# Patient Record
Sex: MALE | Race: WHITE | NOT HISPANIC OR LATINO | Employment: OTHER | URBAN - METROPOLITAN AREA
[De-identification: names, ages, dates, MRNs, and addresses within clinical notes are randomized per-mention and may not be internally consistent; named-entity substitution may affect disease eponyms.]

---

## 2017-05-31 ENCOUNTER — ALLSCRIPTS OFFICE VISIT (OUTPATIENT)
Dept: OTHER | Facility: OTHER | Age: 37
End: 2017-05-31

## 2017-06-05 DIAGNOSIS — R73.01 IMPAIRED FASTING GLUCOSE: ICD-10-CM

## 2017-06-08 ENCOUNTER — GENERIC CONVERSION - ENCOUNTER (OUTPATIENT)
Dept: OTHER | Facility: OTHER | Age: 37
End: 2017-06-08

## 2017-07-12 ENCOUNTER — ALLSCRIPTS OFFICE VISIT (OUTPATIENT)
Dept: OTHER | Facility: OTHER | Age: 37
End: 2017-07-12

## 2017-07-12 LAB
CLARITY UR: NORMAL
COLOR UR: YELLOW
GLUCOSE (HISTORICAL): NORMAL
KETONES UR STRIP-MCNC: NORMAL MG/DL
LEUKOCYTE ESTERASE UR QL STRIP: NORMAL
NITRITE UR QL STRIP: NORMAL
PH UR STRIP.AUTO: 6 [PH]
PROT UR STRIP-MCNC: NORMAL MG/DL
SP GR UR STRIP.AUTO: 1.01

## 2017-07-25 ENCOUNTER — ALLSCRIPTS OFFICE VISIT (OUTPATIENT)
Dept: OTHER | Facility: OTHER | Age: 37
End: 2017-07-25

## 2017-07-25 DIAGNOSIS — E55.9 VITAMIN D DEFICIENCY: ICD-10-CM

## 2017-07-25 DIAGNOSIS — N18.9 CHRONIC KIDNEY DISEASE: ICD-10-CM

## 2017-07-25 LAB
BILIRUB UR QL STRIP: NEGATIVE
CLARITY UR: NORMAL
COLOR UR: CLEAR
GLUCOSE (HISTORICAL): 250
HGB UR QL STRIP.AUTO: NEGATIVE
KETONES UR STRIP-MCNC: NEGATIVE MG/DL
LEUKOCYTE ESTERASE UR QL STRIP: NEGATIVE
NITRITE UR QL STRIP: NEGATIVE
PH UR STRIP.AUTO: 5 [PH]
PROT UR STRIP-MCNC: NEGATIVE MG/DL
SP GR UR STRIP.AUTO: 1.01
UROBILINOGEN UR QL STRIP.AUTO: 0.2

## 2017-08-03 ENCOUNTER — HOSPITAL ENCOUNTER (EMERGENCY)
Facility: HOSPITAL | Age: 37
Discharge: HOME | End: 2017-08-03
Attending: EMERGENCY MEDICINE | Admitting: EMERGENCY MEDICINE
Payer: COMMERCIAL

## 2017-08-03 ENCOUNTER — HOSPITAL ENCOUNTER (OUTPATIENT)
Dept: RADIOLOGY | Facility: HOSPITAL | Age: 37
Discharge: HOME | End: 2017-08-03
Attending: INTERNAL MEDICINE
Payer: COMMERCIAL

## 2017-08-03 VITALS
DIASTOLIC BLOOD PRESSURE: 73 MMHG | TEMPERATURE: 97.9 F | SYSTOLIC BLOOD PRESSURE: 134 MMHG | HEART RATE: 72 BPM | OXYGEN SATURATION: 98 % | WEIGHT: 238 LBS | RESPIRATION RATE: 14 BRPM

## 2017-08-03 DIAGNOSIS — N18.9 CHRONIC KIDNEY DISEASE: ICD-10-CM

## 2017-08-03 DIAGNOSIS — K52.9 GASTROENTERITIS, ACUTE: Primary | ICD-10-CM

## 2017-08-03 DIAGNOSIS — R07.89 ATYPICAL CHEST PAIN: ICD-10-CM

## 2017-08-03 LAB
ALBUMIN SERPL BCP-MCNC: 3.4 G/DL (ref 3.5–5)
ALP SERPL-CCNC: 101 U/L (ref 46–116)
ALT SERPL W P-5'-P-CCNC: 30 U/L (ref 12–78)
ANION GAP SERPL CALCULATED.3IONS-SCNC: 7 MMOL/L (ref 4–13)
AST SERPL W P-5'-P-CCNC: 15 U/L (ref 5–45)
ATRIAL RATE: 74 BPM
ATRIAL RATE: 79 BPM
BASOPHILS # BLD AUTO: 0.02 THOUSANDS/ΜL (ref 0–0.1)
BASOPHILS NFR BLD AUTO: 0 % (ref 0–1)
BILIRUB SERPL-MCNC: 0.34 MG/DL (ref 0.2–1)
BUN SERPL-MCNC: 18 MG/DL (ref 5–25)
CALCIUM SERPL-MCNC: 9 MG/DL (ref 8.3–10.1)
CHLORIDE SERPL-SCNC: 106 MMOL/L (ref 100–108)
CO2 SERPL-SCNC: 26 MMOL/L (ref 21–32)
CREAT SERPL-MCNC: 1.23 MG/DL (ref 0.6–1.3)
EOSINOPHIL # BLD AUTO: 0.13 THOUSAND/ΜL (ref 0–0.61)
EOSINOPHIL NFR BLD AUTO: 1 % (ref 0–6)
ERYTHROCYTE [DISTWIDTH] IN BLOOD BY AUTOMATED COUNT: 12 % (ref 11.6–15.1)
GFR SERPL CREATININE-BSD FRML MDRD: 76 ML/MIN/1.73SQ M
GLUCOSE SERPL-MCNC: 146 MG/DL (ref 65–140)
HCT VFR BLD AUTO: 49.2 % (ref 36.5–49.3)
HGB BLD-MCNC: 17.1 G/DL (ref 12–17)
LYMPHOCYTES # BLD AUTO: 2.89 THOUSANDS/ΜL (ref 0.6–4.47)
LYMPHOCYTES NFR BLD AUTO: 29 % (ref 14–44)
MCH RBC QN AUTO: 30.4 PG (ref 26.8–34.3)
MCHC RBC AUTO-ENTMCNC: 34.8 G/DL (ref 31.4–37.4)
MCV RBC AUTO: 88 FL (ref 82–98)
MONOCYTES # BLD AUTO: 0.75 THOUSAND/ΜL (ref 0.17–1.22)
MONOCYTES NFR BLD AUTO: 8 % (ref 4–12)
NEUTROPHILS # BLD AUTO: 6.02 THOUSANDS/ΜL (ref 1.85–7.62)
NEUTS SEG NFR BLD AUTO: 62 % (ref 43–75)
NRBC BLD AUTO-RTO: 0 /100 WBCS
P AXIS: 42 DEGREES
P AXIS: 43 DEGREES
PLATELET # BLD AUTO: 173 THOUSANDS/UL (ref 149–390)
PMV BLD AUTO: 9.6 FL (ref 8.9–12.7)
POTASSIUM SERPL-SCNC: 4.5 MMOL/L (ref 3.5–5.3)
PR INTERVAL: 174 MS
PR INTERVAL: 184 MS
PROT SERPL-MCNC: 7.3 G/DL (ref 6.4–8.2)
QRS AXIS: -2 DEGREES
QRS AXIS: 11 DEGREES
QRSD INTERVAL: 96 MS
QRSD INTERVAL: 98 MS
QT INTERVAL: 386 MS
QT INTERVAL: 408 MS
QTC INTERVAL: 442 MS
QTC INTERVAL: 452 MS
RBC # BLD AUTO: 5.62 MILLION/UL (ref 3.88–5.62)
SODIUM SERPL-SCNC: 139 MMOL/L (ref 136–145)
T WAVE AXIS: 30 DEGREES
T WAVE AXIS: 32 DEGREES
T4 FREE SERPL-MCNC: 1.23 NG/DL (ref 0.76–1.46)
TROPONIN I SERPL-MCNC: <0.02 NG/ML
TROPONIN I SERPL-MCNC: <0.02 NG/ML
TSH SERPL DL<=0.05 MIU/L-ACNC: 1.16 UIU/ML (ref 0.36–3.74)
VENTRICULAR RATE: 74 BPM
VENTRICULAR RATE: 79 BPM
WBC # BLD AUTO: 9.89 THOUSAND/UL (ref 4.31–10.16)

## 2017-08-03 PROCEDURE — 80053 COMPREHEN METABOLIC PANEL: CPT | Performed by: EMERGENCY MEDICINE

## 2017-08-03 PROCEDURE — 93005 ELECTROCARDIOGRAM TRACING: CPT | Performed by: EMERGENCY MEDICINE

## 2017-08-03 PROCEDURE — 84443 ASSAY THYROID STIM HORMONE: CPT | Performed by: EMERGENCY MEDICINE

## 2017-08-03 PROCEDURE — 36415 COLL VENOUS BLD VENIPUNCTURE: CPT | Performed by: EMERGENCY MEDICINE

## 2017-08-03 PROCEDURE — 76770 US EXAM ABDO BACK WALL COMP: CPT

## 2017-08-03 PROCEDURE — 84484 ASSAY OF TROPONIN QUANT: CPT | Performed by: EMERGENCY MEDICINE

## 2017-08-03 PROCEDURE — 96361 HYDRATE IV INFUSION ADD-ON: CPT

## 2017-08-03 PROCEDURE — 99285 EMERGENCY DEPT VISIT HI MDM: CPT

## 2017-08-03 PROCEDURE — 84439 ASSAY OF FREE THYROXINE: CPT | Performed by: EMERGENCY MEDICINE

## 2017-08-03 PROCEDURE — 96374 THER/PROPH/DIAG INJ IV PUSH: CPT

## 2017-08-03 PROCEDURE — 85025 COMPLETE CBC W/AUTO DIFF WBC: CPT | Performed by: EMERGENCY MEDICINE

## 2017-08-03 RX ORDER — LORAZEPAM 0.5 MG/1
0.5 TABLET ORAL 2 TIMES DAILY
COMMUNITY
End: 2017-08-09 | Stop reason: HOSPADM

## 2017-08-03 RX ORDER — POLYETHYLENE GLYCOL 3350 17 G/17G
17 POWDER, FOR SOLUTION ORAL DAILY
COMMUNITY
End: 2017-08-09 | Stop reason: HOSPADM

## 2017-08-03 RX ORDER — LANOLIN ALCOHOL/MO/W.PET/CERES
CREAM (GRAM) TOPICAL DAILY
Status: ON HOLD | COMMUNITY
End: 2017-08-09

## 2017-08-03 RX ORDER — ONDANSETRON 4 MG/1
4 TABLET, FILM COATED ORAL EVERY 6 HOURS
Qty: 12 TABLET | Refills: 0 | Status: SHIPPED | OUTPATIENT
Start: 2017-08-03 | End: 2017-08-05 | Stop reason: ALTCHOICE

## 2017-08-03 RX ORDER — CHOLECALCIFEROL (VITAMIN D3) 1250 MCG
1 CAPSULE ORAL WEEKLY
COMMUNITY
End: 2017-08-09 | Stop reason: HOSPADM

## 2017-08-03 RX ORDER — PROPRANOLOL HYDROCHLORIDE 120 MG/1
120 CAPSULE, EXTENDED RELEASE ORAL DAILY
COMMUNITY
End: 2017-08-05 | Stop reason: ALTCHOICE

## 2017-08-03 RX ORDER — LEVOTHYROXINE SODIUM 0.03 MG/1
25 TABLET ORAL DAILY
COMMUNITY
End: 2017-08-09 | Stop reason: HOSPADM

## 2017-08-03 RX ORDER — LORATADINE 10 MG/1
10 TABLET ORAL DAILY
COMMUNITY
End: 2017-08-09 | Stop reason: HOSPADM

## 2017-08-03 RX ORDER — DULOXETIN HYDROCHLORIDE 60 MG/1
60 CAPSULE, DELAYED RELEASE ORAL DAILY
COMMUNITY
End: 2017-08-09 | Stop reason: HOSPADM

## 2017-08-03 RX ORDER — ONDANSETRON 2 MG/ML
4 INJECTION INTRAMUSCULAR; INTRAVENOUS ONCE
Status: COMPLETED | OUTPATIENT
Start: 2017-08-03 | End: 2017-08-03

## 2017-08-03 RX ORDER — LAMOTRIGINE 200 MG/1
200 TABLET ORAL 2 TIMES DAILY
COMMUNITY
End: 2017-08-09 | Stop reason: HOSPADM

## 2017-08-03 RX ORDER — CYANOCOBALAMIN (VITAMIN B-12) 500 MCG
1 LOZENGE ORAL DAILY
COMMUNITY
End: 2017-08-09 | Stop reason: HOSPADM

## 2017-08-03 RX ORDER — OLANZAPINE 10 MG/1
10 TABLET ORAL
Status: ON HOLD | COMMUNITY
End: 2017-08-09

## 2017-08-03 RX ADMIN — ONDANSETRON 4 MG: 2 INJECTION INTRAMUSCULAR; INTRAVENOUS at 09:44

## 2017-08-03 RX ADMIN — SODIUM CHLORIDE 1000 ML: 0.9 INJECTION, SOLUTION INTRAVENOUS at 09:43

## 2017-08-04 ENCOUNTER — ALLSCRIPTS OFFICE VISIT (OUTPATIENT)
Dept: OTHER | Facility: OTHER | Age: 37
End: 2017-08-04

## 2017-08-05 ENCOUNTER — HOSPITAL ENCOUNTER (EMERGENCY)
Facility: HOSPITAL | Age: 37
End: 2017-08-06
Attending: EMERGENCY MEDICINE | Admitting: EMERGENCY MEDICINE
Payer: COMMERCIAL

## 2017-08-05 DIAGNOSIS — R46.89 AGGRESSIVE BEHAVIOR: ICD-10-CM

## 2017-08-05 DIAGNOSIS — R45.851 SUICIDAL THOUGHTS: Primary | ICD-10-CM

## 2017-08-05 RX ORDER — HALOPERIDOL 0.5 MG/1
0.5 TABLET ORAL
COMMUNITY
End: 2017-08-09 | Stop reason: HOSPADM

## 2017-08-05 RX ORDER — ARIPIPRAZOLE 5 MG/1
5 TABLET ORAL DAILY
COMMUNITY
End: 2017-08-09 | Stop reason: HOSPADM

## 2017-08-05 RX ORDER — OLANZAPINE 5 MG/1
10 TABLET, ORALLY DISINTEGRATING ORAL
Status: DISCONTINUED | OUTPATIENT
Start: 2017-08-05 | End: 2017-08-06 | Stop reason: HOSPADM

## 2017-08-05 RX ORDER — MAG HYDROX/ALUMINUM HYD/SIMETH 400-400-40
5000 SUSPENSION, ORAL (FINAL DOSE FORM) ORAL DAILY
COMMUNITY
End: 2017-08-09 | Stop reason: HOSPADM

## 2017-08-05 RX ORDER — OLANZAPINE 5 MG/1
5 TABLET ORAL
COMMUNITY
End: 2017-08-09 | Stop reason: HOSPADM

## 2017-08-05 RX ORDER — LORAZEPAM 1 MG/1
1 TABLET ORAL ONCE
Status: COMPLETED | OUTPATIENT
Start: 2017-08-05 | End: 2017-08-06

## 2017-08-05 RX ADMIN — OLANZAPINE 10 MG: 5 TABLET, ORALLY DISINTEGRATING ORAL at 21:26

## 2017-08-06 ENCOUNTER — HOSPITAL ENCOUNTER (INPATIENT)
Facility: HOSPITAL | Age: 37
LOS: 3 days | Discharge: HOME | DRG: 885 | End: 2017-08-09
Attending: PSYCHIATRY & NEUROLOGY | Admitting: PSYCHIATRY & NEUROLOGY
Payer: COMMERCIAL

## 2017-08-06 VITALS
TEMPERATURE: 97.9 F | BODY MASS INDEX: 34.07 KG/M2 | HEIGHT: 69 IN | RESPIRATION RATE: 20 BRPM | DIASTOLIC BLOOD PRESSURE: 91 MMHG | OXYGEN SATURATION: 94 % | SYSTOLIC BLOOD PRESSURE: 137 MMHG | HEART RATE: 88 BPM | WEIGHT: 230 LBS

## 2017-08-06 DIAGNOSIS — F41.9 ANXIETY: ICD-10-CM

## 2017-08-06 DIAGNOSIS — K59.00 CONSTIPATION: ICD-10-CM

## 2017-08-06 DIAGNOSIS — E56.0 VITAMIN E DEFICIENCY: ICD-10-CM

## 2017-08-06 DIAGNOSIS — T78.40XD ALLERGY, SUBSEQUENT ENCOUNTER: ICD-10-CM

## 2017-08-06 DIAGNOSIS — F32.89 OTHER DEPRESSION: ICD-10-CM

## 2017-08-06 DIAGNOSIS — R45.1 AGITATION: ICD-10-CM

## 2017-08-06 DIAGNOSIS — L85.3 DRY SKIN: ICD-10-CM

## 2017-08-06 DIAGNOSIS — E03.9 HYPOTHYROIDISM, UNSPECIFIED TYPE: ICD-10-CM

## 2017-08-06 DIAGNOSIS — E55.9 VITAMIN D DEFICIENCY: ICD-10-CM

## 2017-08-06 DIAGNOSIS — F20.1 DISORGANIZED SCHIZOPHRENIA (HCC): Primary | Chronic | ICD-10-CM

## 2017-08-06 PROCEDURE — 99285 EMERGENCY DEPT VISIT HI MDM: CPT

## 2017-08-06 RX ORDER — DIPHENHYDRAMINE HCL 25 MG
50 TABLET ORAL EVERY 4 HOURS PRN
Status: DISCONTINUED | OUTPATIENT
Start: 2017-08-06 | End: 2017-08-09 | Stop reason: HOSPADM

## 2017-08-06 RX ORDER — HALOPERIDOL 5 MG/ML
5 INJECTION INTRAMUSCULAR EVERY 6 HOURS PRN
Status: CANCELLED | OUTPATIENT
Start: 2017-08-06

## 2017-08-06 RX ORDER — HALOPERIDOL 5 MG/ML
5 INJECTION INTRAMUSCULAR EVERY 6 HOURS PRN
Status: DISCONTINUED | OUTPATIENT
Start: 2017-08-06 | End: 2017-08-09 | Stop reason: HOSPADM

## 2017-08-06 RX ORDER — LORAZEPAM 2 MG/ML
2 INJECTION INTRAMUSCULAR EVERY 4 HOURS PRN
Status: DISCONTINUED | OUTPATIENT
Start: 2017-08-06 | End: 2017-08-09 | Stop reason: HOSPADM

## 2017-08-06 RX ORDER — VITAMIN E 268 MG
400 CAPSULE ORAL DAILY
Status: DISCONTINUED | OUTPATIENT
Start: 2017-08-06 | End: 2017-08-09 | Stop reason: HOSPADM

## 2017-08-06 RX ORDER — DIPHENHYDRAMINE HCL 25 MG
50 TABLET ORAL ONCE
Status: COMPLETED | OUTPATIENT
Start: 2017-08-06 | End: 2017-08-06

## 2017-08-06 RX ORDER — LORAZEPAM 1 MG/1
TABLET ORAL
Status: DISPENSED
Start: 2017-08-06 | End: 2017-08-06

## 2017-08-06 RX ORDER — ZIPRASIDONE MESYLATE 20 MG/ML
20 INJECTION, POWDER, LYOPHILIZED, FOR SOLUTION INTRAMUSCULAR EVERY 4 HOURS PRN
Status: DISCONTINUED | OUTPATIENT
Start: 2017-08-06 | End: 2017-08-09 | Stop reason: HOSPADM

## 2017-08-06 RX ORDER — LOXAPINE SUCCINATE 25 MG/1
50 TABLET ORAL EVERY MORNING
Status: DISCONTINUED | OUTPATIENT
Start: 2017-08-07 | End: 2017-08-09 | Stop reason: HOSPADM

## 2017-08-06 RX ORDER — DIPHENHYDRAMINE HCL 25 MG
25 TABLET ORAL EVERY MORNING
Status: DISCONTINUED | OUTPATIENT
Start: 2017-08-06 | End: 2017-08-09 | Stop reason: HOSPADM

## 2017-08-06 RX ORDER — LORAZEPAM 1 MG/1
1 TABLET ORAL EVERY 4 HOURS PRN
Status: DISCONTINUED | OUTPATIENT
Start: 2017-08-06 | End: 2017-08-09 | Stop reason: HOSPADM

## 2017-08-06 RX ORDER — ACETAMINOPHEN 325 MG/1
650 TABLET ORAL EVERY 6 HOURS PRN
Status: CANCELLED | OUTPATIENT
Start: 2017-08-06

## 2017-08-06 RX ORDER — LOXAPINE SUCCINATE 25 MG/1
100 TABLET ORAL EVERY MORNING
Status: DISCONTINUED | OUTPATIENT
Start: 2017-08-06 | End: 2017-08-06

## 2017-08-06 RX ORDER — DIPHENHYDRAMINE HCL 25 MG
50 TABLET ORAL
Status: DISCONTINUED | OUTPATIENT
Start: 2017-08-06 | End: 2017-08-07

## 2017-08-06 RX ORDER — MELATONIN
1000 DAILY
Status: DISCONTINUED | OUTPATIENT
Start: 2017-08-06 | End: 2017-08-09 | Stop reason: HOSPADM

## 2017-08-06 RX ORDER — POLYETHYLENE GLYCOL 3350 17 G/17G
17 POWDER, FOR SOLUTION ORAL DAILY
Status: DISCONTINUED | OUTPATIENT
Start: 2017-08-06 | End: 2017-08-09 | Stop reason: HOSPADM

## 2017-08-06 RX ORDER — OLANZAPINE 5 MG/1
TABLET, ORALLY DISINTEGRATING ORAL
Status: COMPLETED
Start: 2017-08-06 | End: 2017-08-06

## 2017-08-06 RX ORDER — DOCUSATE SODIUM 100 MG/1
200 CAPSULE, LIQUID FILLED ORAL DAILY
Status: DISCONTINUED | OUTPATIENT
Start: 2017-08-06 | End: 2017-08-09 | Stop reason: HOSPADM

## 2017-08-06 RX ORDER — OLANZAPINE 5 MG/1
5 TABLET ORAL EVERY 4 HOURS PRN
Status: DISCONTINUED | OUTPATIENT
Start: 2017-08-06 | End: 2017-08-06

## 2017-08-06 RX ORDER — OLANZAPINE 10 MG/1
10 TABLET, ORALLY DISINTEGRATING ORAL
Status: DISCONTINUED | OUTPATIENT
Start: 2017-08-06 | End: 2017-08-09 | Stop reason: HOSPADM

## 2017-08-06 RX ORDER — LAMOTRIGINE 100 MG/1
200 TABLET ORAL 2 TIMES DAILY
Status: DISCONTINUED | OUTPATIENT
Start: 2017-08-06 | End: 2017-08-06

## 2017-08-06 RX ORDER — LORAZEPAM 1 MG/1
1 TABLET ORAL ONCE
Status: COMPLETED | OUTPATIENT
Start: 2017-08-06 | End: 2017-08-06

## 2017-08-06 RX ORDER — DIPHENHYDRAMINE HCL 25 MG
TABLET ORAL
Status: COMPLETED
Start: 2017-08-06 | End: 2017-08-06

## 2017-08-06 RX ORDER — LORAZEPAM 0.5 MG/1
0.5 TABLET ORAL 2 TIMES DAILY
Status: DISCONTINUED | OUTPATIENT
Start: 2017-08-06 | End: 2017-08-06

## 2017-08-06 RX ORDER — CHLORPROMAZINE HYDROCHLORIDE 50 MG/1
50 TABLET, FILM COATED ORAL
Status: DISCONTINUED | OUTPATIENT
Start: 2017-08-06 | End: 2017-08-07

## 2017-08-06 RX ORDER — DULOXETIN HYDROCHLORIDE 60 MG/1
60 CAPSULE, DELAYED RELEASE ORAL DAILY
Status: DISCONTINUED | OUTPATIENT
Start: 2017-08-06 | End: 2017-08-06

## 2017-08-06 RX ORDER — DULOXETIN HYDROCHLORIDE 30 MG/1
30 CAPSULE, DELAYED RELEASE ORAL DAILY
Status: DISCONTINUED | OUTPATIENT
Start: 2017-08-07 | End: 2017-08-09 | Stop reason: HOSPADM

## 2017-08-06 RX ORDER — MAGNESIUM HYDROXIDE/ALUMINUM HYDROXICE/SIMETHICONE 120; 1200; 1200 MG/30ML; MG/30ML; MG/30ML
30 SUSPENSION ORAL EVERY 4 HOURS PRN
Status: DISCONTINUED | OUTPATIENT
Start: 2017-08-06 | End: 2017-08-09 | Stop reason: HOSPADM

## 2017-08-06 RX ORDER — CHLORPROMAZINE HYDROCHLORIDE 25 MG/ML
50 INJECTION INTRAMUSCULAR EVERY 6 HOURS PRN
Status: DISCONTINUED | OUTPATIENT
Start: 2017-08-06 | End: 2017-08-09 | Stop reason: HOSPADM

## 2017-08-06 RX ORDER — LORAZEPAM 1 MG/1
1 TABLET ORAL EVERY 4 HOURS PRN
Status: DISCONTINUED | OUTPATIENT
Start: 2017-08-06 | End: 2017-08-06 | Stop reason: SDUPTHER

## 2017-08-06 RX ORDER — LORAZEPAM 1 MG/1
1 TABLET ORAL EVERY 8 HOURS PRN
Status: DISCONTINUED | OUTPATIENT
Start: 2017-08-06 | End: 2017-08-06

## 2017-08-06 RX ORDER — LORATADINE 10 MG/1
10 TABLET ORAL DAILY
Status: DISCONTINUED | OUTPATIENT
Start: 2017-08-06 | End: 2017-08-09 | Stop reason: HOSPADM

## 2017-08-06 RX ORDER — LOXAPINE SUCCINATE 25 MG/1
50 TABLET ORAL EVERY EVENING
Status: DISCONTINUED | OUTPATIENT
Start: 2017-08-06 | End: 2017-08-09 | Stop reason: HOSPADM

## 2017-08-06 RX ORDER — BENZTROPINE MESYLATE 1 MG/ML
1 INJECTION INTRAMUSCULAR; INTRAVENOUS EVERY 6 HOURS PRN
Status: CANCELLED | OUTPATIENT
Start: 2017-08-06

## 2017-08-06 RX ORDER — OLANZAPINE 10 MG/1
10 INJECTION, POWDER, LYOPHILIZED, FOR SOLUTION INTRAMUSCULAR
Status: DISCONTINUED | OUTPATIENT
Start: 2017-08-06 | End: 2017-08-09 | Stop reason: HOSPADM

## 2017-08-06 RX ORDER — OLANZAPINE 10 MG/1
10 TABLET ORAL EVERY 4 HOURS PRN
Status: DISCONTINUED | OUTPATIENT
Start: 2017-08-06 | End: 2017-08-06

## 2017-08-06 RX ORDER — MAGNESIUM HYDROXIDE/ALUMINUM HYDROXICE/SIMETHICONE 120; 1200; 1200 MG/30ML; MG/30ML; MG/30ML
30 SUSPENSION ORAL EVERY 4 HOURS PRN
Status: CANCELLED | OUTPATIENT
Start: 2017-08-06

## 2017-08-06 RX ORDER — ACETAMINOPHEN 325 MG/1
650 TABLET ORAL EVERY 6 HOURS PRN
Status: DISCONTINUED | OUTPATIENT
Start: 2017-08-06 | End: 2017-08-09 | Stop reason: HOSPADM

## 2017-08-06 RX ORDER — CHLORPROMAZINE HYDROCHLORIDE 50 MG/1
50 TABLET, FILM COATED ORAL EVERY 6 HOURS PRN
Status: DISCONTINUED | OUTPATIENT
Start: 2017-08-06 | End: 2017-08-09 | Stop reason: HOSPADM

## 2017-08-06 RX ORDER — LORAZEPAM 1 MG/1
1 TABLET ORAL 2 TIMES DAILY
Status: DISCONTINUED | OUTPATIENT
Start: 2017-08-06 | End: 2017-08-06

## 2017-08-06 RX ORDER — LORAZEPAM 1 MG/1
1 TABLET ORAL 3 TIMES DAILY
Status: DISCONTINUED | OUTPATIENT
Start: 2017-08-06 | End: 2017-08-09 | Stop reason: HOSPADM

## 2017-08-06 RX ORDER — BENZTROPINE MESYLATE 1 MG/ML
1 INJECTION INTRAMUSCULAR; INTRAVENOUS EVERY 6 HOURS PRN
Status: DISCONTINUED | OUTPATIENT
Start: 2017-08-06 | End: 2017-08-09 | Stop reason: HOSPADM

## 2017-08-06 RX ORDER — ARIPIPRAZOLE 5 MG/1
5 TABLET ORAL DAILY
Status: DISCONTINUED | OUTPATIENT
Start: 2017-08-06 | End: 2017-08-06

## 2017-08-06 RX ORDER — LEVOTHYROXINE SODIUM 0.03 MG/1
25 TABLET ORAL
Status: DISCONTINUED | OUTPATIENT
Start: 2017-08-06 | End: 2017-08-09 | Stop reason: HOSPADM

## 2017-08-06 RX ORDER — PETROLATUM 42 G/100G
OINTMENT TOPICAL DAILY
Status: DISCONTINUED | OUTPATIENT
Start: 2017-08-06 | End: 2017-08-07

## 2017-08-06 RX ORDER — CHLORPROMAZINE HYDROCHLORIDE 25 MG/ML
50 INJECTION INTRAMUSCULAR EVERY 6 HOURS PRN
Status: DISCONTINUED | OUTPATIENT
Start: 2017-08-06 | End: 2017-08-06

## 2017-08-06 RX ADMIN — LAMOTRIGINE 150 MG: 100 TABLET ORAL at 17:06

## 2017-08-06 RX ADMIN — WATER: 1 INJECTION INTRAMUSCULAR; INTRAVENOUS; SUBCUTANEOUS at 16:12

## 2017-08-06 RX ADMIN — DIPHENHYDRAMINE HCL 50 MG: 25 TABLET ORAL at 21:13

## 2017-08-06 RX ADMIN — LORAZEPAM 0.5 MG: 0.5 TABLET ORAL at 11:02

## 2017-08-06 RX ADMIN — NORTRIPTYLINE HYDROCHLORIDE 100 MG: 10 CAPSULE ORAL at 10:38

## 2017-08-06 RX ADMIN — DOCUSATE SODIUM 200 MG: 100 CAPSULE, LIQUID FILLED ORAL at 10:38

## 2017-08-06 RX ADMIN — LORAZEPAM 1 MG: 1 TABLET ORAL at 17:05

## 2017-08-06 RX ADMIN — LORAZEPAM 1 MG: 1 TABLET ORAL at 00:03

## 2017-08-06 RX ADMIN — OLANZAPINE 5 MG: 5 TABLET, ORALLY DISINTEGRATING ORAL at 07:54

## 2017-08-06 RX ADMIN — LORAZEPAM 1 MG: 1 TABLET ORAL at 21:13

## 2017-08-06 RX ADMIN — WATER 10 ML: 1 INJECTION INTRAMUSCULAR; INTRAVENOUS; SUBCUTANEOUS at 14:36

## 2017-08-06 RX ADMIN — OLANZAPINE 10 MG: 5 TABLET, ORALLY DISINTEGRATING ORAL at 00:04

## 2017-08-06 RX ADMIN — VITAMIN D, TAB 1000IU (100/BT) 1000 UNITS: 25 TAB at 10:37

## 2017-08-06 RX ADMIN — CHLORPROMAZINE HYDROCHLORIDE 50 MG: 25 INJECTION INTRAMUSCULAR at 12:43

## 2017-08-06 RX ADMIN — LEVOTHYROXINE SODIUM 25 MCG: 25 TABLET ORAL at 10:50

## 2017-08-06 RX ADMIN — NORTRIPTYLINE HYDROCHLORIDE 50 MG: 10 CAPSULE ORAL at 21:12

## 2017-08-06 RX ADMIN — DIPHENHYDRAMINE HCL 50 MG: 25 TABLET ORAL at 07:54

## 2017-08-06 RX ADMIN — OLANZAPINE 10 MG: 10 TABLET, ORALLY DISINTEGRATING ORAL at 11:29

## 2017-08-06 RX ADMIN — DIPHENHYDRAMINE HYDROCHLORIDE 50 MG: 25 TABLET ORAL at 04:26

## 2017-08-06 RX ADMIN — LAMOTRIGINE 200 MG: 100 TABLET ORAL at 10:37

## 2017-08-06 RX ADMIN — DULOXETINE HYDROCHLORIDE 60 MG: 60 CAPSULE, DELAYED RELEASE ORAL at 10:37

## 2017-08-06 RX ADMIN — LORAZEPAM 1 MG: 1 TABLET ORAL at 07:52

## 2017-08-06 RX ADMIN — OLANZAPINE 10 MG: 10 INJECTION, POWDER, LYOPHILIZED, FOR SOLUTION INTRAMUSCULAR at 12:24

## 2017-08-06 RX ADMIN — ARIPIPRAZOLE 5 MG: 5 TABLET ORAL at 10:37

## 2017-08-06 RX ADMIN — ZIPRASIDONE MESYLATE 20 MG: 20 INJECTION, POWDER, LYOPHILIZED, FOR SOLUTION INTRAMUSCULAR at 15:29

## 2017-08-06 RX ADMIN — LORATADINE 10 MG: 10 TABLET ORAL at 10:50

## 2017-08-06 RX ADMIN — DIPHENHYDRAMINE HCL 50 MG: 25 TABLET ORAL at 14:31

## 2017-08-06 RX ADMIN — LORAZEPAM 1 MG: 1 TABLET ORAL at 14:32

## 2017-08-06 RX ADMIN — LORAZEPAM 1 MG: 1 TABLET ORAL at 04:26

## 2017-08-06 RX ADMIN — LORAZEPAM 1 MG: 1 TABLET ORAL at 11:21

## 2017-08-06 RX ADMIN — VITAMIN E CAP 400 UNIT 400 UNITS: 400 CAP at 11:20

## 2017-08-06 RX ADMIN — ACETAMINOPHEN 650 MG: 325 TABLET, FILM COATED ORAL at 10:37

## 2017-08-07 RX ORDER — CHLORPROMAZINE HYDROCHLORIDE 50 MG/1
50 TABLET, FILM COATED ORAL
Status: DISCONTINUED | OUTPATIENT
Start: 2017-08-07 | End: 2017-08-09 | Stop reason: HOSPADM

## 2017-08-07 RX ORDER — LANOLIN ALCOHOL/MO/W.PET/CERES
CREAM (GRAM) TOPICAL DAILY
Status: DISCONTINUED | OUTPATIENT
Start: 2017-08-07 | End: 2017-08-09 | Stop reason: HOSPADM

## 2017-08-07 RX ORDER — IMIPRAMINE HYDROCHLORIDE 10 MG/1
10 TABLET, FILM COATED ORAL DAILY
Status: DISCONTINUED | OUTPATIENT
Start: 2017-08-07 | End: 2017-08-09

## 2017-08-07 RX ORDER — DIPHENHYDRAMINE HCL 25 MG
50 TABLET ORAL
Status: DISCONTINUED | OUTPATIENT
Start: 2017-08-07 | End: 2017-08-09 | Stop reason: HOSPADM

## 2017-08-07 RX ORDER — CHLORPROMAZINE HYDROCHLORIDE 50 MG/1
50 TABLET, FILM COATED ORAL 2 TIMES DAILY
Status: DISCONTINUED | OUTPATIENT
Start: 2017-08-07 | End: 2017-08-09 | Stop reason: HOSPADM

## 2017-08-07 RX ADMIN — LORATADINE 10 MG: 10 TABLET ORAL at 08:26

## 2017-08-07 RX ADMIN — DIPHENHYDRAMINE HCL 50 MG: 25 TABLET ORAL at 20:00

## 2017-08-07 RX ADMIN — DOCUSATE SODIUM 200 MG: 100 CAPSULE, LIQUID FILLED ORAL at 08:26

## 2017-08-07 RX ADMIN — OLANZAPINE 10 MG: 10 TABLET, ORALLY DISINTEGRATING ORAL at 16:16

## 2017-08-07 RX ADMIN — VITAMIN D, TAB 1000IU (100/BT) 1000 UNITS: 25 TAB at 08:26

## 2017-08-07 RX ADMIN — CHLORPROMAZINE HYDROCHLORIDE 50 MG: 50 TABLET, SUGAR COATED ORAL at 20:01

## 2017-08-07 RX ADMIN — LAMOTRIGINE 150 MG: 100 TABLET ORAL at 17:32

## 2017-08-07 RX ADMIN — VITAMIN E CAP 400 UNIT 400 UNITS: 400 CAP at 09:26

## 2017-08-07 RX ADMIN — LORAZEPAM 1 MG: 1 TABLET ORAL at 15:57

## 2017-08-07 RX ADMIN — DULOXETINE HYDROCHLORIDE 30 MG: 30 CAPSULE, DELAYED RELEASE ORAL at 08:26

## 2017-08-07 RX ADMIN — LORAZEPAM 1 MG: 1 TABLET ORAL at 08:26

## 2017-08-07 RX ADMIN — POLYETHYLENE GLYCOL 3350 17 G: 17 POWDER, FOR SOLUTION ORAL at 08:27

## 2017-08-07 RX ADMIN — DIPHENHYDRAMINE HCL 25 MG: 25 TABLET ORAL at 08:27

## 2017-08-07 RX ADMIN — LAMOTRIGINE 150 MG: 100 TABLET ORAL at 08:26

## 2017-08-07 RX ADMIN — CHLORPROMAZINE HYDROCHLORIDE 50 MG: 50 TABLET, SUGAR COATED ORAL at 17:32

## 2017-08-07 RX ADMIN — IMIPRAMINE HYDROCHLORIDE 10 MG: 10 TABLET, FILM COATED ORAL at 10:42

## 2017-08-07 RX ADMIN — CHLORPROMAZINE HYDROCHLORIDE 50 MG: 50 TABLET, SUGAR COATED ORAL at 10:42

## 2017-08-07 RX ADMIN — Medication: at 10:42

## 2017-08-07 RX ADMIN — LORAZEPAM 1 MG: 1 TABLET ORAL at 20:00

## 2017-08-07 RX ADMIN — NORTRIPTYLINE HYDROCHLORIDE 50 MG: 10 CAPSULE ORAL at 19:36

## 2017-08-07 RX ADMIN — LEVOTHYROXINE SODIUM 25 MCG: 25 TABLET ORAL at 07:12

## 2017-08-07 RX ADMIN — DIPHENHYDRAMINE HCL 50 MG: 25 TABLET ORAL at 16:16

## 2017-08-07 RX ADMIN — LOXAPINE 50 MG: 25 CAPSULE ORAL at 08:27

## 2017-08-08 LAB
CHOLEST SERPL-MCNC: 141 MG/DL (ref 50–200)
HDLC SERPL-MCNC: 41 MG/DL (ref 40–60)
LDLC SERPL CALC-MCNC: 76 MG/DL (ref 0–100)
TRIGL SERPL-MCNC: 122 MG/DL

## 2017-08-08 PROCEDURE — 80061 LIPID PANEL: CPT | Performed by: PSYCHIATRY & NEUROLOGY

## 2017-08-08 RX ADMIN — NORTRIPTYLINE HYDROCHLORIDE 50 MG: 10 CAPSULE ORAL at 21:42

## 2017-08-08 RX ADMIN — ALUMINUM HYDROXIDE, MAGNESIUM HYDROXIDE, AND SIMETHICONE 30 ML: 200; 200; 20 SUSPENSION ORAL at 13:05

## 2017-08-08 RX ADMIN — VITAMIN E CAP 400 UNIT 400 UNITS: 400 CAP at 08:42

## 2017-08-08 RX ADMIN — CHLORPROMAZINE HYDROCHLORIDE 50 MG: 50 TABLET, SUGAR COATED ORAL at 21:42

## 2017-08-08 RX ADMIN — LAMOTRIGINE 150 MG: 100 TABLET ORAL at 08:13

## 2017-08-08 RX ADMIN — LORAZEPAM 1 MG: 1 TABLET ORAL at 08:14

## 2017-08-08 RX ADMIN — VITAMIN D, TAB 1000IU (100/BT) 1000 UNITS: 25 TAB at 08:14

## 2017-08-08 RX ADMIN — DIPHENHYDRAMINE HCL 25 MG: 25 TABLET ORAL at 08:14

## 2017-08-08 RX ADMIN — DIPHENHYDRAMINE HCL 50 MG: 25 TABLET ORAL at 21:42

## 2017-08-08 RX ADMIN — LEVOTHYROXINE SODIUM 25 MCG: 25 TABLET ORAL at 05:57

## 2017-08-08 RX ADMIN — LORAZEPAM 1 MG: 1 TABLET ORAL at 16:38

## 2017-08-08 RX ADMIN — DULOXETINE HYDROCHLORIDE 30 MG: 30 CAPSULE, DELAYED RELEASE ORAL at 08:14

## 2017-08-08 RX ADMIN — DIPHENHYDRAMINE HCL 50 MG: 25 TABLET ORAL at 00:25

## 2017-08-08 RX ADMIN — LOXAPINE 50 MG: 25 CAPSULE ORAL at 08:15

## 2017-08-08 RX ADMIN — LAMOTRIGINE 150 MG: 100 TABLET ORAL at 17:51

## 2017-08-08 RX ADMIN — DIPHENHYDRAMINE HCL 50 MG: 25 TABLET ORAL at 16:37

## 2017-08-08 RX ADMIN — IMIPRAMINE HYDROCHLORIDE 10 MG: 10 TABLET, FILM COATED ORAL at 08:42

## 2017-08-08 RX ADMIN — DOCUSATE SODIUM 200 MG: 100 CAPSULE, LIQUID FILLED ORAL at 08:14

## 2017-08-08 RX ADMIN — ACETAMINOPHEN 650 MG: 325 TABLET, FILM COATED ORAL at 01:12

## 2017-08-08 RX ADMIN — CHLORPROMAZINE HYDROCHLORIDE 50 MG: 50 TABLET, SUGAR COATED ORAL at 17:51

## 2017-08-08 RX ADMIN — OLANZAPINE 10 MG: 10 TABLET, ORALLY DISINTEGRATING ORAL at 16:37

## 2017-08-08 RX ADMIN — CHLORPROMAZINE HYDROCHLORIDE 50 MG: 50 TABLET, SUGAR COATED ORAL at 08:14

## 2017-08-08 RX ADMIN — LORAZEPAM 1 MG: 1 TABLET ORAL at 21:42

## 2017-08-08 RX ADMIN — POLYETHYLENE GLYCOL 3350 17 G: 17 POWDER, FOR SOLUTION ORAL at 08:15

## 2017-08-08 RX ADMIN — Medication: at 08:18

## 2017-08-08 RX ADMIN — LORATADINE 10 MG: 10 TABLET ORAL at 08:14

## 2017-08-09 VITALS
HEART RATE: 88 BPM | BODY MASS INDEX: 34.07 KG/M2 | TEMPERATURE: 97.5 F | HEIGHT: 69 IN | DIASTOLIC BLOOD PRESSURE: 81 MMHG | WEIGHT: 230 LBS | SYSTOLIC BLOOD PRESSURE: 136 MMHG | RESPIRATION RATE: 16 BRPM

## 2017-08-09 RX ORDER — IMIPRAMINE HYDROCHLORIDE 10 MG/1
10 TABLET, FILM COATED ORAL
Status: DISCONTINUED | OUTPATIENT
Start: 2017-08-09 | End: 2017-08-09 | Stop reason: HOSPADM

## 2017-08-09 RX ORDER — LANOLIN ALCOHOL/MO/W.PET/CERES
CREAM (GRAM) TOPICAL DAILY
Qty: 240 ML | Refills: 0 | Status: ON HOLD | OUTPATIENT
Start: 2017-08-09 | End: 2017-08-24

## 2017-08-09 RX ORDER — LANOLIN ALCOHOL/MO/W.PET/CERES
CREAM (GRAM) TOPICAL DAILY
Qty: 20 G | Refills: 0 | Status: SHIPPED | OUTPATIENT
Start: 2017-08-09 | End: 2017-08-16

## 2017-08-09 RX ORDER — LAMOTRIGINE 150 MG/1
150 TABLET ORAL 2 TIMES DAILY
Qty: 60 TABLET | Refills: 0 | Status: SHIPPED | OUTPATIENT
Start: 2017-08-09 | End: 2017-08-24 | Stop reason: HOSPADM

## 2017-08-09 RX ORDER — DULOXETIN HYDROCHLORIDE 30 MG/1
30 CAPSULE, DELAYED RELEASE ORAL DAILY
Qty: 30 CAPSULE | Refills: 0 | Status: SHIPPED | OUTPATIENT
Start: 2017-08-09 | End: 2017-08-24 | Stop reason: HOSPADM

## 2017-08-09 RX ORDER — VITAMIN E 268 MG
400 CAPSULE ORAL DAILY
Qty: 30 CAPSULE | Refills: 0 | Status: ON HOLD | OUTPATIENT
Start: 2017-08-09 | End: 2017-08-24

## 2017-08-09 RX ORDER — LOXAPINE SUCCINATE 50 MG/1
50 TABLET ORAL EVERY EVENING
Qty: 30 CAPSULE | Refills: 0 | Status: SHIPPED | OUTPATIENT
Start: 2017-08-09 | End: 2017-08-24 | Stop reason: HOSPADM

## 2017-08-09 RX ORDER — OLANZAPINE 10 MG/1
10 TABLET ORAL EVERY 2 HOUR PRN
Qty: 60 TABLET | Refills: 0 | Status: ON HOLD | OUTPATIENT
Start: 2017-08-09 | End: 2017-08-24

## 2017-08-09 RX ORDER — POLYETHYLENE GLYCOL 3350 17 G/17G
17 POWDER, FOR SOLUTION ORAL DAILY
Qty: 119 G | Refills: 0 | Status: SHIPPED | OUTPATIENT
Start: 2017-08-09 | End: 2017-08-24 | Stop reason: HOSPADM

## 2017-08-09 RX ORDER — LORATADINE 10 MG/1
10 TABLET ORAL DAILY
Qty: 30 TABLET | Refills: 0 | Status: ON HOLD | OUTPATIENT
Start: 2017-08-09 | End: 2017-08-24

## 2017-08-09 RX ORDER — IMIPRAMINE HYDROCHLORIDE 10 MG/1
10 TABLET, FILM COATED ORAL
Qty: 30 TABLET | Refills: 0 | Status: SHIPPED | OUTPATIENT
Start: 2017-08-09 | End: 2017-08-16

## 2017-08-09 RX ORDER — DOCUSATE SODIUM 100 MG/1
200 CAPSULE, LIQUID FILLED ORAL DAILY
Qty: 10 CAPSULE | Refills: 0 | Status: ON HOLD | OUTPATIENT
Start: 2017-08-09 | End: 2017-08-24

## 2017-08-09 RX ORDER — LOXAPINE SUCCINATE 50 MG/1
50 TABLET ORAL EVERY MORNING
Qty: 30 CAPSULE | Refills: 0 | Status: SHIPPED | OUTPATIENT
Start: 2017-08-09 | End: 2017-08-24 | Stop reason: HOSPADM

## 2017-08-09 RX ORDER — DIPHENHYDRAMINE HCL 25 MG
25 TABLET ORAL EVERY MORNING
Qty: 30 TABLET | Refills: 0 | Status: SHIPPED | OUTPATIENT
Start: 2017-08-09 | End: 2017-08-16

## 2017-08-09 RX ORDER — LORAZEPAM 1 MG/1
1 TABLET ORAL 3 TIMES DAILY
Qty: 30 TABLET | Refills: 0 | Status: ON HOLD | OUTPATIENT
Start: 2017-08-09 | End: 2017-08-24

## 2017-08-09 RX ORDER — LORAZEPAM 1 MG/1
1 TABLET ORAL EVERY 4 HOURS PRN
Qty: 30 TABLET | Refills: 0 | Status: SHIPPED | OUTPATIENT
Start: 2017-08-09 | End: 2017-08-16

## 2017-08-09 RX ORDER — CHLORPROMAZINE HYDROCHLORIDE 50 MG/1
50 TABLET, FILM COATED ORAL 3 TIMES DAILY
Qty: 90 TABLET | Refills: 0 | Status: ON HOLD | OUTPATIENT
Start: 2017-08-09 | End: 2017-08-24

## 2017-08-09 RX ORDER — LEVOTHYROXINE SODIUM 0.03 MG/1
25 TABLET ORAL
Qty: 30 TABLET | Refills: 0 | Status: ON HOLD | OUTPATIENT
Start: 2017-08-09 | End: 2017-08-24

## 2017-08-09 RX ADMIN — LAMOTRIGINE 150 MG: 100 TABLET ORAL at 08:05

## 2017-08-09 RX ADMIN — LOXAPINE 50 MG: 25 CAPSULE ORAL at 08:04

## 2017-08-09 RX ADMIN — IMIPRAMINE HYDROCHLORIDE 10 MG: 10 TABLET, FILM COATED ORAL at 08:05

## 2017-08-09 RX ADMIN — DULOXETINE HYDROCHLORIDE 30 MG: 30 CAPSULE, DELAYED RELEASE ORAL at 08:06

## 2017-08-09 RX ADMIN — LORAZEPAM 1 MG: 1 TABLET ORAL at 08:06

## 2017-08-09 RX ADMIN — DIPHENHYDRAMINE HCL 25 MG: 25 TABLET ORAL at 08:04

## 2017-08-09 RX ADMIN — LEVOTHYROXINE SODIUM 25 MCG: 25 TABLET ORAL at 05:24

## 2017-08-09 RX ADMIN — VITAMIN D, TAB 1000IU (100/BT) 1000 UNITS: 25 TAB at 08:05

## 2017-08-09 RX ADMIN — LORATADINE 10 MG: 10 TABLET ORAL at 08:04

## 2017-08-09 RX ADMIN — VITAMIN E CAP 400 UNIT 400 UNITS: 400 CAP at 08:06

## 2017-08-09 RX ADMIN — CHLORPROMAZINE HYDROCHLORIDE 50 MG: 50 TABLET, SUGAR COATED ORAL at 08:06

## 2017-08-09 RX ADMIN — Medication: at 08:23

## 2017-08-14 ENCOUNTER — GENERIC CONVERSION - ENCOUNTER (OUTPATIENT)
Dept: OTHER | Facility: OTHER | Age: 37
End: 2017-08-14

## 2017-08-16 ENCOUNTER — APPOINTMENT (EMERGENCY)
Dept: RADIOLOGY | Facility: HOSPITAL | Age: 37
DRG: 885 | End: 2017-08-16
Payer: COMMERCIAL

## 2017-08-16 ENCOUNTER — HOSPITAL ENCOUNTER (INPATIENT)
Facility: HOSPITAL | Age: 37
LOS: 8 days | Discharge: HOME | DRG: 885 | End: 2017-08-24
Attending: EMERGENCY MEDICINE | Admitting: PSYCHIATRY & NEUROLOGY
Payer: COMMERCIAL

## 2017-08-16 DIAGNOSIS — R45.851 SUICIDAL IDEATIONS: ICD-10-CM

## 2017-08-16 DIAGNOSIS — E56.0 VITAMIN E DEFICIENCY: ICD-10-CM

## 2017-08-16 DIAGNOSIS — F25.0 SCHIZOAFFECTIVE DISORDER, BIPOLAR TYPE (HCC): Primary | Chronic | ICD-10-CM

## 2017-08-16 DIAGNOSIS — L85.3 DRY SKIN: ICD-10-CM

## 2017-08-16 DIAGNOSIS — E03.9 HYPOTHYROIDISM, UNSPECIFIED TYPE: ICD-10-CM

## 2017-08-16 DIAGNOSIS — F20.1 DISORGANIZED SCHIZOPHRENIA (HCC): Chronic | ICD-10-CM

## 2017-08-16 DIAGNOSIS — F23 ACUTE PSYCHOSIS (HCC): ICD-10-CM

## 2017-08-16 DIAGNOSIS — G47.00 INSOMNIA, UNSPECIFIED TYPE: ICD-10-CM

## 2017-08-16 DIAGNOSIS — E55.9 VITAMIN D DEFICIENCY: ICD-10-CM

## 2017-08-16 DIAGNOSIS — K59.00 CONSTIPATION: ICD-10-CM

## 2017-08-16 DIAGNOSIS — T78.40XD ALLERGY, SUBSEQUENT ENCOUNTER: ICD-10-CM

## 2017-08-16 LAB
AMPHETAMINES SERPL QL SCN: NEGATIVE
BARBITURATES UR QL: NEGATIVE
BENZODIAZ UR QL: NEGATIVE
COCAINE UR QL: NEGATIVE
ETHANOL EXG-MCNC: 0 MG/DL
METHADONE UR QL: NEGATIVE
OPIATES UR QL SCN: NEGATIVE
PCP UR QL: NEGATIVE
THC UR QL: NEGATIVE

## 2017-08-16 PROCEDURE — 96372 THER/PROPH/DIAG INJ SC/IM: CPT

## 2017-08-16 PROCEDURE — 82075 ASSAY OF BREATH ETHANOL: CPT | Performed by: EMERGENCY MEDICINE

## 2017-08-16 PROCEDURE — 72125 CT NECK SPINE W/O DYE: CPT

## 2017-08-16 PROCEDURE — 99285 EMERGENCY DEPT VISIT HI MDM: CPT

## 2017-08-16 PROCEDURE — 80307 DRUG TEST PRSMV CHEM ANLYZR: CPT | Performed by: EMERGENCY MEDICINE

## 2017-08-16 PROCEDURE — 70450 CT HEAD/BRAIN W/O DYE: CPT

## 2017-08-16 RX ORDER — DULOXETIN HYDROCHLORIDE 30 MG/1
30 CAPSULE, DELAYED RELEASE ORAL DAILY
Status: DISCONTINUED | OUTPATIENT
Start: 2017-08-17 | End: 2017-08-17

## 2017-08-16 RX ORDER — VITAMIN E 268 MG
400 CAPSULE ORAL DAILY
Status: DISCONTINUED | OUTPATIENT
Start: 2017-08-17 | End: 2017-08-24 | Stop reason: HOSPADM

## 2017-08-16 RX ORDER — LORAZEPAM 2 MG/ML
2 INJECTION INTRAMUSCULAR ONCE
Status: COMPLETED | OUTPATIENT
Start: 2017-08-16 | End: 2017-08-16

## 2017-08-16 RX ORDER — IBUPROFEN 400 MG/1
800 TABLET ORAL EVERY 8 HOURS PRN
Status: DISCONTINUED | OUTPATIENT
Start: 2017-08-16 | End: 2017-08-24 | Stop reason: HOSPADM

## 2017-08-16 RX ORDER — LANOLIN ALCOHOL/MO/W.PET/CERES
CREAM (GRAM) TOPICAL EVERY EVENING
Status: DISCONTINUED | OUTPATIENT
Start: 2017-08-16 | End: 2017-08-24 | Stop reason: HOSPADM

## 2017-08-16 RX ORDER — LORAZEPAM 2 MG/ML
INJECTION INTRAMUSCULAR
Status: COMPLETED
Start: 2017-08-16 | End: 2017-08-16

## 2017-08-16 RX ORDER — ACETAMINOPHEN 325 MG/1
650 TABLET ORAL EVERY 4 HOURS PRN
Status: DISCONTINUED | OUTPATIENT
Start: 2017-08-16 | End: 2017-08-24 | Stop reason: HOSPADM

## 2017-08-16 RX ORDER — LORAZEPAM 2 MG/ML
1 INJECTION INTRAMUSCULAR ONCE
Status: DISCONTINUED | OUTPATIENT
Start: 2017-08-16 | End: 2017-08-16

## 2017-08-16 RX ORDER — DOCUSATE SODIUM 100 MG/1
200 CAPSULE, LIQUID FILLED ORAL DAILY
Status: DISCONTINUED | OUTPATIENT
Start: 2017-08-17 | End: 2017-08-24 | Stop reason: HOSPADM

## 2017-08-16 RX ORDER — LOXAPINE SUCCINATE 25 MG/1
50 TABLET ORAL EVERY MORNING
Status: DISCONTINUED | OUTPATIENT
Start: 2017-08-17 | End: 2017-08-17

## 2017-08-16 RX ORDER — CHLORPROMAZINE HYDROCHLORIDE 50 MG/1
50 TABLET, FILM COATED ORAL ONCE
Status: COMPLETED | OUTPATIENT
Start: 2017-08-16 | End: 2017-08-16

## 2017-08-16 RX ORDER — HALOPERIDOL 5 MG
5 TABLET ORAL EVERY 6 HOURS PRN
Status: DISCONTINUED | OUTPATIENT
Start: 2017-08-16 | End: 2017-08-24 | Stop reason: HOSPADM

## 2017-08-16 RX ORDER — MELATONIN
1000 DAILY
Status: DISCONTINUED | OUTPATIENT
Start: 2017-08-17 | End: 2017-08-24 | Stop reason: HOSPADM

## 2017-08-16 RX ORDER — HYDROXYZINE 50 MG/1
50 TABLET, FILM COATED ORAL EVERY 4 HOURS PRN
Status: DISCONTINUED | OUTPATIENT
Start: 2017-08-16 | End: 2017-08-24 | Stop reason: HOSPADM

## 2017-08-16 RX ORDER — ZIPRASIDONE MESYLATE 20 MG/ML
10 INJECTION, POWDER, LYOPHILIZED, FOR SOLUTION INTRAMUSCULAR ONCE
Status: COMPLETED | OUTPATIENT
Start: 2017-08-16 | End: 2017-08-16

## 2017-08-16 RX ORDER — MAGNESIUM HYDROXIDE/ALUMINUM HYDROXICE/SIMETHICONE 120; 1200; 1200 MG/30ML; MG/30ML; MG/30ML
30 SUSPENSION ORAL EVERY 4 HOURS PRN
Status: DISCONTINUED | OUTPATIENT
Start: 2017-08-16 | End: 2017-08-24 | Stop reason: HOSPADM

## 2017-08-16 RX ORDER — LEVOTHYROXINE SODIUM 0.03 MG/1
25 TABLET ORAL
Status: DISCONTINUED | OUTPATIENT
Start: 2017-08-17 | End: 2017-08-24 | Stop reason: HOSPADM

## 2017-08-16 RX ORDER — LOXAPINE SUCCINATE 25 MG/1
50 TABLET ORAL EVERY EVENING
Status: DISCONTINUED | OUTPATIENT
Start: 2017-08-16 | End: 2017-08-17

## 2017-08-16 RX ORDER — CHLORPROMAZINE HYDROCHLORIDE 50 MG/1
50 TABLET, FILM COATED ORAL 3 TIMES DAILY
Status: DISCONTINUED | OUTPATIENT
Start: 2017-08-16 | End: 2017-08-16

## 2017-08-16 RX ORDER — LORATADINE 10 MG/1
10 TABLET ORAL DAILY
Status: DISCONTINUED | OUTPATIENT
Start: 2017-08-17 | End: 2017-08-24 | Stop reason: HOSPADM

## 2017-08-16 RX ORDER — LORAZEPAM 1 MG/1
1 TABLET ORAL 3 TIMES DAILY
Status: DISCONTINUED | OUTPATIENT
Start: 2017-08-16 | End: 2017-08-24 | Stop reason: HOSPADM

## 2017-08-16 RX ORDER — LORAZEPAM 2 MG/ML
INJECTION INTRAMUSCULAR
Status: DISPENSED
Start: 2017-08-16 | End: 2017-08-16

## 2017-08-16 RX ORDER — TRAZODONE HYDROCHLORIDE 50 MG/1
50 TABLET ORAL
Status: DISCONTINUED | OUTPATIENT
Start: 2017-08-16 | End: 2017-08-23

## 2017-08-16 RX ORDER — ZIPRASIDONE MESYLATE 20 MG/ML
INJECTION, POWDER, LYOPHILIZED, FOR SOLUTION INTRAMUSCULAR
Status: DISCONTINUED
Start: 2017-08-16 | End: 2017-08-16

## 2017-08-16 RX ORDER — CHLORPROMAZINE HYDROCHLORIDE 50 MG/1
50 TABLET, FILM COATED ORAL 4 TIMES DAILY
Status: DISCONTINUED | OUTPATIENT
Start: 2017-08-16 | End: 2017-08-24 | Stop reason: HOSPADM

## 2017-08-16 RX ORDER — LORAZEPAM 2 MG/ML
2 INJECTION INTRAMUSCULAR EVERY 4 HOURS PRN
Status: DISCONTINUED | OUTPATIENT
Start: 2017-08-16 | End: 2017-08-24 | Stop reason: HOSPADM

## 2017-08-16 RX ORDER — OLANZAPINE 10 MG/1
10 INJECTION, POWDER, LYOPHILIZED, FOR SOLUTION INTRAMUSCULAR
Status: DISCONTINUED | OUTPATIENT
Start: 2017-08-16 | End: 2017-08-24 | Stop reason: HOSPADM

## 2017-08-16 RX ORDER — BENZTROPINE MESYLATE 1 MG/1
1 TABLET ORAL EVERY 6 HOURS PRN
Status: DISCONTINUED | OUTPATIENT
Start: 2017-08-16 | End: 2017-08-24 | Stop reason: HOSPADM

## 2017-08-16 RX ORDER — ZIPRASIDONE MESYLATE 20 MG/ML
20 INJECTION, POWDER, LYOPHILIZED, FOR SOLUTION INTRAMUSCULAR ONCE
Status: DISCONTINUED | OUTPATIENT
Start: 2017-08-16 | End: 2017-08-16

## 2017-08-16 RX ORDER — LORAZEPAM 1 MG/1
1 TABLET ORAL EVERY 6 HOURS PRN
Status: DISCONTINUED | OUTPATIENT
Start: 2017-08-16 | End: 2017-08-24 | Stop reason: HOSPADM

## 2017-08-16 RX ORDER — OLANZAPINE 10 MG/1
10 TABLET ORAL
Status: DISCONTINUED | OUTPATIENT
Start: 2017-08-16 | End: 2017-08-24 | Stop reason: HOSPADM

## 2017-08-16 RX ORDER — RISPERIDONE 1 MG/1
2 TABLET, ORALLY DISINTEGRATING ORAL
Status: DISCONTINUED | OUTPATIENT
Start: 2017-08-16 | End: 2017-08-24 | Stop reason: HOSPADM

## 2017-08-16 RX ORDER — HALOPERIDOL 5 MG/ML
5 INJECTION INTRAMUSCULAR EVERY 6 HOURS PRN
Status: DISCONTINUED | OUTPATIENT
Start: 2017-08-16 | End: 2017-08-24 | Stop reason: HOSPADM

## 2017-08-16 RX ORDER — ACETAMINOPHEN 325 MG/1
650 TABLET ORAL EVERY 6 HOURS PRN
Status: DISCONTINUED | OUTPATIENT
Start: 2017-08-16 | End: 2017-08-24 | Stop reason: HOSPADM

## 2017-08-16 RX ORDER — POLYETHYLENE GLYCOL 3350 17 G/17G
17 POWDER, FOR SOLUTION ORAL DAILY
Status: DISCONTINUED | OUTPATIENT
Start: 2017-08-17 | End: 2017-08-24 | Stop reason: HOSPADM

## 2017-08-16 RX ADMIN — LORAZEPAM 1 MG: 1 TABLET ORAL at 21:24

## 2017-08-16 RX ADMIN — CHLORPROMAZINE HYDROCHLORIDE 50 MG: 50 TABLET, SUGAR COATED ORAL at 17:46

## 2017-08-16 RX ADMIN — CHLORPROMAZINE HYDROCHLORIDE 50 MG: 50 TABLET, SUGAR COATED ORAL at 21:23

## 2017-08-16 RX ADMIN — LORAZEPAM 2 MG: 2 INJECTION INTRAMUSCULAR; INTRAVENOUS at 11:53

## 2017-08-16 RX ADMIN — LAMOTRIGINE 150 MG: 100 TABLET ORAL at 17:38

## 2017-08-16 RX ADMIN — OLANZAPINE 10 MG: 10 TABLET, FILM COATED ORAL at 18:20

## 2017-08-16 RX ADMIN — HALOPERIDOL LACTATE 5 MG: 5 INJECTION, SOLUTION INTRAMUSCULAR at 16:25

## 2017-08-16 RX ADMIN — LORAZEPAM 1 MG: 2 INJECTION INTRAMUSCULAR; INTRAVENOUS at 16:25

## 2017-08-16 RX ADMIN — HYDROXYZINE HYDROCHLORIDE 50 MG: 50 TABLET, FILM COATED ORAL at 18:20

## 2017-08-16 RX ADMIN — WATER 0.6 ML: 1 INJECTION INTRAMUSCULAR; INTRAVENOUS; SUBCUTANEOUS at 11:53

## 2017-08-16 RX ADMIN — LORAZEPAM 1 MG: 1 TABLET ORAL at 17:38

## 2017-08-16 RX ADMIN — ZIPRASIDONE MESYLATE 10 MG: 20 INJECTION, POWDER, LYOPHILIZED, FOR SOLUTION INTRAMUSCULAR at 11:54

## 2017-08-16 RX ADMIN — LORAZEPAM 1 MG: 2 INJECTION INTRAMUSCULAR at 16:25

## 2017-08-16 RX ADMIN — LOXAPINE 50 MG: 25 CAPSULE ORAL at 17:38

## 2017-08-17 PROBLEM — F70 MILD INTELLECTUAL DISABILITIES: Chronic | Status: ACTIVE | Noted: 2017-08-17

## 2017-08-17 PROBLEM — F25.0 SCHIZOAFFECTIVE DISORDER, BIPOLAR TYPE (HCC): Chronic | Status: ACTIVE | Noted: 2017-08-06

## 2017-08-17 PROBLEM — F42.9 OBSESSIVE COMPULSIVE DISORDER: Chronic | Status: ACTIVE | Noted: 2017-08-17

## 2017-08-17 LAB
ALBUMIN SERPL BCP-MCNC: 3.1 G/DL (ref 3.5–5)
ALP SERPL-CCNC: 106 U/L (ref 46–116)
ALT SERPL W P-5'-P-CCNC: 35 U/L (ref 12–78)
ANION GAP SERPL CALCULATED.3IONS-SCNC: 10 MMOL/L (ref 4–13)
AST SERPL W P-5'-P-CCNC: 21 U/L (ref 5–45)
BASOPHILS # BLD AUTO: 0.03 THOUSANDS/ΜL (ref 0–0.1)
BASOPHILS NFR BLD AUTO: 0 % (ref 0–1)
BILIRUB SERPL-MCNC: 0.5 MG/DL (ref 0.2–1)
BUN SERPL-MCNC: 16 MG/DL (ref 5–25)
CALCIUM SERPL-MCNC: 8.8 MG/DL (ref 8.3–10.1)
CHLORIDE SERPL-SCNC: 102 MMOL/L (ref 100–108)
CHOLEST SERPL-MCNC: 172 MG/DL (ref 50–200)
CO2 SERPL-SCNC: 27 MMOL/L (ref 21–32)
CREAT SERPL-MCNC: 1.4 MG/DL (ref 0.6–1.3)
EOSINOPHIL # BLD AUTO: 0.1 THOUSAND/ΜL (ref 0–0.61)
EOSINOPHIL NFR BLD AUTO: 1 % (ref 0–6)
ERYTHROCYTE [DISTWIDTH] IN BLOOD BY AUTOMATED COUNT: 12.3 % (ref 11.6–15.1)
GFR SERPL CREATININE-BSD FRML MDRD: 65 ML/MIN/1.73SQ M
GLUCOSE P FAST SERPL-MCNC: 177 MG/DL (ref 65–99)
GLUCOSE SERPL-MCNC: 177 MG/DL (ref 65–140)
HCT VFR BLD AUTO: 48.1 % (ref 36.5–49.3)
HDLC SERPL-MCNC: 40 MG/DL (ref 40–60)
HGB BLD-MCNC: 16.2 G/DL (ref 12–17)
LDLC SERPL CALC-MCNC: 99 MG/DL (ref 0–100)
LYMPHOCYTES # BLD AUTO: 2.65 THOUSANDS/ΜL (ref 0.6–4.47)
LYMPHOCYTES NFR BLD AUTO: 32 % (ref 14–44)
MCH RBC QN AUTO: 29.9 PG (ref 26.8–34.3)
MCHC RBC AUTO-ENTMCNC: 33.7 G/DL (ref 31.4–37.4)
MCV RBC AUTO: 89 FL (ref 82–98)
MONOCYTES # BLD AUTO: 0.68 THOUSAND/ΜL (ref 0.17–1.22)
MONOCYTES NFR BLD AUTO: 8 % (ref 4–12)
NEUTROPHILS # BLD AUTO: 4.83 THOUSANDS/ΜL (ref 1.85–7.62)
NEUTS SEG NFR BLD AUTO: 59 % (ref 43–75)
NRBC BLD AUTO-RTO: 0 /100 WBCS
PLATELET # BLD AUTO: 161 THOUSANDS/UL (ref 149–390)
PMV BLD AUTO: 10.2 FL (ref 8.9–12.7)
POTASSIUM SERPL-SCNC: 3.9 MMOL/L (ref 3.5–5.3)
PROT SERPL-MCNC: 6.8 G/DL (ref 6.4–8.2)
RBC # BLD AUTO: 5.42 MILLION/UL (ref 3.88–5.62)
SODIUM SERPL-SCNC: 139 MMOL/L (ref 136–145)
TRIGL SERPL-MCNC: 165 MG/DL
TSH SERPL DL<=0.05 MIU/L-ACNC: 0.65 UIU/ML (ref 0.36–3.74)
WBC # BLD AUTO: 8.36 THOUSAND/UL (ref 4.31–10.16)

## 2017-08-17 PROCEDURE — 84443 ASSAY THYROID STIM HORMONE: CPT | Performed by: PSYCHIATRY & NEUROLOGY

## 2017-08-17 PROCEDURE — 80053 COMPREHEN METABOLIC PANEL: CPT | Performed by: PSYCHIATRY & NEUROLOGY

## 2017-08-17 PROCEDURE — 85025 COMPLETE CBC W/AUTO DIFF WBC: CPT | Performed by: PSYCHIATRY & NEUROLOGY

## 2017-08-17 PROCEDURE — 86592 SYPHILIS TEST NON-TREP QUAL: CPT | Performed by: PSYCHIATRY & NEUROLOGY

## 2017-08-17 PROCEDURE — 80061 LIPID PANEL: CPT | Performed by: PSYCHIATRY & NEUROLOGY

## 2017-08-17 RX ORDER — DIVALPROEX SODIUM 500 MG/1
500 TABLET, DELAYED RELEASE ORAL 2 TIMES DAILY
Status: DISCONTINUED | OUTPATIENT
Start: 2017-08-17 | End: 2017-08-18

## 2017-08-17 RX ADMIN — LORAZEPAM 1 MG: 1 TABLET ORAL at 14:39

## 2017-08-17 RX ADMIN — CHLORPROMAZINE HYDROCHLORIDE 50 MG: 50 TABLET, SUGAR COATED ORAL at 08:02

## 2017-08-17 RX ADMIN — DOCUSATE SODIUM 200 MG: 100 CAPSULE, LIQUID FILLED ORAL at 08:01

## 2017-08-17 RX ADMIN — LORAZEPAM 1 MG: 1 TABLET ORAL at 01:01

## 2017-08-17 RX ADMIN — TRAZODONE HYDROCHLORIDE 50 MG: 50 TABLET ORAL at 20:30

## 2017-08-17 RX ADMIN — LORAZEPAM 2 MG: 2 INJECTION INTRAMUSCULAR; INTRAVENOUS at 23:47

## 2017-08-17 RX ADMIN — LOXAPINE 50 MG: 25 CAPSULE ORAL at 08:01

## 2017-08-17 RX ADMIN — POLYETHYLENE GLYCOL 3350 17 G: 17 POWDER, FOR SOLUTION ORAL at 08:01

## 2017-08-17 RX ADMIN — DULOXETINE HYDROCHLORIDE 30 MG: 30 CAPSULE, DELAYED RELEASE ORAL at 08:01

## 2017-08-17 RX ADMIN — LORATADINE 10 MG: 10 TABLET ORAL at 08:02

## 2017-08-17 RX ADMIN — LORAZEPAM 1 MG: 1 TABLET ORAL at 20:30

## 2017-08-17 RX ADMIN — CHLORPROMAZINE HYDROCHLORIDE 50 MG: 50 TABLET, SUGAR COATED ORAL at 17:50

## 2017-08-17 RX ADMIN — LORAZEPAM 1 MG: 1 TABLET ORAL at 17:50

## 2017-08-17 RX ADMIN — HYDROXYZINE HYDROCHLORIDE 50 MG: 50 TABLET, FILM COATED ORAL at 19:28

## 2017-08-17 RX ADMIN — RISPERIDONE 2 MG: 1 TABLET, ORALLY DISINTEGRATING ORAL at 20:46

## 2017-08-17 RX ADMIN — LORAZEPAM 2 MG: 2 INJECTION INTRAMUSCULAR; INTRAVENOUS at 18:46

## 2017-08-17 RX ADMIN — HALOPERIDOL 5 MG: 5 TABLET ORAL at 19:28

## 2017-08-17 RX ADMIN — CHLORPROMAZINE HYDROCHLORIDE 50 MG: 50 TABLET, SUGAR COATED ORAL at 11:00

## 2017-08-17 RX ADMIN — LORAZEPAM 1 MG: 1 TABLET ORAL at 08:02

## 2017-08-17 RX ADMIN — OLANZAPINE 10 MG: 10 TABLET, FILM COATED ORAL at 14:39

## 2017-08-17 RX ADMIN — LEVOTHYROXINE SODIUM 25 MCG: 25 TABLET ORAL at 06:36

## 2017-08-17 RX ADMIN — DIVALPROEX SODIUM 500 MG: 500 TABLET, DELAYED RELEASE ORAL at 17:53

## 2017-08-17 RX ADMIN — VITAMIN E CAP 400 UNIT 400 UNITS: 400 CAP at 08:02

## 2017-08-17 RX ADMIN — OLANZAPINE 10 MG: 10 INJECTION, POWDER, FOR SOLUTION INTRAMUSCULAR at 21:36

## 2017-08-17 RX ADMIN — HYDROXYZINE HYDROCHLORIDE 50 MG: 50 TABLET, FILM COATED ORAL at 08:58

## 2017-08-17 RX ADMIN — VITAMIN D, TAB 1000IU (100/BT) 1000 UNITS: 25 TAB at 08:02

## 2017-08-17 RX ADMIN — LAMOTRIGINE 150 MG: 100 TABLET ORAL at 08:01

## 2017-08-17 RX ADMIN — CHLORPROMAZINE HYDROCHLORIDE 50 MG: 50 TABLET, SUGAR COATED ORAL at 21:08

## 2017-08-17 RX ADMIN — OLANZAPINE 10 MG: 10 INJECTION, POWDER, FOR SOLUTION INTRAMUSCULAR at 18:46

## 2017-08-17 RX ADMIN — TRAZODONE HYDROCHLORIDE 50 MG: 50 TABLET ORAL at 01:01

## 2017-08-18 LAB — RPR SER QL: NORMAL

## 2017-08-18 RX ORDER — DIVALPROEX SODIUM 500 MG/1
500 TABLET, DELAYED RELEASE ORAL 2 TIMES DAILY
Status: DISCONTINUED | OUTPATIENT
Start: 2017-08-18 | End: 2017-08-21

## 2017-08-18 RX ORDER — DIPHENHYDRAMINE HCL 25 MG
50 TABLET ORAL
Status: DISCONTINUED | OUTPATIENT
Start: 2017-08-18 | End: 2017-08-24 | Stop reason: HOSPADM

## 2017-08-18 RX ADMIN — BENZTROPINE MESYLATE 1 MG: 1 TABLET ORAL at 21:55

## 2017-08-18 RX ADMIN — HALOPERIDOL LACTATE 5 MG: 5 INJECTION, SOLUTION INTRAMUSCULAR at 21:53

## 2017-08-18 RX ADMIN — DIPHENHYDRAMINE HCL 50 MG: 25 TABLET ORAL at 23:11

## 2017-08-18 RX ADMIN — CHLORPROMAZINE HYDROCHLORIDE 50 MG: 50 TABLET, SUGAR COATED ORAL at 19:01

## 2017-08-18 RX ADMIN — RISPERIDONE 2 MG: 1 TABLET, ORALLY DISINTEGRATING ORAL at 16:47

## 2017-08-18 RX ADMIN — VITAMIN D, TAB 1000IU (100/BT) 1000 UNITS: 25 TAB at 08:01

## 2017-08-18 RX ADMIN — DOCUSATE SODIUM 200 MG: 100 CAPSULE, LIQUID FILLED ORAL at 08:00

## 2017-08-18 RX ADMIN — CHLORPROMAZINE HYDROCHLORIDE 50 MG: 50 TABLET, SUGAR COATED ORAL at 12:08

## 2017-08-18 RX ADMIN — Medication 1 APPLICATION: at 20:36

## 2017-08-18 RX ADMIN — RISPERIDONE 2 MG: 1 TABLET, ORALLY DISINTEGRATING ORAL at 05:31

## 2017-08-18 RX ADMIN — VITAMIN E CAP 400 UNIT 400 UNITS: 400 CAP at 08:00

## 2017-08-18 RX ADMIN — POLYETHYLENE GLYCOL 3350 17 G: 17 POWDER, FOR SOLUTION ORAL at 08:00

## 2017-08-18 RX ADMIN — RISPERIDONE 2 MG: 1 TABLET, ORALLY DISINTEGRATING ORAL at 00:46

## 2017-08-18 RX ADMIN — LORAZEPAM 2 MG: 2 INJECTION INTRAMUSCULAR; INTRAVENOUS at 03:25

## 2017-08-18 RX ADMIN — LORAZEPAM 1 MG: 1 TABLET ORAL at 16:06

## 2017-08-18 RX ADMIN — DIVALPROEX SODIUM 500 MG: 500 TABLET, DELAYED RELEASE ORAL at 20:33

## 2017-08-18 RX ADMIN — CHLORPROMAZINE HYDROCHLORIDE 50 MG: 50 TABLET, SUGAR COATED ORAL at 08:01

## 2017-08-18 RX ADMIN — DIVALPROEX SODIUM 500 MG: 500 TABLET, DELAYED RELEASE ORAL at 08:01

## 2017-08-18 RX ADMIN — LORAZEPAM 1 MG: 1 TABLET ORAL at 20:33

## 2017-08-18 RX ADMIN — LORATADINE 10 MG: 10 TABLET ORAL at 08:01

## 2017-08-18 RX ADMIN — HALOPERIDOL LACTATE 5 MG: 5 INJECTION, SOLUTION INTRAMUSCULAR at 03:26

## 2017-08-18 RX ADMIN — LEVOTHYROXINE SODIUM 25 MCG: 25 TABLET ORAL at 05:57

## 2017-08-18 RX ADMIN — CHLORPROMAZINE HYDROCHLORIDE 50 MG: 50 TABLET, SUGAR COATED ORAL at 21:00

## 2017-08-18 RX ADMIN — LORAZEPAM 1 MG: 1 TABLET ORAL at 08:01

## 2017-08-19 RX ORDER — BENZTROPINE MESYLATE 1 MG/ML
1 INJECTION INTRAMUSCULAR; INTRAVENOUS EVERY 6 HOURS PRN
Status: DISCONTINUED | OUTPATIENT
Start: 2017-08-19 | End: 2017-08-24 | Stop reason: HOSPADM

## 2017-08-19 RX ADMIN — DIVALPROEX SODIUM 500 MG: 500 TABLET, DELAYED RELEASE ORAL at 08:08

## 2017-08-19 RX ADMIN — LORAZEPAM 2 MG: 2 INJECTION INTRAMUSCULAR; INTRAVENOUS at 11:08

## 2017-08-19 RX ADMIN — WATER 2.1 ML: 1 INJECTION INTRAMUSCULAR; INTRAVENOUS; SUBCUTANEOUS at 15:45

## 2017-08-19 RX ADMIN — POLYETHYLENE GLYCOL 3350 17 G: 17 POWDER, FOR SOLUTION ORAL at 10:31

## 2017-08-19 RX ADMIN — LEVOTHYROXINE SODIUM 25 MCG: 25 TABLET ORAL at 07:00

## 2017-08-19 RX ADMIN — VITAMIN E CAP 400 UNIT 400 UNITS: 400 CAP at 08:08

## 2017-08-19 RX ADMIN — CHLORPROMAZINE HYDROCHLORIDE 50 MG: 50 TABLET, SUGAR COATED ORAL at 17:33

## 2017-08-19 RX ADMIN — LORAZEPAM 2 MG: 2 INJECTION INTRAMUSCULAR; INTRAVENOUS at 15:31

## 2017-08-19 RX ADMIN — CHLORPROMAZINE HYDROCHLORIDE 50 MG: 50 TABLET, SUGAR COATED ORAL at 11:41

## 2017-08-19 RX ADMIN — HALOPERIDOL 5 MG: 5 TABLET ORAL at 18:11

## 2017-08-19 RX ADMIN — VITAMIN D, TAB 1000IU (100/BT) 1000 UNITS: 25 TAB at 08:08

## 2017-08-19 RX ADMIN — OLANZAPINE 10 MG: 10 INJECTION, POWDER, FOR SOLUTION INTRAMUSCULAR at 15:33

## 2017-08-19 RX ADMIN — TRAZODONE HYDROCHLORIDE 50 MG: 50 TABLET ORAL at 23:50

## 2017-08-19 RX ADMIN — RISPERIDONE 2 MG: 1 TABLET, ORALLY DISINTEGRATING ORAL at 23:50

## 2017-08-19 RX ADMIN — OLANZAPINE 10 MG: 10 TABLET, FILM COATED ORAL at 08:08

## 2017-08-19 RX ADMIN — LORAZEPAM 1 MG: 1 TABLET ORAL at 18:11

## 2017-08-19 RX ADMIN — DIPHENHYDRAMINE HCL 50 MG: 25 TABLET ORAL at 21:55

## 2017-08-19 RX ADMIN — BENZTROPINE MESYLATE 1 MG: 1 TABLET ORAL at 11:46

## 2017-08-19 RX ADMIN — LORAZEPAM 1 MG: 1 TABLET ORAL at 08:08

## 2017-08-19 RX ADMIN — CHLORPROMAZINE HYDROCHLORIDE 50 MG: 50 TABLET, SUGAR COATED ORAL at 08:08

## 2017-08-19 RX ADMIN — BENZTROPINE MESYLATE 1 MG: 1 INJECTION, SOLUTION INTRAMUSCULAR; INTRAVENOUS at 22:20

## 2017-08-19 RX ADMIN — LORATADINE 10 MG: 10 TABLET ORAL at 08:07

## 2017-08-19 RX ADMIN — DIVALPROEX SODIUM 500 MG: 500 TABLET, DELAYED RELEASE ORAL at 20:00

## 2017-08-19 RX ADMIN — LORAZEPAM 1 MG: 1 TABLET ORAL at 21:55

## 2017-08-19 RX ADMIN — CHLORPROMAZINE HYDROCHLORIDE 50 MG: 50 TABLET, SUGAR COATED ORAL at 21:55

## 2017-08-20 LAB
ALBUMIN SERPL BCP-MCNC: 3.2 G/DL (ref 3.5–5)
ALP SERPL-CCNC: 100 U/L (ref 46–116)
ALT SERPL W P-5'-P-CCNC: 34 U/L (ref 12–78)
ANION GAP SERPL CALCULATED.3IONS-SCNC: 8 MMOL/L (ref 4–13)
AST SERPL W P-5'-P-CCNC: 35 U/L (ref 5–45)
BASOPHILS # BLD AUTO: 0.02 THOUSANDS/ΜL (ref 0–0.1)
BASOPHILS NFR BLD AUTO: 0 % (ref 0–1)
BILIRUB SERPL-MCNC: 0.68 MG/DL (ref 0.2–1)
BUN SERPL-MCNC: 13 MG/DL (ref 5–25)
CALCIUM SERPL-MCNC: 9.2 MG/DL (ref 8.3–10.1)
CHLORIDE SERPL-SCNC: 102 MMOL/L (ref 100–108)
CO2 SERPL-SCNC: 30 MMOL/L (ref 21–32)
CREAT SERPL-MCNC: 1.27 MG/DL (ref 0.6–1.3)
EOSINOPHIL # BLD AUTO: 0.1 THOUSAND/ΜL (ref 0–0.61)
EOSINOPHIL NFR BLD AUTO: 1 % (ref 0–6)
ERYTHROCYTE [DISTWIDTH] IN BLOOD BY AUTOMATED COUNT: 12.1 % (ref 11.6–15.1)
GFR SERPL CREATININE-BSD FRML MDRD: 73 ML/MIN/1.73SQ M
GLUCOSE P FAST SERPL-MCNC: 145 MG/DL (ref 65–99)
GLUCOSE SERPL-MCNC: 145 MG/DL (ref 65–140)
HCT VFR BLD AUTO: 46.8 % (ref 36.5–49.3)
HGB BLD-MCNC: 15.9 G/DL (ref 12–17)
LYMPHOCYTES # BLD AUTO: 2.58 THOUSANDS/ΜL (ref 0.6–4.47)
LYMPHOCYTES NFR BLD AUTO: 33 % (ref 14–44)
MCH RBC QN AUTO: 30.1 PG (ref 26.8–34.3)
MCHC RBC AUTO-ENTMCNC: 34 G/DL (ref 31.4–37.4)
MCV RBC AUTO: 89 FL (ref 82–98)
MONOCYTES # BLD AUTO: 0.47 THOUSAND/ΜL (ref 0.17–1.22)
MONOCYTES NFR BLD AUTO: 6 % (ref 4–12)
NEUTROPHILS # BLD AUTO: 4.59 THOUSANDS/ΜL (ref 1.85–7.62)
NEUTS SEG NFR BLD AUTO: 60 % (ref 43–75)
NRBC BLD AUTO-RTO: 0 /100 WBCS
PLATELET # BLD AUTO: 150 THOUSANDS/UL (ref 149–390)
PMV BLD AUTO: 10.1 FL (ref 8.9–12.7)
POTASSIUM SERPL-SCNC: 4.2 MMOL/L (ref 3.5–5.3)
PROT SERPL-MCNC: 7 G/DL (ref 6.4–8.2)
RBC # BLD AUTO: 5.29 MILLION/UL (ref 3.88–5.62)
SODIUM SERPL-SCNC: 140 MMOL/L (ref 136–145)
VALPROATE SERPL-MCNC: 32 UG/ML (ref 50–100)
WBC # BLD AUTO: 7.84 THOUSAND/UL (ref 4.31–10.16)

## 2017-08-20 PROCEDURE — 80053 COMPREHEN METABOLIC PANEL: CPT | Performed by: PSYCHIATRY & NEUROLOGY

## 2017-08-20 PROCEDURE — 80164 ASSAY DIPROPYLACETIC ACD TOT: CPT | Performed by: PSYCHIATRY & NEUROLOGY

## 2017-08-20 PROCEDURE — 85025 COMPLETE CBC W/AUTO DIFF WBC: CPT | Performed by: PSYCHIATRY & NEUROLOGY

## 2017-08-20 RX ORDER — BENZTROPINE MESYLATE 1 MG/1
1 TABLET ORAL 2 TIMES DAILY
Status: DISCONTINUED | OUTPATIENT
Start: 2017-08-20 | End: 2017-08-23

## 2017-08-20 RX ADMIN — LORAZEPAM 1 MG: 1 TABLET ORAL at 21:22

## 2017-08-20 RX ADMIN — CHLORPROMAZINE HYDROCHLORIDE 50 MG: 50 TABLET, SUGAR COATED ORAL at 11:44

## 2017-08-20 RX ADMIN — CHLORPROMAZINE HYDROCHLORIDE 50 MG: 50 TABLET, SUGAR COATED ORAL at 08:01

## 2017-08-20 RX ADMIN — LORAZEPAM 1 MG: 1 TABLET ORAL at 11:44

## 2017-08-20 RX ADMIN — DIVALPROEX SODIUM 500 MG: 500 TABLET, DELAYED RELEASE ORAL at 08:01

## 2017-08-20 RX ADMIN — LORAZEPAM 1 MG: 1 TABLET ORAL at 08:01

## 2017-08-20 RX ADMIN — VITAMIN E CAP 400 UNIT 400 UNITS: 400 CAP at 08:01

## 2017-08-20 RX ADMIN — BENZTROPINE MESYLATE 1 MG: 1 TABLET ORAL at 19:14

## 2017-08-20 RX ADMIN — LORAZEPAM 1 MG: 1 TABLET ORAL at 16:00

## 2017-08-20 RX ADMIN — CHLORPROMAZINE HYDROCHLORIDE 50 MG: 50 TABLET, SUGAR COATED ORAL at 17:48

## 2017-08-20 RX ADMIN — CHLORPROMAZINE HYDROCHLORIDE 50 MG: 50 TABLET, SUGAR COATED ORAL at 21:21

## 2017-08-20 RX ADMIN — BENZTROPINE MESYLATE 1 MG: 1 TABLET ORAL at 07:49

## 2017-08-20 RX ADMIN — DIPHENHYDRAMINE HCL 50 MG: 25 TABLET ORAL at 21:20

## 2017-08-20 RX ADMIN — TRAZODONE HYDROCHLORIDE 50 MG: 50 TABLET ORAL at 22:57

## 2017-08-20 RX ADMIN — ALUMINUM HYDROXIDE, MAGNESIUM HYDROXIDE, AND SIMETHICONE 30 ML: 200; 200; 20 SUSPENSION ORAL at 04:08

## 2017-08-20 RX ADMIN — VITAMIN D, TAB 1000IU (100/BT) 1000 UNITS: 25 TAB at 10:35

## 2017-08-20 RX ADMIN — POLYETHYLENE GLYCOL 3350 17 G: 17 POWDER, FOR SOLUTION ORAL at 08:02

## 2017-08-20 RX ADMIN — DIVALPROEX SODIUM 500 MG: 500 TABLET, DELAYED RELEASE ORAL at 19:14

## 2017-08-20 RX ADMIN — LORATADINE 10 MG: 10 TABLET ORAL at 08:01

## 2017-08-21 RX ORDER — DIVALPROEX SODIUM 500 MG/1
1000 TABLET, DELAYED RELEASE ORAL
Status: DISCONTINUED | OUTPATIENT
Start: 2017-08-21 | End: 2017-08-24 | Stop reason: HOSPADM

## 2017-08-21 RX ORDER — DIVALPROEX SODIUM 500 MG/1
500 TABLET, DELAYED RELEASE ORAL EVERY MORNING
Status: DISCONTINUED | OUTPATIENT
Start: 2017-08-22 | End: 2017-08-24 | Stop reason: HOSPADM

## 2017-08-21 RX ADMIN — ACETAMINOPHEN 650 MG: 325 TABLET, FILM COATED ORAL at 04:44

## 2017-08-21 RX ADMIN — VITAMIN D, TAB 1000IU (100/BT) 1000 UNITS: 25 TAB at 08:44

## 2017-08-21 RX ADMIN — DIVALPROEX SODIUM 500 MG: 500 TABLET, DELAYED RELEASE ORAL at 08:43

## 2017-08-21 RX ADMIN — OLANZAPINE 10 MG: 10 TABLET, FILM COATED ORAL at 23:53

## 2017-08-21 RX ADMIN — LORAZEPAM 1 MG: 1 TABLET ORAL at 18:18

## 2017-08-21 RX ADMIN — VITAMIN E CAP 400 UNIT 400 UNITS: 400 CAP at 08:43

## 2017-08-21 RX ADMIN — CHLORPROMAZINE HYDROCHLORIDE 50 MG: 50 TABLET, SUGAR COATED ORAL at 12:28

## 2017-08-21 RX ADMIN — LORAZEPAM 1 MG: 1 TABLET ORAL at 08:43

## 2017-08-21 RX ADMIN — CHLORPROMAZINE HYDROCHLORIDE 50 MG: 50 TABLET, SUGAR COATED ORAL at 18:18

## 2017-08-21 RX ADMIN — DIPHENHYDRAMINE HCL 50 MG: 25 TABLET ORAL at 21:02

## 2017-08-21 RX ADMIN — DIVALPROEX SODIUM 1000 MG: 500 TABLET, DELAYED RELEASE ORAL at 18:26

## 2017-08-21 RX ADMIN — TRAZODONE HYDROCHLORIDE 50 MG: 50 TABLET ORAL at 22:18

## 2017-08-21 RX ADMIN — BENZTROPINE MESYLATE 1 MG: 1 TABLET ORAL at 08:44

## 2017-08-21 RX ADMIN — IBUPROFEN 800 MG: 400 TABLET, FILM COATED ORAL at 12:28

## 2017-08-21 RX ADMIN — LORATADINE 10 MG: 10 TABLET ORAL at 08:43

## 2017-08-21 RX ADMIN — LORAZEPAM 1 MG: 1 TABLET ORAL at 21:02

## 2017-08-21 RX ADMIN — BENZTROPINE MESYLATE 1 MG: 1 TABLET ORAL at 18:18

## 2017-08-21 RX ADMIN — CHLORPROMAZINE HYDROCHLORIDE 50 MG: 50 TABLET, SUGAR COATED ORAL at 08:43

## 2017-08-21 RX ADMIN — CHLORPROMAZINE HYDROCHLORIDE 50 MG: 50 TABLET, SUGAR COATED ORAL at 21:02

## 2017-08-21 RX ADMIN — HYDROXYZINE HYDROCHLORIDE 50 MG: 50 TABLET, FILM COATED ORAL at 19:25

## 2017-08-22 RX ORDER — KETOTIFEN FUMARATE 0.35 MG/ML
1 SOLUTION/ DROPS OPHTHALMIC 2 TIMES DAILY PRN
Status: DISCONTINUED | OUTPATIENT
Start: 2017-08-22 | End: 2017-08-24 | Stop reason: HOSPADM

## 2017-08-22 RX ORDER — DIPHENHYDRAMINE HCL 25 MG
25 TABLET ORAL 2 TIMES DAILY
Status: DISCONTINUED | OUTPATIENT
Start: 2017-08-22 | End: 2017-08-24 | Stop reason: HOSPADM

## 2017-08-22 RX ADMIN — LEVOTHYROXINE SODIUM 25 MCG: 25 TABLET ORAL at 08:55

## 2017-08-22 RX ADMIN — BENZTROPINE MESYLATE 1 MG: 1 TABLET ORAL at 08:56

## 2017-08-22 RX ADMIN — CHLORPROMAZINE HYDROCHLORIDE 50 MG: 50 TABLET, SUGAR COATED ORAL at 08:55

## 2017-08-22 RX ADMIN — DIVALPROEX SODIUM 1000 MG: 500 TABLET, DELAYED RELEASE ORAL at 18:12

## 2017-08-22 RX ADMIN — VITAMIN D, TAB 1000IU (100/BT) 1000 UNITS: 25 TAB at 08:56

## 2017-08-22 RX ADMIN — LORAZEPAM 1 MG: 1 TABLET ORAL at 15:39

## 2017-08-22 RX ADMIN — TRAZODONE HYDROCHLORIDE 50 MG: 50 TABLET ORAL at 22:39

## 2017-08-22 RX ADMIN — VITAMIN E CAP 400 UNIT 400 UNITS: 400 CAP at 08:57

## 2017-08-22 RX ADMIN — DIPHENHYDRAMINE HCL 50 MG: 25 TABLET ORAL at 21:22

## 2017-08-22 RX ADMIN — IBUPROFEN 800 MG: 400 TABLET, FILM COATED ORAL at 16:18

## 2017-08-22 RX ADMIN — CHLORPROMAZINE HYDROCHLORIDE 50 MG: 50 TABLET, SUGAR COATED ORAL at 18:12

## 2017-08-22 RX ADMIN — CHLORPROMAZINE HYDROCHLORIDE 50 MG: 50 TABLET, SUGAR COATED ORAL at 21:22

## 2017-08-22 RX ADMIN — ACETAMINOPHEN 650 MG: 325 TABLET, FILM COATED ORAL at 22:39

## 2017-08-22 RX ADMIN — LORAZEPAM 1 MG: 1 TABLET ORAL at 08:56

## 2017-08-22 RX ADMIN — DIPHENHYDRAMINE HCL 25 MG: 25 TABLET ORAL at 18:12

## 2017-08-22 RX ADMIN — LORATADINE 10 MG: 10 TABLET ORAL at 08:55

## 2017-08-22 RX ADMIN — DIVALPROEX SODIUM 500 MG: 500 TABLET, DELAYED RELEASE ORAL at 09:00

## 2017-08-22 RX ADMIN — CHLORPROMAZINE HYDROCHLORIDE 50 MG: 50 TABLET, SUGAR COATED ORAL at 12:00

## 2017-08-22 RX ADMIN — LORAZEPAM 1 MG: 1 TABLET ORAL at 21:21

## 2017-08-22 RX ADMIN — BENZTROPINE MESYLATE 1 MG: 1 TABLET ORAL at 18:12

## 2017-08-23 LAB — VALPROATE SERPL-MCNC: 87 UG/ML (ref 50–100)

## 2017-08-23 PROCEDURE — 80164 ASSAY DIPROPYLACETIC ACD TOT: CPT | Performed by: PSYCHIATRY & NEUROLOGY

## 2017-08-23 RX ORDER — CHLORPROMAZINE HYDROCHLORIDE 50 MG/1
50 TABLET, FILM COATED ORAL
Status: DISCONTINUED | OUTPATIENT
Start: 2017-08-23 | End: 2017-08-24 | Stop reason: HOSPADM

## 2017-08-23 RX ORDER — BENZTROPINE MESYLATE 1 MG/1
1 TABLET ORAL 2 TIMES DAILY
Status: DISCONTINUED | OUTPATIENT
Start: 2017-08-23 | End: 2017-08-24 | Stop reason: HOSPADM

## 2017-08-23 RX ORDER — LORAZEPAM 2 MG/ML
INJECTION INTRAMUSCULAR
Status: COMPLETED
Start: 2017-08-23 | End: 2017-08-23

## 2017-08-23 RX ORDER — TRAZODONE HYDROCHLORIDE 100 MG/1
100 TABLET ORAL
Status: DISCONTINUED | OUTPATIENT
Start: 2017-08-23 | End: 2017-08-24 | Stop reason: HOSPADM

## 2017-08-23 RX ORDER — CHLORPROMAZINE HYDROCHLORIDE 50 MG/1
50 TABLET, FILM COATED ORAL
Status: DISCONTINUED | OUTPATIENT
Start: 2017-08-23 | End: 2017-08-23

## 2017-08-23 RX ADMIN — CHLORPROMAZINE HYDROCHLORIDE 50 MG: 50 TABLET, SUGAR COATED ORAL at 11:54

## 2017-08-23 RX ADMIN — WATER 10 ML: 1 INJECTION INTRAMUSCULAR; INTRAVENOUS; SUBCUTANEOUS at 17:11

## 2017-08-23 RX ADMIN — LORAZEPAM 1 MG: 1 TABLET ORAL at 21:00

## 2017-08-23 RX ADMIN — BENZTROPINE MESYLATE 1 MG: 1 TABLET ORAL at 09:27

## 2017-08-23 RX ADMIN — HYDROXYZINE HYDROCHLORIDE 50 MG: 50 TABLET, FILM COATED ORAL at 19:15

## 2017-08-23 RX ADMIN — OLANZAPINE 10 MG: 10 INJECTION, POWDER, FOR SOLUTION INTRAMUSCULAR at 17:11

## 2017-08-23 RX ADMIN — DIPHENHYDRAMINE HCL 50 MG: 25 TABLET ORAL at 21:00

## 2017-08-23 RX ADMIN — LORATADINE 10 MG: 10 TABLET ORAL at 08:02

## 2017-08-23 RX ADMIN — DIVALPROEX SODIUM 1000 MG: 500 TABLET, DELAYED RELEASE ORAL at 17:32

## 2017-08-23 RX ADMIN — LORAZEPAM 1 MG: 1 TABLET ORAL at 08:06

## 2017-08-23 RX ADMIN — BENZTROPINE MESYLATE 1 MG: 1 TABLET ORAL at 08:01

## 2017-08-23 RX ADMIN — RISPERIDONE 2 MG: 1 TABLET, ORALLY DISINTEGRATING ORAL at 19:15

## 2017-08-23 RX ADMIN — OLANZAPINE 10 MG: 10 TABLET, FILM COATED ORAL at 22:31

## 2017-08-23 RX ADMIN — LEVOTHYROXINE SODIUM 25 MCG: 25 TABLET ORAL at 07:59

## 2017-08-23 RX ADMIN — BENZTROPINE MESYLATE 1 MG: 1 TABLET ORAL at 17:33

## 2017-08-23 RX ADMIN — VITAMIN E CAP 400 UNIT 400 UNITS: 400 CAP at 08:01

## 2017-08-23 RX ADMIN — HALOPERIDOL 5 MG: 5 TABLET ORAL at 15:45

## 2017-08-23 RX ADMIN — TRAZODONE HYDROCHLORIDE 100 MG: 100 TABLET ORAL at 19:15

## 2017-08-23 RX ADMIN — LORAZEPAM 2 MG: 2 INJECTION INTRAMUSCULAR; INTRAVENOUS at 01:45

## 2017-08-23 RX ADMIN — CHLORPROMAZINE HYDROCHLORIDE 50 MG: 50 TABLET, SUGAR COATED ORAL at 21:00

## 2017-08-23 RX ADMIN — CHLORPROMAZINE HYDROCHLORIDE 50 MG: 50 TABLET, SUGAR COATED ORAL at 17:32

## 2017-08-23 RX ADMIN — VITAMIN D, TAB 1000IU (100/BT) 1000 UNITS: 25 TAB at 08:03

## 2017-08-23 RX ADMIN — CHLORPROMAZINE HYDROCHLORIDE 50 MG: 50 TABLET, SUGAR COATED ORAL at 08:00

## 2017-08-23 RX ADMIN — DIPHENHYDRAMINE HCL 25 MG: 25 TABLET ORAL at 08:01

## 2017-08-23 RX ADMIN — ACETAMINOPHEN 650 MG: 325 TABLET, FILM COATED ORAL at 22:32

## 2017-08-23 RX ADMIN — DIVALPROEX SODIUM 500 MG: 500 TABLET, DELAYED RELEASE ORAL at 08:08

## 2017-08-23 RX ADMIN — DIPHENHYDRAMINE HCL 25 MG: 25 TABLET ORAL at 15:45

## 2017-08-23 RX ADMIN — LORAZEPAM 1 MG: 1 TABLET ORAL at 09:22

## 2017-08-23 RX ADMIN — LORAZEPAM 1 MG: 1 TABLET ORAL at 15:45

## 2017-08-24 VITALS
HEART RATE: 100 BPM | SYSTOLIC BLOOD PRESSURE: 133 MMHG | OXYGEN SATURATION: 95 % | WEIGHT: 230 LBS | TEMPERATURE: 98.2 F | HEIGHT: 68 IN | RESPIRATION RATE: 16 BRPM | DIASTOLIC BLOOD PRESSURE: 85 MMHG | BODY MASS INDEX: 34.86 KG/M2

## 2017-08-24 RX ORDER — CHLORPROMAZINE HYDROCHLORIDE 100 MG/1
100 TABLET, FILM COATED ORAL
Qty: 30 TABLET | Refills: 0 | Status: SHIPPED | OUTPATIENT
Start: 2017-08-24 | End: 2017-12-11 | Stop reason: HOSPADM

## 2017-08-24 RX ORDER — CHLORPROMAZINE HYDROCHLORIDE 50 MG/1
50 TABLET, FILM COATED ORAL 3 TIMES DAILY
Qty: 90 TABLET | Refills: 0 | Status: CANCELLED | OUTPATIENT
Start: 2017-08-24 | End: 2017-09-23

## 2017-08-24 RX ORDER — LORATADINE 10 MG/1
10 TABLET ORAL DAILY
Qty: 30 TABLET | Refills: 0 | Status: ON HOLD | OUTPATIENT
Start: 2017-08-24 | End: 2017-12-08

## 2017-08-24 RX ORDER — TRAZODONE HYDROCHLORIDE 100 MG/1
150 TABLET ORAL
Qty: 30 TABLET | Refills: 0 | Status: SHIPPED | OUTPATIENT
Start: 2017-08-24 | End: 2017-12-11 | Stop reason: HOSPADM

## 2017-08-24 RX ORDER — DOCUSATE SODIUM 100 MG/1
200 CAPSULE, LIQUID FILLED ORAL DAILY
Qty: 10 CAPSULE | Refills: 0 | Status: SHIPPED | OUTPATIENT
Start: 2017-08-24 | End: 2017-12-11 | Stop reason: HOSPADM

## 2017-08-24 RX ORDER — CHLORPROMAZINE HYDROCHLORIDE 50 MG/1
50 TABLET, FILM COATED ORAL 3 TIMES DAILY
Qty: 90 TABLET | Refills: 0 | Status: SHIPPED | OUTPATIENT
Start: 2017-08-24 | End: 2017-12-11 | Stop reason: HOSPADM

## 2017-08-24 RX ORDER — DIVALPROEX SODIUM 500 MG/1
1000 TABLET, DELAYED RELEASE ORAL
Qty: 60 TABLET | Refills: 0 | Status: ON HOLD | OUTPATIENT
Start: 2017-08-24 | End: 2017-12-08

## 2017-08-24 RX ORDER — LEVOTHYROXINE SODIUM 0.03 MG/1
25 TABLET ORAL
Qty: 30 TABLET | Refills: 0 | Status: ON HOLD | OUTPATIENT
Start: 2017-08-24 | End: 2017-12-08

## 2017-08-24 RX ORDER — DIPHENHYDRAMINE HCL 25 MG
25 TABLET ORAL 2 TIMES DAILY
Qty: 60 TABLET | Refills: 0 | Status: SHIPPED | OUTPATIENT
Start: 2017-08-24 | End: 2017-12-11 | Stop reason: HOSPADM

## 2017-08-24 RX ORDER — LANOLIN ALCOHOL/MO/W.PET/CERES
CREAM (GRAM) TOPICAL DAILY
Qty: 240 ML | Refills: 0 | Status: ON HOLD | OUTPATIENT
Start: 2017-08-24 | End: 2017-12-08

## 2017-08-24 RX ORDER — VITAMIN E 268 MG
400 CAPSULE ORAL DAILY
Qty: 30 CAPSULE | Refills: 0 | Status: ON HOLD | OUTPATIENT
Start: 2017-08-24 | End: 2017-12-08

## 2017-08-24 RX ORDER — DIVALPROEX SODIUM 500 MG/1
500 TABLET, DELAYED RELEASE ORAL EVERY MORNING
Qty: 30 TABLET | Refills: 0 | Status: ON HOLD | OUTPATIENT
Start: 2017-08-24 | End: 2017-12-08

## 2017-08-24 RX ORDER — OLANZAPINE 10 MG/1
10 TABLET ORAL EVERY 2 HOUR PRN
Qty: 60 TABLET | Refills: 0 | Status: SHIPPED | OUTPATIENT
Start: 2017-08-24 | End: 2017-12-11 | Stop reason: HOSPADM

## 2017-08-24 RX ORDER — KETOTIFEN FUMARATE 0.35 MG/ML
1 SOLUTION/ DROPS OPHTHALMIC 2 TIMES DAILY PRN
Qty: 5 ML | Refills: 0 | Status: ON HOLD | OUTPATIENT
Start: 2017-08-24 | End: 2017-12-08

## 2017-08-24 RX ORDER — LORAZEPAM 1 MG/1
1 TABLET ORAL 3 TIMES DAILY
Qty: 30 TABLET | Refills: 2 | Status: SHIPPED | OUTPATIENT
Start: 2017-08-24 | End: 2017-12-11 | Stop reason: HOSPADM

## 2017-08-24 RX ADMIN — LORAZEPAM 1 MG: 1 TABLET ORAL at 08:36

## 2017-08-24 RX ADMIN — VITAMIN E CAP 400 UNIT 400 UNITS: 400 CAP at 08:35

## 2017-08-24 RX ADMIN — BENZTROPINE MESYLATE 1 MG: 1 TABLET ORAL at 08:35

## 2017-08-24 RX ADMIN — ACETAMINOPHEN 650 MG: 325 TABLET, FILM COATED ORAL at 12:04

## 2017-08-24 RX ADMIN — CHLORPROMAZINE HYDROCHLORIDE 50 MG: 50 TABLET, SUGAR COATED ORAL at 08:35

## 2017-08-24 RX ADMIN — DIPHENHYDRAMINE HCL 25 MG: 25 TABLET ORAL at 08:37

## 2017-08-24 RX ADMIN — ALUMINUM HYDROXIDE, MAGNESIUM HYDROXIDE, AND SIMETHICONE 30 ML: 200; 200; 20 SUSPENSION ORAL at 12:10

## 2017-08-24 RX ADMIN — LORATADINE 10 MG: 10 TABLET ORAL at 08:37

## 2017-08-24 RX ADMIN — DIVALPROEX SODIUM 500 MG: 500 TABLET, DELAYED RELEASE ORAL at 08:36

## 2017-08-24 RX ADMIN — VITAMIN D, TAB 1000IU (100/BT) 1000 UNITS: 25 TAB at 08:34

## 2017-08-24 RX ADMIN — CHLORPROMAZINE HYDROCHLORIDE 50 MG: 50 TABLET, SUGAR COATED ORAL at 11:05

## 2017-11-22 ENCOUNTER — GENERIC CONVERSION - ENCOUNTER (OUTPATIENT)
Dept: OTHER | Facility: OTHER | Age: 37
End: 2017-11-22

## 2017-11-22 DIAGNOSIS — R79.89 OTHER SPECIFIED ABNORMAL FINDINGS OF BLOOD CHEMISTRY: ICD-10-CM

## 2017-11-27 ENCOUNTER — GENERIC CONVERSION - ENCOUNTER (OUTPATIENT)
Dept: OTHER | Facility: OTHER | Age: 37
End: 2017-11-27

## 2017-12-01 ENCOUNTER — HOSPITAL ENCOUNTER (INPATIENT)
Facility: HOSPITAL | Age: 37
LOS: 9 days | Discharge: HOME | DRG: 885 | End: 2017-12-11
Attending: EMERGENCY MEDICINE | Admitting: PSYCHIATRY & NEUROLOGY
Payer: COMMERCIAL

## 2017-12-01 DIAGNOSIS — E56.0 VITAMIN E DEFICIENCY: ICD-10-CM

## 2017-12-01 DIAGNOSIS — R45.851 SUICIDAL IDEATION: ICD-10-CM

## 2017-12-01 DIAGNOSIS — F20.1 DISORGANIZED SCHIZOPHRENIA (HCC): Chronic | ICD-10-CM

## 2017-12-01 DIAGNOSIS — E11.65 TYPE 2 DIABETES MELLITUS WITH HYPERGLYCEMIA, UNSPECIFIED LONG TERM INSULIN USE STATUS: ICD-10-CM

## 2017-12-01 DIAGNOSIS — E03.9 HYPOTHYROIDISM, UNSPECIFIED TYPE: ICD-10-CM

## 2017-12-01 DIAGNOSIS — L85.3 DRY SKIN: ICD-10-CM

## 2017-12-01 DIAGNOSIS — F25.0 SCHIZOAFFECTIVE DISORDER, BIPOLAR TYPE (HCC): Primary | Chronic | ICD-10-CM

## 2017-12-01 DIAGNOSIS — E55.9 VITAMIN D DEFICIENCY: ICD-10-CM

## 2017-12-01 DIAGNOSIS — E11.9: ICD-10-CM

## 2017-12-01 DIAGNOSIS — G47.00 INSOMNIA, UNSPECIFIED TYPE: ICD-10-CM

## 2017-12-01 DIAGNOSIS — T78.40XD ALLERGY, SUBSEQUENT ENCOUNTER: ICD-10-CM

## 2017-12-01 DIAGNOSIS — Z00.8 EVALUATION BY PSYCHIATRIC SERVICE REQUIRED: ICD-10-CM

## 2017-12-01 DIAGNOSIS — K59.00 CONSTIPATION, UNSPECIFIED CONSTIPATION TYPE: ICD-10-CM

## 2017-12-01 DIAGNOSIS — E78.2 MIXED HYPERLIPIDEMIA: ICD-10-CM

## 2017-12-01 DIAGNOSIS — F60.2 ANTISOCIAL PERSONALITY DISORDER (HCC): ICD-10-CM

## 2017-12-01 DIAGNOSIS — F70 MILD INTELLECTUAL DISABILITY: Chronic | ICD-10-CM

## 2017-12-01 PROCEDURE — 82075 ASSAY OF BREATH ETHANOL: CPT | Performed by: EMERGENCY MEDICINE

## 2017-12-01 PROCEDURE — 96372 THER/PROPH/DIAG INJ SC/IM: CPT

## 2017-12-01 PROCEDURE — 80307 DRUG TEST PRSMV CHEM ANLYZR: CPT | Performed by: EMERGENCY MEDICINE

## 2017-12-01 RX ORDER — OLANZAPINE 10 MG/1
5 INJECTION, POWDER, LYOPHILIZED, FOR SOLUTION INTRAMUSCULAR EVERY 6 HOURS PRN
Status: CANCELLED | OUTPATIENT
Start: 2017-12-01

## 2017-12-01 RX ORDER — LORAZEPAM 1 MG/1
2 TABLET ORAL EVERY 6 HOURS PRN
Status: CANCELLED | OUTPATIENT
Start: 2017-12-01

## 2017-12-01 RX ORDER — ACETAMINOPHEN 325 MG/1
650 TABLET ORAL EVERY 6 HOURS PRN
Status: CANCELLED | OUTPATIENT
Start: 2017-12-01

## 2017-12-01 RX ORDER — BENZTROPINE MESYLATE 1 MG/1
1 TABLET ORAL
Status: CANCELLED | OUTPATIENT
Start: 2017-12-01

## 2017-12-01 RX ORDER — RISPERIDONE 1 MG/1
2 TABLET, ORALLY DISINTEGRATING ORAL
Status: CANCELLED | OUTPATIENT
Start: 2017-12-01

## 2017-12-01 RX ORDER — MAGNESIUM HYDROXIDE/ALUMINUM HYDROXICE/SIMETHICONE 120; 1200; 1200 MG/30ML; MG/30ML; MG/30ML
30 SUSPENSION ORAL EVERY 4 HOURS PRN
Status: CANCELLED | OUTPATIENT
Start: 2017-12-01

## 2017-12-01 RX ORDER — ZIPRASIDONE MESYLATE 20 MG/ML
20 INJECTION, POWDER, LYOPHILIZED, FOR SOLUTION INTRAMUSCULAR ONCE
Status: COMPLETED | OUTPATIENT
Start: 2017-12-01 | End: 2017-12-01

## 2017-12-01 RX ORDER — HALOPERIDOL 5 MG/ML
5 INJECTION INTRAMUSCULAR EVERY 4 HOURS PRN
Status: CANCELLED | OUTPATIENT
Start: 2017-12-01

## 2017-12-01 RX ORDER — LORAZEPAM 2 MG/ML
2 INJECTION INTRAMUSCULAR EVERY 4 HOURS PRN
Status: CANCELLED | OUTPATIENT
Start: 2017-12-01

## 2017-12-01 RX ORDER — BENZTROPINE MESYLATE 1 MG/ML
1 INJECTION INTRAMUSCULAR; INTRAVENOUS
Status: CANCELLED | OUTPATIENT
Start: 2017-12-01

## 2017-12-01 RX ORDER — IBUPROFEN 600 MG/1
600 TABLET ORAL EVERY 8 HOURS PRN
Status: CANCELLED | OUTPATIENT
Start: 2017-12-01

## 2017-12-01 RX ORDER — HYDROXYZINE 50 MG/1
50 TABLET, FILM COATED ORAL EVERY 4 HOURS PRN
Status: CANCELLED | OUTPATIENT
Start: 2017-12-01

## 2017-12-01 RX ORDER — TRAZODONE HYDROCHLORIDE 50 MG/1
50 TABLET ORAL
Status: CANCELLED | OUTPATIENT
Start: 2017-12-01

## 2017-12-01 RX ADMIN — ZIPRASIDONE MESYLATE 20 MG: 20 INJECTION, POWDER, LYOPHILIZED, FOR SOLUTION INTRAMUSCULAR at 22:37

## 2017-12-02 PROBLEM — H25.12 NUCLEAR SCLEROTIC CATARACT OF LEFT EYE: Status: ACTIVE | Noted: 2017-11-09

## 2017-12-02 PROBLEM — H25.11 NUCLEAR SCLEROTIC CATARACT OF RIGHT EYE: Status: ACTIVE | Noted: 2017-11-09

## 2017-12-02 PROCEDURE — 99285 EMERGENCY DEPT VISIT HI MDM: CPT

## 2017-12-02 PROCEDURE — 93005 ELECTROCARDIOGRAM TRACING: CPT | Performed by: PSYCHIATRY & NEUROLOGY

## 2017-12-02 RX ORDER — VITAMIN E 268 MG
400 CAPSULE ORAL DAILY
Status: DISCONTINUED | OUTPATIENT
Start: 2017-12-02 | End: 2017-12-11 | Stop reason: HOSPADM

## 2017-12-02 RX ORDER — DOCUSATE SODIUM 100 MG/1
100 CAPSULE, LIQUID FILLED ORAL 2 TIMES DAILY
Status: DISCONTINUED | OUTPATIENT
Start: 2017-12-02 | End: 2017-12-11 | Stop reason: HOSPADM

## 2017-12-02 RX ORDER — ACETAMINOPHEN 325 MG/1
650 TABLET ORAL EVERY 6 HOURS PRN
Status: DISCONTINUED | OUTPATIENT
Start: 2017-12-02 | End: 2017-12-04

## 2017-12-02 RX ORDER — CHLORPROMAZINE HYDROCHLORIDE 50 MG/1
50 TABLET, FILM COATED ORAL 3 TIMES DAILY
Status: DISCONTINUED | OUTPATIENT
Start: 2017-12-02 | End: 2017-12-04

## 2017-12-02 RX ORDER — HALOPERIDOL 5 MG/ML
5 INJECTION INTRAMUSCULAR EVERY 4 HOURS PRN
Status: DISCONTINUED | OUTPATIENT
Start: 2017-12-02 | End: 2017-12-11 | Stop reason: HOSPADM

## 2017-12-02 RX ORDER — LEVOTHYROXINE SODIUM 0.03 MG/1
25 TABLET ORAL
Status: DISCONTINUED | OUTPATIENT
Start: 2017-12-02 | End: 2017-12-11 | Stop reason: HOSPADM

## 2017-12-02 RX ORDER — DOCUSATE SODIUM 100 MG/1
200 CAPSULE, LIQUID FILLED ORAL DAILY
Status: DISCONTINUED | OUTPATIENT
Start: 2017-12-02 | End: 2017-12-02

## 2017-12-02 RX ORDER — LORATADINE 10 MG/1
10 TABLET ORAL DAILY
Status: DISCONTINUED | OUTPATIENT
Start: 2017-12-02 | End: 2017-12-11 | Stop reason: HOSPADM

## 2017-12-02 RX ORDER — HYDRALAZINE HYDROCHLORIDE 25 MG/1
25 TABLET, FILM COATED ORAL 3 TIMES DAILY PRN
Status: DISCONTINUED | OUTPATIENT
Start: 2017-12-02 | End: 2017-12-11 | Stop reason: HOSPADM

## 2017-12-02 RX ORDER — MELATONIN
1000 DAILY
Status: DISCONTINUED | OUTPATIENT
Start: 2017-12-02 | End: 2017-12-11 | Stop reason: HOSPADM

## 2017-12-02 RX ORDER — DIVALPROEX SODIUM 500 MG/1
500 TABLET, DELAYED RELEASE ORAL EVERY MORNING
Status: DISCONTINUED | OUTPATIENT
Start: 2017-12-02 | End: 2017-12-11 | Stop reason: HOSPADM

## 2017-12-02 RX ORDER — TRAZODONE HYDROCHLORIDE 50 MG/1
50 TABLET ORAL
Status: DISCONTINUED | OUTPATIENT
Start: 2017-12-02 | End: 2017-12-11 | Stop reason: HOSPADM

## 2017-12-02 RX ORDER — LORAZEPAM 1 MG/1
2 TABLET ORAL EVERY 6 HOURS PRN
Status: DISCONTINUED | OUTPATIENT
Start: 2017-12-02 | End: 2017-12-04

## 2017-12-02 RX ORDER — IBUPROFEN 600 MG/1
600 TABLET ORAL EVERY 8 HOURS PRN
Status: DISCONTINUED | OUTPATIENT
Start: 2017-12-02 | End: 2017-12-11 | Stop reason: HOSPADM

## 2017-12-02 RX ORDER — PETROLATUM 42 G/100G
OINTMENT TOPICAL 2 TIMES DAILY PRN
Status: DISCONTINUED | OUTPATIENT
Start: 2017-12-02 | End: 2017-12-02

## 2017-12-02 RX ORDER — HALOPERIDOL 5 MG
5 TABLET ORAL EVERY 6 HOURS PRN
Status: DISCONTINUED | OUTPATIENT
Start: 2017-12-02 | End: 2017-12-11 | Stop reason: HOSPADM

## 2017-12-02 RX ORDER — HYDROXYZINE 50 MG/1
50 TABLET, FILM COATED ORAL EVERY 4 HOURS PRN
Status: DISCONTINUED | OUTPATIENT
Start: 2017-12-02 | End: 2017-12-11 | Stop reason: HOSPADM

## 2017-12-02 RX ORDER — LANOLIN ALCOHOL/MO/W.PET/CERES
CREAM (GRAM) TOPICAL 2 TIMES DAILY PRN
Status: DISCONTINUED | OUTPATIENT
Start: 2017-12-02 | End: 2017-12-11 | Stop reason: HOSPADM

## 2017-12-02 RX ORDER — BENZTROPINE MESYLATE 1 MG/ML
1 INJECTION INTRAMUSCULAR; INTRAVENOUS
Status: DISCONTINUED | OUTPATIENT
Start: 2017-12-02 | End: 2017-12-11 | Stop reason: HOSPADM

## 2017-12-02 RX ORDER — RISPERIDONE 1 MG/1
2 TABLET, ORALLY DISINTEGRATING ORAL
Status: DISCONTINUED | OUTPATIENT
Start: 2017-12-02 | End: 2017-12-11 | Stop reason: HOSPADM

## 2017-12-02 RX ORDER — BENZTROPINE MESYLATE 1 MG/1
1 TABLET ORAL
Status: DISCONTINUED | OUTPATIENT
Start: 2017-12-02 | End: 2017-12-11 | Stop reason: HOSPADM

## 2017-12-02 RX ORDER — ACETAMINOPHEN 325 MG/1
650 TABLET ORAL EVERY 6 HOURS PRN
Status: DISCONTINUED | OUTPATIENT
Start: 2017-12-02 | End: 2017-12-11 | Stop reason: HOSPADM

## 2017-12-02 RX ORDER — LORAZEPAM 2 MG/ML
2 INJECTION INTRAMUSCULAR EVERY 4 HOURS PRN
Status: DISCONTINUED | OUTPATIENT
Start: 2017-12-02 | End: 2017-12-04

## 2017-12-02 RX ORDER — ACETAMINOPHEN 325 MG/1
650 TABLET ORAL EVERY 6 HOURS PRN
COMMUNITY
End: 2018-12-21 | Stop reason: SDUPTHER

## 2017-12-02 RX ORDER — OLANZAPINE 5 MG/1
5 TABLET ORAL
Status: DISCONTINUED | OUTPATIENT
Start: 2017-12-02 | End: 2017-12-04

## 2017-12-02 RX ORDER — MELATONIN
1000 DAILY
Status: ON HOLD | COMMUNITY
End: 2017-12-08

## 2017-12-02 RX ORDER — DIPHENHYDRAMINE HCL 25 MG
25 TABLET ORAL 2 TIMES DAILY
Status: DISCONTINUED | OUTPATIENT
Start: 2017-12-02 | End: 2017-12-04

## 2017-12-02 RX ORDER — DIPHENHYDRAMINE HCL 25 MG
50 TABLET ORAL
Status: DISCONTINUED | OUTPATIENT
Start: 2017-12-02 | End: 2017-12-04

## 2017-12-02 RX ORDER — MAGNESIUM HYDROXIDE/ALUMINUM HYDROXICE/SIMETHICONE 120; 1200; 1200 MG/30ML; MG/30ML; MG/30ML
30 SUSPENSION ORAL EVERY 4 HOURS PRN
Status: DISCONTINUED | OUTPATIENT
Start: 2017-12-02 | End: 2017-12-11 | Stop reason: HOSPADM

## 2017-12-02 RX ORDER — DIVALPROEX SODIUM 500 MG/1
1000 TABLET, DELAYED RELEASE ORAL
Status: DISCONTINUED | OUTPATIENT
Start: 2017-12-02 | End: 2017-12-11 | Stop reason: HOSPADM

## 2017-12-02 RX ORDER — CHLORPROMAZINE HYDROCHLORIDE 100 MG/1
100 TABLET, FILM COATED ORAL
Status: DISCONTINUED | OUTPATIENT
Start: 2017-12-02 | End: 2017-12-04

## 2017-12-02 RX ORDER — OLANZAPINE 10 MG/1
5 INJECTION, POWDER, LYOPHILIZED, FOR SOLUTION INTRAMUSCULAR EVERY 6 HOURS PRN
Status: DISCONTINUED | OUTPATIENT
Start: 2017-12-02 | End: 2017-12-04

## 2017-12-02 RX ADMIN — DIVALPROEX SODIUM 1000 MG: 500 TABLET, DELAYED RELEASE ORAL at 17:15

## 2017-12-02 RX ADMIN — BENZTROPINE MESYLATE 1 MG: 1 TABLET ORAL at 10:55

## 2017-12-02 RX ADMIN — CHLORPROMAZINE HYDROCHLORIDE 100 MG: 100 TABLET, SUGAR COATED ORAL at 21:10

## 2017-12-02 RX ADMIN — RISPERIDONE 2 MG: 1 TABLET, ORALLY DISINTEGRATING ORAL at 10:55

## 2017-12-02 RX ADMIN — DIVALPROEX SODIUM 500 MG: 500 TABLET, DELAYED RELEASE ORAL at 12:49

## 2017-12-02 RX ADMIN — TRAZODONE HYDROCHLORIDE 50 MG: 50 TABLET ORAL at 21:13

## 2017-12-02 RX ADMIN — BENZTROPINE MESYLATE 1 MG: 1 TABLET ORAL at 21:14

## 2017-12-02 RX ADMIN — CHLORPROMAZINE HYDROCHLORIDE 50 MG: 50 TABLET, SUGAR COATED ORAL at 21:10

## 2017-12-02 RX ADMIN — CHLORPROMAZINE HYDROCHLORIDE 50 MG: 50 TABLET, SUGAR COATED ORAL at 16:22

## 2017-12-02 RX ADMIN — VITAMIN D, TAB 1000IU (100/BT) 1000 UNITS: 25 TAB at 12:50

## 2017-12-02 RX ADMIN — OLANZAPINE 5 MG: 5 TABLET, FILM COATED ORAL at 14:47

## 2017-12-02 RX ADMIN — LORATADINE 10 MG: 10 TABLET ORAL at 12:49

## 2017-12-02 RX ADMIN — ALUMINUM HYDROXIDE, MAGNESIUM HYDROXIDE, AND SIMETHICONE 30 ML: 200; 200; 20 SUSPENSION ORAL at 14:47

## 2017-12-02 NOTE — ED NOTES
Group Home staff stated that the pt mother Alexa Alexandre is the pt POA  The pt mother was requesting to speak to Michael Hollins 92 ask the group home if they had a copy of the POA paperwork  They stated no  CW called the pt mother Michael Rose at [phone]  Pt mother was requesting that the pt be DC  The CW talk about this matter with pt doctor- Salvatore Joiner stated that the pt mother would have to provide a copy of POA paperwork and sign an AMA for taking all responsibility if the pt would hurt himself  The pt mother refused to both conditions  The pt mother gave verbal consent agreeing to  [place]  201 sent to [facility] for consideration

## 2017-12-02 NOTE — PLAN OF CARE
Problem: Alteration in Thoughts and Perception  Goal: Treatment Goal: Gain control of psychotic behaviors/thinking, reduce/eliminate presenting symptoms and demonstrate improved reality functioning upon discharge  Outcome: Progressing    Goal: Verbalize thoughts and feelings  Interventions:  - Promote a nonjudgmental and trusting relationship with the patient through active listening and therapeutic communication  - Assess patient's level of functioning, behavior and potential for risk  - Engage patient in 1 on 1 interactions for a minimum of 15 minutes each session  - Encourage patient to express fears, feelings, frustrations, and discuss symptoms    - [place] patient to reality, help patient recognize reality-based thinking   - Administer medications as ordered and assess for potential side effects  - Provide the patient education related to the signs and symptoms of the illness and desired effects of prescribed medications   Outcome: Progressing      Problem: Depression  Goal: Treatment Goal: Demonstrate behavioral control of depressive symptoms, verbalize feelings of improved mood/affect, and adopt new coping skills prior to discharge  Outcome: Progressing    Goal: Verbalize thoughts and feelings  Interventions:  - Assess and re-assess patient's level of risk   - Engage patient in 1:1 interactions, daily, for a minimum of 15 minutes   - Encourage patient to express feelings, fears, frustrations, hopes    Outcome: Progressing    Goal: Refrain from harming self  Interventions:  - Monitor patient closely, per order   - Supervise medication ingestion, monitor effects and side effects    Outcome: Progressing      Problem: Risk for Violence/Aggression Toward Others  Goal: Treatment Goal: Refrain from acts of violence/aggression during length of stay, and demonstrate improved impulse control at the time of discharge  Outcome: Progressing    Goal: Verbalize thoughts and feelings  Interventions:  - Assess and re-assess patient's level of risk, every waking shift  - Engage patient in 1:1 interactions, daily, for a minimum of 15 minutes   - Allow patient to express feelings and frustrations in a safe and non-threatening manner   - Establish rapport/trust with patient    Outcome: Progressing    Goal: Refrain from harming others  Outcome: Progressing    Goal: Control angry outbursts  Interventions:  - Monitor patient closely, per order  - Ensure early verbal de-escalation  - Monitor prn medication needs  - Set reasonable/therapeutic limits, outline behavioral expectations, and consequences   - Provide a non-threatening milieu, utilizing the least restrictive interventions    Outcome: Progressing
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
## 2017-12-02 NOTE — ED NOTES
Aundrea Swan [address] [facility] [place], [address] [place] [address]  12/02/17 0174

## 2017-12-02 NOTE — ED NOTES
EVS called, pt has [facility] placed to [phone] where Donna Jacobson stated a care manager will call back to complete CoB

## 2017-12-02 NOTE — ED NOTES
Pt lives in a group home  Pt came to the ED from the group home Personal Direct Support  The pt states that this time of year makes him depress because he can't be with his family for the holidays  The pt is SI with a plan to get hit by a car or stab self with a tooth brush  The pt is also hearing AH telling him to kill himself  Pt denies HI/VH  The pt states that if he is DC he will hurt himself  The pt is a danger to himself  Pt offered and signed a 201  Dr MOELLER in agreement
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
## 2017-12-02 NOTE — CMS CERTIFICATION NOTE
Certification: Based upon physical, mental and social evaluations, I certify that inpatient psychiatric services are medically necessary for this patient for a duration of 5 midnights for the treatment of Schizoaffective Bipolar type  Available alternative community resources do not meet the patient's mental health care needs  I further attest that an established written individualized plan of care has been implemented and is outlined in the patient's medical records

## 2017-12-02 NOTE — ED ATTENDING ATTESTATION
I, Debora Degroot, DO, saw and evaluated the patient  I have discussed the patient with the resident/non-physician practitioner and agree with the resident's/non-physician practitioner's findings, Plan of Care, and MDM as documented in the resident's/non-physician practitioner's note, except where noted  All available labs and Radiology studies were reviewed  At this point I agree with the current assessment done in the Emergency Department  I have conducted an independent evaluation of this patient a history and physical is as follows:      Critical Care Time  CritCare Time    40 yr old male to the ED with incr in aggitation and hearing command voices to kill self  Taking meds but they are not working  Thoughts to hurt self with plan to run into traffic  Exm: pressured speech with incr in aggitation as interview continued  Heart: tachy    Lungs: cta   Pln: UDS and crisis eval

## 2017-12-02 NOTE — TREATMENT PLAN
TREATMENT PLAN REVIEW - [address] Lis Glass 40 y o  1980 male MRN: [id number]    [address] [facility] Room / Bed: [place] [address] [id number] Encounter: [id number]          Admit Date/Time:  12/1/2017  7:40 PM    Treatment Team: Attending Provider: Pilar Shannon MD; Patient Care Technician: Colby Velazquez; Patient Care Technician: Poncho Delgado; Patient Care Technician: Shaila Harrison;  Patient Care Technician: Dajuan Starks; Registered Nurse: King Bustillo RN; Patient Care Technician: Jing Soto    Diagnosis: Principal Problem:    Schizoaffective disorder, bipolar type [facility])  Active Problems:    Mild intellectual disability    Obsessive compulsive disorder      Patient Strengths/Assets: patient is on a voluntary commitment    Patient Barriers/Limitations: difficulty adapting, limited support system, poor insight, poor interpersonal skills    Short Term Goals: decrease in paranoid thoughts, decrease in psychotic symptoms, decrease in suicidal thoughts, decrease in homicidal thoughts, decrease in level of agitation    Long Term Goals: stabilization of mood, free of suicidal thoughts, free of homicidal thoughts, resolution of psychotic symptoms, improved insight    Progress Towards Goals: starting psychiatric medications as prescribed    Recommended Treatment: medication management, patient medication education, group therapy, milieu therapy, continued Behavioral Health psychiatric evaluation/assessment process    Treatment Frequency: daily medication monitoring, group and milieu therapy daily, monitoring through interdisciplinary rounds, monitoring through weekly patient care conferences    Expected Discharge Date:  5  days    Discharge Plan: referral for outpatient medication management with a psychiatrist, return to previous living arrangement    Treatment Plan Created/Updated By: Kayla Goins MD

## 2017-12-02 NOTE — PROGRESS NOTES
Pt c/o AH of voices telling to harm himself  He requested thorazine and said it worked the best for the [facility]   He said Ativan and Benadryl make the AH worsened  Pt was given Risperidone and Cogentin PRN  He remains seclusive to his room  Will monitor
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
## 2017-12-02 NOTE — PROGRESS NOTES
Pt c/o feeling anxious with AH telling him to hurt himself  He was given Zyprexa PRN  He showered prior med  He was seen shaking and nervous  Mother called several times and said she faxed the paperwork probing she was his legal guardian  Will monitor
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
## 2017-12-03 LAB
ALBUMIN SERPL BCP-MCNC: 2.9 G/DL (ref 3.5–5)
ALP SERPL-CCNC: 81 U/L (ref 46–116)
ALT SERPL W P-5'-P-CCNC: 55 U/L (ref 12–78)
ANION GAP SERPL CALCULATED.3IONS-SCNC: 8 MMOL/L (ref 4–13)
AST SERPL W P-5'-P-CCNC: 25 U/L (ref 5–45)
ATRIAL RATE: 93 BPM
BASOPHILS # BLD AUTO: 0.03 THOUSANDS/ΜL (ref 0–0.1)
BASOPHILS NFR BLD AUTO: 0 % (ref 0–1)
BILIRUB SERPL-MCNC: 0.45 MG/DL (ref 0.2–1)
BUN SERPL-MCNC: 10 MG/DL (ref 5–25)
CALCIUM SERPL-MCNC: 8.6 MG/DL (ref 8.3–10.1)
CHLORIDE SERPL-SCNC: 102 MMOL/L (ref 100–108)
CO2 SERPL-SCNC: 30 MMOL/L (ref 21–32)
CREAT SERPL-MCNC: 1.23 MG/DL (ref 0.6–1.3)
EOSINOPHIL # BLD AUTO: 0.12 THOUSAND/ΜL (ref 0–0.61)
EOSINOPHIL NFR BLD AUTO: 1 % (ref 0–6)
ERYTHROCYTE [DISTWIDTH] IN BLOOD BY AUTOMATED COUNT: 11.8 % (ref 11.6–15.1)
GFR SERPL CREATININE-BSD FRML MDRD: 75 ML/MIN/1.73SQ M
GLUCOSE P FAST SERPL-MCNC: 226 MG/DL (ref 65–99)
GLUCOSE SERPL-MCNC: 226 MG/DL (ref 65–140)
HCT VFR BLD AUTO: 48.3 % (ref 36.5–49.3)
HGB BLD-MCNC: 16.5 G/DL (ref 12–17)
LYMPHOCYTES # BLD AUTO: 3.19 THOUSANDS/ΜL (ref 0.6–4.47)
LYMPHOCYTES NFR BLD AUTO: 37 % (ref 14–44)
MCH RBC QN AUTO: 30.6 PG (ref 26.8–34.3)
MCHC RBC AUTO-ENTMCNC: 34.2 G/DL (ref 31.4–37.4)
MCV RBC AUTO: 89 FL (ref 82–98)
MONOCYTES # BLD AUTO: 0.85 THOUSAND/ΜL (ref 0.17–1.22)
MONOCYTES NFR BLD AUTO: 10 % (ref 4–12)
NEUTROPHILS # BLD AUTO: 4.32 THOUSANDS/ΜL (ref 1.85–7.62)
NEUTS SEG NFR BLD AUTO: 52 % (ref 43–75)
NRBC BLD AUTO-RTO: 0 /100 WBCS
P AXIS: 43 DEGREES
PLATELET # BLD AUTO: 152 THOUSANDS/UL (ref 149–390)
PMV BLD AUTO: 10.1 FL (ref 8.9–12.7)
POTASSIUM SERPL-SCNC: 3.7 MMOL/L (ref 3.5–5.3)
PR INTERVAL: 162 MS
PROT SERPL-MCNC: 6.5 G/DL (ref 6.4–8.2)
QRS AXIS: -11 DEGREES
QRSD INTERVAL: 102 MS
QT INTERVAL: 366 MS
QTC INTERVAL: 455 MS
RBC # BLD AUTO: 5.4 MILLION/UL (ref 3.88–5.62)
SODIUM SERPL-SCNC: 140 MMOL/L (ref 136–145)
T WAVE AXIS: 50 DEGREES
T4 SERPL-MCNC: 9.6 UG/DL (ref 4.7–13.3)
TSH SERPL DL<=0.05 MIU/L-ACNC: 1.54 UIU/ML (ref 0.36–3.74)
VENTRICULAR RATE: 93 BPM
WBC # BLD AUTO: 8.64 THOUSAND/UL (ref 4.31–10.16)

## 2017-12-03 PROCEDURE — 85025 COMPLETE CBC W/AUTO DIFF WBC: CPT | Performed by: PSYCHIATRY & NEUROLOGY

## 2017-12-03 PROCEDURE — 84443 ASSAY THYROID STIM HORMONE: CPT | Performed by: PSYCHIATRY & NEUROLOGY

## 2017-12-03 PROCEDURE — 80053 COMPREHEN METABOLIC PANEL: CPT | Performed by: PSYCHIATRY & NEUROLOGY

## 2017-12-03 PROCEDURE — 84436 ASSAY OF TOTAL THYROXINE: CPT | Performed by: PSYCHIATRY & NEUROLOGY

## 2017-12-03 RX ADMIN — CHLORPROMAZINE HYDROCHLORIDE 50 MG: 50 TABLET, SUGAR COATED ORAL at 21:09

## 2017-12-03 RX ADMIN — LORAZEPAM 2 MG: 1 TABLET ORAL at 01:23

## 2017-12-03 RX ADMIN — LORATADINE 10 MG: 10 TABLET ORAL at 08:52

## 2017-12-03 RX ADMIN — CHLORPROMAZINE HYDROCHLORIDE 100 MG: 100 TABLET, SUGAR COATED ORAL at 21:09

## 2017-12-03 RX ADMIN — DIPHENHYDRAMINE HCL 25 MG: 25 TABLET ORAL at 15:49

## 2017-12-03 RX ADMIN — DIVALPROEX SODIUM 500 MG: 500 TABLET, DELAYED RELEASE ORAL at 08:56

## 2017-12-03 RX ADMIN — VITAMIN E CAP 400 UNIT 400 UNITS: 400 CAP at 08:52

## 2017-12-03 RX ADMIN — ACETAMINOPHEN 650 MG: 325 TABLET, FILM COATED ORAL at 15:40

## 2017-12-03 RX ADMIN — LEVOTHYROXINE SODIUM 25 MCG: 25 TABLET ORAL at 06:02

## 2017-12-03 RX ADMIN — HYDROXYZINE HYDROCHLORIDE 50 MG: 50 TABLET, FILM COATED ORAL at 16:50

## 2017-12-03 RX ADMIN — IBUPROFEN 600 MG: 600 TABLET ORAL at 12:26

## 2017-12-03 RX ADMIN — DOCUSATE SODIUM 100 MG: 100 CAPSULE, LIQUID FILLED ORAL at 18:00

## 2017-12-03 RX ADMIN — CHLORPROMAZINE HYDROCHLORIDE 50 MG: 50 TABLET, SUGAR COATED ORAL at 15:40

## 2017-12-03 RX ADMIN — DIVALPROEX SODIUM 1000 MG: 500 TABLET, DELAYED RELEASE ORAL at 15:58

## 2017-12-03 RX ADMIN — DIPHENHYDRAMINE HCL 50 MG: 25 TABLET ORAL at 21:09

## 2017-12-03 RX ADMIN — VITAMIN D, TAB 1000IU (100/BT) 1000 UNITS: 25 TAB at 08:52

## 2017-12-03 RX ADMIN — DOCUSATE SODIUM 100 MG: 100 CAPSULE, LIQUID FILLED ORAL at 08:52

## 2017-12-03 RX ADMIN — HYDROXYZINE HYDROCHLORIDE 50 MG: 50 TABLET, FILM COATED ORAL at 22:15

## 2017-12-03 RX ADMIN — DIPHENHYDRAMINE HCL 25 MG: 25 TABLET ORAL at 08:55

## 2017-12-03 RX ADMIN — CHLORPROMAZINE HYDROCHLORIDE 50 MG: 50 TABLET, SUGAR COATED ORAL at 08:52

## 2017-12-03 NOTE — PROGRESS NOTES
As pert mother pt had trial of clozaril who gave him adverse reactions and also" became immune to Seroquel"  [facility] ([phone])

## 2017-12-03 NOTE — PROGRESS NOTES
Progress Note - Behavioral Health   Aneta Glass 40 y o  male MRN: [id number]  Unit/Bed#: JR4 933-02 Encounter: [id number]    Assessment/Plan   Principal Problem:    Schizoaffective disorder, bipolar type ([address] )  Active Problems:    Mild intellectual disability    Obsessive compulsive disorder      Behavior over the last 24 hours:  unchanged  Sleep: normal  Appetite: normal  Medication side effects: No  ROS: no complaints    Mental Status Evaluation:  Appearance:  disheveld   Behavior:  guarded   Speech:  normal   Mood:  constricted   Affect:  constricted   Thought Process:  Goal oriented   Thought Content:  normal   Perceptual Disturbances: denies   Risk Potential: SI   Sensorium:  intact   Cognition:  Oriented in time,person and place   Consciousness:  Awake and alert   Attention: reduced   Insight:  limited   Judgment: limited   Gait/Station: normal   Motor Activity: No abnormal movement      Progress Toward Goals: Patient is reclusive and guarded  He is compliant with treatment  Recommended Treatment: Continue with group therapy, milieu therapy and occupational therapy  Risks, benefits and possible side effects of Medications:   Risks, benefits, and possible side effects of medications explained to patient and patient verbalizes understanding  Medications: all current active meds have been reviewed  Labs: reviewed    Counseling / Coordination of Care  Total floor / unit time spent today n/a minutes  Greater than 50% of total time was spent with the patient and / or family counseling and / or coordination of care   A description of the counseling / coordination of care:

## 2017-12-03 NOTE — PROGRESS NOTES
Pt given scheduled mediations early  Pt told RN he is hearing voices to hurt self  Pt told Rn " I am working on not hurting myself " Pt took benadryl, thorazine, and Depakote  After pt took the benadryl pt took RN that he wants doctor to D/C ativan and benadryl  Pt stated it is making him here the voices and be aggressive  Pt stated he told the doctor yesterday to D/C them  PT was informed he still has both medications  Pt tried calling his mother at this time and remains out in dayroom

## 2017-12-03 NOTE — PLAN OF CARE
Problem: Alteration in Thoughts and Perception  Goal: Treatment Goal: Gain control of psychotic behaviors/thinking, reduce/eliminate presenting symptoms and demonstrate improved reality functioning upon discharge  Outcome: Progressing    Goal: Verbalize thoughts and feelings  Interventions:  - Promote a nonjudgmental and trusting relationship with the patient through active listening and therapeutic communication  - Assess patient's level of functioning, behavior and potential for risk  - Engage patient in 1 on 1 interactions for a minimum of 15 minutes each session  - Encourage patient to express fears, feelings, frustrations, and discuss symptoms    - [place] patient to reality, help patient recognize reality-based thinking   - Administer medications as ordered and assess for potential side effects  - Provide the patient education related to the signs and symptoms of the illness and desired effects of prescribed medications   Outcome: Progressing      Problem: Depression  Goal: Treatment Goal: Demonstrate behavioral control of depressive symptoms, verbalize feelings of improved mood/affect, and adopt new coping skills prior to discharge  Outcome: Progressing    Goal: Verbalize thoughts and feelings  Interventions:  - Assess and re-assess patient's level of risk   - Engage patient in 1:1 interactions, daily, for a minimum of 15 minutes   - Encourage patient to express feelings, fears, frustrations, hopes    Outcome: Progressing    Goal: Refrain from harming self  Interventions:  - Monitor patient closely, per order   - Supervise medication ingestion, monitor effects and side effects    Outcome: Progressing      Problem: Risk for Violence/Aggression Toward Others  Goal: Treatment Goal: Refrain from acts of violence/aggression during length of stay, and demonstrate improved impulse control at the time of discharge  Outcome: Progressing    Goal: Verbalize thoughts and feelings  Interventions:  - Assess and re-assess patient's level of risk, every waking shift  - Engage patient in 1:1 interactions, daily, for a minimum of 15 minutes   - Allow patient to express feelings and frustrations in a safe and non-threatening manner   - Establish rapport/trust with patient    Outcome: Progressing    Goal: Refrain from harming others  Outcome: Progressing    Goal: Control angry outbursts  Interventions:  - Monitor patient closely, per order  - Ensure early verbal de-escalation  - Monitor prn medication needs  - Set reasonable/therapeutic limits, outline behavioral expectations, and consequences   - Provide a non-threatening milieu, utilizing the least restrictive interventions    Outcome: Progressing

## 2017-12-03 NOTE — PROGRESS NOTES
Patient mainly seclusive to his room  Denies SI currently and reports a decrease in AH  No aggressive behavior as of this time  Compliant with medication and will continue to monitor

## 2017-12-03 NOTE — PLAN OF CARE
Problem: Alteration in Thoughts and Perception  Goal: Treatment Goal: Gain control of psychotic behaviors/thinking, reduce/eliminate presenting symptoms and demonstrate improved reality functioning upon discharge  Outcome: Progressing    Goal: Verbalize thoughts and feelings  Interventions:  - Promote a nonjudgmental and trusting relationship with the patient through active listening and therapeutic communication  - Assess patient's level of functioning, behavior and potential for risk  - Engage patient in 1 on 1 interactions for a minimum of 15 minutes each session  - Encourage patient to express fears, feelings, frustrations, and discuss symptoms    - [place] patient to reality, help patient recognize reality-based thinking   - Administer medications as ordered and assess for potential side effects  - Provide the patient education related to the signs and symptoms of the illness and desired effects of prescribed medications   Outcome: Progressing    Goal: Attend and participate in unit activities, including therapeutic, recreational, and educational groups  Interventions:  - Provide therapeutic and educational activities daily, encourage attendance and participation, and document same in the medical record    Outcome: Progressing      Problem: Depression  Goal: Treatment Goal: Demonstrate behavioral control of depressive symptoms, verbalize feelings of improved mood/affect, and adopt new coping skills prior to discharge  Outcome: Progressing    Goal: Verbalize thoughts and feelings  Interventions:  - Assess and re-assess patient's level of risk   - Engage patient in 1:1 interactions, daily, for a minimum of 15 minutes   - Encourage patient to express feelings, fears, frustrations, hopes    Outcome: Progressing    Goal: Refrain from harming self  Interventions:  - Monitor patient closely, per order   - Supervise medication ingestion, monitor effects and side effects    Outcome: Progressing      Problem: Risk for Violence/Aggression Toward Others  Goal: Treatment Goal: Refrain from acts of violence/aggression during length of stay, and demonstrate improved impulse control at the time of discharge  Outcome: Progressing    Goal: Verbalize thoughts and feelings  Interventions:  - Assess and re-assess patient's level of risk, every waking shift  - Engage patient in 1:1 interactions, daily, for a minimum of 15 minutes   - Allow patient to express feelings and frustrations in a safe and non-threatening manner   - Establish rapport/trust with patient    Outcome: Progressing    Goal: Refrain from harming others  Outcome: Progressing    Goal: Control angry outbursts  Interventions:  - Monitor patient closely, per order  - Ensure early verbal de-escalation  - Monitor prn medication needs  - Set reasonable/therapeutic limits, outline behavioral expectations, and consequences   - Provide a non-threatening milieu, utilizing the least restrictive interventions    Outcome: Progressing
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
## 2017-12-04 LAB — T3 SERPL-MCNC: 0.8 NG/ML (ref 0.6–1.8)

## 2017-12-04 PROCEDURE — 84480 ASSAY TRIIODOTHYRONINE (T3): CPT | Performed by: PSYCHIATRY & NEUROLOGY

## 2017-12-04 RX ORDER — TRAMADOL HYDROCHLORIDE 50 MG/1
50 TABLET ORAL EVERY 8 HOURS PRN
Status: DISCONTINUED | OUTPATIENT
Start: 2017-12-04 | End: 2017-12-11 | Stop reason: HOSPADM

## 2017-12-04 RX ORDER — ACETAMINOPHEN 325 MG/1
975 TABLET ORAL EVERY 6 HOURS PRN
Status: DISCONTINUED | OUTPATIENT
Start: 2017-12-04 | End: 2017-12-04

## 2017-12-04 RX ORDER — CHLORPROMAZINE HYDROCHLORIDE 100 MG/1
100 TABLET, FILM COATED ORAL 2 TIMES DAILY
Status: DISCONTINUED | OUTPATIENT
Start: 2017-12-05 | End: 2017-12-11 | Stop reason: HOSPADM

## 2017-12-04 RX ORDER — CHLORPROMAZINE HYDROCHLORIDE 100 MG/1
200 TABLET, FILM COATED ORAL
Status: DISCONTINUED | OUTPATIENT
Start: 2017-12-04 | End: 2017-12-07

## 2017-12-04 RX ORDER — OLANZAPINE 10 MG/1
10 INJECTION, POWDER, LYOPHILIZED, FOR SOLUTION INTRAMUSCULAR EVERY 6 HOURS PRN
Status: DISCONTINUED | OUTPATIENT
Start: 2017-12-04 | End: 2017-12-11 | Stop reason: HOSPADM

## 2017-12-04 RX ORDER — OLANZAPINE 10 MG/1
10 TABLET ORAL
Status: DISCONTINUED | OUTPATIENT
Start: 2017-12-04 | End: 2017-12-11 | Stop reason: HOSPADM

## 2017-12-04 RX ORDER — CHLORPROMAZINE HYDROCHLORIDE 25 MG/ML
50 INJECTION INTRAMUSCULAR ONCE
Status: COMPLETED | OUTPATIENT
Start: 2017-12-04 | End: 2017-12-04

## 2017-12-04 RX ORDER — CHLORPROMAZINE HYDROCHLORIDE 100 MG/1
100 TABLET, FILM COATED ORAL EVERY 6 HOURS PRN
Status: DISCONTINUED | OUTPATIENT
Start: 2017-12-04 | End: 2017-12-11 | Stop reason: HOSPADM

## 2017-12-04 RX ORDER — CHLORPROMAZINE HYDROCHLORIDE 100 MG/1
100 TABLET, FILM COATED ORAL 3 TIMES DAILY
Status: DISCONTINUED | OUTPATIENT
Start: 2017-12-04 | End: 2017-12-04

## 2017-12-04 RX ORDER — CHLORPROMAZINE HYDROCHLORIDE 25 MG/ML
50 INJECTION INTRAMUSCULAR EVERY 6 HOURS PRN
Status: DISCONTINUED | OUTPATIENT
Start: 2017-12-04 | End: 2017-12-11 | Stop reason: HOSPADM

## 2017-12-04 RX ORDER — HYDROXYZINE 50 MG/1
50 TABLET, FILM COATED ORAL 3 TIMES DAILY
Status: DISCONTINUED | OUTPATIENT
Start: 2017-12-04 | End: 2017-12-11 | Stop reason: HOSPADM

## 2017-12-04 RX ORDER — OLANZAPINE 5 MG/1
5 TABLET ORAL
Status: DISCONTINUED | OUTPATIENT
Start: 2017-12-04 | End: 2017-12-04

## 2017-12-04 RX ORDER — LORAZEPAM 2 MG/ML
2 INJECTION INTRAMUSCULAR ONCE
Status: COMPLETED | OUTPATIENT
Start: 2017-12-04 | End: 2017-12-04

## 2017-12-04 RX ORDER — LORAZEPAM 2 MG/ML
2 INJECTION INTRAMUSCULAR EVERY 4 HOURS PRN
Status: DISCONTINUED | OUTPATIENT
Start: 2017-12-04 | End: 2017-12-11 | Stop reason: HOSPADM

## 2017-12-04 RX ORDER — BENZTROPINE MESYLATE 1 MG/1
1 TABLET ORAL 2 TIMES DAILY
Status: DISCONTINUED | OUTPATIENT
Start: 2017-12-04 | End: 2017-12-11 | Stop reason: HOSPADM

## 2017-12-04 RX ORDER — OLANZAPINE 10 MG/1
5 INJECTION, POWDER, LYOPHILIZED, FOR SOLUTION INTRAMUSCULAR EVERY 6 HOURS PRN
Status: DISCONTINUED | OUTPATIENT
Start: 2017-12-04 | End: 2017-12-04

## 2017-12-04 RX ORDER — TRAMADOL HYDROCHLORIDE 50 MG/1
50 TABLET ORAL EVERY 8 HOURS PRN
Status: DISCONTINUED | OUTPATIENT
Start: 2017-12-04 | End: 2017-12-04

## 2017-12-04 RX ADMIN — LORATADINE 10 MG: 10 TABLET ORAL at 08:21

## 2017-12-04 RX ADMIN — CHLORPROMAZINE HYDROCHLORIDE 50 MG: 25 INJECTION INTRAMUSCULAR at 18:13

## 2017-12-04 RX ADMIN — CHLORPROMAZINE HYDROCHLORIDE 50 MG: 50 TABLET, SUGAR COATED ORAL at 08:21

## 2017-12-04 RX ADMIN — LEVOTHYROXINE SODIUM 25 MCG: 25 TABLET ORAL at 05:45

## 2017-12-04 RX ADMIN — BENZTROPINE MESYLATE 1 MG: 1 TABLET ORAL at 14:43

## 2017-12-04 RX ADMIN — WATER 10 ML: 1 INJECTION INTRAMUSCULAR; INTRAVENOUS; SUBCUTANEOUS at 15:06

## 2017-12-04 RX ADMIN — RISPERIDONE 2 MG: 1 TABLET, ORALLY DISINTEGRATING ORAL at 14:31

## 2017-12-04 RX ADMIN — VITAMIN E CAP 400 UNIT 400 UNITS: 400 CAP at 08:30

## 2017-12-04 RX ADMIN — CHLORPROMAZINE HYDROCHLORIDE 50 MG: 25 INJECTION INTRAMUSCULAR at 16:48

## 2017-12-04 RX ADMIN — DOCUSATE SODIUM 100 MG: 100 CAPSULE, LIQUID FILLED ORAL at 18:16

## 2017-12-04 RX ADMIN — IBUPROFEN 600 MG: 600 TABLET ORAL at 00:01

## 2017-12-04 RX ADMIN — CHLORPROMAZINE HYDROCHLORIDE 200 MG: 100 TABLET, SUGAR COATED ORAL at 20:21

## 2017-12-04 RX ADMIN — HYDROXYZINE HYDROCHLORIDE 50 MG: 50 TABLET, FILM COATED ORAL at 20:18

## 2017-12-04 RX ADMIN — DIVALPROEX SODIUM 500 MG: 500 TABLET, DELAYED RELEASE ORAL at 08:21

## 2017-12-04 RX ADMIN — VITAMIN D, TAB 1000IU (100/BT) 1000 UNITS: 25 TAB at 08:21

## 2017-12-04 RX ADMIN — OLANZAPINE 10 MG: 10 INJECTION, POWDER, FOR SOLUTION INTRAMUSCULAR at 15:05

## 2017-12-04 RX ADMIN — DOCUSATE SODIUM 100 MG: 100 CAPSULE, LIQUID FILLED ORAL at 08:21

## 2017-12-04 RX ADMIN — LORAZEPAM 2 MG: 2 INJECTION INTRAMUSCULAR; INTRAVENOUS at 22:15

## 2017-12-04 RX ADMIN — HYDROXYZINE HYDROCHLORIDE 50 MG: 50 TABLET, FILM COATED ORAL at 14:44

## 2017-12-04 RX ADMIN — IBUPROFEN 600 MG: 600 TABLET ORAL at 11:02

## 2017-12-04 RX ADMIN — BENZTROPINE MESYLATE 1 MG: 1 TABLET ORAL at 20:21

## 2017-12-04 RX ADMIN — DIVALPROEX SODIUM 1000 MG: 500 TABLET, DELAYED RELEASE ORAL at 18:16
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
## 2017-12-04 NOTE — PROGRESS NOTES
PT given PRN thorazine 50 mg IM  Pt was telling RN he has anxiety again and is having the impulse to hit people  PT kept asking RN to be put in restraints and have someone watch him  Rn told pt that it's not an option  PT asked RN " How can I be put into restraints?" Rn informed pt that he can have more medication  PT is currently in his room laying down

## 2017-12-04 NOTE — PLAN OF CARE
Problem: Alteration in Thoughts and Perception  Goal: Treatment Goal: Gain control of psychotic behaviors/thinking, reduce/eliminate presenting symptoms and demonstrate improved reality functioning upon discharge  Outcome: Progressing    Goal: Verbalize thoughts and feelings  Interventions:  - Promote a nonjudgmental and trusting relationship with the patient through active listening and therapeutic communication  - Assess patient's level of functioning, behavior and potential for risk  - Engage patient in 1 on 1 interactions for a minimum of 15 minutes each session  - Encourage patient to express fears, feelings, frustrations, and discuss symptoms    - [place] patient to reality, help patient recognize reality-based thinking   - Administer medications as ordered and assess for potential side effects  - Provide the patient education related to the signs and symptoms of the illness and desired effects of prescribed medications   Outcome: Progressing      Problem: Depression  Goal: Treatment Goal: Demonstrate behavioral control of depressive symptoms, verbalize feelings of improved mood/affect, and adopt new coping skills prior to discharge  Outcome: Progressing    Goal: Verbalize thoughts and feelings  Interventions:  - Assess and re-assess patient's level of risk   - Engage patient in 1:1 interactions, daily, for a minimum of 15 minutes   - Encourage patient to express feelings, fears, frustrations, hopes    Outcome: Progressing    Goal: Refrain from harming self  Interventions:  - Monitor patient closely, per order   - Supervise medication ingestion, monitor effects and side effects    Outcome: Progressing      Problem: Risk for Violence/Aggression Toward Others  Goal: Treatment Goal: Refrain from acts of violence/aggression during length of stay, and demonstrate improved impulse control at the time of discharge  Outcome: Progressing    Goal: Verbalize thoughts and feelings  Interventions:  - Assess and re-assess patient's level of risk, every waking shift  - Engage patient in 1:1 interactions, daily, for a minimum of 15 minutes   - Allow patient to express feelings and frustrations in a safe and non-threatening manner   - Establish rapport/trust with patient    Outcome: Progressing    Goal: Refrain from harming others  Outcome: Progressing    Goal: Control angry outbursts  Interventions:  - Monitor patient closely, per order  - Ensure early verbal de-escalation  - Monitor prn medication needs  - Set reasonable/therapeutic limits, outline behavioral expectations, and consequences   - Provide a non-threatening milieu, utilizing the least restrictive interventions    Outcome: Progressing

## 2017-12-04 NOTE — ED NOTES
[facility] [phone] to [facility] for primary insurance  Spoke with Kimber who took initial info   She stated someone will call back within 24 hours to take clinical
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
## 2017-12-04 NOTE — PROGRESS NOTES
Pt is med compliance  He has AH of voices telling him to hurt himself  Pt was encouraged to come to staff if increased AH or anxiety/ change of mood  Pt was receptive  Pt walks around the milieu often  Mother calls daily to check on him  Benadryl was refused  Read previous notes

## 2017-12-05 LAB
EST. AVERAGE GLUCOSE BLD GHB EST-MCNC: 240 MG/DL
GLUCOSE SERPL-MCNC: 213 MG/DL (ref 65–140)
HBA1C MFR BLD: 10 % (ref 4.2–6.3)

## 2017-12-05 PROCEDURE — 83036 HEMOGLOBIN GLYCOSYLATED A1C: CPT | Performed by: PSYCHIATRY & NEUROLOGY

## 2017-12-05 PROCEDURE — 82948 REAGENT STRIP/BLOOD GLUCOSE: CPT

## 2017-12-05 RX ORDER — IMIPRAMINE HYDROCHLORIDE 10 MG/1
10 TABLET, FILM COATED ORAL DAILY
Status: DISCONTINUED | OUTPATIENT
Start: 2017-12-06 | End: 2017-12-06

## 2017-12-05 RX ORDER — TEMAZEPAM 15 MG/1
30 CAPSULE ORAL
Status: DISCONTINUED | OUTPATIENT
Start: 2017-12-05 | End: 2017-12-11 | Stop reason: HOSPADM

## 2017-12-05 RX ADMIN — DOCUSATE SODIUM 100 MG: 100 CAPSULE, LIQUID FILLED ORAL at 08:51

## 2017-12-05 RX ADMIN — TEMAZEPAM 30 MG: 15 CAPSULE ORAL at 21:21

## 2017-12-05 RX ADMIN — DIVALPROEX SODIUM 1000 MG: 500 TABLET, DELAYED RELEASE ORAL at 17:04

## 2017-12-05 RX ADMIN — HYDROXYZINE HYDROCHLORIDE 50 MG: 50 TABLET, FILM COATED ORAL at 21:17

## 2017-12-05 RX ADMIN — CHLORPROMAZINE HYDROCHLORIDE 200 MG: 100 TABLET, SUGAR COATED ORAL at 21:19

## 2017-12-05 RX ADMIN — BENZTROPINE MESYLATE 1 MG: 1 TABLET ORAL at 08:51

## 2017-12-05 RX ADMIN — VITAMIN E CAP 400 UNIT 400 UNITS: 400 CAP at 08:51

## 2017-12-05 RX ADMIN — CHLORPROMAZINE HYDROCHLORIDE 100 MG: 100 TABLET, SUGAR COATED ORAL at 17:03

## 2017-12-05 RX ADMIN — CHLORPROMAZINE HYDROCHLORIDE 100 MG: 100 TABLET, SUGAR COATED ORAL at 08:51

## 2017-12-05 RX ADMIN — LORAZEPAM 2 MG: 2 INJECTION INTRAMUSCULAR; INTRAVENOUS at 17:48

## 2017-12-05 RX ADMIN — HYDROXYZINE HYDROCHLORIDE 50 MG: 50 TABLET, FILM COATED ORAL at 17:03

## 2017-12-05 RX ADMIN — HYDROXYZINE HYDROCHLORIDE 50 MG: 50 TABLET, FILM COATED ORAL at 08:51

## 2017-12-05 RX ADMIN — DOCUSATE SODIUM 100 MG: 100 CAPSULE, LIQUID FILLED ORAL at 17:04

## 2017-12-05 RX ADMIN — LORATADINE 10 MG: 10 TABLET ORAL at 08:51

## 2017-12-05 RX ADMIN — ALUMINUM HYDROXIDE, MAGNESIUM HYDROXIDE, AND SIMETHICONE 30 ML: 200; 200; 20 SUSPENSION ORAL at 01:04

## 2017-12-05 RX ADMIN — LEVOTHYROXINE SODIUM 25 MCG: 25 TABLET ORAL at 06:06

## 2017-12-05 RX ADMIN — INSULIN LISPRO 2 UNITS: 100 INJECTION, SOLUTION INTRAVENOUS; SUBCUTANEOUS at 23:46

## 2017-12-05 RX ADMIN — RISPERIDONE 2 MG: 1 TABLET, ORALLY DISINTEGRATING ORAL at 11:19

## 2017-12-05 RX ADMIN — BENZTROPINE MESYLATE 1 MG: 1 TABLET ORAL at 17:04

## 2017-12-05 RX ADMIN — TRAMADOL HYDROCHLORIDE 50 MG: 50 TABLET, FILM COATED ORAL at 11:19

## 2017-12-05 RX ADMIN — VITAMIN D, TAB 1000IU (100/BT) 1000 UNITS: 25 TAB at 08:53

## 2017-12-05 RX ADMIN — DIVALPROEX SODIUM 500 MG: 500 TABLET, DELAYED RELEASE ORAL at 08:53
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
## 2017-12-05 NOTE — PLAN OF CARE
Problem: Alteration in Thoughts and Perception  Goal: Treatment Goal: Gain control of psychotic behaviors/thinking, reduce/eliminate presenting symptoms and demonstrate improved reality functioning upon discharge  Outcome: Progressing    Goal: Verbalize thoughts and feelings  Interventions:  - Promote a nonjudgmental and trusting relationship with the patient through active listening and therapeutic communication  - Assess patient's level of functioning, behavior and potential for risk  - Engage patient in 1 on 1 interactions for a minimum of 15 minutes each session  - Encourage patient to express fears, feelings, frustrations, and discuss symptoms    - [place] patient to reality, help patient recognize reality-based thinking   - Administer medications as ordered and assess for potential side effects  - Provide the patient education related to the signs and symptoms of the illness and desired effects of prescribed medications   Outcome: Progressing    Goal: Attend and participate in unit activities, including therapeutic, recreational, and educational groups  Interventions:  - Provide therapeutic and educational activities daily, encourage attendance and participation, and document same in the medical record    Outcome: Progressing      Problem: Depression  Goal: Treatment Goal: Demonstrate behavioral control of depressive symptoms, verbalize feelings of improved mood/affect, and adopt new coping skills prior to discharge  Outcome: Progressing    Goal: Verbalize thoughts and feelings  Interventions:  - Assess and re-assess patient's level of risk   - Engage patient in 1:1 interactions, daily, for a minimum of 15 minutes   - Encourage patient to express feelings, fears, frustrations, hopes    Outcome: Progressing    Goal: Refrain from harming self  Interventions:  - Monitor patient closely, per order   - Supervise medication ingestion, monitor effects and side effects    Outcome: Progressing      Problem: Risk for Violence/Aggression Toward Others  Goal: Treatment Goal: Refrain from acts of violence/aggression during length of stay, and demonstrate improved impulse control at the time of discharge  Outcome: Not Progressing    Goal: Verbalize thoughts and feelings  Interventions:  - Assess and re-assess patient's level of risk, every waking shift  - Engage patient in 1:1 interactions, daily, for a minimum of 15 minutes   - Allow patient to express feelings and frustrations in a safe and non-threatening manner   - Establish rapport/trust with patient    Outcome: Progressing    Goal: Refrain from harming others  Outcome: Not Progressing    Goal: Control angry outbursts  Interventions:  - Monitor patient closely, per order  - Ensure early verbal de-escalation  - Monitor prn medication needs  - Set reasonable/therapeutic limits, outline behavioral expectations, and consequences   - Provide a non-threatening milieu, utilizing the least restrictive interventions    Outcome: Not Progressing      Problem: DISCHARGE PLANNING  Goal: Discharge to home or other facility with appropriate resources  INTERVENTIONS:  - Identify barriers to discharge w/patient and caregiver  - Arrange for needed discharge resources and transportation as appropriate  - Identify discharge learning needs (meds, wound care, etc )  - Arrange for interpretive services to assist at discharge as needed  - Refer to Case Management Department for coordinating discharge planning if the patient needs post-hospital services based on physician/advanced practitioner order or complex needs related to functional status, cognitive ability, or social support system   Outcome: Progressing

## 2017-12-05 NOTE — PLAN OF CARE
Problem: Ineffective Coping  Goal: Participates in unit activities  Interventions:  - Provide therapeutic environment   - Provide required programming   - Redirect inappropriate behaviors   Outcome: Not Progressing
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
## 2017-12-05 NOTE — PROGRESS NOTES
Patient seclusive to his room most of the shift  Scant, denies all symptoms currently  Compliant with medications  No aggressive behaviors as of this time  Will continue to monitor
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
## 2017-12-05 NOTE — PLAN OF CARE
Problem: SAFETY, RESTRAINT - VIOLENT/SELF-DESTRUCTIVE  Goal: Remains free of harm/injury from restraints (Restraint for Violent/Self-Destructive Behavior)  INTERVENTIONS:  - Instruct patient/family regarding restraint use   - Assess and monitor physiologic and psychological status   - Provide interventions and comfort measures to meet assessed patient needs   - Ensure continuous in person monitoring is provided   - Identify and implement measures to help patient regain control  - Assess readiness for release of restraint  Outcome: Not Progressing    Goal: Returns to optimal restraint-free functioning  INTERVENTIONS:  - Assess the patient's behavior and symptoms that indicate continued need for restraint  - Identify and implement measures to help patient regain control  - Assess readiness for release of restraint   Outcome: Not Progressing

## 2017-12-05 NOTE — PROGRESS NOTES
Pt remains calm and has made no attempts to hit anyone  Re-enforced need for appropriate behavior  States understanding of same  Pt states he understands why he went into restraints  Restraints d/c at this time

## 2017-12-05 NOTE — CASE MANAGEMENT
CM left a message for Rony Hernandez requesting a call back to discuss pt's Opthamology appt scheduled for 12/7/17  Appt will have to be cancelled  CM inquiring about rescheduling with consideration for surgery scheduled beginning of January  Rony Hernandez [phone] returned call  She will be cancelling and rescheduling pt's Opthamology appointment

## 2017-12-06 PROBLEM — E11.65 TYPE 2 DIABETES MELLITUS WITH HYPERGLYCEMIA (HCC): Status: ACTIVE | Noted: 2017-12-06

## 2017-12-06 PROBLEM — N18.2 CKD (CHRONIC KIDNEY DISEASE) STAGE 2, GFR 60-89 ML/MIN: Status: ACTIVE | Noted: 2017-12-06

## 2017-12-06 LAB
ANION GAP SERPL CALCULATED.3IONS-SCNC: 7 MMOL/L (ref 4–13)
BUN SERPL-MCNC: 9 MG/DL (ref 5–25)
CALCIUM SERPL-MCNC: 8.9 MG/DL (ref 8.3–10.1)
CHLORIDE SERPL-SCNC: 103 MMOL/L (ref 100–108)
CO2 SERPL-SCNC: 30 MMOL/L (ref 21–32)
CREAT SERPL-MCNC: 1.36 MG/DL (ref 0.6–1.3)
GFR SERPL CREATININE-BSD FRML MDRD: 66 ML/MIN/1.73SQ M
GLUCOSE P FAST SERPL-MCNC: 183 MG/DL (ref 65–99)
GLUCOSE SERPL-MCNC: 181 MG/DL (ref 65–140)
GLUCOSE SERPL-MCNC: 183 MG/DL (ref 65–140)
GLUCOSE SERPL-MCNC: 226 MG/DL (ref 65–140)
GLUCOSE SERPL-MCNC: 232 MG/DL (ref 65–140)
GLUCOSE SERPL-MCNC: 258 MG/DL (ref 65–140)
POTASSIUM SERPL-SCNC: 4.1 MMOL/L (ref 3.5–5.3)
SODIUM SERPL-SCNC: 140 MMOL/L (ref 136–145)

## 2017-12-06 PROCEDURE — 80048 BASIC METABOLIC PNL TOTAL CA: CPT | Performed by: PHYSICIAN ASSISTANT

## 2017-12-06 PROCEDURE — 82948 REAGENT STRIP/BLOOD GLUCOSE: CPT

## 2017-12-06 RX ADMIN — HYDROXYZINE HYDROCHLORIDE 50 MG: 50 TABLET, FILM COATED ORAL at 21:38

## 2017-12-06 RX ADMIN — INSULIN LISPRO 1 UNITS: 100 INJECTION, SOLUTION INTRAVENOUS; SUBCUTANEOUS at 08:26

## 2017-12-06 RX ADMIN — INSULIN LISPRO 3 UNITS: 100 INJECTION, SOLUTION INTRAVENOUS; SUBCUTANEOUS at 16:20

## 2017-12-06 RX ADMIN — BENZTROPINE MESYLATE 1 MG: 1 TABLET ORAL at 21:40

## 2017-12-06 RX ADMIN — VITAMIN E CAP 400 UNIT 400 UNITS: 400 CAP at 08:23

## 2017-12-06 RX ADMIN — DOCUSATE SODIUM 100 MG: 100 CAPSULE, LIQUID FILLED ORAL at 08:22

## 2017-12-06 RX ADMIN — CHLORPROMAZINE HYDROCHLORIDE 100 MG: 100 TABLET, SUGAR COATED ORAL at 17:06

## 2017-12-06 RX ADMIN — INSULIN LISPRO 2 UNITS: 100 INJECTION, SOLUTION INTRAVENOUS; SUBCUTANEOUS at 12:34

## 2017-12-06 RX ADMIN — TEMAZEPAM 30 MG: 15 CAPSULE ORAL at 21:37

## 2017-12-06 RX ADMIN — CHLORPROMAZINE HYDROCHLORIDE 100 MG: 100 TABLET, SUGAR COATED ORAL at 08:23

## 2017-12-06 RX ADMIN — DIVALPROEX SODIUM 1000 MG: 500 TABLET, DELAYED RELEASE ORAL at 17:05

## 2017-12-06 RX ADMIN — IMIPRAMINE HYDROCHLORIDE 10 MG: 10 TABLET, FILM COATED ORAL at 08:23

## 2017-12-06 RX ADMIN — ALUMINUM HYDROXIDE, MAGNESIUM HYDROXIDE, AND SIMETHICONE 30 ML: 200; 200; 20 SUSPENSION ORAL at 20:00

## 2017-12-06 RX ADMIN — LEVOTHYROXINE SODIUM 25 MCG: 25 TABLET ORAL at 06:03

## 2017-12-06 RX ADMIN — VITAMIN D, TAB 1000IU (100/BT) 1000 UNITS: 25 TAB at 08:23

## 2017-12-06 RX ADMIN — BENZTROPINE MESYLATE 1 MG: 1 TABLET ORAL at 17:05

## 2017-12-06 RX ADMIN — HYDROXYZINE HYDROCHLORIDE 50 MG: 50 TABLET, FILM COATED ORAL at 17:07

## 2017-12-06 RX ADMIN — DOCUSATE SODIUM 100 MG: 100 CAPSULE, LIQUID FILLED ORAL at 17:05

## 2017-12-06 RX ADMIN — CHLORPROMAZINE HYDROCHLORIDE 100 MG: 100 TABLET, SUGAR COATED ORAL at 20:03

## 2017-12-06 RX ADMIN — CHLORPROMAZINE HYDROCHLORIDE 200 MG: 100 TABLET, SUGAR COATED ORAL at 21:37

## 2017-12-06 RX ADMIN — LORATADINE 10 MG: 10 TABLET ORAL at 08:22

## 2017-12-06 RX ADMIN — DIVALPROEX SODIUM 500 MG: 500 TABLET, DELAYED RELEASE ORAL at 08:22

## 2017-12-06 RX ADMIN — INSULIN LISPRO 2 UNITS: 100 INJECTION, SOLUTION INTRAVENOUS; SUBCUTANEOUS at 21:40

## 2017-12-06 RX ADMIN — BENZTROPINE MESYLATE 1 MG: 1 TABLET ORAL at 08:23

## 2017-12-06 RX ADMIN — HYDROXYZINE HYDROCHLORIDE 50 MG: 50 TABLET, FILM COATED ORAL at 08:23

## 2017-12-06 RX ADMIN — OLANZAPINE 10 MG: 10 INJECTION, POWDER, FOR SOLUTION INTRAMUSCULAR at 12:03

## 2017-12-06 NOTE — PROGRESS NOTES
Patient more calm and in control  No further agitation or aggressive behaviors  PRN of Zyprexa has been effective  Patient denies AH at this time  States that he is only having "thoughts"   When RN asked patient what kind of thoughts he was having, patient replied, " to stand on top of a chair"  Denies SI  Compliant with medications  Will continue to monitor
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
## 2017-12-06 NOTE — PROGRESS NOTES
Patient mainly seclusive to his room  No acting out behavior as of this time and has been in control  Compliant with medication  Will continue to monitor

## 2017-12-06 NOTE — PROGRESS NOTES
Patients mother called, patient gave verbal authorization to speak with his mom  Patient seemed very agitated when speaking about his mother  Patients speech became very pressured and patient started to stutter  Patients mother seems to be a stressor
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
## 2017-12-06 NOTE — PROGRESS NOTES
Since patient is being violet, we will do the consult in the morning  Will place the patient on SSI for tonight

## 2017-12-06 NOTE — PLAN OF CARE
Problem: Alteration in Thoughts and Perception  Goal: Treatment Goal: Gain control of psychotic behaviors/thinking, reduce/eliminate presenting symptoms and demonstrate improved reality functioning upon discharge  Outcome: Progressing    Goal: Verbalize thoughts and feelings  Interventions:  - Promote a nonjudgmental and trusting relationship with the patient through active listening and therapeutic communication  - Assess patient's level of functioning, behavior and potential for risk  - Engage patient in 1 on 1 interactions for a minimum of 15 minutes each session  - Encourage patient to express fears, feelings, frustrations, and discuss symptoms    - [place] patient to reality, help patient recognize reality-based thinking   - Administer medications as ordered and assess for potential side effects  - Provide the patient education related to the signs and symptoms of the illness and desired effects of prescribed medications   Outcome: Progressing    Goal: Attend and participate in unit activities, including therapeutic, recreational, and educational groups  Interventions:  - Provide therapeutic and educational activities daily, encourage attendance and participation, and document same in the medical record    Outcome: Progressing      Problem: Depression  Goal: Treatment Goal: Demonstrate behavioral control of depressive symptoms, verbalize feelings of improved mood/affect, and adopt new coping skills prior to discharge  Outcome: Progressing    Goal: Verbalize thoughts and feelings  Interventions:  - Assess and re-assess patient's level of risk   - Engage patient in 1:1 interactions, daily, for a minimum of 15 minutes   - Encourage patient to express feelings, fears, frustrations, hopes    Outcome: Progressing    Goal: Refrain from harming self  Interventions:  - Monitor patient closely, per order   - Supervise medication ingestion, monitor effects and side effects    Outcome: Progressing      Problem: Risk for Violence/Aggression Toward Others  Goal: Treatment Goal: Refrain from acts of violence/aggression during length of stay, and demonstrate improved impulse control at the time of discharge  Outcome: Progressing    Goal: Verbalize thoughts and feelings  Interventions:  - Assess and re-assess patient's level of risk, every waking shift  - Engage patient in 1:1 interactions, daily, for a minimum of 15 minutes   - Allow patient to express feelings and frustrations in a safe and non-threatening manner   - Establish rapport/trust with patient    Outcome: Progressing    Goal: Refrain from harming others  Outcome: Progressing    Goal: Control angry outbursts  Interventions:  - Monitor patient closely, per order  - Ensure early verbal de-escalation  - Monitor prn medication needs  - Set reasonable/therapeutic limits, outline behavioral expectations, and consequences   - Provide a non-threatening milieu, utilizing the least restrictive interventions    Outcome: Progressing      Problem: DISCHARGE PLANNING  Goal: Discharge to home or other facility with appropriate resources  INTERVENTIONS:  - Identify barriers to discharge w/patient and caregiver  - Arrange for needed discharge resources and transportation as appropriate  - Identify discharge learning needs (meds, wound care, etc )  - Arrange for interpretive services to assist at discharge as needed  - Refer to Case Management Department for coordinating discharge planning if the patient needs post-hospital services based on physician/advanced practitioner order or complex needs related to functional status, cognitive ability, or social support system   Outcome: Progressing      Problem: SAFETY, RESTRAINT - VIOLENT/SELF-DESTRUCTIVE  Goal: Remains free of harm/injury from restraints (Restraint for Violent/Self-Destructive Behavior)  INTERVENTIONS:  - Instruct patient/family regarding restraint use   - Assess and monitor physiologic and psychological status   - Provide interventions and comfort measures to meet assessed patient needs   - Ensure continuous in person monitoring is provided   - Identify and implement measures to help patient regain control  - Assess readiness for release of restraint   Outcome: Progressing    Goal: Returns to optimal restraint-free functioning  INTERVENTIONS:  - Assess the patient's behavior and symptoms that indicate continued need for restraint  - Identify and implement measures to help patient regain control  - Assess readiness for release of restraint    Outcome: Progressing      Problem: Ineffective Coping  Goal: Participates in unit activities  Interventions:  - Provide therapeutic environment   - Provide required programming   - Redirect inappropriate behaviors    Outcome: Progressing

## 2017-12-06 NOTE — PROGRESS NOTES
Progress Note - Behavioral Health   Ester Palma 40 y o  male MRN: @MRN   Unit/Bed#: EZ3 068-08 Encounter: [id number]        The patient was seen for continuing care and reviewed with staff  Report from staff regarding this patient received and discussed, and records reviewed prior to seeing this patient   The patient today was not agitated or angry and not physical signed of aggression was evident  He still struggle with his emotional responses and overall disorganization of mind and behavioral and endorsed auditory hallucinations  Increase of Thorazine provided is calming affect however his psychosis requires longer treatment  The patient today was more coherent and accepted medication management  Patient remains agitated at times, anxious, delusional, disheveled, disorganized, distracted, distressed and doing better today      Medication side effects: No   ROS: no complaints    Mental Status Evaluation:    Appearance:  wearing hospital clothes   Behavior:  bizarre, guarded, restless and fidgety, evasive   Mood:  dysphoric, angry   Affect: constricted    Speech:  hypertalkative   Language: appropriate   Thought Process:  disorganized   Associations: concrete associations   Thought Content:  bizarre delusions   Perceptual Disturbances: auditory hallucinations, auditory hallucinations with commands to harm others   Risk Potential: Suicidal ideation - None at present  Homicidal ideation - None  Potential for aggression - Yes, due to acute psychosis   Sensorium:  oriented to person, place and time   Memory:  recent and remote memory grossly intact   Consciousness:  alert and awake   Attention: attention span and concentration are normal   Intellect: normal   Fund of Knowledge: awareness of current events appropriate   Insight:  impaired due to psychosis   Judgment: impaired due to psychosis   Muscle Tone: normal   Gait/Station: normal gait/station and normal balance   Motor Activity: no abnormal movements Laboratory results:  I have personally reviewed all pertinent laboratory results  Recent Labs      12/03/17 0637  12/05/17 0627   HGBA1C   --   10 0*   NA  140   --    K  3 7   --    CL  102   --    CO2  30   --    GLUCOSE  226*   --    CREATININE  1 23   --    BUN  10   --      Recent Labs      12/03/17   0636   WBC  8 64   RBC  5 40   HGB  16 5   HCT  48 3   MCV  89   MCH  30 6   RDW  11 8   PLT  152     Recent Labs      12/03/17   0637   CREATININE  1 23   BUN  10   NA  140   K  3 7   CL  102   CO2  30   GLUCOSE  226*   PROT  6 5   ALT  55   AST  25     Recent Labs      12/03/17   0637   ALKPHOS  81   AST  25   ALT  55   BILITOT  0 45   ALBUMIN  2 9*     No results for input(s): [place] in the last 72 hours  Invalid input(s): CKMB, PBNP  No results for input(s): CHOL, LDLDIRECT, HDL, TRIG in the last 72 hours  No results for input(s): CRP, SEDRATE, FRANKIE, HAV, HEPAIGM, HEPBIGM, HEPBCAB, HEPCAB in the last 72 hours  Invalid input(s): HEAG    CBC:   Recent Labs      12/03/17   0636   WBC  8 64   RBC  5 40   HGB  16 5   HCT  48 3   PLT  152     BMP:   Recent Labs      12/03/17   0637   NA  140   K  3 7   CL  102   CO2  30   BUN  10           Progress Toward Goals: limited improvement    Assessment/Plan   Principal Problem:    Schizoaffective disorder, bipolar type (HCC)  Active Problems:    Mild intellectual disability    Obsessive compulsive disorder      Recommended Treatment:  Will continue the patient's medication management of acute psychosis with agitation was dangers behavioral and physical aggression toward staff which is now started to improve a with resolution of most severe and potentially extremely dangers behavioral was increase of Thorazine  At the same time p r n  IM Ativan was provided to treat patient who agitations, the oral Ativan as the patient stated lead to worsening agitation most likely because of these inhibitory effect, however I a m   Ativan successfully help the patient to calm down  To treat the patient insomnia and Restoril will be provided upon questions request because he stated that Restoril helped him in the past   Low-dose of Tofranil will be provided in the morning as it was helpful to the patient to treat his anxiety as the patient stated  Appropriate labs were reviewed  The patient diabetes markers and blood sugar level were increased, and Medicine consult was requested to assess and treat the patient's diabetes  Planned medication and treatment changes: All current active medications have been reviewed  Continue treatment with group therapy, milieu therapy, occupational therapy and medication management  Risks / Benefits of Treatment:    Risks, benefits, and possible side effects of medications explained to patient and patient verbalizes understanding and agreement for treatment  Counseling / Coordination of Care:    Patient's progress discussed with staff in treatment team meeting  Medication changes reviewed with staff in treatment team meeting  Medications, treatment progress and treatment plan reviewed with patient  Medication changes discussed with patient  Patient's progress reviewed with case management staff  ** Please Note: This note has been constructed using a voice recognition system   **
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
## 2017-12-06 NOTE — PROGRESS NOTES
Patient states the voices tell hime to "jump out the window "  Patient is able to verbally contract for safety and states "if I jump out the window I will be dead   I dont want to be dead "

## 2017-12-07 PROBLEM — E78.5 HYPERLIPIDEMIA: Status: ACTIVE | Noted: 2017-12-07

## 2017-12-07 LAB
ANION GAP SERPL CALCULATED.3IONS-SCNC: 7 MMOL/L (ref 4–13)
BUN SERPL-MCNC: 10 MG/DL (ref 5–25)
CALCIUM SERPL-MCNC: 8.5 MG/DL (ref 8.3–10.1)
CHLORIDE SERPL-SCNC: 101 MMOL/L (ref 100–108)
CHOLEST SERPL-MCNC: 174 MG/DL (ref 50–200)
CO2 SERPL-SCNC: 31 MMOL/L (ref 21–32)
CREAT SERPL-MCNC: 1.37 MG/DL (ref 0.6–1.3)
CREAT UR-MCNC: 110 MG/DL
GFR SERPL CREATININE-BSD FRML MDRD: 66 ML/MIN/1.73SQ M
GLUCOSE P FAST SERPL-MCNC: 205 MG/DL (ref 65–99)
GLUCOSE SERPL-MCNC: 160 MG/DL (ref 65–140)
GLUCOSE SERPL-MCNC: 175 MG/DL (ref 65–140)
GLUCOSE SERPL-MCNC: 205 MG/DL (ref 65–140)
GLUCOSE SERPL-MCNC: 224 MG/DL (ref 65–140)
GLUCOSE SERPL-MCNC: 252 MG/DL (ref 65–140)
HDLC SERPL-MCNC: 35 MG/DL (ref 40–60)
LDLC SERPL CALC-MCNC: 106 MG/DL (ref 0–100)
MICROALBUMIN UR-MCNC: 48.9 MG/L (ref 0–20)
MICROALBUMIN/CREAT 24H UR: 44 MG/G CREATININE (ref 0–30)
POTASSIUM SERPL-SCNC: 4.1 MMOL/L (ref 3.5–5.3)
SODIUM SERPL-SCNC: 139 MMOL/L (ref 136–145)
TRIGL SERPL-MCNC: 167 MG/DL

## 2017-12-07 PROCEDURE — 82043 UR ALBUMIN QUANTITATIVE: CPT | Performed by: INTERNAL MEDICINE

## 2017-12-07 PROCEDURE — 80061 LIPID PANEL: CPT | Performed by: INTERNAL MEDICINE

## 2017-12-07 PROCEDURE — 80048 BASIC METABOLIC PNL TOTAL CA: CPT | Performed by: NURSE PRACTITIONER

## 2017-12-07 PROCEDURE — 82948 REAGENT STRIP/BLOOD GLUCOSE: CPT

## 2017-12-07 PROCEDURE — 82570 ASSAY OF URINE CREATININE: CPT | Performed by: INTERNAL MEDICINE

## 2017-12-07 RX ORDER — ATORVASTATIN CALCIUM 10 MG/1
10 TABLET, FILM COATED ORAL
Status: DISCONTINUED | OUTPATIENT
Start: 2017-12-07 | End: 2017-12-11 | Stop reason: HOSPADM

## 2017-12-07 RX ORDER — CHLORPROMAZINE HYDROCHLORIDE 100 MG/1
300 TABLET, FILM COATED ORAL
Status: DISCONTINUED | OUTPATIENT
Start: 2017-12-07 | End: 2017-12-11 | Stop reason: HOSPADM

## 2017-12-07 RX ADMIN — HYDROXYZINE HYDROCHLORIDE 50 MG: 50 TABLET, FILM COATED ORAL at 22:25

## 2017-12-07 RX ADMIN — VITAMIN D, TAB 1000IU (100/BT) 1000 UNITS: 25 TAB at 08:28

## 2017-12-07 RX ADMIN — TEMAZEPAM 30 MG: 15 CAPSULE ORAL at 22:25

## 2017-12-07 RX ADMIN — BENZTROPINE MESYLATE 1 MG: 1 TABLET ORAL at 08:27

## 2017-12-07 RX ADMIN — DOCUSATE SODIUM 100 MG: 100 CAPSULE, LIQUID FILLED ORAL at 17:05

## 2017-12-07 RX ADMIN — CHLORPROMAZINE HYDROCHLORIDE 300 MG: 100 TABLET, SUGAR COATED ORAL at 22:24

## 2017-12-07 RX ADMIN — LEVOTHYROXINE SODIUM 25 MCG: 25 TABLET ORAL at 06:04

## 2017-12-07 RX ADMIN — BENZTROPINE MESYLATE 1 MG: 1 TABLET ORAL at 17:05

## 2017-12-07 RX ADMIN — DIVALPROEX SODIUM 500 MG: 500 TABLET, DELAYED RELEASE ORAL at 08:28

## 2017-12-07 RX ADMIN — DIVALPROEX SODIUM 1000 MG: 500 TABLET, DELAYED RELEASE ORAL at 17:04

## 2017-12-07 RX ADMIN — CHLORPROMAZINE HYDROCHLORIDE 100 MG: 100 TABLET, SUGAR COATED ORAL at 08:27

## 2017-12-07 RX ADMIN — INSULIN LISPRO 1 UNITS: 100 INJECTION, SOLUTION INTRAVENOUS; SUBCUTANEOUS at 12:39

## 2017-12-07 RX ADMIN — VITAMIN E CAP 400 UNIT 400 UNITS: 400 CAP at 08:27

## 2017-12-07 RX ADMIN — DOCUSATE SODIUM 100 MG: 100 CAPSULE, LIQUID FILLED ORAL at 08:28

## 2017-12-07 RX ADMIN — CHLORPROMAZINE HYDROCHLORIDE 100 MG: 100 TABLET, SUGAR COATED ORAL at 17:03

## 2017-12-07 RX ADMIN — HYDROXYZINE HYDROCHLORIDE 50 MG: 50 TABLET, FILM COATED ORAL at 17:04

## 2017-12-07 RX ADMIN — METFORMIN HYDROCHLORIDE 500 MG: 500 TABLET, FILM COATED ORAL at 17:06

## 2017-12-07 RX ADMIN — INSULIN LISPRO 2 UNITS: 100 INJECTION, SOLUTION INTRAVENOUS; SUBCUTANEOUS at 22:25

## 2017-12-07 RX ADMIN — INSULIN LISPRO 2 UNITS: 100 INJECTION, SOLUTION INTRAVENOUS; SUBCUTANEOUS at 17:09

## 2017-12-07 RX ADMIN — ATORVASTATIN CALCIUM 10 MG: 10 TABLET, FILM COATED ORAL at 19:05

## 2017-12-07 RX ADMIN — INSULIN LISPRO 1 UNITS: 100 INJECTION, SOLUTION INTRAVENOUS; SUBCUTANEOUS at 08:42

## 2017-12-07 RX ADMIN — HYDROXYZINE HYDROCHLORIDE 50 MG: 50 TABLET, FILM COATED ORAL at 08:28

## 2017-12-07 RX ADMIN — LORATADINE 10 MG: 10 TABLET ORAL at 08:28

## 2017-12-07 NOTE — PROGRESS NOTES
Patient mainly seclusive to his room the entire shift  No c/o voices or SI  Compliant with medications as well as accuchecks  No aggressive behaviors  Will continue to monitor

## 2017-12-07 NOTE — CONSULTS
Came to evaluate patient for consultation for reported new diagnosis of Dm2 with A1C of 10 on admission  Staff reports patient is very poor historian and is unreliable is giving medical history and has had some violent tendencies and suggested seeing him may not provide much more information as well  Staff reports patient eating well and without any GI concerns  While psychiatric conditions are being acutely treated, would suggest the following:  - Start Metformin 500 mg BID  GFR has been over 60  - Check urine microalb/cr, lipids if not checked already   - Will need follow up as outpatient

## 2017-12-07 NOTE — PLAN OF CARE
Problem: Alteration in Thoughts and Perception  Goal: Treatment Goal: Gain control of psychotic behaviors/thinking, reduce/eliminate presenting symptoms and demonstrate improved reality functioning upon discharge  Outcome: Progressing    Goal: Verbalize thoughts and feelings  Interventions:  - Promote a nonjudgmental and trusting relationship with the patient through active listening and therapeutic communication  - Assess patient's level of functioning, behavior and potential for risk  - Engage patient in 1 on 1 interactions for a minimum of 15 minutes each session  - Encourage patient to express fears, feelings, frustrations, and discuss symptoms    - [place] patient to reality, help patient recognize reality-based thinking   - Administer medications as ordered and assess for potential side effects  - Provide the patient education related to the signs and symptoms of the illness and desired effects of prescribed medications   Outcome: Progressing    Goal: Attend and participate in unit activities, including therapeutic, recreational, and educational groups  Interventions:  - Provide therapeutic and educational activities daily, encourage attendance and participation, and document same in the medical record    Outcome: Progressing      Problem: Depression  Goal: Treatment Goal: Demonstrate behavioral control of depressive symptoms, verbalize feelings of improved mood/affect, and adopt new coping skills prior to discharge  Outcome: Progressing    Goal: Verbalize thoughts and feelings  Interventions:  - Assess and re-assess patient's level of risk   - Engage patient in 1:1 interactions, daily, for a minimum of 15 minutes   - Encourage patient to express feelings, fears, frustrations, hopes    Outcome: Progressing    Goal: Refrain from harming self  Interventions:  - Monitor patient closely, per order   - Supervise medication ingestion, monitor effects and side effects    Outcome: Progressing

## 2017-12-07 NOTE — PROGRESS NOTES
Pt has been in his room laying down  PT took medications without any issues  Pt does not c/o anxiety or voices a this time
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
## 2017-12-07 NOTE — PLAN OF CARE
Problem: Alteration in Thoughts and Perception  Goal: Treatment Goal: Gain control of psychotic behaviors/thinking, reduce/eliminate presenting symptoms and demonstrate improved reality functioning upon discharge  Outcome: Progressing    Goal: Verbalize thoughts and feelings  Interventions:  - Promote a nonjudgmental and trusting relationship with the patient through active listening and therapeutic communication  - Assess patient's level of functioning, behavior and potential for risk  - Engage patient in 1 on 1 interactions for a minimum of 15 minutes each session  - Encourage patient to express fears, feelings, frustrations, and discuss symptoms    - [place] patient to reality, help patient recognize reality-based thinking   - Administer medications as ordered and assess for potential side effects  - Provide the patient education related to the signs and symptoms of the illness and desired effects of prescribed medications   Outcome: Progressing    Goal: Attend and participate in unit activities, including therapeutic, recreational, and educational groups  Interventions:  - Provide therapeutic and educational activities daily, encourage attendance and participation, and document same in the medical record    Outcome: Not Progressing      Problem: Depression  Goal: Treatment Goal: Demonstrate behavioral control of depressive symptoms, verbalize feelings of improved mood/affect, and adopt new coping skills prior to discharge  Outcome: Progressing    Goal: Verbalize thoughts and feelings  Interventions:  - Assess and re-assess patient's level of risk   - Engage patient in 1:1 interactions, daily, for a minimum of 15 minutes   - Encourage patient to express feelings, fears, frustrations, hopes    Outcome: Progressing    Goal: Refrain from harming self  Interventions:  - Monitor patient closely, per order   - Supervise medication ingestion, monitor effects and side effects    Outcome: Progressing      Problem: Risk for Violence/Aggression Toward Others  Goal: Treatment Goal: Refrain from acts of violence/aggression during length of stay, and demonstrate improved impulse control at the time of discharge  Outcome: Not Progressing    Goal: Verbalize thoughts and feelings  Interventions:  - Assess and re-assess patient's level of risk, every waking shift  - Engage patient in 1:1 interactions, daily, for a minimum of 15 minutes   - Allow patient to express feelings and frustrations in a safe and non-threatening manner   - Establish rapport/trust with patient    Outcome: Progressing    Goal: Refrain from harming others  Outcome: Not Progressing    Goal: Control angry outbursts  Interventions:  - Monitor patient closely, per order  - Ensure early verbal de-escalation  - Monitor prn medication needs  - Set reasonable/therapeutic limits, outline behavioral expectations, and consequences   - Provide a non-threatening milieu, utilizing the least restrictive interventions    Outcome: Not Progressing      Problem: DISCHARGE PLANNING  Goal: Discharge to home or other facility with appropriate resources  INTERVENTIONS:  - Identify barriers to discharge w/patient and caregiver  - Arrange for needed discharge resources and transportation as appropriate  - Identify discharge learning needs (meds, wound care, etc )  - Arrange for interpretive services to assist at discharge as needed  - Refer to Case Management Department for coordinating discharge planning if the patient needs post-hospital services based on physician/advanced practitioner order or complex needs related to functional status, cognitive ability, or social support system   Outcome: Progressing      Problem: SAFETY, RESTRAINT - VIOLENT/SELF-DESTRUCTIVE  Goal: Remains free of harm/injury from restraints (Restraint for Violent/Self-Destructive Behavior)  INTERVENTIONS:  - Instruct patient/family regarding restraint use   - Assess and monitor physiologic and psychological status   - Provide interventions and comfort measures to meet assessed patient needs   - Ensure continuous in person monitoring is provided   - Identify and implement measures to help patient regain control  - Assess readiness for release of restraint   Outcome: Progressing    Goal: Returns to optimal restraint-free functioning  INTERVENTIONS:  - Assess the patient's behavior and symptoms that indicate continued need for restraint  - Identify and implement measures to help patient regain control  - Assess readiness for release of restraint    Outcome: Progressing      Problem: Ineffective Coping  Goal: Participates in unit activities  Interventions:  - Provide therapeutic environment   - Provide required programming   - Redirect inappropriate behaviors    Outcome: Not Progressing

## 2017-12-07 NOTE — PROGRESS NOTES
Patient given urine specimen cup at this time and explained to patient the importance of providing sample

## 2017-12-08 LAB
ANION GAP SERPL CALCULATED.3IONS-SCNC: 4 MMOL/L (ref 4–13)
BUN SERPL-MCNC: 10 MG/DL (ref 5–25)
CALCIUM SERPL-MCNC: 9 MG/DL (ref 8.3–10.1)
CHLORIDE SERPL-SCNC: 104 MMOL/L (ref 100–108)
CO2 SERPL-SCNC: 30 MMOL/L (ref 21–32)
CREAT SERPL-MCNC: 1.33 MG/DL (ref 0.6–1.3)
GFR SERPL CREATININE-BSD FRML MDRD: 68 ML/MIN/1.73SQ M
GLUCOSE P FAST SERPL-MCNC: 177 MG/DL (ref 65–99)
GLUCOSE SERPL-MCNC: 135 MG/DL (ref 65–140)
GLUCOSE SERPL-MCNC: 164 MG/DL (ref 65–140)
GLUCOSE SERPL-MCNC: 177 MG/DL (ref 65–140)
GLUCOSE SERPL-MCNC: 206 MG/DL (ref 65–140)
GLUCOSE SERPL-MCNC: 234 MG/DL (ref 65–140)
POTASSIUM SERPL-SCNC: 4.5 MMOL/L (ref 3.5–5.3)
SODIUM SERPL-SCNC: 138 MMOL/L (ref 136–145)

## 2017-12-08 PROCEDURE — 80048 BASIC METABOLIC PNL TOTAL CA: CPT | Performed by: NURSE PRACTITIONER

## 2017-12-08 PROCEDURE — 82948 REAGENT STRIP/BLOOD GLUCOSE: CPT

## 2017-12-08 RX ORDER — DIVALPROEX SODIUM 500 MG/1
1000 TABLET, DELAYED RELEASE ORAL
Qty: 60 TABLET | Refills: 0 | Status: SHIPPED | OUTPATIENT
Start: 2017-12-08 | End: 2018-02-15 | Stop reason: HOSPADM

## 2017-12-08 RX ORDER — VITAMIN E 268 MG
400 CAPSULE ORAL DAILY
Qty: 30 CAPSULE | Refills: 0 | Status: SHIPPED | OUTPATIENT
Start: 2017-12-08 | End: 2018-01-30 | Stop reason: SDUPTHER

## 2017-12-08 RX ORDER — CHLORPROMAZINE HYDROCHLORIDE 100 MG/1
300 TABLET, FILM COATED ORAL
Qty: 90 TABLET | Refills: 0 | Status: SHIPPED | OUTPATIENT
Start: 2017-12-08 | End: 2018-02-15 | Stop reason: HOSPADM

## 2017-12-08 RX ORDER — TRAZODONE HYDROCHLORIDE 50 MG/1
50 TABLET ORAL
Qty: 30 TABLET | Refills: 0 | Status: ON HOLD | OUTPATIENT
Start: 2017-12-08 | End: 2018-02-14

## 2017-12-08 RX ORDER — CHLORPROMAZINE HYDROCHLORIDE 100 MG/1
100 TABLET, FILM COATED ORAL 2 TIMES DAILY
Qty: 60 TABLET | Refills: 0 | Status: SHIPPED | OUTPATIENT
Start: 2017-12-09 | End: 2018-02-15 | Stop reason: HOSPADM

## 2017-12-08 RX ORDER — MELATONIN
1000 DAILY
Qty: 30 TABLET | Refills: 0 | Status: ON HOLD | OUTPATIENT
Start: 2017-12-08 | End: 2018-02-14

## 2017-12-08 RX ORDER — DIVALPROEX SODIUM 500 MG/1
500 TABLET, DELAYED RELEASE ORAL EVERY MORNING
Qty: 30 TABLET | Refills: 0 | Status: SHIPPED | OUTPATIENT
Start: 2017-12-08 | End: 2018-02-15 | Stop reason: HOSPADM

## 2017-12-08 RX ORDER — LORATADINE 10 MG/1
10 TABLET ORAL DAILY
Qty: 30 TABLET | Refills: 0 | Status: ON HOLD | OUTPATIENT
Start: 2017-12-08 | End: 2018-02-14

## 2017-12-08 RX ORDER — KETOTIFEN FUMARATE 0.35 MG/ML
1 SOLUTION/ DROPS OPHTHALMIC 2 TIMES DAILY PRN
Qty: 5 ML | Refills: 0 | Status: ON HOLD | OUTPATIENT
Start: 2017-12-08 | End: 2018-02-14

## 2017-12-08 RX ORDER — ATORVASTATIN CALCIUM 10 MG/1
10 TABLET, FILM COATED ORAL
Qty: 30 TABLET | Refills: 0 | Status: ON HOLD | OUTPATIENT
Start: 2017-12-09 | End: 2018-02-14

## 2017-12-08 RX ORDER — LEVOTHYROXINE SODIUM 0.03 MG/1
25 TABLET ORAL
Qty: 30 TABLET | Refills: 0 | Status: ON HOLD | OUTPATIENT
Start: 2017-12-08 | End: 2018-02-14

## 2017-12-08 RX ORDER — BENZTROPINE MESYLATE 1 MG/1
1 TABLET ORAL 2 TIMES DAILY
Qty: 60 TABLET | Refills: 0 | Status: ON HOLD | OUTPATIENT
Start: 2017-12-09 | End: 2018-02-14

## 2017-12-08 RX ORDER — DOCUSATE SODIUM 100 MG/1
100 CAPSULE, LIQUID FILLED ORAL 2 TIMES DAILY
Qty: 10 CAPSULE | Refills: 0 | Status: ON HOLD | OUTPATIENT
Start: 2017-12-09 | End: 2018-02-14

## 2017-12-08 RX ORDER — LANOLIN ALCOHOL/MO/W.PET/CERES
CREAM (GRAM) TOPICAL DAILY
Qty: 240 ML | Refills: 0 | Status: SHIPPED | OUTPATIENT
Start: 2017-12-08 | End: 2019-01-25 | Stop reason: HOSPADM

## 2017-12-08 RX ADMIN — CHLORPROMAZINE HYDROCHLORIDE 100 MG: 100 TABLET, SUGAR COATED ORAL at 08:08

## 2017-12-08 RX ADMIN — BENZTROPINE MESYLATE 1 MG: 1 TABLET ORAL at 17:41

## 2017-12-08 RX ADMIN — LORATADINE 10 MG: 10 TABLET ORAL at 08:08

## 2017-12-08 RX ADMIN — DIVALPROEX SODIUM 1000 MG: 500 TABLET, DELAYED RELEASE ORAL at 17:43

## 2017-12-08 RX ADMIN — TRAZODONE HYDROCHLORIDE 50 MG: 50 TABLET ORAL at 02:14

## 2017-12-08 RX ADMIN — HYDROXYZINE HYDROCHLORIDE 50 MG: 50 TABLET, FILM COATED ORAL at 08:08

## 2017-12-08 RX ADMIN — INSULIN LISPRO 1 UNITS: 100 INJECTION, SOLUTION INTRAVENOUS; SUBCUTANEOUS at 21:27

## 2017-12-08 RX ADMIN — DIVALPROEX SODIUM 500 MG: 500 TABLET, DELAYED RELEASE ORAL at 08:08

## 2017-12-08 RX ADMIN — LEVOTHYROXINE SODIUM 25 MCG: 25 TABLET ORAL at 06:34

## 2017-12-08 RX ADMIN — BENZTROPINE MESYLATE 1 MG: 1 TABLET ORAL at 08:08

## 2017-12-08 RX ADMIN — ATORVASTATIN CALCIUM 10 MG: 10 TABLET, FILM COATED ORAL at 17:41

## 2017-12-08 RX ADMIN — INSULIN LISPRO 3 UNITS: 100 INJECTION, SOLUTION INTRAVENOUS; SUBCUTANEOUS at 17:43

## 2017-12-08 RX ADMIN — DOCUSATE SODIUM 100 MG: 100 CAPSULE, LIQUID FILLED ORAL at 08:08

## 2017-12-08 RX ADMIN — VITAMIN D, TAB 1000IU (100/BT) 1000 UNITS: 25 TAB at 08:08

## 2017-12-08 RX ADMIN — CHLORPROMAZINE HYDROCHLORIDE 100 MG: 100 TABLET, SUGAR COATED ORAL at 17:41

## 2017-12-08 RX ADMIN — ACETAMINOPHEN 650 MG: 325 TABLET, FILM COATED ORAL at 22:58

## 2017-12-08 RX ADMIN — CHLORPROMAZINE HYDROCHLORIDE 300 MG: 100 TABLET, SUGAR COATED ORAL at 21:13

## 2017-12-08 RX ADMIN — VITAMIN E CAP 400 UNIT 400 UNITS: 400 CAP at 08:08

## 2017-12-08 RX ADMIN — HYDROXYZINE HYDROCHLORIDE 50 MG: 50 TABLET, FILM COATED ORAL at 17:42

## 2017-12-08 RX ADMIN — METFORMIN HYDROCHLORIDE 500 MG: 500 TABLET, FILM COATED ORAL at 08:08

## 2017-12-08 RX ADMIN — INSULIN LISPRO 1 UNITS: 100 INJECTION, SOLUTION INTRAVENOUS; SUBCUTANEOUS at 08:26

## 2017-12-08 RX ADMIN — TEMAZEPAM 30 MG: 15 CAPSULE ORAL at 21:14

## 2017-12-08 RX ADMIN — METFORMIN HYDROCHLORIDE 500 MG: 500 TABLET, FILM COATED ORAL at 17:42

## 2017-12-08 RX ADMIN — HYDROXYZINE HYDROCHLORIDE 50 MG: 50 TABLET, FILM COATED ORAL at 21:14

## 2017-12-08 RX ADMIN — DOCUSATE SODIUM 100 MG: 100 CAPSULE, LIQUID FILLED ORAL at 17:43
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
## 2017-12-08 NOTE — CASE MANAGEMENT
Pt is doing better  Has not had aggressive behavior towards staff recently   said pt could be D/C on Mon, 12/11  BEN called [facility] home staff person, [phone]  Advised him of above  BEN asked about pt's 's appt  He said pt was to see his dr on 12/20, but that appt was cancelled  He will call BEN back with dr's # so BEN can re-schedule appt

## 2017-12-08 NOTE — PLAN OF CARE
Problem: Alteration in Thoughts and Perception  Goal: Treatment Goal: Gain control of psychotic behaviors/thinking, reduce/eliminate presenting symptoms and demonstrate improved reality functioning upon discharge  Outcome: Progressing    Goal: Verbalize thoughts and feelings  Interventions:  - Promote a nonjudgmental and trusting relationship with the patient through active listening and therapeutic communication  - Assess patient's level of functioning, behavior and potential for risk  - Engage patient in 1 on 1 interactions for a minimum of 15 minutes each session  - Encourage patient to express fears, feelings, frustrations, and discuss symptoms    - [place] patient to reality, help patient recognize reality-based thinking   - Administer medications as ordered and assess for potential side effects  - Provide the patient education related to the signs and symptoms of the illness and desired effects of prescribed medications   Outcome: Progressing    Goal: Attend and participate in unit activities, including therapeutic, recreational, and educational groups  Interventions:  - Provide therapeutic and educational activities daily, encourage attendance and participation, and document same in the medical record    Outcome: Not Progressing      Problem: Depression  Goal: Treatment Goal: Demonstrate behavioral control of depressive symptoms, verbalize feelings of improved mood/affect, and adopt new coping skills prior to discharge  Outcome: Progressing    Goal: Verbalize thoughts and feelings  Interventions:  - Assess and re-assess patient's level of risk   - Engage patient in 1:1 interactions, daily, for a minimum of 15 minutes   - Encourage patient to express feelings, fears, frustrations, hopes    Outcome: Progressing    Goal: Refrain from harming self  Interventions:  - Monitor patient closely, per order   - Supervise medication ingestion, monitor effects and side effects    Outcome: Progressing      Problem: Risk for Violence/Aggression Toward Others  Goal: Treatment Goal: Refrain from acts of violence/aggression during length of stay, and demonstrate improved impulse control at the time of discharge  Outcome: Progressing    Goal: Verbalize thoughts and feelings  Interventions:  - Assess and re-assess patient's level of risk, every waking shift  - Engage patient in 1:1 interactions, daily, for a minimum of 15 minutes   - Allow patient to express feelings and frustrations in a safe and non-threatening manner   - Establish rapport/trust with patient    Outcome: Progressing    Goal: Refrain from harming others  Outcome: Progressing    Goal: Control angry outbursts  Interventions:  - Monitor patient closely, per order  - Ensure early verbal de-escalation  - Monitor prn medication needs  - Set reasonable/therapeutic limits, outline behavioral expectations, and consequences   - Provide a non-threatening milieu, utilizing the least restrictive interventions    Outcome: Progressing      Problem: DISCHARGE PLANNING  Goal: Discharge to home or other facility with appropriate resources  INTERVENTIONS:  - Identify barriers to discharge w/patient and caregiver  - Arrange for needed discharge resources and transportation as appropriate  - Identify discharge learning needs (meds, wound care, etc )  - Arrange for interpretive services to assist at discharge as needed  - Refer to Case Management Department for coordinating discharge planning if the patient needs post-hospital services based on physician/advanced practitioner order or complex needs related to functional status, cognitive ability, or social support system   Outcome: Progressing      Problem: SAFETY, RESTRAINT - VIOLENT/SELF-DESTRUCTIVE  Goal: Remains free of harm/injury from restraints (Restraint for Violent/Self-Destructive Behavior)  INTERVENTIONS:  - Instruct patient/family regarding restraint use   - Assess and monitor physiologic and psychological status   - Provide interventions and comfort measures to meet assessed patient needs   - Ensure continuous in person monitoring is provided   - Identify and implement measures to help patient regain control  - Assess readiness for release of restraint   Outcome: Progressing    Goal: Returns to optimal restraint-free functioning  INTERVENTIONS:  - Assess the patient's behavior and symptoms that indicate continued need for restraint  - Identify and implement measures to help patient regain control  - Assess readiness for release of restraint    Outcome: Progressing      Problem: Ineffective Coping  Goal: Participates in unit activities  Interventions:  - Provide therapeutic environment   - Provide required programming   - Redirect inappropriate behaviors    Outcome: Not Progressing

## 2017-12-08 NOTE — PROGRESS NOTES
Pt given Trazodone PRN for sleep @ 0215   Medication ineffective, pt in bed but continues to be restless and awake

## 2017-12-08 NOTE — PROGRESS NOTES
[facility] Progress Note  Patient: Stephanie ePrez 40 y o  male   MRN: [id number]  PCP: Amando Nicole MD  Unit/Bed#: VC1 342-02 Encounter: [id number]  Date Of Visit: 12/08/17    Assessment:  Principal Problem:    Schizoaffective disorder, bipolar type ([facility] )  Active Problems:    Mild intellectual disability    Obsessive compulsive disorder    CKD (chronic kidney disease) stage 2, GFR 60-89 ml/min    Type 2 diabetes mellitus with hyperglycemia ([facility] )    Hyperlipidemia    Plan: OK for SLIM to sign off at this time  New onset diabetes mellitus type 2: A1c 10  Last A1c 6 6 in June  Endocrine following  Checked lipid panel and urine micro al/cr  Started Metformin 500 mg BID - no adverse side effects  C/w Lispro insulin sliding scal coverage  Carb-restricted diet  Will need outpatient follow-up  OK for SLIM to sign off at this time  Hyperlipidemia: Lipid panel reviewed, , HDL 35  Started Lipitor 10mg daily - continue on discharge  Elevated creatinine with CKD, stg 2: Cr at baseline  Avoid NSAIDS  Schizoaffective disorder: Management per behavioral health  VTE Pharmacologic Prophylaxis:   Pharmacologic: Pharmacologic VTE Prophylaxis contraindicated due to ambulatory   Mechanical: Mechanical VTE prophylaxis in place  Patient Centered Rounds: I have performed bedside rounds with nursing staff today  Discussions with Specialists or Other Care Team Provider: Nursing  Education and Discussions with Family / Patient: Patient is not willing to discuss care today  Time Spent for Care: 15 minutes  More than 50% of total time spent on counseling and coordination of care as described above  Current Length of Stay: 6 day(s)  Current Patient Status: Inpatient Psych   Certification Statement: The patient will continue to require additional inpatient hospital stay due to behavorial health     Discharge Plan: Patient is under the behavorial health care   Will need to follow-up with PCP or endocrine as outpatient  OK for SLIM to sign off  Code Status: Level 1 - Full Code    Subjective:   Spoke with primary nurse regarding appetite and patient's food choices as patient is a poor historian  RN reports patient is compliant with Metformin and insulin  Per RN, no complaints of abd pain, N/D  Objective:   Vitals:   Temp (24hrs), Av 2 °F (36 8 °C), Min:98 2 °F (36 8 °C), Max:98 2 °F (36 8 °C)    HR:  [] 89  Resp:  [16] 16  BP: (139-162)/(84-95) 145/84  Body mass index is 32 49 kg/m²  Input and Output Summary (last 24 hours):     No intake or output data in the 24 hours ending 17 1109    Physical Exam:     Physical Exam   Constitutional: Vital signs are normal  He appears well-developed  He is active  He does not appear ill  Neurological: He is alert  Additional Data:   Labs:    Results from last 7 days  Lab Units 17  0636   WBC Thousand/uL 8 64   HEMOGLOBIN g/dL 16 5   HEMATOCRIT % 48 3   PLATELETS Thousands/uL 152   NEUTROS PCT % 52   LYMPHS PCT % 37   MONOS PCT % 10   EOS PCT % 1       Results from last 7 days  Lab Units 17  0636  17  0637   SODIUM mmol/L 138  < > 140   POTASSIUM mmol/L 4 5  < > 3 7   CHLORIDE mmol/L 104  < > 102   CO2 mmol/L 30  < > 30   BUN mg/dL 10  < > 10   CREATININE mg/dL 1 33*  < > 1 23   CALCIUM mg/dL 9 0  < > 8 6   TOTAL PROTEIN g/dL  --   --  6 5   BILIRUBIN TOTAL mg/dL  --   --  0 45   ALK PHOS U/L  --   --  81   ALT U/L  --   --  55   AST U/L  --   --  25   GLUCOSE RANDOM mg/dL 177*  < > 226*   < > = values in this interval not displayed  * I Have Reviewed All Lab Data Listed Above  * Additional Pertinent Lab Tests Reviewed: All Labs Within Last 24 Hours Reviewed    Imaging:    Imaging Reports Reviewed Today Include: No results found      Cultures:   Blood Culture: No results found for: BLOODCX  Urine Culture: No results found for: URINECX  Sputum Culture: No components found for: SPUTUMCX  Wound Culture: No results found for: WOUNDCULT    Last 24 Hours Medication List:     atorvastatin 10 mg Oral Daily With Dinner   benztropine 1 mg Oral BID   chlorproMAZINE 100 mg Oral BID   chlorproMAZINE 300 mg Oral HS   cholecalciferol 1,000 Units Oral Daily   divalproex sodium 1,000 mg Oral After Dinner   divalproex sodium 500 mg Oral QAM   docusate sodium 100 mg Oral BID   hydrOXYzine HCL 50 mg Oral TID   insulin lispro 1-5 Units Subcutaneous HS   insulin lispro 1-6 Units Subcutaneous TID AC   levothyroxine 25 mcg Oral Early Morning   loratadine 10 mg Oral Daily   metFORMIN 500 mg Oral BID With Meals   temazepam 30 mg Oral HS   vitamin E (tocopherol) 400 Units Oral Daily        Today, Patient Was Seen By: CHRISTIANA Brizuela    ** Please Note: Dragon 360 Dictation voice to text software may have been used in the creation of this document   **

## 2017-12-08 NOTE — CASE MANAGEMENT
BEN called [facility], & advised pt to be D/C on Mon, 12/11  Asked him for phone # of Dr Mc Laureano  Said he'd get the # & let BEN know  BEN called [facility]  Was told pt saw a therapist there  The "" was in a mtg  Left message for her to call BEN back with appt  BEN called Dr Mc Laureano for an appt  Left message  BEN spoke to pt's  & advised that scripts had to be faxed to [facility]  Dr said he'd write them today  Rec'd message from Dayna Dakins, behav spec from [address] [facility], [phone], saying he was told that pt to be D/C on Mon, & that a mtg was needed prior to D/C  BEN called Irma back  Left message saying that mtg could be done prior to D/C

## 2017-12-08 NOTE — PROGRESS NOTES
Progress Note - Behavioral Health   Niya Alcaraz 40 y o  male MRN: @MRN   Unit/Bed#: WP0 209-80 Encounter: [id number]      Progress Note - 24[address] Jayant Bernstein 40 y o  male MRN: @MRN   Unit/Bed#: HW4 763-01 Encounter: [id number]        The patient was seen for continuing care and reviewed with staff  Report from staff regarding this patient received and discussed, and records reviewed prior to seeing this patient   Pt today contniue to be calm and cooerating with the unit rules  Was found mostly laying in his bed resting  No angry or violent behavior also far  He takes his medications  Patient remains agitated at times, delusional, disoriented, distracted and distressed today  Medication side effects: No   ROS: no complaints    Mental Status Evaluation:  Mental Status Evaluation:    Appearance:  dressed in hospital attire   Behavior:  cooperative, guarded, restless and fidgety   Mood:  dysphoric   Affect: constricted    Speech:  decreased rate, slow   Language: appropriate   Thought Process:  concrete   Associations: concrete associations   Thought Content:  poverty of thought   Perceptual Disturbances: does not appear responding to internal stimuli   Risk Potential: Suicidal ideation - None at present  Homicidal ideation - None at present  Potential for aggression - Yes, due to acute psychosis   Sensorium:  oriented to person and place only   Memory:  recent and remote memory: unable to assess due to lack of cooperation   Consciousness:  alert and awake   Attention: attention span and concentration are normal   Intellect: not examined   Fund of Knowledge: awareness of current events appropriate   Insight:  impaired due to psychosis   Judgment: impaired due to psychosis   Muscle Tone: normal   Gait/Station: Patient lies in bed   Motor Activity: no abnormal movements     Laboratory results:  I have personally reviewed all pertinent laboratory results      Recent Labs      12/05/17   8893 12/06/17   0622  12/07/17   0611   HGBA1C  10 0*   --    --    NA   --   140  139   K   --   4 1  4 1   CL   --   103  101   CO2   --   30  31   GLUCOSE   --   183*  205*   CREATININE   --   1 36*  1 37*   BUN   --   9  10     No results for input(s): WBC, RBC, HGB, HCT, MCV, MCH, RDW, PLT in the last 72 hours  Recent Labs      12/06/17   0622  12/07/17   0611   CREATININE  1 36*  1 37*   BUN  9  10   NA  140  139   K  4 1  4 1   CL  103  101   CO2  30  31   GLUCOSE  183*  205*     No results for input(s): ALKPHOS, AST, ALT, GGT, BILITOT, BILIDIR, ALBUMIN, INR, AMYLASE, LIPASE in the last 72 hours  No results for input(s): Ramond Roman in the last 72 hours  Invalid input(s): CKMB, PBNP  Recent Labs      12/07/17 0611   CHOL  174   HDL  35*   TRIG  167*     No results for input(s): CRP, SEDRATE, FRANKIE, HAV, HEPAIGM, HEPBIGM, HEPBCAB, HEPCAB in the last 72 hours  Invalid input(s): HEAG    CBC: No results for input(s): WBC, RBC, HGB, HCT, PLT in the last 72 hours  BMP:   Recent Labs      12/06/17 0622 12/07/17   0611   NA  140  139   K  4 1  4 1   CL  103  101   CO2  30  31   BUN  9  10           Progress Toward Goals: no improvement    Assessment/Plan   Principal Problem:    Schizoaffective disorder, bipolar type (HCC)  Active Problems:    Mild intellectual disability    Obsessive compulsive disorder    CKD (chronic kidney disease) stage 2, GFR 60-89 ml/min    Type 2 diabetes mellitus with hyperglycemia ([facility] )    Hyperlipidemia      Recommended Treatment:  Increase daytime Thorazine to 150 and nighttime Thorazine to 300 to further treat patient's psychosis was frequent agitations slightly improving  Received Depakote for mood disorder and aggression  Will follow the patient slow  The next step would be to start patient's lead him the and increase his Thorazine as tolerated if no improvement will be noted tomorrow  His sleep improved after Restoril was started        Planned medication and treatment changes: All current active medications have been reviewed  Continue treatment with group therapy, milieu therapy, occupational therapy and medication management  Risks / Benefits of Treatment:    Risks, benefits, and possible side effects of medications explained to patient  Patient has limited understanding of risks and benefits of treatment at this time, but agrees to take medications as prescribed  Counseling / Coordination of Care:    Patient's progress discussed with staff in treatment team meeting  Medication changes reviewed with staff in treatment team meeting  ** Please Note: This note has been constructed using a voice recognition system   **    T

## 2017-12-08 NOTE — PLAN OF CARE
Problem: Alteration in Thoughts and Perception  Goal: Treatment Goal: Gain control of psychotic behaviors/thinking, reduce/eliminate presenting symptoms and demonstrate improved reality functioning upon discharge  Outcome: Progressing      Problem: Risk for Violence/Aggression Toward Others  Goal: Treatment Goal: Refrain from acts of violence/aggression during length of stay, and demonstrate improved impulse control at the time of discharge  Outcome: Progressing    Goal: Verbalize thoughts and feelings  Interventions:  - Assess and re-assess patient's level of risk, every waking shift  - Engage patient in 1:1 interactions, daily, for a minimum of 15 minutes   - Allow patient to express feelings and frustrations in a safe and non-threatening manner   - Establish rapport/trust with patient    Outcome: Progressing    Goal: Refrain from harming others  Outcome: Progressing    Goal: Control angry outbursts  Interventions:  - Monitor patient closely, per order  - Ensure early verbal de-escalation  - Monitor prn medication needs  - Set reasonable/therapeutic limits, outline behavioral expectations, and consequences   - Provide a non-threatening milieu, utilizing the least restrictive interventions    Outcome: Progressing      Problem: DISCHARGE PLANNING  Goal: Discharge to home or other facility with appropriate resources  INTERVENTIONS:  - Identify barriers to discharge w/patient and caregiver  - Arrange for needed discharge resources and transportation as appropriate  - Identify discharge learning needs (meds, wound care, etc )  - Arrange for interpretive services to assist at discharge as needed  - Refer to Case Management Department for coordinating discharge planning if the patient needs post-hospital services based on physician/advanced practitioner order or complex needs related to functional status, cognitive ability, or social support system   Outcome: Progressing      Problem: SAFETY, RESTRAINT - VIOLENT/SELF-DESTRUCTIVE  Goal: Remains free of harm/injury from restraints (Restraint for Violent/Self-Destructive Behavior)  INTERVENTIONS:  - Instruct patient/family regarding restraint use   - Assess and monitor physiologic and psychological status   - Provide interventions and comfort measures to meet assessed patient needs   - Ensure continuous in person monitoring is provided   - Identify and implement measures to help patient regain control  - Assess readiness for release of restraint   Outcome: Progressing    Goal: Returns to optimal restraint-free functioning  INTERVENTIONS:  - Assess the patient's behavior and symptoms that indicate continued need for restraint  - Identify and implement measures to help patient regain control  - Assess readiness for release of restraint    Outcome: Progressing      Problem: Ineffective Coping  Goal: Participates in unit activities  Interventions:  - Provide therapeutic environment   - Provide required programming   - Redirect inappropriate behaviors    Outcome: Progressing

## 2017-12-09 LAB
AMMONIA PLAS-SCNC: 23 UMOL/L (ref 11–35)
BASOPHILS # BLD AUTO: 0.03 THOUSANDS/ΜL (ref 0–0.1)
BASOPHILS NFR BLD AUTO: 0 % (ref 0–1)
EOSINOPHIL # BLD AUTO: 0.15 THOUSAND/ΜL (ref 0–0.61)
EOSINOPHIL NFR BLD AUTO: 2 % (ref 0–6)
ERYTHROCYTE [DISTWIDTH] IN BLOOD BY AUTOMATED COUNT: 11.5 % (ref 11.6–15.1)
GLUCOSE SERPL-MCNC: 145 MG/DL (ref 65–140)
GLUCOSE SERPL-MCNC: 156 MG/DL (ref 65–140)
GLUCOSE SERPL-MCNC: 160 MG/DL (ref 65–140)
GLUCOSE SERPL-MCNC: 178 MG/DL (ref 65–140)
HCT VFR BLD AUTO: 47.2 % (ref 36.5–49.3)
HGB BLD-MCNC: 16.3 G/DL (ref 12–17)
LYMPHOCYTES # BLD AUTO: 3.58 THOUSANDS/ΜL (ref 0.6–4.47)
LYMPHOCYTES NFR BLD AUTO: 40 % (ref 14–44)
MCH RBC QN AUTO: 31 PG (ref 26.8–34.3)
MCHC RBC AUTO-ENTMCNC: 34.5 G/DL (ref 31.4–37.4)
MCV RBC AUTO: 90 FL (ref 82–98)
MONOCYTES # BLD AUTO: 0.95 THOUSAND/ΜL (ref 0.17–1.22)
MONOCYTES NFR BLD AUTO: 11 % (ref 4–12)
NEUTROPHILS # BLD AUTO: 4.19 THOUSANDS/ΜL (ref 1.85–7.62)
NEUTS SEG NFR BLD AUTO: 47 % (ref 43–75)
NRBC BLD AUTO-RTO: 0 /100 WBCS
PLATELET # BLD AUTO: 158 THOUSANDS/UL (ref 149–390)
PMV BLD AUTO: 10.2 FL (ref 8.9–12.7)
RBC # BLD AUTO: 5.26 MILLION/UL (ref 3.88–5.62)
VALPROATE SERPL-MCNC: 49 UG/ML (ref 50–100)
WBC # BLD AUTO: 9.02 THOUSAND/UL (ref 4.31–10.16)

## 2017-12-09 PROCEDURE — 80164 ASSAY DIPROPYLACETIC ACD TOT: CPT | Performed by: PSYCHIATRY & NEUROLOGY

## 2017-12-09 PROCEDURE — 82140 ASSAY OF AMMONIA: CPT | Performed by: PSYCHIATRY & NEUROLOGY

## 2017-12-09 PROCEDURE — 85025 COMPLETE CBC W/AUTO DIFF WBC: CPT | Performed by: PSYCHIATRY & NEUROLOGY

## 2017-12-09 PROCEDURE — 82948 REAGENT STRIP/BLOOD GLUCOSE: CPT

## 2017-12-09 RX ADMIN — LORATADINE 10 MG: 10 TABLET ORAL at 08:45

## 2017-12-09 RX ADMIN — TEMAZEPAM 30 MG: 15 CAPSULE ORAL at 21:37

## 2017-12-09 RX ADMIN — CHLORPROMAZINE HYDROCHLORIDE 100 MG: 100 TABLET, SUGAR COATED ORAL at 08:44

## 2017-12-09 RX ADMIN — INSULIN LISPRO 1 UNITS: 100 INJECTION, SOLUTION INTRAVENOUS; SUBCUTANEOUS at 21:37

## 2017-12-09 RX ADMIN — VITAMIN D, TAB 1000IU (100/BT) 1000 UNITS: 25 TAB at 08:45

## 2017-12-09 RX ADMIN — INSULIN LISPRO 1 UNITS: 100 INJECTION, SOLUTION INTRAVENOUS; SUBCUTANEOUS at 12:07

## 2017-12-09 RX ADMIN — HYDROXYZINE HYDROCHLORIDE 50 MG: 50 TABLET, FILM COATED ORAL at 08:44

## 2017-12-09 RX ADMIN — HYDROXYZINE HYDROCHLORIDE 50 MG: 50 TABLET, FILM COATED ORAL at 21:37

## 2017-12-09 RX ADMIN — INSULIN LISPRO 1 UNITS: 100 INJECTION, SOLUTION INTRAVENOUS; SUBCUTANEOUS at 08:45

## 2017-12-09 RX ADMIN — HYDROXYZINE HYDROCHLORIDE 50 MG: 50 TABLET, FILM COATED ORAL at 17:12

## 2017-12-09 RX ADMIN — RISPERIDONE 2 MG: 1 TABLET, ORALLY DISINTEGRATING ORAL at 23:58

## 2017-12-09 RX ADMIN — METFORMIN HYDROCHLORIDE 500 MG: 500 TABLET, FILM COATED ORAL at 08:44

## 2017-12-09 RX ADMIN — ATORVASTATIN CALCIUM 10 MG: 10 TABLET, FILM COATED ORAL at 17:12

## 2017-12-09 RX ADMIN — CHLORPROMAZINE HYDROCHLORIDE 300 MG: 100 TABLET, SUGAR COATED ORAL at 21:37

## 2017-12-09 RX ADMIN — DOCUSATE SODIUM 100 MG: 100 CAPSULE, LIQUID FILLED ORAL at 08:45

## 2017-12-09 RX ADMIN — CHLORPROMAZINE HYDROCHLORIDE 100 MG: 100 TABLET, SUGAR COATED ORAL at 17:13

## 2017-12-09 RX ADMIN — BENZTROPINE MESYLATE 1 MG: 1 TABLET ORAL at 08:45

## 2017-12-09 RX ADMIN — BENZTROPINE MESYLATE 1 MG: 1 TABLET ORAL at 17:12

## 2017-12-09 RX ADMIN — VITAMIN E CAP 400 UNIT 400 UNITS: 400 CAP at 09:32

## 2017-12-09 RX ADMIN — DIVALPROEX SODIUM 1000 MG: 500 TABLET, DELAYED RELEASE ORAL at 17:13

## 2017-12-09 RX ADMIN — METFORMIN HYDROCHLORIDE 500 MG: 500 TABLET, FILM COATED ORAL at 17:12

## 2017-12-09 RX ADMIN — DOCUSATE SODIUM 100 MG: 100 CAPSULE, LIQUID FILLED ORAL at 17:12

## 2017-12-09 RX ADMIN — LEVOTHYROXINE SODIUM 25 MCG: 25 TABLET ORAL at 06:36

## 2017-12-09 RX ADMIN — DIVALPROEX SODIUM 500 MG: 500 TABLET, DELAYED RELEASE ORAL at 08:44

## 2017-12-09 RX ADMIN — OLANZAPINE 10 MG: 10 TABLET, FILM COATED ORAL at 18:03

## 2017-12-09 NOTE — PROGRESS NOTES
Patient seclusive to room  Patient states "voices are going away, a little "  Patient is medication compliant

## 2017-12-09 NOTE — PROGRESS NOTES
PT came to T stating he has the urge to hit people  Pt was asked to go to his room at this time  PT given scheduled medications  PT stated to RN " I don't know  Now I want to throw things " RN asked if he threw the tray and the plate down  Pt stated " Yes I threw it  I'm not going to lie about it " All belongings removed out of room

## 2017-12-09 NOTE — PROGRESS NOTES
Progress Note - Behavioral Health   Eliseo Barnes 40 y o  male MRN: @MRN   Unit/Bed#: UX2 366-51 Encounter: [id number]        The patient was seen for continuing care and reviewed with staff  Report from staff regarding this patient received and discussed, and records reviewed prior to seeing this patient   The patient was found in the resting position in a his room, he was not angry and agitated and stated that he feels better  Today he denied hearing voices  Not attacking other people  Contracted for safety  With patient's permission his mother who is legal guardian was contacted and we discussed the current medication management  She expressed her understanding and was thankful for the patient's treatment  Patient feels anxious, appropriate, better, disheveled, distracted, distressed and doing better today  Medication side effects: No   ROS: no complaints, no shortness of breath for of chest pain or tightness or muscle tightness or any other symptoms      Mental Status Evaluation:    Appearance:  dressed in hospital attire   Behavior:  cooperative, calm   Mood:  improved, dysphoric   Affect: constricted    Speech:  decreased rate   Language: appropriate   Thought Process:  concrete   Associations: concrete associations   Thought Content:  negative thinking, poverty of thought   Perceptual Disturbances: no auditory hallucinations, no visual hallucinations, denies auditory hallucinations when asked, does not appear responding to internal stimuli   Risk Potential: Suicidal ideation - None at present  Homicidal ideation - None at present  Potential for aggression - No longer agitated   Sensorium:  oriented to person, place and time   Memory:  recent and remote memory grossly intact   Consciousness:  alert and awake   Attention: decreased concentration   Intellect: not examined   Fund of Knowledge: awareness of current events appropriate   Insight:  impaired   Judgment: limited   Muscle Tone: normal Gait/Station: normal gait/station and normal balance   Motor Activity: no abnormal movements         Laboratory results:  I have personally reviewed all pertinent laboratory results  Recent Labs      12/06/17   0622  12/07/17   0611  12/08/17   0636   NA  140  139  138   K  4 1  4 1  4 5   CL  103  101  104   CO2  30  31  30   GLUCOSE  183*  205*  177*   CREATININE  1 36*  1 37*  1 33*   BUN  9  10  10     No results for input(s): WBC, RBC, HGB, HCT, MCV, MCH, RDW, PLT in the last 72 hours  Recent Labs      12/06/17   0622  12/07/17   0611  12/08/17   0636   CREATININE  1 36*  1 37*  1 33*   BUN  9  10  10   NA  140  139  138   K  4 1  4 1  4 5   CL  103  101  104   CO2  30  31  30   GLUCOSE  183*  205*  177*     No results for input(s): ALKPHOS, AST, ALT, GGT, BILITOT, BILIDIR, ALBUMIN, INR, AMYLASE, LIPASE in the last 72 hours  No results for input(s): Dyke Perone in the last 72 hours  Invalid input(s): CKMB, PBNP  Recent Labs      12/07/17   0611   CHOL  174   HDL  35*   TRIG  167*     No results for input(s): CRP, SEDRATE, FRANKIE, HAV, HEPAIGM, HEPBIGM, HEPBCAB, HEPCAB in the last 72 hours  Invalid input(s): HEAG    CBC: No results for input(s): WBC, RBC, HGB, HCT, PLT in the last 72 hours  BMP:   Recent Labs      12/07/17   0611  12/08/17   0636   NA  139  138   K  4 1  4 5   CL  101  104   CO2  31  30   BUN  10  10           Progress Toward Goals: improving slowly    Assessment/Plan   Principal Problem:    Schizoaffective disorder, bipolar type (HCC)  Active Problems:    Mild intellectual disability    Obsessive compulsive disorder    CKD (chronic kidney disease) stage 2, GFR 60-89 ml/min    Type 2 diabetes mellitus with hyperglycemia ([facility] [address] )    Hyperlipidemia      Recommended Treatment:  Since the patient condition is improving, will not change his medications today  Will follow appropriate labs  Sleep improved no need for sleep medicine  Appreciate neuropsychological consult      Planned medication and treatment changes: All current active medications have been reviewed  Continue treatment with group therapy, milieu therapy, occupational therapy and medication management  Risks / Benefits of Treatment:    Risks, benefits, and possible side effects of medications explained to patient  Patient has limited understanding of risks and benefits of treatment at this time, but agrees to take medications as prescribed  Counseling / Coordination of Care:    Patient's progress discussed with staff in treatment team meeting  Medication changes reviewed with staff in treatment team meeting  ** Please Note: This note has been constructed using a voice recognition system   **

## 2017-12-09 NOTE — PROGRESS NOTES
C/O" Took meds leave me alone '    Report from staff regarding this patient received and record reviewed  prior to seeing this patient   Behavior over the last 24 hours:    Sleep:Ok  Appetite:ok  Medication side effects: none  ROS:agitation  Mental Status Evaluation:  Appearance:  Dressed appropraitely   Behavior:  cooperative   Speech:  normal   Mood:  agitated   Affect:  appropriate    Thought Process:  disorganzied   Thought Content:  paarnoid   Perceptual Disturbances: Denied AV hallucination   Risk Potential: NO LUIS    Sensorium:  normal   Cognition:  intact   Consciousness:  Alert, OX3   Attention: Fair   Insight:  poor   Judgment: poor   Gait/Station: With in normal range   Motor Activity: agitated     Progress Toward Goals: working on current treatment goals, no changes  Made in treatment plan   Recommended Treatment: Continue with group therapy, milieu therapy and occupational therapy  Risks, benefits and possible side effects of Medications:   Risks, benefits, and possible side effects of medications explained to patient and patient verbalizes understanding        Medications:   current meds:   Current Facility-Administered Medications   Medication Dose Route Frequency    acetaminophen (TYLENOL) tablet 650 mg  650 mg Oral Q6H PRN    aluminum-magnesium hydroxide-simethicone (MYLANTA) 200-200-20 mg/5 mL oral suspension 30 mL  30 mL Oral Q4H PRN    atorvastatin (LIPITOR) tablet 10 mg  10 mg Oral Daily With Dinner    benztropine (COGENTIN) injection 1 mg  1 mg Intramuscular Q1H PRN    benztropine (COGENTIN) tablet 1 mg  1 mg Oral Q1H PRN    benztropine (COGENTIN) tablet 1 mg  1 mg Oral BID    chlorproMAZINE (THORAZINE) injection SOLN 50 mg  50 mg Intramuscular Q6H PRN    chlorproMAZINE (THORAZINE) tablet 100 mg  100 mg Oral Q6H PRN    chlorproMAZINE (THORAZINE) tablet 100 mg  100 mg Oral BID    chlorproMAZINE (THORAZINE) tablet 300 mg  300 mg Oral HS    cholecalciferol (VITAMIN D3) tablet 1,000 Units  1,000 Units Oral Daily    divalproex sodium (DEPAKOTE) EC tablet 1,000 mg  1,000 mg Oral After Dinner    divalproex sodium (DEPAKOTE) EC tablet 500 mg  500 mg Oral QAM    docusate sodium (COLACE) capsule 100 mg  100 mg Oral BID    haloperidol (HALDOL) tablet 5 mg  5 mg Oral Q6H PRN    haloperidol lactate (HALDOL) injection 5 mg  5 mg Intramuscular Q4H PRN    hydrALAZINE (APRESOLINE) tablet 25 mg  25 mg Oral TID PRN    hydrOXYzine HCL (ATARAX) tablet 50 mg  50 mg Oral Q4H PRN    hydrOXYzine HCL (ATARAX) tablet 50 mg  50 mg Oral TID    ibuprofen (MOTRIN) tablet 600 mg  600 mg Oral Q8H PRN    insulin lispro (HumaLOG) 100 units/mL subcutaneous injection 1-5 Units  1-5 Units Subcutaneous HS    insulin lispro (HumaLOG) 100 units/mL subcutaneous injection 1-6 Units  1-6 Units Subcutaneous TID AC    levothyroxine tablet 25 mcg  25 mcg Oral Early Morning    loratadine (CLARITIN) tablet 10 mg  10 mg Oral Daily    LORazepam (ATIVAN) 2 mg/mL injection 2 mg  2 mg Intramuscular Q4H PRN    magnesium hydroxide (MILK OF MAGNESIA) 400 mg/5 mL oral suspension 30 mL  30 mL Oral Daily PRN    metFORMIN (GLUCOPHAGE) tablet 500 mg  500 mg Oral BID With Meals    OLANZapine (ZyPREXA) IM injection 10 mg  10 mg Intramuscular Q6H PRN    OLANZapine (ZyPREXA) tablet 10 mg  10 mg Oral Q3H PRN    risperiDONE (RisperDAL M-TABS) dispersible tablet 2 mg  2 mg Oral Q3H PRN    temazepam (RESTORIL) capsule 30 mg  30 mg Oral HS    traMADol (ULTRAM) tablet 50 mg  50 mg Oral Q8H PRN    traZODone (DESYREL) tablet 50 mg  50 mg Oral HS PRN    vitamin E (tocopherol) capsule 400 Units  400 Units Oral Daily    white petrolatum-mineral oil (EUCERIN,HYDROCERIN) cream   Topical BID PRN     Labs: NA    Assessment, Diagnosis  and Plan: continue with current meds and goals, F/U tomorrow    Counseling / Coordination of Care  Total floor / unit time spent today15 minutes  minutes   Greater than 50% of total time was spent with the patient and / or family counseling and / or coordination of care  A description of the counseling / coordination of care:      Padma Flynn MD

## 2017-12-09 NOTE — PROGRESS NOTES
Pt was in room eating dinner  Staff found dinner tray and plate on floor broken  Pt stated it "accidently fell on the floor " Room was cleaned

## 2017-12-09 NOTE — PROGRESS NOTES
Pt has anxiety related to not being able to go to the bathroom for one day  Pt given scheduled mediations a this time  PT has no behavioral outbursts  Pt given prune juice

## 2017-12-09 NOTE — PROGRESS NOTES
Pt's prescriptions were faxed to [address] [place] [address] this evening Friday 12/8/17at 1900 at Dr Margarette Negrete request  The original scripts were put in [address] [place] [address] by sender

## 2017-12-09 NOTE — PLAN OF CARE
Alteration in Thoughts and Perception     Treatment Goal: Gain control of psychotic behaviors/thinking, reduce/eliminate presenting symptoms and demonstrate improved reality functioning upon discharge Progressing     Verbalize thoughts and feelings Progressing     Attend and participate in unit activities, including therapeutic, recreational, and educational groups Progressing        Depression     Treatment Goal: Demonstrate behavioral control of depressive symptoms, verbalize feelings of improved mood/affect, and adopt new coping skills prior to discharge Progressing     Verbalize thoughts and feelings Progressing     Refrain from harming self [place]     Discharge to home or other facility with appropriate resources Progressing        Ineffective Coping     Participates in unit activities Progressing        Risk for Violence/Aggression Toward Others     Treatment Goal: Refrain from acts of violence/aggression during length of stay, and demonstrate improved impulse control at the time of discharge Progressing     Verbalize thoughts and feelings Progressing     Refrain from harming others Progressing     Control angry outbursts Progressing        SAFETY, RESTRAINT - VIOLENT/SELF-DESTRUCTIVE     Remains free of harm/injury from restraints (Restraint for Violent/Self-Destructive Behavior) Progressing     Returns to optimal restraint-free functioning Progressing

## 2017-12-10 LAB
GLUCOSE SERPL-MCNC: 119 MG/DL (ref 65–140)
GLUCOSE SERPL-MCNC: 134 MG/DL (ref 65–140)
GLUCOSE SERPL-MCNC: 167 MG/DL (ref 65–140)
GLUCOSE SERPL-MCNC: 170 MG/DL (ref 65–140)

## 2017-12-10 PROCEDURE — 82948 REAGENT STRIP/BLOOD GLUCOSE: CPT

## 2017-12-10 RX ADMIN — OLANZAPINE 10 MG: 10 TABLET, FILM COATED ORAL at 12:41

## 2017-12-10 RX ADMIN — CHLORPROMAZINE HYDROCHLORIDE 100 MG: 100 TABLET, SUGAR COATED ORAL at 09:03

## 2017-12-10 RX ADMIN — METFORMIN HYDROCHLORIDE 500 MG: 500 TABLET, FILM COATED ORAL at 09:06

## 2017-12-10 RX ADMIN — HYDROXYZINE HYDROCHLORIDE 50 MG: 50 TABLET, FILM COATED ORAL at 23:50

## 2017-12-10 RX ADMIN — CHLORPROMAZINE HYDROCHLORIDE 300 MG: 100 TABLET, SUGAR COATED ORAL at 22:08

## 2017-12-10 RX ADMIN — BENZTROPINE MESYLATE 1 MG: 1 TABLET ORAL at 14:32

## 2017-12-10 RX ADMIN — BENZTROPINE MESYLATE 1 MG: 1 TABLET ORAL at 09:06

## 2017-12-10 RX ADMIN — HYDROXYZINE HYDROCHLORIDE 50 MG: 50 TABLET, FILM COATED ORAL at 22:09

## 2017-12-10 RX ADMIN — LORATADINE 10 MG: 10 TABLET ORAL at 09:05

## 2017-12-10 RX ADMIN — HYDROXYZINE HYDROCHLORIDE 50 MG: 50 TABLET, FILM COATED ORAL at 09:04

## 2017-12-10 RX ADMIN — BENZTROPINE MESYLATE 1 MG: 1 TABLET ORAL at 22:42

## 2017-12-10 RX ADMIN — CHLORPROMAZINE HYDROCHLORIDE 100 MG: 100 TABLET, SUGAR COATED ORAL at 17:24

## 2017-12-10 RX ADMIN — INSULIN LISPRO 1 UNITS: 100 INJECTION, SOLUTION INTRAVENOUS; SUBCUTANEOUS at 17:15

## 2017-12-10 RX ADMIN — VITAMIN E CAP 400 UNIT 400 UNITS: 400 CAP at 09:08

## 2017-12-10 RX ADMIN — DIVALPROEX SODIUM 500 MG: 500 TABLET, DELAYED RELEASE ORAL at 09:05

## 2017-12-10 RX ADMIN — HYDROXYZINE HYDROCHLORIDE 50 MG: 50 TABLET, FILM COATED ORAL at 14:33

## 2017-12-10 RX ADMIN — DOCUSATE SODIUM 100 MG: 100 CAPSULE, LIQUID FILLED ORAL at 17:29

## 2017-12-10 RX ADMIN — LEVOTHYROXINE SODIUM 25 MCG: 25 TABLET ORAL at 05:37

## 2017-12-10 RX ADMIN — HYDROXYZINE HYDROCHLORIDE 50 MG: 50 TABLET, FILM COATED ORAL at 11:52

## 2017-12-10 RX ADMIN — DOCUSATE SODIUM 100 MG: 100 CAPSULE, LIQUID FILLED ORAL at 09:05

## 2017-12-10 RX ADMIN — VITAMIN D, TAB 1000IU (100/BT) 1000 UNITS: 25 TAB at 09:04

## 2017-12-10 RX ADMIN — LORAZEPAM 2 MG: 2 INJECTION INTRAMUSCULAR; INTRAVENOUS at 15:08

## 2017-12-10 RX ADMIN — ATORVASTATIN CALCIUM 10 MG: 10 TABLET, FILM COATED ORAL at 17:29

## 2017-12-10 RX ADMIN — DIVALPROEX SODIUM 1000 MG: 500 TABLET, DELAYED RELEASE ORAL at 17:30

## 2017-12-10 RX ADMIN — CHLORPROMAZINE HYDROCHLORIDE 50 MG: 25 INJECTION INTRAMUSCULAR at 15:06

## 2017-12-10 RX ADMIN — TEMAZEPAM 30 MG: 15 CAPSULE ORAL at 22:09

## 2017-12-10 RX ADMIN — METFORMIN HYDROCHLORIDE 500 MG: 500 TABLET, FILM COATED ORAL at 17:29

## 2017-12-10 NOTE — PROGRESS NOTES
Hand tremor noticed  Patient is aware of the tremulousness  He is aware he is being discharged "this won't stop me being discharged " He is on the periphery of the milieu

## 2017-12-10 NOTE — PROGRESS NOTES
Pt approached staff stating that he is feeling anxious and agitated while pacing outside his room  Pt given Risperdal PRN @ 0712  Medication effective, pt sleeping within the hour
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
## 2017-12-10 NOTE — PROGRESS NOTES
PT given PRN zyprexa 10 mg PO for agitation/hearing voices   PT states to RN " I'm not ready to go home  "

## 2017-12-10 NOTE — PROGRESS NOTES
C/O"I am ok, feeling little better "    Report from staff regarding this patient received and record reviewed  prior to seeing this patient   Behavior over the last 24 hours:  , he is in seclusion room  With door unlocked , took all his meds   Sleep:ok  Appetite:good  Medication side effects none:  ROS:some improvement   less agitated    Mental Status Evaluation:  Appearance:  Dressed appropraitely   Behavior:  cooperative   Speech:  normal   Mood:  euthymic   Affect:  blunted   Thought Process:  Goal directed   Thought Content:  paranoid   Perceptual Disturbances: Denied AV hallucination   Risk Potential: NO LUIS    Sensorium:  normal   Cognition:  intact   Consciousness:  Alert, OX3   Attention: Fair   Insight:  limited   Judgment: limited   Gait/Station: With in normal range   Motor Activity: With in normal range     Progress Toward Goals: working on current treatment goals, no changes  Made in treatment plan   Recommended Treatment: Continue with group therapy, milieu therapy and occupational therapy  Risks, benefits and possible side effects of Medications:   Risks, benefits, and possible side effects of medications explained to patient and patient verbalizes understanding        Medications:   current meds:   Current Facility-Administered Medications   Medication Dose Route Frequency    acetaminophen (TYLENOL) tablet 650 mg  650 mg Oral Q6H PRN    aluminum-magnesium hydroxide-simethicone (MYLANTA) 200-200-20 mg/5 mL oral suspension 30 mL  30 mL Oral Q4H PRN    atorvastatin (LIPITOR) tablet 10 mg  10 mg Oral Daily With Dinner    benztropine (COGENTIN) injection 1 mg  1 mg Intramuscular Q1H PRN    benztropine (COGENTIN) tablet 1 mg  1 mg Oral Q1H PRN    benztropine (COGENTIN) tablet 1 mg  1 mg Oral BID    chlorproMAZINE (THORAZINE) injection SOLN 50 mg  50 mg Intramuscular Q6H PRN    chlorproMAZINE (THORAZINE) tablet 100 mg  100 mg Oral Q6H PRN    chlorproMAZINE (THORAZINE) tablet 100 mg  100 mg Oral BID    chlorproMAZINE (THORAZINE) tablet 300 mg  300 mg Oral HS    cholecalciferol (VITAMIN D3) tablet 1,000 Units  1,000 Units Oral Daily    divalproex sodium (DEPAKOTE) EC tablet 1,000 mg  1,000 mg Oral After Dinner    divalproex sodium (DEPAKOTE) EC tablet 500 mg  500 mg Oral QAM    docusate sodium (COLACE) capsule 100 mg  100 mg Oral BID    haloperidol (HALDOL) tablet 5 mg  5 mg Oral Q6H PRN    haloperidol lactate (HALDOL) injection 5 mg  5 mg Intramuscular Q4H PRN    hydrALAZINE (APRESOLINE) tablet 25 mg  25 mg Oral TID PRN    hydrOXYzine HCL (ATARAX) tablet 50 mg  50 mg Oral Q4H PRN    hydrOXYzine HCL (ATARAX) tablet 50 mg  50 mg Oral TID    ibuprofen (MOTRIN) tablet 600 mg  600 mg Oral Q8H PRN    insulin lispro (HumaLOG) 100 units/mL subcutaneous injection 1-5 Units  1-5 Units Subcutaneous HS    insulin lispro (HumaLOG) 100 units/mL subcutaneous injection 1-6 Units  1-6 Units Subcutaneous TID AC    levothyroxine tablet 25 mcg  25 mcg Oral Early Morning    loratadine (CLARITIN) tablet 10 mg  10 mg Oral Daily    LORazepam (ATIVAN) 2 mg/mL injection 2 mg  2 mg Intramuscular Q4H PRN    magnesium hydroxide (MILK OF MAGNESIA) 400 mg/5 mL oral suspension 30 mL  30 mL Oral Daily PRN    metFORMIN (GLUCOPHAGE) tablet 500 mg  500 mg Oral BID With Meals    OLANZapine (ZyPREXA) IM injection 10 mg  10 mg Intramuscular Q6H PRN    OLANZapine (ZyPREXA) tablet 10 mg  10 mg Oral Q3H PRN    risperiDONE (RisperDAL M-TABS) dispersible tablet 2 mg  2 mg Oral Q3H PRN    temazepam (RESTORIL) capsule 30 mg  30 mg Oral HS    traMADol (ULTRAM) tablet 50 mg  50 mg Oral Q8H PRN    traZODone (DESYREL) tablet 50 mg  50 mg Oral HS PRN    vitamin E (tocopherol) capsule 400 Units  400 Units Oral Daily    white petrolatum-mineral oil (EUCERIN,HYDROCERIN) cream   Topical BID PRN         Labs: NA    Assessment, Diagnosis  and Plan: continue with current meds and goals, F/U tomorrow    Counseling / Coordination of Care  Total floor / unit time spent today15 minutes  minutes  Greater than 50% of total time was spent with the patient and / or family counseling and / or coordination of care  A description of the counseling / coordination of care:      Frank Londono MD

## 2017-12-10 NOTE — PLAN OF CARE
Problem: Alteration in Thoughts and Perception  Goal: Treatment Goal: Gain control of psychotic behaviors/thinking, reduce/eliminate presenting symptoms and demonstrate improved reality functioning upon discharge  Outcome: Progressing    Goal: Verbalize thoughts and feelings  Interventions:  - Promote a nonjudgmental and trusting relationship with the patient through active listening and therapeutic communication  - Assess patient's level of functioning, behavior and potential for risk  - Engage patient in 1 on 1 interactions for a minimum of 15 minutes each session  - Encourage patient to express fears, feelings, frustrations, and discuss symptoms    - [place] patient to reality, help patient recognize reality-based thinking   - Administer medications as ordered and assess for potential side effects  - Provide the patient education related to the signs and symptoms of the illness and desired effects of prescribed medications   Outcome: Progressing    Goal: Attend and participate in unit activities, including therapeutic, recreational, and educational groups  Interventions:  - Provide therapeutic and educational activities daily, encourage attendance and participation, and document same in the medical record    Outcome: Progressing      Problem: Depression  Goal: Treatment Goal: Demonstrate behavioral control of depressive symptoms, verbalize feelings of improved mood/affect, and adopt new coping skills prior to discharge  Outcome: Progressing    Goal: Verbalize thoughts and feelings  Interventions:  - Assess and re-assess patient's level of risk   - Engage patient in 1:1 interactions, daily, for a minimum of 15 minutes   - Encourage patient to express feelings, fears, frustrations, hopes    Outcome: Progressing    Goal: Refrain from harming self  Interventions:  - Monitor patient closely, per order   - Supervise medication ingestion, monitor effects and side effects    Outcome: Progressing      Problem: Risk for Violence/Aggression Toward Others  Goal: Treatment Goal: Refrain from acts of violence/aggression during length of stay, and demonstrate improved impulse control at the time of discharge  Outcome: Progressing    Goal: Verbalize thoughts and feelings  Interventions:  - Assess and re-assess patient's level of risk, every waking shift  - Engage patient in 1:1 interactions, daily, for a minimum of 15 minutes   - Allow patient to express feelings and frustrations in a safe and non-threatening manner   - Establish rapport/trust with patient    Outcome: Progressing    Goal: Refrain from harming others  Outcome: Progressing    Goal: Control angry outbursts  Interventions:  - Monitor patient closely, per order  - Ensure early verbal de-escalation  - Monitor prn medication needs  - Set reasonable/therapeutic limits, outline behavioral expectations, and consequences   - Provide a non-threatening milieu, utilizing the least restrictive interventions    Outcome: Progressing      Problem: DISCHARGE PLANNING  Goal: Discharge to home or other facility with appropriate resources  INTERVENTIONS:  - Identify barriers to discharge w/patient and caregiver  - Arrange for needed discharge resources and transportation as appropriate  - Identify discharge learning needs (meds, wound care, etc )  - Arrange for interpretive services to assist at discharge as needed  - Refer to Case Management Department for coordinating discharge planning if the patient needs post-hospital services based on physician/advanced practitioner order or complex needs related to functional status, cognitive ability, or social support system   Outcome: Progressing      Problem: SAFETY, RESTRAINT - VIOLENT/SELF-DESTRUCTIVE  Goal: Remains free of harm/injury from restraints (Restraint for Violent/Self-Destructive Behavior)  INTERVENTIONS:  - Instruct patient/family regarding restraint use   - Assess and monitor physiologic and psychological status   - Provide interventions and comfort measures to meet assessed patient needs   - Ensure continuous in person monitoring is provided   - Identify and implement measures to help patient regain control  - Assess readiness for release of restraint   Outcome: Progressing    Goal: Returns to optimal restraint-free functioning  INTERVENTIONS:  - Assess the patient's behavior and symptoms that indicate continued need for restraint  - Identify and implement measures to help patient regain control  - Assess readiness for release of restraint    Outcome: Progressing      Problem: Ineffective Coping  Goal: Participates in unit activities  Interventions:  - Provide therapeutic environment   - Provide required programming   - Redirect inappropriate behaviors    Outcome: Progressing

## 2017-12-11 VITALS
SYSTOLIC BLOOD PRESSURE: 123 MMHG | TEMPERATURE: 97.7 F | HEART RATE: 65 BPM | WEIGHT: 220 LBS | HEIGHT: 69 IN | BODY MASS INDEX: 32.58 KG/M2 | OXYGEN SATURATION: 95 % | DIASTOLIC BLOOD PRESSURE: 82 MMHG | RESPIRATION RATE: 16 BRPM

## 2017-12-11 LAB
GLUCOSE SERPL-MCNC: 123 MG/DL (ref 65–140)
GLUCOSE SERPL-MCNC: 163 MG/DL (ref 65–140)
VALPROATE SERPL-MCNC: 53 UG/ML (ref 50–100)

## 2017-12-11 PROCEDURE — 82948 REAGENT STRIP/BLOOD GLUCOSE: CPT

## 2017-12-11 PROCEDURE — 80164 ASSAY DIPROPYLACETIC ACD TOT: CPT | Performed by: PSYCHIATRY & NEUROLOGY

## 2017-12-11 RX ADMIN — LORATADINE 10 MG: 10 TABLET ORAL at 08:45

## 2017-12-11 RX ADMIN — DIVALPROEX SODIUM 500 MG: 500 TABLET, DELAYED RELEASE ORAL at 08:45

## 2017-12-11 RX ADMIN — INSULIN LISPRO 1 UNITS: 100 INJECTION, SOLUTION INTRAVENOUS; SUBCUTANEOUS at 09:00

## 2017-12-11 RX ADMIN — BENZTROPINE MESYLATE 1 MG: 1 TABLET ORAL at 08:45

## 2017-12-11 RX ADMIN — LEVOTHYROXINE SODIUM 25 MCG: 25 TABLET ORAL at 06:33

## 2017-12-11 RX ADMIN — CHLORPROMAZINE HYDROCHLORIDE 100 MG: 100 TABLET, SUGAR COATED ORAL at 08:45

## 2017-12-11 RX ADMIN — HYDROXYZINE HYDROCHLORIDE 50 MG: 50 TABLET, FILM COATED ORAL at 08:45

## 2017-12-11 RX ADMIN — VITAMIN E CAP 400 UNIT 400 UNITS: 400 CAP at 08:45

## 2017-12-11 RX ADMIN — METFORMIN HYDROCHLORIDE 500 MG: 500 TABLET, FILM COATED ORAL at 08:44

## 2017-12-11 RX ADMIN — DOCUSATE SODIUM 100 MG: 100 CAPSULE, LIQUID FILLED ORAL at 08:45

## 2017-12-11 RX ADMIN — VITAMIN D, TAB 1000IU (100/BT) 1000 UNITS: 25 TAB at 08:45

## 2017-12-11 NOTE — PLAN OF CARE
Problem: Alteration in Thoughts and Perception  Goal: Treatment Goal: Gain control of psychotic behaviors/thinking, reduce/eliminate presenting symptoms and demonstrate improved reality functioning upon discharge  Outcome: Completed Date Met: 12/11/17    Goal: Verbalize thoughts and feelings  Interventions:  - Promote a nonjudgmental and trusting relationship with the patient through active listening and therapeutic communication  - Assess patient's level of functioning, behavior and potential for risk  - Engage patient in 1 on 1 interactions for a minimum of 15 minutes each session  - Encourage patient to express fears, feelings, frustrations, and discuss symptoms    - [place] patient to reality, help patient recognize reality-based thinking   - Administer medications as ordered and assess for potential side effects  - Provide the patient education related to the signs and symptoms of the illness and desired effects of prescribed medications   Outcome: Completed Date Met: 12/11/17    Goal: Attend and participate in unit activities, including therapeutic, recreational, and educational groups  Interventions:  - Provide therapeutic and educational activities daily, encourage attendance and participation, and document same in the medical record    Outcome: Completed Date Met: 12/11/17      Problem: Depression  Goal: Treatment Goal: Demonstrate behavioral control of depressive symptoms, verbalize feelings of improved mood/affect, and adopt new coping skills prior to discharge  Outcome: Completed Date Met: 12/11/17    Goal: Verbalize thoughts and feelings  Interventions:  - Assess and re-assess patient's level of risk   - Engage patient in 1:1 interactions, daily, for a minimum of 15 minutes   - Encourage patient to express feelings, fears, frustrations, hopes    Outcome: Completed Date Met: 12/11/17    Goal: Refrain from harming self  Interventions:  - Monitor patient closely, per order   - Supervise medication ingestion, monitor effects and side effects    Outcome: Completed Date Met: 12/11/17      Problem: Risk for Violence/Aggression Toward Others  Goal: Treatment Goal: Refrain from acts of violence/aggression during length of stay, and demonstrate improved impulse control at the time of discharge  Outcome: Completed Date Met: 12/11/17    Goal: Verbalize thoughts and feelings  Interventions:  - Assess and re-assess patient's level of risk, every waking shift  - Engage patient in 1:1 interactions, daily, for a minimum of 15 minutes   - Allow patient to express feelings and frustrations in a safe and non-threatening manner   - Establish rapport/trust with patient    Outcome: Completed Date Met: 12/11/17    Goal: Refrain from harming others  Outcome: Completed Date Met: 12/11/17    Goal: Control angry outbursts  Interventions:  - Monitor patient closely, per order  - Ensure early verbal de-escalation  - Monitor prn medication needs  - Set reasonable/therapeutic limits, outline behavioral expectations, and consequences   - Provide a non-threatening milieu, utilizing the least restrictive interventions    Outcome: Completed Date Met: 12/11/17      Problem: DISCHARGE PLANNING  Goal: Discharge to home or other facility with appropriate resources  INTERVENTIONS:  - Identify barriers to discharge w/patient and caregiver  - Arrange for needed discharge resources and transportation as appropriate  - Identify discharge learning needs (meds, wound care, etc )  - Arrange for interpretive services to assist at discharge as needed  - Refer to Case Management Department for coordinating discharge planning if the patient needs post-hospital services based on physician/advanced practitioner order or complex needs related to functional status, cognitive ability, or social support system   Outcome: Completed Date Met: 12/11/17      Problem: SAFETY, RESTRAINT - VIOLENT/SELF-DESTRUCTIVE  Goal: Remains free of harm/injury from restraints (Restraint for Violent/Self-Destructive Behavior)  INTERVENTIONS:  - Instruct patient/family regarding restraint use   - Assess and monitor physiologic and psychological status   - Provide interventions and comfort measures to meet assessed patient needs   - Ensure continuous in person monitoring is provided   - Identify and implement measures to help patient regain control  - Assess readiness for release of restraint   Outcome: Completed Date Met: 12/11/17    Goal: Returns to optimal restraint-free functioning  INTERVENTIONS:  - Assess the patient's behavior and symptoms that indicate continued need for restraint  - Identify and implement measures to help patient regain control  - Assess readiness for release of restraint    Outcome: Completed Date Met: 12/11/17      Problem: Ineffective Coping  Goal: Participates in unit activities  Interventions:  - Provide therapeutic environment   - Provide required programming   - Redirect inappropriate behaviors    Outcome: Completed Date Met: 12/11/17

## 2017-12-11 NOTE — NURSING NOTE
Group home staff was given D/C instructions, script copies, and education  Belongings that were charted on this floor were returned to pt  Scripts faxed over to pharmacy  Pt left w/ group home staff

## 2017-12-11 NOTE — CASE MANAGEMENT
SW rec'd message from Dr Jesse Marcos, pt's psychiatrist  Jean Carlos Orr pt had appt with him on 1/10/2018 @ 1 PM  SW rec'd message from [facility] returned call  Sec'y said that pt had an appt on 12/21 @ 5 PM with Valerie Salinas, therapist  Lida Leyden called Dionte Miramontes, [phone], group home staff, & advised that pt to be D/C  Said he & another  Staff person, & his supervisor, Anoop Dominguez, would come to pick pt up  SW advised Dionte Miramontes of above appts

## 2017-12-11 NOTE — PROGRESS NOTES
No further agitation or outbursts thus far this shift  Patient ate supper and showered this shift  Took supper time medication/ insulin coverage without problems or incident   Reinforced behavior that he needs to demonstrate on NW7

## 2017-12-11 NOTE — DISCHARGE INSTR - LAB
Contact Information: If you have any questions, concerns, pended studies, tests and/or procedures, or emergencies regarding your inpatient behavioral health visit  Please contact [place] [facility] [phone] and ask to speak to a , nurse or physician  A contact is available 24 hours/ 7 days a week at this number  Summary of Procedures Performed During your Stay:  Below is a list of major procedures performed during your hospital stay and a summary of results:  - No major procedures performed  Pending Studies     None        If studies are pending at discharge, follow up with your PCP and/or referring provider

## 2017-12-11 NOTE — PROGRESS NOTES
Given PRN PO cogentin for C/O tremors   Patient agrees to go to sleep and talk to physician tomorrow regarding, "not feeling ready to go home "
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
## 2017-12-11 NOTE — DISCHARGE INSTRUCTIONS
Chlorpromazine (By mouth)   Chlorpromazine (klor-PROE-ma-saulo)  Treats bipolar disorder, schizophrenia, tetanus, porphyria, severe hiccups, and severe nausea and vomiting  Also used to reduce anxiety before surgery  This medicine is a phenothiazine  Brand Name(s):   There may be other brand names for this medicine  When This Medicine Should Not Be Used: This medicine is not right for everyone  Do not use it if you had an allergic reaction to chlorpromazine or any other phenothiazine drug  How to Use This Medicine:   Tablet, Long Acting Capsule, Liquid  · Take your medicine as directed  Your dose may need to be changed several times to find what works best for you  · Do not crush or chew the tablet or capsule  · Missed dose: Take a dose as soon as you remember  If it is almost time for your next dose, wait until then and take a regular dose  Do not take extra medicine to make up for a missed dose  · Store the medicine in a closed container at room temperature, away from heat, moisture, and direct light  Drugs and Foods to Avoid:   Ask your doctor or pharmacist before using any other medicine, including over-the-counter medicines, vitamins, and herbal products  · Many other drugs can interact with chlorpromazine  Tell your doctor about all other medicines you are taking, including the following:  ¨ Blood thinner, such as warfarin  ¨ Blood pressure medicine, such as atenolol, guanethidine, metoprolol, propranolol  ¨ Diuretic (water pill), such as hydrochlorothiazide  ¨ Lithium  ¨ Medicine to treat muscle spasms, such as atropine  ¨ Seizure medicine  · Tell your doctor if you use anything else that makes you sleepy  Some examples are allergy medicine, narcotic pain medicine, and alcohol  Warnings While Using This Medicine:   · Tell your doctor if you are pregnant or breastfeeding, or if you have asthma, emphysema, breast cancer, trouble urinating, glaucoma, seizures, or a bone marrow or blood disorder  Tell your doctor if you have heart, kidney, or liver disease, or lupus  · This medicine may cause drowsiness or affect your sight  Do not drive or do anything that could be dangerous until you know how this medicine affects you  · This medicine may cause the following problems:   ¨ Neuroleptic malignant syndrome (NMS)  ¨ Tardive dyskinesia  · This medicine may make your skin more sensitive to sunlight  Wear sunscreen  Do not use sunlamps or tanning beds  · Do not stop using this medicine suddenly  Your doctor will need to slowly decrease your dose before you stop it completely  · Tell any doctor or dentist who treats you that you are using this medicine  This medicine may affect certain medical test results  · Tell any doctor or dentist who treats you that you are using this medicine  You may need to stop using this medicine several days before you have surgery or medical tests  · Keep all medicine out of the reach of children  Never share your medicine with anyone  Possible Side Effects While Using This Medicine:   Call your doctor right away if you notice any of these side effects:  · Allergic reaction: Itching or hives, swelling in your face or hands, swelling or tingling in your mouth or throat, chest tightness, trouble breathing  · Drowsiness or dizziness, confusion  · Fast or uneven heartbeat  · Jerky muscle movement you cannot control (often in your face, tongue, or jaw)  · Seizures  If you notice these less serious side effects, talk with your doctor:   · Agitation, restlessness, or insomnia  · Weakness  If you notice other side effects that you think are caused by this medicine, tell your doctor  Call your doctor for medical advice about side effects  You may report side effects to FDA at 5-946-FDA-9588  © 2017 [address] [employer] Information is for End User's use only and may not be sold, redistributed or otherwise used for commercial purposes    The above information is an  only  It is not intended as medical advice for individual conditions or treatments  Talk to your doctor, nurse or pharmacist before following any medical regimen to see if it is safe and effective for you  Valproic Acid (By mouth)   Valproic Acid (brian-PROE-ik AS-id)  Treats seizures  Also treats bipolar disorder and helps prevent migraine headaches  Brand Name(s): Depakene, Depakote Sprinkles, Stavzor   There may be other brand names for this medicine  When This Medicine Should Not Be Used: This medicine is not right for everyone  Do not use it if you had an allergic reaction to divalproex, sodium valproate, or valproic acid  Do not use it if you are pregnant, or if you have liver disease or certain genetic disorders (such as urea cycle or mitochondrial disorder)  How to Use This Medicine:   Delayed Release Capsule, Liquid Filled Capsule, Liquid  · Take your medicine as directed  Your dose may need to be changed several times to find what works best for you  · You may take this medicine with food to decrease stomach upset  · Capsule: Swallow it whole  Do not crush, break, or chew it  · Oral liquid: Measure the oral liquid medicine with a marked measuring spoon, oral syringe, or medicine cup  · This medicine should come with a Medication Guide  Ask your pharmacist for a copy if you do not have one  · Missed dose: Take a dose as soon as you remember  If it is almost time for your next dose, wait until then and take a regular dose  Do not take extra medicine to make up for a missed dose  · Store the medicine in a closed container at room temperature, away from heat, moisture, and direct light  Drugs and Foods to Avoid:   Ask your doctor or pharmacist before using any other medicine, including over-the-counter medicines, vitamins, and herbal products  · Some medicines can affect how valproic acid works   Tell your doctor if you are using any of the following:   ¨ Aspirin, rifampin, rufinamide, tolbutamide, zidovudine  ¨ Carbapenem antibiotic (including ertapenem, imipenem, meropenem)  ¨ Other seizure medicine (including carbamazepine, felbamate, phenobarbital, phenytoin, primidone)  ¨ Blood thinner (including warfarin)  · Alcohol, narcotic pain relievers, or sleeping pills may cause you to feel more lightheaded, dizzy, or faint when used together with this medicine  Warnings While Using This Medicine:   · It is not safe to take this medicine during pregnancy  It could harm an unborn baby  Tell your doctor right away if you become pregnant  · Tell your doctor if you are breastfeeding, or if you have kidney disease, blood disease, or pancreas problems  · This medicine can increase depression and thoughts of suicide  Tell your doctor if you have a history of depression or mental health problems  · This medicine may cause the following problems:  ¨ Liver problems  ¨ Pancreatitis  ¨ Hyperammonemic encephalopathy (too much ammonia in your blood)  ¨ Thrombocytopenia (decrease in blood cells that affect clotting)  ¨ Hypothermia (low body temperature)  ¨ Drug reaction with eosinophilia and systemic symptoms (DRESS), which may damage organs such as the liver, kidney, or heart  · This medicine may make you dizzy or drowsy  Do not drive or do anything else that could be dangerous until you know how this medicine affects you  · Do not stop using this medicine suddenly  Your doctor will need to slowly decrease your dose before you stop it completely  · Tell any doctor or dentist who treats you that you are using this medicine  This medicine may affect certain medical test results  · Your doctor will check your progress and the effects of this medicine at regular visits  Keep all appointments  · Keep all medicine out of the reach of children  Never share your medicine with anyone    Possible Side Effects While Using This Medicine:   Call your doctor right away if you notice any of these side effects:  · Allergic reaction: Itching or hives, swelling in your face or hands, swelling or tingling in your mouth or throat, chest tightness, trouble breathing  · Blistering, peeling, or red skin rash  · Confusion, problems with memory, unusual drowsiness, clumsiness  · Dark urine or pale stools, loss of appetite, stomach pain, yellow skin or eyes  · Fever, rash, swollen glands in the neck, armpits, or groin  · Sudden and severe stomach pain, nausea, vomiting  · Thoughts of hurting yourself, depression, unusual changes in behavior or moods  · Unusual bleeding, bruising, or weakness  If you notice these less serious side effects, talk with your doctor:   · Diarrhea, mild stomach pain or upset  · Hair loss  · Tiredness, sleepiness  · Trouble sleeping, tremor  · Vision changes, dizziness, headache  If you notice other side effects that you think are caused by this medicine, tell your doctor  Call your doctor for medical advice about side effects  You may report side effects to FDA at 4-634-FDA-1407  © 2017 [address] [facility] Information is for End User's use only and may not be sold, redistributed or otherwise used for commercial purposes  The above information is an  only  It is not intended as medical advice for individual conditions or treatments  Talk to your doctor, nurse or pharmacist before following any medical regimen to see if it is safe and effective for you  Laxative, Stool Softeners (By mouth)   Treats constipation by helping you have a bowel movement  Brand Name(s): Col-Rite, Colace, Colace Clear, DSS, Diocto, Diocto Liquid, Doc-Q-Lace, Docuprene, Docusil, Dok, Dulcolax, Fleet Sof-Lax, [facility] Docusate Calcium, [facility] Stool Softener, [facility] Stool Softner   There may be other brand names for this medicine  When This Medicine Should Not Be Used: You should not use this medicine if you have severe stomach pain, nausea, or vomiting   Stool softeners should not be used if you have severe stomach pain and do not know the cause  How to Use This Medicine:   Capsule, Tablet, Liquid, Liquid Filled Capsule  · Your doctor will tell you how much medicine to use  Do not use more than directed  · Follow the instructions on the medicine label if you are using this medicine without a prescription  · Drink 6 to 8 glasses of water daily while using any laxative  · To make the oral liquid taste better, you may mix it with one-half glass of milk or fruit juice  · Measure the oral liquid medicine with a marked measuring spoon, oral syringe, medicine cup, or medicine dropper  If a dose is missed:   · Use the missed dose as soon as possible  · If you do not remember the missed dose until the next day, skip the missed dose and go back to your regular dosing schedule  · You should not use two doses at the same time  How to Store and Dispose of This Medicine:   · Store the medicine in a tightly closed container at room temperature, away from heat and moisture  Do not store liquid-filled capsules in the refrigerator  · Keep all medicine out of the reach of children  Drugs and Foods to Avoid:   Ask your doctor or pharmacist before using any other medicine, including over-the-counter medicines, vitamins, and herbal products  · You should not use mineral oil while you are using a stool softener  · You should not use a stool softener within 2 hours before or after taking any other medicines  Laxatives can keep other medicines from working correctly  Warnings While Using This Medicine:   · If you are pregnant or breastfeeding, talk to your doctor before taking this medicine  · Do not give laxatives to children under 10years old unless you talk to your doctor  · You should not use this laxative for longer than 1 week unless approved by your doctor  Laxatives may be habit-forming and can harm your bowels if you use them too long    · Stool softeners usually work in 1 to 2 days, but for some people, results can take as long as 3 to 5 days  Possible Side Effects While Using This Medicine: If you notice these less serious side effects, talk with your doctor:  · Nausea  · Sore throat  · Skin rash  If you notice other side effects that you think are caused by this medicine, tell your doctor  Call your doctor for medical advice about side effects  You may report side effects to FDA at 3-706-QNV-3070  © 2017 [address] [employer] Information is for End User's use only and may not be sold, redistributed or otherwise used for commercial purposes  The above information is an  only  It is not intended as medical advice for individual conditions or treatments  Talk to your doctor, nurse or pharmacist before following any medical regimen to see if it is safe and effective for you  Levothyroxine (By mouth)   Levothyroxine (ihb-lar-shue-JUANI-een)  Treats hypothyroidism  Also treats an enlarged thyroid gland and thyroid cancer  Brand Name(s): Levoxyl, Synthroid, Tirosint, Unithroid   There may be other brand names for this medicine  When This Medicine Should Not Be Used: This medicine is not right for everyone  Do not use it if you had an allergic reaction to glycerol, or you recently had a heart attack  How to Use This Medicine:   Capsule, Liquid, Tablet  · Take your medicine as directed  Your dose may need to be changed several times to find what works best for you  You may have to take this medicine for 6 to 8 weeks before your symptoms start to get better  · Read and follow the patient instructions that come with this medicine  Talk to your doctor or pharmacist if you have any questions  · Take this medicine in the morning on an empty stomach  Wait at least 30 to 60 minutes before you eat any food  · Capsule: Swallow whole  Do not cut or crush it  · Oral liquid:   ¨ This medicine may be mixed with water or be given directly into the mouth    ¨ If mixed with water: Squeeze the contents of 1 single unit-dose ampule into a glass or cup containing water  Stir and drink it immediately  Add some water to the glass or cup and drink the water  This will help get all of the medicine out of the glass or cup  Do not mix this medicine with any other liquid except water  Do not store the mixture for later use  ¨ If taken without water: Squeeze the medicine directly into the mouth or into a spoon and swallow it immediately  · Tablet: If this medicine is being given to a baby or a small child, you can crush the tablet and mix it in 1 to 2 teaspoons (5 to 10 milliliters) of water  This mixture can be given by spoon or dropper  Do not mix the tablet with any other liquid except water  Do not store the mixture  If you do not give the medicine right after it is mixed, throw it away  · Missed dose: Take a dose as soon as you remember  If it is almost time for your next dose, wait until then and take a regular dose  Do not take extra medicine to make up for a missed dose  · Store the medicine in a closed container at room temperature, away from heat, moisture, and direct light  Use the oral liquid within 15 days after opening the pouch  Keep the ampules in the pouch until you are ready to use them  Drugs and Foods to Avoid:   Ask your doctor or pharmacist before using any other medicine, including over-the-counter medicines, vitamins, and herbal products  · Some foods and medicines can affect how levothyroxine works   Tell your doctor if you are using any of the following:  ¨ Amiodarone, dexamethasone, digoxin, imatinib, ketamine, phenobarbital, rifampin  ¨ Beta-blocker medicine  ¨ Blood thinner (including heparin, warfarin)  ¨ Insulin or diabetes medicine  ¨ Medicine to treat depression  · If you use antacids, medicine to treat high cholesterol (including cholestyramine, colesevelam, colestipol), orlistat, sevelamer, sucralfate, stomach medicine (including lansoprazole, omeprazole, pantoprazole), or any medicine that contains calcium or iron, take it at least 4 hours before or 4 hours after you take levothyroxine  · Cottonseed meal, dietary fiber, soybean flour (infant formula), or walnuts may decrease the absorption of this medicine  Talk with your doctor if you have questions  · Do not eat grapefruit or drink grapefruit juice while you are using this medicine  Warnings While Using This Medicine:   · Tell your doctor if you are pregnant or breastfeeding, or if you have anemia, blood clotting problems, diabetes, heart disease, osteoporosis, pituitary gland problems, or adrenal gland problems  Tell your doctor if you have recently received radiation therapy with iodine  Do not use this medicine to treat obesity or to lose weight  · Tell any doctor or dentist who treats you that you take this medicine  · Do not stop using this medicine suddenly  Your doctor will need to slowly decrease your dose before you stop it completely  · Your doctor will do lab tests at regular visits to check on the effects of this medicine  Keep all appointments  · Keep all medicine out of the reach of children  Never share your medicine with anyone    Possible Side Effects While Using This Medicine:   Call your doctor right away if you notice any of these side effects:  · Allergic reaction: Itching or hives, swelling in your face or hands, swelling or tingling in your mouth or throat, chest tightness, trouble breathing  · Chest pain that may spread, trouble breathing, unusual sweating, fainting  · Fast, pounding, or uneven heartbeat  · Seizures or tremors  · Severe headache, blurred or double vision, nausea, vomiting (in children)  · Walking with a limp, knee or hip pain (in children)  If you notice these less serious side effects, talk with your doctor:   · Appetite or weight changes  · Changes in your menstrual periods  · Hair loss  · Nervousness, sensitivity to heat, sweating  If you notice other side effects that you think are caused by this medicine, tell your doctor  Call your doctor for medical advice about side effects  You may report side effects to FDA at 6-077-FDA-0471  © 2017 [address] [employer] Information is for End User's use only and may not be sold, redistributed or otherwise used for commercial purposes  The above information is an  only  It is not intended as medical advice for individual conditions or treatments  Talk to your doctor, nurse or pharmacist before following any medical regimen to see if it is safe and effective for you  Loratadine (By mouth)   Loratadine (opc-Y-fk-poly)  Treats allergy symptoms and hives  Brand Name(s): Alavert, Allergy, Brite-Life Allergy Relief, Children's Claritin, Children's Claritin Allergy, Children's Loratadine, Children's Loratadine Allergy, Claritin, Claritin 24HR, Claritin Liqui-Gels, Claritin RediTabs, [facility] Loratadine, [facility] Children's Loratadine, [facility] Loratadine, [facility] Allergy Relief   There may be other brand names for this medicine  When This Medicine Should Not Be Used: You should not use this medicine if you have had an allergic reaction to loratadine  Do not give any over-the-counter (OTC) cough and cold medicine to a baby or child under 3years old  Using these medicines in very young children might cause serious or possibly life-threatening side effects  How to Use This Medicine:   Tablet, Dissolving Tablet, Liquid, Liquid Filled Capsule, Chewable Tablet  · Your doctor will tell you how much of this medicine to use and how often  Do not use more medicine or use it more often than your doctor tells you to  · Follow the instructions on the medicine label if you are using this medicine without a prescription  · After you remove a rapidly disintegrating tablet (Reditab®) from the package, use it right away by putting the tablet on your tongue   The tablet will break up and dissolve quickly  You may take the tablet with or without water  · Measure the oral liquid medicine with a marked measuring spoon, oral syringe, or medicine cup  If a dose is missed:   · If you miss a dose or forget to take your medicine, take it as soon as you can  If it is almost time for your next dose, wait until then to take the medicine and skip the missed dose  · Do not use extra medicine to make up for a missed dose  How to Store and Dispose of This Medicine:   · Store the medicine at room temperature, away from heat and direct light  Do not freeze  · Ask your pharmacist, doctor, or health caregiver about the best way to dispose of any outdated medicine or medicine no longer needed  · Keep all medicine out of the reach of children and never share your medicine with anyone  Drugs and Foods to Avoid:      Ask your doctor or pharmacist before using any other medicine, including over-the-counter medicines, vitamins, and herbal products  Warnings While Using This Medicine:   · Make sure your doctor knows if you are pregnant or breastfeeding, or if you have liver disease or kidney disease  Possible Side Effects While Using This Medicine:   Call your doctor right away if you notice any of these side effects:  · Allergic reaction: Itching or hives, swelling in face or hands, swelling or tingling in the mouth or throat, tightness in chest, trouble breathing  · Extreme weakness  · Fast or irregular heartbeat  · Uncontrolled movements of the head, neck, eyes or tongue  If you notice these less serious side effects, talk with your doctor:   · Cough, sore throat, hoarseness  · Drowsiness  · Dry mouth  · Headache  · Nervousness  · Red, irritated eyes  If you notice other side effects that you think are caused by this medicine, tell your doctor  Call your doctor for medical advice about side effects   You may report side effects to FDA at 0-723-FDA-6389  © 2017 [address] [employer] Information is for End User's use only and may not be sold, redistributed or otherwise used for commercial purposes  The above information is an  only  It is not intended as medical advice for individual conditions or treatments  Talk to your doctor, nurse or pharmacist before following any medical regimen to see if it is safe and effective for you  Atorvastatin (By mouth)   Atorvastatin (a-tor-va-STAT-in)  Treats high cholesterol and triglyceride levels  Reduces the risk of angina, stroke, heart attack, or certain heart and blood vessel problems  This medicine is a statin  Brand Name(s): Lipitor   There may be other brand names for this medicine  When This Medicine Should Not Be Used: This medicine is not right for everyone  Do not use it if you had an allergic reaction to atorvastatin, if you have active liver disease, or if you are pregnant or breastfeeding  How to Use This Medicine:   Tablet  · Take your medicine as directed  Your dose may need to be changed several times to find what works best for you  · Take this medicine at the same time each day  · Swallow the tablet whole  Do not break it  · Missed dose: Take a dose as soon as you remember  If it is less than 12 hours until your next dose, skip the missed dose and take the next dose at the regular time  Do not take 2 doses of this medicine within 12 hours  · Read and follow the patient instructions that come with this medicine  Talk to your doctor or pharmacist if you have any questions  · Store the medicine in a closed container at room temperature, away from heat, moisture, and direct light  Drugs and Foods to Avoid:   Ask your doctor or pharmacist before using any other medicine, including over-the-counter medicines, vitamins, and herbal products  · Some medicines can affect how atorvastatin works   Tell your doctor if you also use birth control pills, boceprevir, cimetidine, colchicine, cyclosporine, digoxin, niacin, rifampin, spironolactone, telaprevir, medicine to treat an infection, or medicine to treat HIV/AIDS  Warnings While Using This Medicine:   · It is not safe to take this medicine during pregnancy  It could harm an unborn baby  Tell your doctor right away if you become pregnant  · Tell your doctor if you have kidney disease, diabetes, muscle pain or weakness, thyroid problems, have recently had a stroke or TIA (transient ischemic attack), or have a history of liver disease  Tell your doctor if you drink grapefruit juice or drink alcohol regularly  · This medicine can cause muscle problems, which can lead to kidney problems  · Tell any doctor or dentist who treats you that you use this medicine  You may need to stop using it if you have surgery, have an injury, or develop serious health problems  · Your doctor will do lab tests at regular visits to check on the effects of this medicine  Keep all appointments  · Keep all medicine out of the reach of children  Never share your medicine with anyone  Possible Side Effects While Using This Medicine:   Call your doctor right away if you notice any of these side effects:  · Allergic reaction: Itching or hives, swelling in your face or hands, swelling or tingling in your mouth or throat, chest tightness, trouble breathing  · Blistering, peeling, red skin rash  · Change in how much or how often you urinate  · Dark urine or pale stools, nausea, vomiting, loss of appetite, stomach pain, yellow skin or eyes  · Fever  · Muscle pain, tenderness, or weakness  · Unusual tiredness  If you notice these less serious side effects, talk with your doctor:   · Diarrhea  · Joint pain  If you notice other side effects that you think are caused by this medicine, tell your doctor  Call your doctor for medical advice about side effects   You may report side effects to FDA at 1-802-FDA-2151  © 2017 [address] Information is for End User's use only and may not be sold, redistributed or otherwise used for commercial purposes  The above information is an  only  It is not intended as medical advice for individual conditions or treatments  Talk to your doctor, nurse or pharmacist before following any medical regimen to see if it is safe and effective for you  Benztropine   Benztropine ([id number])  Treats symptoms of Parkinson disease or side effects of other drugs  Brand Name(s): Cogentin   There may be other brand names for this medicine  When This Medicine Should Not Be Used: This medicine is not right for everyone  Do not receive it if you had an allergic reaction to benztropine  How to Use This Medicine:   Injectable  · A nurse or other health provider will give you this medicine  Your doctor will prescribe your exact dose, and tell you how often it will be given  · Missed dose: Call your doctor, pharmacist, or home caregiver f  ·   ·   ·   ·   ·   · or instructions  Drugs and Foods to Avoid:   Ask your doctor or pharmacist before using any other medicine, including over-the-counter medicines, vitamins, and herbal products  · Some foods and medicines may affect how benztropine works  Tell your doctor if you are taking any of the following:   ¨ Haloperidol  ¨ A phenothiazine medicine, such as prochlorperazine, chlorpromazine, perphenazine, promethazine, thioridazine  ¨ A tricyclic antidepressant medicine, such as amitriptyline, doxepin, nortriptyline  · Do not drink alcohol while you are using this medicine  Warnings While Using This Medicine:   · Tell your doctor if you are pregnant or breastfeeding, or if you have glaucoma, bowel or stomach problems, an enlarged prostate, heart disease, or heart rhythm problems  Tell your doctor if you have a history of mental problems, or if you have a disease that affects your nervous system  Tell your doctor if you have jerky muscle movements caused by another medicine    · This medicine may keep you from sweating enough, which may cause your body to get too hot  Be careful in hot weather, and while you exercise or use a sauna or whirlpool  · This medicine may make you drowsy or cause you to have trouble thinking clearly  Do not drive or do anything that could be dangerous until you know how this medicine affects you  · Tell your doctor if your neck muscles become stiff and suddenly weak  Your doctor may need to lower the dose you are taking  · Your doctor will check your progress and the effects of this medicine at regular visits  Keep all appointments  Possible Side Effects While Using This Medicine:   Call your doctor right away if you notice any of these side effects:  · Allergic reaction: Itching or hives, swelling in your face or hands, swelling or tingling in your mouth or throat, chest tightness, trouble breathing  · Confusion or extreme behavior changes  · Fast or uneven heartbeat  · Jerky muscle movements you cannot control (often in your face, tongue, or jaw)  · Seeing or hearing things that are not real  · Severe constipation, or pain in your abdomen  · Severe dry mouth that causes trouble swallowing or speaking, loss of appetite, or weight loss  · Unable to sweat, or feeling overheated  If you notice these less serious side effects, talk with your doctor:   · Blurred vision  · Nausea or vomiting  · Trouble urinating  If you notice other side effects that you think are caused by this medicine, tell your doctor  Call your doctor for medical advice about side effects  You may report side effects to FDA at 6-624-FDA-2479  © 2017 [address] Information is for End User's use only and may not be sold, redistributed or otherwise used for commercial purposes  The above information is an  only  It is not intended as medical advice for individual conditions or treatments   Talk to your doctor, nurse or pharmacist before following any medical regimen to see if it is safe and effective for you  Ketotifen (Into the eye)   Ketotifen (mabel-toe-CRISTINA-fen)  Treats eye itching and discomfort caused by allergies  Brand Name(s): Alaway, Children's Alaway, Claritin Eye, Equate Eye Itch Relief, Eye Itch Relief, [facility] Eye Itch, Good Sense Itchy Eye Drops, Leader Allergy Eye Drops, Leader Eye Itch Relief Drops, Refresh Eye Itch Relief, [facility] Eye Itch Relief, Zaditor, ZyrTEC Itchy Eye Drops   There may be other brand names for this medicine  When This Medicine Should Not Be Used: You should not use this medicine if you have had an allergic reaction to ketotifen  How to Use This Medicine:   Drop  Your doctor will tell you how much of this medicine to use and how often  Do not use more medicine or use it more often than your doctor tells you to  This medicine is not for long-term use  Wash your hands before and after using the medicine  Shake the eye drops well just before each use  Lie down or tilt your head back  With your index finger, pull down the lower lid of your eye to form a pocket  To use the eye drops: Hold the dropper close to your eye with the other hand  Drop the correct number of drops into the pocket made between your lower lid and eyeball  Gently close your eyes  Place your index finger over the inner corner of your eye for 1 minute  Do not rinse or wipe the dropper or allow it to touch anything, including your eye  Put the cap on the bottle right away  If a dose is missed:   If you miss a dose or forget to use your medicine, use it as soon as you can  If it is almost time for your next dose, wait until then to use the medicine and skip the missed dose  Do not use extra medicine to make up for a missed dose  How to Store and Dispose of This Medicine:   Keep the bottle upright when you are not using it  Store the medicine at room temperature, away from heat and direct light    Keep all medicine out of the reach of children and never share your medicine with anyone  Drugs and Foods to Avoid:   Ask your doctor or pharmacist before using any other medicine, including over-the-counter medicines, vitamins, and herbal products  Talk with your doctor or pharmacist before using any other medicines in the eye  Warnings While Using This Medicine: If you are pregnant or breastfeeding, talk to your doctor before using this medicine  If you wear contact lenses, do not put them in right after using ketotifen  Wait at least 10 minutes before putting the lenses in your eyes  Do not wear your contact lens(es) if your eye(s) is red  You should not use this medicine to treat irritation of the eyes caused by contact lenses  You should not use leftover medicine to treat other eye conditions or general discomfort from contact lenses  Possible Side Effects While Using This Medicine:   Call your doctor right away if you notice any of these side effects:  Change in or loss of vision  Excessive tears in the eyes  Rash  Severe eye irritation, redness, swelling or pus in or around the eye  Severe eye pain  If you notice these less serious side effects, talk with your doctor:   Burning or stinging in the eye after using the medicine  Headache  Increased eye sensitivity to sunlight  Runny or stuffy nose  If you notice other side effects that you think are caused by this medicine, tell your doctor  Call your doctor for medical advice about side effects  You may report side effects to FDA at 7-272-FDA-0180  © 2017 [address] Information is for End User's use only and may not be sold, redistributed or otherwise used for commercial purposes  The above information is an  only  It is not intended as medical advice for individual conditions or treatments  Talk to your doctor, nurse or pharmacist before following any medical regimen to see if it is safe and effective for you        Metformin (By mouth)   Metformin Hydrochloride (met-FOR-min che-droe-KLOR-tj)  Treats type 2 diabetes  Brand Name(s): Fortamet, Glucophage, Glucophage XR, Glumetza, Riomet   There may be other brand names for this medicine  When This Medicine Should Not Be Used: This medicine is not right for everyone  Do not use if you had an allergic reaction to metformin, or if you have severe kidney problems or metabolic acidosis  How to Use This Medicine:   Liquid, Tablet, Long Acting Tablet  Take your medicine as directed  Your dose may need to be changed several times to find what works best for you  It is best to take this medicine with food or milk  Swallow the extended-release tablet whole  Do not crush, break, or chew it  Tell your doctor if you have trouble swallowing the tablets whole  Measure the oral liquid medicine with a marked measuring spoon, oral syringe, or medicine cup  Read and follow the patient instructions that come with this medicine  Talk to your doctor or pharmacist if you have any questions  Missed dose: Take a dose as soon as you remember  If it is almost time for your next dose, wait until then and take a regular dose  Do not take extra medicine to make up for a missed dose  Store the medicine in a closed container at room temperature, away from heat, moisture, and direct light  Drugs and Foods to Avoid:   Ask your doctor or pharmacist before using any other medicine, including over-the-counter medicines, vitamins, and herbal products  Some medicines can affect how metformin works   Tell your doctor if you are using any of the following:  Acetazolamide  Dichlorphenamide  Diuretics (water pills)  Estrogen or birth control pills  Heart or blood pressure medicine  Isoniazid  Nicotinic acid  Phenothiazine medicine  Phenytoin  Steroid medicine  Thyroid medicine  Topiramate  Zonisamide  Warnings While Using This Medicine:   Tell your doctor if you are pregnant or breastfeeding, or if you have heart or blood vessel disease, heart failure, blood circulation problems, kidney disease, liver disease, anemia, an adrenal gland or pituitary gland disorder, or a vitamin B12 deficiency  Tell your doctor if you had a heart attack  Tell your doctor if you drink alcohol  Too much of this medicine can cause a rare, but serious condition called lactic acidosis  Part of the extended-release tablet may pass in your stool  This is normal   Make sure any doctor or dentist who treats you knows that you are using this medicine  You may need to stop using this medicine before you have surgery, an x-ray, CT scan, or other medical test   Your doctor will do lab tests at regular visits to check on the effects of this medicine  Keep all appointments  Keep all medicine out of the reach of children  Never share your medicine with anyone  Possible Side Effects While Using This Medicine:   Call your doctor right away if you notice any of these side effects: Allergic reaction: Itching or hives, swelling in your face or hands, swelling or tingling in your mouth or throat, chest tightness, trouble breathing  Confusion, fast heartbeat, increased hunger, shakiness  Fever or chills  Stomach pain, nausea, vomiting, muscle pain or cramping  Trouble breathing, slow heartbeat, lightheadedness, dizziness  Unusual tiredness or weakness  If you notice these less serious side effects, talk with your doctor:   Diarrhea, gas, nausea  If you notice other side effects that you think are caused by this medicine, tell your doctor  Call your doctor for medical advice about side effects  You may report side effects to FDA at 4-516-FDA-4367  © 2017 [address] Information is for End User's use only and may not be sold, redistributed or otherwise used for commercial purposes  The above information is an  only  It is not intended as medical advice for individual conditions or treatments   Talk to your doctor, nurse or pharmacist before following any medical regimen to see if it is safe and effective for you  Trazodone (By mouth)   Trazodone (TRAZ-oh-done)  Treats depression  Brand Name(s): Sakina   There may be other brand names for this medicine  When This Medicine Should Not Be Used: This medicine is not right for everyone  Do not use it if you had an allergic reaction to trazodone  How to Use This Medicine:   Tablet, Long Acting Tablet  Take your medicine as directed  Your dose may need to be changed several times to find what works best for you  Regular tablet: Take it with or shortly after a meal or light snack  Extended-release tablet: Take it at the same time each day, preferably at bedtime, without food  The tablet can be swallowed whole, or you may break the tablet in half along the score line  Do not break the tablet unless your doctor tells you to  Do not crush or chew the tablet  This medicine should come with a Medication Guide  Ask your pharmacist for a copy if you do not have one  Missed dose: Take a dose as soon as you remember  If it is almost time for your next dose, wait until then and take a regular dose  Do not take extra medicine to make up for a missed dose  Store the medicine in a closed container at room temperature, away from heat, moisture, and direct light  Drugs and Foods to Avoid:   Ask your doctor or pharmacist before using any other medicine, including over-the-counter medicines, vitamins, and herbal products  Do not use trazodone if you currently take an MAO inhibitor (MAOI) or have used an MAOI in the past 14 days    Tell your doctor if you also use any of the following:  Carbamazepine, digoxin, phenytoin, indinavir, ritonavir, buspirone, fentanyl, lithium, tryptophan, Shawn's wort, tramadol  Medicine to treat a fungal infection (such as itraconazole, ketoconazole), a diuretic (water pill), blood pressure medicine, an NSAID pain or arthritis medicine (such as aspirin, celecoxib, diclofenac, ibuprofen, naproxen), a blood thinner (such as warfarin), other medicine for depression, or triptan medicine to treat migraine headaches  Do not drink alcohol while you are using this medicine  Tell your doctor if you use anything else that makes you sleepy  Some examples are allergy medicine, narcotic pain medicine, and alcohol  Warnings While Using This Medicine:   Tell your doctor if you are pregnant or breastfeeding, or if you have kidney disease, liver disease, bleeding problems, glaucoma, heart disease, heart rhythm problems, or low blood pressure  Tell your doctor if you recently had a heart attack  For some children, teenagers, and young adults, this medicine may increase mental or emotional problems  This may lead to thoughts of suicide and violence  Talk with your doctor right away if you have any thoughts or behavior changes that concern you  Tell your doctor if you or anyone in your family has a history of bipolar disorder or suicide attempts  This medicine may cause the following problems:  Serotonin syndrome (more likely when used with certain other medicines)  Heart rhythm problems (QT prolongation)  Low sodium levels  Higher risk of bleeding  Do not stop using this medicine suddenly  Your doctor will need to slowly decrease your dose before you stop it completely  This medicine may make you dizzy or drowsy  Do not drive or do anything that could be dangerous until you know how this medicine affects you  Stand or sit up slowly if you are dizzy  Tell any doctor or dentist who treats you that you are using this medicine  You may need to stop using this medicine several days before you have surgery or medical tests  Your doctor will check your progress and the effects of this medicine at regular visits  Keep all appointments  Keep all medicine out of the reach of children  Never share your medicine with anyone    Possible Side Effects While Using This Medicine:   Call your doctor right away if you notice any of these side effects: Allergic reaction: Itching or hives, swelling in your face or hands, swelling or tingling in your mouth or throat, chest tightness, trouble breathing  Anxiety, restlessness, fever, sweating, muscle spasms, nausea, vomiting, diarrhea, seeing or hearing things that are not there  Confusion, weakness, muscle twitching  Fast, pounding, or uneven heartbeat  Lightheadedness, dizziness, fainting  Painful, prolonged erection of your penis  Sudden increase in energy, feeling irritable, trouble sleeping  Thoughts of hurting yourself or others, unusual behavior  Unusual bleeding or bruising  If you notice these less serious side effects, talk with your doctor:   Constipation, mild nausea  Dry mouth  Eye pain, vision changes, seeing halos around lights  Headache  Sleepiness or unusual drowsiness  If you notice other side effects that you think are caused by this medicine, tell your doctor  Call your doctor for medical advice about side effects  You may report side effects to FDA at 8-550-FDA-9443  © 2017 [address]  Information is for End User's use only and may not be sold, redistributed or otherwise used for commercial purposes  The above information is an  only  It is not intended as medical advice for individual conditions or treatments  Talk to your doctor, nurse or pharmacist before following any medical regimen to see if it is safe and effective for you  10% - bad control"> 10% - bad control,Hemoglobin A1c (HbA1c) greater than 10% indicating poor diabetic control,Haemoglobin A1c greater than 10% indicating poor diabetic control">   Diabetes Mellitus Type 2 in Adults, Ambulatory Care   GENERAL INFORMATION:   Diabetes mellitus type 2  is a disease that affects how your body uses glucose (sugar)  Insulin helps move sugar out of the blood so it can be used for energy  Normally, when the blood sugar level increases, the pancreas makes more insulin   Type 2 diabetes develops because either the body cannot make enough insulin, or it cannot use the insulin correctly  After many years, your pancreas may stop making insulin  Common symptoms include the following:   · More hunger or thirst than usual     · Frequent urination     · Weight loss without trying     · Blurred vision  Seek immediate care for the following symptoms:   · Severe abdominal pain, or pain that spreads to your back  You may also be vomiting  · Trouble staying awake or focusing    · Shaking or sweating    · Blurred or double vision    · Breath has a fruity, sweet smell    · Breathing is deep and labored, or rapid and shallow    · Heartbeat is fast and weak  Treatment for diabetes mellitus type 2  includes keeping your blood sugar at a normal level  You must eat the right foods, and exercise regularly  You may also need medicine if you cannot control your blood sugar level with nutrition and exercise  Manage diabetes mellitus type 2:   · Check your blood sugar level  You will be taught how to check a small drop of blood in a glucose monitor  Ask your healthcare provider when and how often to check during the day  Ask your healthcare provider what your blood sugar levels should be when you check them  · Keep track of carbohydrates (sugar and starchy foods)  Your blood sugar level can get too high if you eat too many carbohydrates  Your dietitian will help you plan meals and snacks that have the right amount of carbohydrates  · Eat low-fat foods  Some examples are skinless chicken and low-fat milk  · Eat less sodium (salt)  Some examples of high-sodium foods to limit are soy sauce, potato chips, and soup  Do not add salt to food you cook  Limit your use of table salt  · Eat high-fiber foods  Foods that are a good source of fiber include vegetables, whole grain bread, and beans  · Limit alcohol  Alcohol affects your blood sugar level and can make it harder to manage your diabetes   Women should limit alcohol to 1 drink a day  Men should limit alcohol to 2 drinks a day  A drink of alcohol is 12 ounces of beer, 5 ounces of wine, or 1½ ounces of liquor  · Get regular exercise  Exercise can help keep your blood sugar level steady, decrease your risk of heart disease, and help you lose weight  Exercise for at least 30 minutes, 5 days a week  Include muscle strengthening activities 2 days each week  Work with your healthcare provider to create an exercise plan  · Check your feet each day  for injuries or open sores  Ask your healthcare provider for activities you can do if you have an open sore  · Quit smoking  If you smoke, it is never too late to quit  Smoking can worsen the problems that may occur with diabetes  Ask your healthcare provider for information about how to stop smoking if you are having trouble quitting  · Ask about your weight:  Ask healthcare providers if you need to lose weight, and how much to lose  Ask them to help you with a weight loss program  Even a 10 to 15 pound weight loss can help you manage your blood sugar level  · Carry medical alert identification  Wear medical alert jewelry or carry a card that says you have diabetes  Ask your healthcare provider where to get these items  · Ask about vaccines  Diabetes puts you at risk of serious illness if you get the flu, pneumonia, or hepatitis  Ask your healthcare provider if you should get a flu, pneumonia, or hepatitis B vaccine, and when to get the vaccine  Follow up with your healthcare provider as directed:  Write down your questions so you remember to ask them during your visits  CARE AGREEMENT:   You have the right to help plan your care  Learn about your health condition and how it may be treated  Discuss treatment options with your caregivers to decide what care you want to receive  You always have the right to refuse treatment  The above information is an  only   It is not intended as medical advice for individual conditions or treatments  Talk to your doctor, nurse or pharmacist before following any medical regimen to see if it is safe and effective for you  © 2014 [address] is for End User's use only and may not be sold, redistributed or otherwise used for commercial purposes  All illustrations and images included in CareNotes® are the copyrighted property of [employer]  or [address]

## 2017-12-11 NOTE — PROGRESS NOTES
Patient has been seclusive to his room as of this time  Reported to RN that he is "catching up on his sleep before going back to his group home"  Compliant with medications, no agitation and denies AH at this time  Will continue to monitor

## 2017-12-25 NOTE — DISCHARGE SUMMARY
Discharge Summary - [facility] 40 y o  male MRN: [id number]  Unit/Bed#: UB0 269-33 Encounter: [id number]     Admission Date: 12/1/2017         Discharge Date: 12/11/2017      Attending Psychiatrist: KERWIN Rice     Reason for Admission/HPI:     The patient was intense the and long history of psychiatric disorder unit group home, and diagnosed with history of schizoaffective disorder was admitted because of the increased agitation and anger and inability to function his group home  His symptoms on admission was poor insight and judgment aggressive tendencies history of the aggression and physical and verbal, inability to take care of himself, unstable mood and affect  The patient had history of prior psychiatric hospitalization to inpatient psych unit  Hospital Course:     Mr Parvez Steven was admitted and all appropriate precautions were started  Pt was oriented to the unit  His treatment, including medication management and therapy was discussed with the patient  His condition was the consistent was his to the angry disorganized psychotic state, the patient had frequent agitations was physical of expression of his anger by flipping furniture, and a physical aggression toward staff but not patients  P r n  medications were provided the patient was also provided with physical restraints for his own safety and safety of other people  The medication management was increase of Thorazine to 100 mg twice a day and 300 at bedtime and continuation of Depakote 500 mg in morning and 1000 at bedtime, was the most recent level was 53, therapeutic, lead to slowed resolution of his acute psychotic and aggressive symptoms  The patient became more calm and polite  No longer was aggressive toward staff, no longer was agitated and needed to be in restraints or provided p r n  medications to deal with his unprovoked anger or agitation    His mother was contacted regarding his current medication management and she expressed her understanding  Pt  sleep improved  Tolerated medications without side effects  Vitals were stable  Denied any SI or HI  At the time of discharge Mr Jeremy Pal  was tolerating psychiatric medications  His physical medications including metformin for diabetes, prescribed by endocrinologist after his hemoglobin A1c was found to be abnormally high, his creatinine was 1 33 which present the patient baseline according to Internal Medicine consult  Synthroid for hypothyroidism vitamin-D for vitamin-D deficiency, and antihistaminic to treat his allergy and medications as needed to treat his chronic pain were provided as well  Please see After Discharge Summary for a completed list of discharge medications  Safety plan was discussed and approved by the patient  He was not manic, depressed, angry, or confused or psychotic on a day of discharge and was accountable for his actions  I discussed outpatient follow up with the patient, which was arranged by the unit  upon discharge  Reviewed with the patient importance of compliance with medications and outpatient treatment after discharge  The patient was competent to understand of risks and benefits of his actions on the day of discharge, he was happy to return back to his group home were he will be observed consistently       Mental Status at time of Discharge:     Mental Status Evaluation:    Appearance:  casually dressed   Behavior:  cooperative   Mood:  improved   Affect: less labile, less constricted    Speech:  slow   Language: appropriate   Thought Process:  concrete   Associations: concrete associations   Thought Content:  normal, poverty of thought   Perceptual Disturbances: no auditory hallucinations, no visual hallucinations, denies auditory hallucinations when asked, does not appear responding to internal stimuli   Risk Potential: Suicidal ideation - None  Homicidal ideation - None  Potential for aggression - No longer agitated   Sensorium:  oriented to person, place and time   Memory:  recent and remote memory grossly intact   Consciousness:  alert and awake   Attention: attention span and concentration are normal   Intellect: not examined   Fund of Knowledge: awareness of current events appropriate   Insight:  normal   Judgment: improved   Muscle Tone: normal   Gait/Station: normal gait/station and normal balance   Motor Activity: no abnormal movements         Discharge Diagnosis:   Patient Active Problem List   Diagnosis    Agitation    Schizoaffective disorder, bipolar type [facility]    Mild intellectual disability    Obsessive compulsive disorder    Nuclear sclerotic cataract of left eye    Nuclear sclerotic cataract of right eye    CKD (chronic kidney disease) stage 2, GFR 60-89 ml/min    Type 2 diabetes mellitus with hyperglycemia ([facility] [address] )    Hyperlipidemia       Discharge Medications:  See after visit summary for reconciled discharge medications provided to patient and family  Discharge instructions/Information to patient and family:   See after visit summary for information provided to patient and family  Provisions for Follow-Up Care:  See after visit summary for information related to follow-up care and any pertinent home health orders  Discharge Statement   I spent 46 minutes discharging the patient  This time was spent on the day of discharge  I had direct contact with the patient on the day of discharge  Additional documentation is required if more than 30 minutes were spent on discharge  Portions of the record may have been created with voice recognition software  Occasional wrong word or "sound a like" substitutions may have occurred due to the inherent limitations of voice recognition software  Read the chart carefully and recognize, using context, where substitutions have occurred  There may be translation, syntax,  or grammatical errors   If you have any questions, please contact the dictating provider

## 2018-01-11 NOTE — MISCELLANEOUS
To whom it may conern: This patient may discontinue us of Patanol eye drops      Sincerely         [place]      Electronically signed by:Serenity Murillo   Jun 8 2017  3:17PM EST

## 2018-01-12 NOTE — MISCELLANEOUS
Message  I called Riccardo Price, medical director for this patient, at [phone] but was unable to reach her  Left a message asking to call back the office if any questions/concerns  Medication list is updated        Signatures   Electronically signed by : Lydia Louise DO; Nov 27 2017  8:37AM EST                       (Author)

## 2018-01-14 VITALS
HEIGHT: 68 IN | HEART RATE: 80 BPM | WEIGHT: 228.38 LBS | SYSTOLIC BLOOD PRESSURE: 118 MMHG | DIASTOLIC BLOOD PRESSURE: 72 MMHG | BODY MASS INDEX: 34.61 KG/M2

## 2018-01-14 VITALS
HEIGHT: 68 IN | SYSTOLIC BLOOD PRESSURE: 122 MMHG | WEIGHT: 234.5 LBS | RESPIRATION RATE: 12 BRPM | DIASTOLIC BLOOD PRESSURE: 82 MMHG | HEART RATE: 76 BPM | BODY MASS INDEX: 35.54 KG/M2

## 2018-01-15 VITALS
TEMPERATURE: 98.1 F | HEIGHT: 68 IN | WEIGHT: 210.5 LBS | DIASTOLIC BLOOD PRESSURE: 72 MMHG | HEART RATE: 89 BPM | SYSTOLIC BLOOD PRESSURE: 118 MMHG | OXYGEN SATURATION: 99 % | RESPIRATION RATE: 14 BRPM | BODY MASS INDEX: 31.9 KG/M2

## 2018-01-15 VITALS
DIASTOLIC BLOOD PRESSURE: 76 MMHG | HEART RATE: 64 BPM | SYSTOLIC BLOOD PRESSURE: 120 MMHG | WEIGHT: 235.5 LBS | BODY MASS INDEX: 35.69 KG/M2 | RESPIRATION RATE: 16 BRPM | TEMPERATURE: 96.9 F | HEIGHT: 68 IN

## 2018-01-16 NOTE — MISCELLANEOUS
Message  Informed the patient's care coordinator that his vitamin D should be reduced from 5000u daily to 1000u daily        Plan  Vitamin D deficiency    · From  Vitamin D3 5000 UNIT Oral Capsule take 1 capsule daily To Vitamin D3  1000 UNIT Oral Capsule TAKE 1 CAPSULE Daily    Signatures   Electronically signed by : Charito Mendoza DO; Aug 14 2017  5:15PM EST                       (Author)

## 2018-01-22 VITALS
SYSTOLIC BLOOD PRESSURE: 122 MMHG | HEIGHT: 68 IN | WEIGHT: 243.38 LBS | BODY MASS INDEX: 36.89 KG/M2 | RESPIRATION RATE: 16 BRPM | DIASTOLIC BLOOD PRESSURE: 78 MMHG | HEART RATE: 66 BPM

## 2018-01-30 DIAGNOSIS — E56.0 VITAMIN E DEFICIENCY: ICD-10-CM

## 2018-01-30 RX ORDER — VITAMIN E 268 MG
CAPSULE ORAL
Qty: 28 CAPSULE | Refills: 0 | Status: SHIPPED | OUTPATIENT
Start: 2018-01-30 | End: 2018-12-21 | Stop reason: SDUPTHER

## 2018-02-09 ENCOUNTER — HOSPITAL ENCOUNTER (INPATIENT)
Facility: HOSPITAL | Age: 38
LOS: 6 days | Discharge: HOME | DRG: 885 | End: 2018-02-15
Attending: EMERGENCY MEDICINE | Admitting: PSYCHIATRY & NEUROLOGY
Payer: COMMERCIAL

## 2018-02-09 DIAGNOSIS — R45.6 VIOLENT BEHAVIOR: ICD-10-CM

## 2018-02-09 DIAGNOSIS — R45.851 SUICIDAL IDEATION: ICD-10-CM

## 2018-02-09 DIAGNOSIS — E03.9 HYPOTHYROIDISM, UNSPECIFIED TYPE: ICD-10-CM

## 2018-02-09 DIAGNOSIS — E55.9 VITAMIN D DEFICIENCY: ICD-10-CM

## 2018-02-09 DIAGNOSIS — E11.65 TYPE 2 DIABETES MELLITUS WITH HYPERGLYCEMIA, UNSPECIFIED LONG TERM INSULIN USE STATUS: ICD-10-CM

## 2018-02-09 DIAGNOSIS — K59.00 CONSTIPATION, UNSPECIFIED CONSTIPATION TYPE: ICD-10-CM

## 2018-02-09 DIAGNOSIS — E78.2 MIXED HYPERLIPIDEMIA: ICD-10-CM

## 2018-02-09 DIAGNOSIS — E83.51 HYPOCALCEMIA: ICD-10-CM

## 2018-02-09 DIAGNOSIS — F25.0 SCHIZOAFFECTIVE DISORDER, BIPOLAR TYPE (HCC): Primary | Chronic | ICD-10-CM

## 2018-02-09 DIAGNOSIS — G47.00 INSOMNIA, UNSPECIFIED TYPE: ICD-10-CM

## 2018-02-09 DIAGNOSIS — N18.2 CKD (CHRONIC KIDNEY DISEASE) STAGE 2, GFR 60-89 ML/MIN: ICD-10-CM

## 2018-02-09 DIAGNOSIS — T78.40XD ALLERGY, SUBSEQUENT ENCOUNTER: ICD-10-CM

## 2018-02-09 LAB
AMPHETAMINES SERPL QL SCN: NEGATIVE
BARBITURATES UR QL: NEGATIVE
BENZODIAZ UR QL: NEGATIVE
COCAINE UR QL: NEGATIVE
ETHANOL SERPL-MCNC: <3 MG/DL (ref 0–3)
GLUCOSE SERPL-MCNC: 94 MG/DL (ref 65–140)
METHADONE UR QL: NEGATIVE
OPIATES UR QL SCN: NEGATIVE
PCP UR QL: NEGATIVE
THC UR QL: NEGATIVE
VALPROATE SERPL-MCNC: 68 UG/ML (ref 50–100)

## 2018-02-09 PROCEDURE — 99285 EMERGENCY DEPT VISIT HI MDM: CPT

## 2018-02-09 PROCEDURE — 80307 DRUG TEST PRSMV CHEM ANLYZR: CPT | Performed by: EMERGENCY MEDICINE

## 2018-02-09 PROCEDURE — 82948 REAGENT STRIP/BLOOD GLUCOSE: CPT

## 2018-02-09 PROCEDURE — 80320 DRUG SCREEN QUANTALCOHOLS: CPT | Performed by: EMERGENCY MEDICINE

## 2018-02-09 PROCEDURE — 80164 ASSAY DIPROPYLACETIC ACD TOT: CPT | Performed by: EMERGENCY MEDICINE

## 2018-02-09 PROCEDURE — 36415 COLL VENOUS BLD VENIPUNCTURE: CPT | Performed by: EMERGENCY MEDICINE

## 2018-02-09 RX ORDER — TRAZODONE HYDROCHLORIDE 50 MG/1
50 TABLET ORAL
Status: DISCONTINUED | OUTPATIENT
Start: 2018-02-09 | End: 2018-02-15 | Stop reason: HOSPADM

## 2018-02-09 RX ORDER — LORATADINE 10 MG/1
10 TABLET ORAL DAILY
Status: DISCONTINUED | OUTPATIENT
Start: 2018-02-10 | End: 2018-02-15 | Stop reason: HOSPADM

## 2018-02-09 RX ORDER — BENZTROPINE MESYLATE 1 MG/1
1 TABLET ORAL EVERY 6 HOURS PRN
Status: DISCONTINUED | OUTPATIENT
Start: 2018-02-09 | End: 2018-02-15 | Stop reason: HOSPADM

## 2018-02-09 RX ORDER — MELATONIN
1000 DAILY
Status: DISCONTINUED | OUTPATIENT
Start: 2018-02-10 | End: 2018-02-15 | Stop reason: HOSPADM

## 2018-02-09 RX ORDER — LEVOTHYROXINE SODIUM 0.03 MG/1
25 TABLET ORAL
Status: DISCONTINUED | OUTPATIENT
Start: 2018-02-10 | End: 2018-02-15 | Stop reason: HOSPADM

## 2018-02-09 RX ORDER — DOCUSATE SODIUM 100 MG/1
100 CAPSULE, LIQUID FILLED ORAL 2 TIMES DAILY
Status: DISCONTINUED | OUTPATIENT
Start: 2018-02-09 | End: 2018-02-15 | Stop reason: HOSPADM

## 2018-02-09 RX ORDER — LORAZEPAM 0.5 MG/1
1 TABLET ORAL ONCE
Status: COMPLETED | OUTPATIENT
Start: 2018-02-09 | End: 2018-02-09

## 2018-02-09 RX ORDER — HALOPERIDOL 5 MG
5 TABLET ORAL EVERY 6 HOURS PRN
Status: DISCONTINUED | OUTPATIENT
Start: 2018-02-09 | End: 2018-02-15 | Stop reason: HOSPADM

## 2018-02-09 RX ORDER — MAGNESIUM HYDROXIDE/ALUMINUM HYDROXICE/SIMETHICONE 120; 1200; 1200 MG/30ML; MG/30ML; MG/30ML
30 SUSPENSION ORAL EVERY 4 HOURS PRN
Status: DISCONTINUED | OUTPATIENT
Start: 2018-02-09 | End: 2018-02-15 | Stop reason: HOSPADM

## 2018-02-09 RX ORDER — BENZTROPINE MESYLATE 1 MG/ML
1 INJECTION INTRAMUSCULAR; INTRAVENOUS EVERY 6 HOURS PRN
Status: DISCONTINUED | OUTPATIENT
Start: 2018-02-09 | End: 2018-02-15 | Stop reason: HOSPADM

## 2018-02-09 RX ORDER — ATORVASTATIN CALCIUM 10 MG/1
10 TABLET, FILM COATED ORAL
Status: DISCONTINUED | OUTPATIENT
Start: 2018-02-10 | End: 2018-02-15 | Stop reason: HOSPADM

## 2018-02-09 RX ORDER — HALOPERIDOL 5 MG/ML
5 INJECTION INTRAMUSCULAR EVERY 6 HOURS PRN
Status: DISCONTINUED | OUTPATIENT
Start: 2018-02-09 | End: 2018-02-15 | Stop reason: HOSPADM

## 2018-02-09 RX ADMIN — HALOPERIDOL 5 MG: 5 TABLET ORAL at 22:40

## 2018-02-09 RX ADMIN — LORAZEPAM 1 MG: 0.5 TABLET ORAL at 17:19

## 2018-02-09 NOTE — ED NOTES
CW called the pt primary insurance [employer] at [phone] and spoke to  Rose Rolon  She stated that he pt is still active  The pre-cert can not complete until their is an accepting facility, Doctor and their UR contact information  She also stated that  [facility] facilities for this pt is [place] , [facility], [facility], and [facility]  CW called [facility] and they don't have any beds available at this time   The CW sent referral to [facility] for consideration

## 2018-02-09 NOTE — ED ATTENDING ATTESTATION
Gurpreet Díaz DO, saw and evaluated the patient  I have discussed the patient with the resident/non-physician practitioner and agree with the resident's/non-physician practitioner's findings, Plan of Care, and MDM as documented in the resident's/non-physician practitioner's note, except where noted  All available labs and Radiology studies were reviewed  At this point I agree with the current assessment done in the Emergency Department  I have conducted an independent evaluation of this patient a history and physical is as follows:    39 yo male presents for evaluation of agitation while at group home  Pt has hx of recurrent episodes of agitation due to hx of psych illness, autism  Upon arrival pt is calm and cooperative  Symptoms generalized in nature, no  A/e factors  Moderate severity  Currently resolved  Pt denies HI, AH/VH  Does admit to SI but no plan at this time  Imp: agitation - resolved  SI without obvious plan  plan: BAT, UDS, crisis      Critical Care Time  CritCare Time    Procedures

## 2018-02-09 NOTE — ED NOTES
Patient is accepted at [facility] by [facility] per Charge Nurse Jeane Evans  Patient is to be transferred to the unit at 700pm by Security  Nurse report is to be called to [phone] to patient transfer
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
## 2018-02-10 LAB
GLUCOSE SERPL-MCNC: 103 MG/DL (ref 65–140)
GLUCOSE SERPL-MCNC: 113 MG/DL (ref 65–140)
GLUCOSE SERPL-MCNC: 142 MG/DL (ref 65–140)
GLUCOSE SERPL-MCNC: 87 MG/DL (ref 65–140)

## 2018-02-10 PROCEDURE — 99232 SBSQ HOSP IP/OBS MODERATE 35: CPT | Performed by: PHYSICIAN ASSISTANT

## 2018-02-10 PROCEDURE — 82948 REAGENT STRIP/BLOOD GLUCOSE: CPT

## 2018-02-10 PROCEDURE — 99221 1ST HOSP IP/OBS SF/LOW 40: CPT | Performed by: PSYCHIATRY & NEUROLOGY

## 2018-02-10 RX ORDER — TRAMADOL HYDROCHLORIDE 50 MG/1
50 TABLET ORAL EVERY 6 HOURS PRN
Status: DISCONTINUED | OUTPATIENT
Start: 2018-02-10 | End: 2018-02-15 | Stop reason: HOSPADM

## 2018-02-10 RX ORDER — OLANZAPINE 5 MG/1
5 TABLET ORAL EVERY 6 HOURS PRN
Status: DISCONTINUED | OUTPATIENT
Start: 2018-02-10 | End: 2018-02-15 | Stop reason: HOSPADM

## 2018-02-10 RX ORDER — ACETAMINOPHEN 325 MG/1
650 TABLET ORAL EVERY 4 HOURS PRN
Status: DISCONTINUED | OUTPATIENT
Start: 2018-02-10 | End: 2018-02-15 | Stop reason: HOSPADM

## 2018-02-10 RX ORDER — ACETAMINOPHEN 325 MG/1
650 TABLET ORAL EVERY 6 HOURS PRN
Status: DISCONTINUED | OUTPATIENT
Start: 2018-02-10 | End: 2018-02-15 | Stop reason: HOSPADM

## 2018-02-10 RX ORDER — CHLORPROMAZINE HYDROCHLORIDE 100 MG/1
300 TABLET, FILM COATED ORAL
Status: DISCONTINUED | OUTPATIENT
Start: 2018-02-10 | End: 2018-02-15 | Stop reason: HOSPADM

## 2018-02-10 RX ORDER — OLANZAPINE 10 MG/1
10 INJECTION, POWDER, LYOPHILIZED, FOR SOLUTION INTRAMUSCULAR EVERY 6 HOURS PRN
Status: DISCONTINUED | OUTPATIENT
Start: 2018-02-10 | End: 2018-02-15 | Stop reason: HOSPADM

## 2018-02-10 RX ADMIN — LEVOTHYROXINE SODIUM 25 MCG: 25 TABLET ORAL at 05:57

## 2018-02-10 RX ADMIN — HALOPERIDOL 5 MG: 5 TABLET ORAL at 03:22

## 2018-02-10 RX ADMIN — METFORMIN HYDROCHLORIDE 500 MG: 500 TABLET, FILM COATED ORAL at 17:43

## 2018-02-10 RX ADMIN — BENZTROPINE MESYLATE 1 MG: 1 TABLET ORAL at 17:41

## 2018-02-10 RX ADMIN — BENZTROPINE MESYLATE 1 MG: 1 TABLET ORAL at 08:37

## 2018-02-10 RX ADMIN — HALOPERIDOL 5 MG: 5 TABLET ORAL at 08:37

## 2018-02-10 RX ADMIN — METFORMIN HYDROCHLORIDE 500 MG: 500 TABLET, FILM COATED ORAL at 08:16

## 2018-02-10 RX ADMIN — DOCUSATE SODIUM 100 MG: 100 CAPSULE, LIQUID FILLED ORAL at 17:42

## 2018-02-10 RX ADMIN — LORATADINE 10 MG: 10 TABLET ORAL at 08:16

## 2018-02-10 RX ADMIN — ATORVASTATIN CALCIUM 10 MG: 10 TABLET, FILM COATED ORAL at 17:42

## 2018-02-10 RX ADMIN — HALOPERIDOL 5 MG: 5 TABLET ORAL at 17:41

## 2018-02-10 RX ADMIN — DOCUSATE SODIUM 100 MG: 100 CAPSULE, LIQUID FILLED ORAL at 08:16

## 2018-02-10 NOTE — PROGRESS NOTES
Tarah Velarde from [employer] called for authorization 2/9/2018 - 2/11/2018  Review date will be opal Banks 2/11/2018 (phone # [phone])  Ayden Hernandez is [facility] coordination point of contact if needs any assistance after D/C

## 2018-02-10 NOTE — PROGRESS NOTES
Patient mainly seclusive to his room  Speech pressured  States that he thinks this is going to be a "longer admission because he really needs the help"  Patient denies SI as well as AH but does report that he is "depressed"  Compliant with medications  No agitation or aggressive behavior as of this time  Will monitor

## 2018-02-10 NOTE — PLAN OF CARE
Problem: Potential for Falls  Goal: Patient will remain free of falls  INTERVENTIONS:  - Assess patient frequently for physical needs  -  Identify cognitive and physical deficits and behaviors that affect risk of falls    -  [place] fall precautions as indicated by assessment   - Educate patient/family on patient safety including physical limitations  - Instruct patient to call for assistance with activity based on assessment  - Modify environment to reduce risk of injury  - Consider OT/PT consult to assist with strengthening/mobility   Outcome: Progressing      Problem: SELF HARM/SUICIDALITY  Goal: Will have no self-injury during hospital stay  INTERVENTIONS:  - Q 15 MINUTES: Routine safety checks  - Q WAKING SHIFT & PRN: Assess risk to determine if routine checks are adequate to maintain patient safety  - Encourage patient to participate actively in care by formulating a plan to combat response to suicidal ideation, identify supports and resources   Outcome: Progressing      Problem: PSYCHOSIS  Goal: Will report no hallucinations or delusions  Interventions:  - Administer medication as  ordered  - Every waking shifts and PRN assess for the presence of hallucinations and or delusions  - Assist with reality testing to support increasing orientation  - Assess if patient's hallucinations or delusions are encouraging self-harm or harm to others and intervene as appropriate   Outcome: Progressing      Problem: SAFETY ADULT  Goal: Maintain or return to baseline ADL function  INTERVENTIONS:  -  Assess patient's ability to carry out ADLs; assess patient's baseline for ADL function and identify physical deficits which impact ability to perform ADLs (bathing, care of mouth/teeth, toileting, grooming, dressing, etc )  - Assess/evaluate cause of self-care deficits   - Assess range of motion  - Assess patient's mobility; develop plan if impaired  - Assess patient's need for assistive devices and provide as appropriate  - Encourage maximum independence but intervene and supervise when necessary  ¯ Involve family in performance of ADLs  ¯ Assess for home care needs following discharge   ¯ Request OT consult to assist with ADL evaluation and planning for discharge  ¯ Provide patient education as appropriate   Outcome: Progressing    Goal: Maintain or return mobility status to optimal level  INTERVENTIONS:  - Assess patient's baseline mobility status (ambulation, transfers, stairs, etc )    - Identify cognitive and physical deficits and behaviors that affect mobility  - Identify mobility aids required to assist with transfers and/or ambulation (gait belt, sit-to-stand, lift, walker, cane, etc )  - [place] fall precautions as indicated by assessment  - Record patient progress and toleration of activity level on Mobility SBAR; progress patient to next Phase/Stage  - Instruct patient to call for assistance with activity based on assessment  - Request Rehabilitation consult to assist with strengthening/weightbearing, etc    Outcome: Progressing      Problem: DISCHARGE PLANNING  Goal: Discharge to home or other facility with appropriate resources  INTERVENTIONS:  - Identify barriers to discharge w/patient and caregiver  - Arrange for needed discharge resources and transportation as appropriate  - Identify discharge learning needs (meds, wound care, etc )  - Arrange for interpretive services to assist at discharge as needed  - Refer to Case Management Department for coordinating discharge planning if the patient needs post-hospital services based on physician/advanced practitioner order or complex needs related to functional status, cognitive ability, or social support system   Outcome: Progressing

## 2018-02-10 NOTE — ED NOTES
CW called the pt primary insurance [facility] at [phone]  CW spoke to  Juliann Humphreys stated that the clinicals need to be faxed to their behavioral health department at [phone] and they will contact the UR  from the admitting facility to review clinicals  They will give the AUTH# at that time  The case Ref#[id number]  CW faxed [employer] the pt clinicals      The CW completed COB with Yasmin Mercado

## 2018-02-10 NOTE — TREATMENT PLAN
TREATMENT PLAN REVIEW - [address] Mcgee 40 y o  1980 male MRN: [id number]    [address] [place] [address] Room / Bed: [place] [address] Encounter: [id number]          Admit Date/Time:  2/9/2018  3:03 PM    Treatment Team: Attending Provider: Margot Garcia MD; Consulting Physician: Braden Morley DO; Patient Care Technician: Srinivasa Chu; Registered Nurse: King Galo RN; Patient Care Assistant: Mary Beth Holder;  Patient Care Technician: Nish Joseph; Patient Care Technician: Chelita Garcia; Patient Care Assistant: Rd James; Patient Care Technician: Dale Landaverde    Diagnosis: Principal Problem:    Schizoaffective disorder, bipolar type ([facility] )  Active Problems:    Mild intellectual disability    Obsessive compulsive disorder      Patient Strengths/Assets: negotiates basic needs    Patient Barriers/Limitations: difficulty adapting, impaired cognition, poor insight    Short Term Goals: decrease in depressive symptoms, decrease in anxiety symptoms, decrease in level of agitation, ability to stay safe on the unit    Long Term Goals: resolution of manic symptoms, stabilization of mood, improved insight    Progress Towards Goals: starting psychiatric medications as prescribed, attends groups more often, less agitated    Recommended Treatment: medication management, patient medication education, group therapy, milieu therapy, continued Behavioral Health psychiatric evaluation/assessment process    Treatment Frequency: daily medication monitoring, group and milieu therapy daily, monitoring through interdisciplinary rounds, monitoring through weekly patient care conferences    Expected Discharge Date:  2/21/18    Discharge Plan: placement in group home    Treatment Plan Created/Updated By: Mason Cochran MD
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
## 2018-02-10 NOTE — PLAN OF CARE
Problem: Potential for Falls  Goal: Patient will remain free of falls  INTERVENTIONS:  - Assess patient frequently for physical needs  -  Identify cognitive and physical deficits and behaviors that affect risk of falls    -  Continental fall precautions as indicated by assessment   - Educate patient/family on patient safety including physical limitations  - Instruct patient to call for assistance with activity based on assessment  - Modify environment to reduce risk of injury  - Consider OT/PT consult to assist with strengthening/mobility   Outcome: Progressing      Problem: SELF HARM/SUICIDALITY  Goal: Will have no self-injury during hospital stay  INTERVENTIONS:  - Q 15 MINUTES: Routine safety checks  - Q WAKING SHIFT & PRN: Assess risk to determine if routine checks are adequate to maintain patient safety  - Encourage patient to participate actively in care by formulating a plan to combat response to suicidal ideation, identify supports and resources   Outcome: Progressing      Problem: PSYCHOSIS  Goal: Will report no hallucinations or delusions  Interventions:  - Administer medication as  ordered  - Every waking shifts and PRN assess for the presence of hallucinations and or delusions  - Assist with reality testing to support increasing orientation  - Assess if patient's hallucinations or delusions are encouraging self-harm or harm to others and intervene as appropriate   Outcome: Progressing      Problem: SAFETY ADULT  Goal: Maintain or return to baseline ADL function  INTERVENTIONS:  -  Assess patient's ability to carry out ADLs; assess patient's baseline for ADL function and identify physical deficits which impact ability to perform ADLs (bathing, care of mouth/teeth, toileting, grooming, dressing, etc )  - Assess/evaluate cause of self-care deficits   - Assess range of motion  - Assess patient's mobility; develop plan if impaired  - Assess patient's need for assistive devices and provide as appropriate  - Encourage maximum independence but intervene and supervise when necessary  ¯ Involve family in performance of ADLs  ¯ Assess for home care needs following discharge   ¯ Request OT consult to assist with ADL evaluation and planning for discharge  ¯ Provide patient education as appropriate   Outcome: Progressing    Goal: Maintain or return mobility status to optimal level  INTERVENTIONS:  - Assess patient's baseline mobility status (ambulation, transfers, stairs, etc )    - Identify cognitive and physical deficits and behaviors that affect mobility  - Identify mobility aids required to assist with transfers and/or ambulation (gait belt, sit-to-stand, lift, walker, cane, etc )  - N[place]bit fall precautions as indicated by assessment  - Record patient progress and toleration of activity level on Mobility SBAR; progress patient to next Phase/Stage  - Instruct patient to call for assistance with activity based on assessment  - Request Rehabilitation consult to assist with strengthening/weightbearing, etc    Outcome: Progressing      Problem: DISCHARGE PLANNING  Goal: Discharge to home or other facility with appropriate resources  INTERVENTIONS:  - Identify barriers to discharge w/patient and caregiver  - Arrange for needed discharge resources and transportation as appropriate  - Identify discharge learning needs (meds, wound care, etc )  - Arrange for interpretive services to assist at discharge as needed  - Refer to Case Management Department for coordinating discharge planning if the patient needs post-hospital services based on physician/advanced practitioner order or complex needs related to functional status, cognitive ability, or social support system   Outcome: Progressing

## 2018-02-10 NOTE — PLAN OF CARE
DISCHARGE PLANNING     Discharge to home or other facility with appropriate resources Not Progressing        Potential for Falls     Patient will remain free of falls Not Progressing        PSYCHOSIS     Will report no hallucinations or delusions Not Progressing        SAFETY ADULT     Maintain or return to baseline ADL function Not Progressing     Maintain or return mobility status to optimal level Not Progressing        SELF HARM/SUICIDALITY     Will have no self-injury during hospital stay Not Progressing
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
## 2018-02-10 NOTE — PLAN OF CARE
DISCHARGE PLANNING     Discharge to home or other facility with appropriate resources Progressing        Potential for Falls     Patient will remain free of falls Progressing        PSYCHOSIS     Will report no hallucinations or delusions Progressing        SAFETY ADULT     Maintain or return to baseline ADL function Progressing     Maintain or return mobility status to optimal level Progressing        SELF HARM/SUICIDALITY     Will have no self-injury during hospital stay Progressing

## 2018-02-10 NOTE — PROGRESS NOTES
Patient woke up and was very agitated, punching the wall and hitting his face  Security made rounds and patient stated he was "shaking and crawling all over" but he was willing to take po Haldol  Now laying down to rest Will reassess

## 2018-02-11 LAB
GLUCOSE SERPL-MCNC: 126 MG/DL (ref 65–140)
GLUCOSE SERPL-MCNC: 131 MG/DL (ref 65–140)

## 2018-02-11 PROCEDURE — 82948 REAGENT STRIP/BLOOD GLUCOSE: CPT

## 2018-02-11 PROCEDURE — 99232 SBSQ HOSP IP/OBS MODERATE 35: CPT | Performed by: PSYCHIATRY & NEUROLOGY

## 2018-02-11 RX ORDER — LORAZEPAM 1 MG/1
1 TABLET ORAL EVERY 8 HOURS PRN
Status: DISCONTINUED | OUTPATIENT
Start: 2018-02-11 | End: 2018-02-15 | Stop reason: HOSPADM

## 2018-02-11 RX ORDER — LORAZEPAM 2 MG/ML
2 INJECTION INTRAMUSCULAR EVERY 6 HOURS PRN
Status: CANCELLED | OUTPATIENT
Start: 2018-02-11

## 2018-02-11 RX ORDER — LORAZEPAM 2 MG/ML
2 INJECTION INTRAMUSCULAR EVERY 6 HOURS PRN
Status: DISCONTINUED | OUTPATIENT
Start: 2018-02-11 | End: 2018-02-15 | Stop reason: HOSPADM

## 2018-02-11 RX ADMIN — DOCUSATE SODIUM 100 MG: 100 CAPSULE, LIQUID FILLED ORAL at 09:49

## 2018-02-11 RX ADMIN — OLANZAPINE 5 MG: 5 TABLET, FILM COATED ORAL at 12:14

## 2018-02-11 RX ADMIN — LORATADINE 10 MG: 10 TABLET ORAL at 09:49

## 2018-02-11 RX ADMIN — LORAZEPAM 2 MG: 2 INJECTION INTRAMUSCULAR; INTRAVENOUS at 17:51

## 2018-02-11 RX ADMIN — TRAZODONE HYDROCHLORIDE 50 MG: 50 TABLET ORAL at 00:00

## 2018-02-11 RX ADMIN — VITAMIN D, TAB 1000IU (100/BT) 1000 UNITS: 25 TAB at 09:49

## 2018-02-11 RX ADMIN — ATORVASTATIN CALCIUM 10 MG: 10 TABLET, FILM COATED ORAL at 17:58

## 2018-02-11 RX ADMIN — LEVOTHYROXINE SODIUM 25 MCG: 25 TABLET ORAL at 06:01

## 2018-02-11 RX ADMIN — TRAZODONE HYDROCHLORIDE 50 MG: 50 TABLET ORAL at 21:26

## 2018-02-11 RX ADMIN — DOCUSATE SODIUM 100 MG: 100 CAPSULE, LIQUID FILLED ORAL at 17:58

## 2018-02-11 RX ADMIN — METFORMIN HYDROCHLORIDE 500 MG: 500 TABLET, FILM COATED ORAL at 09:49

## 2018-02-11 RX ADMIN — LORAZEPAM 1 MG: 1 TABLET ORAL at 13:34

## 2018-02-11 RX ADMIN — METFORMIN HYDROCHLORIDE 500 MG: 500 TABLET, FILM COATED ORAL at 17:57

## 2018-02-11 RX ADMIN — TRAMADOL HYDROCHLORIDE 50 MG: 50 TABLET, FILM COATED ORAL at 04:12

## 2018-02-11 RX ADMIN — BENZTROPINE MESYLATE 1 MG: 1 TABLET ORAL at 12:15

## 2018-02-11 NOTE — PLAN OF CARE
Problem: Potential for Falls  Goal: Patient will remain free of falls  INTERVENTIONS:  - Assess patient frequently for physical needs  -  Identify cognitive and physical deficits and behaviors that affect risk of falls    -  [place] fall precautions as indicated by assessment   - Educate patient/family on patient safety including physical limitations  - Instruct patient to call for assistance with activity based on assessment  - Modify environment to reduce risk of injury  - Consider OT/PT consult to assist with strengthening/mobility   Outcome: Progressing      Problem: SELF HARM/SUICIDALITY  Goal: Will have no self-injury during hospital stay  INTERVENTIONS:  - Q 15 MINUTES: Routine safety checks  - Q WAKING SHIFT & PRN: Assess risk to determine if routine checks are adequate to maintain patient safety  - Encourage patient to participate actively in care by formulating a plan to combat response to suicidal ideation, identify supports and resources   Outcome: Progressing      Problem: PSYCHOSIS  Goal: Will report no hallucinations or delusions  Interventions:  - Administer medication as  ordered  - Every waking shifts and PRN assess for the presence of hallucinations and or delusions  - Assist with reality testing to support increasing orientation  - Assess if patient's hallucinations or delusions are encouraging self-harm or harm to others and intervene as appropriate   Outcome: Progressing      Problem: SAFETY ADULT  Goal: Maintain or return to baseline ADL function  INTERVENTIONS:  -  Assess patient's ability to carry out ADLs; assess patient's baseline for ADL function and identify physical deficits which impact ability to perform ADLs (bathing, care of mouth/teeth, toileting, grooming, dressing, etc )  - Assess/evaluate cause of self-care deficits   - Assess range of motion  - Assess patient's mobility; develop plan if impaired  - Assess patient's need for assistive devices and provide as appropriate  - Encourage maximum independence but intervene and supervise when necessary  ¯ Involve family in performance of ADLs  ¯ Assess for home care needs following discharge   ¯ Request OT consult to assist with ADL evaluation and planning for discharge  ¯ Provide patient education as appropriate   Outcome: Progressing    Goal: Maintain or return mobility status to optimal level  INTERVENTIONS:  - Assess patient's baseline mobility status (ambulation, transfers, stairs, etc )    - Identify cognitive and physical deficits and behaviors that affect mobility  - Identify mobility aids required to assist with transfers and/or ambulation (gait belt, sit-to-stand, lift, walker, cane, etc )  - [place] fall precautions as indicated by assessment  - Record patient progress and toleration of activity level on Mobility SBAR; progress patient to next Phase/Stage  - Instruct patient to call for assistance with activity based on assessment  - Request Rehabilitation consult to assist with strengthening/weightbearing, etc    Outcome: Progressing      Problem: DISCHARGE PLANNING  Goal: Discharge to home or other facility with appropriate resources  INTERVENTIONS:  - Identify barriers to discharge w/patient and caregiver  - Arrange for needed discharge resources and transportation as appropriate  - Identify discharge learning needs (meds, wound care, etc )  - Arrange for interpretive services to assist at discharge as needed  - Refer to Case Management Department for coordinating discharge planning if the patient needs post-hospital services based on physician/advanced practitioner order or complex needs related to functional status, cognitive ability, or social support system   Outcome: Progressing

## 2018-02-11 NOTE — PROGRESS NOTES
Patient has been seclusive to his room as of this time  Denies SI as well as AH  No agitation or impulsive behaviors  Compliant with medications  Will continue to monitor

## 2018-02-11 NOTE — PLAN OF CARE
Problem: Risk for Violence/Aggression Toward Others  Goal: Refrain from harming others  Outcome: Progressing    Goal: Control angry outbursts  Interventions:  - Monitor patient closely, per order  - Ensure early verbal de-escalation  - Monitor prn medication needs  - Set reasonable/therapeutic limits, outline behavioral expectations, and consequences   - Provide a non-threatening milieu, utilizing the least restrictive interventions   Outcome: Progressing

## 2018-02-11 NOTE — PROGRESS NOTES
Pt given PRN haldol 5 mg PO and cogentin 1 mg PO for agitation/anxiety  Pt threw his tray on the ground and admitted this was on purpose  PT asked for some medication  Pt stated "the voices are making me want to throw things " Pt denies wanting to hurt self or others  PT was given new food tray and remained in control  PRN haldol and ativan effective

## 2018-02-11 NOTE — PROGRESS NOTES
Progress Note - Behavioral Health   Issa Bustillos 40 y o  male MRN: [id number]  Unit/Bed#: OY1 372-62 Encounter: [id number]    Assessment/Plan   Principal Problem:    Schizoaffective disorder, bipolar type ([address] )  Active Problems:    Mild intellectual disability    Obsessive compulsive disorder      Behavior over the last 24 hours:  unchanged  Sleep: normal  Appetite: normal  Medication side effects: No  ROS: no complaints    Mental Status Evaluation:  Appearance:  disheveled   Behavior:  psychomotor agitation   Speech:  dysarthric   Mood:  dysthymic and irritable   Affect:  labile   Thought Process:  disorganized and flight of ideas   Thought Content:  delusions  persecutory   Perceptual Disturbances: None   Risk Potential: Potential for Aggression Yes labile   Sensorium:  person   Cognition:  grossly intact   Consciousness:  awake    Attention: attention span and concentration were age appropriate   Insight:  limited   Judgment: limited   Gait/Station: normal gait/station   Motor Activity: no abnormal movements     Progress Toward Goals: Pt remains largely disorganized and labile with impulse control issues  Limited insight,would like to be here long enough to get well  Some disruptive behavior as per staff but overall medication compliant  Recommended Treatment:  1-Cont current medications  2- Continue with group therapy, milieu therapy and occupational therapy  Risks, benefits and possible side effects of Medications:   Risks, benefits, and possible side effects of medications explained to patient and patient verbalizes understanding        Medications:   current meds:   Current Facility-Administered Medications   Medication Dose Route Frequency    acetaminophen (TYLENOL) tablet 650 mg  650 mg Oral Q6H PRN    acetaminophen (TYLENOL) tablet 650 mg  650 mg Oral Q4H PRN    aluminum-magnesium hydroxide-simethicone (MYLANTA) 200-200-20 mg/5 mL oral suspension 30 mL  30 mL Oral Q4H PRN    atorvastatin (LIPITOR) tablet 10 mg  10 mg Oral Daily With Dinner    benztropine (COGENTIN) injection 1 mg  1 mg Intramuscular Q6H PRN    benztropine (COGENTIN) tablet 1 mg  1 mg Oral Q6H PRN    bismuth subsalicylate (PEPTO BISMOL) 524 mg/30 mL oral suspension 262 mg  262 mg Oral Q6H PRN    chlorproMAZINE (THORAZINE) tablet 300 mg  300 mg Oral HS    cholecalciferol (VITAMIN D3) tablet 1,000 Units  1,000 Units Oral Daily    docusate sodium (COLACE) capsule 100 mg  100 mg Oral BID    haloperidol (HALDOL) tablet 5 mg  5 mg Oral Q6H PRN    haloperidol lactate (HALDOL) injection 5 mg  5 mg Intramuscular Q6H PRN    levothyroxine tablet 25 mcg  25 mcg Oral Early Morning    loratadine (CLARITIN) tablet 10 mg  10 mg Oral Daily    magnesium hydroxide (MILK OF MAGNESIA) 400 mg/5 mL oral suspension 30 mL  30 mL Oral Daily PRN    metFORMIN (GLUCOPHAGE) tablet 500 mg  500 mg Oral BID With Meals    OLANZapine (ZyPREXA) IM injection 10 mg  10 mg Intramuscular Q6H PRN    OLANZapine (ZyPREXA) tablet 5 mg  5 mg Oral Q6H PRN    traMADol (ULTRAM) tablet 50 mg  50 mg Oral Q6H PRN    traZODone (DESYREL) tablet 50 mg  50 mg Oral HS PRN     Labs: I have personally reviewed pt's labs    Counseling / Coordination of Care  Total floor / unit time spent today 25 minutes  Greater than 50% of total time was spent with the patient and / or family counseling and / or coordination of care   A description of the counseling / coordination of care:

## 2018-02-11 NOTE — PROGRESS NOTES
Patient agitated and also c/o feeling restless  Given PRN of Zyprexa PO and Cogentin PO  Patient also trying to grab at RN while RN was checking blood sugar  Patient c/o feeling dizzy; VS checked; 132/90 HR 91  Blood sugar checked; 126   Will continue to monitor

## 2018-02-11 NOTE — PROGRESS NOTES
PT seen in room punching wall  Pt stated " I like to play w/ the wall " RN spoke to pt about behavior  PT stated to RN "II will not take thorazine or ativan anymore because it makes me violent " Pt refusing medications at this time

## 2018-02-11 NOTE — PROGRESS NOTES
Pt approached nurses station requesting something to help him sleep  Pt agreed to take PRN trazodone at 0000 that he previously refused   Medication effective, pt is currently sleeping

## 2018-02-11 NOTE — PROGRESS NOTES
Pt given Ultram PRN for 8/10 L sided neck pain @5852  Medication appears to be effective as pt is currently sleeping

## 2018-02-11 NOTE — PROGRESS NOTES
Pt appears restless getting up and down from the bed  RN went to get pt's blood sugar  Pt was stating that he was getting restless and anxious  This feeling makes him throw things even though he doesn't want too  Pt sat on his hands so he would not touch RN  PT was was then seen out in hallway trying to touch another pt  PT redirected to his room  Pt asked for a shot of ativan to help calm him down and for his tremors  RN notified insight and Dr Bandar Quintana for PRN ativan IM

## 2018-02-11 NOTE — PROGRESS NOTES
PRN of Zyprexa, Ativan and Cogentin appear to have been effective  No further agitation or complaints of restlessness

## 2018-02-12 LAB
GLUCOSE SERPL-MCNC: 106 MG/DL (ref 65–140)
GLUCOSE SERPL-MCNC: 108 MG/DL (ref 65–140)
GLUCOSE SERPL-MCNC: 119 MG/DL (ref 65–140)
GLUCOSE SERPL-MCNC: 128 MG/DL (ref 65–140)
GLUCOSE SERPL-MCNC: 153 MG/DL (ref 65–140)
GLUCOSE SERPL-MCNC: 82 MG/DL (ref 65–140)

## 2018-02-12 PROCEDURE — 82948 REAGENT STRIP/BLOOD GLUCOSE: CPT

## 2018-02-12 PROCEDURE — 99232 SBSQ HOSP IP/OBS MODERATE 35: CPT | Performed by: PSYCHIATRY & NEUROLOGY

## 2018-02-12 RX ORDER — DIVALPROEX SODIUM 500 MG/1
500 TABLET, DELAYED RELEASE ORAL 2 TIMES DAILY
Status: DISCONTINUED | OUTPATIENT
Start: 2018-02-12 | End: 2018-02-15 | Stop reason: HOSPADM

## 2018-02-12 RX ORDER — ARIPIPRAZOLE 5 MG/1
5 TABLET ORAL DAILY
Status: DISCONTINUED | OUTPATIENT
Start: 2018-02-13 | End: 2018-02-15 | Stop reason: HOSPADM

## 2018-02-12 RX ADMIN — ATORVASTATIN CALCIUM 10 MG: 10 TABLET, FILM COATED ORAL at 17:39

## 2018-02-12 RX ADMIN — LEVOTHYROXINE SODIUM 25 MCG: 25 TABLET ORAL at 07:01

## 2018-02-12 RX ADMIN — METFORMIN HYDROCHLORIDE 500 MG: 500 TABLET, FILM COATED ORAL at 09:20

## 2018-02-12 RX ADMIN — DOCUSATE SODIUM 100 MG: 100 CAPSULE, LIQUID FILLED ORAL at 09:20

## 2018-02-12 RX ADMIN — WATER 10 ML: 1 INJECTION INTRAMUSCULAR; INTRAVENOUS; SUBCUTANEOUS at 20:02

## 2018-02-12 RX ADMIN — LORAZEPAM 2 MG: 2 INJECTION INTRAMUSCULAR; INTRAVENOUS at 17:36

## 2018-02-12 RX ADMIN — CHLORPROMAZINE HYDROCHLORIDE 300 MG: 100 TABLET, SUGAR COATED ORAL at 21:13

## 2018-02-12 RX ADMIN — METFORMIN HYDROCHLORIDE 500 MG: 500 TABLET, FILM COATED ORAL at 17:40

## 2018-02-12 RX ADMIN — ALUMINUM HYDROXIDE, MAGNESIUM HYDROXIDE, AND SIMETHICONE 30 ML: 200; 200; 20 SUSPENSION ORAL at 20:15

## 2018-02-12 RX ADMIN — DIVALPROEX SODIUM 500 MG: 500 TABLET, DELAYED RELEASE ORAL at 17:39

## 2018-02-12 RX ADMIN — OLANZAPINE 10 MG: 10 INJECTION, POWDER, FOR SOLUTION INTRAMUSCULAR at 20:00

## 2018-02-12 RX ADMIN — DOCUSATE SODIUM 100 MG: 100 CAPSULE, LIQUID FILLED ORAL at 17:39

## 2018-02-12 RX ADMIN — LORATADINE 10 MG: 10 TABLET ORAL at 09:20

## 2018-02-12 RX ADMIN — VITAMIN D, TAB 1000IU (100/BT) 1000 UNITS: 25 TAB at 09:20

## 2018-02-12 NOTE — PROGRESS NOTES
PT has been observed talking to self in room and talking to wall out in the hallway  PT refused scheduled thorazine stating " that doesn't work for me anymore " PT did take PRN trazodone 50 mg PO for sleep  Pt has been remaining calm by writing on papers and walking hallway

## 2018-02-12 NOTE — PROGRESS NOTES
Progress Note - Behavioral Health   Brent Mcgee 40 y o  male MRN: @MRN   Unit/Bed#: UR4 051-99 Encounter: [id number]        The patient was seen for continuing care and reviewed with staff  Report from staff regarding this patient received and discussed, and records reviewed prior to seeing this patient   The patient was today waited in the presence of 1 of his caregivers  The patient was common polite he stated that Depakote and Abilify helped him in the past   Stated that Benadryl made him more agitated  The and stated that the Thorazine it used to be quite helpful but stopped be helpful recently  We discussed his medication management  The patient feels like he is less angry and agitated and agreed with medication management in the unit  Communicated with his caregiver in the polite and calm manner  Sleep  Appetite    Patient remains agitated at times, anxious, bizarre, distracted and distressed today      Medication side effects: No   ROS: no complaints    Mental Status Evaluation:    Appearance:  looks older than stated age, wearing hospital clothes   Behavior:  cooperative, guarded   Mood:  depressed, dysphoric, anxious   Affect: less constricted, constricted    Speech:  decreased rate   Language: appropriate   Thought Process:  concrete   Associations: concrete associations   Thought Content:  negative thinking, poverty of thought   Perceptual Disturbances: no auditory hallucinations, no visual hallucinations   Risk Potential: Suicidal ideation - None at present  Homicidal ideation - None at present  Potential for aggression - No longer agitated   Sensorium:  oriented to person, place and time   Memory:  recent memory mildly impaired   Consciousness:  alert and awake   Attention: decreased concentration   Intellect: below average   Fund of Knowledge: awareness of current events appropriate   Insight:  impaired   Judgment: impaired   Muscle Tone: normal   Gait/Station: normal gait/station and normal balance   Motor Activity: no abnormal movements         Laboratory results:  I have personally reviewed all pertinent laboratory results  No results for input(s): HGBA1C, NA, K, CL, CO2, GLUCOSE, CREATININE, BUN, MG, PHOS in the last 72 hours  Invalid input(s): CA  No results for input(s): WBC, RBC, HGB, HCT, MCV, MCH, RDW, PLT in the last 72 hours  No results for input(s): CREATININE, BUN, NA, K, CL, CO2, GLUCOSE, PROT, ALT, AST, BILIDIR in the last 72 hours  Invalid input(s): CA, AKLPHOS  No results for input(s): ALKPHOS, AST, ALT, GGT, BILITOT, BILIDIR, ALBUMIN, INR, AMYLASE, LIPASE in the last 72 hours  No results for input(s): TROPONINI, CKMB, CKTOTAL in the last 72 hours  Invalid input(s): PBNP  No results for input(s): CHOL, LDLDIRECT, HDL, TRIG in the last 72 hours  No results for input(s): CRP, SEDRATE, FRANKIE, HAV, HEPAIGM, HEPBIGM, HEPBCAB, HEPCAB in the last 72 hours  Invalid input(s): HEAG    CBC: No results for input(s): WBC, RBC, HGB, HCT, PLT in the last 72 hours  BMP: No results for input(s): NA, K, CL, CO2, BUN, GLU in the last 72 hours  Invalid input(s): CREA        Progress Toward Goals: improving slow    Assessment/Plan   Principal Problem:    Schizoaffective disorder, bipolar type (HCC)  Active Problems:    Mild intellectual disability    Obsessive compulsive disorder      Recommended Treatment:  Start low-dose of Abilify, and restart Depakote  Continue the present medications including nighttime Thorazine, and observe the patient improvement  The patient was admitted angry agitated but started to slightly improve  His sleep is not well, but appetite is normal   Brief supportive therapy was provided  The patient does not feel anxious or angry  His recent labs were reviewed  Planned medication and treatment changes: All current active medications have been reviewed    Continue treatment with group therapy, milieu therapy, occupational therapy and medication management  Risks / Benefits of Treatment:    Risks, benefits, and possible side effects of medications explained to patient  Patient has limited understanding of risks and benefits of treatment at this time, but agrees to take medications as prescribed  Counseling / Coordination of Care:    Patient's progress discussed with staff in treatment team meeting  Medication changes reviewed with staff in treatment team meeting  ** Please Note: This note has been constructed using a voice recognition system   **

## 2018-02-12 NOTE — CASE MANAGEMENT
CM met with patient  Patient was calm, cooperative, and denies symptoms of SI, HI, psychosis  Feels medications are helping this IP stay more than others  Appears to be completing some daily living skills as patient was clean and kempt  Denies problems with sleep and appetite at this time  States his medications are helping control his urges to touch people and also decrease his anger  CM reviewed the goals of IP treatment with him and patient signed treatment plan  ROIs signed for PCP, OP mental health, and [facility] (current residence)  This CM also met with representative from [facility], who agreed patient did appear more stable  Allowing patient to return to current home environment, requesting only a DC meeting a few days prior and medication prescriptions faxed to the [address] [place] [address] W  CM will continue to monitor

## 2018-02-12 NOTE — PROGRESS NOTES
Patient cooperative with medications today  Denied symptoms and needs, other than some "tremoring", which he said is "because of my nerves," but for which he declined medication  Met with a representative from his group home today, who he said was trying to "get me out of here tomorrow " Denied SI and HI

## 2018-02-13 LAB
GLUCOSE SERPL-MCNC: 106 MG/DL (ref 65–140)
GLUCOSE SERPL-MCNC: 114 MG/DL (ref 65–140)
GLUCOSE SERPL-MCNC: 141 MG/DL (ref 65–140)
GLUCOSE SERPL-MCNC: 83 MG/DL (ref 65–140)

## 2018-02-13 PROCEDURE — 99232 SBSQ HOSP IP/OBS MODERATE 35: CPT | Performed by: PSYCHIATRY & NEUROLOGY

## 2018-02-13 PROCEDURE — 82948 REAGENT STRIP/BLOOD GLUCOSE: CPT

## 2018-02-13 RX ADMIN — ARIPIPRAZOLE 5 MG: 5 TABLET ORAL at 08:50

## 2018-02-13 RX ADMIN — ATORVASTATIN CALCIUM 10 MG: 10 TABLET, FILM COATED ORAL at 18:29

## 2018-02-13 RX ADMIN — METFORMIN HYDROCHLORIDE 500 MG: 500 TABLET, FILM COATED ORAL at 18:29

## 2018-02-13 RX ADMIN — CHLORPROMAZINE HYDROCHLORIDE 300 MG: 100 TABLET, SUGAR COATED ORAL at 21:43

## 2018-02-13 RX ADMIN — DIVALPROEX SODIUM 500 MG: 500 TABLET, DELAYED RELEASE ORAL at 08:47

## 2018-02-13 RX ADMIN — LORAZEPAM 1 MG: 1 TABLET ORAL at 18:30

## 2018-02-13 RX ADMIN — LORAZEPAM 1 MG: 1 TABLET ORAL at 08:47

## 2018-02-13 RX ADMIN — LORATADINE 10 MG: 10 TABLET ORAL at 08:47

## 2018-02-13 RX ADMIN — LEVOTHYROXINE SODIUM 25 MCG: 25 TABLET ORAL at 05:11

## 2018-02-13 RX ADMIN — DIVALPROEX SODIUM 500 MG: 500 TABLET, DELAYED RELEASE ORAL at 18:29

## 2018-02-13 RX ADMIN — VITAMIN D, TAB 1000IU (100/BT) 1000 UNITS: 25 TAB at 08:47

## 2018-02-13 RX ADMIN — DOCUSATE SODIUM 100 MG: 100 CAPSULE, LIQUID FILLED ORAL at 08:47

## 2018-02-13 RX ADMIN — METFORMIN HYDROCHLORIDE 500 MG: 500 TABLET, FILM COATED ORAL at 08:47

## 2018-02-13 RX ADMIN — DOCUSATE SODIUM 100 MG: 100 CAPSULE, LIQUID FILLED ORAL at 18:29

## 2018-02-13 NOTE — PROGRESS NOTES
Pt asked for IM ativan at [phone] b/c tremors were bothering him and he did not feel he could be in control of his behaviors, IM ativan given with security standby  Pt stated the medication was helpful  Pt about unit after eating dinner  Cooperative with meds and denies SI and hallucinations  States that his worker from outside saw him today and he can go home  He is hopeful for d/c tomorrow  At 2000 pt pulled down his pants and flashed male MHT after being redirected from taking phone from hallway by door  Pt would not take PO PRN meds, security called to unit and pt given IM Zyprexa  Pt calmer after meds  Will monitor

## 2018-02-13 NOTE — PROGRESS NOTES
Patient denies any further c/o anxiety  Currently in room and resting  Ativan effective  Denies SI as well as AH/VH  No outbursts or aggressive behaviors  Compliant with medications  Will monitor

## 2018-02-13 NOTE — PLAN OF CARE
Problem: Potential for Falls  Goal: Patient will remain free of falls  INTERVENTIONS:  - Assess patient frequently for physical needs  -  Identify cognitive and physical deficits and behaviors that affect risk of falls    -  [place] fall precautions as indicated by assessment   - Educate patient/family on patient safety including physical limitations  - Instruct patient to call for assistance with activity based on assessment  - Modify environment to reduce risk of injury  - Consider OT/PT consult to assist with strengthening/mobility   Outcome: Progressing      Problem: SELF HARM/SUICIDALITY  Goal: Will have no self-injury during hospital stay  INTERVENTIONS:  - Q 15 MINUTES: Routine safety checks  - Q WAKING SHIFT & PRN: Assess risk to determine if routine checks are adequate to maintain patient safety  - Encourage patient to participate actively in care by formulating a plan to combat response to suicidal ideation, identify supports and resources   Outcome: Progressing      Problem: PSYCHOSIS  Goal: Will report no hallucinations or delusions  Interventions:  - Administer medication as  ordered  - Every waking shifts and PRN assess for the presence of hallucinations and or delusions  - Assist with reality testing to support increasing orientation  - Assess if patient's hallucinations or delusions are encouraging self-harm or harm to others and intervene as appropriate   Outcome: Progressing      Problem: SAFETY ADULT  Goal: Maintain or return to baseline ADL function  INTERVENTIONS:  -  Assess patient's ability to carry out ADLs; assess patient's baseline for ADL function and identify physical deficits which impact ability to perform ADLs (bathing, care of mouth/teeth, toileting, grooming, dressing, etc )  - Assess/evaluate cause of self-care deficits   - Assess range of motion  - Assess patient's mobility; develop plan if impaired  - Assess patient's need for assistive devices and provide as appropriate  - Encourage maximum independence but intervene and supervise when necessary  ¯ Involve family in performance of ADLs  ¯ Assess for home care needs following discharge   ¯ Request OT consult to assist with ADL evaluation and planning for discharge  ¯ Provide patient education as appropriate   Outcome: Progressing    Goal: Maintain or return mobility status to optimal level  INTERVENTIONS:  - Assess patient's baseline mobility status (ambulation, transfers, stairs, etc )    - Identify cognitive and physical deficits and behaviors that affect mobility  - Identify mobility aids required to assist with transfers and/or ambulation (gait belt, sit-to-stand, lift, walker, cane, etc )  - [place] fall precautions as indicated by assessment  - Record patient progress and toleration of activity level on Mobility SBAR; progress patient to next Phase/Stage  - Instruct patient to call for assistance with activity based on assessment  - Request Rehabilitation consult to assist with strengthening/weightbearing, etc    Outcome: Progressing      Problem: DISCHARGE PLANNING  Goal: Discharge to home or other facility with appropriate resources  INTERVENTIONS:  - Identify barriers to discharge w/patient and caregiver  - Arrange for needed discharge resources and transportation as appropriate  - Identify discharge learning needs (meds, wound care, etc )  - Arrange for interpretive services to assist at discharge as needed  - Refer to Case Management Department for coordinating discharge planning if the patient needs post-hospital services based on physician/advanced practitioner order or complex needs related to functional status, cognitive ability, or social support system   Outcome: Progressing      Problem: Risk for Violence/Aggression Toward Others  Goal: Refrain from harming others  Outcome: Progressing    Goal: Control angry outbursts  Interventions:  - Monitor patient closely, per order  - Ensure early verbal de-escalation  - Monitor prn medication needs  - Set reasonable/therapeutic limits, outline behavioral expectations, and consequences   - Provide a non-threatening milieu, utilizing the least restrictive interventions    Outcome: Progressing      Problem: Ineffective Coping  Goal: Participates in unit activities  Interventions:  - Provide therapeutic environment   - Provide required programming   - Redirect inappropriate behaviors    Outcome: Progressing

## 2018-02-14 LAB
ALBUMIN SERPL BCP-MCNC: 3 G/DL (ref 3.5–5)
ALP SERPL-CCNC: 75 U/L (ref 46–116)
ALT SERPL W P-5'-P-CCNC: 21 U/L (ref 12–78)
AMMONIA PLAS-SCNC: 29 UMOL/L (ref 11–35)
ANION GAP SERPL CALCULATED.3IONS-SCNC: 7 MMOL/L (ref 4–13)
AST SERPL W P-5'-P-CCNC: 14 U/L (ref 5–45)
BACTERIA UR QL AUTO: ABNORMAL /HPF
BILIRUB DIRECT SERPL-MCNC: 0.17 MG/DL (ref 0–0.2)
BILIRUB SERPL-MCNC: 0.43 MG/DL (ref 0.2–1)
BILIRUB UR QL STRIP: NEGATIVE
BUN SERPL-MCNC: 10 MG/DL (ref 5–25)
CALCIUM SERPL-MCNC: 8.2 MG/DL (ref 8.3–10.1)
CHLORIDE SERPL-SCNC: 106 MMOL/L (ref 100–108)
CLARITY UR: CLEAR
CO2 SERPL-SCNC: 29 MMOL/L (ref 21–32)
COLOR UR: YELLOW
CREAT SERPL-MCNC: 1.05 MG/DL (ref 0.6–1.3)
GFR SERPL CREATININE-BSD FRML MDRD: 91 ML/MIN/1.73SQ M
GLUCOSE P FAST SERPL-MCNC: 119 MG/DL (ref 65–99)
GLUCOSE SERPL-MCNC: 108 MG/DL (ref 65–140)
GLUCOSE SERPL-MCNC: 117 MG/DL (ref 65–140)
GLUCOSE SERPL-MCNC: 119 MG/DL (ref 65–140)
GLUCOSE SERPL-MCNC: 140 MG/DL (ref 65–140)
GLUCOSE UR STRIP-MCNC: ABNORMAL MG/DL
HGB UR QL STRIP.AUTO: NEGATIVE
KETONES UR STRIP-MCNC: NEGATIVE MG/DL
LEUKOCYTE ESTERASE UR QL STRIP: NEGATIVE
NITRITE UR QL STRIP: NEGATIVE
NON-SQ EPI CELLS URNS QL MICRO: ABNORMAL /HPF
PH UR STRIP.AUTO: 6 [PH] (ref 4.5–8)
POTASSIUM SERPL-SCNC: 3.9 MMOL/L (ref 3.5–5.3)
PROT SERPL-MCNC: 6.7 G/DL (ref 6.4–8.2)
PROT UR STRIP-MCNC: NEGATIVE MG/DL
RBC #/AREA URNS AUTO: ABNORMAL /HPF
SODIUM SERPL-SCNC: 142 MMOL/L (ref 136–145)
SP GR UR STRIP.AUTO: 1.01 (ref 1–1.03)
UROBILINOGEN UR QL STRIP.AUTO: 0.2 E.U./DL
WBC #/AREA URNS AUTO: ABNORMAL /HPF

## 2018-02-14 PROCEDURE — 80076 HEPATIC FUNCTION PANEL: CPT | Performed by: PSYCHIATRY & NEUROLOGY

## 2018-02-14 PROCEDURE — 99231 SBSQ HOSP IP/OBS SF/LOW 25: CPT | Performed by: PSYCHIATRY & NEUROLOGY

## 2018-02-14 PROCEDURE — 82140 ASSAY OF AMMONIA: CPT | Performed by: PSYCHIATRY & NEUROLOGY

## 2018-02-14 PROCEDURE — 80048 BASIC METABOLIC PNL TOTAL CA: CPT | Performed by: PSYCHIATRY & NEUROLOGY

## 2018-02-14 PROCEDURE — 81001 URINALYSIS AUTO W/SCOPE: CPT | Performed by: PSYCHIATRY & NEUROLOGY

## 2018-02-14 PROCEDURE — 82948 REAGENT STRIP/BLOOD GLUCOSE: CPT

## 2018-02-14 RX ORDER — ARIPIPRAZOLE 5 MG/1
5 TABLET ORAL DAILY
Qty: 30 TABLET | Refills: 0 | Status: SHIPPED | OUTPATIENT
Start: 2018-02-15 | End: 2019-01-25 | Stop reason: HOSPADM

## 2018-02-14 RX ORDER — LORATADINE 10 MG/1
10 TABLET ORAL DAILY
Qty: 30 TABLET | Refills: 0 | Status: SHIPPED | OUTPATIENT
Start: 2018-02-14 | End: 2018-12-21 | Stop reason: SDUPTHER

## 2018-02-14 RX ORDER — ATORVASTATIN CALCIUM 10 MG/1
10 TABLET, FILM COATED ORAL
Qty: 30 TABLET | Refills: 0 | Status: SHIPPED | OUTPATIENT
Start: 2018-02-14 | End: 2018-12-21 | Stop reason: SDUPTHER

## 2018-02-14 RX ORDER — MELATONIN
1000 DAILY
Qty: 30 TABLET | Refills: 0 | Status: SHIPPED | OUTPATIENT
Start: 2018-02-14 | End: 2018-12-21 | Stop reason: SDUPTHER

## 2018-02-14 RX ORDER — BENZTROPINE MESYLATE 1 MG/1
1 TABLET ORAL 2 TIMES DAILY
Qty: 60 TABLET | Refills: 0 | Status: SHIPPED | OUTPATIENT
Start: 2018-02-14 | End: 2018-12-21 | Stop reason: ALTCHOICE

## 2018-02-14 RX ORDER — KETOTIFEN FUMARATE 0.35 MG/ML
1 SOLUTION/ DROPS OPHTHALMIC 2 TIMES DAILY PRN
Qty: 5 ML | Refills: 0 | Status: SHIPPED | OUTPATIENT
Start: 2018-02-14 | End: 2019-01-25 | Stop reason: HOSPADM

## 2018-02-14 RX ORDER — CALCIUM CARBONATE 500(1250)
1 TABLET ORAL
Status: DISCONTINUED | OUTPATIENT
Start: 2018-02-15 | End: 2018-02-15 | Stop reason: HOSPADM

## 2018-02-14 RX ORDER — DOCUSATE SODIUM 100 MG/1
100 CAPSULE, LIQUID FILLED ORAL 2 TIMES DAILY
Qty: 60 CAPSULE | Refills: 0 | Status: SHIPPED | OUTPATIENT
Start: 2018-02-14 | End: 2018-12-21 | Stop reason: SDUPTHER

## 2018-02-14 RX ORDER — DIVALPROEX SODIUM 500 MG/1
500 TABLET, DELAYED RELEASE ORAL 2 TIMES DAILY
Qty: 60 TABLET | Refills: 0 | Status: SHIPPED | OUTPATIENT
Start: 2018-02-14 | End: 2019-01-25 | Stop reason: HOSPADM

## 2018-02-14 RX ORDER — CHLORPROMAZINE HYDROCHLORIDE 100 MG/1
300 TABLET, FILM COATED ORAL
Qty: 90 TABLET | Refills: 0 | Status: SHIPPED | OUTPATIENT
Start: 2018-02-14 | End: 2019-01-25 | Stop reason: HOSPADM

## 2018-02-14 RX ORDER — TRAZODONE HYDROCHLORIDE 50 MG/1
50 TABLET ORAL
Qty: 30 TABLET | Refills: 0 | Status: SHIPPED | OUTPATIENT
Start: 2018-02-14 | End: 2018-12-21 | Stop reason: ALTCHOICE

## 2018-02-14 RX ORDER — LEVOTHYROXINE SODIUM 0.03 MG/1
25 TABLET ORAL
Qty: 30 TABLET | Refills: 0 | Status: SHIPPED | OUTPATIENT
Start: 2018-02-14 | End: 2018-12-21 | Stop reason: SDUPTHER

## 2018-02-14 RX ADMIN — DIVALPROEX SODIUM 500 MG: 500 TABLET, DELAYED RELEASE ORAL at 08:13

## 2018-02-14 RX ADMIN — ATORVASTATIN CALCIUM 10 MG: 10 TABLET, FILM COATED ORAL at 17:57

## 2018-02-14 RX ADMIN — LORATADINE 10 MG: 10 TABLET ORAL at 08:13

## 2018-02-14 RX ADMIN — CHLORPROMAZINE HYDROCHLORIDE 300 MG: 100 TABLET, SUGAR COATED ORAL at 21:11

## 2018-02-14 RX ADMIN — DOCUSATE SODIUM 100 MG: 100 CAPSULE, LIQUID FILLED ORAL at 08:13

## 2018-02-14 RX ADMIN — LEVOTHYROXINE SODIUM 25 MCG: 25 TABLET ORAL at 05:20

## 2018-02-14 RX ADMIN — DIVALPROEX SODIUM 500 MG: 500 TABLET, DELAYED RELEASE ORAL at 17:57

## 2018-02-14 RX ADMIN — METFORMIN HYDROCHLORIDE 500 MG: 500 TABLET, FILM COATED ORAL at 17:57

## 2018-02-14 RX ADMIN — ARIPIPRAZOLE 5 MG: 5 TABLET ORAL at 08:13

## 2018-02-14 RX ADMIN — VITAMIN D, TAB 1000IU (100/BT) 1000 UNITS: 25 TAB at 08:13

## 2018-02-14 RX ADMIN — METFORMIN HYDROCHLORIDE 500 MG: 500 TABLET, FILM COATED ORAL at 08:27

## 2018-02-14 RX ADMIN — DOCUSATE SODIUM 100 MG: 100 CAPSULE, LIQUID FILLED ORAL at 17:57

## 2018-02-14 NOTE — DISCHARGE INSTR - OTHER ORDERS
[address]   [phone]    [address], [address] [place] [address]    Please go to your appointment on Thursday, February 22, 2018 with your psychiatrist, Dr Melo Perez  Please go to your therapist appointment with Ryan Mayers, on Thursday, February 15, 2018  Person Directed Supports  Please follow the guidelines given to you by the staff at your group home

## 2018-02-14 NOTE — ESCALATED TEAM TX
This CM met with treatment provider of group home and treatment team through [facility]  Patient was present during session  Reviewed treatment plan and goals of OP treatment  Patient was able to stay in control of actions and appears euthymic for DC to occur tomorrow

## 2018-02-14 NOTE — PROGRESS NOTES
PT given PRN ativan 1 mg PO @ 18:30  Pt stated PO ativan was ineffective and pt is anxious about his tremors  PT wants IM ativan for anxiety and termor  RN told pt he cannot have ativan at this time  PT is in his room at this time  PT declines all other medications  PT able to remains in control at this time

## 2018-02-14 NOTE — CASE MANAGEMENT
CM met with patient  Patient was calm, cooperative, states he feels no urges to act out and believes medication management has assisted with that  Denies ideations of suicide, homicide, and denies symptoms of psychosis at this time  Admits to occasional anxiety due to returning home and leaving the hospital setting but expresses remorse knowing that he cannot behave in the manner that he had  Signed NIKKI for mother, this CM did provide update to her  CM will continue to monitor and called [facility] to attempt to have DC occur tomorrow

## 2018-02-14 NOTE — CASE MANAGEMENT
CM met with [facility] and treatment team  Both were provided the treatment plan and [facility] was given the prescriptions for refill for easy transition to home environment tomorrow  [facility] will pick patient up, after discharge is confirmed, at around 1300

## 2018-02-14 NOTE — PROGRESS NOTES
Pt calm and cooperative  Denies symptoms  Preoccupied with how many times a day he gets meds, repetitive and pressured  No outbursts or aggression  Hopeful for discharge  Will monitor

## 2018-02-14 NOTE — PLAN OF CARE
Problem: Potential for Falls  Goal: Patient will remain free of falls  INTERVENTIONS:  - Assess patient frequently for physical needs  -  Identify cognitive and physical deficits and behaviors that affect risk of falls    -  [place] fall precautions as indicated by assessment   - Educate patient/family on patient safety including physical limitations  - Instruct patient to call for assistance with activity based on assessment  - Modify environment to reduce risk of injury  - Consider OT/PT consult to assist with strengthening/mobility   Outcome: Progressing      Problem: SELF HARM/SUICIDALITY  Goal: Will have no self-injury during hospital stay  INTERVENTIONS:  - Q 15 MINUTES: Routine safety checks  - Q WAKING SHIFT & PRN: Assess risk to determine if routine checks are adequate to maintain patient safety  - Encourage patient to participate actively in care by formulating a plan to combat response to suicidal ideation, identify supports and resources   Outcome: Progressing      Problem: PSYCHOSIS  Goal: Will report no hallucinations or delusions  Interventions:  - Administer medication as  ordered  - Every waking shifts and PRN assess for the presence of hallucinations and or delusions  - Assist with reality testing to support increasing orientation  - Assess if patient's hallucinations or delusions are encouraging self-harm or harm to others and intervene as appropriate   Outcome: Progressing      Problem: SAFETY ADULT  Goal: Maintain or return to baseline ADL function  INTERVENTIONS:  -  Assess patient's ability to carry out ADLs; assess patient's baseline for ADL function and identify physical deficits which impact ability to perform ADLs (bathing, care of mouth/teeth, toileting, grooming, dressing, etc )  - Assess/evaluate cause of self-care deficits   - Assess range of motion  - Assess patient's mobility; develop plan if impaired  - Assess patient's need for assistive devices and provide as appropriate  - Encourage maximum independence but intervene and supervise when necessary  ¯ Involve family in performance of ADLs  ¯ Assess for home care needs following discharge   ¯ Request OT consult to assist with ADL evaluation and planning for discharge  ¯ Provide patient education as appropriate   Outcome: Progressing    Goal: Maintain or return mobility status to optimal level  INTERVENTIONS:  - Assess patient's baseline mobility status (ambulation, transfers, stairs, etc )    - Identify cognitive and physical deficits and behaviors that affect mobility  - Identify mobility aids required to assist with transfers and/or ambulation (gait belt, sit-to-stand, lift, walker, cane, etc )  - [place] fall precautions as indicated by assessment  - Record patient progress and toleration of activity level on Mobility SBAR; progress patient to next Phase/Stage  - Instruct patient to call for assistance with activity based on assessment  - Request Rehabilitation consult to assist with strengthening/weightbearing, etc    Outcome: Progressing      Problem: DISCHARGE PLANNING  Goal: Discharge to home or other facility with appropriate resources  INTERVENTIONS:  - Identify barriers to discharge w/patient and caregiver  - Arrange for needed discharge resources and transportation as appropriate  - Identify discharge learning needs (meds, wound care, etc )  - Arrange for interpretive services to assist at discharge as needed  - Refer to Case Management Department for coordinating discharge planning if the patient needs post-hospital services based on physician/advanced practitioner order or complex needs related to functional status, cognitive ability, or social support system   Outcome: Progressing      Problem: Risk for Violence/Aggression Toward Others  Goal: Refrain from harming others  Outcome: Progressing    Goal: Control angry outbursts  Interventions:  - Monitor patient closely, per order  - Ensure early verbal de-escalation  - Monitor prn medication needs  - Set reasonable/therapeutic limits, outline behavioral expectations, and consequences   - Provide a non-threatening milieu, utilizing the least restrictive interventions    Outcome: Progressing      Problem: Ineffective Coping  Goal: Participates in unit activities  Interventions:  - Provide therapeutic environment   - Provide required programming   - Redirect inappropriate behaviors    Outcome: Progressing

## 2018-02-14 NOTE — PROGRESS NOTES
Progress Note - Behavioral Health   Kobe Glynn 40 y o  male MRN: @MRN   Unit/Bed#: VH7 942-60 Encounter: [id number]        The patient was seen for continuing care and reviewed with staff  Report from staff regarding this patient received and discussed, and records reviewed prior to seeing this patient   Common polite, residing in the his room without any concerns or anger and agitation  Had improved eye contact  Stated that he no longer feels anxious or angry  He still has very restricted affect and potential for anger and agitation still high despite initial improvement  Brief supportive therapy was pro    Sleep is improving  Appetite is normal    Patient remains anxious, appropriate, distracted, distressed and doing better today  Medication side effects: No   ROS: no complaints    Mental Status Evaluation:    Appearance:  dressed in hospital attire   Behavior:  cooperative, calm, demanding   Mood:  improved, depressed, dysphoric   Affect: constricted    Speech:  decreased rate   Language: appropriate   Thought Process:  concrete   Associations: concrete associations   Thought Content:  intrusive thoughts, negative thinking   Perceptual Disturbances: no auditory hallucinations, no visual hallucinations   Risk Potential: Suicidal ideation - None at present  Homicidal ideation - None at present  Potential for aggression - No longer agitated   Sensorium:  oriented to person and place only   Memory:  long term memory intact   Consciousness:  alert and awake   Attention: decreased concentration   Intellect: not examined   Fund of Knowledge: awareness of current events appropriate   Insight:  impaired   Judgment: improving and impaired   Muscle Tone: normal   Gait/Station: normal gait/station and normal balance   Motor Activity: no abnormal movements         Laboratory results:  I have personally reviewed all pertinent laboratory results        Progress Toward Goals: limited improvement    Assessment/Plan Principal Problem:    Schizoaffective disorder, bipolar type ([facility] [address] )  Active Problems:    Mild intellectual disability    Obsessive compulsive disorder      Recommended Treatment:  The patient condition started to improve, will continue the present medications  Will monitor the patient tomorrow  His sleep was improving and appetite improving as well as though his sleep improvement still needs further management  Will order appropriate labs, following the patient increase of creatinine  Will follow the patient's tomorrow if he is continued to improve on not on this combination of medications if necessary we will adjust his Neurontin and Abilify  Planned medication and treatment changes: All current active medications have been reviewed  Continue treatment with group therapy, milieu therapy, occupational therapy and medication management  Risks / Benefits of Treatment:    Risks, benefits, and possible side effects of medications explained to patient  Patient has limited understanding of risks and benefits of treatment at this time, but agrees to take medications as prescribed  Counseling / Coordination of Care:    Patient's progress discussed with staff in treatment team meeting  Medication changes reviewed with staff in treatment team meeting  ** Please Note: This note has been constructed using a voice recognition system   **

## 2018-02-14 NOTE — PROGRESS NOTES
Patient calm and cooperative  Compliant with medications  Seclusive to his room  Denies all symptoms, no aggressive behaviors  Hopeful for discharge soon

## 2018-02-14 NOTE — PROGRESS NOTES
Patient asked for more Ativan for the arm and leg slight tremors  Patient was just given a dose 30 minutes ago and that was explained to him

## 2018-02-14 NOTE — PROGRESS NOTES
Patient denies SI/HI and any hallucinations at this time  Patient took his evening medications without difficulty

## 2018-02-14 NOTE — CASE MANAGEMENT
Jean Ramirez with [facility], will be coming for a family meeting/DC meeting today at 1   Prescriptions are requested and this CM will meet with his treatment team

## 2018-02-15 VITALS
DIASTOLIC BLOOD PRESSURE: 71 MMHG | BODY MASS INDEX: 32.59 KG/M2 | HEART RATE: 91 BPM | OXYGEN SATURATION: 94 % | HEIGHT: 69 IN | RESPIRATION RATE: 16 BRPM | SYSTOLIC BLOOD PRESSURE: 137 MMHG | WEIGHT: 220.02 LBS | TEMPERATURE: 97.6 F

## 2018-02-15 LAB
GLUCOSE SERPL-MCNC: 106 MG/DL (ref 65–140)
GLUCOSE SERPL-MCNC: 119 MG/DL (ref 65–140)

## 2018-02-15 PROCEDURE — 99239 HOSP IP/OBS DSCHRG MGMT >30: CPT | Performed by: PSYCHIATRY & NEUROLOGY

## 2018-02-15 PROCEDURE — 82948 REAGENT STRIP/BLOOD GLUCOSE: CPT

## 2018-02-15 RX ORDER — CALCIUM CARBONATE 500(1250)
1 TABLET ORAL
Qty: 30 TABLET | Refills: 0 | Status: SHIPPED | OUTPATIENT
Start: 2018-02-16 | End: 2018-12-21 | Stop reason: SDUPTHER

## 2018-02-15 RX ADMIN — DIVALPROEX SODIUM 500 MG: 500 TABLET, DELAYED RELEASE ORAL at 08:21

## 2018-02-15 RX ADMIN — ARIPIPRAZOLE 5 MG: 5 TABLET ORAL at 08:21

## 2018-02-15 RX ADMIN — LORATADINE 10 MG: 10 TABLET ORAL at 08:21

## 2018-02-15 RX ADMIN — VITAMIN D, TAB 1000IU (100/BT) 1000 UNITS: 25 TAB at 08:21

## 2018-02-15 RX ADMIN — Medication 1 TABLET: at 08:21

## 2018-02-15 RX ADMIN — LEVOTHYROXINE SODIUM 25 MCG: 25 TABLET ORAL at 06:02

## 2018-02-15 RX ADMIN — DOCUSATE SODIUM 100 MG: 100 CAPSULE, LIQUID FILLED ORAL at 08:21

## 2018-02-15 RX ADMIN — METFORMIN HYDROCHLORIDE 500 MG: 500 TABLET, FILM COATED ORAL at 08:21

## 2018-02-15 NOTE — DISCHARGE INSTR - LAB
Contact Information: If you have any questions, concerns, pended studies, tests and/or procedures, or emergencies regarding your inpatient behavioral health visit  Please contact [facility] [phone] and ask to speak to a , nurse or physician  A contact is available 24 hours/ 7 days a week at this number  Summary of Procedures Performed During your Stay:  Below is a list of major procedures performed during your hospital stay and a summary of results:  - No major procedures performed  Pending Studies     Start     Ordered    02/16/18 0600  Valproic acid level, total  Morning draw      02/13/18 1326    02/15/18 9593  Basic metabolic panel     Comments: This is a patient instruction: Patient fasting for 8 hours or longer recommended  02/15/18 1033    02/15/18 0000  Hemoglobin A1c      02/15/18 1033        If studies are pending at discharge, follow up with your PCP and/or referring provider

## 2018-02-15 NOTE — CASE MANAGEMENT
CM met with patient  Patient was calm and cooperative, patient denies symptoms of aggression, agitation, SI/HI  Denies symptoms of psychosis at this time  Wishes to return to [facility] today, explained to patient the DC plans and return to home today at 1300 with Germain Leroy  Patient was euthymic  CM will continue to monitor and plan for DC today

## 2018-02-15 NOTE — PROGRESS NOTES
Progress Note - Behavioral Health   Jess Meier 40 y o  male MRN: @MRN   Unit/Bed#: KD8 751-35 Encounter: [id number]        The patient was seen for continuing care and reviewed with staff  Report from staff regarding this patient received and discussed, and records reviewed prior to seeing this patient   The patient was evaluated in the comfort of his room, he was kind and polite without any anger agitation, no aggressive tendencies  He was happy to think about going back to his group home tomorrow  His appetite is improving his sleep was normal       Patient remains anxious, appropriate, cooperative with milieu and cooperative with staff today  Medication side effects: No   ROS: no complaints    Mental Status Evaluation:    Appearance:  improved grooming, wearing hospital clothes   Behavior:  cooperative, calm   Mood:  improved, anxious   Affect: less constricted, constricted    Speech:  decreased rate   Language: appropriate   Thought Process:  concrete   Associations: concrete associations   Thought Content:  negative thinking, poverty of thought   Perceptual Disturbances: no auditory hallucinations, no visual hallucinations   Risk Potential: Suicidal ideation - None  Homicidal ideation - None  Potential for aggression - No   Sensorium:  oriented to person, place and time   Memory:  recent and remote memory grossly intact   Consciousness:  alert and awake   Attention: attention span and concentration are normal   Intellect: Not evaluated   Fund of Knowledge: awareness of current events appropriate   Insight:  improving   Judgment: improved   Muscle Tone: normal   Gait/Station: normal gait/station and normal balance   Motor Activity: no abnormal movements         Laboratory results:  I have personally reviewed all pertinent laboratory results      Recent Labs      02/14/18   0529   NA  142   K  3 9   CL  106   CO2  29   GLUCOSE  119   CREATININE  1 05   BUN  10     No results for input(s): WBC, RBC, HGB, HCT, MCV, MCH, RDW, PLT in the last 72 hours  Recent Labs      02/14/18   0529   CREATININE  1 05   BUN  10   NA  142   K  3 9   CL  106   CO2  29   GLUCOSE  119   PROT  6 7   ALT  21   AST  14   BILIDIR  0 17     Recent Labs      02/14/18   0529   ALKPHOS  75   AST  14   ALT  21   BILITOT  0 43   BILIDIR  0 17     No results for input(s): TROPONINI, CKMB, CKTOTAL in the last 72 hours  Invalid input(s): PBNP  No results for input(s): CHOL, LDLDIRECT, HDL, TRIG in the last 72 hours  No results for input(s): CRP, SEDRATE, FRANKIE, HAV, HEPAIGM, HEPBIGM, HEPBCAB, HEPCAB in the last 72 hours  Invalid input(s): HEAG    CBC: No results for input(s): WBC, RBC, HGB, HCT, PLT in the last 72 hours  BMP:   Recent Labs      02/14/18   0529   NA  142   K  3 9   CL  106   CO2  29   BUN  10           Progress Toward Goals: improving progressively    Assessment/Plan   Principal Problem:    Schizoaffective disorder, bipolar type (HCC)  Active Problems:    Mild intellectual disability    Obsessive compulsive disorder      Recommended Treatment:  Will continue the patient's present medications his condition is improving  His sleep was improved as well his appetite is 1 normal his recent labs were reviewed  Planned medication and treatment changes: All current active medications have been reviewed  Continue treatment with group therapy, milieu therapy, occupational therapy and medication management  Risks / Benefits of Treatment:    Risks, benefits, and possible side effects of medications explained to patient  Patient has limited understanding of risks and benefits of treatment at this time, but agrees to take medications as prescribed  Counseling / Coordination of Care:    Patient's progress discussed with staff in treatment team meeting  Medication changes reviewed with staff in treatment team meeting  ** Please Note: This note has been constructed using a voice recognition system   **

## 2018-02-15 NOTE — PROGRESS NOTES
Patient pleasant and cooperative  Denies all symptoms and compliant with medications  Looking forward to discharge

## 2018-02-15 NOTE — PLAN OF CARE
Problem: Potential for Falls  Goal: Patient will remain free of falls  INTERVENTIONS:  - Assess patient frequently for physical needs  -  Identify cognitive and physical deficits and behaviors that affect risk of falls    -  [place] fall precautions as indicated by assessment   - Educate patient/family on patient safety including physical limitations  - Instruct patient to call for assistance with activity based on assessment  - Modify environment to reduce risk of injury  - Consider OT/PT consult to assist with strengthening/mobility   Outcome: Completed Date Met: 02/15/18      Problem: SELF HARM/SUICIDALITY  Goal: Will have no self-injury during hospital stay  INTERVENTIONS:  - Q 15 MINUTES: Routine safety checks  - Q WAKING SHIFT & PRN: Assess risk to determine if routine checks are adequate to maintain patient safety  - Encourage patient to participate actively in care by formulating a plan to combat response to suicidal ideation, identify supports and resources   Outcome: Completed Date Met: 02/15/18      Problem: PSYCHOSIS  Goal: Will report no hallucinations or delusions  Interventions:  - Administer medication as  ordered  - Every waking shifts and PRN assess for the presence of hallucinations and or delusions  - Assist with reality testing to support increasing orientation  - Assess if patient's hallucinations or delusions are encouraging self-harm or harm to others and intervene as appropriate   Outcome: Completed Date Met: 02/15/18      Problem: SAFETY ADULT  Goal: Maintain or return to baseline ADL function  INTERVENTIONS:  -  Assess patient's ability to carry out ADLs; assess patient's baseline for ADL function and identify physical deficits which impact ability to perform ADLs (bathing, care of mouth/teeth, toileting, grooming, dressing, etc )  - Assess/evaluate cause of self-care deficits   - Assess range of motion  - Assess patient's mobility; develop plan if impaired  - Assess patient's need for assistive devices and provide as appropriate  - Encourage maximum independence but intervene and supervise when necessary  ¯ Involve family in performance of ADLs  ¯ Assess for home care needs following discharge   ¯ Request OT consult to assist with ADL evaluation and planning for discharge  ¯ Provide patient education as appropriate   Outcome: Completed Date Met: 02/15/18    Goal: Maintain or return mobility status to optimal level  INTERVENTIONS:  - Assess patient's baseline mobility status (ambulation, transfers, stairs, etc )    - Identify cognitive and physical deficits and behaviors that affect mobility  - Identify mobility aids required to assist with transfers and/or ambulation (gait belt, sit-to-stand, lift, walker, cane, etc )  - [place] fall precautions as indicated by assessment  - Record patient progress and toleration of activity level on Mobility SBAR; progress patient to next Phase/Stage  - Instruct patient to call for assistance with activity based on assessment  - Request Rehabilitation consult to assist with strengthening/weightbearing, etc    Outcome: Completed Date Met: 02/15/18      Problem: DISCHARGE PLANNING  Goal: Discharge to home or other facility with appropriate resources  INTERVENTIONS:  - Identify barriers to discharge w/patient and caregiver  - Arrange for needed discharge resources and transportation as appropriate  - Identify discharge learning needs (meds, wound care, etc )  - Arrange for interpretive services to assist at discharge as needed  - Refer to Case Management Department for coordinating discharge planning if the patient needs post-hospital services based on physician/advanced practitioner order or complex needs related to functional status, cognitive ability, or social support system   Outcome: Completed Date Met: 02/15/18      Problem: Risk for Violence/Aggression Toward Others  Goal: Refrain from harming others  Outcome: Completed Date Met: 02/15/18    Goal: Control angry outbursts  Interventions:  - Monitor patient closely, per order  - Ensure early verbal de-escalation  - Monitor prn medication needs  - Set reasonable/therapeutic limits, outline behavioral expectations, and consequences   - Provide a non-threatening milieu, utilizing the least restrictive interventions    Outcome: Completed Date Met: 02/15/18      Problem: Ineffective Coping  Goal: Participates in unit activities  Interventions:  - Provide therapeutic environment   - Provide required programming   - Redirect inappropriate behaviors    Outcome: Completed Date Met: 02/15/18

## 2018-02-16 NOTE — DISCHARGE SUMMARY
Discharge Summary - [address] [facility] 40 y o  male MRN: [id number]  Unit/Bed#: JS4 700-19 Encounter: [id number]     Admission Date: 2/9/2018         Discharge Date: 2/15/2018      Attending Psychiatrist: KERWIN Concepcion     Reason for Admission/HPI:     The patient was brought from his group home due was reside with supervision to on 1 after he became angry agitated in stating that he started to hear voices  The patient has chronic schizoaffective disorder bipolar type was episodic exacerbation of his disorder  Patient was treated inpatient unit frequently  He was admitted to treat his angry agitated psychotic condition  Hospital Course:     Mr Estrellita Gan was admitted and all appropriate precautions were started  Pt was oriented to the unit  His treatment, including medication management and therapy was discussed with the patient, and he agreed  During the hospitalization the patient was encouraged to attend individual therapy, group therapy, milieu therapy and occupational therapy  Patient condition significantly improved after his  medications were started, his Depakote was restarted was therapeutic level, and Abilify was started as the patient stated that he remembers Abilify to help him before to help with his mood and anger    Pt agreed to start his medication after discussion of potential benefits and side effects  Risks, benefits, and possible side effects of medications explained to patient  Patient has limited understanding of risks and benefits of treatment at this time, but agrees to take medications as prescribed    He also participated in therapy  Patient's diabetes was controlled by a metformin  Pt  sleep improved and appetite improved as well  During his  treatment at the Unit the patient did not have any episodes of self-harming or harming to others behaviors, was cooperative with the treatment plan    The patient spent most of the day in his room but participated in some activities, Tolerated medications without side effects  Vitals were stable  Denied any SI or HI  I discussed the medication regimen and possible side effects of the medications with the patient prior to discharge  At the time of discharge Mr   was tolerating psychiatric medications  Please see After Discharge Summary for a completed list of discharge medications  Safety plan was discussed and approved by the patient  He was not manic, depressed, angry, or confused or psychotic on a day of discharge and was accountable for his actions  I discussed outpatient follow up with the patient, which was arranged by the unit  upon discharge  Safety plan was discussed and approved by Mr     Reviewed with the patient importance of compliance with medications and outpatient treatment after discharge  The patient was competent to understand of risks and benefits of his actions  He was provided was a prescriptions to check his Depakote level again, and BMP because the patient has chronic kidney problems however the most recent creatinine went within normal limit  He was also provided was a hemoglobin A1c in 1 months to follow on his diabetes treatment  Mental Status at time of Discharge:     Mental Status Evaluation:  Significantly improved, good eye contact good rapport with the writer no angry or agitated state no responding to internal stimuli and obsessive hand movements are much less evident      Appearance:  dressed appropriately, casually dressed   Behavior:  cooperative, calm   Mood:  improved, Pleasant and calm   Affect: reactive, brighter, less labile, less constricted    Speech:  slow, increased latency of response   Language: appropriate   Thought Process:  concrete   Associations: concrete associations   Thought Content:  normal   Perceptual Disturbances: no auditory hallucinations, no visual hallucinations   Risk Potential: Suicidal ideation - None  Homicidal ideation - None  Potential for aggression - No   Sensorium:  oriented to person, place and time   Memory:  recent and remote memory grossly intact   Consciousness:  alert and awake   Attention: decreased concentration   Intellect: below average   Fund of Knowledge: awareness of current events appropriate   Insight:  improved   Judgment: normal and improved   Muscle Tone: normal   Gait/Station: normal gait/station and normal balance   Motor Activity: no abnormal movements         Discharge Diagnosis:   Patient Active Problem List   Diagnosis    Agitation    Schizoaffective disorder, bipolar type ([facility] [address] )    Mild intellectual disability    Obsessive compulsive disorder    Nuclear sclerotic cataract of left eye    Nuclear sclerotic cataract of right eye    CKD (chronic kidney disease) stage 2, GFR 60-89 ml/min    Type 2 diabetes mellitus with hyperglycemia ([facility] [address] )    Hyperlipidemia       Discharge Medications:  See after visit summary for reconciled discharge medications provided to patient and family  Discharge instructions/Information to patient and family:   See after visit summary for information provided to patient and family  Provisions for Follow-Up Care:  See after visit summary for information related to follow-up care and any pertinent home health orders  Discharge Statement   I spent 40 minutes discharging the patient  This time was spent on the day of discharge  I had direct contact with the patient on the day of discharge  Additional documentation is required if more than 30 minutes were spent on discharge  Portions of the record may have been created with voice recognition software

## 2018-03-16 LAB
ALBUMIN SERPL-MCNC: 3.5 G/DL (ref 3.6–5.1)
ALBUMIN/CREAT UR: 19 MCG/MG CREAT
ALBUMIN/GLOB SERPL: 1.3 (CALC) (ref 1–2.5)
ALP SERPL-CCNC: 70 U/L (ref 40–115)
ALT SERPL-CCNC: 7 U/L (ref 9–46)
AST SERPL-CCNC: 7 U/L (ref 10–40)
BILIRUB SERPL-MCNC: 0.3 MG/DL (ref 0.2–1.2)
BUN SERPL-MCNC: 10 MG/DL (ref 7–25)
BUN/CREAT SERPL: ABNORMAL (CALC) (ref 6–22)
CALCIUM SERPL-MCNC: 8.5 MG/DL (ref 8.6–10.3)
CHLORIDE SERPL-SCNC: 102 MMOL/L (ref 98–110)
CHOLEST SERPL-MCNC: 110 MG/DL
CHOLEST/HDLC SERPL: 3.7 (CALC)
CO2 SERPL-SCNC: 32 MMOL/L (ref 20–31)
CREAT SERPL-MCNC: 1.18 MG/DL (ref 0.6–1.35)
CREAT UR-MCNC: 77 MG/DL (ref 20–370)
ERYTHROCYTE [DISTWIDTH] IN BLOOD BY AUTOMATED COUNT: 12.6 % (ref 11–15)
GLOBULIN SER CALC-MCNC: 2.8 G/DL (CALC) (ref 1.9–3.7)
GLUCOSE SERPL-MCNC: 137 MG/DL (ref 65–99)
HBA1C MFR BLD: 5.9 % OF TOTAL HGB
HCT VFR BLD AUTO: 45.1 % (ref 38.5–50)
HDLC SERPL-MCNC: 30 MG/DL
HGB BLD-MCNC: 15.3 G/DL (ref 13.2–17.1)
LDLC SERPL CALC-MCNC: 54 MG/DL (CALC)
MCH RBC QN AUTO: 31.4 PG (ref 27–33)
MCHC RBC AUTO-ENTMCNC: 33.9 G/DL (ref 32–36)
MCV RBC AUTO: 92.6 FL (ref 80–100)
MICROALBUMIN UR-MCNC: 1.5 MG/DL
NONHDLC SERPL-MCNC: 80 MG/DL (CALC)
PLATELET # BLD AUTO: 149 THOUSAND/UL (ref 140–400)
PMV BLD REES-ECKER: 10.2 FL (ref 7.5–12.5)
POTASSIUM SERPL-SCNC: 4.5 MMOL/L (ref 3.5–5.3)
PROT SERPL-MCNC: 6.3 G/DL (ref 6.1–8.1)
RBC # BLD AUTO: 4.87 MILLION/UL (ref 4.2–5.8)
SL AMB EGFR AFRICAN AMERICAN: 91 ML/MIN/1.73M2
SL AMB EGFR NON AFRICAN AMERICAN: 79 ML/MIN/1.73M2
SODIUM SERPL-SCNC: 141 MMOL/L (ref 135–146)
TRIGL SERPL-MCNC: 185 MG/DL
TSH SERPL-ACNC: 1.51 MIU/L (ref 0.4–4.5)
WBC # BLD AUTO: 10.3 THOUSAND/UL (ref 3.8–10.8)

## 2018-03-30 ENCOUNTER — TELEPHONE (OUTPATIENT)
Dept: NEPHROLOGY | Facility: CLINIC | Age: 38
End: 2018-03-30

## 2018-03-30 NOTE — TELEPHONE ENCOUNTER
----- Message from Simin Perez DO sent at 3/30/2018  2:02 PM EDT -----  Regarding: RE:  He essentially has ckd stage 1 at most based on Cr 1 18 per last bloodwork  I actually think he can continue to be followed by his pcp unless some issue arises (ie higher cr on follow up labs or BP issues that cannot be managed by his pcp)  So I do not need to see him  Thanks!  ----- Message -----  From: Alisa Connolly  Sent: 3/30/2018   8:57 AM  To: Simin Perez DO    I got a call from Sánchez Muller (from [facility])  She is wondering when Carrillo Pathak needs to come back  She was told that his f/u appt was dependent on his blood work results  He just got labs on 3/15/18  Let me know when you would want to see him, if needed, and I can call her back  Thanks!

## 2018-12-05 NOTE — PROGRESS NOTES
PT given PRn ativan IM 2 mg for severe anxiety/agitation  PT asked for IM of ativan due increasing thoughts of throwing things  PT asked to eat in his room so he wouldn't touch or hit  other patients  PT was beginning to touch other  patients ealier shift  PT is very anxious because of his tremors and states cogentin does not work  PT was cooperative w/ IM  PT remains in his room  [facility]    PATIENT'S NAME: Estelita Alonso   ATTENDING PHYSICIAN: Rohit Ervin M.D. OPERATING PHYSICIAN: Rohit Ervin M.D.    PATIENT ACCOUNT#:   [de-identified]    LOCATION:  [facility] ENDO POOL ROOMS 1 EDWP 10  MEDICAL RECORD #:   [id number]

## 2018-12-21 ENCOUNTER — OFFICE VISIT (OUTPATIENT)
Dept: FAMILY MEDICINE CLINIC | Facility: CLINIC | Age: 38
End: 2018-12-21
Payer: COMMERCIAL

## 2018-12-21 VITALS
HEIGHT: 69 IN | BODY MASS INDEX: 31.43 KG/M2 | RESPIRATION RATE: 16 BRPM | TEMPERATURE: 97.2 F | DIASTOLIC BLOOD PRESSURE: 61 MMHG | WEIGHT: 212.2 LBS | HEART RATE: 84 BPM | SYSTOLIC BLOOD PRESSURE: 94 MMHG

## 2018-12-21 DIAGNOSIS — G44.219 EPISODIC TENSION-TYPE HEADACHE, NOT INTRACTABLE: ICD-10-CM

## 2018-12-21 DIAGNOSIS — R42 DIZZINESS: ICD-10-CM

## 2018-12-21 DIAGNOSIS — E56.0 VITAMIN E DEFICIENCY: ICD-10-CM

## 2018-12-21 DIAGNOSIS — E11.65 TYPE 2 DIABETES MELLITUS WITH HYPERGLYCEMIA, UNSPECIFIED WHETHER LONG TERM INSULIN USE (HCC): ICD-10-CM

## 2018-12-21 DIAGNOSIS — E78.5 HYPERLIPIDEMIA, UNSPECIFIED HYPERLIPIDEMIA TYPE: ICD-10-CM

## 2018-12-21 DIAGNOSIS — E83.51 HYPOCALCEMIA: ICD-10-CM

## 2018-12-21 DIAGNOSIS — K59.00 CONSTIPATION, UNSPECIFIED CONSTIPATION TYPE: ICD-10-CM

## 2018-12-21 DIAGNOSIS — T78.40XD ALLERGY, SUBSEQUENT ENCOUNTER: ICD-10-CM

## 2018-12-21 DIAGNOSIS — F70 MILD INTELLECTUAL DISABILITY: Chronic | ICD-10-CM

## 2018-12-21 DIAGNOSIS — E03.9 HYPOTHYROIDISM, UNSPECIFIED TYPE: ICD-10-CM

## 2018-12-21 DIAGNOSIS — F25.0 SCHIZOAFFECTIVE DISORDER, BIPOLAR TYPE (HCC): Primary | Chronic | ICD-10-CM

## 2018-12-21 DIAGNOSIS — E78.2 MIXED HYPERLIPIDEMIA: ICD-10-CM

## 2018-12-21 DIAGNOSIS — E55.9 VITAMIN D DEFICIENCY: ICD-10-CM

## 2018-12-21 PROCEDURE — 99213 OFFICE O/P EST LOW 20 MIN: CPT | Performed by: FAMILY MEDICINE

## 2018-12-21 RX ORDER — CALCIUM CARBONATE 500(1250)
1 TABLET ORAL
Qty: 90 TABLET | Refills: 3 | Status: SHIPPED | OUTPATIENT
Start: 2018-12-21 | End: 2018-12-21 | Stop reason: SDUPTHER

## 2018-12-21 RX ORDER — ACETAMINOPHEN 325 MG/1
650 TABLET ORAL EVERY 6 HOURS PRN
Qty: 120 TABLET | Refills: 3 | Status: SHIPPED | OUTPATIENT
Start: 2018-12-21 | End: 2019-04-09

## 2018-12-21 RX ORDER — VITAMIN B COMPLEX
1 CAPSULE ORAL DAILY
Qty: 90 CAPSULE | Refills: 0 | Status: SHIPPED | OUTPATIENT
Start: 2018-12-21 | End: 2019-04-19 | Stop reason: HOSPADM

## 2018-12-21 RX ORDER — LORATADINE 10 MG/1
10 TABLET ORAL DAILY
Qty: 90 TABLET | Refills: 3 | Status: SHIPPED | OUTPATIENT
Start: 2018-12-21 | End: 2018-12-21 | Stop reason: SDUPTHER

## 2018-12-21 RX ORDER — LORATADINE 10 MG/1
10 TABLET ORAL DAILY
Qty: 90 TABLET | Refills: 0 | Status: SHIPPED | OUTPATIENT
Start: 2018-12-21 | End: 2019-01-25 | Stop reason: HOSPADM

## 2018-12-21 RX ORDER — MIRTAZAPINE 15 MG/1
15 TABLET, ORALLY DISINTEGRATING ORAL
Refills: 0
Start: 2018-12-21 | End: 2019-01-25 | Stop reason: HOSPADM

## 2018-12-21 RX ORDER — CALCIUM CARBONATE 500(1250)
1 TABLET ORAL
Qty: 90 TABLET | Refills: 0 | Status: SHIPPED | OUTPATIENT
Start: 2018-12-21 | End: 2019-01-25 | Stop reason: HOSPADM

## 2018-12-21 RX ORDER — DOCUSATE SODIUM 100 MG/1
100 CAPSULE, LIQUID FILLED ORAL 2 TIMES DAILY
Qty: 60 CAPSULE | Refills: 0 | Status: SHIPPED | OUTPATIENT
Start: 2018-12-21 | End: 2019-01-25 | Stop reason: HOSPADM

## 2018-12-21 RX ORDER — DOCUSATE SODIUM 100 MG/1
100 CAPSULE, LIQUID FILLED ORAL 2 TIMES DAILY
Qty: 60 CAPSULE | Refills: 5 | Status: SHIPPED | OUTPATIENT
Start: 2018-12-21 | End: 2018-12-21 | Stop reason: SDUPTHER

## 2018-12-21 RX ORDER — VITAMIN B COMPLEX
1 CAPSULE ORAL DAILY
Qty: 90 CAPSULE | Refills: 3 | Status: SHIPPED | OUTPATIENT
Start: 2018-12-21 | End: 2018-12-21 | Stop reason: SDUPTHER

## 2018-12-21 RX ORDER — ACETAMINOPHEN 325 MG/1
650 TABLET ORAL EVERY 6 HOURS PRN
Qty: 30 TABLET | Refills: 3 | Status: SHIPPED | OUTPATIENT
Start: 2018-12-21 | End: 2018-12-21 | Stop reason: SDUPTHER

## 2018-12-21 RX ORDER — ATORVASTATIN CALCIUM 10 MG/1
10 TABLET, FILM COATED ORAL
Qty: 90 TABLET | Refills: 0 | Status: SHIPPED | OUTPATIENT
Start: 2018-12-21 | End: 2019-01-25 | Stop reason: HOSPADM

## 2018-12-21 RX ORDER — LEVOTHYROXINE SODIUM 0.03 MG/1
25 TABLET ORAL
Qty: 90 TABLET | Refills: 3 | Status: SHIPPED | OUTPATIENT
Start: 2018-12-21 | End: 2018-12-21 | Stop reason: SDUPTHER

## 2018-12-21 RX ORDER — PROPRANOLOL HYDROCHLORIDE 40 MG/1
40 TABLET ORAL 3 TIMES DAILY
Refills: 0
Start: 2018-12-21 | End: 2018-12-24 | Stop reason: SDUPTHER

## 2018-12-21 RX ORDER — VITAMIN E 268 MG
400 CAPSULE ORAL DAILY
Qty: 90 CAPSULE | Refills: 0 | Status: SHIPPED | OUTPATIENT
Start: 2018-12-21 | End: 2019-04-04 | Stop reason: SDUPTHER

## 2018-12-21 RX ORDER — LEVOTHYROXINE SODIUM 0.03 MG/1
25 TABLET ORAL
Qty: 90 TABLET | Refills: 0 | Status: SHIPPED | OUTPATIENT
Start: 2018-12-21 | End: 2019-01-25 | Stop reason: HOSPADM

## 2018-12-21 RX ORDER — CHLORPROMAZINE HYDROCHLORIDE 50 MG/1
100 TABLET, FILM COATED ORAL
Status: ON HOLD | COMMUNITY
End: 2019-01-23

## 2018-12-21 RX ORDER — ATORVASTATIN CALCIUM 10 MG/1
10 TABLET, FILM COATED ORAL
Qty: 90 TABLET | Refills: 3 | Status: SHIPPED | OUTPATIENT
Start: 2018-12-21 | End: 2018-12-21 | Stop reason: SDUPTHER

## 2018-12-21 RX ORDER — MELATONIN
1000 DAILY
Qty: 90 TABLET | Refills: 3 | Status: SHIPPED | OUTPATIENT
Start: 2018-12-21 | End: 2019-01-25 | Stop reason: HOSPADM

## 2018-12-24 ENCOUNTER — TELEPHONE (OUTPATIENT)
Dept: FAMILY MEDICINE CLINIC | Facility: CLINIC | Age: 38
End: 2018-12-24

## 2018-12-24 DIAGNOSIS — F25.0 SCHIZOAFFECTIVE DISORDER, BIPOLAR TYPE (HCC): Chronic | ICD-10-CM

## 2018-12-24 DIAGNOSIS — E11.65 TYPE 2 DIABETES MELLITUS WITH HYPERGLYCEMIA, UNSPECIFIED WHETHER LONG TERM INSULIN USE (HCC): ICD-10-CM

## 2018-12-24 PROBLEM — R42 DIZZINESS: Status: ACTIVE | Noted: 2018-12-24

## 2018-12-24 PROBLEM — E03.9 HYPOTHYROIDISM: Status: ACTIVE | Noted: 2017-05-31

## 2018-12-24 RX ORDER — PROPRANOLOL HYDROCHLORIDE 40 MG/1
40 TABLET ORAL DAILY
Refills: 0
Start: 2018-12-24 | End: 2018-12-26 | Stop reason: SDUPTHER

## 2018-12-24 NOTE — ASSESSMENT & PLAN NOTE
BP 94/61, advised hydration, also advised his aid to mention this  symptom to his psychiatrist as could be secondary to side effect of multiple psych drugs he has been taking  Will check labs and have him follow up

## 2018-12-24 NOTE — ASSESSMENT & PLAN NOTE
Lab Results   Component Value Date    HGBA1C 5 9 (H) 03/15/2018       Blood Sugar Average: Last 72 hrs:  110-120  Patient is currently taking Janumet XR  Advised to follow lifestyle modification with diet and exercise    Will check A1c, CMP, urine for microalbumin, FLP and will have patient follow up in 3 months

## 2018-12-24 NOTE — TELEPHONE ENCOUNTER
Patients care giver called to notify Corrinne Soman that scripts must be updated patient is taking Metformin Twice a day and Inderal once a day  Thanks

## 2018-12-24 NOTE — PROGRESS NOTES
Assessment/Plan     Schizoaffective disorder, bipolar type ([facility] )  Continue follow-up with Dr Vera Rutherford, psychiatry    Type 2 diabetes mellitus with hyperglycemia ([facility] )  Lab Results   Component Value Date    HGBA1C 5 9 (H) 03/15/2018       Blood Sugar Average: Last 72 hrs:  110-120  Patient is currently taking Janumet XR  Advised to follow lifestyle modification with diet and exercise  Will check A1c, CMP, urine for microalbumin, FLP and will have patient follow up in 3 months      Hyperlipidemia  Currently taking atorvastatin 10 mg daily, will check FLP at have patient follow up in 3    Hypothyroidism  Currently taking levothyroxine 25 mcg daily, will check TSH and have patient follow    Dizziness  BP 94/61, advised hydration, also advised his aid to mention this  symptom to his psychiatrist as could be secondary to side effect of multiple psych drugs he has been taking  Will check labs and have him follow up  Diagnoses and all orders for this visit:    Schizoaffective disorder, bipolar type (HCC)  -     mirtazapine (REMERON SOL-TAB) 15 mg disintegrating tablet; Take 1 tablet (15 mg total) by mouth daily at bedtime  -     propranolol (INDERAL) 40 mg tablet; Take 1 tablet (40 mg total) by mouth 3 (three) times a day    Type 2 diabetes mellitus with hyperglycemia, unspecified whether long term insulin use (HCC)  -       -     Comprehensive metabolic panel; Future  -     HEMOGLOBIN A1C W/ EAG ESTIMATION; Future  -     Microalbumin / creatinine urine ratio  -     SITagliptin-MetFORMIN HCl ER (JANUMET XR)  MG TB24; Take 1 tablet by mouth daily with dinner    Hyperlipidemia, unspecified hyperlipidemia type  -     Lipid panel; Future    Mild intellectual disability    -     b complex vitamins capsule; Take 1 capsule by mouth daily    Vitamin E deficiency  -     vitamin E, tocopherol, 400 units capsule;  Take 1 capsule (400 Units total) by mouth daily At 8AM (Supplement0    Allergy, subsequent encounter    - loratadine (CLARITIN) 10 mg tablet; Take 1 tablet (10 mg total) by mouth daily for 30 days    Hypothyroidism, unspecified type  -     TSH, 3rd generation; Future  -     levothyroxine 25 mcg tablet; Take 1 tablet (25 mcg total) by mouth daily in the early morning for 30 days    Constipation, unspecified constipation type   -     docusate sodium (COLACE) 100 mg capsule; Take 1 capsule (100 mg total) by mouth 2 (two) times a day for 30 days    Vitamin D deficiency  -     cholecalciferol (VITAMIN D3) 1,000 units tablet; Take 1 tablet (1,000 Units total) by mouth daily for 30 days    Hypocalcemia    -     calcium carbonate (OYSTER SHELL,OSCAL) 500 mg; Take 1 tablet by mouth daily with breakfast for 30 days    Mixed hyperlipidemia  -     atorvastatin (LIPITOR) 10 mg tablet; Take 1 tablet (10 mg total) by mouth daily with dinner for 30 days    Episodic tension-type headache, not intractable  -     acetaminophen (TYLENOL) 325 mg tablet; Take 2 tablets (650 mg total) by mouth every 6 (six) hours as needed for mild pain    Dizziness  -     CBC and differential; Future    Other orders  -     chlorproMAZINE (THORAZINE) 50 mg tablet; Take 50 mg by mouth 2 (two) times a day         Subjective     Chief Complaint   Patient presents with    Physical Exam     complains of dizziness       27-year-old male presented to office with his supervisor and an aide to establish care  He lives at a group home,  [facility] and is on 24 hr 2 on 1 observation  He has schizoaffective disorder and mild intellectual disorder and was recently hospitalized for violent behavior  He has bed at various group homes before this and recently moved to [facility]  Patient is here to establish care at get refills on his medications, he does report occasional dizziness and lightheadedness but has no other concerns today          The following portions of the patient's history were reviewed and updated as appropriate: allergies, current medications, past family history, past medical history, past social history, past surgical history and problem list     Review of Systems   Constitutional: Negative for activity change, appetite change, chills and fever  HENT: Negative for congestion  Eyes: Negative for discharge and itching  Respiratory: Negative for cough, chest tightness, shortness of breath and wheezing  Cardiovascular: Negative for chest pain  Gastrointestinal: Negative for diarrhea, nausea and vomiting  Endocrine: Negative for cold intolerance  Genitourinary: Negative for difficulty urinating  Neurological: Negative for weakness and headaches  Objective     Vitals:Blood pressure 94/61, pulse 84, temperature (!) 97 2 °F (36 2 °C), temperature source Tympanic, resp  rate 16, height 5' 8 5" (1 74 m), weight 96 3 kg (212 lb 3 2 oz)  Physical Exam:  Physical Exam   Constitutional: He is oriented to person, place, and time  He appears well-developed and well-nourished  HENT:   Head: Normocephalic and atraumatic  Mouth/Throat: Oropharynx is clear and moist    Eyes: Conjunctivae and EOM are normal    Neck: Normal range of motion  Neck supple  Cardiovascular: Normal rate, regular rhythm and normal heart sounds  Exam reveals no friction rub  No murmur heard  Pulmonary/Chest: Effort normal and breath sounds normal  No respiratory distress  He has no wheezes  He has no rales  Abdominal: Soft  Bowel sounds are normal  He exhibits no distension  There is no tenderness  Musculoskeletal: Normal range of motion  Neurological: He is alert and oriented to person, place, and time  Skin: Skin is warm and dry  Vitals reviewed

## 2018-12-25 ENCOUNTER — TELEPHONE (OUTPATIENT)
Dept: FAMILY MEDICINE CLINIC | Facility: CLINIC | Age: 38
End: 2018-12-25

## 2018-12-26 ENCOUNTER — TELEPHONE (OUTPATIENT)
Dept: FAMILY MEDICINE CLINIC | Facility: CLINIC | Age: 38
End: 2018-12-26

## 2018-12-26 DIAGNOSIS — E11.65 TYPE 2 DIABETES MELLITUS WITH HYPERGLYCEMIA, UNSPECIFIED WHETHER LONG TERM INSULIN USE (HCC): ICD-10-CM

## 2018-12-26 DIAGNOSIS — F25.0 SCHIZOAFFECTIVE DISORDER, BIPOLAR TYPE (HCC): Chronic | ICD-10-CM

## 2018-12-26 RX ORDER — PROPRANOLOL HYDROCHLORIDE 40 MG/1
40 TABLET ORAL DAILY
Refills: 0 | Status: ON HOLD
Start: 2018-12-26 | End: 2019-01-14

## 2018-12-26 NOTE — TELEPHONE ENCOUNTER
Sanam Alcantara had called again to ask if about janumet, he was taking it 8am and 5pm the order received by pharmacy states she wanted to know why it was put in for once a day  She stated him taking it twice a day was working fine, needs to know if this is an error or if you changed it to once a day and why

## 2018-12-26 NOTE — TELEPHONE ENCOUNTER
Aretha Colon called from [facility] and stated the pharmacy never received order for Inderal 40mg to be daily but pt had been on inderal 40 tid  the med was changed in our system please forward correct order to the pharmacy  Pt was on Janumet at 8 am and 5 pm if you decreased but the order in the computer is for daily janumet but no notation of decrease from bid  please resend to the pharmacy at correct frequency and call to Aretha Ferreira if any questions   Thank you

## 2018-12-31 ENCOUNTER — TELEPHONE (OUTPATIENT)
Dept: FAMILY MEDICINE CLINIC | Facility: CLINIC | Age: 38
End: 2018-12-31

## 2018-12-31 NOTE — TELEPHONE ENCOUNTER
Mariposa Lim,  ([phone]) left voice message  At patient's last visit, there was discussion in changing Inderal, there is no new order at pharmacy  Also, patient is not scheduled until March, do you want him to be seen sooner?

## 2019-01-03 ENCOUNTER — TELEPHONE (OUTPATIENT)
Dept: FAMILY MEDICINE CLINIC | Facility: CLINIC | Age: 39
End: 2019-01-03

## 2019-01-03 DIAGNOSIS — F42.9 OBSESSIVE COMPULSIVE DISORDER: Primary | Chronic | ICD-10-CM

## 2019-01-03 RX ORDER — PROPRANOLOL HYDROCHLORIDE 40 MG/1
40 TABLET ORAL DAILY
Qty: 60 TABLET | Refills: 2 | Status: SHIPPED | OUTPATIENT
Start: 2019-01-03 | End: 2019-01-25 | Stop reason: HOSPADM

## 2019-01-03 NOTE — TELEPHONE ENCOUNTER
[facility] called they need the inderal 40 mg order sent to the pharmacy [facility]  Please send asap  Thank you, Chapin Babcock    Patient is also to schedule in 3 weeks please call to schedule

## 2019-01-08 ENCOUNTER — TELEPHONE (OUTPATIENT)
Dept: FAMILY MEDICINE CLINIC | Facility: CLINIC | Age: 39
End: 2019-01-08

## 2019-01-08 NOTE — TELEPHONE ENCOUNTER
Josh Welsh from [facility] left message  Patient is coming 1/25/19 to see you  Should he have lab work done prior to this visit or can it wait until his visit in march

## 2019-01-11 ENCOUNTER — HOSPITAL ENCOUNTER (EMERGENCY)
Facility: HOSPITAL | Age: 39
End: 2019-01-12
Attending: EMERGENCY MEDICINE | Admitting: EMERGENCY MEDICINE
Payer: COMMERCIAL

## 2019-01-11 DIAGNOSIS — F30.9 MANIA (HCC): Primary | ICD-10-CM

## 2019-01-11 DIAGNOSIS — R45.851 SUICIDAL IDEATION: ICD-10-CM

## 2019-01-11 DIAGNOSIS — E11.65 TYPE 2 DIABETES MELLITUS WITH HYPERGLYCEMIA, UNSPECIFIED WHETHER LONG TERM INSULIN USE (HCC): ICD-10-CM

## 2019-01-11 PROCEDURE — 82075 ASSAY OF BREATH ETHANOL: CPT | Performed by: EMERGENCY MEDICINE

## 2019-01-11 PROCEDURE — 80307 DRUG TEST PRSMV CHEM ANLYZR: CPT | Performed by: EMERGENCY MEDICINE

## 2019-01-11 PROCEDURE — 99285 EMERGENCY DEPT VISIT HI MDM: CPT

## 2019-01-11 RX ORDER — LORAZEPAM 0.5 MG/1
1 TABLET ORAL ONCE
Status: COMPLETED | OUTPATIENT
Start: 2019-01-11 | End: 2019-01-11

## 2019-01-11 RX ADMIN — LORAZEPAM 1 MG: 0.5 TABLET ORAL at 23:00

## 2019-01-11 NOTE — LETTER
[address]  [address] [address]  Dept: [id number]  TRANSFER CONSENT    NAME Gabriele Burgos                                         1980                              MRN [id number]    I have been informed of my rights regarding examination, treatment, and transfer   by Dr Romeo att  providers found    Benefits: Specialized equipment and/or services available at the receiving facility (Include comment)________________________ (inpatient mental health treatment)    Risks: Potential for delay in receiving treatment      Consent for Transfer:  I acknowledge that my medical condition has been evaluated and explained to me by the emergency department physician or other qualified medical person and/or my attending physician, who has recommended that I be transferred to the service of  Accepting Physician: Dr Sandrita Villagomez MD ([facility]) at [address] [place] [address] Name, [address] : [facility] [id number]  The above potential benefits of such transfer, the potential risks associated with such transfer, and the probable risks of not being transferred have been explained to me, and I fully understand them  The doctor has explained that, in my case, the benefits of transfer outweigh the risks  I agree to be transferred  I authorize the performance of emergency medical procedures and treatments upon me in both transit and upon arrival at the receiving facility  Additionally, I authorize the release of any and all medical records to the receiving facility and request they be transported with me, if possible  I understand that the safest mode of transportation during a medical emergency is an ambulance and that the Hospital advocates the use of this mode of transport   Risks of traveling to the receiving facility by car, including absence of medical control, life sustaining equipment, such as oxygen, and medical personnel has been explained to me and I fully understand them  (ÁLVARO CORRECT BOX BELOW)  [  ]  I consent to the stated transfer and to be transported by ambulance/helicopter  [  ]  I consent to the stated transfer, but refuse transportation by ambulance and accept full responsibility for my transportation by car  I understand the risks of non-ambulance transfers and I exonerate the Hospital and its staff from any deterioration in my condition that results from this refusal     X___________________________________________    DATE  19  TIME________  Signature of patient or legally responsible individual signing on patient behalf           RELATIONSHIP TO PATIENT_________________________          Provider Certification    NAME Paul Daniels DOB 1980                              MRN [id number]    A medical screening exam was performed on the above named patient  Based on the examination:    Condition Necessitating Transfer The primary encounter diagnosis was Nicol ([facility] [address] )  Diagnoses of Suicidal ideation and Type 2 diabetes mellitus with hyperglycemia, unspecified whether long term insulin use ([facility] ) were also pertinent to this visit      Patient Condition: The patient has been stabilized such that within reasonable medical probability, no material deterioration of the patient condition or the condition of the unborn child(sanchez) is likely to result from the transfer    Reason for Transfer: Level of Care needed not available at this facility    Transfer Requirements: [address] [place] [address] Dr Makenzie Powell [address] [facility]   · Space available and qualified personnel available for treatment as acknowledged by Srinath Moeller, [address] [facility]  · Agreed to accept transfer and to provide appropriate medical treatment as acknowledged by       Dr Cinthia Stone MD ([facility])  · Appropriate medical records of the examination and treatment of the patient are provided at the time of transfer   STAFF INITIAL WHEN COMPLETED _______  · Transfer will be performed by qualified personnel from [facility]  [phone]  and appropriate transfer equipment as required, including the use of necessary and appropriate life support measures  Provider Certification: I have examined the patient and explained the following risks and benefits of being transferred/refusing transfer to the patient/family:  General risk, such as traffic hazards, adverse weather conditions, rough terrain or turbulence, possible failure of equipment (including vehicle or aircraft), or consequences of actions of persons outside the control of the transport personnel, The patient is stable for psychiatric transfer because they are medically stable, and is protected from harming him/herself or others during transport      Based on these reasonable risks and benefits to the patient and/or the unborn child(sanchez), and based upon the information available at the time of the patients examination, I certify that the medical benefits reasonably to be expected from the provision of appropriate medical treatments at another medical facility outweigh the increasing risks, if any, to the individuals medical condition, and in the case of labor to the unborn child, from effecting the transfer      X____________________________________________ DATE 01/12/19        TIME_______      ORIGINAL - SEND TO MEDICAL RECORDS   COPY - SEND WITH PATIENT DURING TRANSFER

## 2019-01-12 ENCOUNTER — HOSPITAL ENCOUNTER (INPATIENT)
Facility: HOSPITAL | Age: 39
LOS: 13 days | Discharge: HOME | DRG: 885 | End: 2019-01-25
Attending: PSYCHIATRY & NEUROLOGY | Admitting: PSYCHIATRY & NEUROLOGY
Payer: COMMERCIAL

## 2019-01-12 VITALS
OXYGEN SATURATION: 94 % | HEART RATE: 97 BPM | RESPIRATION RATE: 20 BRPM | TEMPERATURE: 98.2 F | SYSTOLIC BLOOD PRESSURE: 123 MMHG | WEIGHT: 212.3 LBS | DIASTOLIC BLOOD PRESSURE: 68 MMHG | BODY MASS INDEX: 32.18 KG/M2 | HEIGHT: 68 IN

## 2019-01-12 DIAGNOSIS — F25.0 SCHIZOAFFECTIVE DISORDER, BIPOLAR TYPE (HCC): Primary | Chronic | ICD-10-CM

## 2019-01-12 DIAGNOSIS — E03.9 HYPOTHYROIDISM, UNSPECIFIED TYPE: ICD-10-CM

## 2019-01-12 DIAGNOSIS — E78.5 HYPERLIPIDEMIA, UNSPECIFIED HYPERLIPIDEMIA TYPE: ICD-10-CM

## 2019-01-12 DIAGNOSIS — E11.65 TYPE 2 DIABETES MELLITUS WITH HYPERGLYCEMIA, UNSPECIFIED WHETHER LONG TERM INSULIN USE (HCC): ICD-10-CM

## 2019-01-12 RX ORDER — MIRTAZAPINE 15 MG/1
15 TABLET, ORALLY DISINTEGRATING ORAL
Status: DISCONTINUED | OUTPATIENT
Start: 2019-01-12 | End: 2019-01-12 | Stop reason: HOSPADM

## 2019-01-12 RX ORDER — PROPRANOLOL HYDROCHLORIDE 20 MG/1
40 TABLET ORAL DAILY
Status: DISCONTINUED | OUTPATIENT
Start: 2019-01-12 | End: 2019-01-12 | Stop reason: HOSPADM

## 2019-01-12 RX ORDER — ACETAMINOPHEN 325 MG/1
325 TABLET ORAL EVERY 6 HOURS PRN
Status: CANCELLED | OUTPATIENT
Start: 2019-01-12

## 2019-01-12 RX ORDER — HALOPERIDOL 5 MG
5 TABLET ORAL EVERY 6 HOURS PRN
Status: DISCONTINUED | OUTPATIENT
Start: 2019-01-12 | End: 2019-01-16

## 2019-01-12 RX ORDER — DIVALPROEX SODIUM 500 MG/1
500 TABLET, DELAYED RELEASE ORAL 2 TIMES DAILY
Status: DISCONTINUED | OUTPATIENT
Start: 2019-01-12 | End: 2019-01-12 | Stop reason: HOSPADM

## 2019-01-12 RX ORDER — LEVOTHYROXINE SODIUM 0.03 MG/1
25 TABLET ORAL
Status: DISCONTINUED | OUTPATIENT
Start: 2019-01-12 | End: 2019-01-12 | Stop reason: HOSPADM

## 2019-01-12 RX ORDER — LORATADINE 10 MG/1
10 TABLET ORAL DAILY
Status: DISCONTINUED | OUTPATIENT
Start: 2019-01-12 | End: 2019-01-12 | Stop reason: HOSPADM

## 2019-01-12 RX ORDER — TRAZODONE HYDROCHLORIDE 50 MG/1
50 TABLET ORAL
Status: CANCELLED | OUTPATIENT
Start: 2019-01-12

## 2019-01-12 RX ORDER — HALOPERIDOL 5 MG/ML
5 INJECTION INTRAMUSCULAR EVERY 6 HOURS PRN
Status: CANCELLED | OUTPATIENT
Start: 2019-01-12

## 2019-01-12 RX ORDER — RISPERIDONE 1 MG/1
1 TABLET, ORALLY DISINTEGRATING ORAL EVERY 6 HOURS PRN
Status: DISCONTINUED | OUTPATIENT
Start: 2019-01-12 | End: 2019-01-15

## 2019-01-12 RX ORDER — ARIPIPRAZOLE 10 MG/1
10 TABLET ORAL DAILY
COMMUNITY
End: 2019-01-25 | Stop reason: HOSPADM

## 2019-01-12 RX ORDER — MAGNESIUM HYDROXIDE/ALUMINUM HYDROXICE/SIMETHICONE 120; 1200; 1200 MG/30ML; MG/30ML; MG/30ML
15 SUSPENSION ORAL EVERY 4 HOURS PRN
Status: DISCONTINUED | OUTPATIENT
Start: 2019-01-12 | End: 2019-01-25 | Stop reason: HOSPADM

## 2019-01-12 RX ORDER — HYDROXYZINE HYDROCHLORIDE 25 MG/1
25 TABLET, FILM COATED ORAL EVERY 6 HOURS PRN
Status: CANCELLED | OUTPATIENT
Start: 2019-01-12

## 2019-01-12 RX ORDER — CALCIUM CARBONATE 500(1250)
1 TABLET ORAL
Status: DISCONTINUED | OUTPATIENT
Start: 2019-01-12 | End: 2019-01-12 | Stop reason: HOSPADM

## 2019-01-12 RX ORDER — HALOPERIDOL 5 MG/ML
5 INJECTION INTRAMUSCULAR EVERY 6 HOURS PRN
Status: DISCONTINUED | OUTPATIENT
Start: 2019-01-12 | End: 2019-01-16

## 2019-01-12 RX ORDER — RISPERIDONE 1 MG/1
1 TABLET, ORALLY DISINTEGRATING ORAL EVERY 6 HOURS PRN
Status: CANCELLED | OUTPATIENT
Start: 2019-01-12

## 2019-01-12 RX ORDER — ACETAMINOPHEN 325 MG/1
325 TABLET ORAL EVERY 6 HOURS PRN
Status: DISCONTINUED | OUTPATIENT
Start: 2019-01-12 | End: 2019-01-16

## 2019-01-12 RX ORDER — CHOLECALCIFEROL (VITAMIN D3) 10 MCG
1 TABLET ORAL DAILY
Status: DISCONTINUED | OUTPATIENT
Start: 2019-01-12 | End: 2019-01-12 | Stop reason: HOSPADM

## 2019-01-12 RX ORDER — HYDROXYZINE HYDROCHLORIDE 25 MG/1
25 TABLET, FILM COATED ORAL EVERY 6 HOURS PRN
Status: DISCONTINUED | OUTPATIENT
Start: 2019-01-12 | End: 2019-01-25 | Stop reason: HOSPADM

## 2019-01-12 RX ORDER — DOCUSATE SODIUM 100 MG/1
100 CAPSULE, LIQUID FILLED ORAL 2 TIMES DAILY
Status: DISCONTINUED | OUTPATIENT
Start: 2019-01-12 | End: 2019-01-12 | Stop reason: HOSPADM

## 2019-01-12 RX ORDER — ARIPIPRAZOLE 10 MG/1
10 TABLET ORAL DAILY
Status: DISCONTINUED | OUTPATIENT
Start: 2019-01-12 | End: 2019-01-12 | Stop reason: HOSPADM

## 2019-01-12 RX ORDER — VITAMIN E 268 MG
400 CAPSULE ORAL DAILY
Status: DISCONTINUED | OUTPATIENT
Start: 2019-01-12 | End: 2019-01-12 | Stop reason: HOSPADM

## 2019-01-12 RX ORDER — CHLORPROMAZINE HYDROCHLORIDE 50 MG/1
50 TABLET, FILM COATED ORAL DAILY
COMMUNITY
End: 2019-01-25 | Stop reason: HOSPADM

## 2019-01-12 RX ORDER — MELATONIN
1000 DAILY
Status: DISCONTINUED | OUTPATIENT
Start: 2019-01-12 | End: 2019-01-12 | Stop reason: HOSPADM

## 2019-01-12 RX ORDER — ATORVASTATIN CALCIUM 10 MG/1
10 TABLET, FILM COATED ORAL
Status: DISCONTINUED | OUTPATIENT
Start: 2019-01-12 | End: 2019-01-12 | Stop reason: HOSPADM

## 2019-01-12 RX ORDER — HALOPERIDOL 5 MG
5 TABLET ORAL ONCE
Status: COMPLETED | OUTPATIENT
Start: 2019-01-12 | End: 2019-01-12

## 2019-01-12 RX ORDER — MAGNESIUM HYDROXIDE/ALUMINUM HYDROXICE/SIMETHICONE 120; 1200; 1200 MG/30ML; MG/30ML; MG/30ML
15 SUSPENSION ORAL EVERY 4 HOURS PRN
Status: CANCELLED | OUTPATIENT
Start: 2019-01-12

## 2019-01-12 RX ORDER — PETROLATUM 42 G/100G
OINTMENT TOPICAL 3 TIMES DAILY
Status: DISCONTINUED | OUTPATIENT
Start: 2019-01-12 | End: 2019-01-12 | Stop reason: RX

## 2019-01-12 RX ORDER — HALOPERIDOL 5 MG
5 TABLET ORAL EVERY 6 HOURS PRN
Status: CANCELLED | OUTPATIENT
Start: 2019-01-12

## 2019-01-12 RX ORDER — TRAZODONE HYDROCHLORIDE 50 MG/1
50 TABLET ORAL
Status: DISCONTINUED | OUTPATIENT
Start: 2019-01-12 | End: 2019-01-20

## 2019-01-12 RX ORDER — LANOLIN ALCOHOL/MO/W.PET/CERES
CREAM (GRAM) TOPICAL 3 TIMES DAILY
Status: DISCONTINUED | OUTPATIENT
Start: 2019-01-12 | End: 2019-01-12 | Stop reason: HOSPADM

## 2019-01-12 RX ADMIN — Medication 1 CAPSULE: at 09:18

## 2019-01-12 RX ADMIN — LEVOTHYROXINE SODIUM 25 MCG: 25 TABLET ORAL at 06:12

## 2019-01-12 RX ADMIN — CALCIUM 1 TABLET: 500 TABLET ORAL at 09:18

## 2019-01-12 RX ADMIN — DIVALPROEX SODIUM 500 MG: 500 TABLET, DELAYED RELEASE ORAL at 09:18

## 2019-01-12 RX ADMIN — VITAMIN D, TAB 1000IU (100/BT) 1000 UNITS: 25 TAB at 09:18

## 2019-01-12 RX ADMIN — Medication: at 09:12

## 2019-01-12 RX ADMIN — HALOPERIDOL 5 MG: 5 TABLET ORAL at 11:24

## 2019-01-12 RX ADMIN — DOCUSATE SODIUM 100 MG: 100 CAPSULE, LIQUID FILLED ORAL at 09:18

## 2019-01-12 RX ADMIN — ARIPIPRAZOLE 10 MG: 10 TABLET ORAL at 09:18

## 2019-01-12 RX ADMIN — RISPERIDONE 1 MG: 1 TABLET, ORALLY DISINTEGRATING ORAL at 21:08

## 2019-01-12 RX ADMIN — PROPRANOLOL HYDROCHLORIDE 40 MG: 20 TABLET ORAL at 09:18

## 2019-01-12 RX ADMIN — HYDROXYZINE HYDROCHLORIDE 25 MG: 25 TABLET ORAL at 21:08

## 2019-01-12 RX ADMIN — TRAZODONE HYDROCHLORIDE 50 MG: 50 TABLET ORAL at 21:08

## 2019-01-12 RX ADMIN — LORATADINE 10 MG: 10 TABLET ORAL at 09:18

## 2019-01-12 NOTE — ED NOTES
Pt getting agitated at this time, cursing at group home staff members that are currently at bedside  Pt states he does not want them in his room  Pt redirected at this time  Dr Kulwant Mao  01/12/19 [id number]

## 2019-01-12 NOTE — ED NOTES
Group staff members at the bedside  Pt has been with the group home since 2017 and has been admitted 5 times  Pt has a history of violent behaviors and is a 2:1 at the group home        Reilly Andre RN  01/11/19 [id number]

## 2019-01-12 NOTE — LETTER
January 25, 2019     [address] [place] [address]    Patient: Marilynn Beebe   YOB: 1980   Date of Visit: 1/12/2019       Dear Dr Harjeet Mills: Thank you for referring Marilynn Beebe to me for evaluation  Below are my notes for this consultation  If you have questions, please do not hesitate to call me  I look forward to following your patient along with you  Sincerely,        No name on file  CC: Person Directed [address], [address]  1/20/2019 10:54 AM  Attested  Progress Note - [address] 41 y o  male MRN: [id number]   Unit/Bed#: -02 Encounter: [id number]    Behavior over the last 24 hours:  Ora Valentino was seen for an inpatient follow-up psychiatric visit this date  Per nursing report, he was allowed to use the phone by night shift and became severely agitated again after speaking with his mother  At today's assessment, he appeared less labile, pressured, and tangential although still fixated on medications  He is very argumentative regarding which medications he is willing to take and wants to change his regimen constantly  He is currently endorsing suicidal ideation with a plan to stab himself  He states the voices in his head are telling him to do it  He is able; however, to contract for safety while on the unit  He states he does not want to return to his group home  Appetite is within normal limits  He did sleep last night after receiving trazodone        ROS: no complaints    Mental Status Evaluation:    Appearance:  wearing pajamas   Behavior:  cooperative, calm   Speech:  less tangential, less disorganized , less pressured   Mood:  remains dysphoric, less labile, less irritable   Affect:  reactive   Thought Process:  coherent   Associations: intact associations   Thought Content:  normal, no overt delusions   Perceptual Disturbances: auditory hallucinations of Voices telling him to stab himself   Risk Potential: Suicidal ideation - Yes, without plan  Homicidal ideation - None at present  Potential for aggression - No   Sensorium:  oriented to person, place and time/date   Memory:  recent and remote memory grossly intact   Consciousness:  alert and awake   Attention: poor concentration and poor attention span   Insight:  poor   Judgment: poor   Gait/Station: normal gait/station and normal balance   Motor Activity: no abnormal movements     Vital signs in last 24 hours:    Temp:  [98 8 °F (37 1 °C)-99 4 °F (37 4 °C)] 98 8 °F (37 1 °C)  HR:  [] 90  Resp:  [16-18] 16  BP: (124-140)/(65-90) 124/65    Laboratory results:  I have personally reviewed all pertinent laboratory/tests results  Progress Toward Goals: progressing    Assessment/Plan   Principal Problem:    Schizoaffective disorder, bipolar type (HCC)  Active Problems:    Mild intellectual disability    Obsessive compulsive disorder    Recommended Treatment:   1  Continue current medications as prescribed  2  Continue to monitor patient and adjust medications as needed  3  Discharge disposition and planning are ongoing  All current active medications have been reviewed    Encourage group therapy, milieu therapy and occupational therapy  Behavioral Health checks every 7 minutes      Current Facility-Administered Medications:  acetaminophen 325 mg Oral Q6H PRN Rohan Xiong MD   aluminum-magnesium hydroxide-simethicone 15 mL Oral Q4H PRN Migue Crystal MD   ammonium lactate  Topical Daily CHRISTIANA Zaidi   atorvastatin 10 mg Oral Daily With Dinner Alvina Munguia MD   bismuth subsalicylate 350 mg Oral Q6H PRN Alvina Munguia MD   calcium carbonate 1 tablet Oral Daily With Breakfast Alvina Munguia MD   cholecalciferol 1,000 Units Oral Daily Alvina Munguai MD   diphenhydrAMINE 50 mg Intramuscular Q6H PRN CHRISTIANA Jimenez   And       haloperidol lactate 10 mg Intramuscular Q6H PRN CHRISTIANA Jimenez   diphenhydrAMINE 50 mg Oral BID CHRISTIANA Rosenberg divalproex sodium 1,000 mg Oral Q12H Giles Caal MD   divalproex sodium 500 mg Oral Daily CHRISTIANA Jimenez   haloperidol 5 mg Oral Q6H PRN Breanne Chery MD   hydrOXYzine HCL 25 mg Oral Q6H PRN Junior Truong MD   ketotifen 1 drop Both Eyes BID PRN Girish Daily, MD   levothyroxine 25 mcg Oral Early Morning Girish Daily, MD   loratadine 10 mg Oral Daily Girish Daily, MD   LORazepam 2 mg Intramuscular Q4H PRN CHRISTIANA Jimenez   LORazepam 0 5 mg Oral TID CHRISTIANA Jimenez   magnesium hydroxide 20 mL Oral Daily PRN Junior Truong MD   metFORMIN 1,000 mg Oral Daily With Breakfast Girish Daily, MD   And       sitaGLIPtin 50 mg Oral After Breakfast Girish Daily, MD   mirtazapine 7 5 mg Oral HS CHRISTIANA Jimenez   multivitamin stress formula 1 tablet Oral Daily Girish Daily, MD   OLANZapine 10 mg Oral Q3H PRN CHRISTIANA Jimenez   propranolol 10 mg Oral BID CHRISTIANA Jimenez   risperiDONE 2 mg Oral BID Breanne Chery MD   senna-docusate sodium 1 tablet Oral HS CHRISTIANA Jimenez   traZODone 50 mg Oral HS PRN Junior Truong MD       Risks / Benefits of Treatment:    Risks, benefits, and possible side effects of medications explained to patient and patient verbalizes understanding and agreement for treatment  Counseling / Coordination of Care:      Patient's progress discussed with staff in treatment team meeting  Medications, treatment progress and treatment plan reviewed with patient  Attestation signed by Girish Lizarraga MD at 1/20/2019 11:10 AM:         Teaching Physician Statement  I performed history and exam of patient  I discussed with the Advanced Practitioner Neo agree with her documented findings and plan of care                  CHRISTIANA Daniels  1/19/2019 12:26 PM  Attested Addendum  Progress Note - Behavioral Health     Radha Pi 41 y o  male MRN: [id number]   Unit/Bed#: -02 Encounter: [id number]    Behavior over the last 24 hours:  Laurie Bruce was seen for an inpatient follow-up psychiatric visit this date  He was seen in the quiet room due to agitation  Per nursing report, he has been verbally and physically aggressive and is extremely difficult to redirect  He is requiring p r n  Medication in order to control behavior  At today's assessment, he is extremely labile, paranoid, and obsessive  He relates he is extremely upset at his mother who allegedly told him he is going to skilled nursing  He now cannot stop thinking about it  He did sleep through the night after receiving p r n  Medication and constant redirection  He relates that he would like to hurt his mother but at the same time misses her and wants to see her  He remains on phone restrictions because he keeps calling her and speaking with her increases his agitation      ROS: no complaints    Mental Status Evaluation:    Appearance:  wearing pajamas   Behavior:  angry, still very agitated, psychomotor agitation   Speech:  pressured, increased volume, tangential   Mood:  dysphoric, labile, angry   Affect:  tearful, expansive, reactive   Thought Process:  disorganized, tangential, racing of thoughts, perseverative   Associations: circumstantial associations   Thought Content:  Obsessive thoughts   Perceptual Disturbances: denies auditory hallucinations when asked   Risk Potential: Suicidal ideation - None  Homicidal ideation - yes without plan  Potential for aggression - Yes, due to agitation   Sensorium:  oriented to person, place and time/date   Memory:  recent and remote memory grossly intact   Consciousness:  alert and awake   Attention: decreased concentration and decreased attention span   Insight:  poor   Judgment: poor   Gait/Station: normal gait/station and normal balance   Motor Activity: no abnormal movements     Vital signs in last 24 hours:    Temp:  [97 3 °F (36 3 °C)-98 °F (36 7 °C)] 97 3 °F (36 3 °C)  HR:  [92-97] 97  Resp:  [16-18] 18  BP: (114-129)/(70-89) 129/89    Laboratory results:  I have personally reviewed all pertinent laboratory/tests results  Progress Toward Goals: regressed    Assessment/Plan   Principal Problem:    Schizoaffective disorder, bipolar type (HCC)  Active Problems:    Mild intellectual disability    Obsessive compulsive disorder    Recommended Treatment:   1  Depakote increased to 2500 mg daily  2  Initiate Klonopin 0 5 mg 3 times daily  3  PRN Medications reviewed and updated  4  Will continue to monitor patient and adjust medications as needed  All current active medications have been reviewed    Encourage group therapy, milieu therapy and occupational therapy  Behavioral Health checks every 7 minutes      Current Facility-Administered Medications:  acetaminophen 325 mg Oral Q6H PRN Sherita Meigs, MD   aluminum-magnesium hydroxide-simethicone 15 mL Oral Q4H PRN Apolinar Gonsalves MD   ammonium lactate  Topical Daily CHRISTIANA Zaidi   atorvastatin 10 mg Oral Daily With Dinner Griselda Hagen MD   bismuth subsalicylate 513 mg Oral Q6H PRN Griselda Hagen MD   calcium carbonate 1 tablet Oral Daily With Breakfast Griselda Hagen MD   cholecalciferol 1,000 Units Oral Daily Griselda Hagen MD   clonazePAM 0 5 mg Oral TID CHRISTIANA Jimenez   haloperidol lactate 10 mg Intramuscular Q6H PRN CHRISTIANA Jimenez   And       diphenhydrAMINE 50 mg Intravenous Q6H PRN CHRISTIANA Jimenez   diphenhydrAMINE 50 mg Oral BID CHRISTIANA Jimenez   divalproex sodium 1,000 mg Oral Q12H MD Ugo   divalproex sodium 500 mg Oral Daily CHRISTIANA Jimenez   haloperidol 5 mg Oral Q6H PRN Sherita Meigs, MD   hydrOXYzine HCL 25 mg Oral Q6H PRN Apolinar Gonsalves MD   ketotifen 1 drop Both Eyes BID PRN Griselda Hagen MD   levothyroxine 25 mcg Oral Early Morning Griselda Hagen MD   loratadine 10 mg Oral Daily Griselda Hagen MD   LORazepam 2 mg Intramuscular Q4H PRN CHRISTIANA Jimenez   magnesium hydroxide 20 mL Oral Daily PRN Amy Dunham MD   metFORMIN 1,000 mg Oral Daily With Breakfast Yordy Palomares MD   And       sitaGLIPtin 50 mg Oral After Breakfast Yordy Palomares MD   mirtazapine 7 5 mg Oral HS CHRISTIANA Jimenez   multivitamin stress formula 1 tablet Oral Daily Yordy Palomares MD   OLANZapine 10 mg Oral Q3H PRN CHRISTIANA Jimenez   propranolol 10 mg Oral BID CHRISTIANA Jimenez   risperiDONE 2 mg Oral BID Junior Ahumada, MD   senna-docusate sodium 1 tablet Oral HS CHRISTIANA Jimenez   traZODone 50 mg Oral HS PRN Amy Dunham MD       Risks / Benefits of Treatment:    Risks, benefits, and possible side effects of medications explained to patient and patient verbalizes understanding and agreement for treatment  Counseling / Coordination of Care:      Patient's progress discussed with staff in treatment team meeting  Medications, treatment progress and treatment plan reviewed with patient  Attestation signed by Yordy Palomares MD at 1/19/2019 12:44 PM:         Teaching Physician Statement  I performed history and exam of patient  I discussed with the Advanced Practitioner and  I agree with her documented findings and plan of care  Yaneth Cowan  1/19/2019  3:04 PM  Signed  PT threatening and verbally aggressive when asked to get weight  [address] CHRISTIANA Sebastian  1/16/2019 12:56 PM  Attested  Progress Note - Behavioral Health     Roshan Deras 41 y o  male MRN: [id number]   Unit/Bed#: -02 Encounter: [id number]    Behavior over the last 24 hours:  Corky Bah was seen for an inpatient follow-up psychiatric visit this date  He relates he is feeling better and is no longer hearing voices  He states his anger has greatly decreased and his mood is much better  He remains fixated on medications and relates he wishes to be discharged soon  Per nursing report, his behavior has been controlled although he still becomes extremely upset after speaking with his mother on the phone  Appetite and sleep are within normal limits  He is taking his medications as prescribed  He remains withdrawn during mealtime due to paranoia and anxiety in social situations  ROS: reports Tremors    Mental Status Evaluation:    Appearance:  wearing pajamas   Behavior:  pleasant, cooperative   Speech:  pressured, hypertalkative, tangential   Mood:  improved, labile   Affect:  brighter   Thought Process:  coherent   Associations: intact associations   Thought Content:  no overt delusions   Perceptual Disturbances: none   Risk Potential: Suicidal ideation - None  Homicidal ideation - None  Potential for aggression - No   Sensorium:  oriented to person, place and time/date   Memory:  recent and remote memory grossly intact   Consciousness:  alert and awake   Attention: poor concentration and poor attention span   Insight:  limited   Judgment: limited   Gait/Station: normal gait/station and normal balance   Motor Activity: no abnormal movements     Vital signs in last 24 hours:    Temp:  [97 8 °F (36 6 °C)-98 3 °F (36 8 °C)] 98 3 °F (36 8 °C)  HR:  [] 85  Resp:  [16] 16  BP: (116-124)/(76-82) 116/82    Laboratory results:  I have personally reviewed all pertinent laboratory/tests results  Bloodwork ordered  Progress Toward Goals: progressing    Assessment/Plan   Principal Problem:    Schizoaffective disorder, bipolar type (HCC)  Active Problems:    Mild intellectual disability    Obsessive compulsive disorder    Recommended Treatment:   1  Inderal changed to  10mg BID instead of 20mg QD for increased social anxiety and tremor control  2  Benadryl 50mg BID added for tremor control  3  Senna-docusate added QHS to prevent constipation  4  Risperdal increased to 1mg TID  5  Thorazine decreased to 25mg BID  All current active medications have been reviewed    Encourage group therapy, milieu therapy and occupational therapy  Behavioral Health checks every 7 minutes      Current Facility-Administered Medications:  acetaminophen 325 mg Oral Q6H PRN Apolinar Hanson MD   aluminum-magnesium hydroxide-simethicone 15 mL Oral Q4H PRN Scotty Jauregui MD   ammonium lactate  Topical Daily CHRISTIANA Zaidi   atorvastatin 10 mg Oral Daily With Dinner Mason Cochran MD   bismuth subsalicylate 647 mg Oral Q6H PRN Mason Cochran MD   calcium carbonate 1 tablet Oral Daily With Breakfast Mason Cochran MD   chlorproMAZINE 50 mg Oral Daily Mason Cochran MD   chlorproMAZINE 50 mg Oral HS CHRISTIANA Jimenez   cholecalciferol 1,000 Units Oral Daily Mason Cochran MD   diphenhydrAMINE 25 mg Oral BID CHRISTIANA Jimenez   divalproex sodium 500 mg Oral Q8H [facility] CHRISTIANA Jimenez   docusate sodium 100 mg Oral BID Mason Cochran MD   haloperidol 5 mg Oral Q6H PRN Apolinar Hanson MD   haloperidol lactate 5 mg Intramuscular Q6H PRN Apolinar Hanson MD   hydrOXYzine HCL 25 mg Oral Q6H PRN Scotty Jauregui MD   ketotifen 1 drop Both Eyes BID PRN Mason Cochran MD   levothyroxine 25 mcg Oral Early Morning Mason Cochran MD   loratadine 10 mg Oral Daily Mason Cochran MD   magnesium hydroxide 20 mL Oral Daily PRN Scotty Jauregui MD   metFORMIN 1,000 mg Oral Daily With Breakfast Mason Cochran MD   And       sitaGLIPtin 50 mg Oral After Breakfast Mason Cochran MD   mirtazapine 7 5 mg Oral HS CHRISTIANA Jimenez   multivitamin stress formula 1 tablet Oral Daily Mason Cochran MD   propranolol 20 mg Oral Daily CHRISTIANA Jimenez   risperiDONE 2 mg Oral TID CHRISTIANA Velazquez   traZODone 50 mg Oral HS PRN Scotty Jauregui MD       Risks / Benefits of Treatment:    Risks, benefits, and possible side effects of medications explained to patient and patient verbalizes understanding and agreement for treatment  Counseling / Coordination of Care:      Patient's progress discussed with staff in treatment team meeting  Medications, treatment progress and treatment plan reviewed with patient      Attestation signed by Kesha Emmanuel MD at 1/16/2019  1:22 PM:  I have seen and examined the patient  I have reviewed the findings, discussed the case and agree with the Advanced Practitioner's note, findings, medication orders and recommendations, with the following additions/exceptions:    Pepe Lara continues to benefit from medication adjustments, and is working with the team    Overall he is still fairly disorganized and paranoid  Plan:   Continue medication adjustments  Kesha Emmanuel MD 01/16/19    CHRISTIANA Lubin  1/15/2019 12:30 PM  Attested  Progress Note - [address] 41 y o  male MRN: [id number]   Unit/Bed#: [facility] 249-01 Encounter: [id number]    Behavior over the last 24 hours:  Pepe Lara was seen for an inpatient follow-up psychiatric visit this date  Per nursing report, he was calmer and less fixated last night  He becomes volatile and agitated after speaking with his mother on the telephone so his calls have been limited by staff  He relates he slept well last night and at today's visit states he is feeling better  Appetite is within normal limits        ROS: no complaints    Mental Status Evaluation:    Appearance:  wearing pajamas   Behavior:  cooperative, calm   Speech:  normal rate and volume   Mood:  slightly less dysphoric, slightly less labile, slightly less irritable   Affect:  mood-congruent   Thought Process:  organized, logical, coherent   Associations: intact associations   Thought Content:  normal, no overt delusions   Perceptual Disturbances: none   Risk Potential: Suicidal ideation - None  Homicidal ideation - None  Potential for aggression - No   Sensorium:  oriented to person, place and time/date   Memory:  recent and remote memory grossly intact   Consciousness:  alert and awake   Attention: decreased concentration and decreased attention span   Insight:  limited   Judgment: limited   Gait/Station: normal gait/station and normal balance   Motor Activity: no abnormal movements     Vital signs in last 24 hours:    Temp:  [96 6 °F (35 9 °C)-99 5 °F (37 5 °C)] 99 5 °F (37 5 °C)  HR:  [] 76  Resp:  [16-18] 18  BP: (110-114)/(62-75) 110/62    Laboratory results:  I have personally reviewed all pertinent laboratory/tests results  Progress Toward Goals: improving    Assessment/Plan   Principal Problem:    Schizoaffective disorder, bipolar type (HCC)  Active Problems:    Mild intellectual disability    Obsessive compulsive disorder    Recommended Treatment:   1  Thorazine decreased to 50 mg b i d  2  Risperidone increased to 1 mg b i d  3  Depakote increased to 500 mg 3 times daily  Repeat serum level ordered  4  Will continue to monitor patient and adjust medications as needed  Discharge disposition and planning are ongoing  All current active medications have been reviewed    Encourage group therapy, milieu therapy and occupational therapy  [facility] checks every 7 minutes      Current Facility-Administered Medications:  acetaminophen 325 mg Oral Q6H PRN Teri Pathak MD   aluminum-magnesium hydroxide-simethicone 15 mL Oral Q4H PRN Teri Pathak MD   [START ON 1/16/2019] ammonium lactate  Topical Daily CHRISTIANA Zaidi   atorvastatin 10 mg Oral Daily With Dinner Royer Archuleta MD   bismuth subsalicylate 071 mg Oral Q6H PRN Royer Archuleta MD   calcium carbonate 1 tablet Oral Daily With Breakfast Royer Archuleta MD   chlorproMAZINE 50 mg Oral Daily Royer Archuleta MD   chlorproMAZINE 50 mg Oral HS CHRISTIANA Jimenez   cholecalciferol 1,000 Units Oral Daily Royer Archuleta MD   divalproex sodium 500 mg Oral Q8H [address] CHRISTIANA Jimenez   docusate sodium 100 mg Oral BID Royer Archuleta MD   haloperidol 5 mg Oral Q6H PRN Teri Pathak MD   haloperidol lactate 5 mg Intramuscular Q6H PRN Teri Pathak MD   hydrOXYzine HCL 25 mg Oral Q6H PRN Teri Pathak MD   ketotifen 1 drop Both Eyes BID PRN Royer Archuleta MD   levothyroxine 25 mcg Oral Early Morning Brady Medina MD   loratadine 10 mg Oral Daily Brady Medina MD   magnesium hydroxide 20 mL Oral Daily PRN Osiris Michel MD   metFORMIN 1,000 mg Oral Daily With Breakfast Brady Medina MD   And       sitaGLIPtin 50 mg Oral After Breakfast Brady Medina MD   mirtazapine 7 5 mg Oral HS CHRISTIANA Jimenez   multivitamin stress formula 1 tablet Oral Daily Brady Medina MD   propranolol 20 mg Oral Daily CHRISTIANA Jimenez   risperiDONE 1 mg Oral Q6H PRN Osiris Michel MD   risperiDONE 1 mg Oral BID CHRISTIANA Jimenez   traZODone 50 mg Oral HS PRN Osiris Michel MD       Risks / Benefits of Treatment:    Risks, benefits, and possible side effects of medications explained to patient and patient verbalizes understanding and agreement for treatment  Counseling / Coordination of Care:      Patient's progress discussed with staff in treatment team meeting  Medications, treatment progress and treatment plan reviewed with patient  Attestation signed by Kesha Emmanuel MD at 1/15/2019  3:49 PM:  I have seen and examined the patient  I have reviewed the findings, discussed the case and agree with the Advanced Practitioner's note, findings, medication orders and recommendations, with the following additions/exceptions:    Pepe Lara is talking about different medications and side effects  I spoke with his mother and outpatient psychiatrist about some of the difficult issues in his recent treatment  Plan:   Continue current medications  Kesha Emmanuel MD 01/15/19    CHRISTIANA Lubin  1/14/2019 12:45 PM  Attested  Progress Note - [address] 41 y o  male MRN: [id number]   Unit/Bed#: -01 Encounter: [id number]    Behavior over the last 24 hours:  Pepe Lara was seen for an inpatient follow-up psychiatric visit this date  Per nursing report, he has been visible on the unit pacing in the halls    He has been eating his meals in the quiet room due to social anxiety  He has had no behavioral issues on the unit but per chart review, gets very agitated when he speaks with his mother on the phone  Appetite and sleep are within normal limits  At today's assessment, he was pressured, somewhat agitated, and hyperverbal but cooperative with assessment  He is obsessive over his medications  ROS: no complaints    Mental Status Evaluation:    Appearance:  wearing pajamas   Behavior:  cooperative, psychomotor agitation, some agitation   Speech:  increased rate, pressured, hypertalkative, loud   Mood:  dysphoric, labile, irritable   Affect:  mood-congruent   Thought Process:  logical, coherent   Associations: intact associations   Thought Content:  no overt delusions   Perceptual Disturbances: none   Risk Potential: Suicidal ideation - None  Homicidal ideation - None  Potential for aggression - Yes, due to history of violence   Sensorium:  oriented to person, place and time/date   Memory:  recent and remote memory grossly intact   Consciousness:  alert and awake   Attention: poor concentration and poor attention span   Insight:  limited   Judgment: limited   Gait/Station: normal gait/station and normal balance   Motor Activity: no abnormal movements     Vital signs in last 24 hours:    Temp:  [98 5 °F (36 9 °C)-99 °F (37 2 °C)] 99 °F (37 2 °C)  HR:  [91-99] 91  Resp:  [16-18] 16  BP: ()/(55-94) 99/55    Laboratory results:  I have personally reviewed all pertinent laboratory/tests results  Progress Toward Goals: progressing    Assessment/Plan   Principal Problem:    Schizoaffective disorder, bipolar type ([address] )  Active Problems:    Mild intellectual disability    Obsessive compulsive disorder    Sandor's caregiver Normacristela Sutton was contacted so home medications could be reconciled    Home psychiatric medications are as follows:  Abilify 15mg QD  Thorazine 50mg daily  Thorazine 100mg HS  Depakote 500mg twice daily at 1700 and 2000  Remeron 15mg HS  Inderal 40mg QD for tremors    Recommendations:  1  Abilify discontinued  2  Remeron decreased to 7 5 mg q h s  3  Thorazine decreased to dose he was taking at home which is 50mg daily and 100mg HS  4  Inderal decreased to 20 mg daily  5  Risperidone initiated at 0 5 mg b i d  With plan to increase if tolerated  6  Serum Depakote level ordered  Will continue to monitor patient and adjust medications as needed      Current Facility-Administered Medications:  acetaminophen 325 mg Oral Q6H PRN Martha Boland MD   aluminum-magnesium hydroxide-simethicone 15 mL Oral Q4H PRN Martha Boland MD   ammonium lactate  Topical Daily CHRISTIANA Zaidi   atorvastatin 10 mg Oral Daily With Dinner Andres Eastman MD   bismuth subsalicylate 807 mg Oral Q6H PRN Andres Eastman MD   calcium carbonate 1 tablet Oral Daily With Breakfast Andres Eastman MD   chlorproMAZINE 100 mg Oral HS CHRISTIANA Jimenez   chlorproMAZINE 50 mg Oral Daily Andres Eastman MD   cholecalciferol 1,000 Units Oral Daily Andres Eastman MD   divalproex sodium 500 mg Oral BID Andres Eastman MD   docusate sodium 100 mg Oral BID Andres Eastman MD   haloperidol 5 mg Oral Q6H PRN Martha Boland MD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

## 2019-01-12 NOTE — ED NOTES
[facility] called, Patient is eligible for M/A - Behavioral health services, Managed Care   Rec# [id number]

## 2019-01-12 NOTE — ED RE-EVALUATION NOTE
Patient is medically clear for inpatient psychiatric behavioral care       Chitra Osorio DO  01/12/19 0134

## 2019-01-12 NOTE — ED ATTENDING ATTESTATION
Kushal Tamayo MD, saw and evaluated the patient  I have discussed the patient with the resident/non-physician practitioner and agree with the resident's/non-physician practitioner's findings, Plan of Care, and MDM as documented in the resident's/non-physician practitioner's note, except where noted  All available labs and Radiology studies were reviewed  At this point I agree with the current assessment done in the Emergency Department  I have conducted an independent evaluation of this patient including a focused history and a physical exam     73-year-old male, resides at a group home, presents to the emergency department for evaluation of increasing pressured speech, agitation,, and suicidal ideation  No medical complaints  On examination the patient has pressured speech  He voices suicidal ideation  The patient has a plan  Plan is psychiatric admission

## 2019-01-12 NOTE — ED NOTES
CIS spoke to Live Garza at [facility] and was informed pt has Medicare A&B primary  Insurance Authorization: No pre-cert for Medicare A & B primary  Phone call placed to [facility] [address]   Phone number: [phone]  Spoke to Live Garza  Authorization # Call upon arrival to accepting unit to complete COB notification

## 2019-01-12 NOTE — ED NOTES
CIS referred pt to [address] [place] after having pt sign a new handwritten 201 as per their protocol

## 2019-01-12 NOTE — ED NOTES
Jackeline Manrique from [facility] called regarding disposition of patient  Crisis worker told her pt is accepted to [address] [place] [address]

## 2019-01-12 NOTE — ED NOTES
CIS met w/ pt  Pt was assessed w/ 2 of his group home staff at bedside  Pt presents due to SI w/ no specific plan  Pt is A&O X 4  Pt was cooperative throughout the assessment and was able to answer all assessment questions w/ appropriate elaboration  Pt's speech is rapid but coherent and logical  Thought process is tangential  Pt is verbally expressing SI and states he has continously asked his staff to kill him or at least knock him out  Pt is unable to contract for safety outside a hospital setting at this time  Pt denies any HI  Pt is experiencing increased depression and increased anxiety  Pt also states he has increased agitation/irritability that seems to come from nowhere, pt admits to throwing things, e g  the remote when he becomes angry  Pt also states he is hearing voices and he relates the voices to the angry outbursts  Pt denies any VH or delusions  Pt feels he is in need of I/P psych admission  Pt was informed of his rights and given a copy of same  Pt signed a 201

## 2019-01-12 NOTE — ED NOTES
Patient is accepted at [facility]  Patient is accepted by Dr Neeta Rai per Gabriela Cabrera, [address]  Transportation is arranged with [facility]  Transportation is scheduled for 15:00  Patient may go to the floor upon arrival w/ EMS  Nurse report is to be called to [phone] prior to patient transfer

## 2019-01-12 NOTE — ED NOTES
Crisis Worker ([id number] [employer]) update Pt on transport time and acceptance to [facility]  Pt signed EMTALA  ED Dr signed [id number] and medical necessity
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
## 2019-01-12 NOTE — ED NOTES
Staff member is in possession of the patient's belongings at this moment        Madison French  01/11/19 2302

## 2019-01-12 NOTE — ED NOTES
C[facility] called [facility] to inform them medically clear note was completed, pt now being reviewed for bed consideration

## 2019-01-13 LAB — GLUCOSE SERPL-MCNC: 145 MG/DL (ref 65–140)

## 2019-01-13 PROCEDURE — 82948 REAGENT STRIP/BLOOD GLUCOSE: CPT

## 2019-01-13 PROCEDURE — 99221 1ST HOSP IP/OBS SF/LOW 40: CPT | Performed by: PSYCHIATRY & NEUROLOGY

## 2019-01-13 RX ORDER — AMMONIUM LACTATE 12 G/100G
CREAM TOPICAL DAILY
Status: DISCONTINUED | OUTPATIENT
Start: 2019-01-13 | End: 2019-01-15

## 2019-01-13 RX ORDER — ATORVASTATIN CALCIUM 10 MG/1
10 TABLET, FILM COATED ORAL
Status: DISCONTINUED | OUTPATIENT
Start: 2019-01-13 | End: 2019-01-25 | Stop reason: HOSPADM

## 2019-01-13 RX ORDER — MIRTAZAPINE 15 MG/1
15 TABLET, ORALLY DISINTEGRATING ORAL
Status: DISCONTINUED | OUTPATIENT
Start: 2019-01-13 | End: 2019-01-14

## 2019-01-13 RX ORDER — PROPRANOLOL HYDROCHLORIDE 10 MG/1
40 TABLET ORAL DAILY
Status: DISCONTINUED | OUTPATIENT
Start: 2019-01-13 | End: 2019-01-13 | Stop reason: SDUPTHER

## 2019-01-13 RX ORDER — PROPRANOLOL HYDROCHLORIDE 20 MG/1
40 TABLET ORAL DAILY
Status: DISCONTINUED | OUTPATIENT
Start: 2019-01-13 | End: 2019-01-14

## 2019-01-13 RX ORDER — DOCUSATE SODIUM 100 MG/1
100 CAPSULE, LIQUID FILLED ORAL 2 TIMES DAILY
Status: DISCONTINUED | OUTPATIENT
Start: 2019-01-13 | End: 2019-01-16

## 2019-01-13 RX ORDER — LORATADINE 10 MG/1
10 TABLET ORAL DAILY
Status: DISCONTINUED | OUTPATIENT
Start: 2019-01-13 | End: 2019-01-25 | Stop reason: HOSPADM

## 2019-01-13 RX ORDER — ARIPIPRAZOLE 10 MG/1
10 TABLET ORAL DAILY
Status: DISCONTINUED | OUTPATIENT
Start: 2019-01-13 | End: 2019-01-13

## 2019-01-13 RX ORDER — KETOTIFEN FUMARATE 0.35 MG/ML
1 SOLUTION/ DROPS OPHTHALMIC 2 TIMES DAILY PRN
Status: DISCONTINUED | OUTPATIENT
Start: 2019-01-13 | End: 2019-01-25 | Stop reason: HOSPADM

## 2019-01-13 RX ORDER — METFORMIN HYDROCHLORIDE 500 MG/1
1000 TABLET, EXTENDED RELEASE ORAL
Status: DISCONTINUED | OUTPATIENT
Start: 2019-01-13 | End: 2019-01-25 | Stop reason: HOSPADM

## 2019-01-13 RX ORDER — LEVOTHYROXINE SODIUM 0.03 MG/1
25 TABLET ORAL
Status: DISCONTINUED | OUTPATIENT
Start: 2019-01-13 | End: 2019-01-25 | Stop reason: HOSPADM

## 2019-01-13 RX ORDER — CALCIUM CARBONATE 500(1250)
1 TABLET ORAL
Status: DISCONTINUED | OUTPATIENT
Start: 2019-01-14 | End: 2019-01-25 | Stop reason: HOSPADM

## 2019-01-13 RX ORDER — CHLORPROMAZINE HYDROCHLORIDE 25 MG/1
50 TABLET, FILM COATED ORAL DAILY
Status: DISCONTINUED | OUTPATIENT
Start: 2019-01-13 | End: 2019-01-16

## 2019-01-13 RX ORDER — DIVALPROEX SODIUM 500 MG/1
500 TABLET, DELAYED RELEASE ORAL 2 TIMES DAILY
Status: DISCONTINUED | OUTPATIENT
Start: 2019-01-13 | End: 2019-01-15

## 2019-01-13 RX ORDER — ARIPIPRAZOLE 15 MG/1
15 TABLET ORAL DAILY
Status: DISCONTINUED | OUTPATIENT
Start: 2019-01-13 | End: 2019-01-14

## 2019-01-13 RX ORDER — LANOLIN ALCOHOL/MO/W.PET/CERES
CREAM (GRAM) TOPICAL DAILY
Status: DISCONTINUED | OUTPATIENT
Start: 2019-01-13 | End: 2019-01-13

## 2019-01-13 RX ORDER — CHLORPROMAZINE HYDROCHLORIDE 100 MG/1
100 TABLET, FILM COATED ORAL
Status: DISCONTINUED | OUTPATIENT
Start: 2019-01-13 | End: 2019-01-13

## 2019-01-13 RX ORDER — CHLORPROMAZINE HYDROCHLORIDE 100 MG/1
300 TABLET, FILM COATED ORAL
Status: DISCONTINUED | OUTPATIENT
Start: 2019-01-13 | End: 2019-01-14

## 2019-01-13 RX ORDER — MELATONIN
1000 DAILY
Status: DISCONTINUED | OUTPATIENT
Start: 2019-01-13 | End: 2019-01-25 | Stop reason: HOSPADM

## 2019-01-13 RX ADMIN — CHLORPROMAZINE HYDROCHLORIDE 300 MG: 100 TABLET, SUGAR COATED ORAL at 20:32

## 2019-01-13 RX ADMIN — ATORVASTATIN CALCIUM 10 MG: 10 TABLET, FILM COATED ORAL at 17:54

## 2019-01-13 RX ADMIN — DIVALPROEX SODIUM 500 MG: 500 TABLET, DELAYED RELEASE ORAL at 17:54

## 2019-01-13 RX ADMIN — TRAZODONE HYDROCHLORIDE 50 MG: 50 TABLET ORAL at 23:30

## 2019-01-13 RX ADMIN — CHLORPROMAZINE HYDROCHLORIDE 50 MG: 25 TABLET, SUGAR COATED ORAL at 12:21

## 2019-01-13 RX ADMIN — ZINC 1 TABLET: TAB ORAL at 12:21

## 2019-01-13 RX ADMIN — SITAGLIPTIN 50 MG: 50 TABLET, FILM COATED ORAL at 14:11

## 2019-01-13 RX ADMIN — MIRTAZAPINE 15 MG: 15 TABLET, ORALLY DISINTEGRATING ORAL at 20:32

## 2019-01-13 RX ADMIN — METFORMIN HYDROCHLORIDE 1000 MG: 500 TABLET, EXTENDED RELEASE ORAL at 14:11

## 2019-01-13 RX ADMIN — RISPERIDONE 1 MG: 1 TABLET, ORALLY DISINTEGRATING ORAL at 13:34

## 2019-01-13 RX ADMIN — DOCUSATE SODIUM 100 MG: 100 CAPSULE, LIQUID FILLED ORAL at 12:21

## 2019-01-13 RX ADMIN — LEVOTHYROXINE SODIUM 25 MCG: 25 TABLET ORAL at 12:21

## 2019-01-13 RX ADMIN — ARIPIPRAZOLE 15 MG: 15 TABLET ORAL at 12:22

## 2019-01-13 RX ADMIN — VITAMIN D, TAB 1000IU (100/BT) 1000 UNITS: 25 TAB at 12:22

## 2019-01-13 RX ADMIN — DOCUSATE SODIUM 100 MG: 100 CAPSULE, LIQUID FILLED ORAL at 17:54

## 2019-01-13 RX ADMIN — LORATADINE 10 MG: 10 TABLET ORAL at 12:21

## 2019-01-13 RX ADMIN — RISPERIDONE 1 MG: 1 TABLET, ORALLY DISINTEGRATING ORAL at 20:32

## 2019-01-13 RX ADMIN — Medication: at 13:00

## 2019-01-13 RX ADMIN — PROPRANOLOL HYDROCHLORIDE 40 MG: 20 TABLET ORAL at 14:16

## 2019-01-13 RX ADMIN — DIVALPROEX SODIUM 500 MG: 500 TABLET, DELAYED RELEASE ORAL at 12:22
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
## 2019-01-13 NOTE — PROGRESS NOTES
Patient is visible on the unit, pacing the halls  Good eye contact when speaking with staff  Patient is cooperative and controled  Patient denies SI and HI currently, no behavioral problems  Patient speech pressured  Cooperative with medications  Positive for all meals  Patient denies A/V hallucinations currently  Patient is able to express needs well  Loud when talking and minimal socialization with other patients  Alert and oriented  Able to make needs known  No signs or symptoms of distress  SP1 and Q 7 minute checks will be maintained for patient safety  Will continue to monitor

## 2019-01-13 NOTE — PROGRESS NOTES
Patient has been visible on the unit  Attended evening activities  Denies s/i but reports nervousness, anxiety, and restlessness  Speech is pressured  Up at the nurses desk several times with similar requests and questions     Required frequent redirection but was cooperative  Eye contact good  Pleasant
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
## 2019-01-13 NOTE — TREATMENT PLAN
TREATMENT PLAN REVIEW - [address] Lis Srinivasan 41 y o  1980 male MRN: [id number]    [facility] [address] Room / Bed: [facility] 249/[facility] 970-72 Encounter: [id number]          Admit Date/Time:  1/12/2019  5:07 PM    Treatment Team: Attending Provider: Odilon Barriga MD; Registered Nurse: Mallika Montes, RN; Patient Care Assistant: Reymundo Burgos;  Patient Care Assistant: Fletcher Henderson; Nursing Student: Emani Jesus; Registered Nurse: Jersey Jha RN    Diagnosis: Principal Problem:    Schizoaffective disorder, bipolar type [facility])  Active Problems:    Mild intellectual disability    Obsessive compulsive disorder      Patient Strengths/Assets: cooperative, compliant with medication    Patient Barriers/Limitations: difficulty adapting, poor insight    Short Term Goals: decrease in psychotic symptoms, decrease in suicidal thoughts, decrease in self abusive behaviors, decrease in level of agitation    Long Term Goals: stabilization of mood, resolution of psychotic symptoms, improved insight    Progress Towards Goals: starting psychiatric medications as prescribed, continue psychiatric medications as prescribed    Recommended Treatment: medication management, patient medication education, group therapy, milieu therapy, continued Behavioral Health psychiatric evaluation/assessment process    Treatment Frequency: daily medication monitoring, group and milieu therapy daily, monitoring through interdisciplinary rounds, monitoring through weekly patient care conferences    Expected Discharge Date:  1/20/19    Discharge Plan: placement in group home, referral for outpatient medication management with a psychiatrist    Treatment Plan Created/Updated By: Arcelia Aleman MD

## 2019-01-14 LAB — GLUCOSE SERPL-MCNC: 123 MG/DL (ref 65–140)

## 2019-01-14 PROCEDURE — 82948 REAGENT STRIP/BLOOD GLUCOSE: CPT

## 2019-01-14 PROCEDURE — 99232 SBSQ HOSP IP/OBS MODERATE 35: CPT | Performed by: NURSE PRACTITIONER

## 2019-01-14 RX ORDER — PROPRANOLOL HYDROCHLORIDE 20 MG/1
20 TABLET ORAL DAILY
Status: DISCONTINUED | OUTPATIENT
Start: 2019-01-15 | End: 2019-01-16

## 2019-01-14 RX ORDER — CHLORPROMAZINE HYDROCHLORIDE 100 MG/1
100 TABLET, FILM COATED ORAL
Status: DISCONTINUED | OUTPATIENT
Start: 2019-01-14 | End: 2019-01-15

## 2019-01-14 RX ORDER — MIRTAZAPINE 15 MG/1
7.5 TABLET, FILM COATED ORAL
Status: DISCONTINUED | OUTPATIENT
Start: 2019-01-14 | End: 2019-01-21

## 2019-01-14 RX ORDER — RISPERIDONE 0.5 MG/1
0.5 TABLET, ORALLY DISINTEGRATING ORAL 2 TIMES DAILY
Status: DISCONTINUED | OUTPATIENT
Start: 2019-01-14 | End: 2019-01-15

## 2019-01-14 RX ADMIN — ATORVASTATIN CALCIUM 10 MG: 10 TABLET, FILM COATED ORAL at 17:23

## 2019-01-14 RX ADMIN — CHLORPROMAZINE HYDROCHLORIDE 100 MG: 100 TABLET, SUGAR COATED ORAL at 21:18

## 2019-01-14 RX ADMIN — ZINC 1 TABLET: TAB ORAL at 08:27

## 2019-01-14 RX ADMIN — VITAMIN D, TAB 1000IU (100/BT) 1000 UNITS: 25 TAB at 08:27

## 2019-01-14 RX ADMIN — Medication 1 TABLET: at 08:30

## 2019-01-14 RX ADMIN — DIVALPROEX SODIUM 500 MG: 500 TABLET, DELAYED RELEASE ORAL at 08:27

## 2019-01-14 RX ADMIN — ARIPIPRAZOLE 15 MG: 15 TABLET ORAL at 08:27

## 2019-01-14 RX ADMIN — MIRTAZAPINE 7.5 MG: 15 TABLET, FILM COATED ORAL at 21:18

## 2019-01-14 RX ADMIN — ACETAMINOPHEN 325 MG: 325 TABLET ORAL at 10:27

## 2019-01-14 RX ADMIN — DIVALPROEX SODIUM 500 MG: 500 TABLET, DELAYED RELEASE ORAL at 17:24

## 2019-01-14 RX ADMIN — Medication: at 08:28

## 2019-01-14 RX ADMIN — RISPERIDONE 0.5 MG: 0.5 TABLET, ORALLY DISINTEGRATING ORAL at 17:23

## 2019-01-14 RX ADMIN — ACETAMINOPHEN 325 MG: 325 TABLET ORAL at 17:26

## 2019-01-14 RX ADMIN — DOCUSATE SODIUM 100 MG: 100 CAPSULE, LIQUID FILLED ORAL at 17:23

## 2019-01-14 RX ADMIN — CHLORPROMAZINE HYDROCHLORIDE 50 MG: 25 TABLET, SUGAR COATED ORAL at 08:27

## 2019-01-14 RX ADMIN — DOCUSATE SODIUM 100 MG: 100 CAPSULE, LIQUID FILLED ORAL at 08:27

## 2019-01-14 RX ADMIN — LEVOTHYROXINE SODIUM 25 MCG: 25 TABLET ORAL at 06:13

## 2019-01-14 RX ADMIN — LORATADINE 10 MG: 10 TABLET ORAL at 08:27

## 2019-01-14 RX ADMIN — METFORMIN HYDROCHLORIDE 1000 MG: 500 TABLET, EXTENDED RELEASE ORAL at 08:27

## 2019-01-14 RX ADMIN — SITAGLIPTIN 50 MG: 50 TABLET, FILM COATED ORAL at 08:27
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
## 2019-01-14 NOTE — PROGRESS NOTES
Patient is visible on the unit, pacing the halls  Cooperative with medications  Patient questions medications and is slightly confused with medications  Educated pt on medications, pt verbalized an understanding  Cooperative with morning meal but requested to eat in the quiet room, due to social anxiety  Good eye contact  Showered and participating in ADLs  Patient is controled and speech is normal unless talking to mother on phone, pt then becomes agitated and pressured  Patient denies SI and HI currently, no behavioral problems  Pt denies A/V hallucinations at current time  Alert and oriented  Able to make needs known  No signs or symptoms of distress  SP1 and Q 7 minute checks will be maintained for pt safety  Will continue to monitor

## 2019-01-14 NOTE — PROGRESS NOTES
Patient agitated at change of shift  Pressured  Hyperverbal   Fixating on medications  Again stated to this writer he does not wish to be on Depakote and/or thorazine but would prefer to be on Risperdal   Became increasingly upset over his medications  Attempted to reassure him he will have multiple opportunities to discuss medications with the doctors  He was all over the place with medication requests  He attempted to make his needs known and eventually calmed down  Spent some time in the quiet room

## 2019-01-14 NOTE — CASE MANAGEMENT
Received a call from Rafael Lundy who is the supervisor for this client's group home  His number is [phone]  I informed him that I could not confirm or deny that the patient was here because I did not have a release of information at this point  He stated that he had been here to visit Vi Carlitos last night  Rafael Lundy stated that he wanted to pass on a few things that we should be aware of  He stated that this patient's grandmother  a year a go and the patient does not know yet  The family has been asking for the group home to tell the patient; but they have not stating that they would be an intermediary in the meeting but that they family should tell him  I agreed that the family should pass on the information and if they wanted to they could  Rafael Lundy states that the patient's family is a trigger for [employer]  He also states that the patient's mother is very involved in his treatment and frequently has recommendations for his course of treatment and medication adjustments

## 2019-01-14 NOTE — SOCIAL WORK
BEN spoke to pt's mother who is pt's legal guardian  SW explained that she has yet to meet with pt and that most of the team is still meeting him and that she would like to speak more tomorrow once she meets pt; she accepted this news  She did inform SW that she feels pt decompensated once they decreased him Thorazine by 100MG a few weeks ago; she states that pt does not do well on low doses of medicaiton  She also reports that pt does not provide reliable information; SW informed her that she will speak with pt tomorrow and then call her to confirm information  She also complained that the doctor never called her; BEN explained that the regular doctor just met pt today and that she will provide him her number and have him reach out; she accepted this news

## 2019-01-14 NOTE — ED NOTES
Insurance Authorization: Non-Par Single Case agreement   Phone call placed to [facility]  Phone number: [phone]  Spoke to Jose Gilbert  5 days approved  Level of care: I/P psych  Review on 1/16/19  Authorization # [id number]  UR w/ Nina Self [phone]    This pt also has a  w/ Delmy due to being a high risk for admissions   - Annie Armstrong [phone]    Brianna Rodríguez can be reached by the pt as his point of contact and/or UR can include her in tx team meetings if desired  Please coordinate care w/ her at D/C

## 2019-01-15 LAB
GLUCOSE SERPL-MCNC: 113 MG/DL (ref 65–140)
VALPROATE SERPL-MCNC: 58.8 UG/ML (ref 50–125)

## 2019-01-15 PROCEDURE — 99232 SBSQ HOSP IP/OBS MODERATE 35: CPT | Performed by: PSYCHIATRY & NEUROLOGY

## 2019-01-15 PROCEDURE — 82948 REAGENT STRIP/BLOOD GLUCOSE: CPT

## 2019-01-15 PROCEDURE — 80164 ASSAY DIPROPYLACETIC ACD TOT: CPT | Performed by: NURSE PRACTITIONER

## 2019-01-15 RX ORDER — RISPERIDONE 1 MG/1
1 TABLET, ORALLY DISINTEGRATING ORAL 2 TIMES DAILY
Status: DISCONTINUED | OUTPATIENT
Start: 2019-01-15 | End: 2019-01-16

## 2019-01-15 RX ORDER — AMMONIUM LACTATE 12 G/100G
LOTION TOPICAL DAILY
Status: DISCONTINUED | OUTPATIENT
Start: 2019-01-16 | End: 2019-01-25 | Stop reason: HOSPADM

## 2019-01-15 RX ORDER — CHLORPROMAZINE HYDROCHLORIDE 25 MG/1
50 TABLET, FILM COATED ORAL
Status: DISCONTINUED | OUTPATIENT
Start: 2019-01-15 | End: 2019-01-16

## 2019-01-15 RX ORDER — DIVALPROEX SODIUM 500 MG/1
500 TABLET, DELAYED RELEASE ORAL EVERY 8 HOURS SCHEDULED
Status: DISCONTINUED | OUTPATIENT
Start: 2019-01-15 | End: 2019-01-18

## 2019-01-15 RX ORDER — DIPHENHYDRAMINE HCL 25 MG
25 TABLET ORAL EVERY 6 HOURS PRN
Status: DISCONTINUED | OUTPATIENT
Start: 2019-01-15 | End: 2019-01-16

## 2019-01-15 RX ADMIN — DOCUSATE SODIUM 100 MG: 100 CAPSULE, LIQUID FILLED ORAL at 07:51

## 2019-01-15 RX ADMIN — METFORMIN HYDROCHLORIDE 1000 MG: 500 TABLET, EXTENDED RELEASE ORAL at 07:50

## 2019-01-15 RX ADMIN — LORATADINE 10 MG: 10 TABLET ORAL at 07:51

## 2019-01-15 RX ADMIN — CHLORPROMAZINE HYDROCHLORIDE 50 MG: 25 TABLET, SUGAR COATED ORAL at 07:50

## 2019-01-15 RX ADMIN — CHLORPROMAZINE HYDROCHLORIDE 50 MG: 25 TABLET, SUGAR COATED ORAL at 21:22

## 2019-01-15 RX ADMIN — DIPHENHYDRAMINE HCL 25 MG: 25 TABLET ORAL at 14:12

## 2019-01-15 RX ADMIN — RISPERIDONE 1 MG: 1 TABLET, ORALLY DISINTEGRATING ORAL at 17:36

## 2019-01-15 RX ADMIN — DOCUSATE SODIUM 100 MG: 100 CAPSULE, LIQUID FILLED ORAL at 17:36

## 2019-01-15 RX ADMIN — ATORVASTATIN CALCIUM 10 MG: 10 TABLET, FILM COATED ORAL at 17:36

## 2019-01-15 RX ADMIN — DIVALPROEX SODIUM 500 MG: 500 TABLET, DELAYED RELEASE ORAL at 07:51

## 2019-01-15 RX ADMIN — DIVALPROEX SODIUM 500 MG: 500 TABLET, DELAYED RELEASE ORAL at 21:22

## 2019-01-15 RX ADMIN — Medication 1 TABLET: at 07:50

## 2019-01-15 RX ADMIN — ZINC 1 TABLET: TAB ORAL at 07:50

## 2019-01-15 RX ADMIN — RISPERIDONE 0.5 MG: 0.5 TABLET, ORALLY DISINTEGRATING ORAL at 07:51

## 2019-01-15 RX ADMIN — MIRTAZAPINE 7.5 MG: 15 TABLET, FILM COATED ORAL at 07:05

## 2019-01-15 RX ADMIN — SITAGLIPTIN 50 MG: 50 TABLET, FILM COATED ORAL at 07:51

## 2019-01-15 RX ADMIN — PROPRANOLOL HYDROCHLORIDE 20 MG: 20 TABLET ORAL at 07:51

## 2019-01-15 RX ADMIN — VITAMIN D, TAB 1000IU (100/BT) 1000 UNITS: 25 TAB at 07:52

## 2019-01-15 RX ADMIN — Medication: at 08:06

## 2019-01-15 RX ADMIN — LEVOTHYROXINE SODIUM 25 MCG: 25 TABLET ORAL at 06:21

## 2019-01-15 RX ADMIN — DIVALPROEX SODIUM 500 MG: 500 TABLET, DELAYED RELEASE ORAL at 13:09

## 2019-01-15 NOTE — PROGRESS NOTES
Pt presents for assessment prior to am meal  Is clean and kempt  Offers good eye contact  Appropriate interaction  Pt reports he came to the hospital for hearing voices and paranoia  Pt reports this has improved since admission  Pt denies SI/HI  Denies active jarquin  Denies anger/sadness  Despite interaction to encourage pt to eat in the community, Pt continues to eat his meals in the quiet room  Pt reports he, "doesn't get along with everyone enough" and he is "too paranoid in the dining room"  Pt was compliant with medication this am  Denies acute concerns  Agrees to make needs known   Will continue to monitor

## 2019-01-15 NOTE — PLAN OF CARE
Ineffective Coping     Cooperates with admission process Completed     Understands least restrictive measures Completed     Free from restraint events Completed          Anxiety     Anxiety is at manageable level Progressing        Ineffective Coping     Identifies ineffective coping skills Progressing     Identifies healthy coping skills Progressing     Demonstrates healthy coping skills Progressing     Participates in unit activities Progressing     Patient/Family participate in treatment and DC plans Progressing     Patient/Family verbalizes awareness of resources Progressing        Ineffective Coping     Participates in unit activities Progressing          Pt had an effective discussion with staff regarding positive and negative means of coping with stress  Pt denies acute anxiety at this time  Nursing encourages open communication with staff and participation in daily programming

## 2019-01-15 NOTE — PROGRESS NOTES
Progress Note - Behavioral Health     Balaji Forward 41 y o  male MRN: [id number]   Unit/Bed#: [facility] 249-01 Encounter: [id number]    Behavior over the last 24 hours:  David Holder was seen for an inpatient follow-up psychiatric visit this date  Per nursing report, he was calmer and less fixated last night  He becomes volatile and agitated after speaking with his mother on the telephone so his calls have been limited by staff  He relates he slept well last night and at today's visit states he is feeling better  Appetite is within normal limits  ROS: no complaints    Mental Status Evaluation:    Appearance:  wearing pajamas   Behavior:  cooperative, calm   Speech:  normal rate and volume   Mood:  slightly less dysphoric, slightly less labile, slightly less irritable   Affect:  mood-congruent   Thought Process:  organized, logical, coherent   Associations: intact associations   Thought Content:  normal, no overt delusions   Perceptual Disturbances: none   Risk Potential: Suicidal ideation - None  Homicidal ideation - None  Potential for aggression - No   Sensorium:  oriented to person, place and time/date   Memory:  recent and remote memory grossly intact   Consciousness:  alert and awake   Attention: decreased concentration and decreased attention span   Insight:  limited   Judgment: limited   Gait/Station: normal gait/station and normal balance   Motor Activity: no abnormal movements     Vital signs in last 24 hours:    Temp:  [96 6 °F (35 9 °C)-99 5 °F (37 5 °C)] 99 5 °F (37 5 °C)  HR:  [] 76  Resp:  [16-18] 18  BP: (110-114)/(62-75) 110/62    Laboratory results:  I have personally reviewed all pertinent laboratory/tests results  Progress Toward Goals: improving    Assessment/Plan   Principal Problem:    Schizoaffective disorder, bipolar type (HCC)  Active Problems:    Mild intellectual disability    Obsessive compulsive disorder    Recommended Treatment:   1  Thorazine decreased to 50 mg b i d    2  Risperidone increased to 1 mg b i d  3  Depakote increased to 500 mg 3 times daily  Repeat serum level ordered  4  Will continue to monitor patient and adjust medications as needed  Discharge disposition and planning are ongoing  All current active medications have been reviewed    Encourage group therapy, milieu therapy and occupational therapy  [address] checks every 7 minutes      Current Facility-Administered Medications:  acetaminophen 325 mg Oral Q6H PRN Diego Hastings MD   aluminum-magnesium hydroxide-simethicone 15 mL Oral Q4H PRN Diego Hastings MD   [START ON 1/16/2019] ammonium lactate  Topical Daily CHRISTIANA Zaidi   atorvastatin 10 mg Oral Daily With Dinner Pradip Rodriguez MD   bismuth subsalicylate 218 mg Oral Q6H PRN Pradip Rodriguez MD   calcium carbonate 1 tablet Oral Daily With Breakfast Pradip Rodriguez MD   chlorproMAZINE 50 mg Oral Daily Pradip Rodriguez MD   chlorproMAZINE 50 mg Oral HS CHRISTIANA Jimenez   cholecalciferol 1,000 Units Oral Daily Pradip Rodriguez MD   divalproex sodium 500 mg Oral Q8H [address] CHRISTIANA Jimenez   docusate sodium 100 mg Oral BID Pradip Rodriguez MD   haloperidol 5 mg Oral Q6H PRHCALINO Hastings MD   haloperidol lactate 5 mg Intramuscular Q6H PRN Diego Hastings MD   hydrOXYzine HCL 25 mg Oral Q6H PRN Diego Hastings MD   ketotifen 1 drop Both Eyes BID PRN Pradip Rodriguez MD   levothyroxine 25 mcg Oral Early Morning Pradip Rodriguez MD   loratadine 10 mg Oral Daily Pradip Rodriguez MD   magnesium hydroxide 20 mL Oral Daily PRN Diego Hastings MD   metFORMIN 1,000 mg Oral Daily With Breakfast Pradip Rodriguez MD   And       sitaGLIPtin 50 mg Oral After Breakfast Pradip Rodriguez MD   mirtazapine 7 5 mg Oral HS CHRISTIANA Jimenez   multivitamin stress formula 1 tablet Oral Daily Pradip Rodriguez MD   propranolol 20 mg Oral Daily CHRISTIANA Jimenez   risperiDONE 1 mg Oral Q6H PRN Diego Hastings MD   risperiDONE 1 mg Oral BID CHRISTIANA Mcdaniel   traZODone 50 mg Oral HS PRN Geovany Martinez MD       Risks / Benefits of Treatment:    Risks, benefits, and possible side effects of medications explained to patient and patient verbalizes understanding and agreement for treatment  Counseling / Coordination of Care:      Patient's progress discussed with staff in treatment team meeting  Medications, treatment progress and treatment plan reviewed with patient

## 2019-01-15 NOTE — PROGRESS NOTES
Patient has been visible on the unit  Attended HS snack  Interaction with peers is minimal  Reviewed medications with patient  He seems much calmer this evening  Speech is slower  More easily understood  Did not express agitation or anxiety  Eye contact better  No fixating on things this evening  He did indicate that his mother "gets me worked up" but was able to discuss the topic relatively calmly  Did not require any additional prns or time in the quiet room

## 2019-01-15 NOTE — SOCIAL WORK
BEN spoke with Rainer Gutierrez,  of [facility] ([facility]: [phone] who reported having spoken with the patient and visited on Sunday  Rainer Gutierrez stated that the patient presented "a little better"; believes that some of patient's behavior is 'attention-seeking' and better able to be controlled  Stated that his thinking is obsessive and fixed on medication-issues and "pre-occupied with being normal " Also, spoke with patient's Behavior Theodora Barillas, who plans to visit tomorrow; will report level related to baseline  Will meet with Jaya following his visit  BEN spoke with patient's Psych Provider's office, [address] [place] [address] East: [address]  Ph: [phone]; Fx: [phone]  Patient has a therapy appt  With [facility] 1/24/19 at 5:00 PM  Will keep appt  And reschedule if patient remains hospitalized  A med mgmt appt  With Dr Maria Luz Montgomery is scheduled for March, 2019; will be rescheduled for an appt closer to the patient's d/c date at the time of d/c  Will notify office of same

## 2019-01-16 LAB — GLUCOSE SERPL-MCNC: 87 MG/DL (ref 65–140)

## 2019-01-16 PROCEDURE — 99232 SBSQ HOSP IP/OBS MODERATE 35: CPT | Performed by: PSYCHIATRY & NEUROLOGY

## 2019-01-16 PROCEDURE — 82948 REAGENT STRIP/BLOOD GLUCOSE: CPT

## 2019-01-16 RX ORDER — HALOPERIDOL 5 MG
5 TABLET ORAL EVERY 6 HOURS PRN
Status: DISCONTINUED | OUTPATIENT
Start: 2019-01-16 | End: 2019-01-16 | Stop reason: CLARIF

## 2019-01-16 RX ORDER — DIPHENHYDRAMINE HCL 25 MG
50 TABLET ORAL 2 TIMES DAILY
Status: DISCONTINUED | OUTPATIENT
Start: 2019-01-16 | End: 2019-01-25 | Stop reason: HOSPADM

## 2019-01-16 RX ORDER — PROPRANOLOL HYDROCHLORIDE 10 MG/1
10 TABLET ORAL 2 TIMES DAILY
Status: DISCONTINUED | OUTPATIENT
Start: 2019-01-17 | End: 2019-01-25 | Stop reason: HOSPADM

## 2019-01-16 RX ORDER — HALOPERIDOL 5 MG/ML
5 INJECTION INTRAMUSCULAR EVERY 6 HOURS PRN
Status: DISCONTINUED | OUTPATIENT
Start: 2019-01-16 | End: 2019-01-19

## 2019-01-16 RX ORDER — HALOPERIDOL 2 MG/ML
5 SOLUTION ORAL EVERY 6 HOURS PRN
Status: DISCONTINUED | OUTPATIENT
Start: 2019-01-16 | End: 2019-01-17

## 2019-01-16 RX ORDER — CHLORPROMAZINE HYDROCHLORIDE 25 MG/1
25 TABLET, FILM COATED ORAL
Status: DISCONTINUED | OUTPATIENT
Start: 2019-01-16 | End: 2019-01-18

## 2019-01-16 RX ORDER — DIPHENHYDRAMINE HCL 25 MG
25 TABLET ORAL 2 TIMES DAILY
Status: DISCONTINUED | OUTPATIENT
Start: 2019-01-16 | End: 2019-01-16

## 2019-01-16 RX ORDER — CHLORPROMAZINE HYDROCHLORIDE 25 MG/1
25 TABLET, FILM COATED ORAL DAILY
Status: DISCONTINUED | OUTPATIENT
Start: 2019-01-17 | End: 2019-01-17

## 2019-01-16 RX ORDER — RISPERIDONE 1 MG/1
2 TABLET, ORALLY DISINTEGRATING ORAL 3 TIMES DAILY
Status: DISCONTINUED | OUTPATIENT
Start: 2019-01-16 | End: 2019-01-16

## 2019-01-16 RX ORDER — RISPERIDONE 1 MG/1
1 TABLET, ORALLY DISINTEGRATING ORAL 3 TIMES DAILY
Status: COMPLETED | OUTPATIENT
Start: 2019-01-16 | End: 2019-01-17

## 2019-01-16 RX ORDER — AMOXICILLIN 250 MG
1 CAPSULE ORAL
Status: DISCONTINUED | OUTPATIENT
Start: 2019-01-16 | End: 2019-01-25 | Stop reason: HOSPADM

## 2019-01-16 RX ORDER — ACETAMINOPHEN 325 MG/1
325 TABLET ORAL EVERY 6 HOURS PRN
Status: DISCONTINUED | OUTPATIENT
Start: 2019-01-16 | End: 2019-01-25 | Stop reason: HOSPADM

## 2019-01-16 RX ADMIN — RISPERIDONE 1 MG: 1 TABLET, ORALLY DISINTEGRATING ORAL at 21:16

## 2019-01-16 RX ADMIN — DIVALPROEX SODIUM 500 MG: 500 TABLET, DELAYED RELEASE ORAL at 06:29

## 2019-01-16 RX ADMIN — Medication: at 08:10

## 2019-01-16 RX ADMIN — DOCUSATE SODIUM 100 MG: 100 CAPSULE, LIQUID FILLED ORAL at 08:01

## 2019-01-16 RX ADMIN — DIPHENHYDRAMINE HCL 50 MG: 25 TABLET ORAL at 17:03

## 2019-01-16 RX ADMIN — VITAMIN D, TAB 1000IU (100/BT) 1000 UNITS: 25 TAB at 08:01

## 2019-01-16 RX ADMIN — ATORVASTATIN CALCIUM 10 MG: 10 TABLET, FILM COATED ORAL at 17:03

## 2019-01-16 RX ADMIN — Medication 1 TABLET: at 08:00

## 2019-01-16 RX ADMIN — SENNOSIDES AND DOCUSATE SODIUM 1 TABLET: 8.6; 5 TABLET ORAL at 21:18

## 2019-01-16 RX ADMIN — LORATADINE 10 MG: 10 TABLET ORAL at 08:00

## 2019-01-16 RX ADMIN — RISPERIDONE 1 MG: 1 TABLET, ORALLY DISINTEGRATING ORAL at 08:00

## 2019-01-16 RX ADMIN — SITAGLIPTIN 50 MG: 50 TABLET, FILM COATED ORAL at 08:00

## 2019-01-16 RX ADMIN — LEVOTHYROXINE SODIUM 25 MCG: 25 TABLET ORAL at 06:29

## 2019-01-16 RX ADMIN — RISPERIDONE 1 MG: 1 TABLET, ORALLY DISINTEGRATING ORAL at 16:57

## 2019-01-16 RX ADMIN — PROPRANOLOL HYDROCHLORIDE 20 MG: 20 TABLET ORAL at 08:01

## 2019-01-16 RX ADMIN — DIVALPROEX SODIUM 500 MG: 500 TABLET, DELAYED RELEASE ORAL at 14:40

## 2019-01-16 RX ADMIN — MIRTAZAPINE 7.5 MG: 15 TABLET, FILM COATED ORAL at 21:16

## 2019-01-16 RX ADMIN — CHLORPROMAZINE HYDROCHLORIDE 50 MG: 25 TABLET, SUGAR COATED ORAL at 08:01

## 2019-01-16 RX ADMIN — ZINC 1 TABLET: TAB ORAL at 08:00

## 2019-01-16 RX ADMIN — CHLORPROMAZINE HYDROCHLORIDE 25 MG: 25 TABLET, SUGAR COATED ORAL at 21:18

## 2019-01-16 RX ADMIN — METFORMIN HYDROCHLORIDE 1000 MG: 500 TABLET, EXTENDED RELEASE ORAL at 08:00

## 2019-01-16 RX ADMIN — DIVALPROEX SODIUM 500 MG: 500 TABLET, DELAYED RELEASE ORAL at 21:18

## 2019-01-16 RX ADMIN — ACETAMINOPHEN 325 MG: 325 TABLET ORAL at 12:24

## 2019-01-16 NOTE — PROGRESS NOTES
Progress Note - Behavioral Health     Roma Kilgore 41 y o  male MRN: [id number]   Unit/Bed#: [facility] 218-02 Encounter: [id number]    Behavior over the last 24 hours:  Christiana Rubalcava was seen for an inpatient follow-up psychiatric visit this date  He relates he is feeling better and is no longer hearing voices  He states his anger has greatly decreased and his mood is much better  He remains fixated on medications and relates he wishes to be discharged soon  Per nursing report, his behavior has been controlled although he still becomes extremely upset after speaking with his mother on the phone  Appetite and sleep are within normal limits  He is taking his medications as prescribed  He remains withdrawn during mealtime due to paranoia and anxiety in social situations  ROS: reports Tremors    Mental Status Evaluation:    Appearance:  wearing pajamas   Behavior:  pleasant, cooperative   Speech:  pressured, hypertalkative, tangential   Mood:  improved, labile   Affect:  brighter   Thought Process:  coherent   Associations: intact associations   Thought Content:  no overt delusions   Perceptual Disturbances: none   Risk Potential: Suicidal ideation - None  Homicidal ideation - None  Potential for aggression - No   Sensorium:  oriented to person, place and time/date   Memory:  recent and remote memory grossly intact   Consciousness:  alert and awake   Attention: poor concentration and poor attention span   Insight:  limited   Judgment: limited   Gait/Station: normal gait/station and normal balance   Motor Activity: no abnormal movements     Vital signs in last 24 hours:    Temp:  [97 8 °F (36 6 °C)-98 3 °F (36 8 °C)] 98 3 °F (36 8 °C)  HR:  [] 85  Resp:  [16] 16  BP: (116-124)/(76-82) 116/82    Laboratory results:  I have personally reviewed all pertinent laboratory/tests results  Bloodwork ordered      Progress Toward Goals: progressing    Assessment/Plan   Principal Problem:    Schizoaffective disorder, bipolar type [facility])  Active Problems:    Mild intellectual disability    Obsessive compulsive disorder    Recommended Treatment:   1  Inderal changed to  10mg BID instead of 20mg QD for increased social anxiety and tremor control  2  Benadryl 50mg BID added for tremor control  3  Senna-docusate added QHS to prevent constipation  4  Risperdal increased to 1mg TID  5  Thorazine decreased to 25mg BID  All current active medications have been reviewed    Encourage group therapy, milieu therapy and occupational therapy  Behavioral Health checks every 7 minutes      Current Facility-Administered Medications:  acetaminophen 325 mg Oral Q6H PRN Bob Mcbride MD   aluminum-magnesium hydroxide-simethicone 15 mL Oral Q4H PRN Jeb George MD   ammonium lactate  Topical Daily CHRISTIANA Zaidi   atorvastatin 10 mg Oral Daily With Dinner Aniya Pickering MD   bismuth subsalicylate 462 mg Oral Q6H PRN Aniya Pickering MD   calcium carbonate 1 tablet Oral Daily With Breakfast Aniya Pickering MD   chlorproMAZINE 50 mg Oral Daily Aniya Pickering MD   chlorproMAZINE 50 mg Oral HS CHRISTIANA Jimenez   cholecalciferol 1,000 Units Oral Daily Aniya Pickering MD   diphenhydrAMINE 25 mg Oral BID CHRISTIANA Jimenez   divalproex sodium 500 mg Oral Q8H [address] CHRISTIANA Jimenez   docusate sodium 100 mg Oral BID Aniya Pickering MD   haloperidol 5 mg Oral Q6H PRN Bob Mcbride MD   haloperidol lactate 5 mg Intramuscular Q6H PRN Bob Mcbride MD   hydrOXYzine HCL 25 mg Oral Q6H PRN Jeb George MD   ketotifen 1 drop Both Eyes BID PRN Aniya Pickering MD   levothyroxine 25 mcg Oral Early Morning Aniya Pickering MD   loratadine 10 mg Oral Daily Aniya Pickering MD   magnesium hydroxide 20 mL Oral Daily PRN Jeb George MD   metFORMIN 1,000 mg Oral Daily With Breakfast Aniya Pickering MD   And       sitaGLIPtin 50 mg Oral After Breakfast Aniya Pickering MD   mirtazapine 7 5 mg Oral HS CHRISTIANA Jimenez   multivitamin stress formula 1 tablet Oral Daily Ashlie Hoyt MD   propranolol 20 mg Oral Daily CHRISTIANA Jimenez   risperiDONE 2 mg Oral TID CHRISTIANA Tillman   traZODone 50 mg Oral HS PRN Cece Davis MD       Risks / Benefits of Treatment:    Risks, benefits, and possible side effects of medications explained to patient and patient verbalizes understanding and agreement for treatment  Counseling / Coordination of Care:      Patient's progress discussed with staff in treatment team meeting  Medications, treatment progress and treatment plan reviewed with patient

## 2019-01-16 NOTE — SOCIAL WORK
BEN met with the patient to review the Treatment Plan  Patient stated, "I'm feeling a lot better " Stated he want to be d/c Friday in order to attend a scheduled Art Class on Tuesday  Denied A/VH  Denied lethality  Stated he was aware he had to "work on control " Reports that his 'thoughts' to act-out get him in trouble  Has attempted to deal with paranoid feelings related to remaining in community; attended AM group and participated with good control  Goals and Objectives of the Treatment Plan reviewed  The patient signed the Treatment Plan

## 2019-01-16 NOTE — PROGRESS NOTES
Pt was visible on the unit  Would speak if conversation was initiated with him  Good eye contact  Denies hearing voices at this time  Pt did not eat HS snack  Pt was on the phone several times this evening and was controlled  Agrees to seek out staff for needs  Will continue to monitor

## 2019-01-16 NOTE — PROGRESS NOTES
Patient bright, alert upon arrival of shift  Patient requested facial shave in am, tolerated well  This nurse offered patient to eat in dining room he declined related to desire not to be around " the other people"  Patient is pleasant, compliant with medications and care  Pt denies si hi hallucinations, anxiety and depression  He tells this nurse he lives a in a group home in [facility] and feels like he is ready to go back there  Will maintain on safety precautions and 7 minute checks, no needs identified

## 2019-01-16 NOTE — PROGRESS NOTES
Clinical Pharmacy Note: Valproic Acid    Alea Langston is a 41 y o  male who presents with schizoaffective disorder  Assessment/Plan:    VPA indication: mood disorder     Mati Pham is currently taking 500 mg delayed release tablet three times a day (EC tablet form)   Valproic Acid concentration is 58 8 ug/mL on 1/15/19 and was drawn at steady state  This was based on a dose of 500mg 2x a day  Based on this dose was increased to 500mg 3 times a day on 1/16/19 would recommend steady state level after three days of therapy before morning dose of Saturday 1/19/19  Pharmacist has reviewed liver function tests, pancreatic enzymes, and pregnancy test (if drawn) or made recommendations for such tests YES    Pregnancy test Does not apply to this patient  Pharmacy Recommendations:     1  Recommend drawing steady state level after 3 full days of therapy before AM dose on Saturday 1/19/19      2  Recommend BMP, CBC, because no labs on file since 3/18  3  Recommend EKG b/c none on file for admission in setting of QT prolongation medication  Monitoring:    Valproic Acid:    0  Lab Value Date/Time   VALPROICTOT 58 8 01/15/2019 0623       Liver Function:    0  Lab Value Date/Time   ALB 3 5 (L) 03/15/2018 0923   ALB 3 0 (L) 02/14/2018 0529   ALB 2 9 (L) 12/03/2017 0637   ALB 3 2 (L) 08/20/2017 0606       0  Lab Value Date/Time   TBILI 0 3 03/15/2018 0923   TBILI 0 43 02/14/2018 0529   TBILI 0 45 12/03/2017 0637   TBILI 0 68 08/20/2017 0606       0  Lab Value Date/Time   AST 14 02/14/2018 0529   AST 25 12/03/2017 0637   AST 35 08/20/2017 0606       0  Lab Value Date/Time   ALT 21 02/14/2018 0529   ALT 55 12/03/2017 0637   ALT 34 08/20/2017 0606     No results found for: INR    Platelets:    0  Lab Value Date/Time    03/15/2018 0923    12/09/2017 0642       Therapeutic valproic acid levels are  ug/mL, but target range varies by indication   Levels above 150 ug/mL indicate toxicity, although toxicity may be associated with levels within therapeutic ranges based on symptoms  If switching from delayed release to extended release formulation, increase dose by 8 to 20% and dose daily to maintain therapeutic serum levels  Monitor pancreatic enzymes if patient presents with abdominal pain consistent with pancreatitis which is a black box warning  Also, monitor liver function for black box warning of hepatotoxicity, check ammonia level if suspected  Suspect toxicity in stabilized patients if new-onset sedation, nausea, vomiting, diarrhea, and/or tremor  Reassess valproic acid level if liver function or mental status changes  May cause dose-related thrombocytopenia, inhibition of platelet aggregation, and bleeding  In some cases, platelet counts may be normalized with continued treatment; however, reduce dose or discontinue drug if patient develops evidence of hemorrhage, bruising, or a disorder of hemostasis/coagulation  Females of child bearing age should be on birth control due to very high teratogenic potential      Pharmacy will continue to follow patient with team  Thank you      Electronically signed by: Aaron Feldman, PharmD, Clinical Pharmacist - Psychiatry

## 2019-01-17 LAB — GLUCOSE SERPL-MCNC: 94 MG/DL (ref 65–140)

## 2019-01-17 PROCEDURE — 99231 SBSQ HOSP IP/OBS SF/LOW 25: CPT | Performed by: PSYCHIATRY & NEUROLOGY

## 2019-01-17 PROCEDURE — 82948 REAGENT STRIP/BLOOD GLUCOSE: CPT

## 2019-01-17 RX ORDER — HALOPERIDOL 5 MG
5 TABLET ORAL EVERY 6 HOURS PRN
Status: DISCONTINUED | OUTPATIENT
Start: 2019-01-17 | End: 2019-01-25 | Stop reason: HOSPADM

## 2019-01-17 RX ORDER — RISPERIDONE 1 MG/1
2 TABLET, ORALLY DISINTEGRATING ORAL 2 TIMES DAILY
Status: DISCONTINUED | OUTPATIENT
Start: 2019-01-18 | End: 2019-01-21

## 2019-01-17 RX ADMIN — CHLORPROMAZINE HYDROCHLORIDE 25 MG: 25 TABLET, SUGAR COATED ORAL at 21:48

## 2019-01-17 RX ADMIN — METFORMIN HYDROCHLORIDE 1000 MG: 500 TABLET, EXTENDED RELEASE ORAL at 08:04

## 2019-01-17 RX ADMIN — LORATADINE 10 MG: 10 TABLET ORAL at 08:04

## 2019-01-17 RX ADMIN — DIVALPROEX SODIUM 500 MG: 500 TABLET, DELAYED RELEASE ORAL at 13:01

## 2019-01-17 RX ADMIN — MIRTAZAPINE 7.5 MG: 15 TABLET, FILM COATED ORAL at 21:48

## 2019-01-17 RX ADMIN — RISPERIDONE 1 MG: 1 TABLET, ORALLY DISINTEGRATING ORAL at 21:47

## 2019-01-17 RX ADMIN — ZINC 1 TABLET: TAB ORAL at 08:04

## 2019-01-17 RX ADMIN — DIVALPROEX SODIUM 500 MG: 500 TABLET, DELAYED RELEASE ORAL at 21:48

## 2019-01-17 RX ADMIN — LEVOTHYROXINE SODIUM 25 MCG: 25 TABLET ORAL at 05:35

## 2019-01-17 RX ADMIN — Medication 1 APPLICATION: at 08:04

## 2019-01-17 RX ADMIN — DIPHENHYDRAMINE HCL 50 MG: 25 TABLET ORAL at 08:04

## 2019-01-17 RX ADMIN — ATORVASTATIN CALCIUM 10 MG: 10 TABLET, FILM COATED ORAL at 17:30

## 2019-01-17 RX ADMIN — RISPERIDONE 1 MG: 1 TABLET, ORALLY DISINTEGRATING ORAL at 08:01

## 2019-01-17 RX ADMIN — PROPRANOLOL HYDROCHLORIDE 10 MG: 10 TABLET ORAL at 08:04

## 2019-01-17 RX ADMIN — Medication 1 TABLET: at 08:04

## 2019-01-17 RX ADMIN — VITAMIN D, TAB 1000IU (100/BT) 1000 UNITS: 25 TAB at 08:04

## 2019-01-17 RX ADMIN — DIPHENHYDRAMINE HCL 50 MG: 25 TABLET ORAL at 17:35

## 2019-01-17 RX ADMIN — RISPERIDONE 1 MG: 1 TABLET, ORALLY DISINTEGRATING ORAL at 17:00

## 2019-01-17 RX ADMIN — PROPRANOLOL HYDROCHLORIDE 10 MG: 10 TABLET ORAL at 17:35

## 2019-01-17 RX ADMIN — TRAZODONE HYDROCHLORIDE 50 MG: 50 TABLET ORAL at 23:09

## 2019-01-17 RX ADMIN — CHLORPROMAZINE HYDROCHLORIDE 25 MG: 25 TABLET, SUGAR COATED ORAL at 08:04

## 2019-01-17 RX ADMIN — SITAGLIPTIN 50 MG: 50 TABLET, FILM COATED ORAL at 08:04

## 2019-01-17 RX ADMIN — DIVALPROEX SODIUM 500 MG: 500 TABLET, DELAYED RELEASE ORAL at 05:35

## 2019-01-17 RX ADMIN — SENNOSIDES AND DOCUSATE SODIUM 1 TABLET: 8.6; 5 TABLET ORAL at 21:47

## 2019-01-17 NOTE — SOCIAL WORK
BEN spoke to Hoang connors from pt's Group Home and informed him about discharge on Monday  Hoang connors informed SW that he would like to have a discharge meeting  He reports that he will see pt later this evening and then will meet with SW tomorrow at 11AM for a discharge meeting  BEN spoke to pt's Jagdeep Wilkinson pts mother  SW provided update on pt and explained that Kaylie Velazquez was out this AM to see him and that Hoang connors is coming today or tomorrow  BEN explained that she just spoke to Hoang connors about discharge Monday  BEN then reviewed medication with Jagdeep Wilkinson; she made it clear that she doesn't agree with medication changes but states that she is not the doctor so it "doesn't matter "  She continued to express over and over that she doesn't feel that decreasing the Thorazine is the right answer and that adding Risperdol probably won't do anything  Jagdeep Wilkinson now requesting to speak with the doctor again; BEN reassured her that she will inform the doctor of her request to speak  No other questions or needs reported

## 2019-01-17 NOTE — PROGRESS NOTES
Patient visible on the unit  Became irritated when this nurse discussed phone privledges  Was on the phone approx 1 hr at start of shift  Per report from staff patient was put on phone restrictions this afternoon after become loud and irritated on the phone with his mother  Reviewed unit phone call rules and that he already had a call this evening  Patient kept coming up to the desk to argue about making calls despite admitting that his mother upsets him  Had to be redirected several times  Will continue to monitor behavior

## 2019-01-17 NOTE — PROGRESS NOTES
Patient did calm down after approx 1 hr  In dining room with peers watching tv now  Will continue to monitor behavior

## 2019-01-17 NOTE — PROGRESS NOTES
Progress Note - Behavioral Health     Hardik Holliday 41 y o  male MRN: [id number]   [facility] 218-02 Encounter: [id number]    Behavior over the last 24 hours: slowly improving  Yousif Nava still demonstrates pressured speech and some circular, disorganized thinking  We discuss a few more days in the hospital to complete his cross taper of Thorazine to risperidone  We are evaluating his depakote level and considering further dose adjustments  Yousif Nava becomes extremely pressured after conversations with his mother, in which he is interrogated about the team's decisions about his medication adjustments and generally stirred up emotionally  Sleep: improved  Appetite: fair  Medication side effects: sedation, EPS, weight gain   ROS: no complaints    Mental Status Evaluation:    Appearance:  disheveled   Behavior:  guarded   Speech:  hypertalkative   Mood:  dysphoric, anxious   Affect:  constricted   Thought Process:  disorganized   Associations: loose associations   Thought Content:  obsessions   Perceptual Disturbances: none   Risk Potential: Suicidal ideation - None  Homicidal ideation - None  Potential for aggression - yes when agitated and provoked   Sensorium:  oriented to person, place and time/date   Memory:  recent and remote memory grossly intact   Consciousness:  alert and awake   Attention: attention span and concentration are age appropriate   Insight:  limited   Judgment: limited   Gait/Station: normal gait/station   Motor Activity: no abnormal movements     Vital signs in last 24 hours:    Temp:  [96 3 °F (35 7 °C)-96 9 °F (36 1 °C)] 96 9 °F (36 1 °C)  HR:  [86-96] 86  Resp:  [16] 16  BP: (116-117)/(77-80) 116/80    Laboratory results:    I have personally reviewed all pertinent laboratory/tests results    Most Recent Labs:   Lab Results   Component Value Date    WBC 10 3 03/15/2018    RBC 4 87 03/15/2018    HGB 15 3 03/15/2018    HCT 45 1 03/15/2018     03/15/2018    RDW 12 6 03/15/2018 NEUTROABS 4 19 12/09/2017    SODIUM 141 03/15/2018    K 4 5 03/15/2018     03/15/2018    CO2 32 (H) 03/15/2018    BUN 10 03/15/2018    CREATININE 1 05 02/14/2018    GLUC 137 (H) 03/15/2018    GLUF 119 (H) 02/14/2018    CALCIUM 8 5 (L) 03/15/2018    AST 14 02/14/2018    ALT 21 02/14/2018    ALKPHOS 70 03/15/2018    TP 6 7 02/14/2018    ALB 3 5 (L) 03/15/2018    TBILI 0 3 03/15/2018    CHOLESTEROL 110 03/15/2018    HDL 30 (L) 03/15/2018    TRIG 185 (H) 03/15/2018    LDLCALC 106 (H) 12/07/2017    VALPROICTOT 58 8 01/15/2019    AMMONIA 29 02/14/2018    NMW4JFEJTOHX 1 540 12/03/2017    FREET4 1 23 08/03/2017    RPR Non-Reactive 08/17/2017    HGBA1C 5 9 (H) 03/15/2018     12/05/2017       Progress Toward Goals: improving    Assessment/Plan   Principal Problem:    Schizoaffective disorder, bipolar type ([facility] [address] )  Active Problems:    Mild intellectual disability    Obsessive compulsive disorder    Recommended Treatment:     Planned medication and treatment changes:  Further titration of risperidone to 2mg po bid and observe  Consider increase in Depakote  Taper off of Thorazine tomorrow  All current active medications have been reviewed    Encourage group therapy, milieu therapy and occupational therapy  Behavioral Health checks every 7 minutes      Current Facility-Administered Medications:  acetaminophen 325 mg Oral Q6H PRN Champ Gallagher MD   aluminum-magnesium hydroxide-simethicone 15 mL Oral Q4H PRN Mary You MD   ammonium lactate  Topical Daily CHRISTIANA Zaidi   atorvastatin 10 mg Oral Daily With Dinner Ann Marie Anderson MD   bismuth subsalicylate 007 mg Oral Q6H PRN Ann Marie Anderson MD   calcium carbonate 1 tablet Oral Daily With Breakfast Ann Marie Anderson MD   chlorproMAZINE 25 mg Oral Daily CHRISTIANA Jimenez   chlorproMAZINE 25 mg Oral HS CHRISTIANA Jimenez   cholecalciferol 1,000 Units Oral Daily Ann Marie Anderson MD   diphenhydrAMINE 50 mg Oral BID Polo Brown, CHRISTIANA divalproex sodium 500 mg Oral Q8H [address] CHRISTIANA Jimenez   haloperidol 5 mg Oral Q6H PRN Camilla Clark MD   haloperidol lactate 5 mg Intramuscular Q6H PRN Camilla Clark MD   hydrOXYzine HCL 25 mg Oral Q6H PRN Geovany Martinez MD   ketotifen 1 drop Both Eyes BID PRN Davide Dolan MD   levothyroxine 25 mcg Oral Early Morning Davide Dolan MD   loratadine 10 mg Oral Daily Dvaide Dolan MD   magnesium hydroxide 20 mL Oral Daily PRN Geovany Martinez MD   metFORMIN 1,000 mg Oral Daily With Breakfast Davide Dolan MD   And       sitaGLIPtin 50 mg Oral After Breakfast Davide Dolan MD   mirtazapine 7 5 mg Oral HS CHRISTIANA Jimenez   multivitamin stress formula 1 tablet Oral Daily Davide Dolan MD   propranolol 10 mg Oral BID CHRISTIANA Jimenez   risperiDONE 1 mg Oral TID CHRISTIANA Jimenez   senna-docusate sodium 1 tablet Oral HS CHRISTIANA Jimenez   traZODone 50 mg Oral HS PRN Geovany Martinez MD       Risks / Benefits of Treatment:    Risks, benefits, and possible side effects of medications explained to patient and patient verbalizes understanding and agreement for treatment  Counseling / Coordination of Care:    Patient's progress discussed with staff in treatment team meeting  Medications, treatment progress and treatment plan reviewed with patient      Camilla Clark MD 01/17/19

## 2019-01-17 NOTE — PROGRESS NOTES
Patient is visible on the unit pacing the halls  Cooperative with medications  Positive for morning meal  Consumed breakfast tray in quiet room per patient request  Patient verbalized feeling good but anxious about going home  Patient states " I am feeling really good, I am anxious because I want to go home and I dont want to have to come back to the hospital " Pressured speech  Good eye contact  Patient denies A/V hallucinations  Patient denies SI and HI currently  Alert and oriented  Able to make needs known  No signs or symptoms of distress  SP1 and Q 7 minute checks will be maintained for patient safety  Will continue to monitor

## 2019-01-17 NOTE — CASE MANAGEMENT
Kaylie Velazquez from person directed support came in to meet with this patient today  After the meeting we spoke  I explained that the concerns for this patient have been paranoia and anxiety  Not being able to eat in the dining room around other people  Kaylie Velazquez stated that makes sense and that this patient always gets anxious around other people  I informed him that we were looking for a discharge for this coming Monday  I asked if there were any specific requests or needs that they would have for discharge such as faxing prescriptions or having a meeting with the patient  He stated that he did not know and that discharges were not his thing,  Kaylie Velazquez stated that he would speak with his team and call us to discuss discharge preparation

## 2019-01-17 NOTE — PROGRESS NOTES
Patient finally went to bed approx 0100  Declined any prn meds to help relax and sleep  Has been sleeping well since  Q 7 minute safety checks maintained

## 2019-01-18 LAB
ALBUMIN SERPL BCP-MCNC: 3.4 G/DL (ref 3.5–5.7)
ALP SERPL-CCNC: 46 U/L (ref 40–150)
ALT SERPL W P-5'-P-CCNC: 15 U/L (ref 7–52)
ANION GAP SERPL CALCULATED.3IONS-SCNC: 7 MMOL/L (ref 4–13)
AST SERPL W P-5'-P-CCNC: 16 U/L (ref 13–39)
BASOPHILS # BLD AUTO: 0 THOUSANDS/ΜL (ref 0–0.1)
BASOPHILS NFR BLD AUTO: 0 % (ref 0–2)
BILIRUB SERPL-MCNC: 0.4 MG/DL (ref 0.2–1)
BUN SERPL-MCNC: 12 MG/DL (ref 7–25)
CALCIUM SERPL-MCNC: 8.9 MG/DL (ref 8.6–10.5)
CHLORIDE SERPL-SCNC: 101 MMOL/L (ref 98–107)
CHOLEST SERPL-MCNC: 85 MG/DL (ref 0–200)
CO2 SERPL-SCNC: 30 MMOL/L (ref 21–31)
CREAT SERPL-MCNC: 1.22 MG/DL (ref 0.7–1.3)
EOSINOPHIL # BLD AUTO: 0.3 THOUSAND/ΜL (ref 0–0.61)
EOSINOPHIL NFR BLD AUTO: 4 % (ref 0–5)
ERYTHROCYTE [DISTWIDTH] IN BLOOD BY AUTOMATED COUNT: 12.4 % (ref 11.5–14.5)
GFR SERPL CREATININE-BSD FRML MDRD: 75 ML/MIN/1.73SQ M
GLUCOSE P FAST SERPL-MCNC: 101 MG/DL (ref 65–99)
GLUCOSE SERPL-MCNC: 101 MG/DL (ref 65–99)
HCT VFR BLD AUTO: 44.5 % (ref 36.5–49.3)
HDLC SERPL-MCNC: 24 MG/DL (ref 40–60)
HGB BLD-MCNC: 14.9 G/DL (ref 14–18)
LDLC SERPL CALC-MCNC: 42 MG/DL (ref 75–193)
LYMPHOCYTES # BLD AUTO: 2.9 THOUSANDS/ΜL (ref 0.6–4.47)
LYMPHOCYTES NFR BLD AUTO: 37 % (ref 21–51)
MCH RBC QN AUTO: 31 PG (ref 26–34)
MCHC RBC AUTO-ENTMCNC: 33.5 G/DL (ref 31–37)
MCV RBC AUTO: 93 FL (ref 81–99)
MONOCYTES # BLD AUTO: 0.7 THOUSAND/ΜL (ref 0.17–1.22)
MONOCYTES NFR BLD AUTO: 9 % (ref 2–12)
NEUTROPHILS # BLD AUTO: 3.9 THOUSANDS/ΜL (ref 1.4–6.5)
NEUTS SEG NFR BLD AUTO: 49 % (ref 42–75)
NONHDLC SERPL-MCNC: 61 MG/DL
NRBC BLD AUTO-RTO: 0 /100 WBCS
PLATELET # BLD AUTO: 136 THOUSANDS/UL (ref 149–390)
PMV BLD AUTO: 8.4 FL (ref 8.6–11.7)
POTASSIUM SERPL-SCNC: 3.9 MMOL/L (ref 3.5–5.5)
PROT SERPL-MCNC: 5.7 G/DL (ref 6.4–8.9)
RBC # BLD AUTO: 4.8 MILLION/UL (ref 4.3–5.9)
SODIUM SERPL-SCNC: 138 MMOL/L (ref 134–143)
TRIGL SERPL-MCNC: 97 MG/DL (ref 44–166)
VALPROATE SERPL-MCNC: 82.8 UG/ML (ref 50–125)
WBC # BLD AUTO: 7.9 THOUSAND/UL (ref 4.8–10.8)

## 2019-01-18 PROCEDURE — 80053 COMPREHEN METABOLIC PANEL: CPT | Performed by: NURSE PRACTITIONER

## 2019-01-18 PROCEDURE — 80061 LIPID PANEL: CPT | Performed by: NURSE PRACTITIONER

## 2019-01-18 PROCEDURE — 99232 SBSQ HOSP IP/OBS MODERATE 35: CPT | Performed by: PSYCHIATRY & NEUROLOGY

## 2019-01-18 PROCEDURE — 85025 COMPLETE CBC W/AUTO DIFF WBC: CPT | Performed by: NURSE PRACTITIONER

## 2019-01-18 PROCEDURE — 80164 ASSAY DIPROPYLACETIC ACD TOT: CPT | Performed by: NURSE PRACTITIONER

## 2019-01-18 RX ORDER — LORAZEPAM 2 MG/ML
INJECTION INTRAMUSCULAR
Status: COMPLETED
Start: 2019-01-18 | End: 2019-01-18

## 2019-01-18 RX ORDER — DIVALPROEX SODIUM 500 MG/1
500 TABLET, DELAYED RELEASE ORAL EVERY 8 HOURS SCHEDULED
Status: COMPLETED | OUTPATIENT
Start: 2019-01-18 | End: 2019-01-18

## 2019-01-18 RX ORDER — LORAZEPAM 2 MG/ML
2 INJECTION INTRAMUSCULAR ONCE
Status: DISCONTINUED | OUTPATIENT
Start: 2019-01-18 | End: 2019-01-19

## 2019-01-18 RX ORDER — DIVALPROEX SODIUM 500 MG/1
1000 TABLET, DELAYED RELEASE ORAL EVERY 12 HOURS SCHEDULED
Status: DISCONTINUED | OUTPATIENT
Start: 2019-01-19 | End: 2019-01-25 | Stop reason: HOSPADM

## 2019-01-18 RX ORDER — DIPHENHYDRAMINE HYDROCHLORIDE 50 MG/ML
50 INJECTION INTRAMUSCULAR; INTRAVENOUS ONCE
Status: COMPLETED | OUTPATIENT
Start: 2019-01-18 | End: 2019-01-18

## 2019-01-18 RX ORDER — CHLORPROMAZINE HYDROCHLORIDE 25 MG/1
50 TABLET, FILM COATED ORAL
Status: DISCONTINUED | OUTPATIENT
Start: 2019-01-18 | End: 2019-01-19

## 2019-01-18 RX ADMIN — Medication: at 08:16

## 2019-01-18 RX ADMIN — DIVALPROEX SODIUM 500 MG: 500 TABLET, DELAYED RELEASE ORAL at 20:23

## 2019-01-18 RX ADMIN — CHLORPROMAZINE HYDROCHLORIDE 50 MG: 25 TABLET, SUGAR COATED ORAL at 20:25

## 2019-01-18 RX ADMIN — BISMUTH SUBSALICYLATE 262 MG: 262 LIQUID ORAL at 11:27

## 2019-01-18 RX ADMIN — DIVALPROEX SODIUM 500 MG: 500 TABLET, DELAYED RELEASE ORAL at 05:50

## 2019-01-18 RX ADMIN — ACETAMINOPHEN 325 MG: 325 TABLET ORAL at 20:24

## 2019-01-18 RX ADMIN — LORAZEPAM 2 MG: 2 INJECTION, SOLUTION INTRAMUSCULAR; INTRAVENOUS at 17:40

## 2019-01-18 RX ADMIN — PROPRANOLOL HYDROCHLORIDE 10 MG: 10 TABLET ORAL at 17:39

## 2019-01-18 RX ADMIN — ZINC 1 TABLET: TAB ORAL at 08:16

## 2019-01-18 RX ADMIN — SENNOSIDES AND DOCUSATE SODIUM 1 TABLET: 8.6; 5 TABLET ORAL at 20:21

## 2019-01-18 RX ADMIN — RISPERIDONE 2 MG: 1 TABLET, ORALLY DISINTEGRATING ORAL at 17:39

## 2019-01-18 RX ADMIN — LEVOTHYROXINE SODIUM 25 MCG: 25 TABLET ORAL at 05:50

## 2019-01-18 RX ADMIN — Medication 1 TABLET: at 08:16

## 2019-01-18 RX ADMIN — PROPRANOLOL HYDROCHLORIDE 10 MG: 10 TABLET ORAL at 08:16

## 2019-01-18 RX ADMIN — RISPERIDONE 2 MG: 1 TABLET, ORALLY DISINTEGRATING ORAL at 08:16

## 2019-01-18 RX ADMIN — SITAGLIPTIN 50 MG: 50 TABLET, FILM COATED ORAL at 08:16

## 2019-01-18 RX ADMIN — DIPHENHYDRAMINE HCL 50 MG: 25 TABLET ORAL at 08:16

## 2019-01-18 RX ADMIN — HYDROXYZINE HYDROCHLORIDE 25 MG: 25 TABLET ORAL at 16:08

## 2019-01-18 RX ADMIN — DIPHENHYDRAMINE HYDROCHLORIDE 50 MG: 50 INJECTION INTRAMUSCULAR; INTRAVENOUS at 21:20

## 2019-01-18 RX ADMIN — DIVALPROEX SODIUM 500 MG: 500 TABLET, DELAYED RELEASE ORAL at 16:08

## 2019-01-18 RX ADMIN — METFORMIN HYDROCHLORIDE 1000 MG: 500 TABLET, EXTENDED RELEASE ORAL at 08:16

## 2019-01-18 RX ADMIN — DIPHENHYDRAMINE HCL 50 MG: 25 TABLET ORAL at 17:38

## 2019-01-18 RX ADMIN — ATORVASTATIN CALCIUM 10 MG: 10 TABLET, FILM COATED ORAL at 16:08

## 2019-01-18 RX ADMIN — VITAMIN D, TAB 1000IU (100/BT) 1000 UNITS: 25 TAB at 08:16

## 2019-01-18 RX ADMIN — HALOPERIDOL LACTATE 5 MG: 5 INJECTION, SOLUTION INTRAMUSCULAR at 19:29

## 2019-01-18 RX ADMIN — MIRTAZAPINE 7.5 MG: 15 TABLET, FILM COATED ORAL at 20:29

## 2019-01-18 RX ADMIN — LORATADINE 10 MG: 10 TABLET ORAL at 08:16

## 2019-01-18 NOTE — SOCIAL WORK
BEN met with pt and Norma Sutton and Ludy Vega (group home staff) for scheduled discharge meeting  Pt immediately informed us all that he is no longer discharging on Monday; SW confirmed this information with the doctor  Pt explained that he is being taken off Depakote as he believes it is making him ill; he will be started on a new medication  Pt's group home staff report that pt has been on Depakote for years and has never had issues and believe that this is in pt's head  BEN apologized for inconvenience  Pt reports that he can't come home how he currently is and explained to SW how when he is not well he attacks his staff and calls them bad names which he knows is wrong  Pt apologetic about staff coming all this way; staff very accepting and appear to have a good relationship with pt  BEN agreed to touch base next week
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
## 2019-01-18 NOTE — PROGRESS NOTES
Pt behaviors continued to escalate  Pt hitting self in head and punching bed  Punched quiet room window and door times one  Pt screaming "I need to go be institutionalized  Im not ready to go I can't be discharged  Put me in straight jacket" Called on call Toni Diaz provider  Received new orders for 2 mg IM ativan for severe agitation and anxiety  Pt compliant with PO risperidone 2 mg, benadryl 50 mg PO, and propanolol PO  Administered IM ativan in right deltoid  Pt tolerated without complication  Medication effective  Pt currently resting quietly in quiet room  No signs of distress  Calm and controlled  Safety precautions maintained  Will continue to monitor and assess

## 2019-01-18 NOTE — PROGRESS NOTES
Progress Note - Behavioral Health     Marilynn Beebe 41 y o  male MRN: [id number]   Unit/Bed#: [facility] 218-02 Encounter: [id number]    Behavior over the last 24 hours: unchanged  Ora Valentino feels that depakote has been making him sick and he would not continue to take it after he leaves  He is asking for Topamax  He continues to be demanding of staff, and derails psychologically after talks with his mother  Sleep: insomnia  Appetite: decreased  Medication side effects: Nausea  ROS: nausea    Mental Status Evaluation:    Appearance:  age appropriate   Behavior:  demanding, guarded   Speech:  pressured   Mood:  anxious   Affect:  constricted, inappropriate   Thought Process:  circumstantial   Associations: circumstantial associations   Thought Content:  somatic and medication focused   Perceptual Disturbances: none   Risk Potential: Suicidal ideation - None  Homicidal ideation - None at present  Potential for aggression - Yes, due to agitation   Sensorium:  oriented to person, place and time/date   Memory:  recent and remote memory grossly intact   Consciousness:  alert and awake   Attention: attention span and concentration are age appropriate   Insight:  partial   Judgment: limited   Gait/Station: normal gait/station   Motor Activity: no abnormal movements     Vital signs in last 24 hours:    Temp:  [96 8 °F (36 °C)-97 5 °F (36 4 °C)] 96 8 °F (36 °C)  HR:  [] 94  Resp:  [16-18] 18  BP: (122-125)/(73-78) 125/78    Laboratory results:    I have personally reviewed all pertinent laboratory/tests results    Most Recent Labs:   Lab Results   Component Value Date    WBC 7 90 01/18/2019    RBC 4 80 01/18/2019    HGB 14 9 01/18/2019    HCT 44 5 01/18/2019     (L) 01/18/2019    RDW 12 4 01/18/2019    NEUTROABS 3 90 01/18/2019    SODIUM 138 01/18/2019    K 3 9 01/18/2019     01/18/2019    CO2 30 01/18/2019    BUN 12 01/18/2019    CREATININE 1 22 01/18/2019    GLUC 101 (H) 01/18/2019    GLUF 101 (H) 01/18/2019    CALCIUM 8 9 01/18/2019    AST 16 01/18/2019    ALT 15 01/18/2019    ALKPHOS 46 01/18/2019    TP 5 7 (L) 01/18/2019    ALB 3 4 (L) 01/18/2019    TBILI 0 40 01/18/2019    CHOLESTEROL 85 01/18/2019    HDL 24 (L) 01/18/2019    TRIG 97 01/18/2019    LDLCALC 42 (L) 01/18/2019    NONHDLC 61 01/18/2019    VALPROICTOT 82 8 01/18/2019    AMMONIA 29 02/14/2018    VRT2OIWIVUYR 1 540 12/03/2017    FREET4 1 23 08/03/2017    RPR Non-Reactive 08/17/2017    HGBA1C 5 9 (H) 03/15/2018     12/05/2017       Progress Toward Goals: improving    Assessment/Plan   Principal Problem:    Schizoaffective disorder, bipolar type ([facility] [address] )  Active Problems:    Mild intellectual disability    Obsessive compulsive disorder    Recommended Treatment:     Planned medication and treatment changes: All current active medications have been reviewed  Encourage group therapy, milieu therapy and occupational therapy  Behavioral Health checks every 7 minutes    Feels he won't tolerate depakote on discharge and asks to be switched to Topamax which he reportedly tolerated in combination with risperidone in the past    However there is a major contraindication between risperidone and topiramate such that the medications synergistically cause anhidrosis  So this would not be a good combination for a patient who intermittently has psychosis and severe communication and comprehension difficulties       Current Facility-Administered Medications:  acetaminophen 325 mg Oral Q6H PRN Gilford Lennox, MD   aluminum-magnesium hydroxide-simethicone 15 mL Oral Q4H PRN Pura Rubinstein, MD   ammonium lactate  Topical Daily CHRISTIANA Zaidi   atorvastatin 10 mg Oral Daily With Dinner Kade Woodall MD   bismuth subsalicylate 257 mg Oral Q6H PRN Kade Woodall MD   calcium carbonate 1 tablet Oral Daily With Breakfast Kade Woodall MD   chlorproMAZINE 25 mg Oral HS CHRISTIANA Jimenez   cholecalciferol 1,000 Units Oral Daily Kade Woodall MD diphenhydrAMINE 50 mg Oral BID CHRISTIANA Jimenez   divalproex sodium 500 mg Oral Q8H [facility] CHRISTIANA Jimenez   haloperidol 5 mg Oral Q6H PRN Los Coon MD   haloperidol lactate 5 mg Intramuscular Q6H PRN Los Coon MD   hydrOXYzine HCL 25 mg Oral Q6H PRN Charito Sandy MD   ketotifen 1 drop Both Eyes BID PRN Isaak Price MD   levothyroxine 25 mcg Oral Early Morning Isaak Price MD   loratadine 10 mg Oral Daily Isaak Price MD   magnesium hydroxide 20 mL Oral Daily PRN Charito Sandy MD   metFORMIN 1,000 mg Oral Daily With Breakfast Isaak Price MD   And       sitaGLIPtin 50 mg Oral After Breakfast Isaak Price MD   mirtazapine 7 5 mg Oral HS CHRISTIANA Jimenez   multivitamin stress formula 1 tablet Oral Daily Isaak Price MD   propranolol 10 mg Oral BID CHRISTIANA Jimenez   risperiDONE 2 mg Oral BID Los Coon MD   senna-docusate sodium 1 tablet Oral HS CHRISTIANA Jimenez   traZODone 50 mg Oral HS PRN Charito Sandy MD       Risks / Benefits of Treatment:    Risks, benefits, and possible side effects of medications explained to patient and patient verbalizes understanding and agreement for treatment  Counseling / Coordination of Care:    Patient's progress discussed with staff in treatment team meeting  Medications, treatment progress and treatment plan reviewed with patient      Los Coon MD 01/18/19

## 2019-01-18 NOTE — PROGRESS NOTES
Pt becoming increasingly agitated on telephone with mother  Pt encouraged by RN to hang up phone  Attempted to redirect patient  Decrease stimulation  Provide verbal intervention and 1:1 discussion with RN  Previously administer atarax not effective  Speech pressured  Tangential  Disorganized  Pt screaming and yelling in quiet room  Tearful periods  Pt cycling through multiple emotions expressing frustration with mother and things she said on phone

## 2019-01-18 NOTE — PROGRESS NOTES
Provided patient dinner tray  Pt ate 100% of dinner  Pt remaining calm and controlled  Transitioned back into community with peers  Safety precautions maintained  Will continue to monitor and assess

## 2019-01-18 NOTE — PROGRESS NOTES
Patient intrusive and needy  Needs redirection  No overt agitation  Maintaining treatment gains  Maintained on Q 7 minute safety checks  No acting out, suicidal ideations, and/or homicidal behaviors  No changes in medical condition  Fluids maintained at bedside to promote hydration  Trazodone 50 mg given at HS for insomnia  Effective currently

## 2019-01-18 NOTE — PROGRESS NOTES
No aggression noted  Patient observed sleeping during most Q 7 minute safety checks (second portion of shift)  No suicidal ideations, acting out or homicidal behaviors  No change in medical condition or complaints voiced  Fluids maintained at bedside to promote hydration

## 2019-01-18 NOTE — SOCIAL WORK
BEN received message from pt's mother stating she needs to speak with someone about pt's medicine as she heard his Depakote may be discontinued and does not agree  She reports that she prefers to speak with the doctor  BEN informed doctor of message and requested that he call her ASAP

## 2019-01-19 LAB — GLUCOSE SERPL-MCNC: 77 MG/DL (ref 65–140)

## 2019-01-19 PROCEDURE — 82948 REAGENT STRIP/BLOOD GLUCOSE: CPT

## 2019-01-19 PROCEDURE — 99232 SBSQ HOSP IP/OBS MODERATE 35: CPT | Performed by: NURSE PRACTITIONER

## 2019-01-19 RX ORDER — DIPHENHYDRAMINE HCL 25 MG
50 TABLET ORAL EVERY 6 HOURS PRN
Status: DISCONTINUED | OUTPATIENT
Start: 2019-01-19 | End: 2019-01-19

## 2019-01-19 RX ORDER — CLONAZEPAM 0.5 MG/1
0.5 TABLET ORAL 3 TIMES DAILY
Status: DISCONTINUED | OUTPATIENT
Start: 2019-01-19 | End: 2019-01-19

## 2019-01-19 RX ORDER — DIPHENHYDRAMINE HYDROCHLORIDE 50 MG/ML
50 INJECTION INTRAMUSCULAR; INTRAVENOUS EVERY 6 HOURS PRN
Status: DISCONTINUED | OUTPATIENT
Start: 2019-01-19 | End: 2019-01-19

## 2019-01-19 RX ORDER — HALOPERIDOL 5 MG/ML
10 INJECTION INTRAMUSCULAR EVERY 6 HOURS PRN
Status: DISCONTINUED | OUTPATIENT
Start: 2019-01-19 | End: 2019-01-19

## 2019-01-19 RX ORDER — LORAZEPAM 2 MG/ML
2 INJECTION INTRAMUSCULAR EVERY 4 HOURS PRN
Status: DISCONTINUED | OUTPATIENT
Start: 2019-01-19 | End: 2019-01-25 | Stop reason: HOSPADM

## 2019-01-19 RX ORDER — DIVALPROEX SODIUM 500 MG/1
500 TABLET, DELAYED RELEASE ORAL 2 TIMES DAILY
Status: DISCONTINUED | OUTPATIENT
Start: 2019-01-20 | End: 2019-01-19

## 2019-01-19 RX ORDER — LORAZEPAM 0.5 MG/1
0.5 TABLET ORAL 3 TIMES DAILY
Status: DISCONTINUED | OUTPATIENT
Start: 2019-01-19 | End: 2019-01-25 | Stop reason: HOSPADM

## 2019-01-19 RX ORDER — HALOPERIDOL 5 MG/ML
10 INJECTION INTRAMUSCULAR EVERY 6 HOURS PRN
Status: DISCONTINUED | OUTPATIENT
Start: 2019-01-19 | End: 2019-01-25 | Stop reason: HOSPADM

## 2019-01-19 RX ORDER — DIVALPROEX SODIUM 500 MG/1
500 TABLET, DELAYED RELEASE ORAL DAILY
Status: DISCONTINUED | OUTPATIENT
Start: 2019-01-19 | End: 2019-01-25 | Stop reason: HOSPADM

## 2019-01-19 RX ORDER — DIPHENHYDRAMINE HYDROCHLORIDE 50 MG/ML
50 INJECTION INTRAMUSCULAR; INTRAVENOUS EVERY 6 HOURS PRN
Status: DISCONTINUED | OUTPATIENT
Start: 2019-01-19 | End: 2019-01-25 | Stop reason: HOSPADM

## 2019-01-19 RX ORDER — OLANZAPINE 5 MG/1
10 TABLET, ORALLY DISINTEGRATING ORAL
Status: DISCONTINUED | OUTPATIENT
Start: 2019-01-19 | End: 2019-01-25 | Stop reason: HOSPADM

## 2019-01-19 RX ADMIN — LORAZEPAM 0.5 MG: 0.5 TABLET ORAL at 15:53

## 2019-01-19 RX ADMIN — DIVALPROEX SODIUM 500 MG: 500 TABLET, DELAYED RELEASE ORAL at 12:44

## 2019-01-19 RX ADMIN — HALOPERIDOL 5 MG: 5 TABLET ORAL at 21:46

## 2019-01-19 RX ADMIN — Medication: at 08:36

## 2019-01-19 RX ADMIN — TRAZODONE HYDROCHLORIDE 50 MG: 50 TABLET ORAL at 23:39

## 2019-01-19 RX ADMIN — PROPRANOLOL HYDROCHLORIDE 10 MG: 10 TABLET ORAL at 08:30

## 2019-01-19 RX ADMIN — SITAGLIPTIN 50 MG: 50 TABLET, FILM COATED ORAL at 08:00

## 2019-01-19 RX ADMIN — PROPRANOLOL HYDROCHLORIDE 10 MG: 10 TABLET ORAL at 17:04

## 2019-01-19 RX ADMIN — MIRTAZAPINE 7.5 MG: 15 TABLET, FILM COATED ORAL at 21:45

## 2019-01-19 RX ADMIN — VITAMIN D, TAB 1000IU (100/BT) 1000 UNITS: 25 TAB at 08:30

## 2019-01-19 RX ADMIN — RISPERIDONE 2 MG: 1 TABLET, ORALLY DISINTEGRATING ORAL at 08:29

## 2019-01-19 RX ADMIN — DIVALPROEX SODIUM 1000 MG: 500 TABLET, DELAYED RELEASE ORAL at 20:55

## 2019-01-19 RX ADMIN — LORAZEPAM 0.5 MG: 0.5 TABLET ORAL at 20:54

## 2019-01-19 RX ADMIN — DIPHENHYDRAMINE HCL 50 MG: 25 TABLET ORAL at 17:03

## 2019-01-19 RX ADMIN — ATORVASTATIN CALCIUM 10 MG: 10 TABLET, FILM COATED ORAL at 15:53

## 2019-01-19 RX ADMIN — LEVOTHYROXINE SODIUM 25 MCG: 25 TABLET ORAL at 08:30

## 2019-01-19 RX ADMIN — OLANZAPINE 10 MG: 5 TABLET, ORALLY DISINTEGRATING ORAL at 10:03

## 2019-01-19 RX ADMIN — ZINC 1 TABLET: TAB ORAL at 08:30

## 2019-01-19 RX ADMIN — RISPERIDONE 2 MG: 1 TABLET, ORALLY DISINTEGRATING ORAL at 17:04

## 2019-01-19 RX ADMIN — METFORMIN HYDROCHLORIDE 1000 MG: 500 TABLET, EXTENDED RELEASE ORAL at 08:29

## 2019-01-19 RX ADMIN — LORATADINE 10 MG: 10 TABLET ORAL at 08:29

## 2019-01-19 RX ADMIN — DIVALPROEX SODIUM 1000 MG: 500 TABLET, DELAYED RELEASE ORAL at 08:29

## 2019-01-19 RX ADMIN — SENNOSIDES AND DOCUSATE SODIUM 1 TABLET: 8.6; 5 TABLET ORAL at 21:45

## 2019-01-19 RX ADMIN — VITAMIN D, TAB 1000IU (100/BT) 1000 UNITS: 25 TAB at 08:29

## 2019-01-19 RX ADMIN — Medication 1 TABLET: at 08:29

## 2019-01-19 RX ADMIN — DIPHENHYDRAMINE HCL 50 MG: 25 TABLET ORAL at 08:30

## 2019-01-19 RX ADMIN — LORAZEPAM 2 MG: 2 INJECTION INTRAMUSCULAR; INTRAVENOUS at 10:35

## 2019-01-19 NOTE — PLAN OF CARE
Anxiety     Anxiety is at manageable level Not Progressing        Ineffective Coping     Participates in unit activities Not Progressing          Nutrition/Hydration-ADULT     Nutrient/Hydration intake appropriate for improving, restoring or maintaining nutritional needs Progressing

## 2019-01-19 NOTE — PROGRESS NOTES
Patient remained verbally abrasive to staff  Continues to blame mother for his anger and aggressive behaviors  Talking negative about patient's RN trying to insight arguments with staff  Stalking RN even after constant redirections  Mental health technicians Interceeded to distract patient  Mati Pham retired to room and went to sleep  No distress noted  Remains on 7 minute checks

## 2019-01-19 NOTE — PROGRESS NOTES
Patient continues to complain about voices  Benadryl 50 mg given IM in right deltoid per request at 2120  Resting in room 243 without issue  Remains internally preoccupied

## 2019-01-19 NOTE — PROGRESS NOTES
Patient left out of quiet room now  Behavior much calmer  Keeps coming to nurses station  with multiple requests but easily redirectable  In bathroom kneeing over toilet gagging stating he threw up but nothing in the toilet  Soda given and stomach settles  Pt then c/o hearing voices  Requesting a CT scan of the head to see if he has brain damage causing him to act out the way he does  Speech remains pressured and rapid  Compliant with VS and meds  Will continue to monitor behavior

## 2019-01-19 NOTE — PROGRESS NOTES
Patient remains sleeping on q 7 minute checks  No further aggressive acting out noted    Respirations normal
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
## 2019-01-19 NOTE — PROGRESS NOTES
Progress Note - Behavioral Health     Marilynn Fear 41 y o  male MRN: [id number]   Unit/Bed#: [facility] 218-02 Encounter: [id number]    Behavior over the last 24 hours:  Ora Valentino was seen for an inpatient follow-up psychiatric visit this date  He was seen in the quiet room due to agitation  Per nursing report, he has been verbally and physically aggressive and is extremely difficult to redirect  He is requiring p r n  Medication in order to control behavior  At today's assessment, he is extremely labile, paranoid, and obsessive  He relates he is extremely upset at his mother who allegedly told him he is going to penitentiary  He now cannot stop thinking about it  He did sleep through the night after receiving p r n  Medication and constant redirection  He relates that he would like to hurt his mother but at the same time misses her and wants to see her  He remains on phone restrictions because he keeps calling her and speaking with her increases his agitation      ROS: no complaints    Mental Status Evaluation:    Appearance:  wearing pajamas   Behavior:  angry, still very agitated, psychomotor agitation   Speech:  pressured, increased volume, tangential   Mood:  dysphoric, labile, angry   Affect:  tearful, expansive, reactive   Thought Process:  disorganized, tangential, racing of thoughts, perseverative   Associations: circumstantial associations   Thought Content:  Obsessive thoughts   Perceptual Disturbances: denies auditory hallucinations when asked   Risk Potential: Suicidal ideation - None  Homicidal ideation - yes without plan  Potential for aggression - Yes, due to agitation   Sensorium:  oriented to person, place and time/date   Memory:  recent and remote memory grossly intact   Consciousness:  alert and awake   Attention: decreased concentration and decreased attention span   Insight:  poor   Judgment: poor   Gait/Station: normal gait/station and normal balance   Motor Activity: no abnormal movements     Vital signs in last 24 hours:    Temp:  [97 3 °F (36 3 °C)-98 °F (36 7 °C)] 97 3 °F (36 3 °C)  HR:  [92-97] 97  Resp:  [16-18] 18  BP: (114-129)/(70-89) 129/89    Laboratory results:  I have personally reviewed all pertinent laboratory/tests results  Progress Toward Goals: regressed    Assessment/Plan   Principal Problem:    Schizoaffective disorder, bipolar type (HCC)  Active Problems:    Mild intellectual disability    Obsessive compulsive disorder    Recommended Treatment:   1  Depakote increased to 2500 mg daily  2  Initiate Klonopin 0 5 mg 3 times daily  3  PRN Medications reviewed and updated  4  Will continue to monitor patient and adjust medications as needed  All current active medications have been reviewed    Encourage group therapy, milieu therapy and occupational therapy  Behavioral Health checks every 7 minutes      Current Facility-Administered Medications:  acetaminophen 325 mg Oral Q6H PRN Verona Bence, MD   aluminum-magnesium hydroxide-simethicone 15 mL Oral Q4H PRN Martha Boland MD   ammonium lactate  Topical Daily CHRISTIANA Zaidi   atorvastatin 10 mg Oral Daily With Dinner Andres Eastman MD   bismuth subsalicylate 156 mg Oral Q6H PRN Andres Eastman MD   calcium carbonate 1 tablet Oral Daily With Breakfast Andres Eastman MD   cholecalciferol 1,000 Units Oral Daily Andres Eastman MD   clonazePAM 0 5 mg Oral TID CHRISTIANA Jimenez   haloperidol lactate 10 mg Intramuscular Q6H PRN LUZ MARIA JimenezNP   And       diphenhydrAMINE 50 mg Intravenous Q6H PRN CHRISTIANA Jimenez   diphenhydrAMINE 50 mg Oral BID CHRISTIANA Jimenez   divalproex sodium 1,000 mg Oral Q12H MD Ugo   divalproex sodium 500 mg Oral Daily CHRISTIANA Jimenez   haloperidol 5 mg Oral Q6H PRN Verona Bence, MD   hydrOXYzine HCL 25 mg Oral Q6H PRN Martha Boland MD   ketotifen 1 drop Both Eyes BID PRN Andres Eastman MD   levothyroxine 25 mcg Oral Early Morning Andres Eastman MD loratadine 10 mg Oral Daily Andres Eastman MD   LORazepam 2 mg Intramuscular Q4H PRN CHRISTIANA Jimenez   magnesium hydroxide 20 mL Oral Daily PRN Martha Boland MD   metFORMIN 1,000 mg Oral Daily With Breakfast Andres Eastman MD   And       sitaGLIPtin 50 mg Oral After Breakfast Andres Eastman MD   mirtazapine 7 5 mg Oral HS CHRISTIANA Jimenez   multivitamin stress formula 1 tablet Oral Daily Andres Eastman MD   OLANZapine 10 mg Oral Q3H PRN CHRISTIANA Jimenez   propranolol 10 mg Oral BID CHRISTIANA Jimenez   risperiDONE 2 mg Oral BID Verona Bence, MD   senna-docusate sodium 1 tablet Oral HS CHRISTIANA Jimenez   traZODone 50 mg Oral HS PRN Martha Boland MD       Risks / Benefits of Treatment:    Risks, benefits, and possible side effects of medications explained to patient and patient verbalizes understanding and agreement for treatment  Counseling / Coordination of Care:      Patient's progress discussed with staff in treatment team meeting  Medications, treatment progress and treatment plan reviewed with patient

## 2019-01-19 NOTE — PROGRESS NOTES
Pt remains difficult to redirect  Testing limits  Unwilling to try alternate coping skills  Needs firm limits  Pt made aware throughout shift that the phone would not be utilized today and he was on phone restriction  Pt screaming "why cause my mom triggers me" Confrontational and argumentative with RN  Turned off lights in attempt to decrease stimulation encouraged patient to try and rest  Safety precautions maintained  Will continue to monitor and assess

## 2019-01-19 NOTE — PROGRESS NOTES
No further aggression  Patient observed sleeping during most Q 7 minute safety checks (second portion of shift)  No suicidal ideations, acting out or homicidal behaviors  No change in medical condition or complaints voiced  Fluids maintained at bedside to promote hydration

## 2019-01-20 LAB — GLUCOSE SERPL-MCNC: 105 MG/DL (ref 65–140)

## 2019-01-20 PROCEDURE — 99231 SBSQ HOSP IP/OBS SF/LOW 25: CPT | Performed by: NURSE PRACTITIONER

## 2019-01-20 PROCEDURE — 82948 REAGENT STRIP/BLOOD GLUCOSE: CPT

## 2019-01-20 RX ORDER — TRAZODONE HYDROCHLORIDE 50 MG/1
100 TABLET ORAL
Status: DISCONTINUED | OUTPATIENT
Start: 2019-01-20 | End: 2019-01-25 | Stop reason: HOSPADM

## 2019-01-20 RX ADMIN — LORATADINE 10 MG: 10 TABLET ORAL at 08:22

## 2019-01-20 RX ADMIN — TRAZODONE HYDROCHLORIDE 100 MG: 50 TABLET ORAL at 21:51

## 2019-01-20 RX ADMIN — SITAGLIPTIN 50 MG: 50 TABLET, FILM COATED ORAL at 08:22

## 2019-01-20 RX ADMIN — PROPRANOLOL HYDROCHLORIDE 10 MG: 10 TABLET ORAL at 08:22

## 2019-01-20 RX ADMIN — Medication: at 08:23

## 2019-01-20 RX ADMIN — RISPERIDONE 2 MG: 1 TABLET, ORALLY DISINTEGRATING ORAL at 17:09

## 2019-01-20 RX ADMIN — DIVALPROEX SODIUM 500 MG: 500 TABLET, DELAYED RELEASE ORAL at 13:21

## 2019-01-20 RX ADMIN — METFORMIN HYDROCHLORIDE 1000 MG: 500 TABLET, EXTENDED RELEASE ORAL at 08:23

## 2019-01-20 RX ADMIN — DIVALPROEX SODIUM 1000 MG: 500 TABLET, DELAYED RELEASE ORAL at 08:23

## 2019-01-20 RX ADMIN — DIVALPROEX SODIUM 1000 MG: 500 TABLET, DELAYED RELEASE ORAL at 21:52

## 2019-01-20 RX ADMIN — RISPERIDONE 2 MG: 1 TABLET, ORALLY DISINTEGRATING ORAL at 08:22

## 2019-01-20 RX ADMIN — ZINC 1 TABLET: TAB ORAL at 08:22

## 2019-01-20 RX ADMIN — HYDROXYZINE HYDROCHLORIDE 25 MG: 25 TABLET ORAL at 13:44

## 2019-01-20 RX ADMIN — DIPHENHYDRAMINE HCL 50 MG: 25 TABLET ORAL at 17:09

## 2019-01-20 RX ADMIN — LORAZEPAM 0.5 MG: 0.5 TABLET ORAL at 21:51

## 2019-01-20 RX ADMIN — VITAMIN D, TAB 1000IU (100/BT) 1000 UNITS: 25 TAB at 08:22

## 2019-01-20 RX ADMIN — LEVOTHYROXINE SODIUM 25 MCG: 25 TABLET ORAL at 06:38

## 2019-01-20 RX ADMIN — DIPHENHYDRAMINE HCL 50 MG: 25 TABLET ORAL at 08:22

## 2019-01-20 RX ADMIN — LORAZEPAM 0.5 MG: 0.5 TABLET ORAL at 17:08

## 2019-01-20 RX ADMIN — HYDROXYZINE HYDROCHLORIDE 25 MG: 25 TABLET ORAL at 00:38

## 2019-01-20 RX ADMIN — SENNOSIDES AND DOCUSATE SODIUM 1 TABLET: 8.6; 5 TABLET ORAL at 21:52

## 2019-01-20 RX ADMIN — LORAZEPAM 0.5 MG: 0.5 TABLET ORAL at 08:22

## 2019-01-20 RX ADMIN — Medication 1 TABLET: at 08:22

## 2019-01-20 RX ADMIN — ATORVASTATIN CALCIUM 10 MG: 10 TABLET, FILM COATED ORAL at 17:09

## 2019-01-20 RX ADMIN — HALOPERIDOL 5 MG: 5 TABLET ORAL at 13:44

## 2019-01-20 RX ADMIN — PROPRANOLOL HYDROCHLORIDE 10 MG: 10 TABLET ORAL at 17:09

## 2019-01-20 RX ADMIN — MIRTAZAPINE 7.5 MG: 15 TABLET, FILM COATED ORAL at 21:53

## 2019-01-20 NOTE — PLAN OF CARE
Anxiety     Anxiety is at manageable level Progressing        Ineffective Coping     Participates in unit activities Progressing        Nutrition/Hydration-ADULT     Nutrient/Hydration intake appropriate for improving, restoring or maintaining nutritional needs Progressing

## 2019-01-20 NOTE — PROGRESS NOTES
Progress Note - Behavioral Health     Bire Blanco 41 y o  male MRN: [id number]   Unit/Bed#: [facility] 218-02 Encounter: [id number]    Behavior over the last 24 hours:  Eric Guidry was seen for an inpatient follow-up psychiatric visit this date  Per nursing report, he was allowed to use the phone by night shift and became severely agitated again after speaking with his mother  At today's assessment, he appeared less labile, pressured, and tangential although still fixated on medications  He is very argumentative regarding which medications he is willing to take and wants to change his regimen constantly  He is currently endorsing suicidal ideation with a plan to stab himself  He states the voices in his head are telling him to do it  He is able; however, to contract for safety while on the unit  He states he does not want to return to his group home  Appetite is within normal limits  He did sleep last night after receiving trazodone        ROS: no complaints    Mental Status Evaluation:    Appearance:  wearing pajamas   Behavior:  cooperative, calm   Speech:  less tangential, less disorganized , less pressured   Mood:  remains dysphoric, less labile, less irritable   Affect:  reactive   Thought Process:  coherent   Associations: intact associations   Thought Content:  normal, no overt delusions   Perceptual Disturbances: auditory hallucinations of Voices telling him to stab himself   Risk Potential: Suicidal ideation - Yes, without plan  Homicidal ideation - None at present  Potential for aggression - No   Sensorium:  oriented to person, place and time/date   Memory:  recent and remote memory grossly intact   Consciousness:  alert and awake   Attention: poor concentration and poor attention span   Insight:  poor   Judgment: poor   Gait/Station: normal gait/station and normal balance   Motor Activity: no abnormal movements     Vital signs in last 24 hours:    Temp:  [98 8 °F (37 1 °C)-99 4 °F (37 4 °C)] 98 8 °F (37 1 °C)  HR:  [] 90  Resp:  [16-18] 16  BP: (124-140)/(65-90) 124/65    Laboratory results:  I have personally reviewed all pertinent laboratory/tests results  Progress Toward Goals: progressing    Assessment/Plan   Principal Problem:    Schizoaffective disorder, bipolar type (HCC)  Active Problems:    Mild intellectual disability    Obsessive compulsive disorder    Recommended Treatment:   1  Continue current medications as prescribed  2  Continue to monitor patient and adjust medications as needed  3  Discharge disposition and planning are ongoing  All current active medications have been reviewed    Encourage group therapy, milieu therapy and occupational therapy  Behavioral Health checks every 7 minutes      Current Facility-Administered Medications:  acetaminophen 325 mg Oral Q6H PRN Melly Bronson MD   aluminum-magnesium hydroxide-simethicone 15 mL Oral Q4H PRN Paula Lee MD   ammonium lactate  Topical Daily CHRISTIANA Zaidi   atorvastatin 10 mg Oral Daily With Dinner Jem Balderas MD   bismuth subsalicylate 624 mg Oral Q6H PRN Jem Balderas MD   calcium carbonate 1 tablet Oral Daily With Breakfast Jem Balderas MD   cholecalciferol 1,000 Units Oral Daily Jem Balderas MD   diphenhydrAMINE 50 mg Intramuscular Q6H PRN CHRISTIANA Jimenez   And       haloperidol lactate 10 mg Intramuscular Q6H PRN CHRISTIANA Jimenez   diphenhydrAMINE 50 mg Oral BID CHRISTIANA Jimenez   divalproex sodium 1,000 mg Oral Q12H MD Ugo   divalproex sodium 500 mg Oral Daily CHRISTIANA Jimenez   haloperidol 5 mg Oral Q6H PRN Melly Bronson MD   hydrOXYzine HCL 25 mg Oral Q6H PRN Paula Lee MD   ketotifen 1 drop Both Eyes BID PRN Jem Balderas MD   levothyroxine 25 mcg Oral Early Morning Jem Balderas MD   loratadine 10 mg Oral Daily Jem Balderas MD   LORazepam 2 mg Intramuscular Q4H PRN CHRISTIANA Jimenez   LORazepam 0 5 mg Oral TID CHRISTIANA Paulino magnesium hydroxide 20 mL Oral Daily PRN Harish Muñoz MD   metFORMIN 1,000 mg Oral Daily With Breakfast Safia Lara MD   And       sitaGLIPtin 50 mg Oral After Breakfast Safia Lara MD   mirtazapine 7 5 mg Oral HS CHRISTIANA Jimenez   multivitamin stress formula 1 tablet Oral Daily Safia Lara MD   OLANZapine 10 mg Oral Q3H PRN CHRISTIANA Jimenez   propranolol 10 mg Oral BID CHRISTIANA Jimenez   risperiDONE 2 mg Oral BID Quoc Longo MD   senna-docusate sodium 1 tablet Oral HS CHRISTIANA Jimenez   traZODone 50 mg Oral HS PRN Harish Muñoz MD       Risks / Benefits of Treatment:    Risks, benefits, and possible side effects of medications explained to patient and patient verbalizes understanding and agreement for treatment  Counseling / Coordination of Care:      Patient's progress discussed with staff in treatment team meeting  Medications, treatment progress and treatment plan reviewed with patient

## 2019-01-20 NOTE — PROGRESS NOTES
Patient had difficulty falling asleep  PRN Trazodone was requested and given at 2339  Patient returned at 0038 irritable and labile reporting the Trazodone to have been ineffective and c/o anxiety and that his body was "jumping" in the bed  PRN Atarax was offered for the anxiety to which the patient accepted and received  Patient yelling "nobody in this place listens to a damn thing I say" and that he is "going to marcia everybody in this building " Redirected to room where pt fell asleep shortly after and has remained so without any further complaints  Q 7 minute checks maintained  Will continue to monitor

## 2019-01-20 NOTE — PROGRESS NOTES
Patient has been visible on unit  In attendance for group and positive for snack  Pressured speech  Irritable and labile  C/o AH and SI  Contracts for safety at this time  Requires frequent redirection  Compliant with HS medication  Will continue to monitor

## 2019-01-20 NOTE — PROGRESS NOTES
Mood remains labile  Attention seeking behaviors  Staff splitting  Manipulative  Agitated  Restless  Yelling  Cursing  Pt placed on room restriction do to behaviors exhibited on unit  Pt did not receive redirection well  Uncooperative with staff  Behaviors continue to be uncontrolled  Administered prn haldol and atarax prn  Pt settled for roughly 30 minutes  Then behaviors continued to escalate  Pt banging and punching things in room  Placed on 1:1 supervision for safety  Will continue to monitor and assess

## 2019-01-21 PROCEDURE — 99231 SBSQ HOSP IP/OBS SF/LOW 25: CPT | Performed by: PSYCHIATRY & NEUROLOGY

## 2019-01-21 RX ORDER — PALIPERIDONE 3 MG/1
3 TABLET, EXTENDED RELEASE ORAL DAILY
Status: DISCONTINUED | OUTPATIENT
Start: 2019-01-21 | End: 2019-01-21

## 2019-01-21 RX ORDER — CHLORPROMAZINE HYDROCHLORIDE 25 MG/1
50 TABLET, FILM COATED ORAL 2 TIMES DAILY
Status: DISCONTINUED | OUTPATIENT
Start: 2019-01-21 | End: 2019-01-25 | Stop reason: HOSPADM

## 2019-01-21 RX ORDER — PALIPERIDONE 3 MG/1
3 TABLET, EXTENDED RELEASE ORAL 2 TIMES DAILY
Status: DISCONTINUED | OUTPATIENT
Start: 2019-01-21 | End: 2019-01-25 | Stop reason: HOSPADM

## 2019-01-21 RX ADMIN — RISPERIDONE 2 MG: 1 TABLET, ORALLY DISINTEGRATING ORAL at 08:06

## 2019-01-21 RX ADMIN — TRAZODONE HYDROCHLORIDE 100 MG: 50 TABLET ORAL at 21:50

## 2019-01-21 RX ADMIN — DIPHENHYDRAMINE HCL 50 MG: 25 TABLET ORAL at 08:06

## 2019-01-21 RX ADMIN — Medication: at 08:32

## 2019-01-21 RX ADMIN — CHLORPROMAZINE HYDROCHLORIDE 50 MG: 25 TABLET, SUGAR COATED ORAL at 17:27

## 2019-01-21 RX ADMIN — ALUMINUM HYDROXIDE, MAGNESIUM HYDROXIDE, AND SIMETHICONE 15 ML: 200; 200; 20 SUSPENSION ORAL at 11:33

## 2019-01-21 RX ADMIN — SITAGLIPTIN 50 MG: 50 TABLET, FILM COATED ORAL at 08:07

## 2019-01-21 RX ADMIN — DIVALPROEX SODIUM 500 MG: 500 TABLET, DELAYED RELEASE ORAL at 12:28

## 2019-01-21 RX ADMIN — LORAZEPAM 0.5 MG: 0.5 TABLET ORAL at 17:25

## 2019-01-21 RX ADMIN — VITAMIN D, TAB 1000IU (100/BT) 1000 UNITS: 25 TAB at 08:07

## 2019-01-21 RX ADMIN — PALIPERIDONE 3 MG: 3 TABLET, FILM COATED, EXTENDED RELEASE ORAL at 17:52

## 2019-01-21 RX ADMIN — LORAZEPAM 0.5 MG: 0.5 TABLET ORAL at 21:50

## 2019-01-21 RX ADMIN — ZINC 1 TABLET: TAB ORAL at 08:07

## 2019-01-21 RX ADMIN — DIPHENHYDRAMINE HCL 50 MG: 25 TABLET ORAL at 17:25

## 2019-01-21 RX ADMIN — LORAZEPAM 0.5 MG: 0.5 TABLET ORAL at 08:07

## 2019-01-21 RX ADMIN — LEVOTHYROXINE SODIUM 25 MCG: 25 TABLET ORAL at 06:23

## 2019-01-21 RX ADMIN — PALIPERIDONE 3 MG: 3 TABLET, EXTENDED RELEASE ORAL at 17:25

## 2019-01-21 RX ADMIN — METFORMIN HYDROCHLORIDE 1000 MG: 500 TABLET, EXTENDED RELEASE ORAL at 08:06

## 2019-01-21 RX ADMIN — PROPRANOLOL HYDROCHLORIDE 10 MG: 10 TABLET ORAL at 17:25

## 2019-01-21 RX ADMIN — LORATADINE 10 MG: 10 TABLET ORAL at 08:07

## 2019-01-21 RX ADMIN — DIVALPROEX SODIUM 1000 MG: 500 TABLET, DELAYED RELEASE ORAL at 08:06

## 2019-01-21 RX ADMIN — DIVALPROEX SODIUM 1000 MG: 500 TABLET, DELAYED RELEASE ORAL at 21:50

## 2019-01-21 RX ADMIN — SENNOSIDES AND DOCUSATE SODIUM 1 TABLET: 8.6; 5 TABLET ORAL at 21:50

## 2019-01-21 RX ADMIN — ATORVASTATIN CALCIUM 10 MG: 10 TABLET, FILM COATED ORAL at 17:27

## 2019-01-21 RX ADMIN — Medication 1 TABLET: at 08:07

## 2019-01-21 RX ADMIN — PROPRANOLOL HYDROCHLORIDE 10 MG: 10 TABLET ORAL at 08:06

## 2019-01-21 NOTE — SOCIAL WORK
BEN spoke to Hoang connors from pt's [facility]  SW provided update on pt and explained that as of right now we don't know when discharge will be  SW also informed him that they didn't take him off Depakote  No questions or concerns reported by Hoang connors; SW will continue to keep him informed

## 2019-01-21 NOTE — PROGRESS NOTES
Pt has been withdrawn to his room with the 1:1 in place for behavioral issues  Pt was withdrawn to his room during medication administration, but then came out for breakfast  Pt was able to eat in the quiet room with out  Any issues  Pt denies any current issues  Pt reported that he is currently having auditory hallucinations, but does not go into detail on what they are saying or who it is  Pt is currently lying in bed  Will continue to monitor

## 2019-01-21 NOTE — PROGRESS NOTES
Patient irritable and agitated  Yelling, cursing, banging things in room at start of shift  Firm redirection required with explanation of expected behaviors on unit  Patient verbalizes understanding  Patient had a few more episodes of yelling needing redirection  Later apologizing and reporting that he "can't help it" when he "gets frustrated " Compliant with HS medications  Denies needs currently  Remains on 1:1 continual observation  Will continue to monitor

## 2019-01-21 NOTE — PROGRESS NOTES
Pt  Observed sleeping comfortably throughout the night with no apparent distress  1:1 Maintained  Will continue to monitor

## 2019-01-21 NOTE — PROGRESS NOTES
Progress Note - Behavioral Health     Lavonne Rodriguez 41 y o  male MRN: [id number]   Unit/Bed#: [facility] 218-02 Encounter: [id number]    Behavior over the last 24 hours: gaye Vanegas had severe emotional disturbance, required seclusion and damaged the door to the seclusion room  It appears that his intense affective disturbance may have been triggered by contact with family  Bora Vanegas had been in the middle of medication adjustments  "I don't feel safe to leave the hospital "    Staff are aware of some possible secondary gain component to his behavioral displays, but it is unclear what the underlying communication is  Sleep: insomnia  Appetite: decreased  Medication side effects: sedation/activation   ROS: no complaints    Mental Status Evaluation:    Appearance:  disheveled   Behavior:  agitated   Speech:  normal rate, normal volume, normal pitch   Mood:  depressed   Affect:  constricted   Thought Process:  illogical   Associations: circumstantial associations   Thought Content:  paranoid ideation   Perceptual Disturbances: none   Risk Potential: Suicidal ideation - Yes  Homicidal ideation - None  Potential for aggression - Yes, due to agitation   Sensorium:  oriented to person, place and time/date   Memory:  recent and remote memory grossly intact   Consciousness:  awake   Attention: attention span and concentration appear shorter than expected for age   Insight:  limited   Judgment: impaired   Gait/Station: normal gait/station   Motor Activity: no abnormal movements     Vital signs in last 24 hours:    Temp:  [98 3 °F (36 8 °C)-99 °F (37 2 °C)] 99 °F (37 2 °C)  HR:  [77-84] 84  Resp:  [16-18] 16  BP: (113-129)/(61-73) 129/73    Laboratory results:    I have personally reviewed all pertinent laboratory/tests results    Most Recent Labs:   Lab Results   Component Value Date    WBC 7 90 01/18/2019    RBC 4 80 01/18/2019    HGB 14 9 01/18/2019    HCT 44 5 01/18/2019     (L) 01/18/2019    RDW 12 4 01/18/2019    NEUTROABS 3 90 01/18/2019    SODIUM 138 01/18/2019    K 3 9 01/18/2019     01/18/2019    CO2 30 01/18/2019    BUN 12 01/18/2019    CREATININE 1 22 01/18/2019    GLUC 101 (H) 01/18/2019    GLUF 101 (H) 01/18/2019    CALCIUM 8 9 01/18/2019    AST 16 01/18/2019    ALT 15 01/18/2019    ALKPHOS 46 01/18/2019    TP 5 7 (L) 01/18/2019    ALB 3 4 (L) 01/18/2019    TBILI 0 40 01/18/2019    CHOLESTEROL 85 01/18/2019    HDL 24 (L) 01/18/2019    TRIG 97 01/18/2019    LDLCALC 42 (L) 01/18/2019    NONHDLC 61 01/18/2019    VALPROICTOT 82 8 01/18/2019    AMMONIA 29 02/14/2018    [id number] 1 540 12/03/2017    FREET4 1 23 08/03/2017    RPR Non-Reactive 08/17/2017    HGBA1C 5 9 (H) 03/15/2018     12/05/2017       Progress Toward Goals: regressed     During medication adjustment and with stressors involving talking to his mother  Assessment/Plan   Principal Problem:    Schizoaffective disorder, bipolar type (HCC)  Active Problems:    Mild intellectual disability    Obsessive compulsive disorder    Recommended Treatment:     Planned medication and treatment changes: All current active medications have been reviewed  Encourage group therapy, milieu therapy and occupational therapy  Behavioral Health checks every 7 minutes  Continue current medications:  Titration of depakote as tolerated  Restart some Thorazine  Invega       Current Facility-Administered Medications:  acetaminophen 325 mg Oral Q6H PRN Rose Calhoun MD   aluminum-magnesium hydroxide-simethicone 15 mL Oral Q4H PRN Mary Negrete MD   ammonium lactate  Topical Daily CHRISTIANA Zaidi   atorvastatin 10 mg Oral Daily With Dinner Cristobal White MD   bismuth subsalicylate 505 mg Oral Q6H PRN Cristobal White MD   calcium carbonate 1 tablet Oral Daily With Breakfast Cristobal White MD   chlorproMAZINE 50 mg Oral BID Rose Calhoun MD   cholecalciferol 1,000 Units Oral Daily Cristobal White MD   diphenhydrAMINE 50 mg Intramuscular Q6H PRN CHRISTIANA Pandya   And       haloperidol lactate 10 mg Intramuscular Q6H PRN CHRISTIANA Jimenez   diphenhydrAMINE 50 mg Oral BID CHRISTIANA Jimenez   divalproex sodium 1,000 mg Oral Q12H MD Ugo   divalproex sodium 500 mg Oral Daily CHRISTIANA Jimenez   haloperidol 5 mg Oral Q6H PRN Melly Bronson MD   hydrOXYzine HCL 25 mg Oral Q6H PRN Paula Lee MD   ketotifen 1 drop Both Eyes BID PRN Jem Balderas MD   levothyroxine 25 mcg Oral Early Morning Jem Balderas MD   loratadine 10 mg Oral Daily Jem Balderas MD   LORazepam 2 mg Intramuscular Q4H PRN CHRISTIANA Jimenez   LORazepam 0 5 mg Oral TID CHRISTIANA Jimenez   magnesium hydroxide 20 mL Oral Daily PRN Paula Lee MD   metFORMIN 1,000 mg Oral Daily With Breakfast Jem Balderas MD   And       sitaGLIPtin 50 mg Oral After Breakfast Jem Balderas MD   multivitamin stress formula 1 tablet Oral Daily Jem Balderas MD   OLANZapine 10 mg Oral Q3H PRN CHRISTIANA Jimenez   paliperidone 3 mg Oral BID Melly Bronson MD   propranolol 10 mg Oral BID CHRISTIANA Jimenez   senna-docusate sodium 1 tablet Oral HS CHRISTIANA Jimenez   traZODone 100 mg Oral HS CHRISTIANA Jimenez       Risks / Benefits of Treatment:    Risks, benefits, and possible side effects of medications explained to patient and patient verbalizes understanding and agreement for treatment  Counseling / Coordination of Care:    Patient's progress discussed with staff in treatment team meeting  Medications, treatment progress and treatment plan reviewed with patient      Melly Bronson MD 01/21/19

## 2019-01-22 LAB — TSH SERPL DL<=0.05 MIU/L-ACNC: 1.2 UIU/ML (ref 0.45–5.33)

## 2019-01-22 PROCEDURE — 84443 ASSAY THYROID STIM HORMONE: CPT | Performed by: PSYCHIATRY & NEUROLOGY

## 2019-01-22 PROCEDURE — 99231 SBSQ HOSP IP/OBS SF/LOW 25: CPT | Performed by: PSYCHIATRY & NEUROLOGY

## 2019-01-22 RX ADMIN — VITAMIN D, TAB 1000IU (100/BT) 1000 UNITS: 25 TAB at 08:43

## 2019-01-22 RX ADMIN — DIPHENHYDRAMINE HCL 50 MG: 25 TABLET ORAL at 17:24

## 2019-01-22 RX ADMIN — CHLORPROMAZINE HYDROCHLORIDE 50 MG: 25 TABLET, SUGAR COATED ORAL at 08:44

## 2019-01-22 RX ADMIN — LORAZEPAM 0.5 MG: 0.5 TABLET ORAL at 21:46

## 2019-01-22 RX ADMIN — PROPRANOLOL HYDROCHLORIDE 10 MG: 10 TABLET ORAL at 08:43

## 2019-01-22 RX ADMIN — ATORVASTATIN CALCIUM 10 MG: 10 TABLET, FILM COATED ORAL at 16:20

## 2019-01-22 RX ADMIN — DIPHENHYDRAMINE HCL 50 MG: 25 TABLET ORAL at 08:43

## 2019-01-22 RX ADMIN — LEVOTHYROXINE SODIUM 25 MCG: 25 TABLET ORAL at 05:38

## 2019-01-22 RX ADMIN — SENNOSIDES AND DOCUSATE SODIUM 1 TABLET: 8.6; 5 TABLET ORAL at 21:46

## 2019-01-22 RX ADMIN — PROPRANOLOL HYDROCHLORIDE 10 MG: 10 TABLET ORAL at 17:24

## 2019-01-22 RX ADMIN — LORAZEPAM 0.5 MG: 0.5 TABLET ORAL at 08:44

## 2019-01-22 RX ADMIN — LORAZEPAM 0.5 MG: 0.5 TABLET ORAL at 16:20

## 2019-01-22 RX ADMIN — DIVALPROEX SODIUM 1000 MG: 500 TABLET, DELAYED RELEASE ORAL at 08:44

## 2019-01-22 RX ADMIN — LORATADINE 10 MG: 10 TABLET ORAL at 08:43

## 2019-01-22 RX ADMIN — PALIPERIDONE 3 MG: 3 TABLET, EXTENDED RELEASE ORAL at 08:44

## 2019-01-22 RX ADMIN — CHLORPROMAZINE HYDROCHLORIDE 50 MG: 25 TABLET, SUGAR COATED ORAL at 17:24

## 2019-01-22 RX ADMIN — BISMUTH SUBSALICYLATE 262 MG: 262 LIQUID ORAL at 12:38

## 2019-01-22 RX ADMIN — Medication 1 TABLET: at 08:26

## 2019-01-22 RX ADMIN — DIVALPROEX SODIUM 1000 MG: 500 TABLET, DELAYED RELEASE ORAL at 21:46

## 2019-01-22 RX ADMIN — METFORMIN HYDROCHLORIDE 1000 MG: 500 TABLET, EXTENDED RELEASE ORAL at 08:26

## 2019-01-22 RX ADMIN — DIVALPROEX SODIUM 500 MG: 500 TABLET, DELAYED RELEASE ORAL at 12:28

## 2019-01-22 RX ADMIN — SITAGLIPTIN 50 MG: 50 TABLET, FILM COATED ORAL at 08:44

## 2019-01-22 RX ADMIN — ZINC 1 TABLET: TAB ORAL at 08:43

## 2019-01-22 RX ADMIN — PALIPERIDONE 3 MG: 3 TABLET, EXTENDED RELEASE ORAL at 17:25

## 2019-01-22 RX ADMIN — TRAZODONE HYDROCHLORIDE 100 MG: 50 TABLET ORAL at 21:47

## 2019-01-22 NOTE — PROGRESS NOTES
No noted aggression  Patient observed sleeping during most Q 7 minute safety checks (second portion of shift)  No suicidal ideations, acting out or homicidal behaviors  No change in medical condition or complaints voiced  Fluids maintained at bedside to promote hydration

## 2019-01-22 NOTE — PROGRESS NOTES
Patient bright, alert, awake, ate breakfast with peers, back to bed after eating  Patient is flat, compliant with medications, calm, cooperative, polite to staff  Pt tells this nurse he went back to bed after breakfast because he is tired even though he did sleep well last night, appetite great today  Patient denies si hi, hallucinations and racing thoughts  Patient reports to this nurse that the "invega is really helping me" Will maintain on safety precautions and 7 minute checks, no needs identified

## 2019-01-22 NOTE — PROGRESS NOTES
Progress Note - Behavioral Health     Lavonne Rodriguez 41 y o  male MRN: [id number]   Unit/Bed#: -02 Encounter: [id number]    Behavior over the last 24 hours: some improvement  Bora Vanegas is attempting to maintain his behavioral control  We discussed some of the issues with his medication treatment and being able to afford Invega  He expresses the desire to continue to take the medication  Sleep: improved  Appetite: increased  Medication side effects: sedation/activation   ROS: no complaints    Mental Status Evaluation:    Appearance:  disheveled   Behavior:  guarded   Speech:  increased rate   Mood:  dysphoric   Affect:  constricted   Thought Process:  disorganized   Associations: intact associations   Thought Content:  normal   Perceptual Disturbances: none   Risk Potential: Suicidal ideation - None  Homicidal ideation - None  Potential for aggression - when agitated   Sensorium:  oriented to person, place and time/date   Memory:  recent and remote memory grossly intact   Consciousness:  alert and awake   Attention: attention span and concentration are age appropriate   Insight:  limited   Judgment: partial   Gait/Station: normal gait/station   Motor Activity: no abnormal movements     Vital signs in last 24 hours:    Temp:  [99 °F (37 2 °C)-99 9 °F (37 7 °C)] 99 9 °F (37 7 °C)  HR:  [72-84] 72  Resp:  [16-18] 18  BP: (118-129)/(62-73) 118/62    Laboratory results:    I have personally reviewed all pertinent laboratory/tests results    Most Recent Labs:   Lab Results   Component Value Date    WBC 7 90 01/18/2019    RBC 4 80 01/18/2019    HGB 14 9 01/18/2019    HCT 44 5 01/18/2019     (L) 01/18/2019    RDW 12 4 01/18/2019    NEUTROABS 3 90 01/18/2019    SODIUM 138 01/18/2019    K 3 9 01/18/2019     01/18/2019    CO2 30 01/18/2019    BUN 12 01/18/2019    CREATININE 1 22 01/18/2019    GLUC 101 (H) 01/18/2019    GLUF 101 (H) 01/18/2019    CALCIUM 8 9 01/18/2019    AST 16 01/18/2019    ALT 15 01/18/2019    ALKPHOS 46 01/18/2019    TP 5 7 (L) 01/18/2019    ALB 3 4 (L) 01/18/2019    TBILI 0 40 01/18/2019    CHOLESTEROL 85 01/18/2019    HDL 24 (L) 01/18/2019    TRIG 97 01/18/2019    LDLCALC 42 (L) 01/18/2019    NONHDLC 61 01/18/2019    VALPROICTOT 82 8 01/18/2019    AMMONIA 29 02/14/2018    [id number] 1 200 01/22/2019    FREET4 1 23 08/03/2017    RPR Non-Reactive 08/17/2017    HGBA1C 5 9 (H) 03/15/2018     12/05/2017       Progress Toward Goals: improving    Assessment/Plan   Principal Problem:    Schizoaffective disorder, bipolar type (HCC)  Active Problems:    Mild intellectual disability    Obsessive compulsive disorder    Recommended Treatment:     Planned medication and treatment changes: All current active medications have been reviewed  Encourage group therapy, milieu therapy and occupational therapy  Behavioral Health checks every 7 minutes    Continue to titrate and balance medications  Work with family and develop aftercare plan       Current Facility-Administered Medications:  acetaminophen 325 mg Oral Q6H PRN Kesha Emmanuel MD   aluminum-magnesium hydroxide-simethicone 15 mL Oral Q4H PRN Osiris Michel MD   ammonium lactate  Topical Daily CHRISTIANA Zaidi   atorvastatin 10 mg Oral Daily With Dinner Brady Medina MD   bismuth subsalicylate 709 mg Oral Q6H PRN Brady Medina MD   calcium carbonate 1 tablet Oral Daily With Breakfast Brady Medina MD   chlorproMAZINE 50 mg Oral BID Kesha Emmanuel MD   cholecalciferol 1,000 Units Oral Daily Brady Medina MD   diphenhydrAMINE 50 mg Intramuscular Q6H PRN CHRISTIANA Jimenez   And       haloperidol lactate 10 mg Intramuscular Q6H PRN CHRISTIANA Jimenez   diphenhydrAMINE 50 mg Oral BID CHRISTIANA Jimenez   divalproex sodium 1,000 mg Oral Q12H MD Ugo   divalproex sodium 500 mg Oral Daily CHRISTIANA Jimenez   haloperidol 5 mg Oral Q6H PRN Kesha Emmanuel MD   hydrOXYzine HCL 25 mg Oral Q6H PRN Kwan Miller Sheela aCrreon MD   ketotifen 1 drop Both Eyes BID PRN Brady Grande MD   levothyroxine 25 mcg Oral Early Morning Brady Grande MD   loratadine 10 mg Oral Daily Brady Grande MD   LORazepam 2 mg Intramuscular Q4H PRN CHRISTIANA Jimenez   LORazepam 0 5 mg Oral TID CHRISTIANA Jimenez   magnesium hydroxide 20 mL Oral Daily PRN Cristina Kelly MD   metFORMIN 1,000 mg Oral Daily With Breakfast Brady Grande MD   And       sitaGLIPtin 50 mg Oral After Breakfast Brady Grande MD   multivitamin stress formula 1 tablet Oral Daily Brady Grande MD   OLANZapine 10 mg Oral Q3H PRN CHRISTIANA Jimenez   paliperidone 3 mg Oral BID Rose Phelan MD   propranolol 10 mg Oral BID CHRISTIANA Jimenez   senna-docusate sodium 1 tablet Oral HS CHRISTIANA Jimenez   traZODone 100 mg Oral HS CHRISTIANA Jimenez       Risks / Benefits of Treatment:    Risks, benefits, and possible side effects of medications explained to patient and patient verbalizes understanding and agreement for treatment  Counseling / Coordination of Care:    Patient's progress discussed with staff in treatment team meeting  Medications, treatment progress and treatment plan reviewed with patient      Rose Phelan MD 01/22/19

## 2019-01-22 NOTE — PLAN OF CARE
Anxiety     Anxiety is at manageable level Progressing        DISCHARGE PLANNING - CARE MANAGEMENT     Discharge to post-acute care or home with appropriate resources Progressing        Ineffective Coping     Participates in unit activities Progressing        Nutrition/Hydration-ADULT     Nutrient/Hydration intake appropriate for improving, restoring or maintaining nutritional needs Progressing

## 2019-01-22 NOTE — PROGRESS NOTES
Behavior is improved tonight  No agitation or acting out noted  Social and out in community  Compliant with medications  Able to eat in dining room tonight  Stated he felt tired tonight and went to bed early  No pacing or intrusive behaviors tonight

## 2019-01-22 NOTE — CASE MANAGEMENT
I called the patient's mother to speak to her about Sandor's newest medication  I informed her that the medication would require a $140 a month copay  She stated that if the treatment team thought it was helping him she would be willing to pay it for him  She also said that she was in the process of changing the patients insurance prior to coming in to the hospital and that she wondered if the insurance change would make it more affordable  I stated that there are several medical assistance plans and some medicare plans that cover the monthly injection  I then informed the doctor of this call and informed him that she requested a call from him

## 2019-01-22 NOTE — SOCIAL WORK
SW spoke to pt's mother and provided update; BEN explained how pt is on 1:1 due to behaviors on the weekend  SW explained that pt seems to be triggered by phone calls and therefore we will likely put him on phone restrictions; mother agreed and reports she understands  Additionally, SW reviewed medication changes with her  Mother continues to feel as though she doesn't get phone calls but then stated that she spoke to the doctor on Friday and Monday; she asked SW to keep her updated about discharge  BEN agreed to keep her informed and explained that she typically calls a few times a week  No other questions or concerns

## 2019-01-23 LAB — VALPROATE SERPL-MCNC: 122 UG/ML (ref 50–125)

## 2019-01-23 PROCEDURE — 80164 ASSAY DIPROPYLACETIC ACD TOT: CPT | Performed by: NURSE PRACTITIONER

## 2019-01-23 PROCEDURE — 99231 SBSQ HOSP IP/OBS SF/LOW 25: CPT | Performed by: PSYCHIATRY & NEUROLOGY

## 2019-01-23 RX ORDER — KETOTIFEN FUMARATE 0.35 MG/ML
1 SOLUTION/ DROPS OPHTHALMIC 2 TIMES DAILY PRN
Qty: 5 ML | Refills: 0 | Status: SHIPPED | OUTPATIENT
Start: 2019-01-23 | End: 2019-04-19 | Stop reason: HOSPADM

## 2019-01-23 RX ORDER — CHLORPROMAZINE HYDROCHLORIDE 50 MG/1
100 TABLET, FILM COATED ORAL 2 TIMES DAILY
Qty: 120 TABLET | Refills: 0 | Status: SHIPPED | OUTPATIENT
Start: 2019-01-23 | End: 2019-04-19 | Stop reason: HOSPADM

## 2019-01-23 RX ORDER — LORAZEPAM 0.5 MG/1
0.5 TABLET ORAL 3 TIMES DAILY
Qty: 90 TABLET | Refills: 0 | Status: SHIPPED | OUTPATIENT
Start: 2019-01-23 | End: 2019-04-19 | Stop reason: HOSPADM

## 2019-01-23 RX ORDER — DIPHENHYDRAMINE HCL 25 MG
50 TABLET ORAL
Status: DISCONTINUED | OUTPATIENT
Start: 2019-01-23 | End: 2019-01-25 | Stop reason: HOSPADM

## 2019-01-23 RX ORDER — LORATADINE 10 MG/1
10 TABLET ORAL DAILY
Qty: 30 TABLET | Refills: 0 | Status: SHIPPED | OUTPATIENT
Start: 2019-01-24 | End: 2019-04-04 | Stop reason: SDUPTHER

## 2019-01-23 RX ORDER — PROPRANOLOL HYDROCHLORIDE 10 MG/1
10 TABLET ORAL 2 TIMES DAILY
Qty: 60 TABLET | Refills: 0 | Status: SHIPPED | OUTPATIENT
Start: 2019-01-23 | End: 2019-01-25

## 2019-01-23 RX ORDER — LEVOTHYROXINE SODIUM 0.03 MG/1
25 TABLET ORAL
Qty: 30 TABLET | Refills: 0 | Status: SHIPPED | OUTPATIENT
Start: 2019-01-24 | End: 2019-04-04 | Stop reason: SDUPTHER

## 2019-01-23 RX ORDER — TRAZODONE HYDROCHLORIDE 100 MG/1
100 TABLET ORAL
Qty: 30 TABLET | Refills: 0 | Status: SHIPPED | OUTPATIENT
Start: 2019-01-23 | End: 2019-04-19 | Stop reason: HOSPADM

## 2019-01-23 RX ORDER — ATORVASTATIN CALCIUM 10 MG/1
10 TABLET, FILM COATED ORAL
Qty: 30 TABLET | Refills: 0 | Status: SHIPPED | OUTPATIENT
Start: 2019-01-23 | End: 2019-02-01 | Stop reason: ALTCHOICE

## 2019-01-23 RX ORDER — DIVALPROEX SODIUM 500 MG/1
1000 TABLET, DELAYED RELEASE ORAL EVERY 12 HOURS SCHEDULED
Qty: 120 TABLET | Refills: 0 | Status: SHIPPED | OUTPATIENT
Start: 2019-01-23 | End: 2019-04-19 | Stop reason: HOSPADM

## 2019-01-23 RX ORDER — METFORMIN HYDROCHLORIDE EXTENDED-RELEASE TABLETS 1000 MG/1
1000 TABLET, FILM COATED, EXTENDED RELEASE ORAL
Qty: 30 TABLET | Refills: 0 | Status: SHIPPED | OUTPATIENT
Start: 2019-01-24 | End: 2019-02-24 | Stop reason: SDUPTHER

## 2019-01-23 RX ORDER — CALCIUM CARBONATE 500(1250)
1 TABLET ORAL
Qty: 30 TABLET | Refills: 0 | Status: SHIPPED | OUTPATIENT
Start: 2019-01-24 | End: 2019-04-04 | Stop reason: SDUPTHER

## 2019-01-23 RX ORDER — DIVALPROEX SODIUM 500 MG/1
500 TABLET, DELAYED RELEASE ORAL DAILY
Qty: 30 TABLET | Refills: 0 | Status: SHIPPED | OUTPATIENT
Start: 2019-01-24 | End: 2019-04-19 | Stop reason: HOSPADM

## 2019-01-23 RX ORDER — PALIPERIDONE 3 MG/1
3 TABLET, EXTENDED RELEASE ORAL 2 TIMES DAILY
Qty: 60 TABLET | Refills: 0 | Status: SHIPPED | OUTPATIENT
Start: 2019-01-23 | End: 2019-04-19 | Stop reason: HOSPADM

## 2019-01-23 RX ADMIN — ZINC 1 TABLET: TAB ORAL at 08:07

## 2019-01-23 RX ADMIN — CHLORPROMAZINE HYDROCHLORIDE 50 MG: 25 TABLET, SUGAR COATED ORAL at 08:06

## 2019-01-23 RX ADMIN — DIVALPROEX SODIUM 1000 MG: 500 TABLET, DELAYED RELEASE ORAL at 08:07

## 2019-01-23 RX ADMIN — LEVOTHYROXINE SODIUM 25 MCG: 25 TABLET ORAL at 06:26

## 2019-01-23 RX ADMIN — METFORMIN HYDROCHLORIDE 1000 MG: 500 TABLET, EXTENDED RELEASE ORAL at 08:07

## 2019-01-23 RX ADMIN — Medication: at 08:07

## 2019-01-23 RX ADMIN — SENNOSIDES AND DOCUSATE SODIUM 1 TABLET: 8.6; 5 TABLET ORAL at 21:26

## 2019-01-23 RX ADMIN — Medication 1 TABLET: at 08:07

## 2019-01-23 RX ADMIN — DIVALPROEX SODIUM 1000 MG: 500 TABLET, DELAYED RELEASE ORAL at 21:25

## 2019-01-23 RX ADMIN — DIPHENHYDRAMINE HCL 50 MG: 25 TABLET ORAL at 21:26

## 2019-01-23 RX ADMIN — PROPRANOLOL HYDROCHLORIDE 10 MG: 10 TABLET ORAL at 17:30

## 2019-01-23 RX ADMIN — LORAZEPAM 0.5 MG: 0.5 TABLET ORAL at 21:27

## 2019-01-23 RX ADMIN — LORAZEPAM 0.5 MG: 0.5 TABLET ORAL at 16:56

## 2019-01-23 RX ADMIN — LORATADINE 10 MG: 10 TABLET ORAL at 08:07

## 2019-01-23 RX ADMIN — TRAZODONE HYDROCHLORIDE 100 MG: 50 TABLET ORAL at 21:26

## 2019-01-23 RX ADMIN — CHLORPROMAZINE HYDROCHLORIDE 50 MG: 25 TABLET, SUGAR COATED ORAL at 17:30

## 2019-01-23 RX ADMIN — ATORVASTATIN CALCIUM 10 MG: 10 TABLET, FILM COATED ORAL at 17:29

## 2019-01-23 RX ADMIN — PALIPERIDONE 3 MG: 3 TABLET, EXTENDED RELEASE ORAL at 08:07

## 2019-01-23 RX ADMIN — DIPHENHYDRAMINE HCL 50 MG: 25 TABLET ORAL at 17:29

## 2019-01-23 RX ADMIN — PALIPERIDONE 3 MG: 3 TABLET, EXTENDED RELEASE ORAL at 17:30

## 2019-01-23 RX ADMIN — LORAZEPAM 0.5 MG: 0.5 TABLET ORAL at 08:06

## 2019-01-23 RX ADMIN — DIVALPROEX SODIUM 500 MG: 500 TABLET, DELAYED RELEASE ORAL at 12:26

## 2019-01-23 RX ADMIN — VITAMIN D, TAB 1000IU (100/BT) 1000 UNITS: 25 TAB at 08:07

## 2019-01-23 RX ADMIN — DIPHENHYDRAMINE HCL 50 MG: 25 TABLET ORAL at 08:07

## 2019-01-23 RX ADMIN — PROPRANOLOL HYDROCHLORIDE 10 MG: 10 TABLET ORAL at 08:06

## 2019-01-23 RX ADMIN — SITAGLIPTIN 50 MG: 50 TABLET, FILM COATED ORAL at 08:06

## 2019-01-23 NOTE — PROGRESS NOTES
Clinical Pharmacy Note: Valproic Acid    Lavonne Rodriguez is a 41 y o  male who presents with schizoaffective disorder  Assessment/Plan:    VPA indication: mood disorder     Bora Vanegas is currently taking 2500 mg delayed release tablet EC 1,000,g twice daily and 500mg daily for a total of 2,500mg   Valproic Acid concentration is  82 8 ug/mL on 1/18/19 and was drawn at steady state at a dose of 1500mg  Depakote dosage was increased to target a level of 100-125 ug/mL with a dosage of 2500mg  Would recommend a repeat level of depakote since the patient has been on three full days of therapy  Pharmacist has reviewed liver function tests, pancreatic enzymes, and pregnancy test (if drawn) or made recommendations for such tests YES    Pregnancy test Does not apply to this patient  Pharmacy Recommendations:     Continue current dosing and draw level to assess new dose of 2,500mg daily  Readjust (if needed) based on the level that comes back  Monitoring:    Valproic Acid:    0  Lab Value Date/Time   VALPROICTOT 82 8 01/18/2019 0559       Liver Function:    0  Lab Value Date/Time   ALB 3 4 (L) 01/18/2019 0559   ALB 3 5 (L) 03/15/2018 0923   ALB 3 0 (L) 02/14/2018 0529   ALB 2 9 (L) 12/03/2017 0637       0  Lab Value Date/Time   TBILI 0 40 01/18/2019 0559   TBILI 0 3 03/15/2018 0923   TBILI 0 43 02/14/2018 0529   TBILI 0 45 12/03/2017 0637       0  Lab Value Date/Time   AST 16 01/18/2019 0559   AST 14 02/14/2018 0529   AST 25 12/03/2017 0637       0  Lab Value Date/Time   ALT 15 01/18/2019 0559   ALT 21 02/14/2018 0529   ALT 55 12/03/2017 0637     No results found for: INR    Platelets:    0  Lab Value Date/Time    (L) 01/18/2019 0558       Therapeutic valproic acid levels are  ug/mL, but target range varies by indication  Levels above 150 ug/mL indicate toxicity, although toxicity may be associated with levels within therapeutic ranges based on symptoms      If switching from delayed release to extended release formulation, increase dose by 8 to 20% and dose daily to maintain therapeutic serum levels  Monitor pancreatic enzymes if patient presents with abdominal pain consistent with pancreatitis which is a black box warning  Also, monitor liver function for black box warning of hepatotoxicity, check ammonia level if suspected  Suspect toxicity in stabilized patients if new-onset sedation, nausea, vomiting, diarrhea, and/or tremor  Reassess valproic acid level if liver function or mental status changes  May cause dose-related thrombocytopenia, inhibition of platelet aggregation, and bleeding  In some cases, platelet counts may be normalized with continued treatment; however, reduce dose or discontinue drug if patient develops evidence of hemorrhage, bruising, or a disorder of hemostasis/coagulation  Females of child bearing age should be on birth control due to very high teratogenic potential      Pharmacy will continue to follow patient with team  Thank you      Electronically signed by: Jorge Maguire, PharmD, Clinical Pharmacist - Psychiatry

## 2019-01-23 NOTE — SOCIAL WORK
BEN spoke to Hoang connors at pt's [facility] ([facility]) and informed him that pt will discharge on Friday  Discharge meeting scheduled for tomorrow (Thursday 1/24/19) at [facility] requests that we provide pt's scripts to him at meeting  BEN spoke to  at [facility] location; new appt scheduled with Dr Abner Bonilla for Monday 1/28/19 at [address] [place] [address] appt cancelled for tomorrow; BEN explained that [place] schedules his own appts and that BEN should call him at [facility] location to get new appt  BEN left message for Danelle at the [facility] location requesting call back to schedule pt's next therapy appointment  BEN explained that pt will discharge on Friday

## 2019-01-23 NOTE — PROGRESS NOTES
No overt agitation tonight  Pleasant during shift  Had visitor and did well during  Maintained on Q 7 minute safety checks  No acting out, suicidal ideations, and/or homicidal behaviors  No changes in medical condition or complaints voiced  Fluids maintained at bedside to promote hydration

## 2019-01-23 NOTE — CASE MANAGEMENT
Returned a call to the patient's mother with regards to a message she left about a call from the patient's insurance company  I reviewed with her that the new medication, paliperidone, needed prior authorization and that I completed the process yesterday  She was then informed that the prior authorization was granted and the medication was approved  I also told her that they believed that the medication would be cheaper than the $140 estimate I obtained yesterday  Then I did encourage her that when she is changing insurances that she should check the patient's med list against the insurance company formulary prior to changing

## 2019-01-23 NOTE — CASE MANAGEMENT
CM met with the patient to review the Treatment Plan  The patient was observed in the dining room, sitting on the periphery of group  Patient stated he felt better, "the [id number].cnman is working " Speech remains rapid and pressured; difficult to understand  Denied A/V hallucinations or lethality  Stated he was being discharged on Friday and thought he was ready  Behavior has been controlled  Goals and Objectives of the Treatment Plan were reviewed  The patient agreed and signed the Treatment Plan

## 2019-01-23 NOTE — PROGRESS NOTES
Progress Note - Behavioral Health     Audi Jaramillo 41 y o  male MRN: [id number]   Unit/Bed#: [facility] 218-02 Encounter: [id number]    Behavior over the last 24 hours: improving     Columba Sheikh reports feeling some improvement with steady, twice-daily dosing of Invega  Augmented by a lower dose of Thorazine and Depakote  However, Sandor's affective reactivity is somewhat volatile, and his has hypomanic signs at baseline,  so it is never clear exactly whether he might be set off  Staff have been attempting to enforce a period without conversation with his mother  Sleep: improved  Appetite: increased  Medication side effects: sedation, weight gain   ROS: no complaints    Mental Status Evaluation:    Appearance:  disheveled   Behavior:  guarded   Speech:  increased rate, pressured, hypertalkative   Mood:  dysphoric, irritable   Affect:  constricted   Thought Process:  tangential   Associations: tangential associations   Thought Content:  obsessive thoughts   Perceptual Disturbances: none   Risk Potential: Suicidal ideation - None  Homicidal ideation - None  Potential for aggression - Yes when provoked   Sensorium:  oriented to person, place and time/date   Memory:  recent and remote memory grossly intact   Consciousness:  alert and awake   Attention: attention span and concentration are age appropriate   Insight:  limited   Judgment: partial   Gait/Station: normal gait/station   Motor Activity: no abnormal movements     Vital signs in last 24 hours:    Temp:  [98 3 °F (36 8 °C)-99 °F (37 2 °C)] 99 °F (37 2 °C)  HR:  [] 61  Resp:  [16-18] 18  BP: (127-129)/(73-89) 129/73    Laboratory results:    I have personally reviewed all pertinent laboratory/tests results    Most Recent Labs:   Lab Results   Component Value Date    WBC 7 90 01/18/2019    RBC 4 80 01/18/2019    HGB 14 9 01/18/2019    HCT 44 5 01/18/2019     (L) 01/18/2019    RDW 12 4 01/18/2019    NEUTROABS 3 90 01/18/2019    SODIUM 138 01/18/2019    K 3 9 01/18/2019     01/18/2019    CO2 30 01/18/2019    BUN 12 01/18/2019    CREATININE 1 22 01/18/2019    GLUC 101 (H) 01/18/2019    GLUF 101 (H) 01/18/2019    CALCIUM 8 9 01/18/2019    AST 16 01/18/2019    ALT 15 01/18/2019    ALKPHOS 46 01/18/2019    TP 5 7 (L) 01/18/2019    ALB 3 4 (L) 01/18/2019    TBILI 0 40 01/18/2019    CHOLESTEROL 85 01/18/2019    HDL 24 (L) 01/18/2019    TRIG 97 01/18/2019    LDLCALC 42 (L) 01/18/2019    NONHDLC 61 01/18/2019    VALPROICTOT 82 8 01/18/2019    AMMONIA 29 02/14/2018    LFR3PFBAJTAG 1 200 01/22/2019    FREET4 1 23 08/03/2017    RPR Non-Reactive 08/17/2017    HGBA1C 5 9 (H) 03/15/2018     12/05/2017       Progress Toward Goals: improving    Assessment/Plan   Principal Problem:    Schizoaffective disorder, bipolar type (HCC)  Active Problems:    Mild intellectual disability    Obsessive compulsive disorder    Recommended Treatment:     Planned medication and treatment changes: All current active medications have been reviewed  Encourage group therapy, milieu therapy and occupational therapy  Behavioral Health checks every 7 minutes    Continue to monitor and treat  Arrange aftercare  Ensure medication tolerability       Current Facility-Administered Medications:  acetaminophen 325 mg Oral Q6H PRN Robbie Maravilla MD   aluminum-magnesium hydroxide-simethicone 15 mL Oral Q4H PRN Shanice Palmer MD   ammonium lactate  Topical Daily CHRISTIANA Zaidi   atorvastatin 10 mg Oral Daily With Dinner Loise Figures, MD   bismuth subsalicylate 645 mg Oral Q6H PRN Mariana Ham MD   calcium carbonate 1 tablet Oral Daily With Breakfast Loise Figures, MD   chlorproMAZINE 50 mg Oral BID Robbie Maravilla MD   cholecalciferol 1,000 Units Oral Daily Loise Figures, MD   diphenhydrAMINE 50 mg Intramuscular Q6H PRN CHRISTIANA Jimenez   And       haloperidol lactate 10 mg Intramuscular Q6H PRN CHRISTIANA Jimenez   diphenhydrAMINE 50 mg Oral BID Ozella LcaiCHRISTIANA diphenhydrAMINE 50 mg Oral HS Quoc Longo MD   divalproex sodium 1,000 mg Oral Q12H Savana Anguiano MD   divalproex sodium 500 mg Oral Daily CHRISTIANA Jimenez   haloperidol 5 mg Oral Q6H PRN Quoc Longo MD   hydrOXYzine HCL 25 mg Oral Q6H PRN Harish Muñoz MD   ketotifen 1 drop Both Eyes BID PRN Safia Lara MD   levothyroxine 25 mcg Oral Early Morning Safia Lara MD   loratadine 10 mg Oral Daily Safia Lara MD   LORazepam 2 mg Intramuscular Q4H PRN CHRISTIANA Jimenez   LORazepam 0 5 mg Oral TID CHRISTIANA Jimenez   magnesium hydroxide 20 mL Oral Daily PRN Harish Muñoz MD   metFORMIN 1,000 mg Oral Daily With Breakfast Safia Lara MD   And       sitaGLIPtin 50 mg Oral After Breakfast Safia Lara MD   multivitamin stress formula 1 tablet Oral Daily Safia Lara MD   OLANZapine 10 mg Oral Q3H PRN CHRISTIANA Nuñez   paliperidone 3 mg Oral BID Quoc Longo MD   propranolol 10 mg Oral BID CHRISTIANA Jimenez   senna-docusate sodium 1 tablet Oral HS CHRISTIANA Jimenez   traZODone 100 mg Oral HS CHRISTIANA Jimenez       Risks / Benefits of Treatment:    Risks, benefits, and possible side effects of medications explained to patient and patient verbalizes understanding and agreement for treatment  Counseling / Coordination of Care:    Patient's progress discussed with staff in treatment team meeting  Medications, treatment progress and treatment plan reviewed with patient      Quoc Longo MD 01/23/19

## 2019-01-23 NOTE — PROGRESS NOTES
Patient sleeping in bed upon arrival of shift, refused to get up for breakfast despite several prompts and 1 to 1 conversations with patient about getting out of bed  Pt states to staff " Im not getting up for breakfast"  He was compliant with medications from bed  Patient later came to nursing station and asked to fill out menu for the day  Patient was informed because he refused earlier that he will not be able to fill out menu  Pt handled that information well and then was visible on unit and calm  Pt denies si hi and hallucinations, no needs identified

## 2019-01-23 NOTE — PROGRESS NOTES
Clinical Pharmacy Note: Antipsychotic Monitoring Requirements     Hardik Holliday is a 41 y o  male who presents with schizoaffective disorder and is currently taking paliperidone  Performing metabolic monitoring on patients receiving any antipsychotics is a Centers for [employer] (CMS) requirement  Antipsychotic treatment increases the risk of developing type 2 diabetes mellitus, hypertension, and hyperlipidemia  According to the [facility] for [address] (NICE) guidelines, baseline weight, waist circumference, pulse, blood pressure, fasting blood glucose, hemoglobin A1c, lipid profile, and prolactin level (if initiating risperidone or paliperidone) should be collected  These guidelines coincide with Centers and Medicare & Medicaid Services (CMS) requirements including baseline Body Mass Index, blood pressure, fasting glucose, hemoglobin A1c, and fasting lipid panel  CMS states laboratory values collected 12 months from discharge date are acceptable for evaluation  CMS Checklist:     BMI: yes  BP: yes  Fasting glucose: yes  A1c within last 12 months: yes  Lipid panel within last 12 months: yes  Nicotine Replacement Therapy: no, patient does not smoke    The following values have been collected:  Value Status Result    BMI Body mass index is 30 13 kg/m²     Blood pressure /73 (BP Location: Left arm)   Pulse 61   Temp 99 °F (37 2 °C) (Temporal)   Resp 18   Ht 5' 9" (1 753 m)   Wt 92 5 kg (204 lb)   SpO2 93%   BMI 30 13 kg/m²    Fasting glucose   0  Lab Value Date/Time   GLUC 101 (H) 01/18/2019 0559   GLUC 137 (H) 03/15/2018 0923      Hemoglobin A1c   0  Lab Value Date/Time   HGBA1C 5 9 (H) 03/15/2018 0923      Lipid panel   0  Lab Value Date/Time   CHOLESTEROL 85 01/18/2019 0559   CHOLESTEROL 110 03/15/2018 0923       0  Lab Value Date/Time   HDL 24 (L) 01/18/2019 0559   HDL 30 (L) 03/15/2018 0923       0  Lab Value Date/Time   LDLCALC 42 (L) 01/18/2019 0559        0  Lab Value Date/Time   TRIG 97 01/18/2019 0559   TRIG 185 (H) 03/15/2018 0923          Recommendations    1  Follow up with elevated A1C outpatient  CMS requirements have been met         Pharmacy will continue to follow patient with team   Electronically signed by: Kerman Gaucher, Kory, Clinical Pharmacist - Psychiatry

## 2019-01-23 NOTE — SOCIAL WORK
Sw received return call from [place] at [facility]; pt's therapy appt rescheduled for 1/31/19 at [address] [place] [address]

## 2019-01-24 PROCEDURE — 99231 SBSQ HOSP IP/OBS SF/LOW 25: CPT | Performed by: NURSE PRACTITIONER

## 2019-01-24 RX ADMIN — LORAZEPAM 0.5 MG: 0.5 TABLET ORAL at 08:28

## 2019-01-24 RX ADMIN — BISMUTH SUBSALICYLATE 262 MG: 262 LIQUID ORAL at 11:45

## 2019-01-24 RX ADMIN — PROPRANOLOL HYDROCHLORIDE 10 MG: 10 TABLET ORAL at 18:05

## 2019-01-24 RX ADMIN — METFORMIN HYDROCHLORIDE 1000 MG: 500 TABLET, EXTENDED RELEASE ORAL at 08:29

## 2019-01-24 RX ADMIN — PROPRANOLOL HYDROCHLORIDE 10 MG: 10 TABLET ORAL at 08:29

## 2019-01-24 RX ADMIN — LORAZEPAM 0.5 MG: 0.5 TABLET ORAL at 21:32

## 2019-01-24 RX ADMIN — DIPHENHYDRAMINE HCL 50 MG: 25 TABLET ORAL at 08:29

## 2019-01-24 RX ADMIN — Medication 1 TABLET: at 08:29

## 2019-01-24 RX ADMIN — PALIPERIDONE 3 MG: 3 TABLET, EXTENDED RELEASE ORAL at 18:05

## 2019-01-24 RX ADMIN — DIVALPROEX SODIUM 1000 MG: 500 TABLET, DELAYED RELEASE ORAL at 08:30

## 2019-01-24 RX ADMIN — ATORVASTATIN CALCIUM 10 MG: 10 TABLET, FILM COATED ORAL at 17:00

## 2019-01-24 RX ADMIN — VITAMIN D, TAB 1000IU (100/BT) 1000 UNITS: 25 TAB at 08:29

## 2019-01-24 RX ADMIN — DIPHENHYDRAMINE HCL 50 MG: 25 TABLET ORAL at 21:33

## 2019-01-24 RX ADMIN — SITAGLIPTIN 50 MG: 50 TABLET, FILM COATED ORAL at 08:29

## 2019-01-24 RX ADMIN — CHLORPROMAZINE HYDROCHLORIDE 50 MG: 25 TABLET, SUGAR COATED ORAL at 08:28

## 2019-01-24 RX ADMIN — ZINC 1 TABLET: TAB ORAL at 08:29

## 2019-01-24 RX ADMIN — HYDROXYZINE HYDROCHLORIDE 25 MG: 25 TABLET ORAL at 22:28

## 2019-01-24 RX ADMIN — LORATADINE 10 MG: 10 TABLET ORAL at 08:28

## 2019-01-24 RX ADMIN — LORAZEPAM 0.5 MG: 0.5 TABLET ORAL at 17:00

## 2019-01-24 RX ADMIN — DIVALPROEX SODIUM 1000 MG: 500 TABLET, DELAYED RELEASE ORAL at 21:33

## 2019-01-24 RX ADMIN — DIVALPROEX SODIUM 500 MG: 500 TABLET, DELAYED RELEASE ORAL at 12:26

## 2019-01-24 RX ADMIN — DIPHENHYDRAMINE HCL 50 MG: 25 TABLET ORAL at 18:06

## 2019-01-24 RX ADMIN — TRAZODONE HYDROCHLORIDE 100 MG: 50 TABLET ORAL at 21:32

## 2019-01-24 RX ADMIN — LEVOTHYROXINE SODIUM 25 MCG: 25 TABLET ORAL at 06:26

## 2019-01-24 RX ADMIN — PALIPERIDONE 3 MG: 3 TABLET, EXTENDED RELEASE ORAL at 08:29

## 2019-01-24 RX ADMIN — CHLORPROMAZINE HYDROCHLORIDE 50 MG: 25 TABLET, SUGAR COATED ORAL at 18:07

## 2019-01-24 RX ADMIN — SENNOSIDES AND DOCUSATE SODIUM 1 TABLET: 8.6; 5 TABLET ORAL at 21:32

## 2019-01-24 RX ADMIN — BISMUTH SUBSALICYLATE 262 MG: 262 LIQUID ORAL at 20:48

## 2019-01-24 NOTE — SOCIAL WORK
SW spoke to pt's mother Nidia Snider and confirmed discharge for Friday  Nidia Snider was short with SW and did not have much to add  SW explained that pt has been behaviorally controlled and seems to like his medication  No questions or concerns reported by Nidia Snider

## 2019-01-24 NOTE — NURSING NOTE
Patient presents with flat affect and neutral mood, stating he slept last night and hopes to be discharged tomorrow  Patient denies anxiety, depression, and hallucinations and presents with an organized thought process  Patient has poor eye contact and had covers pulled over his head until this RN asked asked to patient to remove the blanket  Patient pleasant and calm, reviewing his medications with this RN  Will continue to monitor q 7 minutes; falling star protocol maintained

## 2019-01-24 NOTE — PROGRESS NOTES
Progress Note - Behavioral Health     Bahman Mares 41 y o  male MRN: [id number]   Unit/Bed#: -02 Encounter: [id number]    Behavior over the last 24 hours: Roberth Padilla was seen for an inpatient follow-up psychiatric visit this date  Per nursing report, he has been calm, cooperative, and interacting appropriately with staff and peers  Appetite and sleep are within normal limits  At today's visit, he relates he is feeling much better and feels he is ready to be discharged  He denies any suicidal or homicidal ideation and relates he is not having difficulty controlling his mood  ROS: no complaints    Mental Status Evaluation:    Appearance:  wearing pajamas   Behavior:  pleasant, cooperative, calm   Speech:  normal rate and volume   Mood:  normal   Affect:  appropriate   Thought Process:  logical, coherent   Associations: intact associations   Thought Content:  normal   Perceptual Disturbances: none   Risk Potential: Suicidal ideation - None  Homicidal ideation - None  Potential for aggression - No   Sensorium:  oriented to person, place and time/date   Memory:  recent and remote memory grossly intact   Consciousness:  alert and awake   Attention: decreased concentration and decreased attention span   Insight:  limited   Judgment: limited   Gait/Station: normal gait/station and normal balance   Motor Activity: no abnormal movements     Vital signs in last 24 hours:    Temp:  [97 9 °F (36 6 °C)-98 9 °F (37 2 °C)] 98 9 °F (37 2 °C)  HR:  [] 89  Resp:  [16] 16  BP: (112-135)/(64-67) 135/67    Laboratory results:  I have personally reviewed all pertinent laboratory/tests results  Progress Toward Goals: progressing    Assessment/Plan   Principal Problem:    Schizoaffective disorder, bipolar type (HCC)  Active Problems:    Mild intellectual disability    Obsessive compulsive disorder    Recommended Treatment:   Plan is for discharge tomorrow pending any changes in patient's status    Will continue to monitor  All current active medications have been reviewed    Encourage group therapy, milieu therapy and occupational therapy  Behavioral Health checks every 7 minutes      Current Facility-Administered Medications:  acetaminophen 325 mg Oral Q6H PRN Amarjit Chang MD   aluminum-magnesium hydroxide-simethicone 15 mL Oral Q4H PRN Heath Brantley MD   ammonium lactate  Topical Daily CHRISTIANA Zaidi   atorvastatin 10 mg Oral Daily With Dinner Isaias Faustin MD   bismuth subsalicylate 132 mg Oral Q6H PRN Isaias Faustin MD   calcium carbonate 1 tablet Oral Daily With Breakfast Isaias Faustin MD   chlorproMAZINE 50 mg Oral BID Amarjit Chang MD   cholecalciferol 1,000 Units Oral Daily Isaias Faustin MD   diphenhydrAMINE 50 mg Intramuscular Q6H PRN CHRISTIANA Jimenez   And       haloperidol lactate 10 mg Intramuscular Q6H PRN CHRISTIANA Jimenez   diphenhydrAMINE 50 mg Oral BID CHRISTIANA Jimenez   diphenhydrAMINE 50 mg Oral HS Amarjit Chang MD   divalproex sodium 1,000 mg Oral Q12H MD Ugo   divalproex sodium 500 mg Oral Daily CHRISTIANA Jimenez   haloperidol 5 mg Oral Q6H PRN Amarjit Chang MD   hydrOXYzine HCL 25 mg Oral Q6H PRN Heath Brantley MD   ketotifen 1 drop Both Eyes BID PRN Isaias Faustin MD   levothyroxine 25 mcg Oral Early Morning Isaias Faustin MD   loratadine 10 mg Oral Daily Isaias Faustin MD   LORazepam 2 mg Intramuscular Q4H PRN CHRISTIANA Jimenez   LORazepam 0 5 mg Oral TID CHRISTIANA Jimenez   magnesium hydroxide 20 mL Oral Daily PRN Heath Brantley MD   metFORMIN 1,000 mg Oral Daily With Breakfast Isaias Faustin MD   And       sitaGLIPtin 50 mg Oral After Breakfast Isaias Faustin MD   multivitamin stress formula 1 tablet Oral Daily Isaias Faustin MD   OLANZapine 10 mg Oral Q3H PRN Izzy Brown, CHRISTIANA   paliperidone 3 mg Oral BID Amarjit Chang MD   propranolol 10 mg Oral BID CHRISTIANA Jimenez   senna-docusate sodium 1 tablet Oral HS She CHRISTIANA Reeves   traZODone 100 mg Oral HS She CHRISTIANA Reeves       Risks / Benefits of Treatment:    Risks, benefits, and possible side effects of medications explained to patient and patient verbalizes understanding and agreement for treatment  Counseling / Coordination of Care:      Patient's progress discussed with staff in treatment team meeting  Medications, treatment progress and treatment plan reviewed with patient

## 2019-01-24 NOTE — SOCIAL WORK
Discharge meeting held today  Pt and group home staff present for the meeting  Medications reviewed; scripts provided to group home staff  SW reviewed behaviors over the week  Staff agree that pt is ready for discharge and will pick him up tomorrow at 4PM   No other questions or concerns at this time

## 2019-01-24 NOTE — SOCIAL WORK
SW received call from pt's group; they report that they need discharge or script to say continue with the following medicaitons: Colace, Vitamin E, Vitamin D3, Vitamin B complex, and Eucerin Lotion

## 2019-01-24 NOTE — PLAN OF CARE
Problem: Anxiety  Goal: Anxiety is at manageable level  Interventions:  - Assess and monitor patient's anxiety level  - Monitor for signs and symptoms of anxiety both physical and emotional (heart palpitations, chest pain, shortness of breath, headaches, nausea, feeling jumpy, restlessness, irritable, apprehensive)  - Collaborate with interdisciplinary team and initiate plan and interventions as ordered    - [place] patient to unit/surroundings  - Explain treatment plan  - Encourage participation in care  - Encourage verbalization of concerns/fears  - Identify coping mechanisms  - Assist in developing anxiety-reducing skills  - Administer/offer alternative therapies  - Limit or eliminate stimulants   Outcome: Progressing

## 2019-01-24 NOTE — PROGRESS NOTES
No aggressive behaviors  Patient observed sleeping during most Q 7 minute safety checks (second portion of shift)  No suicidal ideations, acting out or homicidal behaviors  No change in medical condition or complaints voiced  Fluids maintained at bedside to promote hydration

## 2019-01-24 NOTE — PROGRESS NOTES
Patient attending meals and some groups  Appears tired and retired early  No overt aggression noted  Compliant with medications  Denies any auditory or visual hallucinations  Maintaining positive treatment gains  Continues to be monitor with 7 minute checks

## 2019-01-25 VITALS
DIASTOLIC BLOOD PRESSURE: 58 MMHG | HEIGHT: 69 IN | TEMPERATURE: 96.9 F | RESPIRATION RATE: 16 BRPM | SYSTOLIC BLOOD PRESSURE: 104 MMHG | OXYGEN SATURATION: 100 % | BODY MASS INDEX: 30.21 KG/M2 | WEIGHT: 204 LBS | HEART RATE: 87 BPM

## 2019-01-25 RX ORDER — PETROLATUM 0.61 G/G
CREAM TOPICAL AS NEEDED
Qty: 397 G | Refills: 0 | Status: SHIPPED | OUTPATIENT
Start: 2019-01-25 | End: 2019-04-19 | Stop reason: HOSPADM

## 2019-01-25 RX ORDER — PROPRANOLOL HYDROCHLORIDE 10 MG/1
10 TABLET ORAL 2 TIMES DAILY
Qty: 60 TABLET | Refills: 0 | Status: SHIPPED | OUTPATIENT
Start: 2019-01-25 | End: 2019-04-19 | Stop reason: HOSPADM

## 2019-01-25 RX ORDER — DOCUSATE SODIUM 100 MG/1
100 CAPSULE, LIQUID FILLED ORAL 2 TIMES DAILY
Qty: 10 CAPSULE | Refills: 0 | Status: SHIPPED | OUTPATIENT
Start: 2019-01-25 | End: 2019-04-19 | Stop reason: HOSPADM

## 2019-01-25 RX ADMIN — BISMUTH SUBSALICYLATE 262 MG: 262 LIQUID ORAL at 11:24

## 2019-01-25 RX ADMIN — ZINC 1 TABLET: TAB ORAL at 08:25

## 2019-01-25 RX ADMIN — CHLORPROMAZINE HYDROCHLORIDE 50 MG: 25 TABLET, SUGAR COATED ORAL at 08:25

## 2019-01-25 RX ADMIN — LEVOTHYROXINE SODIUM 25 MCG: 25 TABLET ORAL at 06:28

## 2019-01-25 RX ADMIN — DIVALPROEX SODIUM 500 MG: 500 TABLET, DELAYED RELEASE ORAL at 13:24

## 2019-01-25 RX ADMIN — HYDROXYZINE HYDROCHLORIDE 25 MG: 25 TABLET ORAL at 15:15

## 2019-01-25 RX ADMIN — VITAMIN D, TAB 1000IU (100/BT) 1000 UNITS: 25 TAB at 08:25

## 2019-01-25 RX ADMIN — METFORMIN HYDROCHLORIDE 1000 MG: 500 TABLET, EXTENDED RELEASE ORAL at 08:25

## 2019-01-25 RX ADMIN — DIPHENHYDRAMINE HCL 50 MG: 25 TABLET ORAL at 08:25

## 2019-01-25 RX ADMIN — PALIPERIDONE 3 MG: 3 TABLET, EXTENDED RELEASE ORAL at 08:25

## 2019-01-25 RX ADMIN — SITAGLIPTIN 50 MG: 50 TABLET, FILM COATED ORAL at 08:25

## 2019-01-25 RX ADMIN — LORAZEPAM 0.5 MG: 0.5 TABLET ORAL at 08:25

## 2019-01-25 RX ADMIN — DIVALPROEX SODIUM 1000 MG: 500 TABLET, DELAYED RELEASE ORAL at 08:25

## 2019-01-25 RX ADMIN — PROPRANOLOL HYDROCHLORIDE 10 MG: 10 TABLET ORAL at 08:25

## 2019-01-25 RX ADMIN — LORATADINE 10 MG: 10 TABLET ORAL at 08:25

## 2019-01-25 RX ADMIN — Medication 1 TABLET: at 08:25

## 2019-01-25 NOTE — PLAN OF CARE
Anxiety     Anxiety is at manageable level Completed        Ineffective Coping     Participates in unit activities Completed        Nutrition/Hydration-ADULT     Nutrient/Hydration intake appropriate for improving, restoring or maintaining nutritional needs Completed

## 2019-01-25 NOTE — DISCHARGE INSTRUCTIONS
Cigarette Smoking and Your Health   WHAT YOU NEED TO KNOW:   What are the risks to my health if I smoke tobacco?  Nicotine and other chemicals found in tobacco damage every cell in your body  Even if you are a light smoker, you have an increased risk for cancer, heart disease, and lung disease  If you are pregnant or have diabetes, smoking increases your risk for complications  What are the benefits to my health if I stop smoking? · You decrease respiratory symptoms such as coughing, wheezing, and shortness of breath  · You reduce your risk for cancers of the lung, mouth, throat, kidney, bladder, pancreas, stomach, and cervix  If you already have cancer, you increase the benefits of chemotherapy  You also reduce your risk for cancer returning or a second cancer from developing  · You reduce your risk for heart disease, blood clots, heart attack, and stroke  · You reduce your risk for lung infections, and diseases such as pneumonia, asthma, chronic bronchitis, and emphysema  · Your circulation improves  More oxygen can be delivered to your body  If you have diabetes, you lower your risk for complications, such as kidney, artery, and eye diseases  You also lower your risk for nerve damage  Nerve damage can lead to amputations, poor vision, and blindness  · You improve your body's ability to heal and to fight infections  What are the health benefits to others if I stop smoking? Tobacco is harmful to nonsmokers who breathe in your secondhand smoke  The following are ways the health of others around you may improve when you stop smoking:  · You lower the risks for lung cancer and heart disease in nonsmoking adults  · If you are pregnant, you lower the risk for miscarriage, early delivery, low birth weight, and stillbirth  You also lower your baby's risk for SIDS, obesity, developmental delay, and neurobehavioral problems, such as ADHD       · If you have children, you lower their risk for ear infections, colds, pneumonia, bronchitis, and asthma  Where can I find more information and support to stop smoking? · [facility]  Phone: 6- 318 - [phone]  Web Address: www [facility]  CARE AGREEMENT:   You have the right to help plan your care  Learn about your health condition and how it may be treated  Discuss treatment options with your caregivers to decide what care you want to receive  You always have the right to refuse treatment  The above information is an  only  It is not intended as medical advice for individual conditions or treatments  Talk to your doctor, nurse or pharmacist before following any medical regimen to see if it is safe and effective for you  © 2017 [address] [employer]  Information is for End User's use only and may not be sold, redistributed or otherwise used for commercial purposes  All illustrations and images included in CareNotes® are the copyrighted property of A D A M , Inc  or [employer]  Lorazepam (By mouth)   Lorazepam (gee-DZ-h-jordan)  Treats anxiety  Brand Name(s): Ativan, LORazepam Intensol   There may be other brand names for this medicine  When This Medicine Should Not Be Used: This medicine is not right for everyone  Do not use it if you had an allergic reaction to lorazepam or similar medicines, or you are pregnant or breastfeeding, or you have acute narrow-angle glaucoma  How to Use This Medicine:   Liquid, Tablet  · Take your medicine as directed  Your dose may need to be changed several times to find what works best for you  · Oral liquid:   ¨ Measure the oral liquid medicine with a marked measuring spoon, oral syringe, or medicine cup  ¨ Mix the medicine with water, juice, soda, applesauce, or pudding  Drink or eat the mixture right away  Do not store it for later use  · This medicine should come with a Medication Guide  Ask your pharmacist for a copy if you do not have one    · Missed dose: Take a dose as soon as you remember  If you are more than 1 hour late, skip the missed dose and wait until it is time for your next dose  Do not use extra medicine to make up for a missed dose  ·   ¨ Oral liquid: Refrigerate the oral liquid  Throw away an opened bottle after 90 days  ¨ Tablets: Store the medicine in a closed container at room temperature, away from heat, moisture, and direct light  Drugs and Foods to Avoid:   Ask your doctor or pharmacist before using any other medicine, including over-the-counter medicines, vitamins, and herbal products  · Some medicines can affect how lorazepam works  Tell your doctor if you are using any of the following:   ¨ Aminophylline, clozapine, probenecid, theophylline, valproate  ¨ Medicine to treat depression or mental health problems  ¨ Medicine to treat seizures  · Do not drink alcohol while you are using this medicine  · Tell your doctor if you use anything else that makes you sleepy  Some examples are allergy medicine, narcotic pain medicine, and alcohol  Warnings While Using This Medicine:   · It is not safe to take this medicine during pregnancy  It could harm an unborn baby  Tell your doctor right away if you become pregnant  · Tell your doctor if you have kidney disease, liver disease, lung or breathing problems (such as COPD, sleep apnea), or a history of drug or alcohol abuse, depression, or seizures  · This medicine can be habit-forming  Do not use more than your prescribed dose  Call your doctor if you think your medicine is not working  · Do not stop using this medicine suddenly  Your doctor will need to slowly decrease your dose before you stop it completely  · This medicine may make you drowsy  Do not drive or do anything else that could be dangerous until you know how this medicine affects you  · Your doctor will do lab tests at regular visits to check on the effects of this medicine  Keep all appointments    · Keep all medicine out of the reach of children  Never share your medicine with anyone  Possible Side Effects While Using This Medicine:   Call your doctor right away if you notice any of these side effects:  · Allergic reaction: Itching or hives, swelling in your face or hands, swelling or tingling in your mouth or throat, chest tightness, trouble breathing  · Confusion, unusual mood or behavior, thoughts of hurting yourself  · Seizures  · Severe drowsiness or weakness, slow heartbeat, trouble breathing  · Worsening of depression  If you notice these less serious side effects, talk with your doctor:   · Dizziness, clumsiness  If you notice other side effects that you think are caused by this medicine, tell your doctor  Call your doctor for medical advice about side effects  You may report side effects to FDA at 3-943-FDA-3693  © 2017 [employer] Information is for End User's use only and may not be sold, redistributed or otherwise used for commercial purposes  The above information is an  only  It is not intended as medical advice for individual conditions or treatments  Talk to your doctor, nurse or pharmacist before following any medical regimen to see if it is safe and effective for you  Divalproex (By mouth)   Divalproex Sodium (dye-SNEHA-proe-ex KATHE-mariusz-um)  Treats seizures  Also treats bipolar disorder and helps prevent migraine headaches  Brand Name(s): Depakote, Depakote ER, Depakote Sprinkles   There may be other brand names for this medicine  When This Medicine Should Not Be Used: This medicine is not right for everyone  Do not use it if you had an allergic reaction to divalproex, valproate sodium, valproic acid, if you are pregnant, or if you have certain genetic disorders (such as urea cycle disorder or mitochondrial disorders)  How to Use This Medicine:   Delayed Release Capsule, Delayed Release Tablet, Coated Tablet, Long Acting Tablet  · Take your medicine as directed   Your dose may need to be changed several times to find what works best for you  · You may take this medicine with food to decrease stomach upset  · Capsule, tablet, or extended-release tablet: Swallow the medicine whole  Do not crush, break, or chew it  · Sprinkle capsule: You may open the capsule and pour the medicine into a small amount of soft food such as pudding or applesauce  Stir this mixture well and swallow it without chewing  · This medicine should come with a Medication Guide  Ask your pharmacist for a copy if you do not have one  · Missed dose: Take a dose as soon as you remember  If it is almost time for your next dose, wait until then and take a regular dose  Do not take extra medicine to make up for a missed dose  If you miss 2 or more doses, call your doctor  · Store the medicine in a closed container at room temperature, away from heat, moisture, and direct light  Drugs and Foods to Avoid:   Ask your doctor or pharmacist before using any other medicine, including over-the-counter medicines, vitamins, and herbal products  · Some medicines can affect how divalproex sodium works  Tell your doctor if you are using any of the following:   ¨ Amitriptyline, aspirin, clonazepam, diazepam, nortriptyline, propofol, rifampin, ritonavir, rufinamide, tolbutamide, or zidovudine  ¨ Birth control pill  ¨ Blood thinner (including warfarin)  ¨ Carbapenem antibiotic (including ertapenem, imipenem, meropenem)  ¨ Other seizure medicines (including carbamazepine, ethosuximide, felbamate, lamotrigine, phenobarbital, phenytoin, primidone, topiramate)  · Alcohol, narcotic pain relievers, or sleeping pills may cause you to feel more lightheaded, dizzy, or faint when used with this medicine  Warnings While Using This Medicine:   · It is not safe to take this medicine during pregnancy  It could harm an unborn baby  Tell your doctor right away if you become pregnant    · Tell your doctor if you are breastfeeding, or if you have kidney disease, liver disease, a blood disease, or pancreas problems, or a history of depression or mental health problems  · This medicine may cause the following problems:  ¨ Liver problems  ¨ Pancreatitis  ¨ Hyperammonemic encephalopathy (too much ammonia in your blood)  ¨ Depression or thoughts of suicide  ¨ Thrombocytopenia (decrease in blood cells that affect clotting)  ¨ Hypothermia (low body temperature)  ¨ Drug reaction with eosinophilia and systemic symptoms (DRESS), which may damage organs such as the liver, kidney, or heart  · This medicine may make you dizzy or drowsy  Do not drive or do anything that could be dangerous until you know how this medicine affects you  · Do not stop using this medicine suddenly  Your doctor will need to slowly decrease your dose before you stop it completely  · Tell any doctor or dentist who treats you that you are using this medicine  This medicine may affect certain medical test results  · Your doctor will check your progress and the effects of this medicine at regular visits  Keep all appointments  · Keep all medicine out of the reach of children  Never share your medicine with anyone    Possible Side Effects While Using This Medicine:   Call your doctor right away if you notice any of these side effects:  · Allergic reaction: Itching or hives, swelling in your face or hands, swelling or tingling in your mouth or throat, chest tightness, trouble breathing  · Blistering, peeling, red skin rash  · Confusion, problems with memory, unusual drowsiness, clumsiness  · Dark urine or pale stools, loss of appetite, stomach pain, yellow skin or eyes  · Fever, rash, swollen glands in the neck, armpit, or groin  · Sudden and severe stomach pain, nausea, vomiting, lightheadedness  · Thoughts of hurting yourself, depression, unusual changes in behavior or moods  · Unusual bleeding, bruising, or weakness  If you notice these less serious side effects, talk with your doctor:   · Diarrhea, stomach upset  · Hair loss  · Tiredness, sleepiness  · Trouble sleeping, tremor  · Vision changes, dizziness, headache  If you notice other side effects that you think are caused by this medicine, tell your doctor  Call your doctor for medical advice about side effects  You may report side effects to FDA at 5-043-FDA-4333  © 2017 [address] [employer] Information is for End User's use only and may not be sold, redistributed or otherwise used for commercial purposes  The above information is an  only  It is not intended as medical advice for individual conditions or treatments  Talk to your doctor, nurse or pharmacist before following any medical regimen to see if it is safe and effective for you

## 2019-01-25 NOTE — DISCHARGE SUMMARY
Discharge Summary - [facility] 41 y o  male MRN: [id number]  Unit/Bed#: -02 Encounter: [id number]     Admission Date:   Admission Orders     Ordered        01/12/19 1709  DISCHARGE READMIT Admit Patient to [address] [place] [address] (use with Discharge Readmit Navigator in [facility] Discharge Readmit scenario including from any IP unit or different campus ED to 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, although he has good outpatient treatment team, a stable living environment and a mother who is involved closely in his care  He has tried and been unable to continue high dose Thorazine, lithium, clozapine, risperidone, Tegretol and many other medications  Although his outpatient psychiatrist and he wanted to get him off of Thorazine  Mac Phalen asked me whether there was a new medication that would not cause EPS, as he is sensitive to that side effect  We discussed beginning Invega, and restarting Thorazine at a lower dose as an augmentation strategy  We also increased Depakote slightly  We recommended a period of time in the hospital during which Mac Phalen was not on the phone with his mother, and this seemed to improve his mood and behavior in the short-term       Mental Status at time of Discharge:     Appearance:  improved   Behavior:  improved   Speech:  still pressured   Mood:  dysthymic   Affect:  constricted   Thought Process:  flight of ideas   Thought Content:  obsessions   Perceptual Disturbances: None   Risk Potential: Potential for Aggression No   Sensorium:  person, place, time/date and situation   Cognition:  grossly intact   Consciousness: alert and awake    Attention: attention span and concentration were age appropriate   Insight:  good   Judgment: good   Gait/Station: normal gait/station   Motor Activity: no abnormal movements       Discharge Diagnosis:   Schizoaffective Disorder, bipolar type  Mild Intellectual disability  OCD    Resolved Problems  Date Reviewed: 1/22/2019    None          Discharge Medications:  See after visit summary for reconciled discharge medications provided to patient and family  Discharge instructions/Information to patient and family:   See after visit summary for information provided to patient and family  Provisions for Follow-Up Care:  See after visit summary for information related to follow-up care and any pertinent home health orders  Discharge Statement   I spent 20 minutes discharging the patient  This time was spent on the day of discharge  I had direct contact with the patient on the day of discharge  Additional documentation is required if more than 30 minutes were spent on discharge

## 2019-01-25 NOTE — SOCIAL WORK
BEN met with pt to discuss discharge for today  Pt is excited for discharge  Pt states that he feels ready and feels Marzetta Gist is really helping  Pt denies SI/HI/AH/VH  BEN reviewed IMM with pt; pt signed; copy faxed to appropriate party and copy place in pt's discharge envelope  Pt has no questions or needs at this time

## 2019-01-25 NOTE — DISCHARGE INSTR - OTHER ORDERS
You will discharge to your group home today at [facility], [address] Phone: [phone]  Crisis Plan:    Triggers you identified that led to suicidal ideations, auditory hallucinations, and increased agitation include: issues with your medications and conflicts with peers  Coping skills you identified during your hospital stay include: listening to music, drawing, and playing video games  After discharge, if you find your coping skills are not effective and you continue to feel distressed please inform a staff member at your facility and follow-up with [facility]  If that is not effective and you feel your are a danger to yourself or others please contact [address] [place]: [phone], Peer Support Talk Line (Seven days a week, 1:00 PM  9:00 PM) Call: 466.910.5476 or Text: 878.689.3962), National Suicide Prevention Lifeline:  4-455.100.4985, National [place] on Mental Illness ([facility]) HELPLINE: 380.664.1286/Email: www mary  org, or Substance Abuse and [employer] [address] [place] [address])  [employer], which is a confidential, free, 24-hour-a-day, 365-day-a-year, information service for individuals and family members facing mental health and/or substance use disorders  This service provides referrals to local treatment facilities, support groups, and community-based organizations  Callers can also order free publications and other information    Call 7-338.603.7206/BFNLQ: www [facility] gov

## 2019-01-25 NOTE — PROGRESS NOTES
Patient compliant with medications and meals  Denies any SI/HI  Requested PRN pepto bismol for upset stomach  No behaviors noted this shift  Patient is for discharge today  Will continue to monitor for behaviors and changes

## 2019-01-25 NOTE — DISCHARGE INSTR - APPOINTMENTS
The treatment team recommends ongoing medication management and individual therapy  Please continue with [facility], [address] Phone: [phone] Fax: [phone]) for ongoing medication management and individua therapy  Your next medication management appointment is scheduled for Monday January 28, 2019 at 5PM with Dr Pelon Beth  Your next therapy appointment is scheduled for Thursday January 31, 2019 at [facility] with Danelle  Your summary of care will be faxed to his provider  Your summary of care will also be faxed to your supports coordinator, Jay Sims at [facility] ([address] [place] [address], [address] Phone: [phone] Fax: [phone])  You were unable to remember the name of your PCP so your summary of care will not be faxed and an appointment was not scheduled on your behalf

## 2019-01-25 NOTE — PROGRESS NOTES
Patient has been sleeping well  through the night  No changes or problems  Q 7 minute safety checks maintained

## 2019-01-25 NOTE — PROGRESS NOTES
Patient has been visible on the unit but keeps to himself  Presents with flat affect  States he is feeling much better  Speech is no longer pressured  Behavior calm and controlled  C/O upset stomach,pepto-bismol given  Will continue to observe

## 2019-01-28 ENCOUNTER — APPOINTMENT (OUTPATIENT)
Dept: LAB | Facility: CLINIC | Age: 39
End: 2019-01-28
Payer: COMMERCIAL

## 2019-01-28 ENCOUNTER — TRANSCRIBE ORDERS (OUTPATIENT)
Dept: LAB | Facility: CLINIC | Age: 39
End: 2019-01-28

## 2019-01-28 DIAGNOSIS — Z79.899 NEED FOR PROPHYLACTIC CHEMOTHERAPY: Primary | ICD-10-CM

## 2019-01-28 DIAGNOSIS — E11.65 TYPE 2 DIABETES MELLITUS WITH HYPERGLYCEMIA, UNSPECIFIED WHETHER LONG TERM INSULIN USE (HCC): ICD-10-CM

## 2019-01-28 DIAGNOSIS — R42 DIZZINESS: ICD-10-CM

## 2019-01-28 DIAGNOSIS — E78.5 HYPERLIPIDEMIA, UNSPECIFIED HYPERLIPIDEMIA TYPE: ICD-10-CM

## 2019-01-28 DIAGNOSIS — E03.9 HYPOTHYROIDISM, UNSPECIFIED TYPE: ICD-10-CM

## 2019-01-28 LAB
ALBUMIN SERPL BCP-MCNC: 3.3 G/DL (ref 3.5–5)
ALP SERPL-CCNC: 47 U/L (ref 46–116)
ALT SERPL W P-5'-P-CCNC: 17 U/L (ref 12–78)
ANION GAP SERPL CALCULATED.3IONS-SCNC: 5 MMOL/L (ref 4–13)
AST SERPL W P-5'-P-CCNC: 13 U/L (ref 5–45)
BASOPHILS # BLD AUTO: 0.04 THOUSANDS/ΜL (ref 0–0.1)
BASOPHILS NFR BLD AUTO: 0 % (ref 0–1)
BILIRUB SERPL-MCNC: 0.24 MG/DL (ref 0.2–1)
BUN SERPL-MCNC: 13 MG/DL (ref 5–25)
CALCIUM SERPL-MCNC: 8.4 MG/DL (ref 8.3–10.1)
CHLORIDE SERPL-SCNC: 104 MMOL/L (ref 100–108)
CHOLEST SERPL-MCNC: 94 MG/DL (ref 50–200)
CO2 SERPL-SCNC: 30 MMOL/L (ref 21–32)
CREAT SERPL-MCNC: 1.19 MG/DL (ref 0.6–1.3)
EOSINOPHIL # BLD AUTO: 0.22 THOUSAND/ΜL (ref 0–0.61)
EOSINOPHIL NFR BLD AUTO: 2 % (ref 0–6)
ERYTHROCYTE [DISTWIDTH] IN BLOOD BY AUTOMATED COUNT: 11.6 % (ref 11.6–15.1)
EST. AVERAGE GLUCOSE BLD GHB EST-MCNC: 123 MG/DL
GFR SERPL CREATININE-BSD FRML MDRD: 78 ML/MIN/1.73SQ M
GLUCOSE P FAST SERPL-MCNC: 119 MG/DL (ref 65–99)
HBA1C MFR BLD: 5.9 % (ref 4.2–6.3)
HCT VFR BLD AUTO: 49.2 % (ref 36.5–49.3)
HDLC SERPL-MCNC: 35 MG/DL (ref 40–60)
HGB BLD-MCNC: 16 G/DL (ref 12–17)
IMM GRANULOCYTES # BLD AUTO: 0.13 THOUSAND/UL (ref 0–0.2)
IMM GRANULOCYTES NFR BLD AUTO: 1 % (ref 0–2)
LDLC SERPL CALC-MCNC: 38 MG/DL (ref 0–100)
LYMPHOCYTES # BLD AUTO: 3.08 THOUSANDS/ΜL (ref 0.6–4.47)
LYMPHOCYTES NFR BLD AUTO: 33 % (ref 14–44)
MCH RBC QN AUTO: 30.8 PG (ref 26.8–34.3)
MCHC RBC AUTO-ENTMCNC: 32.5 G/DL (ref 31.4–37.4)
MCV RBC AUTO: 95 FL (ref 82–98)
MONOCYTES # BLD AUTO: 0.74 THOUSAND/ΜL (ref 0.17–1.22)
MONOCYTES NFR BLD AUTO: 8 % (ref 4–12)
NEUTROPHILS # BLD AUTO: 5.08 THOUSANDS/ΜL (ref 1.85–7.62)
NEUTS SEG NFR BLD AUTO: 56 % (ref 43–75)
NONHDLC SERPL-MCNC: 59 MG/DL
NRBC BLD AUTO-RTO: 0 /100 WBCS
PLATELET # BLD AUTO: 136 THOUSANDS/UL (ref 149–390)
PMV BLD AUTO: 10.6 FL (ref 8.9–12.7)
POTASSIUM SERPL-SCNC: 4.6 MMOL/L (ref 3.5–5.3)
PROT SERPL-MCNC: 6.6 G/DL (ref 6.4–8.2)
RBC # BLD AUTO: 5.19 MILLION/UL (ref 3.88–5.62)
SODIUM SERPL-SCNC: 139 MMOL/L (ref 136–145)
TRIGL SERPL-MCNC: 105 MG/DL
TSH SERPL DL<=0.05 MIU/L-ACNC: 1.19 UIU/ML (ref 0.36–3.74)
WBC # BLD AUTO: 9.29 THOUSAND/UL (ref 4.31–10.16)

## 2019-01-28 PROCEDURE — 80061 LIPID PANEL: CPT

## 2019-01-28 PROCEDURE — 80053 COMPREHEN METABOLIC PANEL: CPT

## 2019-01-28 PROCEDURE — 36415 COLL VENOUS BLD VENIPUNCTURE: CPT

## 2019-01-28 PROCEDURE — 85025 COMPLETE CBC W/AUTO DIFF WBC: CPT

## 2019-01-28 PROCEDURE — 84443 ASSAY THYROID STIM HORMONE: CPT

## 2019-01-28 PROCEDURE — 83036 HEMOGLOBIN GLYCOSYLATED A1C: CPT

## 2019-02-01 ENCOUNTER — OFFICE VISIT (OUTPATIENT)
Dept: FAMILY MEDICINE CLINIC | Facility: CLINIC | Age: 39
End: 2019-02-01

## 2019-02-01 VITALS
HEIGHT: 68 IN | SYSTOLIC BLOOD PRESSURE: 110 MMHG | WEIGHT: 216.8 LBS | HEART RATE: 78 BPM | DIASTOLIC BLOOD PRESSURE: 80 MMHG | BODY MASS INDEX: 32.86 KG/M2 | TEMPERATURE: 96.5 F | RESPIRATION RATE: 16 BRPM

## 2019-02-01 DIAGNOSIS — E03.9 HYPOTHYROIDISM, UNSPECIFIED TYPE: ICD-10-CM

## 2019-02-01 DIAGNOSIS — F25.0 SCHIZOAFFECTIVE DISORDER, BIPOLAR TYPE (HCC): Primary | Chronic | ICD-10-CM

## 2019-02-01 DIAGNOSIS — E11.65 TYPE 2 DIABETES MELLITUS WITH HYPERGLYCEMIA, UNSPECIFIED WHETHER LONG TERM INSULIN USE (HCC): ICD-10-CM

## 2019-02-01 DIAGNOSIS — E78.5 HYPERLIPIDEMIA, UNSPECIFIED HYPERLIPIDEMIA TYPE: ICD-10-CM

## 2019-02-01 PROCEDURE — 99213 OFFICE O/P EST LOW 20 MIN: CPT | Performed by: FAMILY MEDICINE

## 2019-02-01 NOTE — PROGRESS NOTES
Assessment/Plan     Schizoaffective disorder, bipolar type ([facility] )  Recently discharged from hospital on 01/25/2019 for exacerbation of symptoms, he was started on Invega and lower dose of Thorazine, and advised to continue follow-up with outpatient psychiatry, Dr Nicole Rosen     Hyperlipidemia  FLP reviewed, total cholesterol 94, triglycerides 105, LDL 38, HDL 35  Patient is currently on atorvastatin 10 mg daily, will discontinue and repeat FLP in 6 months  Advised lifestyle modification with diet and exercise in the meantime  Type 2 diabetes mellitus with hyperglycemia (HCC)  Lab Results   Component Value Date    HGBA1C 5 9 01/28/2019       No results for input(s): POCGLU in the last 72 hours  Blood Sugar Average: Last 72 hrs:  Fasting blood sugar in recent   Continue metformin 1000 mg daily, Januvia 50 mg daily  Continue lifestyle modification with diet and exercise  Will do a foot exam in the next visit  Will also give referral for Ophthalmology      Hypothyroidism  TSH on 1/25 1 19, continue levothyroxine 25 mcg daily    Diagnoses and all orders for this visit:    Schizoaffective disorder, bipolar type ([facility] )    Type 2 diabetes mellitus with hyperglycemia, unspecified whether long term insulin use (HCC)    Hypothyroidism, unspecified type    Hyperlipidemia, unspecified hyperlipidemia type     Patient to follow up in 6 months or sooner as needed    Subjective     Chief Complaint   Patient presents with    Follow-up       [de-identified] your old male presented to office for follow-up visit with 2 caregivers, patient is a resident of a group home,  [facility] and is on 24 hr 2 on 1 observation  He was recently admitted in the hospital for violent behavior and medications were adjusted at that time  He follows with Dr Allison Singletary psychiatrist outpatient    He has type 2 diabetes mellitus, hypothyroidism, mild intellectual disability, dyslipidemia and is here for follow-up and for review of labs        The following portions of the patient's history were reviewed and updated as appropriate: allergies, current medications, past family history, past medical history, past social history, past surgical history and problem list       Review of Systems   Constitutional: Negative for activity change, chills and fever  HENT: Negative for congestion  Eyes: Negative for discharge  Respiratory: Negative for shortness of breath  Cardiovascular: Negative for chest pain  Gastrointestinal: Negative for diarrhea, nausea and vomiting  Endocrine: Negative for cold intolerance  Genitourinary: Negative for difficulty urinating  Allergic/Immunologic: Negative for environmental allergies  Neurological: Negative for dizziness and headaches  Psychiatric/Behavioral: Positive for agitation and behavioral problems  Objective     Vitals:Blood pressure 110/80, pulse 78, temperature (!) 96 5 °F (35 8 °C), resp  rate 16, height 5' 8" (1 727 m), weight 98 3 kg (216 lb 12 8 oz)  Physical Exam:  Physical Exam   Constitutional: He is oriented to person, place, and time  He appears well-developed and well-nourished  HENT:   Head: Normocephalic and atraumatic  Mouth/Throat: Oropharynx is clear and moist    Eyes: Conjunctivae and EOM are normal    Neck: Normal range of motion  Neck supple  Cardiovascular: Normal rate, regular rhythm and normal heart sounds  Exam reveals no friction rub  No murmur heard  Pulmonary/Chest: Effort normal and breath sounds normal  No respiratory distress  He has no wheezes  He has no rales  Abdominal: Soft  Bowel sounds are normal  He exhibits no distension  There is no tenderness  Musculoskeletal: Normal range of motion  Neurological: He is alert and oriented to person, place, and time  Skin: Skin is warm and dry  Vitals reviewed

## 2019-02-01 NOTE — ASSESSMENT & PLAN NOTE
Lab Results   Component Value Date    HGBA1C 5 9 01/28/2019       No results for input(s): POCGLU in the last 72 hours  Blood Sugar Average: Last 72 hrs:  Fasting blood sugar in recent   Continue metformin 1000 mg daily, Januvia 50 mg daily  Continue lifestyle modification with diet and exercise    Will do a foot exam in the next visit  Will also give referral for Ophthalmology

## 2019-02-01 NOTE — ASSESSMENT & PLAN NOTE
FLP reviewed, total cholesterol 94, triglycerides 105, LDL 38, HDL 35  Patient is currently on atorvastatin 10 mg daily, will discontinue and repeat FLP in 6 months  Advised lifestyle modification with diet and exercise in the meantime

## 2019-02-01 NOTE — ASSESSMENT & PLAN NOTE
Recently discharged from hospital on 01/25/2019 for exacerbation of symptoms, he was started on Invega and lower dose of Thorazine, and advised to continue follow-up with outpatient psychiatry, Dr Martina Rubi

## 2019-02-22 ENCOUNTER — TELEPHONE (OUTPATIENT)
Dept: FAMILY MEDICINE CLINIC | Facility: CLINIC | Age: 39
End: 2019-02-22

## 2019-02-22 NOTE — TELEPHONE ENCOUNTER
Call from  stating patient was seen on 2/1 by Dr Fernando Spence and was supposed to get refills on Januvia and Metformin sent to [facility]    He doesn't have enough for weekend

## 2019-02-24 DIAGNOSIS — F25.0 SCHIZOAFFECTIVE DISORDER, BIPOLAR TYPE (HCC): Chronic | ICD-10-CM

## 2019-02-24 RX ORDER — METFORMIN HYDROCHLORIDE EXTENDED-RELEASE TABLETS 1000 MG/1
1000 TABLET, FILM COATED, EXTENDED RELEASE ORAL
Qty: 30 TABLET | Refills: 0 | Status: SHIPPED | OUTPATIENT
Start: 2019-02-24 | End: 2019-02-25 | Stop reason: SDUPTHER

## 2019-02-25 DIAGNOSIS — F25.0 SCHIZOAFFECTIVE DISORDER, BIPOLAR TYPE (HCC): Chronic | ICD-10-CM

## 2019-02-25 RX ORDER — METFORMIN HYDROCHLORIDE EXTENDED-RELEASE TABLETS 1000 MG/1
1000 TABLET, FILM COATED, EXTENDED RELEASE ORAL
Qty: 90 TABLET | Refills: 1 | Status: ON HOLD | OUTPATIENT
Start: 2019-02-25 | End: 2019-04-19

## 2019-02-26 DIAGNOSIS — J02.9 SORETHROAT: Primary | ICD-10-CM

## 2019-02-26 DIAGNOSIS — R05.9 COUGH: ICD-10-CM

## 2019-02-26 RX ORDER — GUAIFENESIN DEXTROMETHORPHAN HYDROBROMIDE ORAL SOLUTION 10; 100 MG/5ML; MG/5ML
5 SOLUTION ORAL EVERY 12 HOURS
Qty: 180 ML | Refills: 1 | Status: SHIPPED | OUTPATIENT
Start: 2019-02-26 | End: 2019-03-04

## 2019-03-01 ENCOUNTER — OFFICE VISIT (OUTPATIENT)
Dept: URGENT CARE | Age: 39
End: 2019-03-01
Payer: MEDICARE

## 2019-03-01 VITALS
BODY MASS INDEX: 32.29 KG/M2 | TEMPERATURE: 98.9 F | WEIGHT: 218 LBS | HEIGHT: 69 IN | HEART RATE: 134 BPM | DIASTOLIC BLOOD PRESSURE: 58 MMHG | SYSTOLIC BLOOD PRESSURE: 100 MMHG | OXYGEN SATURATION: 94 % | RESPIRATION RATE: 18 BRPM

## 2019-03-01 DIAGNOSIS — J01.40 ACUTE NON-RECURRENT PANSINUSITIS: ICD-10-CM

## 2019-03-01 DIAGNOSIS — J06.9 ACUTE UPPER RESPIRATORY INFECTION: Primary | ICD-10-CM

## 2019-03-01 PROCEDURE — G0463 HOSPITAL OUTPT CLINIC VISIT: HCPCS | Performed by: FAMILY MEDICINE

## 2019-03-01 PROCEDURE — 99213 OFFICE O/P EST LOW 20 MIN: CPT | Performed by: FAMILY MEDICINE

## 2019-03-01 RX ORDER — DOXYCYCLINE 100 MG/1
100 TABLET ORAL 2 TIMES DAILY
Qty: 20 TABLET | Refills: 0 | Status: SHIPPED | OUTPATIENT
Start: 2019-03-01 | End: 2019-03-11

## 2019-03-01 NOTE — PROGRESS NOTES
Assessment/Plan:      Diagnoses and all orders for this visit:    Acute upper respiratory infection    Acute non-recurrent pansinusitis  -     doxycycline (ADOXA) 100 MG tablet; Take 1 tablet (100 mg total) by mouth 2 (two) times a day for 20 doses        Patient Instructions   Rest, limit activity  Doxycycline twice a day until finished (please take probiotics)  Cold/cough/sinus medication as needed  Tylenol as needed  Recheck/follow-up with family physician  Please go to the hospital emergency department if needed  Subjective:     Patient ID: Leda Alarcon is a 41 y o  male  Congestion, sinus pressure, cough      Review of Systems   HENT: Positive for congestion and sinus pressure  Respiratory: Positive for cough  Cardiovascular: Negative  Musculoskeletal: Negative  Skin: Positive for pallor  Neurological: Negative  Objective:     Physical Exam   Constitutional: He is oriented to person, place, and time  He appears well-developed and well-nourished  HENT:   Right Ear: External ear normal    Left Ear: External ear normal    Nasal congestion; discomfort over the sinuses; injection, slight erythema of the oropharynx   Neck: Normal range of motion  Cardiovascular: Regular rhythm and normal heart sounds  Tachycardic at around 130 beats per minute   Pulmonary/Chest: Effort normal  No respiratory distress  He has no wheezes  Course breath sounds with scattered rhonchi   Neurological: He is alert and oriented to person, place, and time  No nuchal rigidity   Skin: There is pallor  Fair turgor   Psychiatric: He has a normal mood and affect  His behavior is normal    Nursing note and vitals reviewed

## 2019-03-01 NOTE — PATIENT INSTRUCTIONS
Rest, limit activity  Doxycycline twice a day until finished (please take probiotics)  Cold/cough/sinus medication as needed  Tylenol as needed  Recheck/follow-up with family physician  Please go to the hospital emergency department if needed

## 2019-03-04 DIAGNOSIS — R05.9 COUGH: Primary | ICD-10-CM

## 2019-03-04 RX ORDER — GUAIFENESIN DEXTROMETHORPHAN HYDROBROMIDE ORAL SOLUTION 10; 100 MG/5ML; MG/5ML
5 SOLUTION ORAL EVERY 12 HOURS PRN
Qty: 180 ML | Refills: 1 | Status: SHIPPED | OUTPATIENT
Start: 2019-03-04 | End: 2019-04-19 | Stop reason: HOSPADM

## 2019-03-04 NOTE — TELEPHONE ENCOUNTER
Group home requesting PRN cough syrup on patient's med list, although you did one recently, we need to send a new one that states he have sugar free  Entered new order for sign off  Thanks!

## 2019-03-04 NOTE — TELEPHONE ENCOUNTER
Patient was given robitussin for every 12hours but it was to be prn, they ([facility]) would like another script called to [facility]

## 2019-03-05 ENCOUNTER — OFFICE VISIT (OUTPATIENT)
Dept: FAMILY MEDICINE CLINIC | Facility: CLINIC | Age: 39
End: 2019-03-05

## 2019-03-05 VITALS
HEIGHT: 69 IN | WEIGHT: 217.2 LBS | BODY MASS INDEX: 32.17 KG/M2 | SYSTOLIC BLOOD PRESSURE: 100 MMHG | HEART RATE: 78 BPM | TEMPERATURE: 96 F | DIASTOLIC BLOOD PRESSURE: 70 MMHG | RESPIRATION RATE: 18 BRPM

## 2019-03-05 DIAGNOSIS — J01.10 ACUTE NON-RECURRENT FRONTAL SINUSITIS: Primary | ICD-10-CM

## 2019-03-05 PROCEDURE — 99213 OFFICE O/P EST LOW 20 MIN: CPT | Performed by: FAMILY MEDICINE

## 2019-03-05 RX ORDER — FLUTICASONE PROPIONATE 50 MCG
1 SPRAY, SUSPENSION (ML) NASAL DAILY
Qty: 1 G | Refills: 0 | Status: SHIPPED | OUTPATIENT
Start: 2019-03-05 | End: 2019-04-19 | Stop reason: HOSPADM
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
## 2019-03-08 ENCOUNTER — TELEPHONE (OUTPATIENT)
Dept: FAMILY MEDICINE CLINIC | Facility: CLINIC | Age: 39
End: 2019-03-08

## 2019-03-18 ENCOUNTER — TELEPHONE (OUTPATIENT)
Dept: FAMILY MEDICINE CLINIC | Facility: CLINIC | Age: 39
End: 2019-03-18

## 2019-03-18 ENCOUNTER — OFFICE VISIT (OUTPATIENT)
Dept: FAMILY MEDICINE CLINIC | Facility: CLINIC | Age: 39
End: 2019-03-18

## 2019-03-18 VITALS
RESPIRATION RATE: 16 BRPM | SYSTOLIC BLOOD PRESSURE: 106 MMHG | HEART RATE: 78 BPM | TEMPERATURE: 96.5 F | HEIGHT: 69 IN | DIASTOLIC BLOOD PRESSURE: 70 MMHG | WEIGHT: 216.6 LBS | BODY MASS INDEX: 32.08 KG/M2

## 2019-03-18 DIAGNOSIS — E78.5 HYPERLIPIDEMIA, UNSPECIFIED HYPERLIPIDEMIA TYPE: ICD-10-CM

## 2019-03-18 DIAGNOSIS — F25.0 SCHIZOAFFECTIVE DISORDER, BIPOLAR TYPE (HCC): Chronic | ICD-10-CM

## 2019-03-18 DIAGNOSIS — E11.65 TYPE 2 DIABETES MELLITUS WITH HYPERGLYCEMIA, UNSPECIFIED WHETHER LONG TERM INSULIN USE (HCC): Primary | ICD-10-CM

## 2019-03-18 DIAGNOSIS — E03.9 HYPOTHYROIDISM, UNSPECIFIED TYPE: ICD-10-CM

## 2019-03-18 PROCEDURE — 99213 OFFICE O/P EST LOW 20 MIN: CPT | Performed by: FAMILY MEDICINE

## 2019-03-18 PROCEDURE — G0439 PPPS, SUBSEQ VISIT: HCPCS | Performed by: FAMILY MEDICINE

## 2019-03-18 NOTE — PROGRESS NOTES
Health Risk Assessment:  Patient rates overall health as good  Patient feels that their physical health rating is Same  Eyesight was rated as Same  Hearing was rated as Same  Patient feels that their emotional and mental health rating is Same  Pain experienced by patient in the last 7 days has been None  Patient states that he has experienced no weight loss or gain in last 6 months  Emotional/Mental Health:  Patient has been feeling nervous/anxious  PHQ-9 Depression Screening:    Frequency of the following problems over the past two weeks:      1  Little interest or pleasure in doing things: 2 - more than half the days      2  Feeling down, depressed, or hopeless: 0 - not at all      3  Trouble falling or staying asleep, or sleeping too much: 3 - nearly every day      4  Feeling tired or having little energy: 0 - not at all      5  Poor appetite or overeatin - not at all      6  Feeling bad about yourself - or that you are a failure or have let yourself or your family down: 0 - not at all      7  Trouble concentrating on things, such as reading the newspaper or watching television: 0 - not at all      8  Moving or speaking so slowly that other people could have noticed  Or the opposite - being so fidgety or restless that you have been moving around a lot more than usual: 0 - not at all      9  Thoughts that you would be better off dead, or of hurting yourself in some way: 0 - not at all  PHQ-2 Score: 2  PHQ-9 Score: 5    Broken Bones/Falls: Fall Risk Assessment:    In the past year, patient has experienced: No history of falling in past year          Bladder/Bowel:  Patient has not leaked urine accidently in the last six months  Patient reports no loss of bowel control  Immunizations:  Patient has had a flu vaccination within the last year  Patient has not received a pneumonia shot  Patient has not received a shingles shot        Home Safety:  Patient does not have trouble with stairs inside or outside of their home  Patient currently reports that there are no safety hazards present in home, working smoke alarms, working carbon monoxide detectors  Preventative Screenings:   no prostate cancer screen performed, no colon cancer screen completed, no cholesterol screen completed, no glaucoma eye exam completed    Nutrition:  Current diet: Regular with servings of the following:    Medications:  Patient is not currently taking any over-the-counter supplements  Patient is not able to manage medications  Lifestyle Choices:  Patient reports no tobacco use  Patient has not smoked or used tobacco in the past   Patient reports no alcohol use  Patient does not drive a vehicle  Patient wears seat belt  Activities of Daily Living:  Can get out of bed by his or her self, able to dress self, able to make own meals, unable to do own shopping, able to bathe self, unable to do laundry/housekeeping, unable to manage own money and other related tasks    Previous Hospitalizations:  Hospitalization or ED visit in past 12 months  Number of hospitalizations within the last year: 1-2        Advanced Directives:  Patient has decided on a power of   Patient has spoken to designated power of   Patient has completed advanced directive  Social Support: Arnold Samuel   [phone]    Roberto Valencia kevan to   [facility]   [phone]    Preventative Screening/Counseling:      Cardiovascular:      General: Risks and Benefits Discussed and Screening Current      Counseling: Healthy Diet and Healthy Weight          Diabetes:      General: Risks and Benefits Discussed      Counseling: Healthy Diet, Healthy Weight and Improve Physical Activity      Comments: Pt has DM type 2, on medications   A1c in January 5 9        Colorectal Cancer:      General: Risks and Benefits Discussed and Screening Not Indicated      Counseling: high fiber diet          Prostate Cancer:      General: Screening Not Indicated          Osteoporosis:      General: Screening Not Indicated          AAA:      General: Screening Not Indicated          Glaucoma:      General: Screening Not Indicated          HIV:      General: Screening Not Indicated and Risks and Benefits Discussed          Hepatitis C:      General: Patient Declines        Advanced Directives:   Patient has no living will for healthcare, Information on ACP and/or AD provided  Additional Comments: Pt would like all kinds of resuscitative measures  Does not have a living will but wants his mother to be decision maker  Immunizations:   Additional Comments: Unsure about Hep B vaccine status, will let us know

## 2019-03-18 NOTE — PROGRESS NOTES
Assessment/Plan     Type 2 diabetes mellitus with hyperglycemia (HCC)  Lab Results   Component Value Date    HGBA1C 5 9 01/28/2019     Blood Sugar Average: Last 72 hrs:  Fasting sugar in CMP done in January 119    Currently on metformin 1000 mg daily, Januvia 50 mg daily, will decrease Januvia to 25 mg daily and repeat A1c in 3 months with CMP  Recommended to keep sugar logs with twice weekly fasting levels  Continue lifestyle modification with diet and exercise  Foot exam done today, see below  Also encouraged to see Ophthalmology as advised in the last visit  Also patient is unsure if he got hepatitis-B vaccine, patient's aid said  will provide records, and if not prior immunized will proceed with vaccine      Hypothyroidism  TSH on 01/25 1 19 continue levothyroxine 25 mcg daily    Schizoaffective disorder, bipolar type [facility])  Continue follow-up with Psychiatry  Hyperlipidemia  FLP from January reviewed, total cholesterol 94, triglycerides 105, LDL 38, HDL 35  Atorvastatin 10 mg was discontinued in the last visit, will repeat FLP in 6 months    Diagnoses and all orders for this visit:    Type 2 diabetes mellitus with hyperglycemia, unspecified whether long term insulin use (HCC)  -     sitaGLIPtin (JANUVIA) 25 mg tablet; Take 1 tablet (25 mg total) by mouth daily  -     HEMOGLOBIN A1C W/ EAG ESTIMATION; Future  -     Comprehensive metabolic panel; Future    Hyperlipidemia, unspecified hyperlipidemia type    Hypothyroidism, unspecified type    Schizoaffective disorder, bipolar type [facility])         Subjective     Chief Complaint   Patient presents with    Medicare Wellness Visit    Follow-up     chronic        43-year-old patient was here with his aid for chronic follow-up and for annual Medicare wellness visit  Patient was in the office few days ago and was diagnosed with sinusitis, but now is feeling better, has no acute concerns today    Aid has paperwork from [employer] to be filled out       The following portions of the patient's history were reviewed and updated as appropriate: allergies, current medications, past family history, past medical history, past social history, past surgical history and problem list     Review of Systems   Constitutional: Negative for activity change, chills and fever  HENT: Negative for congestion and rhinorrhea  Respiratory: Negative for chest tightness and shortness of breath  Cardiovascular: Negative for chest pain and palpitations  Gastrointestinal: Negative for diarrhea, nausea and vomiting  Genitourinary: Negative for difficulty urinating  Skin: Negative for rash  Neurological: Negative for dizziness and headaches  Psychiatric/Behavioral: Positive for behavioral problems  Objective     Vitals:Blood pressure 106/70, pulse 78, temperature (!) 96 5 °F (35 8 °C), resp  rate 16, height 5' 9" (1 753 m), weight 98 2 kg (216 lb 9 6 oz)  Physical Exam:  Physical Exam   Constitutional: He is oriented to person, place, and time  He appears well-developed and well-nourished  HENT:   Head: Normocephalic and atraumatic  Mouth/Throat: Oropharynx is clear and moist    Eyes: Conjunctivae and EOM are normal    Neck: Normal range of motion  Neck supple  Cardiovascular: Normal rate, regular rhythm and normal heart sounds  Exam reveals no friction rub  Pulses are no weak pulses  No murmur heard  Pulses:       Dorsalis pedis pulses are 2+ on the right side, and 2+ on the left side  Posterior tibial pulses are 1+ on the right side, and 1+ on the left side  Pulmonary/Chest: Effort normal and breath sounds normal  No respiratory distress  He has no wheezes  He has no rales  Abdominal: Soft  Bowel sounds are normal  He exhibits no distension  There is no tenderness  Musculoskeletal: Normal range of motion  Feet:   Right Foot:   Skin Integrity: Positive for dry skin  Negative for ulcer, skin breakdown, erythema, warmth or callus  Left Foot:   Skin Integrity: Positive for dry skin  Negative for ulcer, skin breakdown, erythema, warmth or callus  Neurological: He is alert and oriented to person, place, and time  Skin: Skin is warm and dry  Patient's shoes and socks removed  Right Foot/Ankle   Right Foot Inspection  Skin Exam: skin normal, skin intact and dry skin no warmth, no callus, no erythema, no maceration, no abnormal color, no pre-ulcer, no ulcer and no callus                            Sensory       Monofilament testing: intact  Vascular  Capillary refills: < 3 seconds  The right DP pulse is 2+  The right PT pulse is 1+  Left Foot/Ankle  Left Foot Inspection  Skin Exam: skin normal, skin intact and dry skinno warmth, no erythema, no maceration, normal color, no pre-ulcer, no ulcer and no callus                                         Sensory       Monofilament: intact  Vascular  Capillary refills: < 3 seconds  The left DP pulse is 2+  The left PT pulse is 1+  Assign Risk Category:  No deformity present; No loss of protective sensation;  No weak pulses       Risk: 0

## 2019-03-18 NOTE — ASSESSMENT & PLAN NOTE
FLP from January reviewed, total cholesterol 94, triglycerides 105, LDL 38, HDL 35    Atorvastatin 10 mg was discontinued in the last visit, will repeat FLP in 6 months

## 2019-03-18 NOTE — ASSESSMENT & PLAN NOTE
Lab Results   Component Value Date    HGBA1C 5 9 01/28/2019     Blood Sugar Average: Last 72 hrs:  Fasting sugar in CMP done in January 119    Currently on metformin 1000 mg daily, Januvia 50 mg daily, will decrease Januvia to 25 mg daily and repeat A1c in 3 months with CMP  Recommended to keep sugar logs with twice weekly fasting levels  Continue lifestyle modification with diet and exercise    Foot exam done today, see below  Also encouraged to see Ophthalmology as advised in the last visit  Also patient is unsure if he got hepatitis-B vaccine, patient's aid said  will provide records, and if not prior immunized will proceed with vaccine

## 2019-03-18 NOTE — TELEPHONE ENCOUNTER
Pt had an appt  Today with Dr Albaro Vick, at that time paper work of an individual physical examination form was filled out, Grant Dorman the pt  brought it back cause it wasn't filled out in full, I will be placing it in Dr Albaro Vick folder  Please call Grant Dorman when ready  He can be reached at [phone]

## 2019-03-19 NOTE — TELEPHONE ENCOUNTER
Jean Ramirez stopped by today stating there was two question missing on the form that need to be filled  Forms will be placed in [address] [place] [address]  I have also highlighted the questions  Mehdi # [phone]  Thank you in advance

## 2019-04-04 DIAGNOSIS — E56.0 VITAMIN E DEFICIENCY: ICD-10-CM

## 2019-04-04 DIAGNOSIS — F25.0 SCHIZOAFFECTIVE DISORDER, BIPOLAR TYPE (HCC): Chronic | ICD-10-CM

## 2019-04-04 DIAGNOSIS — E78.5 HYPERLIPIDEMIA, UNSPECIFIED HYPERLIPIDEMIA TYPE: ICD-10-CM

## 2019-04-04 DIAGNOSIS — E03.9 HYPOTHYROIDISM, UNSPECIFIED TYPE: ICD-10-CM

## 2019-04-04 RX ORDER — LORATADINE 10 MG/1
10 TABLET ORAL DAILY
Qty: 30 TABLET | Refills: 0 | Status: ON HOLD | OUTPATIENT
Start: 2019-04-04 | End: 2019-04-19 | Stop reason: SDUPTHER

## 2019-04-04 RX ORDER — LEVOTHYROXINE SODIUM 0.03 MG/1
25 TABLET ORAL
Qty: 30 TABLET | Refills: 0 | Status: ON HOLD | OUTPATIENT
Start: 2019-04-04 | End: 2019-04-19 | Stop reason: SDUPTHER

## 2019-04-04 RX ORDER — VITAMIN E 268 MG
400 CAPSULE ORAL DAILY
Qty: 90 CAPSULE | Refills: 0 | Status: SHIPPED | OUTPATIENT
Start: 2019-04-04 | End: 2019-04-19 | Stop reason: HOSPADM

## 2019-04-04 RX ORDER — CALCIUM CARBONATE 500(1250)
1 TABLET ORAL
Qty: 30 TABLET | Refills: 0 | Status: ON HOLD | OUTPATIENT
Start: 2019-04-04 | End: 2019-04-19 | Stop reason: SDUPTHER

## 2019-04-09 ENCOUNTER — OFFICE VISIT (OUTPATIENT)
Dept: FAMILY MEDICINE CLINIC | Facility: CLINIC | Age: 39
End: 2019-04-09

## 2019-04-09 VITALS
SYSTOLIC BLOOD PRESSURE: 102 MMHG | RESPIRATION RATE: 16 BRPM | WEIGHT: 219.2 LBS | TEMPERATURE: 97.2 F | BODY MASS INDEX: 32.47 KG/M2 | HEART RATE: 84 BPM | DIASTOLIC BLOOD PRESSURE: 80 MMHG | HEIGHT: 69 IN

## 2019-04-09 DIAGNOSIS — G44.209 TENSION HEADACHE: Primary | ICD-10-CM

## 2019-04-09 PROCEDURE — 99213 OFFICE O/P EST LOW 20 MIN: CPT | Performed by: FAMILY MEDICINE

## 2019-04-09 RX ORDER — ACETAMINOPHEN 500 MG
1000 TABLET ORAL EVERY 8 HOURS PRN
Qty: 30 TABLET | Refills: 2 | Status: SHIPPED | OUTPATIENT
Start: 2019-04-09 | End: 2019-04-19 | Stop reason: HOSPADM

## 2019-04-10 ENCOUNTER — HOSPITAL ENCOUNTER (EMERGENCY)
Facility: HOSPITAL | Age: 39
Discharge: OTHER INSTITUTION | End: 2019-04-11
Attending: EMERGENCY MEDICINE | Admitting: EMERGENCY MEDICINE
Payer: MEDICARE

## 2019-04-10 DIAGNOSIS — T14.91XA SUICIDE ATTEMPT (HCC): Primary | ICD-10-CM

## 2019-04-10 DIAGNOSIS — F25.9 SCHIZOAFFECTIVE DISORDER (HCC): ICD-10-CM

## 2019-04-10 LAB
AMPHETAMINES SERPL QL SCN: NEGATIVE
BARBITURATES UR QL: NEGATIVE
BENZODIAZ UR QL: NEGATIVE
COCAINE UR QL: NEGATIVE
ETHANOL EXG-MCNC: 0 MG/DL
GLUCOSE SERPL-MCNC: 111 MG/DL (ref 65–140)
METHADONE UR QL: NEGATIVE
OPIATES UR QL SCN: NEGATIVE
PCP UR QL: NEGATIVE
THC UR QL: NEGATIVE

## 2019-04-10 PROCEDURE — 80307 DRUG TEST PRSMV CHEM ANLYZR: CPT | Performed by: EMERGENCY MEDICINE

## 2019-04-10 PROCEDURE — 96372 THER/PROPH/DIAG INJ SC/IM: CPT

## 2019-04-10 PROCEDURE — 82075 ASSAY OF BREATH ETHANOL: CPT | Performed by: EMERGENCY MEDICINE

## 2019-04-10 PROCEDURE — 82948 REAGENT STRIP/BLOOD GLUCOSE: CPT

## 2019-04-10 PROCEDURE — 99285 EMERGENCY DEPT VISIT HI MDM: CPT | Performed by: EMERGENCY MEDICINE

## 2019-04-10 PROCEDURE — 99285 EMERGENCY DEPT VISIT HI MDM: CPT

## 2019-04-10 RX ORDER — LORAZEPAM 1 MG/1
1 TABLET ORAL ONCE
Status: COMPLETED | OUTPATIENT
Start: 2019-04-10 | End: 2019-04-10

## 2019-04-10 RX ORDER — TRAZODONE HYDROCHLORIDE 100 MG/1
100 TABLET ORAL
Status: DISCONTINUED | OUTPATIENT
Start: 2019-04-10 | End: 2019-04-11 | Stop reason: HOSPADM

## 2019-04-10 RX ORDER — LORAZEPAM 2 MG/ML
1 INJECTION INTRAMUSCULAR ONCE
Status: DISCONTINUED | OUTPATIENT
Start: 2019-04-10 | End: 2019-04-10

## 2019-04-10 RX ORDER — PALIPERIDONE 3 MG/1
3 TABLET, EXTENDED RELEASE ORAL 2 TIMES DAILY
Status: DISCONTINUED | OUTPATIENT
Start: 2019-04-11 | End: 2019-04-11 | Stop reason: HOSPADM

## 2019-04-10 RX ORDER — LORAZEPAM 2 MG/ML
2 INJECTION INTRAMUSCULAR ONCE
Status: COMPLETED | OUTPATIENT
Start: 2019-04-10 | End: 2019-04-10

## 2019-04-10 RX ORDER — DIVALPROEX SODIUM 500 MG/1
500 TABLET, DELAYED RELEASE ORAL EVERY 12 HOURS SCHEDULED
Status: DISCONTINUED | OUTPATIENT
Start: 2019-04-11 | End: 2019-04-11 | Stop reason: HOSPADM

## 2019-04-10 RX ADMIN — LORAZEPAM 1 MG: 1 TABLET ORAL at 16:57

## 2019-04-10 RX ADMIN — LORAZEPAM 2 MG: 2 INJECTION INTRAMUSCULAR; INTRAVENOUS at 20:50

## 2019-04-11 ENCOUNTER — HOSPITAL ENCOUNTER (INPATIENT)
Facility: HOSPITAL | Age: 39
LOS: 8 days | Discharge: HOME | DRG: 885 | End: 2019-04-19
Attending: STUDENT IN AN ORGANIZED HEALTH CARE EDUCATION/TRAINING PROGRAM | Admitting: PSYCHIATRY & NEUROLOGY
Payer: MEDICARE

## 2019-04-11 ENCOUNTER — TELEPHONE (OUTPATIENT)
Dept: FAMILY MEDICINE CLINIC | Facility: CLINIC | Age: 39
End: 2019-04-11

## 2019-04-11 VITALS
HEART RATE: 87 BPM | BODY MASS INDEX: 32.62 KG/M2 | TEMPERATURE: 98.5 F | DIASTOLIC BLOOD PRESSURE: 86 MMHG | OXYGEN SATURATION: 97 % | RESPIRATION RATE: 17 BRPM | SYSTOLIC BLOOD PRESSURE: 141 MMHG | WEIGHT: 220.24 LBS | HEIGHT: 69 IN

## 2019-04-11 DIAGNOSIS — E03.9 HYPOTHYROIDISM, UNSPECIFIED TYPE: ICD-10-CM

## 2019-04-11 DIAGNOSIS — G44.209 TENSION HEADACHE: ICD-10-CM

## 2019-04-11 DIAGNOSIS — R45.1 AGITATION: ICD-10-CM

## 2019-04-11 DIAGNOSIS — E11.65 TYPE 2 DIABETES MELLITUS WITH HYPERGLYCEMIA, UNSPECIFIED WHETHER LONG TERM INSULIN USE (HCC): ICD-10-CM

## 2019-04-11 DIAGNOSIS — E78.5 HYPERLIPIDEMIA, UNSPECIFIED HYPERLIPIDEMIA TYPE: ICD-10-CM

## 2019-04-11 DIAGNOSIS — F25.9 SCHIZOAFFECTIVE DISORDER (HCC): ICD-10-CM

## 2019-04-11 DIAGNOSIS — H25.11 NUCLEAR SCLEROTIC CATARACT OF RIGHT EYE: ICD-10-CM

## 2019-04-11 DIAGNOSIS — F25.0 SCHIZOAFFECTIVE DISORDER, BIPOLAR TYPE (HCC): Primary | Chronic | ICD-10-CM

## 2019-04-11 PROBLEM — T14.91XA SUICIDE ATTEMPT (HCC): Status: ACTIVE | Noted: 2019-04-11

## 2019-04-11 LAB
GLUCOSE SERPL-MCNC: 131 MG/DL (ref 65–140)
TSH SERPL DL<=0.05 MIU/L-ACNC: 1.26 UIU/ML (ref 0.47–4.68)
VALPROATE SERPL-MCNC: 41.8 UG/ML (ref 50–120)

## 2019-04-11 PROCEDURE — 93005 ELECTROCARDIOGRAM TRACING: CPT

## 2019-04-11 PROCEDURE — 82948 REAGENT STRIP/BLOOD GLUCOSE: CPT

## 2019-04-11 PROCEDURE — 84443 ASSAY THYROID STIM HORMONE: CPT | Performed by: PSYCHIATRY & NEUROLOGY

## 2019-04-11 PROCEDURE — 80164 ASSAY DIPROPYLACETIC ACD TOT: CPT | Performed by: PSYCHIATRY & NEUROLOGY

## 2019-04-11 PROCEDURE — 96372 THER/PROPH/DIAG INJ SC/IM: CPT

## 2019-04-11 RX ORDER — TRAZODONE HYDROCHLORIDE 50 MG/1
50 TABLET ORAL
Status: CANCELLED | OUTPATIENT
Start: 2019-04-11

## 2019-04-11 RX ORDER — HALOPERIDOL 5 MG
5 TABLET ORAL EVERY 8 HOURS PRN
Status: CANCELLED | OUTPATIENT
Start: 2019-04-11

## 2019-04-11 RX ORDER — RISPERIDONE 1 MG/1
1 TABLET, ORALLY DISINTEGRATING ORAL
Status: CANCELLED | OUTPATIENT
Start: 2019-04-11

## 2019-04-11 RX ORDER — LORAZEPAM 0.5 MG/1
1 TABLET ORAL EVERY 8 HOURS PRN
Status: CANCELLED | OUTPATIENT
Start: 2019-04-11

## 2019-04-11 RX ORDER — TRAZODONE HYDROCHLORIDE 100 MG/1
100 TABLET ORAL
Status: DISCONTINUED | OUTPATIENT
Start: 2019-04-11 | End: 2019-04-11 | Stop reason: SDUPTHER

## 2019-04-11 RX ORDER — ACETAMINOPHEN 325 MG/1
650 TABLET ORAL EVERY 4 HOURS PRN
Status: CANCELLED | OUTPATIENT
Start: 2019-04-11

## 2019-04-11 RX ORDER — ACETAMINOPHEN 325 MG/1
650 TABLET ORAL EVERY 6 HOURS PRN
Status: CANCELLED | OUTPATIENT
Start: 2019-04-11

## 2019-04-11 RX ORDER — LORAZEPAM 2 MG/ML
1 INJECTION INTRAMUSCULAR EVERY 6 HOURS PRN
Status: CANCELLED | OUTPATIENT
Start: 2019-04-11

## 2019-04-11 RX ORDER — BENZTROPINE MESYLATE 1 MG/ML
1 INJECTION INTRAMUSCULAR; INTRAVENOUS EVERY 6 HOURS PRN
Status: CANCELLED | OUTPATIENT
Start: 2019-04-11

## 2019-04-11 RX ORDER — MAGNESIUM HYDROXIDE/ALUMINUM HYDROXICE/SIMETHICONE 120; 1200; 1200 MG/30ML; MG/30ML; MG/30ML
30 SUSPENSION ORAL EVERY 4 HOURS PRN
Status: CANCELLED | OUTPATIENT
Start: 2019-04-11

## 2019-04-11 RX ORDER — HYDROXYZINE HYDROCHLORIDE 25 MG/1
25 TABLET, FILM COATED ORAL EVERY 6 HOURS PRN
Status: CANCELLED | OUTPATIENT
Start: 2019-04-11

## 2019-04-11 RX ORDER — LORAZEPAM 0.5 MG/1
0.5 TABLET ORAL ONCE
Status: COMPLETED | OUTPATIENT
Start: 2019-04-11 | End: 2019-04-11

## 2019-04-11 RX ORDER — HALOPERIDOL 5 MG/ML
5 INJECTION INTRAMUSCULAR EVERY 12 HOURS PRN
Status: CANCELLED | OUTPATIENT
Start: 2019-04-11

## 2019-04-11 RX ORDER — OLANZAPINE 10 MG/1
10 TABLET ORAL
Status: CANCELLED | OUTPATIENT
Start: 2019-04-11

## 2019-04-11 RX ORDER — ZIPRASIDONE MESYLATE 20 MG/ML
20 INJECTION, POWDER, LYOPHILIZED, FOR SOLUTION INTRAMUSCULAR EVERY 12 HOURS PRN
Status: CANCELLED | OUTPATIENT
Start: 2019-04-11

## 2019-04-11 RX ORDER — PALIPERIDONE 3 MG/1
3 TABLET, EXTENDED RELEASE ORAL 2 TIMES DAILY
Status: CANCELLED | OUTPATIENT
Start: 2019-04-11

## 2019-04-11 RX ORDER — LORAZEPAM 1 MG/1
1 TABLET ORAL EVERY 8 HOURS PRN
Status: DISCONTINUED | OUTPATIENT
Start: 2019-04-11 | End: 2019-04-19 | Stop reason: HOSPADM

## 2019-04-11 RX ORDER — RISPERIDONE 1 MG/1
1 TABLET, ORALLY DISINTEGRATING ORAL EVERY 8 HOURS PRN
Status: CANCELLED | OUTPATIENT
Start: 2019-04-11

## 2019-04-11 RX ORDER — MAGNESIUM HYDROXIDE/ALUMINUM HYDROXICE/SIMETHICONE 120; 1200; 1200 MG/30ML; MG/30ML; MG/30ML
30 SUSPENSION ORAL EVERY 4 HOURS PRN
Status: DISCONTINUED | OUTPATIENT
Start: 2019-04-11 | End: 2019-04-19 | Stop reason: HOSPADM

## 2019-04-11 RX ORDER — ACETAMINOPHEN 325 MG/1
650 TABLET ORAL ONCE
Status: COMPLETED | OUTPATIENT
Start: 2019-04-11 | End: 2019-04-11

## 2019-04-11 RX ORDER — HYDROXYZINE HYDROCHLORIDE 25 MG/1
25 TABLET, FILM COATED ORAL EVERY 6 HOURS PRN
Status: DISCONTINUED | OUTPATIENT
Start: 2019-04-11 | End: 2019-04-14

## 2019-04-11 RX ORDER — PALIPERIDONE 3 MG/1
3 TABLET, EXTENDED RELEASE ORAL 2 TIMES DAILY
Status: DISCONTINUED | OUTPATIENT
Start: 2019-04-11 | End: 2019-04-12

## 2019-04-11 RX ORDER — DIVALPROEX SODIUM 500 MG/1
1000 TABLET, EXTENDED RELEASE ORAL 2 TIMES DAILY
Status: DISCONTINUED | OUTPATIENT
Start: 2019-04-11 | End: 2019-04-19 | Stop reason: HOSPADM

## 2019-04-11 RX ORDER — DIVALPROEX SODIUM 500 MG/1
1000 TABLET, EXTENDED RELEASE ORAL 2 TIMES DAILY
Status: CANCELLED | OUTPATIENT
Start: 2019-04-11

## 2019-04-11 RX ORDER — ACETAMINOPHEN 325 MG/1
650 TABLET ORAL EVERY 6 HOURS PRN
Status: DISCONTINUED | OUTPATIENT
Start: 2019-04-11 | End: 2019-04-19 | Stop reason: HOSPADM

## 2019-04-11 RX ORDER — RISPERIDONE 1 MG/1
1 TABLET, ORALLY DISINTEGRATING ORAL EVERY 8 HOURS PRN
Status: DISCONTINUED | OUTPATIENT
Start: 2019-04-11 | End: 2019-04-19 | Stop reason: HOSPADM

## 2019-04-11 RX ORDER — HALOPERIDOL 5 MG/ML
5 INJECTION INTRAMUSCULAR EVERY 12 HOURS PRN
Status: DISCONTINUED | OUTPATIENT
Start: 2019-04-11 | End: 2019-04-19 | Stop reason: HOSPADM

## 2019-04-11 RX ORDER — TRAZODONE HYDROCHLORIDE 50 MG/1
50 TABLET ORAL
Status: DISCONTINUED | OUTPATIENT
Start: 2019-04-11 | End: 2019-04-19 | Stop reason: HOSPADM

## 2019-04-11 RX ORDER — HALOPERIDOL 5 MG
5 TABLET ORAL EVERY 8 HOURS PRN
Status: DISCONTINUED | OUTPATIENT
Start: 2019-04-11 | End: 2019-04-12

## 2019-04-11 RX ORDER — ACETAMINOPHEN 325 MG/1
975 TABLET ORAL EVERY 6 HOURS PRN
Status: CANCELLED | OUTPATIENT
Start: 2019-04-11

## 2019-04-11 RX ORDER — LORAZEPAM 2 MG/ML
1 INJECTION INTRAMUSCULAR EVERY 6 HOURS PRN
Status: DISCONTINUED | OUTPATIENT
Start: 2019-04-11 | End: 2019-04-19 | Stop reason: HOSPADM

## 2019-04-11 RX ORDER — HALOPERIDOL 5 MG/ML
5 INJECTION INTRAMUSCULAR EVERY 6 HOURS PRN
Status: CANCELLED | OUTPATIENT
Start: 2019-04-11

## 2019-04-11 RX ORDER — ZIPRASIDONE MESYLATE 20 MG/ML
INJECTION, POWDER, LYOPHILIZED, FOR SOLUTION INTRAMUSCULAR
Status: COMPLETED
Start: 2019-04-11 | End: 2019-04-11

## 2019-04-11 RX ORDER — ZIPRASIDONE MESYLATE 20 MG/ML
20 INJECTION, POWDER, LYOPHILIZED, FOR SOLUTION INTRAMUSCULAR ONCE
Status: COMPLETED | OUTPATIENT
Start: 2019-04-11 | End: 2019-04-11

## 2019-04-11 RX ORDER — ZIPRASIDONE MESYLATE 20 MG/ML
20 INJECTION, POWDER, LYOPHILIZED, FOR SOLUTION INTRAMUSCULAR EVERY 12 HOURS PRN
Status: DISCONTINUED | OUTPATIENT
Start: 2019-04-11 | End: 2019-04-13

## 2019-04-11 RX ORDER — BENZTROPINE MESYLATE 1 MG/1
1 TABLET ORAL EVERY 6 HOURS PRN
Status: CANCELLED | OUTPATIENT
Start: 2019-04-11

## 2019-04-11 RX ORDER — OLANZAPINE 10 MG/1
10 INJECTION, POWDER, LYOPHILIZED, FOR SOLUTION INTRAMUSCULAR
Status: CANCELLED | OUTPATIENT
Start: 2019-04-11

## 2019-04-11 RX ADMIN — PALIPERIDONE 3 MG: 3 TABLET, EXTENDED RELEASE ORAL at 09:50

## 2019-04-11 RX ADMIN — LORAZEPAM 0.5 MG: 0.5 TABLET ORAL at 16:59

## 2019-04-11 RX ADMIN — PALIPERIDONE 3 MG: 3 TABLET, EXTENDED RELEASE ORAL at 17:39

## 2019-04-11 RX ADMIN — WATER 1.2 ML: 1 INJECTION INTRAMUSCULAR; INTRAVENOUS; SUBCUTANEOUS at 13:00

## 2019-04-11 RX ADMIN — TRAZODONE HYDROCHLORIDE 50 MG: 50 TABLET ORAL at 22:31

## 2019-04-11 RX ADMIN — DIVALPROEX SODIUM 500 MG: 500 TABLET, DELAYED RELEASE ORAL at 09:50

## 2019-04-11 RX ADMIN — PALIPERIDONE 3 MG: 3 TABLET, EXTENDED RELEASE ORAL at 22:30

## 2019-04-11 RX ADMIN — ACETAMINOPHEN 650 MG: 325 TABLET ORAL at 09:58

## 2019-04-11 RX ADMIN — ZIPRASIDONE MESYLATE 20 MG: 20 INJECTION, POWDER, LYOPHILIZED, FOR SOLUTION INTRAMUSCULAR at 13:00

## 2019-04-11 RX ADMIN — TRAZODONE HYDROCHLORIDE 100 MG: 100 TABLET ORAL at 00:43

## 2019-04-11 RX ADMIN — HYDROXYZINE HYDROCHLORIDE 25 MG: 25 TABLET ORAL at 22:30

## 2019-04-12 PROBLEM — G25.0 ESSENTIAL TREMOR: Status: ACTIVE | Noted: 2017-07-12

## 2019-04-12 PROBLEM — F84.0 AUTISM SPECTRUM: Status: ACTIVE | Noted: 2017-08-11

## 2019-04-12 PROBLEM — J30.2 SEASONAL ALLERGIES: Status: ACTIVE | Noted: 2017-05-31

## 2019-04-12 PROBLEM — E66.9 OBESITY: Status: ACTIVE | Noted: 2017-05-31

## 2019-04-12 LAB
ALBUMIN SERPL BCP-MCNC: 3.8 G/DL (ref 3–5.2)
ALP SERPL-CCNC: 55 U/L (ref 43–122)
ALT SERPL W P-5'-P-CCNC: 22 U/L (ref 9–52)
ANION GAP SERPL CALCULATED.3IONS-SCNC: 7 MMOL/L (ref 5–14)
AST SERPL W P-5'-P-CCNC: 30 U/L (ref 17–59)
ATRIAL RATE: 105 BPM
BASOPHILS # BLD AUTO: 0 THOUSANDS/ΜL (ref 0–0.1)
BASOPHILS NFR BLD AUTO: 1 % (ref 0–1)
BILIRUB SERPL-MCNC: 0.6 MG/DL
BUN SERPL-MCNC: 15 MG/DL (ref 5–25)
CALCIUM SERPL-MCNC: 9.3 MG/DL (ref 8.4–10.2)
CHLORIDE SERPL-SCNC: 96 MMOL/L (ref 97–108)
CHOLEST SERPL-MCNC: 200 MG/DL
CO2 SERPL-SCNC: 35 MMOL/L (ref 22–30)
CREAT SERPL-MCNC: 1.28 MG/DL (ref 0.7–1.5)
EOSINOPHIL # BLD AUTO: 0.1 THOUSAND/ΜL (ref 0–0.4)
EOSINOPHIL NFR BLD AUTO: 1 % (ref 0–6)
ERYTHROCYTE [DISTWIDTH] IN BLOOD BY AUTOMATED COUNT: 12.6 %
GFR SERPL CREATININE-BSD FRML MDRD: 71 ML/MIN/1.73SQ M
GLUCOSE SERPL-MCNC: 135 MG/DL (ref 65–140)
GLUCOSE SERPL-MCNC: 139 MG/DL (ref 70–99)
HCT VFR BLD AUTO: 46.5 % (ref 41–53)
HDLC SERPL-MCNC: 32 MG/DL (ref 40–59)
HGB BLD-MCNC: 16.5 G/DL (ref 13.5–17.5)
LDLC SERPL CALC-MCNC: 126 MG/DL
LYMPHOCYTES # BLD AUTO: 3 THOUSANDS/ΜL (ref 0.5–4)
LYMPHOCYTES NFR BLD AUTO: 39 % (ref 25–45)
MAGNESIUM SERPL-MCNC: 1.7 MG/DL (ref 1.6–2.3)
MCH RBC QN AUTO: 32.7 PG (ref 26–34)
MCHC RBC AUTO-ENTMCNC: 35.4 G/DL (ref 31–36)
MCV RBC AUTO: 92 FL (ref 80–100)
MONOCYTES # BLD AUTO: 0.8 THOUSAND/ΜL (ref 0.2–0.9)
MONOCYTES NFR BLD AUTO: 10 % (ref 1–10)
NEUTROPHILS # BLD AUTO: 3.8 THOUSANDS/ΜL (ref 1.8–7.8)
NEUTS SEG NFR BLD AUTO: 49 % (ref 45–65)
NONHDLC SERPL-MCNC: 168 MG/DL
P AXIS: 48 DEGREES
PLATELET # BLD AUTO: 126 THOUSANDS/UL (ref 150–450)
PMV BLD AUTO: 8 FL (ref 8.9–12.7)
POTASSIUM SERPL-SCNC: 4.2 MMOL/L (ref 3.6–5)
PR INTERVAL: 150 MS
PROT SERPL-MCNC: 7 G/DL (ref 5.9–8.4)
QRS AXIS: 44 DEGREES
QRSD INTERVAL: 96 MS
QT INTERVAL: 362 MS
QTC INTERVAL: 478 MS
RBC # BLD AUTO: 5.04 MILLION/UL (ref 4.5–5.9)
SODIUM SERPL-SCNC: 138 MMOL/L (ref 137–147)
T WAVE AXIS: 38 DEGREES
TRIGL SERPL-MCNC: 209 MG/DL
VENTRICULAR RATE: 105 BPM
WBC # BLD AUTO: 7.8 THOUSAND/UL (ref 4.5–11)

## 2019-04-12 PROCEDURE — 80061 LIPID PANEL: CPT | Performed by: PSYCHIATRY & NEUROLOGY

## 2019-04-12 PROCEDURE — 93010 ELECTROCARDIOGRAM REPORT: CPT | Performed by: INTERNAL MEDICINE

## 2019-04-12 PROCEDURE — 99222 1ST HOSP IP/OBS MODERATE 55: CPT | Performed by: INTERNAL MEDICINE

## 2019-04-12 PROCEDURE — 85025 COMPLETE CBC W/AUTO DIFF WBC: CPT | Performed by: PSYCHIATRY & NEUROLOGY

## 2019-04-12 PROCEDURE — 82948 REAGENT STRIP/BLOOD GLUCOSE: CPT

## 2019-04-12 PROCEDURE — 80053 COMPREHEN METABOLIC PANEL: CPT | Performed by: PSYCHIATRY & NEUROLOGY

## 2019-04-12 PROCEDURE — 83735 ASSAY OF MAGNESIUM: CPT | Performed by: INTERNAL MEDICINE

## 2019-04-12 PROCEDURE — 99223 1ST HOSP IP/OBS HIGH 75: CPT | Performed by: PSYCHIATRY & NEUROLOGY

## 2019-04-12 RX ORDER — HYDROXYZINE HYDROCHLORIDE 25 MG/1
25 TABLET, FILM COATED ORAL EVERY 6 HOURS PRN
Status: DISCONTINUED | OUTPATIENT
Start: 2019-04-12 | End: 2019-04-12

## 2019-04-12 RX ORDER — PALIPERIDONE 6 MG/1
6 TABLET, EXTENDED RELEASE ORAL
Status: DISCONTINUED | OUTPATIENT
Start: 2019-04-12 | End: 2019-04-19 | Stop reason: HOSPADM

## 2019-04-12 RX ORDER — OLANZAPINE 10 MG/1
10 TABLET ORAL
Status: DISCONTINUED | OUTPATIENT
Start: 2019-04-12 | End: 2019-04-19 | Stop reason: HOSPADM

## 2019-04-12 RX ORDER — OLANZAPINE 10 MG/1
10 INJECTION, POWDER, LYOPHILIZED, FOR SOLUTION INTRAMUSCULAR
Status: DISCONTINUED | OUTPATIENT
Start: 2019-04-12 | End: 2019-04-12

## 2019-04-12 RX ORDER — RISPERIDONE 1 MG/1
1 TABLET, ORALLY DISINTEGRATING ORAL
Status: DISCONTINUED | OUTPATIENT
Start: 2019-04-12 | End: 2019-04-12

## 2019-04-12 RX ORDER — TRAZODONE HYDROCHLORIDE 50 MG/1
50 TABLET ORAL
Status: DISCONTINUED | OUTPATIENT
Start: 2019-04-12 | End: 2019-04-12

## 2019-04-12 RX ORDER — LORAZEPAM 2 MG/ML
1 INJECTION INTRAMUSCULAR EVERY 6 HOURS PRN
Status: DISCONTINUED | OUTPATIENT
Start: 2019-04-12 | End: 2019-04-12

## 2019-04-12 RX ORDER — CHLORPROMAZINE HYDROCHLORIDE 25 MG/1
50 TABLET, FILM COATED ORAL EVERY 6 HOURS PRN
Status: DISCONTINUED | OUTPATIENT
Start: 2019-04-12 | End: 2019-04-19 | Stop reason: HOSPADM

## 2019-04-12 RX ORDER — HALOPERIDOL 5 MG
5 TABLET ORAL EVERY 8 HOURS PRN
Status: DISCONTINUED | OUTPATIENT
Start: 2019-04-12 | End: 2019-04-12

## 2019-04-12 RX ORDER — MAGNESIUM HYDROXIDE/ALUMINUM HYDROXICE/SIMETHICONE 120; 1200; 1200 MG/30ML; MG/30ML; MG/30ML
30 SUSPENSION ORAL EVERY 4 HOURS PRN
Status: DISCONTINUED | OUTPATIENT
Start: 2019-04-12 | End: 2019-04-12

## 2019-04-12 RX ORDER — LEVOTHYROXINE SODIUM 0.03 MG/1
25 TABLET ORAL
Status: DISCONTINUED | OUTPATIENT
Start: 2019-04-12 | End: 2019-04-19 | Stop reason: HOSPADM

## 2019-04-12 RX ORDER — LORAZEPAM 1 MG/1
1 TABLET ORAL EVERY 8 HOURS PRN
Status: DISCONTINUED | OUTPATIENT
Start: 2019-04-12 | End: 2019-04-12

## 2019-04-12 RX ORDER — PALIPERIDONE 3 MG/1
3 TABLET, EXTENDED RELEASE ORAL DAILY
Status: DISCONTINUED | OUTPATIENT
Start: 2019-04-13 | End: 2019-04-19 | Stop reason: HOSPADM

## 2019-04-12 RX ORDER — BENZTROPINE MESYLATE 1 MG/1
1 TABLET ORAL EVERY 6 HOURS PRN
Status: DISCONTINUED | OUTPATIENT
Start: 2019-04-12 | End: 2019-04-13

## 2019-04-12 RX ORDER — ONDANSETRON 4 MG/1
4 TABLET, ORALLY DISINTEGRATING ORAL ONCE
Status: COMPLETED | OUTPATIENT
Start: 2019-04-12 | End: 2019-04-12

## 2019-04-12 RX ORDER — ACETAMINOPHEN 325 MG/1
975 TABLET ORAL EVERY 6 HOURS PRN
Status: DISCONTINUED | OUTPATIENT
Start: 2019-04-12 | End: 2019-04-12

## 2019-04-12 RX ORDER — HALOPERIDOL 5 MG/ML
5 INJECTION INTRAMUSCULAR EVERY 6 HOURS PRN
Status: DISCONTINUED | OUTPATIENT
Start: 2019-04-12 | End: 2019-04-12

## 2019-04-12 RX ORDER — PROPRANOLOL HYDROCHLORIDE 20 MG/1
20 TABLET ORAL EVERY 12 HOURS SCHEDULED
Status: DISCONTINUED | OUTPATIENT
Start: 2019-04-12 | End: 2019-04-19 | Stop reason: HOSPADM

## 2019-04-12 RX ORDER — BENZTROPINE MESYLATE 1 MG/ML
1 INJECTION INTRAMUSCULAR; INTRAVENOUS EVERY 6 HOURS PRN
Status: DISCONTINUED | OUTPATIENT
Start: 2019-04-12 | End: 2019-04-12

## 2019-04-12 RX ORDER — ACETAMINOPHEN 325 MG/1
650 TABLET ORAL EVERY 6 HOURS PRN
Status: DISCONTINUED | OUTPATIENT
Start: 2019-04-12 | End: 2019-04-12

## 2019-04-12 RX ORDER — DIPHENHYDRAMINE HCL 25 MG
25 TABLET ORAL ONCE
Status: COMPLETED | OUTPATIENT
Start: 2019-04-12 | End: 2019-04-13

## 2019-04-12 RX ORDER — METFORMIN HYDROCHLORIDE 500 MG/1
1000 TABLET, EXTENDED RELEASE ORAL
Status: DISCONTINUED | OUTPATIENT
Start: 2019-04-12 | End: 2019-04-19 | Stop reason: HOSPADM

## 2019-04-12 RX ORDER — ACETAMINOPHEN 325 MG/1
650 TABLET ORAL EVERY 4 HOURS PRN
Status: DISCONTINUED | OUTPATIENT
Start: 2019-04-12 | End: 2019-04-12

## 2019-04-12 RX ADMIN — METFORMIN HYDROCHLORIDE 1000 MG: 500 TABLET, EXTENDED RELEASE ORAL at 07:45

## 2019-04-12 RX ADMIN — HYDROXYZINE HYDROCHLORIDE 25 MG: 25 TABLET ORAL at 07:42

## 2019-04-12 RX ADMIN — LORAZEPAM 1 MG: 1 TABLET ORAL at 22:02

## 2019-04-12 RX ADMIN — LORAZEPAM 1 MG: 1 TABLET ORAL at 15:06

## 2019-04-12 RX ADMIN — PROPRANOLOL HYDROCHLORIDE 20 MG: 20 TABLET ORAL at 21:59

## 2019-04-12 RX ADMIN — DIVALPROEX SODIUM 1000 MG: 500 TABLET, EXTENDED RELEASE ORAL at 07:43

## 2019-04-12 RX ADMIN — PALIPERIDONE 3 MG: 3 TABLET, EXTENDED RELEASE ORAL at 07:44

## 2019-04-12 RX ADMIN — ONDANSETRON 4 MG: 4 TABLET, ORALLY DISINTEGRATING ORAL at 21:58

## 2019-04-12 RX ADMIN — CHLORPROMAZINE HYDROCHLORIDE 50 MG: 25 TABLET, SUGAR COATED ORAL at 22:00

## 2019-04-12 RX ADMIN — ACETAMINOPHEN 650 MG: 325 TABLET ORAL at 11:01

## 2019-04-12 RX ADMIN — LEVOTHYROXINE SODIUM 25 MCG: 25 TABLET ORAL at 06:46

## 2019-04-12 RX ADMIN — SITAGLIPTIN 25 MG: 25 TABLET, FILM COATED ORAL at 07:45

## 2019-04-12 RX ADMIN — PALIPERIDONE 6 MG: 6 TABLET, EXTENDED RELEASE ORAL at 21:58

## 2019-04-12 RX ADMIN — PROPRANOLOL HYDROCHLORIDE 20 MG: 20 TABLET ORAL at 11:55

## 2019-04-12 RX ADMIN — CHLORPROMAZINE HYDROCHLORIDE 50 MG: 25 TABLET, SUGAR COATED ORAL at 12:42

## 2019-04-13 LAB
GLUCOSE SERPL-MCNC: 144 MG/DL (ref 65–140)
TSH SERPL DL<=0.05 MIU/L-ACNC: 1.32 UIU/ML (ref 0.47–4.68)

## 2019-04-13 PROCEDURE — 99232 SBSQ HOSP IP/OBS MODERATE 35: CPT | Performed by: PHYSICIAN ASSISTANT

## 2019-04-13 PROCEDURE — 84443 ASSAY THYROID STIM HORMONE: CPT | Performed by: INTERNAL MEDICINE

## 2019-04-13 PROCEDURE — 82948 REAGENT STRIP/BLOOD GLUCOSE: CPT

## 2019-04-13 PROCEDURE — 86592 SYPHILIS TEST NON-TREP QUAL: CPT | Performed by: INTERNAL MEDICINE

## 2019-04-13 RX ORDER — CALCIUM CARBONATE 500(1250)
1 TABLET ORAL
Status: DISCONTINUED | OUTPATIENT
Start: 2019-04-14 | End: 2019-04-19 | Stop reason: HOSPADM

## 2019-04-13 RX ORDER — OLANZAPINE 10 MG/1
10 INJECTION, POWDER, LYOPHILIZED, FOR SOLUTION INTRAMUSCULAR
Status: DISCONTINUED | OUTPATIENT
Start: 2019-04-13 | End: 2019-04-19 | Stop reason: HOSPADM

## 2019-04-13 RX ORDER — VITAMIN E 268 MG
400 CAPSULE ORAL DAILY
Status: DISCONTINUED | OUTPATIENT
Start: 2019-04-14 | End: 2019-04-19 | Stop reason: HOSPADM

## 2019-04-13 RX ORDER — LORATADINE 10 MG/1
10 TABLET ORAL DAILY
Status: DISCONTINUED | OUTPATIENT
Start: 2019-04-14 | End: 2019-04-19 | Stop reason: HOSPADM

## 2019-04-13 RX ADMIN — DIPHENHYDRAMINE HCL 25 MG: 25 TABLET ORAL at 21:32

## 2019-04-13 RX ADMIN — PALIPERIDONE 3 MG: 3 TABLET, EXTENDED RELEASE ORAL at 08:53

## 2019-04-13 RX ADMIN — TRAZODONE HYDROCHLORIDE 50 MG: 50 TABLET ORAL at 21:32

## 2019-04-13 RX ADMIN — LEVOTHYROXINE SODIUM 25 MCG: 25 TABLET ORAL at 06:38

## 2019-04-13 RX ADMIN — DIVALPROEX SODIUM 1000 MG: 500 TABLET, EXTENDED RELEASE ORAL at 18:07

## 2019-04-13 RX ADMIN — PROPRANOLOL HYDROCHLORIDE 20 MG: 20 TABLET ORAL at 21:32

## 2019-04-13 RX ADMIN — PALIPERIDONE 6 MG: 6 TABLET, EXTENDED RELEASE ORAL at 21:32

## 2019-04-13 RX ADMIN — SITAGLIPTIN 25 MG: 25 TABLET, FILM COATED ORAL at 08:54

## 2019-04-13 RX ADMIN — PROPRANOLOL HYDROCHLORIDE 20 MG: 20 TABLET ORAL at 08:54

## 2019-04-13 RX ADMIN — DIVALPROEX SODIUM 1000 MG: 500 TABLET, EXTENDED RELEASE ORAL at 08:53

## 2019-04-13 RX ADMIN — METFORMIN HYDROCHLORIDE 1000 MG: 500 TABLET, EXTENDED RELEASE ORAL at 11:51

## 2019-04-14 LAB
GLUCOSE SERPL-MCNC: 113 MG/DL (ref 65–140)
GLUCOSE SERPL-MCNC: 181 MG/DL (ref 65–140)
VALPROATE SERPL-MCNC: 52.2 UG/ML (ref 50–120)

## 2019-04-14 PROCEDURE — 80164 ASSAY DIPROPYLACETIC ACD TOT: CPT | Performed by: PSYCHIATRY & NEUROLOGY

## 2019-04-14 PROCEDURE — 82948 REAGENT STRIP/BLOOD GLUCOSE: CPT

## 2019-04-14 PROCEDURE — 99232 SBSQ HOSP IP/OBS MODERATE 35: CPT | Performed by: PHYSICIAN ASSISTANT

## 2019-04-14 RX ORDER — DIPHENHYDRAMINE HCL 25 MG
25 TABLET ORAL 2 TIMES DAILY PRN
Status: DISCONTINUED | OUTPATIENT
Start: 2019-04-14 | End: 2019-04-19 | Stop reason: HOSPADM

## 2019-04-14 RX ADMIN — DIPHENHYDRAMINE HCL 25 MG: 25 TABLET ORAL at 21:25

## 2019-04-14 RX ADMIN — PALIPERIDONE 3 MG: 3 TABLET, EXTENDED RELEASE ORAL at 08:47

## 2019-04-14 RX ADMIN — PALIPERIDONE 6 MG: 6 TABLET, EXTENDED RELEASE ORAL at 21:25

## 2019-04-14 RX ADMIN — PROPRANOLOL HYDROCHLORIDE 20 MG: 20 TABLET ORAL at 21:25

## 2019-04-14 RX ADMIN — VITAMIN E CAP 400 UNIT 400 UNITS: 400 CAP at 08:48

## 2019-04-14 RX ADMIN — METFORMIN HYDROCHLORIDE 1000 MG: 500 TABLET, EXTENDED RELEASE ORAL at 08:47

## 2019-04-14 RX ADMIN — SITAGLIPTIN 25 MG: 25 TABLET, FILM COATED ORAL at 08:47

## 2019-04-14 RX ADMIN — PROPRANOLOL HYDROCHLORIDE 20 MG: 20 TABLET ORAL at 08:47

## 2019-04-14 RX ADMIN — RISPERIDONE 1 MG: 1 TABLET, ORALLY DISINTEGRATING ORAL at 00:11

## 2019-04-14 RX ADMIN — LEVOTHYROXINE SODIUM 25 MCG: 25 TABLET ORAL at 06:26

## 2019-04-14 RX ADMIN — Medication 1 TABLET: at 08:48

## 2019-04-14 RX ADMIN — DIVALPROEX SODIUM 1000 MG: 500 TABLET, EXTENDED RELEASE ORAL at 18:08

## 2019-04-14 RX ADMIN — LORATADINE 10 MG: 10 TABLET ORAL at 08:48

## 2019-04-14 RX ADMIN — DIVALPROEX SODIUM 1000 MG: 500 TABLET, EXTENDED RELEASE ORAL at 08:48

## 2019-04-14 RX ADMIN — LORAZEPAM 1 MG: 1 TABLET ORAL at 21:27

## 2019-04-15 LAB
GLUCOSE SERPL-MCNC: 114 MG/DL (ref 65–140)
RPR SER QL: NORMAL

## 2019-04-15 PROCEDURE — 82948 REAGENT STRIP/BLOOD GLUCOSE: CPT

## 2019-04-15 PROCEDURE — 99232 SBSQ HOSP IP/OBS MODERATE 35: CPT | Performed by: PSYCHIATRY & NEUROLOGY

## 2019-04-15 RX ADMIN — PROPRANOLOL HYDROCHLORIDE 20 MG: 20 TABLET ORAL at 08:28

## 2019-04-15 RX ADMIN — SITAGLIPTIN 25 MG: 25 TABLET, FILM COATED ORAL at 08:30

## 2019-04-15 RX ADMIN — PALIPERIDONE 6 MG: 6 TABLET, EXTENDED RELEASE ORAL at 21:36

## 2019-04-15 RX ADMIN — PALIPERIDONE 3 MG: 3 TABLET, EXTENDED RELEASE ORAL at 08:30

## 2019-04-15 RX ADMIN — LORATADINE 10 MG: 10 TABLET ORAL at 08:28

## 2019-04-15 RX ADMIN — LEVOTHYROXINE SODIUM 25 MCG: 25 TABLET ORAL at 06:26

## 2019-04-15 RX ADMIN — DIVALPROEX SODIUM 1000 MG: 500 TABLET, EXTENDED RELEASE ORAL at 08:28

## 2019-04-15 RX ADMIN — Medication 1 TABLET: at 08:28

## 2019-04-15 RX ADMIN — DIVALPROEX SODIUM 1000 MG: 500 TABLET, EXTENDED RELEASE ORAL at 17:06

## 2019-04-15 RX ADMIN — DIPHENHYDRAMINE HCL 25 MG: 25 TABLET ORAL at 21:36

## 2019-04-15 RX ADMIN — ALUMINUM HYDROXIDE, MAGNESIUM HYDROXIDE, AND SIMETHICONE 30 ML: 200; 200; 20 SUSPENSION ORAL at 18:13

## 2019-04-15 RX ADMIN — METFORMIN HYDROCHLORIDE 1000 MG: 500 TABLET, EXTENDED RELEASE ORAL at 08:28

## 2019-04-15 RX ADMIN — VITAMIN E CAP 400 UNIT 400 UNITS: 400 CAP at 08:28

## 2019-04-15 RX ADMIN — PROPRANOLOL HYDROCHLORIDE 20 MG: 20 TABLET ORAL at 21:36

## 2019-04-16 LAB — GLUCOSE SERPL-MCNC: 102 MG/DL (ref 65–140)

## 2019-04-16 PROCEDURE — 82948 REAGENT STRIP/BLOOD GLUCOSE: CPT

## 2019-04-16 PROCEDURE — 99232 SBSQ HOSP IP/OBS MODERATE 35: CPT | Performed by: PSYCHIATRY & NEUROLOGY

## 2019-04-16 RX ADMIN — DIVALPROEX SODIUM 1000 MG: 500 TABLET, EXTENDED RELEASE ORAL at 17:21

## 2019-04-16 RX ADMIN — PROPRANOLOL HYDROCHLORIDE 20 MG: 20 TABLET ORAL at 21:20

## 2019-04-16 RX ADMIN — TRAZODONE HYDROCHLORIDE 50 MG: 50 TABLET ORAL at 01:25

## 2019-04-16 RX ADMIN — LORAZEPAM 1 MG: 1 TABLET ORAL at 01:25

## 2019-04-16 RX ADMIN — CHLORPROMAZINE HYDROCHLORIDE 50 MG: 25 TABLET, SUGAR COATED ORAL at 13:46

## 2019-04-16 RX ADMIN — PALIPERIDONE 6 MG: 6 TABLET, EXTENDED RELEASE ORAL at 21:20

## 2019-04-16 RX ADMIN — VITAMIN E CAP 400 UNIT 400 UNITS: 400 CAP at 09:01

## 2019-04-16 RX ADMIN — DIVALPROEX SODIUM 1000 MG: 500 TABLET, EXTENDED RELEASE ORAL at 09:01

## 2019-04-16 RX ADMIN — LORATADINE 10 MG: 10 TABLET ORAL at 09:01

## 2019-04-16 RX ADMIN — Medication 1 TABLET: at 09:01

## 2019-04-16 RX ADMIN — METFORMIN HYDROCHLORIDE 1000 MG: 500 TABLET, EXTENDED RELEASE ORAL at 09:01

## 2019-04-16 RX ADMIN — TRAZODONE HYDROCHLORIDE 50 MG: 50 TABLET ORAL at 21:21

## 2019-04-16 RX ADMIN — LEVOTHYROXINE SODIUM 25 MCG: 25 TABLET ORAL at 06:04

## 2019-04-16 RX ADMIN — SITAGLIPTIN 25 MG: 25 TABLET, FILM COATED ORAL at 09:01

## 2019-04-16 RX ADMIN — PROPRANOLOL HYDROCHLORIDE 20 MG: 20 TABLET ORAL at 09:01

## 2019-04-16 RX ADMIN — RISPERIDONE 1 MG: 1 TABLET, ORALLY DISINTEGRATING ORAL at 01:25

## 2019-04-16 RX ADMIN — PALIPERIDONE 3 MG: 3 TABLET, EXTENDED RELEASE ORAL at 09:01

## 2019-04-17 LAB — GLUCOSE SERPL-MCNC: 128 MG/DL (ref 65–140)

## 2019-04-17 PROCEDURE — 82948 REAGENT STRIP/BLOOD GLUCOSE: CPT

## 2019-04-17 PROCEDURE — 99232 SBSQ HOSP IP/OBS MODERATE 35: CPT | Performed by: PSYCHIATRY & NEUROLOGY

## 2019-04-17 RX ADMIN — PROPRANOLOL HYDROCHLORIDE 20 MG: 20 TABLET ORAL at 08:09

## 2019-04-17 RX ADMIN — PALIPERIDONE 6 MG: 6 TABLET, EXTENDED RELEASE ORAL at 21:04

## 2019-04-17 RX ADMIN — PALIPERIDONE 3 MG: 3 TABLET, EXTENDED RELEASE ORAL at 08:08

## 2019-04-17 RX ADMIN — PROPRANOLOL HYDROCHLORIDE 20 MG: 20 TABLET ORAL at 21:05

## 2019-04-17 RX ADMIN — CHLORPROMAZINE HYDROCHLORIDE 50 MG: 25 TABLET, SUGAR COATED ORAL at 17:21

## 2019-04-17 RX ADMIN — METFORMIN HYDROCHLORIDE 1000 MG: 500 TABLET, EXTENDED RELEASE ORAL at 08:09

## 2019-04-17 RX ADMIN — LORATADINE 10 MG: 10 TABLET ORAL at 08:09

## 2019-04-17 RX ADMIN — DIVALPROEX SODIUM 1000 MG: 500 TABLET, EXTENDED RELEASE ORAL at 17:21

## 2019-04-17 RX ADMIN — SITAGLIPTIN 25 MG: 25 TABLET, FILM COATED ORAL at 08:09

## 2019-04-17 RX ADMIN — VITAMIN E CAP 400 UNIT 400 UNITS: 400 CAP at 08:09

## 2019-04-17 RX ADMIN — LEVOTHYROXINE SODIUM 25 MCG: 25 TABLET ORAL at 06:42

## 2019-04-17 RX ADMIN — LORAZEPAM 1 MG: 1 TABLET ORAL at 23:21

## 2019-04-17 RX ADMIN — DIVALPROEX SODIUM 1000 MG: 500 TABLET, EXTENDED RELEASE ORAL at 08:08

## 2019-04-17 RX ADMIN — ALUMINUM HYDROXIDE, MAGNESIUM HYDROXIDE, AND SIMETHICONE 30 ML: 200; 200; 20 SUSPENSION ORAL at 12:01

## 2019-04-17 RX ADMIN — Medication 1 TABLET: at 08:09

## 2019-04-17 RX ADMIN — TRAZODONE HYDROCHLORIDE 50 MG: 50 TABLET ORAL at 21:06

## 2019-04-18 LAB — GLUCOSE SERPL-MCNC: 119 MG/DL (ref 65–140)

## 2019-04-18 PROCEDURE — 99231 SBSQ HOSP IP/OBS SF/LOW 25: CPT | Performed by: PSYCHIATRY & NEUROLOGY

## 2019-04-18 PROCEDURE — 82948 REAGENT STRIP/BLOOD GLUCOSE: CPT

## 2019-04-18 RX ADMIN — PALIPERIDONE 6 MG: 6 TABLET, EXTENDED RELEASE ORAL at 21:19

## 2019-04-18 RX ADMIN — METFORMIN HYDROCHLORIDE 1000 MG: 500 TABLET, EXTENDED RELEASE ORAL at 09:04

## 2019-04-18 RX ADMIN — PROPRANOLOL HYDROCHLORIDE 20 MG: 20 TABLET ORAL at 21:19

## 2019-04-18 RX ADMIN — Medication 1 TABLET: at 09:04

## 2019-04-18 RX ADMIN — DIVALPROEX SODIUM 1000 MG: 500 TABLET, EXTENDED RELEASE ORAL at 17:04

## 2019-04-18 RX ADMIN — PALIPERIDONE 3 MG: 3 TABLET, EXTENDED RELEASE ORAL at 09:04

## 2019-04-18 RX ADMIN — DIVALPROEX SODIUM 1000 MG: 500 TABLET, EXTENDED RELEASE ORAL at 09:03

## 2019-04-18 RX ADMIN — VITAMIN E CAP 400 UNIT 400 UNITS: 400 CAP at 09:03

## 2019-04-18 RX ADMIN — LEVOTHYROXINE SODIUM 25 MCG: 25 TABLET ORAL at 06:26

## 2019-04-18 RX ADMIN — TRAZODONE HYDROCHLORIDE 50 MG: 50 TABLET ORAL at 21:20

## 2019-04-18 RX ADMIN — LORATADINE 10 MG: 10 TABLET ORAL at 09:04

## 2019-04-18 RX ADMIN — SITAGLIPTIN 25 MG: 25 TABLET, FILM COATED ORAL at 09:04

## 2019-04-19 VITALS
TEMPERATURE: 98.6 F | OXYGEN SATURATION: 98 % | BODY MASS INDEX: 31.4 KG/M2 | SYSTOLIC BLOOD PRESSURE: 120 MMHG | WEIGHT: 212 LBS | HEIGHT: 69 IN | DIASTOLIC BLOOD PRESSURE: 83 MMHG | HEART RATE: 87 BPM | RESPIRATION RATE: 16 BRPM

## 2019-04-19 LAB — GLUCOSE SERPL-MCNC: 101 MG/DL (ref 65–140)

## 2019-04-19 PROCEDURE — 82948 REAGENT STRIP/BLOOD GLUCOSE: CPT

## 2019-04-19 PROCEDURE — 99238 HOSP IP/OBS DSCHRG MGMT 30/<: CPT | Performed by: PSYCHIATRY & NEUROLOGY

## 2019-04-19 RX ORDER — PALIPERIDONE 3 MG/1
3 TABLET, EXTENDED RELEASE ORAL DAILY
Qty: 30 TABLET | Refills: 0 | Status: SHIPPED | OUTPATIENT
Start: 2019-04-19 | End: 2019-10-03

## 2019-04-19 RX ORDER — TRAZODONE HYDROCHLORIDE 50 MG/1
50 TABLET ORAL
Qty: 30 TABLET | Refills: 0 | Status: SHIPPED | OUTPATIENT
Start: 2019-04-19 | End: 2019-10-03

## 2019-04-19 RX ORDER — LORATADINE 10 MG/1
10 TABLET ORAL DAILY
Qty: 30 TABLET | Refills: 0 | Status: SHIPPED | OUTPATIENT
Start: 2019-04-19 | End: 2019-04-29 | Stop reason: SDUPTHER

## 2019-04-19 RX ORDER — LEVOTHYROXINE SODIUM 0.03 MG/1
25 TABLET ORAL
Qty: 30 TABLET | Refills: 0 | Status: SHIPPED | OUTPATIENT
Start: 2019-04-19 | End: 2019-04-29 | Stop reason: SDUPTHER

## 2019-04-19 RX ORDER — PALIPERIDONE 6 MG/1
6 TABLET, EXTENDED RELEASE ORAL
Qty: 30 TABLET | Refills: 0 | Status: SHIPPED | OUTPATIENT
Start: 2019-04-19 | End: 2019-10-03

## 2019-04-19 RX ORDER — CHLORPROMAZINE HYDROCHLORIDE 50 MG/1
50 TABLET, FILM COATED ORAL EVERY 6 HOURS PRN
Qty: 60 TABLET | Refills: 0 | Status: SHIPPED | OUTPATIENT
Start: 2019-04-19 | End: 2019-10-03

## 2019-04-19 RX ORDER — CALCIUM CARBONATE 500(1250)
1 TABLET ORAL
Qty: 30 TABLET | Refills: 0 | Status: SHIPPED | OUTPATIENT
Start: 2019-04-19 | End: 2019-04-29 | Stop reason: SDUPTHER

## 2019-04-19 RX ORDER — ACETAMINOPHEN 325 MG/1
650 TABLET ORAL EVERY 6 HOURS PRN
Qty: 30 TABLET | Refills: 0 | Status: SHIPPED | OUTPATIENT
Start: 2019-04-19 | End: 2019-08-30 | Stop reason: SDUPTHER

## 2019-04-19 RX ORDER — LORAZEPAM 1 MG/1
0.5 TABLET ORAL EVERY 8 HOURS PRN
Qty: 30 TABLET | Refills: 0 | Status: ON HOLD | OUTPATIENT
Start: 2019-04-19 | End: 2019-11-08

## 2019-04-19 RX ORDER — VITAMIN E 268 MG
400 CAPSULE ORAL DAILY
Qty: 30 CAPSULE | Refills: 0 | Status: SHIPPED | OUTPATIENT
Start: 2019-04-19 | End: 2019-08-05 | Stop reason: SDUPTHER

## 2019-04-19 RX ORDER — PROPRANOLOL HYDROCHLORIDE 20 MG/1
20 TABLET ORAL EVERY 12 HOURS SCHEDULED
Qty: 60 TABLET | Refills: 0 | Status: SHIPPED | OUTPATIENT
Start: 2019-04-19 | End: 2019-11-08 | Stop reason: HOSPADM

## 2019-04-19 RX ORDER — METFORMIN HYDROCHLORIDE EXTENDED-RELEASE TABLETS 1000 MG/1
1000 TABLET, FILM COATED, EXTENDED RELEASE ORAL
Qty: 30 TABLET | Refills: 0 | Status: SHIPPED | OUTPATIENT
Start: 2019-04-19 | End: 2019-04-25 | Stop reason: SDUPTHER

## 2019-04-19 RX ORDER — DIPHENHYDRAMINE HCL 25 MG
25 TABLET ORAL 2 TIMES DAILY PRN
Qty: 30 TABLET | Refills: 0 | Status: SHIPPED | OUTPATIENT
Start: 2019-04-19 | End: 2019-11-08 | Stop reason: HOSPADM

## 2019-04-19 RX ORDER — DIVALPROEX SODIUM 500 MG/1
1000 TABLET, EXTENDED RELEASE ORAL 2 TIMES DAILY
Qty: 60 TABLET | Refills: 0 | Status: SHIPPED | OUTPATIENT
Start: 2019-04-19 | End: 2019-10-03

## 2019-04-19 RX ADMIN — PROPRANOLOL HYDROCHLORIDE 20 MG: 20 TABLET ORAL at 09:00

## 2019-04-19 RX ADMIN — LORATADINE 10 MG: 10 TABLET ORAL at 09:43

## 2019-04-19 RX ADMIN — ALUMINUM HYDROXIDE, MAGNESIUM HYDROXIDE, AND SIMETHICONE 30 ML: 200; 200; 20 SUSPENSION ORAL at 10:21

## 2019-04-19 RX ADMIN — SITAGLIPTIN 25 MG: 25 TABLET, FILM COATED ORAL at 09:00

## 2019-04-19 RX ADMIN — DIVALPROEX SODIUM 1000 MG: 500 TABLET, EXTENDED RELEASE ORAL at 09:00

## 2019-04-19 RX ADMIN — LEVOTHYROXINE SODIUM 25 MCG: 25 TABLET ORAL at 06:29

## 2019-04-19 RX ADMIN — METFORMIN HYDROCHLORIDE 1000 MG: 500 TABLET, EXTENDED RELEASE ORAL at 08:59

## 2019-04-19 RX ADMIN — Medication 1 TABLET: at 08:59

## 2019-04-19 RX ADMIN — VITAMIN E CAP 400 UNIT 400 UNITS: 400 CAP at 09:00

## 2019-04-19 RX ADMIN — PALIPERIDONE 3 MG: 3 TABLET, EXTENDED RELEASE ORAL at 09:00

## 2019-04-23 ENCOUNTER — OFFICE VISIT (OUTPATIENT)
Dept: FAMILY MEDICINE CLINIC | Facility: CLINIC | Age: 39
End: 2019-04-23

## 2019-04-23 VITALS
RESPIRATION RATE: 16 BRPM | HEART RATE: 76 BPM | WEIGHT: 225.6 LBS | DIASTOLIC BLOOD PRESSURE: 80 MMHG | SYSTOLIC BLOOD PRESSURE: 122 MMHG | TEMPERATURE: 98 F | BODY MASS INDEX: 33.41 KG/M2 | HEIGHT: 69 IN

## 2019-04-23 DIAGNOSIS — E55.9 VITAMIN D DEFICIENCY: ICD-10-CM

## 2019-04-23 DIAGNOSIS — K59.00 CONSTIPATION, UNSPECIFIED CONSTIPATION TYPE: ICD-10-CM

## 2019-04-23 DIAGNOSIS — G44.209 TENSION HEADACHE: ICD-10-CM

## 2019-04-23 DIAGNOSIS — F25.0 SCHIZOAFFECTIVE DISORDER, BIPOLAR TYPE (HCC): Primary | Chronic | ICD-10-CM

## 2019-04-23 PROBLEM — R42 DIZZINESS: Status: RESOLVED | Noted: 2018-12-24 | Resolved: 2019-04-23

## 2019-04-23 PROCEDURE — 99213 OFFICE O/P EST LOW 20 MIN: CPT | Performed by: PHYSICIAN ASSISTANT

## 2019-04-23 RX ORDER — MELATONIN
1000 DAILY
Qty: 30 TABLET | Refills: 5 | Status: SHIPPED | OUTPATIENT
Start: 2019-04-23 | End: 2019-09-29 | Stop reason: SDUPTHER

## 2019-04-23 RX ORDER — DOCUSATE SODIUM 100 MG/1
100 CAPSULE, LIQUID FILLED ORAL DAILY PRN
Qty: 30 CAPSULE | Refills: 5 | Status: SHIPPED | OUTPATIENT
Start: 2019-04-23 | End: 2019-04-25

## 2019-04-25 ENCOUNTER — OFFICE VISIT (OUTPATIENT)
Dept: FAMILY MEDICINE CLINIC | Facility: CLINIC | Age: 39
End: 2019-04-25

## 2019-04-25 VITALS
TEMPERATURE: 96.6 F | SYSTOLIC BLOOD PRESSURE: 130 MMHG | DIASTOLIC BLOOD PRESSURE: 84 MMHG | HEART RATE: 84 BPM | BODY MASS INDEX: 33.03 KG/M2 | WEIGHT: 223 LBS | RESPIRATION RATE: 16 BRPM | HEIGHT: 69 IN

## 2019-04-25 DIAGNOSIS — E11.65 TYPE 2 DIABETES MELLITUS WITH HYPERGLYCEMIA, UNSPECIFIED WHETHER LONG TERM INSULIN USE (HCC): ICD-10-CM

## 2019-04-25 DIAGNOSIS — Z87.19 HISTORY OF CONSTIPATION: Primary | ICD-10-CM

## 2019-04-25 DIAGNOSIS — F25.0 SCHIZOAFFECTIVE DISORDER, BIPOLAR TYPE (HCC): Chronic | ICD-10-CM

## 2019-04-25 DIAGNOSIS — K59.00 CONSTIPATION, UNSPECIFIED CONSTIPATION TYPE: ICD-10-CM

## 2019-04-25 PROCEDURE — 99212 OFFICE O/P EST SF 10 MIN: CPT | Performed by: PHYSICIAN ASSISTANT

## 2019-04-25 RX ORDER — DOCUSATE SODIUM 100 MG/1
100 CAPSULE, LIQUID FILLED ORAL DAILY
Qty: 30 CAPSULE | Refills: 5 | Status: SHIPPED | OUTPATIENT
Start: 2019-04-25 | End: 2019-09-07 | Stop reason: SDUPTHER

## 2019-04-25 RX ORDER — METFORMIN HYDROCHLORIDE EXTENDED-RELEASE TABLETS 1000 MG/1
1000 TABLET, FILM COATED, EXTENDED RELEASE ORAL
Qty: 30 TABLET | Refills: 5 | Status: ON HOLD | OUTPATIENT
Start: 2019-04-25 | End: 2019-11-08 | Stop reason: SDUPTHER

## 2019-04-25 RX ORDER — METFORMIN HYDROCHLORIDE EXTENDED-RELEASE TABLETS 1000 MG/1
1000 TABLET, FILM COATED, EXTENDED RELEASE ORAL
Qty: 30 TABLET | Refills: 0 | Status: SHIPPED | OUTPATIENT
Start: 2019-04-25 | End: 2019-04-25 | Stop reason: SDUPTHER

## 2019-04-29 DIAGNOSIS — E78.5 HYPERLIPIDEMIA, UNSPECIFIED HYPERLIPIDEMIA TYPE: ICD-10-CM

## 2019-04-29 DIAGNOSIS — E03.9 HYPOTHYROIDISM, UNSPECIFIED TYPE: ICD-10-CM

## 2019-04-29 DIAGNOSIS — F25.0 SCHIZOAFFECTIVE DISORDER, BIPOLAR TYPE (HCC): Chronic | ICD-10-CM

## 2019-04-29 RX ORDER — LEVOTHYROXINE SODIUM 0.03 MG/1
25 TABLET ORAL
Qty: 30 TABLET | Refills: 5 | Status: SHIPPED | OUTPATIENT
Start: 2019-04-29 | End: 2019-10-04 | Stop reason: SDUPTHER

## 2019-04-29 RX ORDER — LORATADINE 10 MG/1
10 TABLET ORAL DAILY
Qty: 30 TABLET | Refills: 5 | Status: SHIPPED | OUTPATIENT
Start: 2019-04-29 | End: 2019-10-04 | Stop reason: SDUPTHER

## 2019-04-29 RX ORDER — LORATADINE 10 MG/1
10 TABLET ORAL DAILY
Qty: 30 TABLET | Refills: 5 | Status: SHIPPED | OUTPATIENT
Start: 2019-04-29 | End: 2019-04-29 | Stop reason: SDUPTHER

## 2019-04-29 RX ORDER — LEVOTHYROXINE SODIUM 0.03 MG/1
25 TABLET ORAL
Qty: 30 TABLET | Refills: 5 | Status: SHIPPED | OUTPATIENT
Start: 2019-04-29 | End: 2019-04-29 | Stop reason: SDUPTHER

## 2019-04-29 RX ORDER — CALCIUM CARBONATE 500(1250)
1 TABLET ORAL
Qty: 30 TABLET | Refills: 5 | Status: SHIPPED | OUTPATIENT
Start: 2019-04-29 | End: 2019-10-04 | Stop reason: SDUPTHER

## 2019-04-29 RX ORDER — CALCIUM CARBONATE 500(1250)
1 TABLET ORAL
Qty: 30 TABLET | Refills: 5 | Status: SHIPPED | OUTPATIENT
Start: 2019-04-29 | End: 2019-04-29 | Stop reason: SDUPTHER

## 2019-04-30 ENCOUNTER — TELEPHONE (OUTPATIENT)
Dept: FAMILY MEDICINE CLINIC | Facility: CLINIC | Age: 39
End: 2019-04-30

## 2019-04-30 ENCOUNTER — RX ONLY (RX ONLY)
Age: 39
End: 2019-04-30

## 2019-04-30 ENCOUNTER — DOCTOR'S OFFICE (OUTPATIENT)
Dept: URBAN - METROPOLITAN AREA CLINIC 136 | Facility: CLINIC | Age: 39
Setting detail: OPHTHALMOLOGY
End: 2019-04-30
Payer: COMMERCIAL

## 2019-04-30 DIAGNOSIS — H27.8: ICD-10-CM

## 2019-04-30 DIAGNOSIS — H27.9: ICD-10-CM

## 2019-04-30 DIAGNOSIS — E11.9: ICD-10-CM

## 2019-04-30 PROCEDURE — 92004 COMPRE OPH EXAM NEW PT 1/>: CPT | Performed by: OPHTHALMOLOGY

## 2019-04-30 ASSESSMENT — REFRACTION_AUTOREFRACTION
OD_SPHERE: +1.50
OS_SPHERE: +1.00
OS_CYLINDER: -2.00
OD_AXIS: 170
OD_CYLINDER: -2.50
OS_AXIS: 174

## 2019-04-30 ASSESSMENT — REFRACTION_MANIFEST
OS_VA1: 20/
OD_VA1: 20/
OS_VA3: 20/
OD_VA2: 20/
OS_VA1: 20/
OD_VA3: 20/
OU_VA: 20/
OU_VA: 20/
OD_VA1: 20/
OD_VA3: 20/
OS_VA2: 20/
OD_VA2: 20/
OS_VA3: 20/
OS_VA2: 20/

## 2019-04-30 ASSESSMENT — REFRACTION_CURRENTRX
OD_OVR_VA: 20/
OD_OVR_VA: 20/
OS_OVR_VA: 20/
OS_OVR_VA: 20/
OD_OVR_VA: 20/
OS_OVR_VA: 20/

## 2019-04-30 ASSESSMENT — CONFRONTATIONAL VISUAL FIELD TEST (CVF)
OS_FINDINGS: FULL
OD_FINDINGS: FULL

## 2019-04-30 ASSESSMENT — VISUAL ACUITY
OS_BCVA: 20/50
OD_BCVA: 20/40-1

## 2019-04-30 ASSESSMENT — SPHEQUIV_DERIVED
OD_SPHEQUIV: 0.25
OS_SPHEQUIV: 0

## 2019-05-08 ENCOUNTER — TELEPHONE (OUTPATIENT)
Dept: FAMILY MEDICINE CLINIC | Facility: CLINIC | Age: 39
End: 2019-05-08

## 2019-05-16 ENCOUNTER — TELEPHONE (OUTPATIENT)
Dept: FAMILY MEDICINE CLINIC | Facility: CLINIC | Age: 39
End: 2019-05-16

## 2019-06-07 ENCOUNTER — TELEPHONE (OUTPATIENT)
Dept: FAMILY MEDICINE CLINIC | Facility: CLINIC | Age: 39
End: 2019-06-07

## 2019-06-07 DIAGNOSIS — K59.00 CONSTIPATION, UNSPECIFIED CONSTIPATION TYPE: ICD-10-CM

## 2019-06-11 ENCOUNTER — TELEPHONE (OUTPATIENT)
Dept: FAMILY MEDICINE CLINIC | Facility: CLINIC | Age: 39
End: 2019-06-11

## 2019-06-17 ENCOUNTER — APPOINTMENT (OUTPATIENT)
Dept: LAB | Age: 39
End: 2019-06-17
Payer: MEDICARE

## 2019-06-17 DIAGNOSIS — E11.65 TYPE 2 DIABETES MELLITUS WITH HYPERGLYCEMIA, UNSPECIFIED WHETHER LONG TERM INSULIN USE (HCC): ICD-10-CM

## 2019-06-17 LAB
ALBUMIN SERPL BCP-MCNC: 3.4 G/DL (ref 3.5–5)
ALP SERPL-CCNC: 56 U/L (ref 46–116)
ALT SERPL W P-5'-P-CCNC: 29 U/L (ref 12–78)
ANION GAP SERPL CALCULATED.3IONS-SCNC: 3 MMOL/L (ref 4–13)
AST SERPL W P-5'-P-CCNC: 15 U/L (ref 5–45)
BILIRUB SERPL-MCNC: 0.31 MG/DL (ref 0.2–1)
BUN SERPL-MCNC: 13 MG/DL (ref 5–25)
CALCIUM SERPL-MCNC: 9.8 MG/DL (ref 8.3–10.1)
CHLORIDE SERPL-SCNC: 99 MMOL/L (ref 100–108)
CO2 SERPL-SCNC: 36 MMOL/L (ref 21–32)
CREAT SERPL-MCNC: 1.28 MG/DL (ref 0.6–1.3)
EST. AVERAGE GLUCOSE BLD GHB EST-MCNC: 180 MG/DL
GFR SERPL CREATININE-BSD FRML MDRD: 71 ML/MIN/1.73SQ M
GLUCOSE P FAST SERPL-MCNC: 158 MG/DL (ref 65–99)
HBA1C MFR BLD: 7.9 % (ref 4.2–6.3)
POTASSIUM SERPL-SCNC: 4.6 MMOL/L (ref 3.5–5.3)
PROT SERPL-MCNC: 7.1 G/DL (ref 6.4–8.2)
SODIUM SERPL-SCNC: 138 MMOL/L (ref 136–145)

## 2019-06-17 PROCEDURE — 83036 HEMOGLOBIN GLYCOSYLATED A1C: CPT

## 2019-06-17 PROCEDURE — 80053 COMPREHEN METABOLIC PANEL: CPT

## 2019-06-17 PROCEDURE — 36415 COLL VENOUS BLD VENIPUNCTURE: CPT

## 2019-06-21 ENCOUNTER — TELEPHONE (OUTPATIENT)
Dept: FAMILY MEDICINE CLINIC | Facility: CLINIC | Age: 39
End: 2019-06-21

## 2019-06-21 NOTE — TELEPHONE ENCOUNTER
Received fax ed forms from [employer] to be signed for pt  Forms in your triage folder   Thank you

## 2019-06-24 ENCOUNTER — TELEPHONE (OUTPATIENT)
Dept: FAMILY MEDICINE CLINIC | Facility: CLINIC | Age: 39
End: 2019-06-24

## 2019-06-24 NOTE — TELEPHONE ENCOUNTER
[employer]   Requested a copy of lab results from 6/17/2019  I faxed them over to the company

## 2019-06-26 ENCOUNTER — TELEPHONE (OUTPATIENT)
Dept: FAMILY MEDICINE CLINIC | Facility: CLINIC | Age: 39
End: 2019-06-26

## 2019-07-02 ENCOUNTER — OFFICE VISIT (OUTPATIENT)
Dept: FAMILY MEDICINE CLINIC | Facility: CLINIC | Age: 39
End: 2019-07-02

## 2019-07-02 VITALS
HEART RATE: 76 BPM | WEIGHT: 232.6 LBS | BODY MASS INDEX: 34.35 KG/M2 | SYSTOLIC BLOOD PRESSURE: 118 MMHG | RESPIRATION RATE: 16 BRPM | DIASTOLIC BLOOD PRESSURE: 82 MMHG | TEMPERATURE: 96.7 F

## 2019-07-02 DIAGNOSIS — E78.49 OTHER HYPERLIPIDEMIA: ICD-10-CM

## 2019-07-02 DIAGNOSIS — E11.65 TYPE 2 DIABETES MELLITUS WITH HYPERGLYCEMIA, UNSPECIFIED WHETHER LONG TERM INSULIN USE (HCC): ICD-10-CM

## 2019-07-02 DIAGNOSIS — E11.65 TYPE 2 DIABETES MELLITUS WITH HYPERGLYCEMIA, WITHOUT LONG-TERM CURRENT USE OF INSULIN (HCC): Primary | ICD-10-CM

## 2019-07-02 PROCEDURE — 99213 OFFICE O/P EST LOW 20 MIN: CPT | Performed by: FAMILY MEDICINE

## 2019-07-02 RX ORDER — LANCETS 28 GAUGE
EACH MISCELLANEOUS
Qty: 1 EACH | Refills: 3 | Status: SHIPPED | OUTPATIENT
Start: 2019-07-02 | End: 2019-11-14 | Stop reason: SDUPTHER

## 2019-07-02 RX ORDER — ATORVASTATIN CALCIUM 40 MG/1
40 TABLET, FILM COATED ORAL
Qty: 90 TABLET | Refills: 1 | Status: ON HOLD | OUTPATIENT
Start: 2019-07-02 | End: 2019-11-06 | Stop reason: SDUPTHER

## 2019-07-02 RX ORDER — BLOOD-GLUCOSE METER
1 KIT MISCELLANEOUS 2 TIMES WEEKLY
Qty: 1 EACH | Refills: 1 | Status: SHIPPED | OUTPATIENT
Start: 2019-07-04

## 2019-07-02 NOTE — ASSESSMENT & PLAN NOTE
Lab Results   Component Value Date    HGBA1C 7 9 (H) 06/17/2019       No results for input(s): POCGLU in the last 72 hours  Patient currently on metformin 1000 mg daily, Januvia 25 mg daily  Patient reports that he has not been following diet, likes to eat junk food, and soda     Will increase detemir to 50 mg daily, advised lifestyle modification with diet and exercise  Patient follow-up in 3 months

## 2019-07-02 NOTE — PROGRESS NOTES
Assessment/Plan     Type 2 diabetes mellitus with hyperglycemia (HCC)  Lab Results   Component Value Date    HGBA1C 7 9 (H) 06/17/2019       No results for input(s): POCGLU in the last 72 hours  Patient currently on metformin 1000 mg daily, Januvia 25 mg daily  Patient reports that he has not been following diet, likes to eat junk food, and soda  Will increase detemir to 50 mg daily, advised lifestyle modification with diet and exercise  Patient follow-up in 3 months      Other hyperlipidemia  Lipid panel reviewed, ASCVD risk 50%  Will start atorvastatin 40 mg HS  Side effects reviewed  Advised lifestyle modification with diet and exercise    Diagnoses and all orders for this visit:    Type 2 diabetes mellitus with hyperglycemia, without long-term current use of insulin (HCC)  -     Lancets (FREESTYLE) lancets; Use as instructed  -     glucose monitoring kit (FREESTYLE) monitoring kit; 1 each by Does not apply route 2 (two) times a week    Type 2 diabetes mellitus with hyperglycemia, unspecified whether long term insulin use (HCC)  -     sitaGLIPtin (JANUVIA) 25 mg tablet; Take 2 tablets (50 mg total) by mouth daily    Other hyperlipidemia  -     atorvastatin (LIPITOR) 40 mg tablet; Take 1 tablet (40 mg total) by mouth daily at bedtime         Subjective     Chief Complaint   Patient presents with    Follow-up     ref to ENT       52-year-old male presented to office with caregiver for follow-up visit  Patient wants to review labs which were recently done, also he was having left jaw pain 3 weeks ago, but it has resolved now  Patient also denies any jaw pain with clenching of teeth  The following portions of the patient's history were reviewed and updated as appropriate: allergies, current medications, past family history, past medical history, past social history, past surgical history and problem list     Review of Systems   Constitutional: Negative for activity change, chills and fever     HENT: Negative for congestion  Respiratory: Negative for cough, shortness of breath and wheezing  Cardiovascular: Negative for chest pain  Gastrointestinal: Negative for diarrhea, nausea and vomiting  Genitourinary: Negative for difficulty urinating  Neurological: Negative for headaches  Objective     Vitals:Blood pressure 118/82, pulse 76, temperature (!) 96 7 °F (35 9 °C), temperature source Tympanic, resp  rate 16, weight 106 kg (232 lb 9 6 oz)  Physical Exam:  Physical Exam   Constitutional: He is oriented to person, place, and time  He appears well-developed and well-nourished  HENT:   Head: Normocephalic and atraumatic  Mouth/Throat: Oropharynx is clear and moist    Eyes: Conjunctivae and EOM are normal    Neck: Normal range of motion  Neck supple  Cardiovascular: Normal rate, regular rhythm and normal heart sounds  Exam reveals no friction rub  No murmur heard  Pulmonary/Chest: Effort normal and breath sounds normal  No respiratory distress  He has no wheezes  He has no rales  Abdominal: Soft  Bowel sounds are normal  He exhibits no distension  There is no tenderness  Musculoskeletal: Normal range of motion  Neurological: He is alert and oriented to person, place, and time  Skin: Skin is warm and dry

## 2019-07-02 NOTE — ASSESSMENT & PLAN NOTE
Lipid panel reviewed, ASCVD risk 50%  Will start atorvastatin 40 mg HS  Side effects reviewed    Advised lifestyle modification with diet and exercise

## 2019-07-03 ENCOUNTER — TELEPHONE (OUTPATIENT)
Dept: FAMILY MEDICINE CLINIC | Facility: CLINIC | Age: 39
End: 2019-07-03

## 2019-07-03 NOTE — TELEPHONE ENCOUNTER
Josette Muniz patients care taker stopped by today and dropped off a medical exam casenote that was filled out by Michelle Martinez but needs additional information  1  When is next lipid panel due ? 2  Does patient still need referral to ENT for TMJ per caretaker patient is no longer in pain  Form has been placed in Dr Bernal's folder  Please call Josette Muniz @ [phone] when paperwork has been completed  Thanks   0

## 2019-08-05 DIAGNOSIS — H25.11 NUCLEAR SCLEROTIC CATARACT OF RIGHT EYE: ICD-10-CM

## 2019-08-05 RX ORDER — VITAMIN E 268 MG
400 CAPSULE ORAL DAILY
Qty: 31 CAPSULE | Refills: 5 | Status: SHIPPED | OUTPATIENT
Start: 2019-08-05 | End: 2019-08-30 | Stop reason: SDUPTHER

## 2019-08-29 ENCOUNTER — TRANSCRIBE ORDERS (OUTPATIENT)
Dept: ADMINISTRATIVE | Age: 39
End: 2019-08-29

## 2019-08-29 ENCOUNTER — APPOINTMENT (OUTPATIENT)
Dept: LAB | Age: 39
End: 2019-08-29
Payer: MEDICARE

## 2019-08-29 DIAGNOSIS — Z79.899 ENCOUNTER FOR LONG-TERM (CURRENT) USE OF OTHER MEDICATIONS: ICD-10-CM

## 2019-08-29 DIAGNOSIS — Z79.899 ENCOUNTER FOR LONG-TERM (CURRENT) USE OF OTHER MEDICATIONS: Primary | ICD-10-CM

## 2019-08-29 LAB
ALBUMIN SERPL BCP-MCNC: 3.4 G/DL (ref 3.5–5)
ALP SERPL-CCNC: 69 U/L (ref 46–116)
ALT SERPL W P-5'-P-CCNC: 25 U/L (ref 12–78)
ANION GAP SERPL CALCULATED.3IONS-SCNC: 8 MMOL/L (ref 4–13)
AST SERPL W P-5'-P-CCNC: 9 U/L (ref 5–45)
BASOPHILS # BLD AUTO: 0.04 THOUSANDS/ΜL (ref 0–0.1)
BASOPHILS NFR BLD AUTO: 1 % (ref 0–1)
BILIRUB SERPL-MCNC: 0.31 MG/DL (ref 0.2–1)
BUN SERPL-MCNC: 11 MG/DL (ref 5–25)
CALCIUM SERPL-MCNC: 8.7 MG/DL (ref 8.3–10.1)
CHLORIDE SERPL-SCNC: 102 MMOL/L (ref 100–108)
CHOLEST SERPL-MCNC: 108 MG/DL (ref 50–200)
CO2 SERPL-SCNC: 30 MMOL/L (ref 21–32)
CREAT SERPL-MCNC: 1.32 MG/DL (ref 0.6–1.3)
EOSINOPHIL # BLD AUTO: 0.12 THOUSAND/ΜL (ref 0–0.61)
EOSINOPHIL NFR BLD AUTO: 2 % (ref 0–6)
ERYTHROCYTE [DISTWIDTH] IN BLOOD BY AUTOMATED COUNT: 11.1 % (ref 11.6–15.1)
EST. AVERAGE GLUCOSE BLD GHB EST-MCNC: 183 MG/DL
GFR SERPL CREATININE-BSD FRML MDRD: 68 ML/MIN/1.73SQ M
GLUCOSE P FAST SERPL-MCNC: 243 MG/DL (ref 65–99)
HBA1C MFR BLD: 8 % (ref 4.2–6.3)
HCT VFR BLD AUTO: 45.9 % (ref 36.5–49.3)
HDLC SERPL-MCNC: 28 MG/DL (ref 40–60)
HGB BLD-MCNC: 15.4 G/DL (ref 12–17)
IMM GRANULOCYTES # BLD AUTO: 0.08 THOUSAND/UL (ref 0–0.2)
IMM GRANULOCYTES NFR BLD AUTO: 1 % (ref 0–2)
LDLC SERPL CALC-MCNC: 23 MG/DL (ref 0–100)
LYMPHOCYTES # BLD AUTO: 3.3 THOUSANDS/ΜL (ref 0.6–4.47)
LYMPHOCYTES NFR BLD AUTO: 44 % (ref 14–44)
MCH RBC QN AUTO: 30.7 PG (ref 26.8–34.3)
MCHC RBC AUTO-ENTMCNC: 33.6 G/DL (ref 31.4–37.4)
MCV RBC AUTO: 92 FL (ref 82–98)
MONOCYTES # BLD AUTO: 0.68 THOUSAND/ΜL (ref 0.17–1.22)
MONOCYTES NFR BLD AUTO: 9 % (ref 4–12)
NEUTROPHILS # BLD AUTO: 3.29 THOUSANDS/ΜL (ref 1.85–7.62)
NEUTS SEG NFR BLD AUTO: 43 % (ref 43–75)
NONHDLC SERPL-MCNC: 80 MG/DL
NRBC BLD AUTO-RTO: 0 /100 WBCS
PLATELET # BLD AUTO: 140 THOUSANDS/UL (ref 149–390)
PMV BLD AUTO: 10.3 FL (ref 8.9–12.7)
POTASSIUM SERPL-SCNC: 4.1 MMOL/L (ref 3.5–5.3)
PROLACTIN SERPL-MCNC: 28.7 NG/ML (ref 2.5–17.4)
PROT SERPL-MCNC: 7 G/DL (ref 6.4–8.2)
RBC # BLD AUTO: 5.01 MILLION/UL (ref 3.88–5.62)
SODIUM SERPL-SCNC: 140 MMOL/L (ref 136–145)
TRIGL SERPL-MCNC: 286 MG/DL
TSH SERPL DL<=0.05 MIU/L-ACNC: 2.41 UIU/ML (ref 0.36–3.74)
WBC # BLD AUTO: 7.51 THOUSAND/UL (ref 4.31–10.16)

## 2019-08-29 PROCEDURE — 84443 ASSAY THYROID STIM HORMONE: CPT

## 2019-08-29 PROCEDURE — 83036 HEMOGLOBIN GLYCOSYLATED A1C: CPT

## 2019-08-29 PROCEDURE — 84146 ASSAY OF PROLACTIN: CPT

## 2019-08-29 PROCEDURE — 80053 COMPREHEN METABOLIC PANEL: CPT

## 2019-08-29 PROCEDURE — 85025 COMPLETE CBC W/AUTO DIFF WBC: CPT

## 2019-08-29 PROCEDURE — 36415 COLL VENOUS BLD VENIPUNCTURE: CPT

## 2019-08-29 PROCEDURE — 80061 LIPID PANEL: CPT

## 2019-08-29 PROCEDURE — 80165 DIPROPYLACETIC ACID FREE: CPT

## 2019-08-30 DIAGNOSIS — H25.11 NUCLEAR SCLEROTIC CATARACT OF RIGHT EYE: ICD-10-CM

## 2019-08-30 DIAGNOSIS — G44.209 TENSION HEADACHE: ICD-10-CM

## 2019-08-30 RX ORDER — ACETAMINOPHEN 325 MG/1
650 TABLET ORAL EVERY 6 HOURS PRN
Qty: 30 TABLET | Refills: 0 | Status: SHIPPED | OUTPATIENT
Start: 2019-08-30 | End: 2019-08-30 | Stop reason: SDUPTHER

## 2019-08-30 RX ORDER — ACETAMINOPHEN 325 MG/1
650 TABLET ORAL EVERY 6 HOURS PRN
Qty: 30 TABLET | Refills: 0 | Status: SHIPPED | OUTPATIENT
Start: 2019-08-30 | End: 2019-09-17 | Stop reason: SDUPTHER

## 2019-08-30 RX ORDER — VITAMIN E 268 MG
400 CAPSULE ORAL DAILY
Qty: 31 CAPSULE | Refills: 5 | Status: SHIPPED | OUTPATIENT
Start: 2019-08-30 | End: 2019-09-03 | Stop reason: SDUPTHER

## 2019-08-30 NOTE — TELEPHONE ENCOUNTER
Sánchez Graves from [facility] requesting refill on patient's Tylenol 325 mg  Please review  Thank you, Kallie Medina

## 2019-09-03 DIAGNOSIS — H25.11 NUCLEAR SCLEROTIC CATARACT OF RIGHT EYE: ICD-10-CM

## 2019-09-03 LAB — VALPROATE FREE SERPL-MCNC: 21.7 UG/ML (ref 6–22)

## 2019-09-03 RX ORDER — VITAMIN E 268 MG
400 CAPSULE ORAL DAILY
Qty: 31 CAPSULE | Refills: 5 | Status: ON HOLD | OUTPATIENT
Start: 2019-09-03 | End: 2019-11-08 | Stop reason: SDUPTHER

## 2019-09-06 DIAGNOSIS — K59.00 CONSTIPATION, UNSPECIFIED CONSTIPATION TYPE: ICD-10-CM

## 2019-09-07 RX ORDER — DOCUSATE SODIUM 100 MG/1
CAPSULE, LIQUID FILLED ORAL
Qty: 30 CAPSULE | Refills: 0 | Status: SHIPPED | OUTPATIENT
Start: 2019-09-07 | End: 2019-10-04 | Stop reason: SDUPTHER

## 2019-09-12 ENCOUNTER — TELEPHONE (OUTPATIENT)
Dept: FAMILY MEDICINE CLINIC | Facility: CLINIC | Age: 39
End: 2019-09-12

## 2019-09-13 ENCOUNTER — OFFICE VISIT (OUTPATIENT)
Dept: FAMILY MEDICINE CLINIC | Facility: CLINIC | Age: 39
End: 2019-09-13

## 2019-09-13 VITALS
RESPIRATION RATE: 16 BRPM | TEMPERATURE: 96.5 F | BODY MASS INDEX: 34.04 KG/M2 | WEIGHT: 229.8 LBS | SYSTOLIC BLOOD PRESSURE: 110 MMHG | DIASTOLIC BLOOD PRESSURE: 80 MMHG | HEIGHT: 69 IN | HEART RATE: 70 BPM

## 2019-09-13 DIAGNOSIS — R35.1 NOCTURIA: Primary | ICD-10-CM

## 2019-09-13 DIAGNOSIS — K59.00 CONSTIPATION, UNSPECIFIED CONSTIPATION TYPE: ICD-10-CM

## 2019-09-13 PROCEDURE — 99213 OFFICE O/P EST LOW 20 MIN: CPT | Performed by: FAMILY MEDICINE

## 2019-09-13 NOTE — PROGRESS NOTES
Assessment/Plan:    Nocturia  Nocturia Likely secondary to increased fluid intake  No signs symptoms of UTI or BPH  Patient does admit drinking coffee and a lot of soda which have caffeine which could cause urinary frequency  He is diabetic and nocturia can be worrisome in the setting of increased blood glucose however caretaker says blood sugars have been well controlled so unlikely uncontrolled diabetes is cause of nocturia    -Fluid diary to see how much he is drinking  -Appropriately decreasing fluid intake  --Decreasing coffee and soda intake before water   -Not drinking a few hours before bedtime and also           Problem List Items Addressed This Visit        Other    Nocturia - Primary     Nocturia Likely secondary to increased fluid intake  No signs symptoms of UTI or BPH  Patient does admit drinking coffee and a lot of soda which have caffeine which could cause urinary frequency  He is diabetic and nocturia can be worrisome in the setting of increased blood glucose however caretaker says blood sugars have been well controlled so unlikely uncontrolled diabetes is cause of nocturia    -Fluid diary to see how much he is drinking  -Appropriately decreasing fluid intake  --Decreasing coffee and soda intake before water   -Not drinking a few hours before bedtime and also             Other Visit Diagnoses     Constipation, unspecified constipation type        Relevant Medications    bismuth subsalicylate (PEPTO BISMOL) 524 mg/30 mL oral suspension            Subjective:      Patient ID: Marco A Loyola is a 41 y o  male  Galhebert Vences is a 41-year-old male with a past medical history significant for schizoaffective disorder and diabetes presents to clinic with his caretaker care for nocturia  He says he wakes up frequently in the night to go to the bathroom  He says this is not new has been going on for a while    His caretaker does admit that he has been drinking a lot more water lately and thinks that this is just from increased fluid intake  He also drinks a lot of soda late at night right before bed  Caretaker says if he limits his fluid intake he does not wake up at night to go to the bathroom  He denies any dysuria or urgency  No signs of BPH such as hesitation or weak stream       The following portions of the patient's history were reviewed and updated as appropriate: allergies, current medications, past family history, past medical history, past social history, past surgical history and problem list     Review of Systems   Eyes: Negative for visual disturbance  Gastrointestinal: Negative for abdominal pain  Genitourinary: Positive for frequency  Negative for difficulty urinating, dysuria, enuresis, flank pain, hematuria and urgency  Neurological: Negative for dizziness and headaches  Psychiatric/Behavioral: Negative for confusion  All other systems reviewed and are negative  Objective:      /80 (BP Location: Left arm, Patient Position: Sitting, Cuff Size: Large)   Pulse 70   Temp (!) 96 5 °F (35 8 °C) (Tympanic)   Resp 16   Ht 5' 9" (1 753 m)   Wt 104 kg (229 lb 12 8 oz)   BMI 33 94 kg/m²          Physical Exam   Constitutional: He is oriented to person, place, and time  No distress  HENT:   Head: Normocephalic and atraumatic  Eyes: Pupils are equal, round, and reactive to light  Conjunctivae and EOM are normal    Neck: Normal range of motion  Neck supple  Cardiovascular: Normal rate  Pulmonary/Chest: Effort normal    Abdominal: Soft  He exhibits no distension  There is no tenderness  There is no guarding  Neurological: He is alert and oriented to person, place, and time  Skin: Skin is warm and dry  He is not diaphoretic  Nursing note and vitals reviewed

## 2019-09-13 NOTE — ASSESSMENT & PLAN NOTE
Nocturia Likely secondary to increased fluid intake  No signs symptoms of UTI or BPH  Patient does admit drinking coffee and a lot of soda which have caffeine which could cause urinary frequency    He is diabetic and nocturia can be worrisome in the setting of increased blood glucose however caretaker says blood sugars have been well controlled so unlikely uncontrolled diabetes is cause of nocturia    -Fluid diary to see how much he is drinking  -Appropriately decreasing fluid intake  --Decreasing coffee and soda intake before water   -Not drinking a few hours before bedtime and also

## 2019-09-17 ENCOUNTER — TELEPHONE (OUTPATIENT)
Dept: FAMILY MEDICINE CLINIC | Facility: CLINIC | Age: 39
End: 2019-09-17

## 2019-09-17 DIAGNOSIS — G44.209 TENSION HEADACHE: ICD-10-CM

## 2019-09-17 RX ORDER — ACETAMINOPHEN 325 MG/1
650 TABLET ORAL EVERY 6 HOURS PRN
Qty: 30 TABLET | Refills: 2 | Status: ON HOLD | OUTPATIENT
Start: 2019-09-17 | End: 2019-11-08 | Stop reason: SDUPTHER

## 2019-09-26 ENCOUNTER — TRANSCRIBE ORDERS (OUTPATIENT)
Dept: LAB | Facility: CLINIC | Age: 39
End: 2019-09-26

## 2019-09-26 ENCOUNTER — APPOINTMENT (OUTPATIENT)
Dept: LAB | Facility: CLINIC | Age: 39
End: 2019-09-26
Payer: MEDICARE

## 2019-09-26 DIAGNOSIS — Z79.899 ENCOUNTER FOR LONG-TERM (CURRENT) USE OF OTHER MEDICATIONS: Primary | ICD-10-CM

## 2019-09-26 DIAGNOSIS — Z79.899 ENCOUNTER FOR LONG-TERM (CURRENT) USE OF OTHER MEDICATIONS: ICD-10-CM

## 2019-09-26 LAB
ALBUMIN SERPL BCP-MCNC: 3.7 G/DL (ref 3.5–5)
ALP SERPL-CCNC: 62 U/L (ref 46–116)
ALT SERPL W P-5'-P-CCNC: 32 U/L (ref 12–78)
ANION GAP SERPL CALCULATED.3IONS-SCNC: 7 MMOL/L (ref 4–13)
AST SERPL W P-5'-P-CCNC: 15 U/L (ref 5–45)
BASOPHILS # BLD AUTO: 0.06 THOUSANDS/ΜL (ref 0–0.1)
BASOPHILS NFR BLD AUTO: 1 % (ref 0–1)
BILIRUB SERPL-MCNC: 0.34 MG/DL (ref 0.2–1)
BUN SERPL-MCNC: 13 MG/DL (ref 5–25)
CALCIUM SERPL-MCNC: 9.1 MG/DL (ref 8.3–10.1)
CHLORIDE SERPL-SCNC: 103 MMOL/L (ref 100–108)
CHOLEST SERPL-MCNC: 112 MG/DL (ref 50–200)
CO2 SERPL-SCNC: 31 MMOL/L (ref 21–32)
CREAT SERPL-MCNC: 1.43 MG/DL (ref 0.6–1.3)
EOSINOPHIL # BLD AUTO: 0.18 THOUSAND/ΜL (ref 0–0.61)
EOSINOPHIL NFR BLD AUTO: 2 % (ref 0–6)
ERYTHROCYTE [DISTWIDTH] IN BLOOD BY AUTOMATED COUNT: 11.4 % (ref 11.6–15.1)
GFR SERPL CREATININE-BSD FRML MDRD: 62 ML/MIN/1.73SQ M
GLUCOSE P FAST SERPL-MCNC: 209 MG/DL (ref 65–99)
HCT VFR BLD AUTO: 48.3 % (ref 36.5–49.3)
HDLC SERPL-MCNC: 27 MG/DL (ref 40–60)
HGB BLD-MCNC: 15.8 G/DL (ref 12–17)
IMM GRANULOCYTES # BLD AUTO: 0.12 THOUSAND/UL (ref 0–0.2)
IMM GRANULOCYTES NFR BLD AUTO: 1 % (ref 0–2)
LDLC SERPL CALC-MCNC: 35 MG/DL (ref 0–100)
LYMPHOCYTES # BLD AUTO: 3.92 THOUSANDS/ΜL (ref 0.6–4.47)
LYMPHOCYTES NFR BLD AUTO: 42 % (ref 14–44)
MCH RBC QN AUTO: 30.5 PG (ref 26.8–34.3)
MCHC RBC AUTO-ENTMCNC: 32.7 G/DL (ref 31.4–37.4)
MCV RBC AUTO: 93 FL (ref 82–98)
MONOCYTES # BLD AUTO: 0.93 THOUSAND/ΜL (ref 0.17–1.22)
MONOCYTES NFR BLD AUTO: 10 % (ref 4–12)
NEUTROPHILS # BLD AUTO: 4.09 THOUSANDS/ΜL (ref 1.85–7.62)
NEUTS SEG NFR BLD AUTO: 44 % (ref 43–75)
NONHDLC SERPL-MCNC: 85 MG/DL
NRBC BLD AUTO-RTO: 0 /100 WBCS
PLATELET # BLD AUTO: 128 THOUSANDS/UL (ref 149–390)
PMV BLD AUTO: 9.9 FL (ref 8.9–12.7)
POTASSIUM SERPL-SCNC: 4.4 MMOL/L (ref 3.5–5.3)
PROT SERPL-MCNC: 7.1 G/DL (ref 6.4–8.2)
RBC # BLD AUTO: 5.18 MILLION/UL (ref 3.88–5.62)
SODIUM SERPL-SCNC: 141 MMOL/L (ref 136–145)
TRIGL SERPL-MCNC: 251 MG/DL
TSH SERPL DL<=0.05 MIU/L-ACNC: 2.02 UIU/ML (ref 0.36–3.74)
VALPROATE SERPL-MCNC: 92 UG/ML (ref 50–100)
WBC # BLD AUTO: 9.3 THOUSAND/UL (ref 4.31–10.16)

## 2019-09-26 PROCEDURE — 80164 ASSAY DIPROPYLACETIC ACD TOT: CPT

## 2019-09-26 PROCEDURE — 80061 LIPID PANEL: CPT

## 2019-09-26 PROCEDURE — 36415 COLL VENOUS BLD VENIPUNCTURE: CPT

## 2019-09-26 PROCEDURE — 85025 COMPLETE CBC W/AUTO DIFF WBC: CPT

## 2019-09-26 PROCEDURE — 80165 DIPROPYLACETIC ACID FREE: CPT

## 2019-09-26 PROCEDURE — 80053 COMPREHEN METABOLIC PANEL: CPT

## 2019-09-26 PROCEDURE — 84443 ASSAY THYROID STIM HORMONE: CPT

## 2019-09-27 DIAGNOSIS — G44.209 TENSION HEADACHE: ICD-10-CM

## 2019-09-27 DIAGNOSIS — E55.9 VITAMIN D DEFICIENCY: ICD-10-CM

## 2019-09-29 RX ORDER — MELATONIN
1000 DAILY
Qty: 30 TABLET | Refills: 5 | Status: ON HOLD | OUTPATIENT
Start: 2019-09-29 | End: 2019-11-08 | Stop reason: SDUPTHER

## 2019-10-01 LAB — VALPROATE FREE SERPL-MCNC: 41.8 UG/ML (ref 6–22)

## 2019-10-03 ENCOUNTER — OFFICE VISIT (OUTPATIENT)
Dept: FAMILY MEDICINE CLINIC | Facility: CLINIC | Age: 39
End: 2019-10-03

## 2019-10-03 VITALS
TEMPERATURE: 96.7 F | SYSTOLIC BLOOD PRESSURE: 100 MMHG | RESPIRATION RATE: 16 BRPM | WEIGHT: 231 LBS | DIASTOLIC BLOOD PRESSURE: 78 MMHG | HEART RATE: 80 BPM | BODY MASS INDEX: 34.11 KG/M2

## 2019-10-03 DIAGNOSIS — R45.1 AGITATION: ICD-10-CM

## 2019-10-03 DIAGNOSIS — E11.65 TYPE 2 DIABETES MELLITUS WITH HYPERGLYCEMIA, WITHOUT LONG-TERM CURRENT USE OF INSULIN (HCC): ICD-10-CM

## 2019-10-03 DIAGNOSIS — Z23 ENCOUNTER FOR IMMUNIZATION: Primary | ICD-10-CM

## 2019-10-03 DIAGNOSIS — F25.0 SCHIZOAFFECTIVE DISORDER, BIPOLAR TYPE (HCC): Chronic | ICD-10-CM

## 2019-10-03 PROCEDURE — 99213 OFFICE O/P EST LOW 20 MIN: CPT | Performed by: FAMILY MEDICINE

## 2019-10-03 PROCEDURE — 90682 RIV4 VACC RECOMBINANT DNA IM: CPT | Performed by: FAMILY MEDICINE

## 2019-10-03 PROCEDURE — 90471 IMMUNIZATION ADMIN: CPT | Performed by: FAMILY MEDICINE

## 2019-10-03 RX ORDER — CHLORPROMAZINE HYDROCHLORIDE 50 MG/1
50 TABLET, FILM COATED ORAL 2 TIMES DAILY
Qty: 60 TABLET | Refills: 0 | Status: SHIPPED | OUTPATIENT
Start: 2019-10-03 | End: 2019-11-08 | Stop reason: HOSPADM

## 2019-10-03 RX ORDER — TRAZODONE HYDROCHLORIDE 50 MG/1
50 TABLET ORAL
Qty: 30 TABLET | Refills: 0
Start: 2019-10-03 | End: 2019-10-03

## 2019-10-03 NOTE — ASSESSMENT & PLAN NOTE
Lab Results   Component Value Date    HGBA1C 8 0 (H) 08/29/2019       Continue metformin 1000 mg daily, Januvia 50 mg daily  Will recheck A1c in October  If still elevated will increase metformin to 1000 mg b i d    Again stressed on importance lifestyle modification with diet and exercise  Also gave the option of diabetes education, but patient is not interested  Patient recently had appointment with Ophthalmology and podiatry, will obtain records  Will also check urine for microalbumin in next visit  May not have fluid restriction, but stressed on avoiding soda and sugary drinks

## 2019-10-04 ENCOUNTER — TELEPHONE (OUTPATIENT)
Dept: FAMILY MEDICINE CLINIC | Facility: CLINIC | Age: 39
End: 2019-10-04

## 2019-10-04 DIAGNOSIS — E78.5 HYPERLIPIDEMIA, UNSPECIFIED HYPERLIPIDEMIA TYPE: ICD-10-CM

## 2019-10-04 DIAGNOSIS — K59.00 CONSTIPATION, UNSPECIFIED CONSTIPATION TYPE: ICD-10-CM

## 2019-10-04 DIAGNOSIS — E03.9 HYPOTHYROIDISM, UNSPECIFIED TYPE: ICD-10-CM

## 2019-10-04 DIAGNOSIS — F25.0 SCHIZOAFFECTIVE DISORDER, BIPOLAR TYPE (HCC): Chronic | ICD-10-CM

## 2019-10-04 RX ORDER — CALCIUM CARBONATE 500(1250)
1 TABLET ORAL
Qty: 30 TABLET | Refills: 0 | Status: ON HOLD | OUTPATIENT
Start: 2019-10-04 | End: 2019-11-06 | Stop reason: SDUPTHER

## 2019-10-04 RX ORDER — LEVOTHYROXINE SODIUM 0.03 MG/1
25 TABLET ORAL
Qty: 30 TABLET | Refills: 0 | Status: ON HOLD | OUTPATIENT
Start: 2019-10-04 | End: 2019-11-06 | Stop reason: SDUPTHER

## 2019-10-04 RX ORDER — DOCUSATE SODIUM 100 MG/1
100 CAPSULE, LIQUID FILLED ORAL 2 TIMES DAILY
Qty: 30 CAPSULE | Refills: 0 | Status: SHIPPED | OUTPATIENT
Start: 2019-10-04 | End: 2019-10-07 | Stop reason: SDUPTHER

## 2019-10-04 RX ORDER — LORATADINE 10 MG/1
10 TABLET ORAL DAILY
Qty: 30 TABLET | Refills: 0 | Status: ON HOLD | OUTPATIENT
Start: 2019-10-04 | End: 2019-11-06 | Stop reason: SDUPTHER

## 2019-10-04 NOTE — PROGRESS NOTES
Assessment/Plan     Type 2 diabetes mellitus with hyperglycemia (HCC)    Lab Results   Component Value Date    HGBA1C 8 0 (H) 08/29/2019       Continue metformin 1000 mg daily, Januvia 50 mg daily  Will recheck A1c in October  If still elevated will increase metformin to 1000 mg b i d  Again stressed on importance lifestyle modification with diet and exercise  Also gave the option of diabetes education, but patient is not interested  Patient recently had appointment with Ophthalmology and podiatry, will obtain records  Will also check urine for microalbumin in next visit  May not have fluid restriction, but stressed on avoiding soda and sugary drinks      Schizoaffective disorder, bipolar type ([facility] )  Continue follow-up with Psychiatry  Medication list updated    Diagnoses and all orders for this visit:    Encounter for immunization  -     influenza vaccine, [id number], quadrivalent, recombinant, PF, 0 5 mL, for patients 18 yr+ (FLUBLOK)    Schizoaffective disorder, bipolar type ([facility] )  -     Discontinue: traZODone (DESYREL) 50 mg tablet; Take 1 tablet (50 mg total) by mouth daily at bedtime  -     chlorproMAZINE (THORAZINE) 50 mg tablet; Take 1 tablet (50 mg total) by mouth 2 (two) times a day  -     paliperidone palmitate ER (INVEGA TRINZA) 819 MG/2 625ML MARIN; Inject 1 mL (312 mg total) into a muscle every 3 (three) months    Agitation  -     chlorproMAZINE (THORAZINE) 50 mg tablet; Take 1 tablet (50 mg total) by mouth 2 (two) times a day    Type 2 diabetes mellitus with hyperglycemia, without long-term current use of insulin (HCC)         Subjective     Chief Complaint   Patient presents with    Diabetes       36-year-old male presented to office with his caregiver for diabetes follow-up  He has no acute concerns today  He was put on fluid-restricted diet for polyuria in previous office visit and the caregiver wonders if he still needs to be continued on that  They also have paperwork to be filled out  Patient states that he no longer has to get up at night, and the frequency has decreased      The following portions of the patient's history were reviewed and updated as appropriate: allergies, current medications, past family history, past medical history, past social history, past surgical history and problem list     Review of Systems   Constitutional: Negative for activity change, chills and fever  HENT: Negative for congestion  Respiratory: Negative for shortness of breath and wheezing  Cardiovascular: Negative for chest pain and palpitations  Gastrointestinal: Negative for abdominal distention, diarrhea, nausea and vomiting  Genitourinary: Negative for difficulty urinating  Musculoskeletal: Negative for back pain  Neurological: Negative for headaches  Objective     Vitals:Blood pressure 100/78, pulse 80, temperature (!) 96 7 °F (35 9 °C), temperature source Axillary, resp  rate 16, weight 105 kg (231 lb)  Physical Exam:  Physical Exam   Constitutional: He is oriented to person, place, and time  He appears well-developed and well-nourished  HENT:   Head: Normocephalic and atraumatic  Mouth/Throat: Oropharynx is clear and moist    Eyes: Conjunctivae and EOM are normal    Neck: Normal range of motion  Neck supple  Cardiovascular: Normal rate, regular rhythm and normal heart sounds  Exam reveals no friction rub  No murmur heard  Pulmonary/Chest: Effort normal and breath sounds normal  No respiratory distress  He has no wheezes  He has no rales  Abdominal: Soft  Bowel sounds are normal  He exhibits no distension  There is no tenderness  Musculoskeletal: Normal range of motion  Neurological: He is alert and oriented to person, place, and time  Skin: Skin is warm and dry

## 2019-10-04 NOTE — TELEPHONE ENCOUNTER
Pharmacy requesting refill on patients medications    Oyster shell calcium 500 mg  Allergy (Loratadine) 10 mg  Levothyroxine 25 mcg  Docusate sodium 100 mg    Last office visit was yesterday 10/03/2019  Please review  Thank you, Lester Girard

## 2019-10-07 DIAGNOSIS — K59.00 CONSTIPATION, UNSPECIFIED CONSTIPATION TYPE: ICD-10-CM

## 2019-10-07 RX ORDER — DOCUSATE SODIUM 100 MG/1
100 CAPSULE, LIQUID FILLED ORAL DAILY
Qty: 30 CAPSULE | Refills: 5 | Status: ON HOLD | OUTPATIENT
Start: 2019-10-07 | End: 2019-11-08 | Stop reason: SDUPTHER

## 2019-10-23 ENCOUNTER — TELEPHONE (OUTPATIENT)
Dept: FAMILY MEDICINE CLINIC | Facility: CLINIC | Age: 39
End: 2019-10-23

## 2019-10-23 DIAGNOSIS — K59.00 CONSTIPATION, UNSPECIFIED CONSTIPATION TYPE: ICD-10-CM

## 2019-10-23 RX ORDER — DOCUSATE SODIUM 100 MG/1
100 CAPSULE, LIQUID FILLED ORAL 2 TIMES DAILY
Qty: 60 CAPSULE | Refills: 5 | Status: CANCELLED | OUTPATIENT
Start: 2019-10-23

## 2019-10-31 ENCOUNTER — HOSPITAL ENCOUNTER (EMERGENCY)
Facility: HOSPITAL | Age: 39
End: 2019-11-01
Attending: EMERGENCY MEDICINE | Admitting: EMERGENCY MEDICINE
Payer: MEDICARE

## 2019-10-31 DIAGNOSIS — F25.0 SCHIZOAFFECTIVE DISORDER, BIPOLAR TYPE (HCC): Chronic | ICD-10-CM

## 2019-10-31 DIAGNOSIS — R45.851 SUICIDAL IDEATION: Primary | ICD-10-CM

## 2019-10-31 DIAGNOSIS — F25.9 SCHIZOAFFECTIVE DISORDER (HCC): ICD-10-CM

## 2019-10-31 PROCEDURE — 99285 EMERGENCY DEPT VISIT HI MDM: CPT

## 2019-10-31 PROCEDURE — 36415 COLL VENOUS BLD VENIPUNCTURE: CPT | Performed by: EMERGENCY MEDICINE

## 2019-10-31 PROCEDURE — 99285 EMERGENCY DEPT VISIT HI MDM: CPT | Performed by: EMERGENCY MEDICINE

## 2019-10-31 PROCEDURE — 80329 ANALGESICS NON-OPIOID 1 OR 2: CPT | Performed by: EMERGENCY MEDICINE

## 2019-10-31 PROCEDURE — 85025 COMPLETE CBC W/AUTO DIFF WBC: CPT | Performed by: EMERGENCY MEDICINE

## 2019-10-31 PROCEDURE — 82948 REAGENT STRIP/BLOOD GLUCOSE: CPT

## 2019-10-31 PROCEDURE — 80320 DRUG SCREEN QUANTALCOHOLS: CPT | Performed by: EMERGENCY MEDICINE

## 2019-10-31 PROCEDURE — 80053 COMPREHEN METABOLIC PANEL: CPT | Performed by: EMERGENCY MEDICINE

## 2019-10-31 PROCEDURE — 84443 ASSAY THYROID STIM HORMONE: CPT | Performed by: EMERGENCY MEDICINE

## 2019-11-01 ENCOUNTER — HOSPITAL ENCOUNTER (INPATIENT)
Facility: HOSPITAL | Age: 39
LOS: 7 days | Discharge: HOME | DRG: 885 | End: 2019-11-08
Attending: PSYCHIATRY & NEUROLOGY | Admitting: PSYCHIATRY & NEUROLOGY
Payer: MEDICARE

## 2019-11-01 VITALS
SYSTOLIC BLOOD PRESSURE: 124 MMHG | HEIGHT: 69 IN | OXYGEN SATURATION: 95 % | RESPIRATION RATE: 18 BRPM | TEMPERATURE: 97.8 F | BODY MASS INDEX: 34.22 KG/M2 | HEART RATE: 100 BPM | WEIGHT: 231.04 LBS | DIASTOLIC BLOOD PRESSURE: 79 MMHG

## 2019-11-01 DIAGNOSIS — K59.00 CONSTIPATION, UNSPECIFIED CONSTIPATION TYPE: ICD-10-CM

## 2019-11-01 DIAGNOSIS — E78.49 OTHER HYPERLIPIDEMIA: ICD-10-CM

## 2019-11-01 DIAGNOSIS — R45.1 AGITATION: ICD-10-CM

## 2019-11-01 DIAGNOSIS — F25.0 SCHIZOAFFECTIVE DISORDER, BIPOLAR TYPE (HCC): Primary | Chronic | ICD-10-CM

## 2019-11-01 DIAGNOSIS — E11.65 TYPE 2 DIABETES MELLITUS WITH HYPERGLYCEMIA, UNSPECIFIED WHETHER LONG TERM INSULIN USE (HCC): ICD-10-CM

## 2019-11-01 DIAGNOSIS — E78.5 HYPERLIPIDEMIA, UNSPECIFIED HYPERLIPIDEMIA TYPE: ICD-10-CM

## 2019-11-01 DIAGNOSIS — G44.209 TENSION HEADACHE: ICD-10-CM

## 2019-11-01 DIAGNOSIS — E03.9 HYPOTHYROIDISM, UNSPECIFIED TYPE: ICD-10-CM

## 2019-11-01 DIAGNOSIS — E55.9 VITAMIN D DEFICIENCY: ICD-10-CM

## 2019-11-01 DIAGNOSIS — G25.0 ESSENTIAL TREMOR: ICD-10-CM

## 2019-11-01 DIAGNOSIS — H25.11 NUCLEAR SCLEROTIC CATARACT OF RIGHT EYE: ICD-10-CM

## 2019-11-01 LAB
ALBUMIN SERPL BCP-MCNC: 3.4 G/DL (ref 3.5–5)
ALP SERPL-CCNC: 79 U/L (ref 46–116)
ALT SERPL W P-5'-P-CCNC: 67 U/L (ref 12–78)
AMPHETAMINES SERPL QL SCN: NEGATIVE
ANION GAP SERPL CALCULATED.3IONS-SCNC: 13 MMOL/L (ref 4–13)
APAP SERPL-MCNC: <2 UG/ML (ref 10–20)
AST SERPL W P-5'-P-CCNC: 25 U/L (ref 5–45)
ATRIAL RATE: 90 BPM
BARBITURATES UR QL: NEGATIVE
BASOPHILS # BLD AUTO: 0.03 THOUSANDS/ΜL (ref 0–0.1)
BASOPHILS NFR BLD AUTO: 0 % (ref 0–1)
BENZODIAZ UR QL: NEGATIVE
BILIRUB SERPL-MCNC: 0.6 MG/DL (ref 0.2–1)
BUN SERPL-MCNC: 11 MG/DL (ref 5–25)
CALCIUM SERPL-MCNC: 8.3 MG/DL (ref 8.3–10.1)
CHLORIDE SERPL-SCNC: 100 MMOL/L (ref 100–108)
CO2 SERPL-SCNC: 25 MMOL/L (ref 21–32)
COCAINE UR QL: NEGATIVE
CREAT SERPL-MCNC: 1.21 MG/DL (ref 0.6–1.3)
EOSINOPHIL # BLD AUTO: 0.13 THOUSAND/ΜL (ref 0–0.61)
EOSINOPHIL NFR BLD AUTO: 1 % (ref 0–6)
ERYTHROCYTE [DISTWIDTH] IN BLOOD BY AUTOMATED COUNT: 10.8 % (ref 11.6–15.1)
ETHANOL SERPL-MCNC: <3 MG/DL (ref 0–3)
GFR SERPL CREATININE-BSD FRML MDRD: 75 ML/MIN/1.73SQ M
GLUCOSE SERPL-MCNC: 141 MG/DL (ref 65–140)
GLUCOSE SERPL-MCNC: 159 MG/DL (ref 65–140)
GLUCOSE SERPL-MCNC: 165 MG/DL (ref 65–140)
GLUCOSE SERPL-MCNC: 175 MG/DL (ref 65–140)
GLUCOSE SERPL-MCNC: 189 MG/DL (ref 65–140)
HCT VFR BLD AUTO: 43.9 % (ref 36.5–49.3)
HGB BLD-MCNC: 15.5 G/DL (ref 12–17)
IMM GRANULOCYTES # BLD AUTO: 0.05 THOUSAND/UL (ref 0–0.2)
IMM GRANULOCYTES NFR BLD AUTO: 1 % (ref 0–2)
LYMPHOCYTES # BLD AUTO: 3.44 THOUSANDS/ΜL (ref 0.6–4.47)
LYMPHOCYTES NFR BLD AUTO: 32 % (ref 14–44)
MCH RBC QN AUTO: 31 PG (ref 26.8–34.3)
MCHC RBC AUTO-ENTMCNC: 35.3 G/DL (ref 31.4–37.4)
MCV RBC AUTO: 88 FL (ref 82–98)
METHADONE UR QL: NEGATIVE
MONOCYTES # BLD AUTO: 0.86 THOUSAND/ΜL (ref 0.17–1.22)
MONOCYTES NFR BLD AUTO: 8 % (ref 4–12)
NEUTROPHILS # BLD AUTO: 6.25 THOUSANDS/ΜL (ref 1.85–7.62)
NEUTS SEG NFR BLD AUTO: 58 % (ref 43–75)
NRBC BLD AUTO-RTO: 0 /100 WBCS
OPIATES UR QL SCN: NEGATIVE
P AXIS: 41 DEGREES
PCP UR QL: NEGATIVE
PLATELET # BLD AUTO: 169 THOUSANDS/UL (ref 149–390)
PMV BLD AUTO: 10.4 FL (ref 8.9–12.7)
POTASSIUM SERPL-SCNC: 3.7 MMOL/L (ref 3.5–5.3)
PR INTERVAL: 166 MS
PROT SERPL-MCNC: 6.5 G/DL (ref 6.4–8.2)
QRS AXIS: -9 DEGREES
QRSD INTERVAL: 104 MS
QT INTERVAL: 408 MS
QTC INTERVAL: 499 MS
RBC # BLD AUTO: 5 MILLION/UL (ref 3.88–5.62)
SALICYLATES SERPL-MCNC: <3 MG/DL (ref 3–20)
SODIUM SERPL-SCNC: 138 MMOL/L (ref 136–145)
T WAVE AXIS: 44 DEGREES
THC UR QL: NEGATIVE
TSH SERPL DL<=0.05 MIU/L-ACNC: 1.02 UIU/ML (ref 0.36–3.74)
VENTRICULAR RATE: 90 BPM
WBC # BLD AUTO: 10.76 THOUSAND/UL (ref 4.31–10.16)

## 2019-11-01 PROCEDURE — 82948 REAGENT STRIP/BLOOD GLUCOSE: CPT

## 2019-11-01 PROCEDURE — 80307 DRUG TEST PRSMV CHEM ANLYZR: CPT | Performed by: EMERGENCY MEDICINE

## 2019-11-01 PROCEDURE — 93005 ELECTROCARDIOGRAM TRACING: CPT

## 2019-11-01 PROCEDURE — 93010 ELECTROCARDIOGRAM REPORT: CPT | Performed by: INTERNAL MEDICINE

## 2019-11-01 RX ORDER — PROPRANOLOL HYDROCHLORIDE 20 MG/1
20 TABLET ORAL EVERY 12 HOURS SCHEDULED
Status: DISCONTINUED | OUTPATIENT
Start: 2019-11-01 | End: 2019-11-08 | Stop reason: HOSPADM

## 2019-11-01 RX ORDER — PROPRANOLOL HYDROCHLORIDE 20 MG/1
20 TABLET ORAL EVERY 12 HOURS SCHEDULED
Status: DISCONTINUED | OUTPATIENT
Start: 2019-11-01 | End: 2019-11-01 | Stop reason: HOSPADM

## 2019-11-01 RX ORDER — CHLORPROMAZINE HYDROCHLORIDE 25 MG/ML
25 INJECTION INTRAMUSCULAR EVERY 6 HOURS PRN
Status: DISCONTINUED | OUTPATIENT
Start: 2019-11-01 | End: 2019-11-08 | Stop reason: HOSPADM

## 2019-11-01 RX ORDER — LEVOTHYROXINE SODIUM 0.03 MG/1
25 TABLET ORAL
Status: DISCONTINUED | OUTPATIENT
Start: 2019-11-01 | End: 2019-11-01 | Stop reason: HOSPADM

## 2019-11-01 RX ORDER — LORATADINE 10 MG/1
10 TABLET ORAL DAILY
Status: DISCONTINUED | OUTPATIENT
Start: 2019-11-01 | End: 2019-11-01 | Stop reason: HOSPADM

## 2019-11-01 RX ORDER — HYDROXYZINE HYDROCHLORIDE 25 MG/1
25 TABLET, FILM COATED ORAL EVERY 4 HOURS PRN
Status: DISCONTINUED | OUTPATIENT
Start: 2019-11-01 | End: 2019-11-08 | Stop reason: HOSPADM

## 2019-11-01 RX ORDER — CHLORPROMAZINE HYDROCHLORIDE 25 MG/1
25 TABLET, FILM COATED ORAL EVERY 6 HOURS PRN
Status: DISCONTINUED | OUTPATIENT
Start: 2019-11-01 | End: 2019-11-08 | Stop reason: HOSPADM

## 2019-11-01 RX ORDER — ONDANSETRON 4 MG/1
4 TABLET, ORALLY DISINTEGRATING ORAL ONCE
Status: COMPLETED | OUTPATIENT
Start: 2019-11-01 | End: 2019-11-01

## 2019-11-01 RX ORDER — LORAZEPAM 1 MG/1
1 TABLET ORAL EVERY 4 HOURS PRN
Status: DISCONTINUED | OUTPATIENT
Start: 2019-11-01 | End: 2019-11-01

## 2019-11-01 RX ORDER — HALOPERIDOL 5 MG/ML
5 INJECTION INTRAMUSCULAR EVERY 6 HOURS PRN
Status: DISCONTINUED | OUTPATIENT
Start: 2019-11-01 | End: 2019-11-01

## 2019-11-01 RX ORDER — MELATONIN
1000 DAILY
Status: DISCONTINUED | OUTPATIENT
Start: 2019-11-01 | End: 2019-11-01 | Stop reason: HOSPADM

## 2019-11-01 RX ORDER — HYDROXYZINE HYDROCHLORIDE 25 MG/1
25 TABLET, FILM COATED ORAL EVERY 4 HOURS PRN
Status: CANCELLED | OUTPATIENT
Start: 2019-11-01

## 2019-11-01 RX ORDER — TRAZODONE HYDROCHLORIDE 50 MG/1
50 TABLET ORAL
Status: CANCELLED | OUTPATIENT
Start: 2019-11-01

## 2019-11-01 RX ORDER — DIPHENHYDRAMINE HCL 25 MG
25 TABLET ORAL EVERY 6 HOURS PRN
Status: DISCONTINUED | OUTPATIENT
Start: 2019-11-01 | End: 2019-11-08 | Stop reason: HOSPADM

## 2019-11-01 RX ORDER — ATORVASTATIN CALCIUM 40 MG/1
40 TABLET, FILM COATED ORAL
Status: DISCONTINUED | OUTPATIENT
Start: 2019-11-01 | End: 2019-11-01 | Stop reason: HOSPADM

## 2019-11-01 RX ORDER — LORAZEPAM 2 MG/ML
2 INJECTION INTRAMUSCULAR EVERY 6 HOURS PRN
Status: CANCELLED | OUTPATIENT
Start: 2019-11-01

## 2019-11-01 RX ORDER — HALOPERIDOL 5 MG
5 TABLET ORAL EVERY 6 HOURS PRN
Status: DISCONTINUED | OUTPATIENT
Start: 2019-11-01 | End: 2019-11-01

## 2019-11-01 RX ORDER — LORAZEPAM 1 MG/1
1 TABLET ORAL EVERY 4 HOURS PRN
Status: CANCELLED | OUTPATIENT
Start: 2019-11-01

## 2019-11-01 RX ORDER — LORAZEPAM 2 MG/ML
2 INJECTION INTRAMUSCULAR EVERY 6 HOURS PRN
Status: DISCONTINUED | OUTPATIENT
Start: 2019-11-01 | End: 2019-11-01

## 2019-11-01 RX ORDER — LEVOTHYROXINE SODIUM 0.03 MG/1
25 TABLET ORAL
Status: DISCONTINUED | OUTPATIENT
Start: 2019-11-02 | End: 2019-11-08 | Stop reason: HOSPADM

## 2019-11-01 RX ORDER — ATORVASTATIN CALCIUM 40 MG/1
40 TABLET, FILM COATED ORAL
Status: DISCONTINUED | OUTPATIENT
Start: 2019-11-01 | End: 2019-11-08 | Stop reason: HOSPADM

## 2019-11-01 RX ORDER — ACETAMINOPHEN 325 MG/1
650 TABLET ORAL EVERY 6 HOURS PRN
Status: CANCELLED | OUTPATIENT
Start: 2019-11-01

## 2019-11-01 RX ORDER — DIPHENHYDRAMINE HCL 25 MG
25 TABLET ORAL ONCE
Status: COMPLETED | OUTPATIENT
Start: 2019-11-01 | End: 2019-11-01

## 2019-11-01 RX ORDER — METFORMIN HYDROCHLORIDE 500 MG/1
1000 TABLET, EXTENDED RELEASE ORAL
Status: DISCONTINUED | OUTPATIENT
Start: 2019-11-02 | End: 2019-11-08 | Stop reason: HOSPADM

## 2019-11-01 RX ORDER — HALOPERIDOL 5 MG/ML
5 INJECTION INTRAMUSCULAR EVERY 6 HOURS PRN
Status: CANCELLED | OUTPATIENT
Start: 2019-11-01

## 2019-11-01 RX ORDER — CALCIUM CARBONATE 500(1250)
1 TABLET ORAL
Status: DISCONTINUED | OUTPATIENT
Start: 2019-11-01 | End: 2019-11-01 | Stop reason: HOSPADM

## 2019-11-01 RX ORDER — HALOPERIDOL 5 MG
5 TABLET ORAL EVERY 6 HOURS PRN
Status: CANCELLED | OUTPATIENT
Start: 2019-11-01

## 2019-11-01 RX ORDER — VITAMIN E 268 MG
400 CAPSULE ORAL DAILY
Status: DISCONTINUED | OUTPATIENT
Start: 2019-11-01 | End: 2019-11-01 | Stop reason: HOSPADM

## 2019-11-01 RX ORDER — TRAZODONE HYDROCHLORIDE 50 MG/1
50 TABLET ORAL
Status: DISCONTINUED | OUTPATIENT
Start: 2019-11-01 | End: 2019-11-08 | Stop reason: HOSPADM

## 2019-11-01 RX ORDER — ACETAMINOPHEN 325 MG/1
650 TABLET ORAL EVERY 6 HOURS PRN
Status: DISCONTINUED | OUTPATIENT
Start: 2019-11-01 | End: 2019-11-08 | Stop reason: HOSPADM

## 2019-11-01 RX ORDER — CHLORPROMAZINE HYDROCHLORIDE 25 MG/1
50 TABLET, FILM COATED ORAL 2 TIMES DAILY
Status: DISCONTINUED | OUTPATIENT
Start: 2019-11-01 | End: 2019-11-01 | Stop reason: HOSPADM

## 2019-11-01 RX ADMIN — LORATADINE 10 MG: 10 TABLET ORAL at 09:11

## 2019-11-01 RX ADMIN — DIPHENHYDRAMINE HCL 25 MG: 25 TABLET ORAL at 01:56

## 2019-11-01 RX ADMIN — ONDANSETRON 4 MG: 4 TABLET, ORALLY DISINTEGRATING ORAL at 09:19

## 2019-11-01 RX ADMIN — Medication 400 UNITS: at 09:11

## 2019-11-01 RX ADMIN — LEVOTHYROXINE SODIUM 25 MCG: 25 TABLET ORAL at 08:19

## 2019-11-01 RX ADMIN — METFORMIN HYDROCHLORIDE 1000 MG: 500 TABLET ORAL at 08:19

## 2019-11-01 RX ADMIN — SITAGLIPTIN 50 MG: 50 TABLET, FILM COATED ORAL at 09:11

## 2019-11-01 RX ADMIN — VITAMIN D, TAB 1000IU (100/BT) 1000 UNITS: 25 TAB at 09:11

## 2019-11-01 RX ADMIN — INSULIN LISPRO 2 UNITS: 100 INJECTION, SOLUTION INTRAVENOUS; SUBCUTANEOUS at 19:56

## 2019-11-01 RX ADMIN — PROPRANOLOL HYDROCHLORIDE 20 MG: 20 TABLET ORAL at 09:10

## 2019-11-01 RX ADMIN — PROPRANOLOL HYDROCHLORIDE 20 MG: 20 TABLET ORAL at 22:08

## 2019-11-01 RX ADMIN — CHLORPROMAZINE HYDROCHLORIDE 25 MG: 25 TABLET, SUGAR COATED ORAL at 22:08

## 2019-11-01 RX ADMIN — ATORVASTATIN CALCIUM 40 MG: 40 TABLET, FILM COATED ORAL at 22:08

## 2019-11-01 RX ADMIN — CALCIUM 1 TABLET: 500 TABLET ORAL at 08:19

## 2019-11-01 RX ADMIN — LORAZEPAM 1 MG: 1 TABLET ORAL at 18:33

## 2019-11-01 RX ADMIN — CHLORPROMAZINE HYDROCHLORIDE 50 MG: 25 TABLET, SUGAR COATED ORAL at 09:11

## 2019-11-01 NOTE — PLAN OF CARE
Problem: Risk for Self Injury/Neglect  Goal: Treatment Goal: Remain safe during length of stay, learn and adopt new coping skills, and be free of self-injurious ideation, impulses and acts at the time of discharge  Outcome: Not Progressing  Goal: Verbalize thoughts and feelings  Description  Interventions:  - Assess and re-assess patient's lethality and potential for self-injury  - Engage patient in 1:1 interactions, daily, for a minimum of 15 minutes  - Encourage patient to express feelings, fears, frustrations, hopes  - Establish rapport/trust with patient   Outcome: Not Progressing  Goal: Refrain from harming self  Description  Interventions:  - Monitor patient closely, per order  - Develop a trusting relationship  - Supervise medication ingestion, monitor effects and side effects   Outcome: Not Progressing  Goal: Attend and participate in unit activities, including therapeutic, recreational, and educational groups  Description  Interventions:  - Provide therapeutic and educational activities daily, encourage attendance and participation, and document same in the medical record  - Obtain collateral information, encourage visitation and family involvement in care   Outcome: Not Progressing  Goal: Recognize maladaptive responses and adopt new coping mechanisms  Outcome: Not Progressing  Goal: Complete daily ADLs, including personal hygiene independently, as able  Description  Interventions:  - Observe, teach, and assist patient with ADLS  - Monitor and promote a balance of rest/activity, with adequate nutrition and elimination  Outcome: Not Progressing     Problem: Depression  Goal: Treatment Goal: Demonstrate behavioral control of depressive symptoms, verbalize feelings of improved mood/affect, and adopt new coping skills prior to discharge  Outcome: Not Progressing  Goal: Verbalize thoughts and feelings  Description  Interventions:  - Assess and re-assess patient's level of risk   - Engage patient in 1:1 interactions, daily, for a minimum of 15 minutes   - Encourage patient to express feelings, fears, frustrations, hopes   Outcome: Not Progressing  Goal: Refrain from harming self  Description  Interventions:  - Monitor patient closely, per order   - Supervise medication ingestion, monitor effects and side effects   Outcome: Not Progressing  Goal: Refrain from isolation  Description  Interventions:  - Develop a trusting relationship   - Encourage socialization   Outcome: Not Progressing  Goal: Refrain from self-neglect  Outcome: Not Progressing  Goal: Attend and participate in unit activities, including therapeutic, recreational, and educational groups  Description  Interventions:  - Provide therapeutic and educational activities daily, encourage attendance and participation, and document same in the medical record   Outcome: Not Progressing  Goal: Complete daily ADLs, including personal hygiene independently, as able  Description  Interventions:  - Observe, teach, and assist patient with ADLS  -  Monitor and promote a balance of rest/activity, with adequate nutrition and elimination   Outcome: Not Progressing   Initiated care plan

## 2019-11-01 NOTE — PROGRESS NOTES
Admission Note: 46year old male admitted to [address] [place] [address] Dr unit from [address] [place] [address] as a 201 commitment status due to sadness d/t grandmother passing away in July, [place], and [place] to punch himself in the face  Pt would like join his grandmother in heaven  Pt has allergies to Amoxicillin, Benztropine, Erythromycin, NSAIDS, Tegretol, and Lithium  Pt cooperative during admission process  Pt's UDS is negative  Pt has a medical hx of diabetes mellitus, seizure, constipation, etc  Pt's last blood sugar was 175  Pt reports a 4/4 anxiety and 4/10 depression but denies any SI, HI, AH, or VH  Dr Hilario Persons aware of admission  Will give something for anxiety  Will continue to monitor

## 2019-11-01 NOTE — SOCIAL WORK
Patient is accepted at [facility] 3[facility]  Patient is accepted by Dr Jamil Drew per Jory Myers  Transportation is arranged with TBD  Transportation is scheduled for TBD  Patient may go to the floor at D  Nurse report is to be called to [phone] prior to patient transfer

## 2019-11-01 NOTE — SOCIAL WORK
CM received call from Marsha Eid at [facility] stating that they do not have appropriate  bed availability  CM informed crisis intake, Jani Garcia requested that CM fax [phone] for review  CM faxed [phone] to crisis intake

## 2019-11-01 NOTE — ED NOTES
Clinical faxed to [facility] for review
Eleazar obtained and provided to patient      [id number] [place] Gaurav, RN  11/01/19 4124
I MADE PT AWARE OF TRANSFER INFORMATION     [id number] [place] Gaurav, RN  11/01/19 3032
PT currently sleeping at this time  No signs of distress  1:1 continued at this time   Will continue to monitor for PT safety     Aries Tello  11/01/19 4435
Patient awaiting Bible to read  Called upstairs will provide to us       Mitchel Santos RN  11/01/19 7305
Patient received food tray     Iesha Alvarez [facility]  11/01/19 8249
Pc placed to [facility] where Any Solares stated they need their AM medical doctor to look at the pts labs due to his QC level's being elevated  She believes they will accept him but needs the medical doc to verify 
Plan for patient  at [address] [place], [place] [address]gelacio Jean, PHANI  11/01/19 3081
Pt id lying on the bed will continue to monitor  Lucerne Dust  11/01/19 8442
Pt is in paper anahi Ricks  11/01/19 Tayler
Pt is lying down on the bed will continue to monitor at this time     Kemal White  11/01/19 0167
Pt is lying on the bed will continue to monitor       En Varghese  11/01/19 0112
Pt laying on bed resting  Visitor still at bedside  Tech 1:1 present  Will continue to monitor pt        Madeleine Arambula  11/01/19 0958
Pt meds list is on the chart      Norma Ledezma  10/31/19 0777
Pt peer brought [employer] [facility] paper work on pt chart      Janice Divers  10/31/19 [phone]
Pt signed the 201 forms     Wilber Betancourt  11/01/19 0134
Pt sleeping  Visitor at bedside  Tech 1:1 present  Will continue to monitor        Jessica Costa  11/01/19 9859
Pt sleeping  Visitor at bedside  Tech 1:1 present  Will continue to monitor        Rin Parks  11/01/19 5694
Pt walked to the bathroom       [facility] Marcin  11/01/19 5729
Pt walked to the bathroom     May Settler  11/01/19 0148
Pt was brought to the ED tonight due to his increased agitation and increased depression  He reports that he fears loss of control if he is sent back to the group home tonight  He states he feels safe in the hospital  Pt's last IP was after a suicide attempt and pt admits to [facility] with a plan to "break his nose until" he "dies " He denies HI/AH/VH at this time  His staff reports he has not had any recent medication changes and has been taking them as prescribed   Pt signed 12
Sandor's mother, [facility] updated and spoke with her regarding treatment and transport time  Call back if we need her is [phone] number, [phone]       Justin Galan RN  11/01/19 8374
Took over pt observation at this time  Pt laying on bed sleeping  TV and light on  Visitor at bedside  Will continue to monitor pt        Sophie Bahena  11/01/19 4753
Two staff from [facility] in the room with patient     [address]  11/01/19 8788
pts belongings sent with Ben Doran RN  11/01/19 0686
rn at bedside     Camille Saenz  11/01/19 0158
Quality 110: Preventive Care And Screening: Influenza Immunization: Influenza Immunization Administered during Influenza season
Quality 111:Pneumonia Vaccination Status For Older Adults: Pneumococcal Vaccination Previously Received
Detail Level: Detailed
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
## 2019-11-01 NOTE — SOCIAL WORK
CM called [facility] and spoke to Marsha Eid who stated that the referral was received and she will wait for the doctor to come in to review patient  CM provided direct contact number

## 2019-11-01 NOTE — TRANSPORTATION MEDICAL NECESSITY
Section I - General Information    Name of Patient: Arlene Barnett                 : 1980    Medicare #: [id number]  Transport Date: 19 (PCS is valid for round trips on this date and for all repetitive trips in the 60-day range as noted below )  Origin: [address]: [address] [facility]  Is the pt's stay covered under Medicare Part A (PPS/DRG)   []     Closest appropriate facility? If no, why is transport to more distant facility required? Yes  If hospice pt, is this transport related to pt's terminal illness? NA     Section II - Medical Necessity Questionnaire  Ambulance transportation is medically necessary only if other means of transport are contraindicated or would be potentially harmful to the patient  To meet this requirement, the patient must either be "bed confined" or suffer from a condition such that transport by means other than ambulance is contraindicated by the patient's condition  The following questions must be answered by the medical professional signing below for this form to be valid:    1)  Describe the MEDICAL CONDITION (physical and/or mental) of this patient AT [facility] that requires the patient to be transported in an ambulance and why transport by other means is contraindicated by the patient's condition: Patient signed 201, patient is a danger to self/others  Patient is unable to be safely transported unattended via [facility] and a medical attendant is required to maintain patient safety during transport  2) Is the patient "bed confined" as defined below? No  To be "be confined" the patient must satisfy all three of the following conditions: (1) unable to get up from bed without Assistance; AND (2) unable to ambulate; AND (3) unable to sit in a chair or wheelchair      3) Can this patient safely be transported by car or wheelchair van (i e , seated during transport without a medical attendant or monitoring)? No    4) In addition to completing questions 1-3 above, please check any of the following conditions that apply*:   *Note: supporting documentation for any boxes checked must be maintained in the patient's medical records  If hosp-hosp transfer, describe services needed at 2nd facility not available at 1st facility? Danger to self/others  Medical attendant required   Unable to tolerate seated position for time needed to transport   Other(specify) 201    Section III - Signature of Physician or Healthcare Professional  I certify that the above information is true and correct based on my evaluation of this patient, and represent that the patient requires transport by ambulance and that other forms of transport are contraindicated  I understand that this information will be used by the Centers for Medicare and Medicaid Services (CMS) to support the determination of medical necessity for ambulance services, and I represent that I have personal knowledge of the patient's condition at time of transport  []  If this box is checked, I also certify that the patient is physically or mentally incapable of signing the ambulance service's claim and that the institution with which I am affiliated has furnished care, services, or assistance to the patient  My signature below is made on behalf of the patient pursuant to 42 CFR §424 36(b)(4)  In accordance with 42 CFR §424 37, the specific reason(s) that the patient is physically or mentally incapable of signing the claim form is as follows: N/A      Signature of Physician* or Healthcare Professional______________________________________________________________  Signature Date 11/01/19 (For scheduled repetitive transports, this form is not valid for transports performed more than 60 days after this date)    Printed Name & Credentials of Physician or Healthcare Professional (MD, DO, RN, etc )_Akilah Spears MSW/LSW_______________________________  *Form must be signed by patient's attending physician for scheduled, repetitive transports   For non-repetitive, unscheduled ambulance transports, if unable to obtain the signature of the attending physician, any of the following may sign (choose appropriate option below)  [] Physician Assistant []  Clinical Nurse Specialist []  Registered Nurse  []  Nurse Practitioner  [x] Discharge Planner

## 2019-11-01 NOTE — SOCIAL WORK
CM received call from [facility] at [employer] stating that patient can be transported at 1600

## 2019-11-02 LAB
ALBUMIN SERPL BCP-MCNC: 3.7 G/DL (ref 3–5.2)
ALP SERPL-CCNC: 65 U/L (ref 43–122)
ALT SERPL W P-5'-P-CCNC: 81 U/L (ref 9–52)
ANION GAP SERPL CALCULATED.3IONS-SCNC: 7 MMOL/L (ref 5–14)
AST SERPL W P-5'-P-CCNC: 41 U/L (ref 17–59)
BILIRUB SERPL-MCNC: 0.7 MG/DL
BUN SERPL-MCNC: 11 MG/DL (ref 5–25)
CALCIUM SERPL-MCNC: 8.6 MG/DL (ref 8.4–10.2)
CHLORIDE SERPL-SCNC: 101 MMOL/L (ref 97–108)
CO2 SERPL-SCNC: 30 MMOL/L (ref 22–30)
CREAT SERPL-MCNC: 1.16 MG/DL (ref 0.7–1.5)
GFR SERPL CREATININE-BSD FRML MDRD: 79 ML/MIN/1.73SQ M
GLUCOSE SERPL-MCNC: 146 MG/DL (ref 65–140)
GLUCOSE SERPL-MCNC: 147 MG/DL (ref 65–140)
GLUCOSE SERPL-MCNC: 161 MG/DL (ref 70–99)
GLUCOSE SERPL-MCNC: 164 MG/DL (ref 65–140)
GLUCOSE SERPL-MCNC: 203 MG/DL (ref 65–140)
POTASSIUM SERPL-SCNC: 3.8 MMOL/L (ref 3.6–5)
PROT SERPL-MCNC: 6.6 G/DL (ref 5.9–8.4)
SODIUM SERPL-SCNC: 138 MMOL/L (ref 137–147)

## 2019-11-02 PROCEDURE — 99223 1ST HOSP IP/OBS HIGH 75: CPT | Performed by: PHYSICIAN ASSISTANT

## 2019-11-02 PROCEDURE — 80053 COMPREHEN METABOLIC PANEL: CPT | Performed by: NURSE PRACTITIONER

## 2019-11-02 PROCEDURE — 82948 REAGENT STRIP/BLOOD GLUCOSE: CPT

## 2019-11-02 PROCEDURE — 93005 ELECTROCARDIOGRAM TRACING: CPT

## 2019-11-02 PROCEDURE — 99221 1ST HOSP IP/OBS SF/LOW 40: CPT | Performed by: PSYCHIATRY & NEUROLOGY

## 2019-11-02 RX ORDER — DIVALPROEX SODIUM 500 MG/1
500 TABLET, DELAYED RELEASE ORAL 2 TIMES DAILY
Status: DISCONTINUED | OUTPATIENT
Start: 2019-11-02 | End: 2019-11-08 | Stop reason: HOSPADM

## 2019-11-02 RX ORDER — LORAZEPAM 0.5 MG/1
0.5 TABLET ORAL EVERY 8 HOURS PRN
Status: DISCONTINUED | OUTPATIENT
Start: 2019-11-02 | End: 2019-11-08 | Stop reason: HOSPADM

## 2019-11-02 RX ORDER — DIPHENHYDRAMINE HCL 25 MG
25 TABLET ORAL 3 TIMES DAILY
Status: DISCONTINUED | OUTPATIENT
Start: 2019-11-02 | End: 2019-11-08 | Stop reason: HOSPADM

## 2019-11-02 RX ORDER — DULOXETIN HYDROCHLORIDE 20 MG/1
20 CAPSULE, DELAYED RELEASE ORAL DAILY
Status: DISCONTINUED | OUTPATIENT
Start: 2019-11-02 | End: 2019-11-08 | Stop reason: HOSPADM

## 2019-11-02 RX ADMIN — ATORVASTATIN CALCIUM 40 MG: 40 TABLET, FILM COATED ORAL at 21:20

## 2019-11-02 RX ADMIN — LEVOTHYROXINE SODIUM 25 MCG: 25 TABLET ORAL at 06:36

## 2019-11-02 RX ADMIN — SITAGLIPTIN 50 MG: 25 TABLET, FILM COATED ORAL at 09:24

## 2019-11-02 RX ADMIN — PROPRANOLOL HYDROCHLORIDE 20 MG: 20 TABLET ORAL at 21:20

## 2019-11-02 RX ADMIN — DIVALPROEX SODIUM 500 MG: 500 TABLET, DELAYED RELEASE ORAL at 17:03

## 2019-11-02 RX ADMIN — METFORMIN ER 500 MG 1000 MG: 500 TABLET ORAL at 09:25

## 2019-11-02 RX ADMIN — DIPHENHYDRAMINE HCL 25 MG: 25 TABLET ORAL at 17:23

## 2019-11-02 RX ADMIN — INSULIN LISPRO 4 UNITS: 100 INJECTION, SOLUTION INTRAVENOUS; SUBCUTANEOUS at 18:31

## 2019-11-02 RX ADMIN — LORAZEPAM 0.5 MG: 0.5 TABLET ORAL at 17:03

## 2019-11-02 RX ADMIN — B-COMPLEX W/ C & FOLIC ACID TAB 1 TABLET: TAB at 09:24

## 2019-11-02 RX ADMIN — INSULIN LISPRO 2 UNITS: 100 INJECTION, SOLUTION INTRAVENOUS; SUBCUTANEOUS at 09:24

## 2019-11-02 RX ADMIN — DIPHENHYDRAMINE HCL 25 MG: 25 TABLET ORAL at 21:20

## 2019-11-02 RX ADMIN — DIPHENHYDRAMINE HCL 25 MG: 25 TABLET ORAL at 17:02

## 2019-11-02 RX ADMIN — DULOXETINE HYDROCHLORIDE 20 MG: 20 CAPSULE, DELAYED RELEASE ORAL at 13:25

## 2019-11-02 RX ADMIN — DIVALPROEX SODIUM 500 MG: 500 TABLET, DELAYED RELEASE ORAL at 13:25

## 2019-11-02 RX ADMIN — TRAZODONE HYDROCHLORIDE 50 MG: 50 TABLET ORAL at 21:24

## 2019-11-02 RX ADMIN — PROPRANOLOL HYDROCHLORIDE 20 MG: 20 TABLET ORAL at 09:25

## 2019-11-02 NOTE — PROGRESS NOTES
pt has been awake, alert, and visible intermittently out in the milieu      Pt ate 100% breakfast, lunch   Social with staff   Rests in room at intervals  Denies any depression, anxiety, a/v hallucinations, and has not verbalized any delusions  Pt did not attend  group  Continue to monitor/assess for any changes

## 2019-11-02 NOTE — ASSESSMENT & PLAN NOTE
Patient with resting tremor on propranolol 20 mg daily  He states the tremor is bothering him more than normal   He requests increase medication  Recommend maintaining current dose as his tremor is slight  Increase if worsens  Recommend patient follow with PCP

## 2019-11-02 NOTE — PLAN OF CARE
Problem: Risk for Self Injury/Neglect  Goal: Verbalize thoughts and feelings  Description  Interventions:  - Assess and re-assess patient's lethality and potential for self-injury  - Engage patient in 1:1 interactions, daily, for a minimum of 15 minutes  - Encourage patient to express feelings, fears, frustrations, hopes  - Establish rapport/trust with patient   Outcome: Progressing  Goal: Refrain from harming self  Description  Interventions:  - Monitor patient closely, per order  - Develop a trusting relationship  - Supervise medication ingestion, monitor effects and side effects   Outcome: Progressing  Goal: Attend and participate in unit activities, including therapeutic, recreational, and educational groups  Description  Interventions:  - Provide therapeutic and educational activities daily, encourage attendance and participation, and document same in the medical record  - Obtain collateral information, encourage visitation and family involvement in care   Outcome: Not Progressing  Goal: Complete daily ADLs, including personal hygiene independently, as able  Description  Interventions:  - Observe, teach, and assist patient with ADLS  - Monitor and promote a balance of rest/activity, with adequate nutrition and elimination  Outcome: Not Progressing

## 2019-11-02 NOTE — PROGRESS NOTES
11/02/19 1700   Team Meeting   Meeting Type Daily Rounds   Initial Conference Date 11/02/19   Team Members Present   Team Members Present Physician;Nurse   Physician Team Member Dr Janusz Rosario Team Member [place]   Patient/Family Present   Patient Present No   Patient's Family Present No   Medical, labs and medications reviewed by team  Discharge to be determined by attending  Medication changes to be assessed

## 2019-11-02 NOTE — ASSESSMENT & PLAN NOTE
Lab Results   Component Value Date    HGBA1C 8 0 (H) 08/29/2019       Recent Labs     11/01/19  1221 11/01/19  1734 11/02/19  0817 11/02/19  1150   POCGLU 141* 175* 164* 147*       Blood Sugar Average: Last 72 hrs:  (P) 162     Noted recent hemoglobin A1c  Patient on metformin and Januvia as an outpatient  These are maintained here      Continue ISS with Accu-Cheks    Recommend diabetic diet

## 2019-11-02 NOTE — PROGRESS NOTES
Patient's mom called to inform staff of patient's behavior  Received verbal consent from patient with ok to speak to mother  Patient gets strong tremors and shakes from HALDOL (in site made aware - changes made)    Tere Pozo is requesting to speak to patient's psychiatrist and   Jackie 10 Cell     1  Patient lives at [facility] Joey Wetzel from [facility] will come to unit to bring patient underwear - clothes will not be given because patient throws all clothes away)  2  Patient is depressed but he loves to go to the hospital several times   3  Patient's grandmother passed away three years ago and mother was encouraged not to let patient know because of his condition at the time  Patient did ask for grandmother constantly but he was told she was in a coma  Patient's mother was strongly encouraged by patient's psychiatrist that patient should be notified of grandmothers death  Although mother was reluctant and disagreed because she didn't know how he was going to react  Tere Pozo said that he had recently told his mother that the doctor's should let his grandmother leave with his grandfather because she was suffering  Because of this comment she agreed to tell son of grandmother's death because she thought he would be ok with it  She informed patient of grandmother's death about a month ago but she made it seem like it had recently occurred  She stated she did not know what to expect but he was doing so well at the time  She was surprised that he took the news well  She was even more surprised when he met with her and his father that the meeting went well because patient does not get along with his father  4  Patient spoke to the aunt today and had a agitation level of 37 and an anxiety level of 23 while on the phone and after hanging up  He was sobbing on the phone about his grandmother's death saying he wants to go with her   Patient could not be given Ativan IM due to being PO Ativan an hour earlier  Patient could not be given Haldol because he said it gives him the shakes and tremors  In sight contacted  Patient was very calm 60 seconds after ending the conversation with his aunt  No PRNs needed  5  The aunt was distraught and called patient's mother   10  When patient is not spoken to in a manner that he wants he becomes agitated and throws things (filled cups, food, medical equipment, chairs, etc )   7  Patient showers with clothes on   8  Patient cannot write out a menu   9  Patient throws out clothes - clothes will not be brought to patient - he should wear hospital clothes   10   [facility] has requested the unit supply the patient with underwear until he can have some brought to him

## 2019-11-02 NOTE — PLAN OF CARE
Problem: Risk for Self Injury/Neglect  Goal: Verbalize thoughts and feelings  Description  Interventions:  - Assess and re-assess patient's lethality and potential for self-injury  - Engage patient in 1:1 interactions, daily, for a minimum of 15 minutes  - Encourage patient to express feelings, fears, frustrations, hopes  - Establish rapport/trust with patient   Outcome: Progressing  Goal: Refrain from harming self  Description  Interventions:  - Monitor patient closely, per order  - Develop a trusting relationship  - Supervise medication ingestion, monitor effects and side effects   Outcome: Progressing   Patient was visible on unit  Patient is med compliant  Patient has remained controlled  He has not had any other outbursts  Will continue to monitor for changes in current status

## 2019-11-02 NOTE — ASSESSMENT & PLAN NOTE
With suicidal ideation  Management per primary  We are consulted for medical management  Reviewed lab work and medical history  TSH normal     Has recent hemoglobin A1c  Will check fasting lipid panel
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
## 2019-11-03 LAB
ATRIAL RATE: 91 BPM
ATRIAL RATE: 96 BPM
CHOLEST SERPL-MCNC: 75 MG/DL
GLUCOSE SERPL-MCNC: 133 MG/DL (ref 65–140)
GLUCOSE SERPL-MCNC: 142 MG/DL (ref 65–140)
GLUCOSE SERPL-MCNC: 154 MG/DL (ref 65–140)
GLUCOSE SERPL-MCNC: 159 MG/DL (ref 65–140)
HDLC SERPL-MCNC: 20 MG/DL
LDLC SERPL CALC-MCNC: 33 MG/DL
NONHDLC SERPL-MCNC: 55 MG/DL
P AXIS: 35 DEGREES
P AXIS: 42 DEGREES
PR INTERVAL: 164 MS
PR INTERVAL: 176 MS
QRS AXIS: -4 DEGREES
QRS AXIS: -7 DEGREES
QRSD INTERVAL: 100 MS
QRSD INTERVAL: 94 MS
QT INTERVAL: 382 MS
QT INTERVAL: 382 MS
QTC INTERVAL: 469 MS
QTC INTERVAL: 482 MS
T WAVE AXIS: 34 DEGREES
T WAVE AXIS: 39 DEGREES
TRIGL SERPL-MCNC: 108 MG/DL
VENTRICULAR RATE: 91 BPM
VENTRICULAR RATE: 96 BPM

## 2019-11-03 PROCEDURE — 82948 REAGENT STRIP/BLOOD GLUCOSE: CPT

## 2019-11-03 PROCEDURE — 93010 ELECTROCARDIOGRAM REPORT: CPT | Performed by: INTERNAL MEDICINE

## 2019-11-03 PROCEDURE — 80061 LIPID PANEL: CPT | Performed by: PHYSICIAN ASSISTANT

## 2019-11-03 PROCEDURE — 99231 SBSQ HOSP IP/OBS SF/LOW 25: CPT | Performed by: PSYCHIATRY & NEUROLOGY

## 2019-11-03 RX ADMIN — DULOXETINE HYDROCHLORIDE 20 MG: 20 CAPSULE, DELAYED RELEASE ORAL at 08:32

## 2019-11-03 RX ADMIN — B-COMPLEX W/ C & FOLIC ACID TAB 1 TABLET: TAB at 08:32

## 2019-11-03 RX ADMIN — DIPHENHYDRAMINE HCL 25 MG: 25 TABLET ORAL at 08:32

## 2019-11-03 RX ADMIN — DIVALPROEX SODIUM 500 MG: 500 TABLET, DELAYED RELEASE ORAL at 08:32

## 2019-11-03 RX ADMIN — DIVALPROEX SODIUM 500 MG: 500 TABLET, DELAYED RELEASE ORAL at 17:04

## 2019-11-03 RX ADMIN — PROPRANOLOL HYDROCHLORIDE 20 MG: 20 TABLET ORAL at 21:35

## 2019-11-03 RX ADMIN — INSULIN LISPRO 2 UNITS: 100 INJECTION, SOLUTION INTRAVENOUS; SUBCUTANEOUS at 11:54

## 2019-11-03 RX ADMIN — LEVOTHYROXINE SODIUM 25 MCG: 25 TABLET ORAL at 06:17

## 2019-11-03 RX ADMIN — DIPHENHYDRAMINE HCL 25 MG: 25 TABLET ORAL at 21:35

## 2019-11-03 RX ADMIN — METFORMIN ER 500 MG 1000 MG: 500 TABLET ORAL at 08:32

## 2019-11-03 RX ADMIN — INSULIN LISPRO 2 UNITS: 100 INJECTION, SOLUTION INTRAVENOUS; SUBCUTANEOUS at 21:35

## 2019-11-03 RX ADMIN — PROPRANOLOL HYDROCHLORIDE 20 MG: 20 TABLET ORAL at 08:32

## 2019-11-03 RX ADMIN — SITAGLIPTIN 50 MG: 25 TABLET, FILM COATED ORAL at 08:32

## 2019-11-03 RX ADMIN — ATORVASTATIN CALCIUM 40 MG: 40 TABLET, FILM COATED ORAL at 21:35

## 2019-11-03 RX ADMIN — DIPHENHYDRAMINE HCL 25 MG: 25 TABLET ORAL at 17:04

## 2019-11-03 NOTE — TREATMENT PLAN
TREATMENT PLAN REVIEW - [address] Sanchez 46 y o  1980 male MRN: [id number]    [address] Room / Bed: [facility] 386/[facility] [address] Encounter: [id number]          Admit Date/Time:  11/1/2019  4:59 PM    Treatment Team: Attending Provider: Phillip Cole MD; Consulting Physician: Marilin Ross MD; Licensed Practical Nurse: Angela Camargo LPN; Physician Assistant: Silvia Day PA-C; Patient Care Assistant: Clive Salazar; Registered Nurse: Bell Good RN; Patient Care Technician: Taurus Lamas;  Occupational Therapist: Yuan Donnelly; Patient Care Technician: Jaclyn Ward; Patient Care Assistant: Maribell Ruiz    Diagnosis: Principal Problem:    Schizoaffective disorder, bipolar type ([facility] )  Active Problems:    CKD (chronic kidney disease) stage 2, GFR 60-89 ml/min    Type 2 diabetes mellitus with hyperglycemia ([facility] )    Hyperlipidemia    Hypothyroidism    Essential tremor    History of constipation    Patient Strengths: support at group home     Patient Barriers: chronic mental illness    Short Term Goals: decrease in depressive symptoms    Long Term Goals: stabilization of mood, free of suicidal thoughts    Progress Towards Goals: starting psychitric medications as prescribed, continue psychiatric medications as prescribed    Recommended Treatment: medication management, patient medication education, group therapy, milieu therapy, continued Behavioral Health psychiatric evaluation/assessment process     Treatment Frequency: daily medication monitoring, group and milieu therapy daily, monitoring through interdisciplinary rounds, monitoring through weekly patient care conferences    Expected Discharge Date:  4-5 days    Discharge Plan: referral for outpatient medication management with a psychiatrist, referral for outpatient psychotherapy    Treatment Plan Created/Updated By: Phillip Cole MD

## 2019-11-03 NOTE — PROGRESS NOTES
Mother called  Updated her, as patient states 0/4 anxiety and 0/10 depression  Eating and sleeping well  No tremors noted today

## 2019-11-03 NOTE — PROGRESS NOTES
Patient shaking and upset  Patient tearful and states he does not feel well and is anxious  Patient received Ativan for anxiety  Patient also received Atarax as indicated for EPS and tremors  Patient became upset during dinner due to the shaking and threw his tray on the ground  Patient was easily redirected

## 2019-11-03 NOTE — PROGRESS NOTES
Mom Marilyn Diego called and wanted an update on Pricilla Tierney  Mom also wanted to warn us of some of his behaviors  Mom states patient will do anything he can to stay in the hospital longer, he loves being in the hospital   Mom also states he tries to pick fights with her and said to her that he wants to stay as long as he can  Mom states patient was actually doing really well and his meds were working  Mom would like to touch base with  and doctor

## 2019-11-03 NOTE — PROGRESS NOTES
11/03/19 1200   Team Meeting   Meeting Type Daily Rounds   Initial Conference Date 11/03/19   Team Members Present   Team Members Present Physician;Nurse   Physician Team Member Dr Laura Cano Team Member Jacey Pizarro   Patient/Family Present   Patient Present No   Patient's Family Present No   Medical, labs and medications reviewed by team  Discharge to be determined by attending  Medication changes to be assessed

## 2019-11-03 NOTE — PROGRESS NOTES
Patient became more calm after the Ativan and Benadryl  Patient verbalized feeling better  Patient ate his dinner and continued with no more behaviors  Will continue to monitor
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
## 2019-11-04 LAB
GLUCOSE SERPL-MCNC: 138 MG/DL (ref 65–140)
GLUCOSE SERPL-MCNC: 148 MG/DL (ref 65–140)
GLUCOSE SERPL-MCNC: 179 MG/DL (ref 65–140)
GLUCOSE SERPL-MCNC: 181 MG/DL (ref 65–140)

## 2019-11-04 PROCEDURE — 82948 REAGENT STRIP/BLOOD GLUCOSE: CPT

## 2019-11-04 RX ORDER — TRIHEXYPHENIDYL HYDROCHLORIDE 2 MG/1
2 TABLET ORAL 3 TIMES DAILY
Status: DISCONTINUED | OUTPATIENT
Start: 2019-11-04 | End: 2019-11-08 | Stop reason: HOSPADM

## 2019-11-04 RX ADMIN — DIPHENHYDRAMINE HCL 25 MG: 25 TABLET ORAL at 17:30

## 2019-11-04 RX ADMIN — DIPHENHYDRAMINE HCL 25 MG: 25 TABLET ORAL at 21:50

## 2019-11-04 RX ADMIN — ATORVASTATIN CALCIUM 40 MG: 40 TABLET, FILM COATED ORAL at 21:50

## 2019-11-04 RX ADMIN — CHLORPROMAZINE HYDROCHLORIDE 25 MG: 25 INJECTION INTRAMUSCULAR at 20:36

## 2019-11-04 RX ADMIN — DIVALPROEX SODIUM 500 MG: 500 TABLET, DELAYED RELEASE ORAL at 17:30

## 2019-11-04 RX ADMIN — TRAZODONE HYDROCHLORIDE 50 MG: 50 TABLET ORAL at 22:30

## 2019-11-04 RX ADMIN — LEVOTHYROXINE SODIUM 25 MCG: 25 TABLET ORAL at 06:33

## 2019-11-04 RX ADMIN — INSULIN LISPRO 2 UNITS: 100 INJECTION, SOLUTION INTRAVENOUS; SUBCUTANEOUS at 21:52

## 2019-11-04 RX ADMIN — PROPRANOLOL HYDROCHLORIDE 20 MG: 20 TABLET ORAL at 21:50

## 2019-11-04 RX ADMIN — SITAGLIPTIN 50 MG: 25 TABLET, FILM COATED ORAL at 08:40

## 2019-11-04 RX ADMIN — DIPHENHYDRAMINE HCL 25 MG: 25 TABLET ORAL at 08:40

## 2019-11-04 RX ADMIN — DULOXETINE HYDROCHLORIDE 20 MG: 20 CAPSULE, DELAYED RELEASE ORAL at 08:41

## 2019-11-04 RX ADMIN — B-COMPLEX W/ C & FOLIC ACID TAB 1 TABLET: TAB at 08:41

## 2019-11-04 RX ADMIN — TRIHEXYPHENIDYL HYDROCHLORIDE 2 MG: 2 TABLET ORAL at 21:50

## 2019-11-04 RX ADMIN — METFORMIN ER 500 MG 1000 MG: 500 TABLET ORAL at 08:40

## 2019-11-04 RX ADMIN — INSULIN LISPRO 2 UNITS: 100 INJECTION, SOLUTION INTRAVENOUS; SUBCUTANEOUS at 17:30

## 2019-11-04 RX ADMIN — DIVALPROEX SODIUM 500 MG: 500 TABLET, DELAYED RELEASE ORAL at 08:41

## 2019-11-04 NOTE — NURSING NOTE
Patient's mother called early in the evening to question if patient is starting Cymbalta  Patient had called mother to inform her of the change , however, this medication was started on Friday not Saturday and mother was informed  Patient had a good day and showed no tremors all day  Patient no longer tearful and cooperative with staff  Offered no complaits of any kind  Patient did go to bed early and was sleeping when mom called

## 2019-11-04 NOTE — TELEPHONE ENCOUNTER
Although this refill was confirmed at the pharmacy somehow its still in my encounter and until all involved signs off it will remain there indefinitly  I believe it must be signed from this original request as well      Thank You

## 2019-11-04 NOTE — PROGRESS NOTES
Patient cooperative throughout the shift  Patient was upset earlier in the shift as he was told he may not be going home tomorrow  Patient feels he is ready and is denying all psych symptoms  This note writer spoke to patient and advised him that acting out was not advisable and was not the way to get out of here  Patient was redirectable and had no behaviors

## 2019-11-04 NOTE — SOCIAL WORK
Pt cooperative during initial biopsychosocial assessment  Pt's mood and affect anxious  Pt's thought process/content appropriate/organized  Pt's speech pressured with stuttering (baseline), appearance disheveled and eye contact appropriate  Pt reports "I don't know why I came to the hospital, I don't need to be here"  Pt reports he initially came due to some depression in regards to his grandmother who passed away 3 years ago  Pt reports his mother just let him know his grandmother passed about 1 month ago  Pt reports he does feel he is able to cope with this appropriately at this time  Pt reports his mother spoke with pt and says he likes to be in the hospital  Pt reports he came to the hospital this time to feel safe but is requesting to be discharged because he no longer needs to be in the hospital and will be safe at his group home  Pt's UDS -  Pt denies D/A  Pt denies TOB use  Pt denied hx of abuse  Pt's grandmother passed away about 3 years ago  Pt's mother just recently told pt of grandmother's death about 1 month ago  Pt denies legal     Pt with long hx of inpatient psychiatric hospitalizations  Pt's most recent was in 4/2019 at [facility]  Pt reports he has not tried to self harm since last hospitalization  Pt lives at [facility]  Pt's primary  is Maria Eugenia Oleary ([phone])  Pt sees Dr Miquel Garduno at [facility]  Pt has therapist Mahamed at [facility]  Pt utilizes [facility]  Pt is currently single, never  with no kids  Pt lives at [facility]  Pt's mother/brother are pt's legal guardians  Pt has support from both  Pt receives SSI

## 2019-11-04 NOTE — PROGRESS NOTES
Casey Márquez  was seen for continuing care and reviewed with staff  Progress Note - Behavioral Health   Casey Márqeuz 46 y o  male MRN: @MRN   Unit/Bed#: [facility] 386-01 Encounter: [id number]       Report from staff regarding this patient received and discussed, and records reviewed prior to seeing this patient   The patient is found to be in less distress  Communicated better his thoughts and feeling, no longer stuttering  After medication adjustment done yesterday, his tremor practically disappeared  The was no anger agitation or throwing things in the unit yesterday and today  The patient feels subjectively better  Sleep: improved  Appetite: normal    Medication side effects:no complaints    Mental Status Evaluation:   In hospital attire, slow rate and low volume of speech  [place] thoughts  Anxious, guarded  Restricted affect but improved since yesterday  No visible tremor  Denied suicidal or homicidal thoughts  Less paranoid  Not responding to internal stimuli  [place] thoughts  Language appropriate  Associations intact  Recent remote memory is normal   The patient is alert oriented x3  Insight and judgment restricted because of severe chronic mental illness but slightly improving  Laboratory results:  I have personally reviewed all pertinent laboratory results      BMP:   Recent Labs     10/31/19  2355 11/02/19  0758   K 3 7 3 8    101   CO2 25 30   BUN 11 11     Vitals:    11/03/19 1535   BP: 126/89   Pulse: 98   Resp: 16   Temp: (!) 97 2 °F (36 2 °C)   SpO2:         Assessment/Plan   Principal Problem:    Schizoaffective disorder, bipolar type (HCC)  Active Problems:    CKD (chronic kidney disease) stage 2, GFR 60-89 ml/min    Type 2 diabetes mellitus with hyperglycemia (HCC)    Hyperlipidemia    Hypothyroidism    Essential tremor    History of constipation      Recommended Treatment:   Continue the present medications, the medication adjustment the patient started feeling better both mentally and physically without any visible tremor  Planned medication and treatment changes: All current active medications have been reviewed  Continue treatment with group therapy, milieu therapy, occupational therapy and medication management  Risks / Benefits of Treatment:    Risks, benefits, and possible side effects of medications explained to patient and patient verbalizes understanding and agreement for treatment  Planned medication and treatment changes: All current active medications have been reviewed  Continue treatment with group therapy, milieu therapy, occupational therapy and medication management  Counseling / Coordination of Care:    Patient's progress reviewed with nursing staff  ** Please Note: This note has been constructed using a voice recognition system   **      ----------------------------------------------------------------------------------------------------------------

## 2019-11-04 NOTE — PROGRESS NOTES
11/04/19 0857   Team Meeting   Meeting Type Tx Team Meeting   Initial Conference Date 11/04/19   Team Members Present   Team Members Present Physician;Nurse;   Physician Team Member Dr Vidal Mesa Team Member [facility] Team Member Miriam Willis Coma   Patient/Family Present   Patient Present Yes   Patient's Family Present No   Psychiatrist discussed treatment plan and goals  Medications were discussed and attending group therapy was encouraged  Patient reports doing better and tremors have improved  Patient requesting discharge for tomorrow  Psychiatrist discussed discharge for later this week to coordinate with group home  Patient in agreement with plan and signed it       Strengths: health insurance, income, and stable housing  Limitations: chronic mh, poor insight, and intellectual delay

## 2019-11-04 NOTE — PROGRESS NOTES
Progress Note - Behavioral Health   Tracie Montenegro 46 y o  male MRN: [id number]  Unit/Bed#: [facility] 386-01 Encounter: [id number]    The patient was seen for continuing care and reviewed with treatment team  Staff reports that the patient remains visible on the unit, cooperative with staff, and compliant with medications  During assessment the patient remains focused on discharge  Patient continues to appear anxious, but denies any depressive symptoms  Denies any thoughts to hurt himself or others  Denies any psychotic symptoms  No paranoia or suspicious behavior is noted  Thoughts remain concrete  Denies any side effects to medications  Spoke with the mother regarding possible discharge for tomorrow and mother is in agreement  Collaborated care with  and will contact CM for arrangement   No current medication changes    Mental Status Evaluation:  Appearance:  Adequate hygiene and grooming   Behavior:  cooperative   Fund of knowledge  Not assessed   Speech:   Language: Normal rate and Normal volume; repetitive  No overt abnormality   Mood:  anxious, but improving   Affect:   Associations: constricted  Loosely connected   Thought Process:  Circumstantial   Thought Content:  Obsessive ruminations   Perceptual Disturbances: Uncertain, but denies AVH   Risk Potential: No suicidal or homicidal ideation   Orientation  Oriented x 3   Memory Not tested   Attention/Concentration attention span appeared shorter than expected for age   Insight:  limited   Judgment: Limited   Gait/Station: normal gait/station and normal balance   Motor Activity: No abnormal movement noted     Progress Toward Goals: improving  Assessment/Plan    Principal Problem:    Schizoaffective disorder, bipolar type ([facility])  Active Problems:    CKD (chronic kidney disease) stage 2, GFR 60-89 ml/min    Type 2 diabetes mellitus with hyperglycemia (HCC)    Hyperlipidemia    Hypothyroidism    Essential tremor    History of constipation      Recommended Treatment: Continue with pharmacotherapy, group therapy, milieu therapy and occupational therapy  The patient will be maintained on the following medications:    Current Facility-Administered Medications:  acetaminophen 650 mg Oral Q6H PRN CHRISTIANA Pineda   atorvastatin 40 mg Oral HS Vicki Page MD   chlorproMAZINE 25 mg Intramuscular Q6H PRN Kwame Quijano MD   chlorproMAZINE 25 mg Oral Q6H PRN Kwame Quijano MD   diphenhydrAMINE 25 mg Oral Q6H PRN Kwame Quijano MD   diphenhydrAMINE 25 mg Oral TID Harjinder Mcnair MD   divalproex sodium 500 mg Oral BID Harjinder Mcnair MD   DULoxetine 20 mg Oral Daily Harjinder Mcnair MD   hydrOXYzine HCL 25 mg Oral Q4H PRN CHRISTIANA Pineda   insulin lispro 2-12 Units Subcutaneous TID AC Vicki Page MD   insulin lispro 2-12 Units Subcutaneous HS Vicki Page MD   levothyroxine 25 mcg Oral Early Morning Vicki Page MD   LORazepam 0 5 mg Oral Q8H PRN Harjinder Mcnair MD   metFORMIN 1,000 mg Oral Daily With Breakfast Vicki Page MD   multivitamin stress formula 1 tablet Oral Daily Vicki Page MD   propranolol 20 mg Oral Q12H [address] Vicki Page MD   sitaGLIPtin 50 mg Oral Daily Vicki Page MD   traZODone 50 mg Oral HS PRN CHRISTIANA Pineda       Risks, benefits and possible side effects of Medications:   Risks, benefits, and possible side effects of medications explained to patient and patient verbalizes understanding

## 2019-11-04 NOTE — PROGRESS NOTES
11/04/19 0856   Team Meeting   Meeting Type Daily Rounds   Initial Conference Date 11/04/19   Team Members Present   Team Members Present Physician;Nurse;   Physician Team Member Dr Ele Hdz Team Member [facility] Team Member Melanie Goel   Patient/Family Present   Patient Present No   Patient's Family Present No   Chart and labs were reviewed  Medications to be monitored  Patient visible but not social   Discharge date to be determined

## 2019-11-04 NOTE — PROGRESS NOTES
[facility] GROUP NOTE     11/04/19 9209   Activity/Group Checklist   Group Life Skills  (Parts Of A House Recovery Activity)   Attendance Attended   Attendance Duration (min) 0-15   Interactions Other (Comment)  (Verbally Scant, Sat and observed, left after 15 minutes  )   Affect/Mood Appropriate

## 2019-11-05 LAB
ALBUMIN SERPL BCP-MCNC: 4 G/DL (ref 3–5.2)
ALP SERPL-CCNC: 62 U/L (ref 43–122)
ALT SERPL W P-5'-P-CCNC: 61 U/L (ref 9–52)
AST SERPL W P-5'-P-CCNC: 42 U/L (ref 17–59)
ATRIAL RATE: 90 BPM
BILIRUB DIRECT SERPL-MCNC: 0.3 MG/DL
BILIRUB SERPL-MCNC: 1.1 MG/DL
GLUCOSE SERPL-MCNC: 104 MG/DL (ref 65–140)
GLUCOSE SERPL-MCNC: 105 MG/DL (ref 65–140)
GLUCOSE SERPL-MCNC: 116 MG/DL (ref 65–140)
GLUCOSE SERPL-MCNC: 157 MG/DL (ref 65–140)
P AXIS: 56 DEGREES
PR INTERVAL: 168 MS
PROT SERPL-MCNC: 7.2 G/DL (ref 5.9–8.4)
QRS AXIS: -3 DEGREES
QRSD INTERVAL: 100 MS
QT INTERVAL: 390 MS
QTC INTERVAL: 477 MS
T WAVE AXIS: 51 DEGREES
VENTRICULAR RATE: 90 BPM

## 2019-11-05 PROCEDURE — 82948 REAGENT STRIP/BLOOD GLUCOSE: CPT

## 2019-11-05 PROCEDURE — 93010 ELECTROCARDIOGRAM REPORT: CPT | Performed by: INTERNAL MEDICINE

## 2019-11-05 PROCEDURE — 80076 HEPATIC FUNCTION PANEL: CPT | Performed by: PSYCHIATRY & NEUROLOGY

## 2019-11-05 RX ORDER — PALIPERIDONE 3 MG/1
3 TABLET, EXTENDED RELEASE ORAL DAILY
Status: DISCONTINUED | OUTPATIENT
Start: 2019-11-06 | End: 2019-11-06

## 2019-11-05 RX ADMIN — DIVALPROEX SODIUM 500 MG: 500 TABLET, DELAYED RELEASE ORAL at 17:05

## 2019-11-05 RX ADMIN — TRAZODONE HYDROCHLORIDE 50 MG: 50 TABLET ORAL at 21:18

## 2019-11-05 RX ADMIN — DULOXETINE HYDROCHLORIDE 20 MG: 20 CAPSULE, DELAYED RELEASE ORAL at 08:12

## 2019-11-05 RX ADMIN — B-COMPLEX W/ C & FOLIC ACID TAB 1 TABLET: TAB at 08:12

## 2019-11-05 RX ADMIN — DIPHENHYDRAMINE HCL 25 MG: 25 TABLET ORAL at 15:42

## 2019-11-05 RX ADMIN — PROPRANOLOL HYDROCHLORIDE 20 MG: 20 TABLET ORAL at 21:17

## 2019-11-05 RX ADMIN — CHLORPROMAZINE HYDROCHLORIDE 25 MG: 25 TABLET, SUGAR COATED ORAL at 11:10

## 2019-11-05 RX ADMIN — PROPRANOLOL HYDROCHLORIDE 20 MG: 20 TABLET ORAL at 08:11

## 2019-11-05 RX ADMIN — METFORMIN ER 500 MG 1000 MG: 500 TABLET ORAL at 08:12

## 2019-11-05 RX ADMIN — TRIHEXYPHENIDYL HYDROCHLORIDE 2 MG: 2 TABLET ORAL at 15:42

## 2019-11-05 RX ADMIN — INSULIN LISPRO 2 UNITS: 100 INJECTION, SOLUTION INTRAVENOUS; SUBCUTANEOUS at 08:11

## 2019-11-05 RX ADMIN — ATORVASTATIN CALCIUM 40 MG: 40 TABLET, FILM COATED ORAL at 21:17

## 2019-11-05 RX ADMIN — TRIHEXYPHENIDYL HYDROCHLORIDE 2 MG: 2 TABLET ORAL at 08:12

## 2019-11-05 RX ADMIN — DIPHENHYDRAMINE HCL 25 MG: 25 TABLET ORAL at 21:17

## 2019-11-05 RX ADMIN — TRIHEXYPHENIDYL HYDROCHLORIDE 2 MG: 2 TABLET ORAL at 21:17

## 2019-11-05 RX ADMIN — LEVOTHYROXINE SODIUM 25 MCG: 25 TABLET ORAL at 06:19

## 2019-11-05 RX ADMIN — DIPHENHYDRAMINE HCL 25 MG: 25 TABLET ORAL at 08:13

## 2019-11-05 RX ADMIN — DIPHENHYDRAMINE HCL 25 MG: 25 TABLET ORAL at 23:03

## 2019-11-05 RX ADMIN — DIVALPROEX SODIUM 500 MG: 500 TABLET, DELAYED RELEASE ORAL at 08:11

## 2019-11-05 RX ADMIN — SITAGLIPTIN 50 MG: 25 TABLET, FILM COATED ORAL at 08:12
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
## 2019-11-05 NOTE — PROGRESS NOTES
Pt had meeting with Christian Vivar his counselor  Visit went well  Pt continues to appear to touch wall repeatedly as if it is a compulsion  Will continue to monitor  Mood is pleasant
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
## 2019-11-05 NOTE — PLAN OF CARE
Problem: Risk for Self Injury/Neglect  Goal: Treatment Goal: Remain safe during length of stay, learn and adopt new coping skills, and be free of self-injurious ideation, impulses and acts at the time of discharge  Outcome: Progressing  Goal: Verbalize thoughts and feelings  Description  Interventions:  - Assess and re-assess patient's lethality and potential for self-injury  - Engage patient in 1:1 interactions, daily, for a minimum of 15 minutes  - Encourage patient to express feelings, fears, frustrations, hopes  - Establish rapport/trust with patient   Outcome: Progressing  Goal: Refrain from harming self  Description  Interventions:  - Monitor patient closely, per order  - Develop a trusting relationship  - Supervise medication ingestion, monitor effects and side effects   Outcome: Progressing  Goal: Attend and participate in unit activities, including therapeutic, recreational, and educational groups  Description  Interventions:  - Provide therapeutic and educational activities daily, encourage attendance and participation, and document same in the medical record  - Obtain collateral information, encourage visitation and family involvement in care   Outcome: Progressing  Goal: Recognize maladaptive responses and adopt new coping mechanisms  Outcome: Progressing  Goal: Complete daily ADLs, including personal hygiene independently, as able  Description  Interventions:  - Observe, teach, and assist patient with ADLS  - Monitor and promote a balance of rest/activity, with adequate nutrition and elimination  Outcome: Progressing     Problem: Depression  Goal: Treatment Goal: Demonstrate behavioral control of depressive symptoms, verbalize feelings of improved mood/affect, and adopt new coping skills prior to discharge  Outcome: Progressing  Goal: Verbalize thoughts and feelings  Description  Interventions:  - Assess and re-assess patient's level of risk   - Engage patient in 1:1 interactions, daily, for a minimum of 15 minutes   - Encourage patient to express feelings, fears, frustrations, hopes   Outcome: Progressing  Goal: Refrain from harming self  Description  Interventions:  - Monitor patient closely, per order   - Supervise medication ingestion, monitor effects and side effects   Outcome: Progressing  Goal: Refrain from isolation  Description  Interventions:  - Develop a trusting relationship   - Encourage socialization   Outcome: Progressing  Goal: Refrain from self-neglect  Outcome: Progressing  Goal: Attend and participate in unit activities, including therapeutic, recreational, and educational groups  Description  Interventions:  - Provide therapeutic and educational activities daily, encourage attendance and participation, and document same in the medical record   Outcome: Progressing  Goal: Complete daily ADLs, including personal hygiene independently, as able  Description  Interventions:  - Observe, teach, and assist patient with ADLS  -  Monitor and promote a balance of rest/activity, with adequate nutrition and elimination   Outcome: Progressing     Problem: DISCHARGE PLANNING  Goal: Discharge to home or other facility with appropriate resources  Description  INTERVENTIONS:  - Identify barriers to discharge w/patient and caregiver  - Arrange for needed discharge resources and transportation as appropriate  - Identify discharge learning needs (meds, wound care, etc )  - Arrange for interpretive services to assist at discharge as needed  - Refer to Case Management Department for coordinating discharge planning if the patient needs post-hospital services based on physician/advanced practitioner order or complex needs related to functional status, cognitive ability, or social support system  Outcome: Progressing
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
## 2019-11-05 NOTE — SOCIAL WORK
Worker spoke with Lizet Hood at [email or web address]  Lizet Killer in agreement with pt being discharged on 11/5  Lizet Hood felt pt did not need inpatient tx but presented to the ED making statements that would get him admitted  Worker and Lizet Hood arranged PDS discharge meeting for 1 PM with pt being picked up at 4  Pt utilizes [address] [place] [address]

## 2019-11-05 NOTE — SOCIAL WORK
Worker spoke with pt's mother  Pt's mother wants pt to be discharged  Mother believes pt likes coming to the hospital and thinks it is his "safe haven"  Mother reports once pt is in the hospital he wants to leave  Mother reports pt will do negative things in which he believes will get him discharge but in reality it keeps him in the hospital longer  Mother states pt "knows what to say" to get into the hospital  Mother explains this is what pt does and it will happen again  Mother does not want any med changes as she feels pt has been doing well working with his outpatient psychiatrist   Mother would like pt discharged on 11/5  Worker will remain in contact

## 2019-11-05 NOTE — PROGRESS NOTES
11/05/19 0900   Team Meeting   Meeting Type Daily Rounds   Team Members Present   Team Members Present Physician;;Nurse; Other (Discipline and Name)   Physician Team Member [id number] Team Member NNAMDI LEIJA [facility] Management Team Member DRE Hamilton   Other (Discipline and Name) [facility], LUZ MARIA 2 nurskng students   Patient/Family Present   Patient Present No   Patient's Family Present No     CM will talk to group home about possible d/c today

## 2019-11-06 DIAGNOSIS — E03.9 HYPOTHYROIDISM, UNSPECIFIED TYPE: ICD-10-CM

## 2019-11-06 DIAGNOSIS — E78.49 OTHER HYPERLIPIDEMIA: ICD-10-CM

## 2019-11-06 DIAGNOSIS — F25.0 SCHIZOAFFECTIVE DISORDER, BIPOLAR TYPE (HCC): Chronic | ICD-10-CM

## 2019-11-06 DIAGNOSIS — E78.5 HYPERLIPIDEMIA, UNSPECIFIED HYPERLIPIDEMIA TYPE: ICD-10-CM

## 2019-11-06 LAB
GLUCOSE SERPL-MCNC: 114 MG/DL (ref 65–140)
GLUCOSE SERPL-MCNC: 118 MG/DL (ref 65–140)
GLUCOSE SERPL-MCNC: 158 MG/DL (ref 65–140)
GLUCOSE SERPL-MCNC: 73 MG/DL (ref 65–140)
VALPROATE SERPL-MCNC: 44 UG/ML (ref 50–120)

## 2019-11-06 PROCEDURE — 99231 SBSQ HOSP IP/OBS SF/LOW 25: CPT | Performed by: NURSE PRACTITIONER

## 2019-11-06 PROCEDURE — 82948 REAGENT STRIP/BLOOD GLUCOSE: CPT

## 2019-11-06 PROCEDURE — 80164 ASSAY DIPROPYLACETIC ACD TOT: CPT | Performed by: NURSE PRACTITIONER

## 2019-11-06 RX ORDER — PALIPERIDONE 6 MG/1
6 TABLET, EXTENDED RELEASE ORAL DAILY
Status: DISCONTINUED | OUTPATIENT
Start: 2019-11-07 | End: 2019-11-08 | Stop reason: HOSPADM

## 2019-11-06 RX ADMIN — TRIHEXYPHENIDYL HYDROCHLORIDE 2 MG: 2 TABLET ORAL at 08:21

## 2019-11-06 RX ADMIN — DULOXETINE HYDROCHLORIDE 20 MG: 20 CAPSULE, DELAYED RELEASE ORAL at 08:23

## 2019-11-06 RX ADMIN — B-COMPLEX W/ C & FOLIC ACID TAB 1 TABLET: TAB at 08:23

## 2019-11-06 RX ADMIN — METFORMIN ER 500 MG 1000 MG: 500 TABLET ORAL at 08:22

## 2019-11-06 RX ADMIN — DIVALPROEX SODIUM 500 MG: 500 TABLET, DELAYED RELEASE ORAL at 08:22

## 2019-11-06 RX ADMIN — DIVALPROEX SODIUM 500 MG: 500 TABLET, DELAYED RELEASE ORAL at 17:10

## 2019-11-06 RX ADMIN — DIPHENHYDRAMINE HCL 25 MG: 25 TABLET ORAL at 17:10

## 2019-11-06 RX ADMIN — DIPHENHYDRAMINE HCL 25 MG: 25 TABLET ORAL at 08:22

## 2019-11-06 RX ADMIN — PALIPERIDONE 3 MG: 3 TABLET, EXTENDED RELEASE ORAL at 08:22

## 2019-11-06 RX ADMIN — LEVOTHYROXINE SODIUM 25 MCG: 25 TABLET ORAL at 06:32

## 2019-11-06 RX ADMIN — ATORVASTATIN CALCIUM 40 MG: 40 TABLET, FILM COATED ORAL at 21:17

## 2019-11-06 RX ADMIN — PROPRANOLOL HYDROCHLORIDE 20 MG: 20 TABLET ORAL at 08:21

## 2019-11-06 RX ADMIN — DIPHENHYDRAMINE HCL 25 MG: 25 TABLET ORAL at 21:17

## 2019-11-06 RX ADMIN — TRIHEXYPHENIDYL HYDROCHLORIDE 2 MG: 2 TABLET ORAL at 17:10

## 2019-11-06 RX ADMIN — TRIHEXYPHENIDYL HYDROCHLORIDE 2 MG: 2 TABLET ORAL at 21:17

## 2019-11-06 RX ADMIN — INSULIN LISPRO 2 UNITS: 100 INJECTION, SOLUTION INTRAVENOUS; SUBCUTANEOUS at 17:28

## 2019-11-06 RX ADMIN — SITAGLIPTIN 50 MG: 25 TABLET, FILM COATED ORAL at 08:21

## 2019-11-06 NOTE — PLAN OF CARE
Problem: Risk for Self Injury/Neglect  Goal: Verbalize thoughts and feelings  Description  Interventions:  - Assess and re-assess patient's lethality and potential for self-injury  - Engage patient in 1:1 interactions, daily, for a minimum of 15 minutes  - Encourage patient to express feelings, fears, frustrations, hopes  - Establish rapport/trust with patient   Outcome: Progressing  Goal: Refrain from harming self  Description  Interventions:  - Monitor patient closely, per order  - Develop a trusting relationship  - Supervise medication ingestion, monitor effects and side effects   Outcome: Progressing  Goal: Complete daily ADLs, including personal hygiene independently, as able  Description  Interventions:  - Observe, teach, and assist patient with ADLS  - Monitor and promote a balance of rest/activity, with adequate nutrition and elimination  Outcome: Progressing Status Change:    S/O: Called to bedside by RN for sustained desaturations in the upper 80s after breastfeeding attempt. Instructed RN to try prone positioning and I would come to the bedside to assess. Infant asleep in open bassinet in prone positioning in room air upon my arrival. SpO2 93-96% in prone position but then dipping again to upper 80s. RR 50s-80s, HR 130s, normothermic. Lung sounds clear and well aerated throughout. No murmur ascultated. Good perfusion. Active bowel sounds. Infant awakened appropriately with exam. Infant was weaned from CPAP to room air since 8/12.   A: Sepsis vs. UTI vs. fatigue from increased PO intake. After further discussion with RN, it sounds like infant was bathed prior to most recent breastfeeding attempt and had had multiple breastfeeding attempts in a row and may be tiring out, but cannot rule out possibility of infection.  P: Place in 1/2 LPM, blended. Notify NNP for O2 need >35%. Sepsis evaluation including blood culture, UA/UC, CBC d/p, CRP, chest/ab xray, and initiation of antibiotics. Plan discussed with fellow, who also ran the plan by the attending.    Mother was updated on plan with . Mother conveyed relief that we are looking into why Tae is having more desaturations.    AMARILIS Hernandez, CNP, NNP-BC 2018 7:20 PM

## 2019-11-06 NOTE — PROGRESS NOTES
11/06/19 0844   Team Meeting   Meeting Type Daily Rounds   Initial Conference Date 11/06/19   Team Members Present   Team Members Present Physician;Nurse;   Physician Team Member Dr Paco Mendez Team Member [facility] Management Team Member Dimitri Maradiaga   Patient/Family Present   Patient Present No   Patient's Family Present No   Chart and labs were reviewed  Medications to be monitored  Discharge date to be determined

## 2019-11-06 NOTE — NURSING NOTE
Patient visible on unit throughout the morning  Upon approach, patient was easy to engage with  Focused on discharge  Stressed he had a "good night last night" Support given  AM medication compliant  Refused to participate in group activities  Will monitor

## 2019-11-06 NOTE — NURSING NOTE
Patient presented to nurse's station and reported restlessness and inability to fall asleep  Patient presented with psychomotor agitation and was shuffling his feet in place  Patient was administered PRN Benadryl 25 at 2303 for possible EPS  Patient returned to bed but got up again a short time later  Patient remained restless and was in and out of bed for remainder of the night  Patient did not appear to sleep at all and appeared to be highly anxious  Patient was frequently staring into nurse's station and appeared to be responding to internal stimuli  Patient witnessed waving his hand and pointing towards the nurse's station on several occassions  When approached by staff patient reported he was waving at the "person in the computer screen " Patient also witnessed sitting in the back seating area punching the air  Patient denied experiencing visual hallucinations when asked and refused multiple offers for PRN medication for anxiety  Patient frequently stated "I am fine " Patient currently in bed  Will continue to monitor closely
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
## 2019-11-06 NOTE — SOCIAL WORK
Worker contacted [facility] and canceled pt's appointment scheduled for 11/7   Pt has another appointment at [facility] on 11/11 at 3:15PM

## 2019-11-06 NOTE — NURSING NOTE
Patient was cooperative and visible on unit in common areas during the evening  Patient appeared to be anxious but was pleasant on approach  Patient was compliant with medications  Patient currently in bed  Will continue to monitor closely

## 2019-11-06 NOTE — SOCIAL WORK
Worker contacted pt's mother to notify her of planned discharge for 11/8  Mother in agreement  Mother "is fed up" with pt  Mother informed of follow up appointments

## 2019-11-07 LAB
GLUCOSE SERPL-MCNC: 118 MG/DL (ref 65–140)
GLUCOSE SERPL-MCNC: 131 MG/DL (ref 65–140)
GLUCOSE SERPL-MCNC: 134 MG/DL (ref 65–140)
GLUCOSE SERPL-MCNC: 92 MG/DL (ref 65–140)

## 2019-11-07 PROCEDURE — 99231 SBSQ HOSP IP/OBS SF/LOW 25: CPT | Performed by: NURSE PRACTITIONER

## 2019-11-07 PROCEDURE — 82948 REAGENT STRIP/BLOOD GLUCOSE: CPT

## 2019-11-07 RX ORDER — LORATADINE 10 MG/1
10 TABLET ORAL DAILY
Qty: 30 TABLET | Refills: 5 | Status: SHIPPED | OUTPATIENT
Start: 2019-11-07 | End: 2019-11-08 | Stop reason: SDUPTHER

## 2019-11-07 RX ORDER — CALCIUM CARBONATE 500(1250)
1 TABLET ORAL
Qty: 30 TABLET | Refills: 5 | Status: SHIPPED | OUTPATIENT
Start: 2019-11-07 | End: 2019-11-08 | Stop reason: SDUPTHER

## 2019-11-07 RX ORDER — ATORVASTATIN CALCIUM 40 MG/1
40 TABLET, FILM COATED ORAL
Qty: 30 TABLET | Refills: 5 | Status: SHIPPED | OUTPATIENT
Start: 2019-11-07 | End: 2019-11-08 | Stop reason: SDUPTHER

## 2019-11-07 RX ORDER — LEVOTHYROXINE SODIUM 0.03 MG/1
25 TABLET ORAL
Qty: 30 TABLET | Refills: 5 | Status: SHIPPED | OUTPATIENT
Start: 2019-11-07 | End: 2019-11-08 | Stop reason: SDUPTHER

## 2019-11-07 RX ADMIN — TRIHEXYPHENIDYL HYDROCHLORIDE 2 MG: 2 TABLET ORAL at 17:37

## 2019-11-07 RX ADMIN — TRIHEXYPHENIDYL HYDROCHLORIDE 2 MG: 2 TABLET ORAL at 08:23

## 2019-11-07 RX ADMIN — DIVALPROEX SODIUM 500 MG: 500 TABLET, DELAYED RELEASE ORAL at 08:22

## 2019-11-07 RX ADMIN — ACETAMINOPHEN 650 MG: 325 TABLET ORAL at 06:12

## 2019-11-07 RX ADMIN — SITAGLIPTIN 50 MG: 25 TABLET, FILM COATED ORAL at 08:22

## 2019-11-07 RX ADMIN — PALIPERIDONE 6 MG: 6 TABLET, FILM COATED, EXTENDED RELEASE ORAL at 08:22

## 2019-11-07 RX ADMIN — B-COMPLEX W/ C & FOLIC ACID TAB 1 TABLET: TAB at 08:22

## 2019-11-07 RX ADMIN — METFORMIN ER 500 MG 1000 MG: 500 TABLET ORAL at 08:22

## 2019-11-07 RX ADMIN — DIPHENHYDRAMINE HCL 25 MG: 25 TABLET ORAL at 17:37

## 2019-11-07 RX ADMIN — DIVALPROEX SODIUM 500 MG: 500 TABLET, DELAYED RELEASE ORAL at 17:37

## 2019-11-07 RX ADMIN — LEVOTHYROXINE SODIUM 25 MCG: 25 TABLET ORAL at 06:12

## 2019-11-07 RX ADMIN — PROPRANOLOL HYDROCHLORIDE 20 MG: 20 TABLET ORAL at 08:22

## 2019-11-07 RX ADMIN — ATORVASTATIN CALCIUM 40 MG: 40 TABLET, FILM COATED ORAL at 21:08

## 2019-11-07 RX ADMIN — PROPRANOLOL HYDROCHLORIDE 20 MG: 20 TABLET ORAL at 21:28

## 2019-11-07 RX ADMIN — DULOXETINE HYDROCHLORIDE 20 MG: 20 CAPSULE, DELAYED RELEASE ORAL at 08:22

## 2019-11-07 RX ADMIN — DIPHENHYDRAMINE HCL 25 MG: 25 TABLET ORAL at 08:22

## 2019-11-07 RX ADMIN — DIPHENHYDRAMINE HCL 25 MG: 25 TABLET ORAL at 21:08

## 2019-11-07 RX ADMIN — TRIHEXYPHENIDYL HYDROCHLORIDE 2 MG: 2 TABLET ORAL at 21:08
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
## 2019-11-07 NOTE — PROGRESS NOTES
11/07/19 1303   Team Meeting   Meeting Type Daily Rounds   Initial Conference Date 11/07/19   Team Members Present   Team Members Present Physician;Nurse;   Physician Team Member   [address] Team Member [facility] Management Team Member Martín Ortiz   Patient/Family Present   Patient Present No   Patient's Family Present No   Patient pending discharge for Friday

## 2019-11-07 NOTE — NURSING NOTE
Patient appeared to sleep until around 0415  Patient appeared internally occupied  Patient frequently sat and stared into the nurse's station  Patient reported feeling fine when approached  Will continue to monitor

## 2019-11-07 NOTE — PROGRESS NOTES
Patient cooperative throughout the shift  Patient appears bizarre at times and stares at staff  Patient is visible but not social with peers  Patient denies anxiety, depression, S/HI, and A/VH  Patient states he has never heard voices  Patient was observed yelling in his room and appeared to be responding to internal stimuli  Patient sounded like he was fighting with himself  When asked patient says he was just joking around in his room  Will continue to monitor

## 2019-11-07 NOTE — PLAN OF CARE
Worker contacted [facility] and spoke with [email or web address]   [facility] will come out for a discharge meeting on 11/8 at 12PM  [facility] will then come back to pick pt up for discharge around 4PM-5PM

## 2019-11-08 VITALS
SYSTOLIC BLOOD PRESSURE: 127 MMHG | RESPIRATION RATE: 18 BRPM | HEIGHT: 69 IN | BODY MASS INDEX: 31.84 KG/M2 | DIASTOLIC BLOOD PRESSURE: 101 MMHG | OXYGEN SATURATION: 96 % | HEART RATE: 97 BPM | TEMPERATURE: 98.3 F | WEIGHT: 215 LBS

## 2019-11-08 LAB
ALBUMIN SERPL BCP-MCNC: 3.8 G/DL (ref 3–5.2)
ALP SERPL-CCNC: 68 U/L (ref 43–122)
ALT SERPL W P-5'-P-CCNC: 44 U/L (ref 9–52)
AST SERPL W P-5'-P-CCNC: 32 U/L (ref 17–59)
BILIRUB DIRECT SERPL-MCNC: 0.2 MG/DL
BILIRUB SERPL-MCNC: 0.6 MG/DL
GLUCOSE SERPL-MCNC: 108 MG/DL (ref 65–140)
GLUCOSE SERPL-MCNC: 151 MG/DL (ref 65–140)
PROT SERPL-MCNC: 7 G/DL (ref 5.9–8.4)

## 2019-11-08 PROCEDURE — 82948 REAGENT STRIP/BLOOD GLUCOSE: CPT

## 2019-11-08 PROCEDURE — 99238 HOSP IP/OBS DSCHRG MGMT 30/<: CPT | Performed by: NURSE PRACTITIONER

## 2019-11-08 PROCEDURE — 80076 HEPATIC FUNCTION PANEL: CPT | Performed by: PSYCHIATRY & NEUROLOGY

## 2019-11-08 RX ORDER — DOCUSATE SODIUM 100 MG/1
100 CAPSULE, LIQUID FILLED ORAL DAILY
Qty: 30 CAPSULE | Refills: 0 | Status: SHIPPED | OUTPATIENT
Start: 2019-11-08 | End: 2020-03-04 | Stop reason: SDUPTHER

## 2019-11-08 RX ORDER — VITAMIN E 268 MG
400 CAPSULE ORAL DAILY
Qty: 31 CAPSULE | Refills: 0 | Status: SHIPPED | OUTPATIENT
Start: 2019-11-08 | End: 2020-02-24 | Stop reason: SDUPTHER

## 2019-11-08 RX ORDER — ATORVASTATIN CALCIUM 40 MG/1
40 TABLET, FILM COATED ORAL
Qty: 30 TABLET | Refills: 0 | Status: SHIPPED | OUTPATIENT
Start: 2019-11-08 | End: 2020-05-01 | Stop reason: SDUPTHER

## 2019-11-08 RX ORDER — DIPHENHYDRAMINE HCL 25 MG
25 TABLET ORAL EVERY 6 HOURS PRN
Qty: 30 TABLET | Refills: 0 | Status: SHIPPED | OUTPATIENT
Start: 2019-11-08 | End: 2019-11-14 | Stop reason: ALTCHOICE

## 2019-11-08 RX ORDER — HYDROXYZINE HYDROCHLORIDE 25 MG/1
25 TABLET, FILM COATED ORAL EVERY 8 HOURS PRN
Qty: 30 TABLET | Refills: 0 | Status: SHIPPED | OUTPATIENT
Start: 2019-11-08 | End: 2022-05-27

## 2019-11-08 RX ORDER — ACETAMINOPHEN 325 MG/1
650 TABLET ORAL EVERY 6 HOURS PRN
Qty: 30 TABLET | Refills: 0 | Status: SHIPPED | OUTPATIENT
Start: 2019-11-08 | End: 2020-02-24 | Stop reason: SDUPTHER

## 2019-11-08 RX ORDER — METFORMIN HYDROCHLORIDE EXTENDED-RELEASE TABLETS 1000 MG/1
1000 TABLET, FILM COATED, EXTENDED RELEASE ORAL
Qty: 30 TABLET | Refills: 0 | Status: SHIPPED | OUTPATIENT
Start: 2019-11-08 | End: 2019-12-13 | Stop reason: SDUPTHER

## 2019-11-08 RX ORDER — PALIPERIDONE 6 MG/1
6 TABLET, EXTENDED RELEASE ORAL DAILY
Qty: 30 TABLET | Refills: 0 | Status: SHIPPED | OUTPATIENT
Start: 2019-11-09 | End: 2020-06-09

## 2019-11-08 RX ORDER — PROPRANOLOL HYDROCHLORIDE 20 MG/1
20 TABLET ORAL EVERY 12 HOURS SCHEDULED
Qty: 60 TABLET | Refills: 0 | Status: SHIPPED | OUTPATIENT
Start: 2019-11-08 | End: 2021-09-29 | Stop reason: SDUPTHER

## 2019-11-08 RX ORDER — TRIHEXYPHENIDYL HYDROCHLORIDE 2 MG/1
2 TABLET ORAL 3 TIMES DAILY
Qty: 90 TABLET | Refills: 0 | Status: SHIPPED | OUTPATIENT
Start: 2019-11-08 | End: 2019-11-14 | Stop reason: ALTCHOICE

## 2019-11-08 RX ORDER — LORATADINE 10 MG/1
10 TABLET ORAL DAILY
Qty: 30 TABLET | Refills: 0 | Status: SHIPPED | OUTPATIENT
Start: 2019-11-08 | End: 2020-05-01 | Stop reason: SDUPTHER

## 2019-11-08 RX ORDER — TRAZODONE HYDROCHLORIDE 50 MG/1
50 TABLET ORAL
Qty: 30 TABLET | Refills: 0 | Status: SHIPPED | OUTPATIENT
Start: 2019-11-08 | End: 2022-07-08

## 2019-11-08 RX ORDER — METFORMIN HYDROCHLORIDE EXTENDED-RELEASE TABLETS 1000 MG/1
1000 TABLET, FILM COATED, EXTENDED RELEASE ORAL
Qty: 30 TABLET | Refills: 0 | Status: SHIPPED | OUTPATIENT
Start: 2019-11-08 | End: 2019-11-08 | Stop reason: SDUPTHER

## 2019-11-08 RX ORDER — LORAZEPAM 1 MG/1
0.5 TABLET ORAL EVERY 8 HOURS PRN
Qty: 30 TABLET | Refills: 0 | Status: SHIPPED | OUTPATIENT
Start: 2019-11-08 | End: 2022-05-18 | Stop reason: SDUPTHER

## 2019-11-08 RX ORDER — LEVOTHYROXINE SODIUM 0.03 MG/1
25 TABLET ORAL
Qty: 30 TABLET | Refills: 0 | Status: SHIPPED | OUTPATIENT
Start: 2019-11-08 | End: 2020-05-01 | Stop reason: SDUPTHER

## 2019-11-08 RX ORDER — MELATONIN
1000 DAILY
Qty: 30 TABLET | Refills: 0 | Status: SHIPPED | OUTPATIENT
Start: 2019-11-08 | End: 2020-02-24 | Stop reason: SDUPTHER

## 2019-11-08 RX ORDER — DULOXETIN HYDROCHLORIDE 20 MG/1
20 CAPSULE, DELAYED RELEASE ORAL DAILY
Qty: 30 CAPSULE | Refills: 0 | Status: SHIPPED | OUTPATIENT
Start: 2019-11-09 | End: 2019-11-14 | Stop reason: ALTCHOICE

## 2019-11-08 RX ORDER — CALCIUM CARBONATE 500(1250)
1 TABLET ORAL
Qty: 30 TABLET | Refills: 0 | Status: SHIPPED | OUTPATIENT
Start: 2019-11-08 | End: 2020-02-24 | Stop reason: SDUPTHER

## 2019-11-08 RX ORDER — CHLORPROMAZINE HYDROCHLORIDE 25 MG/1
25 TABLET, FILM COATED ORAL EVERY 6 HOURS PRN
Qty: 60 TABLET | Refills: 0 | Status: SHIPPED | OUTPATIENT
Start: 2019-11-08 | End: 2019-11-14 | Stop reason: ALTCHOICE

## 2019-11-08 RX ORDER — DIVALPROEX SODIUM 500 MG/1
500 TABLET, DELAYED RELEASE ORAL 2 TIMES DAILY
Qty: 60 TABLET | Refills: 0 | Status: SHIPPED | OUTPATIENT
Start: 2019-11-08 | End: 2022-07-08

## 2019-11-08 RX ADMIN — LEVOTHYROXINE SODIUM 25 MCG: 25 TABLET ORAL at 05:12

## 2019-11-08 RX ADMIN — SITAGLIPTIN 50 MG: 25 TABLET, FILM COATED ORAL at 08:58

## 2019-11-08 RX ADMIN — METFORMIN ER 500 MG 1000 MG: 500 TABLET ORAL at 08:58

## 2019-11-08 RX ADMIN — PROPRANOLOL HYDROCHLORIDE 20 MG: 20 TABLET ORAL at 08:58

## 2019-11-08 RX ADMIN — DIPHENHYDRAMINE HCL 25 MG: 25 TABLET ORAL at 08:58

## 2019-11-08 RX ADMIN — TRIHEXYPHENIDYL HYDROCHLORIDE 2 MG: 2 TABLET ORAL at 08:58

## 2019-11-08 RX ADMIN — DIVALPROEX SODIUM 500 MG: 500 TABLET, DELAYED RELEASE ORAL at 08:58

## 2019-11-08 RX ADMIN — B-COMPLEX W/ C & FOLIC ACID TAB 1 TABLET: TAB at 08:58

## 2019-11-08 RX ADMIN — PALIPERIDONE 6 MG: 6 TABLET, FILM COATED, EXTENDED RELEASE ORAL at 08:58

## 2019-11-08 RX ADMIN — DULOXETINE HYDROCHLORIDE 20 MG: 20 CAPSULE, DELAYED RELEASE ORAL at 08:58

## 2019-11-08 NOTE — PROGRESS NOTES
11/08/19 0910   Team Meeting   Meeting Type Daily Rounds   Initial Conference Date 11/08/19   Team Members Present   Team Members Present Physician;Nurse;   Physician Team Member Dr Abbie Liz Management Team Member Sherlyn Valadez   Patient/Family Present   Patient Present No   Patient's Family Present No   Pt being discharged today around 4PM with group home   Discharge meeting with group home at 12PM

## 2019-11-08 NOTE — PROGRESS NOTES
Patient pleasant and cooperative throughout the shift  Patient denies depression, anxiety, S/HI, and A/VH  Patient visible and social with peers

## 2019-11-08 NOTE — DISCHARGE INSTR - OTHER ORDERS
If you are experiencing a mental health emergency, you may call the [id number] [place] [address] 24 hours a day, 7 days per week at [phone]  In Counts include 234 beds at the [facility], call [phone]  The [facility] Family-to-Family Education Program is a free 12-week (2 1/2 hours/week) course for families of individuals with severe brain disorders (mental illnesses)  The classes are taught by trained family members  All course materials are furnished at no cost to you  Below are some details  To register, e-mail [email or web address] or call [phone]  The curriculum focuses on schizophrenia, bipolar disorder (manic depression), clinical depression, panic disorders and obsessive-compulsive disorder (OCD)  The course discusses the clinical treatment of these illnesses and teaches the knowledge and skills that family members need to cope more effectively  Topics Include:  Learning about feelings, learning about facts   Schizophrenia, major depression and chely: diagnosis and dealing with critical periods   Subtypes of depression and bipolar disorder, panic disorder and OCD; diagnosis and causes; sharing our stories   The biology of the brain/new research   Problem solving workshops   Medication review   Empathy workshop - what its like to have a brain disorder   Communication skills workshop   Self-care and relative groups   Rehabilitation, services available   Advocacy: fighting stigma   Review and certification ceremony    [employer] Education Course  The [employer] Education class is a ten week - two hours per week - experiential education course on the topic of recovery for any person with serious mental illness who is interested in establishing and maintaining wellness  The course uses a combination of lecture, interactive exercises, and structural group processes  The diversity of experience among participants affords for a lively dynamic that moves the course along    [facility] Education class is offered free of charge to people who experience mental illness  You do not need to be a member of [facility] to take the course  Courses are taught by teams of trained mentors/peer-teachers who are themselves experienced at living well with mental illness  Below are some details  To register, call [phone] or e-mail [email or web address]  Sign up today! [facility] group is for family members, caregivers, and loved ones of individuals living with the everyday challenges of mental illness  The leaders are family members in the same situation  Sessions take place in an intimate, confidential setting to allow families to share openly with each other  These support groups allow participants to learn from the experiences of other group members, share coping strategies, and offer each other encouragement and understanding  Kenyetta Laguna know that you are not alone  Drop in--no registration is necessary  Here are the times and locations  HILARIO  Monthly: 3rd Monday, 7:00-8:30 pm  [place]  [address]  Monthly: 4th Tuesday, 7:00-8:30 pm  [facility]  Monthly: 1st Monday, 7:00-8:30 pm  [facility]  [address] [place], [place] [facility] [address] [place] [address]         Monthly Support for Persons with Mental Illness  The Peer Support Group is a monthly meeting for individuals facing the challenges of recovering from severe and persistent mental illness  Depression, manic depression, schizophrenia, and general anxiety disorder are only a few of the diagnoses of individuals who have found a supportive place at our meetings  Our [place]  We are a fellowship of individuals who share a common goal of recovery and the ability to maintain mental and emotional stability  We help others and ourselves through sharing our experiences, strength and hope with each other   No matter how traumatic our past or how despairing our present may be, there is hope for a new day  Sessions take place in an intimate, confidential setting to allow individuals to share openly with each other  Torsten Huerta know that you are not alone  Drop in--no registration is necessary  Here are the times and locations  ANAM  Monthly: 1st Monday, 7:00-8:30 pm  [facility]  [address] [place], [place]   [id number]  Monthly: 3rd Monday, 7:00-8:30 pm  [address]         When you need someone to listen, the Devin Kocher is available for 16 hours a day, 7 days a week, from the time of 7-10am and 2pm-2am   It is not available from the hours of 2am-6am and 10am-2pm  A representative can be reached at [phone]

## 2019-11-08 NOTE — NURSING NOTE
Patient walked off the unit accompanied by his group home  Group home had been on the unit earlier in the shift and the AVS and prescriptions were given to them

## 2019-11-08 NOTE — DISCHARGE SUMMARY
Discharge Summary - [facility] 46 y o  male MRN: [id number]  Unit/Bed#: [facility] 386-01 Encounter: [id number]     Admission Date: 11/1/2019         Discharge Date: No discharge date for patient encounter  Attending Psychiatrist: Karlo Mendoza MD    Reason for Admission/HPI:       Meds/Allergies     all current active meds have been reviewed    Allergies   Allergen Reactions    Augmentin [Amoxicillin-Pot Clavulanate]     Benztropine     Erythromycin     Lithium     Nsaids     Tegretol [Carbamazepine]        Objective     Vital signs in last 24 hours:  Temp:  [96 3 °F (35 7 °C)-98 3 °F (36 8 °C)] 96 3 °F (35 7 °C)  HR:  [89-90] 89  Resp:  [16-18] 18  BP: (116-124)/(72-93) 116/72      Intake/Output Summary (Last 24 hours) at 11/8/2019 1055  Last data filed at 11/8/2019 0830  Gross per 24 hour   Intake 100 ml   Output --   Net 100 ml       Hospital Course: The patient was admitted to the inpatient psychiatric unit and started on every 15 minutes precautions  During the hospitalization the patient was attending individual therapy, group therapy, milieu therapy and occupational therapy  Psychiatric medications were titrated over the hospital stay  To address mood swings, impulsivity, psychotic symptoms and auditory hallucinations the patient was started on antipsychotic medication Invega  Medication doses were titrated during the hospital course  Prior to beginning of treatment medications risks and benefits and possible side effects including risk of parkinsonian symptoms, Tardive Dyskinesia and metabolic syndrome related to treatment with antipsychotic medications were reviewed with the patient  The patient verbalized understanding and agreement for treatment  Patient's symptoms improved gradually over the hospital course  At the end of treatment the patient was doing well  Mood was stable at the time of discharge   The patient denied suicidal ideation, intent or plan at the time of discharge and denied homicidal ideation, intent or plan at the time of discharge  There was no overt psychosis at the time of discharge  Sleep and appetite were improved  The patient was tolerating medications and was not reporting any significant side effects at the time of discharge  Since the patient was doing well at the end of the hospitalization, treatment team felt that the patient could be safely discharged to outpatient care  We felt that Radha Keller achieved the maximum benefit of inpatient stay at that point, was at baseline at the end of the hospitalization and could now be discharged to a lower level of care setting  The outpatient follow up with [facility] for psychiatric follow up and transport was arranged by the unit  upon discharge      Mental Status at Time of Discharge:   Appearance:  Adequate hygiene and grooming and Marginal/poor hygiene   Behavior:  calm, cooperative and friendly   Speech:   Language: Stuttering  Able to name objects and Able to repeat phrases   Mood:  euthymic   Affect:   Associations: constricted and labile  Tightly connected   Thought Process:  Goal directed and coherent   Thought Content:  Does not verbalize delusional material   Perceptual Disturbances: Denies hallucinations and does not appear to be responding to internal stimuli     Risk Potential: No suicidal or homicidal ideation   Orientation   Language Oriented x 3  anomia No   Memory  Fund of knowledge grossly intact and Not tested  aware of current events, Aware of past history and Diminished   Attention/Concentration attention span appeared shorter than expected for age   Insight:  limited   Judgment: Poor judgment   Gait/Station: slow   Motor Activity: Resting tremor, generalized       Admission Diagnosis:  Principal Problem:    Schizoaffective disorder, bipolar type ([facility] )  Active Problems:    CKD (chronic kidney disease) stage 2, GFR 60-89 ml/min    Type 2 diabetes mellitus with hyperglycemia ([facility] )    Hyperlipidemia    Hypothyroidism    Essential tremor    History of constipation      Discharge Diagnosis:     Principal Problem:    Schizoaffective disorder, bipolar type ([facility] )  Active Problems:    CKD (chronic kidney disease) stage 2, GFR 60-89 ml/min    Type 2 diabetes mellitus with hyperglycemia (HCC)    Hyperlipidemia    Hypothyroidism    Essential tremor    History of constipation  Resolved Problems:    * No resolved hospital problems   *      Lab results:    Admission on 11/01/2019   Component Date Value    POC Glucose 11/01/2019 175*    Sodium 11/02/2019 138     Potassium 11/02/2019 3 8     Chloride 11/02/2019 101     CO2 11/02/2019 30     ANION GAP 11/02/2019 7     BUN 11/02/2019 11     Creatinine 11/02/2019 1 16     Glucose 11/02/2019 161*    Calcium 11/02/2019 8 6     AST 11/02/2019 41     ALT 11/02/2019 81*    Alkaline Phosphatase 11/02/2019 65     Total Protein 11/02/2019 6 6     Albumin 11/02/2019 3 7     Total Bilirubin 11/02/2019 0 70     eGFR 11/02/2019 79     POC Glucose 11/02/2019 164*    POC Glucose 11/02/2019 147*    POC Glucose 11/02/2019 203*    POC Glucose 11/02/2019 146*    Cholesterol 11/03/2019 75     Triglycerides 11/03/2019 108     HDL, Direct 11/03/2019 20*    LDL Calculated 11/03/2019 33     Non-HDL-Chol (CHOL-HDL) 11/03/2019 55     POC Glucose 11/03/2019 142*    Ventricular Rate 11/02/2019 91     Atrial Rate 11/02/2019 91     HI Interval 11/02/2019 164     QRSD Interval 11/02/2019 100     QT Interval 11/02/2019 382     QTC Interval 11/02/2019 469     P Axis 11/02/2019 35     QRS Axis 11/02/2019 -7     T Wave Axis 11/02/2019 39     Ventricular Rate 11/01/2019 96     Atrial Rate 11/01/2019 96     HI Interval 11/01/2019 176     QRSD Interval 11/01/2019 94     QT Interval 11/01/2019 382     QTC Interval 11/01/2019 482     P Axis 11/01/2019 42     QRS Axis 11/01/2019 -4     T Wave Axis 11/01/2019 34     POC Glucose 11/03/2019 159*    POC Glucose 11/03/2019 133     POC Glucose 11/03/2019 154*    POC Glucose 11/04/2019 138     POC Glucose 11/04/2019 148*    POC Glucose 11/04/2019 179*    POC Glucose 11/04/2019 181*    Total Bilirubin 11/05/2019 1 10     Bilirubin, Direct 11/05/2019 0 30     Alkaline Phosphatase 11/05/2019 62     AST 11/05/2019 42     ALT 11/05/2019 61*    Total Protein 11/05/2019 7 2     Albumin 11/05/2019 4 0     POC Glucose 11/05/2019 157*    POC Glucose 11/05/2019 116     POC Glucose 11/05/2019 104     Ventricular Rate 11/02/2019 90     Atrial Rate 11/02/2019 90     OR Interval 11/02/2019 168     QRSD Interval 11/02/2019 100     QT Interval 11/02/2019 390     QTC Interval 11/02/2019 477     P Axis 11/02/2019 56     QRS Axis 11/02/2019 -3     T Wave Axis 11/02/2019 51     POC Glucose 11/05/2019 105     Valproic Acid, Total 11/06/2019 44 0*    POC Glucose 11/06/2019 118     POC Glucose 11/06/2019 73     POC Glucose 11/06/2019 158*    POC Glucose 11/06/2019 114     POC Glucose 11/07/2019 131     POC Glucose 11/07/2019 92     POC Glucose 11/07/2019 118     POC Glucose 11/07/2019 134     Total Bilirubin 11/08/2019 0 60     Bilirubin, Direct 11/08/2019 0 20     Alkaline Phosphatase 11/08/2019 68     AST 11/08/2019 32     ALT 11/08/2019 44     Total Protein 11/08/2019 7 0     Albumin 11/08/2019 3 8     POC Glucose 11/08/2019 151*       Discharge Medications:    See after visit summary for reconciled discharge medications provided to patient and family  Discharge instructions/Information to patient and family:     See after visit summary for information provided to patient and family  Provisions for Follow-Up Care:    See after visit summary for information related to follow-up care and any pertinent home health orders  Discharge Statement     I spent 30 minutes discharging the patient  This time was spent on the day of discharge   I had direct contact with the patient on the day of discharge  Additional documentation is required if more than 30 minutes were spent on discharge:    I reviewed with Uli Mariscal importance of compliance with medications and outpatient treatment after discharge

## 2019-11-08 NOTE — PROGRESS NOTES
Pt visible on unit, Social with peers , pt denies anxiety ,depression, SI,HI  Pt stats "I am leaving tomorrow"   Will continue to monitor

## 2019-11-08 NOTE — PROGRESS NOTES
Patient pleasant and cooperative throughout the shift  Patient denies depression, anxiety, S/HI, and A/VH  Patient visible on the unit

## 2019-11-11 NOTE — PLAN OF CARE
Pt discharged today  Pt denied SI/HI, AH/VH  Pt oriented x3 and returned to baseline  Pt returned to [facility] and picked up by group home staff  Pt to [facility] on 11/11/19 at 3:15PM  Pt's meds faxed to [address]

## 2019-11-14 ENCOUNTER — OFFICE VISIT (OUTPATIENT)
Dept: FAMILY MEDICINE CLINIC | Facility: CLINIC | Age: 39
End: 2019-11-14

## 2019-11-14 VITALS
TEMPERATURE: 96.5 F | HEART RATE: 78 BPM | WEIGHT: 222.8 LBS | RESPIRATION RATE: 18 BRPM | HEIGHT: 69 IN | SYSTOLIC BLOOD PRESSURE: 120 MMHG | BODY MASS INDEX: 33 KG/M2 | DIASTOLIC BLOOD PRESSURE: 80 MMHG

## 2019-11-14 DIAGNOSIS — E11.69 TYPE 2 DIABETES MELLITUS WITH OTHER SPECIFIED COMPLICATION, WITHOUT LONG-TERM CURRENT USE OF INSULIN (HCC): Primary | ICD-10-CM

## 2019-11-14 DIAGNOSIS — E11.65 TYPE 2 DIABETES MELLITUS WITH HYPERGLYCEMIA, WITHOUT LONG-TERM CURRENT USE OF INSULIN (HCC): ICD-10-CM

## 2019-11-14 DIAGNOSIS — F25.0 SCHIZOAFFECTIVE DISORDER, BIPOLAR TYPE (HCC): Chronic | ICD-10-CM

## 2019-11-14 PROCEDURE — 99213 OFFICE O/P EST LOW 20 MIN: CPT | Performed by: FAMILY MEDICINE

## 2019-11-14 RX ORDER — LANCETS 28 GAUGE
EACH MISCELLANEOUS
Qty: 1 EACH | Refills: 3 | Status: SHIPPED | OUTPATIENT
Start: 2019-11-14 | End: 2020-11-24 | Stop reason: SDUPTHER

## 2019-11-14 NOTE — PROGRESS NOTES
Assessment/Plan     Type 2 diabetes mellitus with hyperglycemia (HCC)    Lab Results   Component Value Date    HGBA1C 8 0 (H) 08/29/2019       Continue metformin 1000 mg daily, Januvia 50 mg daily  Will recheck A1c, urine for microalbumin  If A1c is still elevated will increase metformin to 1000 mg b i d  Again stressed on importance lifestyle modification with diet and exercise  Also gave the option of diabetes education, but patient is not interested  Patient recently had appointment with Ophthalmology and podiatry, will obtain records  Advised to eat small meals at frequent intervals    Schizoaffective disorder, bipolar type [facility])  Continue follow-up with Psychiatry, Dr Cyn Yanes  Advised to discuss eating disorder with Psychiatry as well    Diagnoses and all orders for this visit:    Type 2 diabetes mellitus with other specified complication, without long-term current use of insulin ([address] )  -     Microalbumin / creatinine urine ratio  -     HEMOGLOBIN A1C W/ EAG ESTIMATION; Future  -     glucose blood (FREESTYLE LITE) test strip; Use as instructed    Type 2 diabetes mellitus with hyperglycemia, without long-term current use of insulin (HCC)  -     Lancets (FREESTYLE) lancets; Use as instructed    Schizoaffective disorder, bipolar type [facility])         Subjective     Chief Complaint   Patient presents with    Diabetes       58-year-old male presented to office for follow-up visit  Patient has type 2 diabetes mellitus, is currently on metformin and Januvia  He also has schizoaffective disorder, follows with Dr Cyn Yanes  He was recently admitted for suicidal ideation, and discharged on November 1  Today he reports that he he has been watching his diet, sometimes he does feel lightheaded, and sometimes when he takes a big meal, he feels nauseous   His caregiver is also present during the visit, reports that patient sometimes binge eats, and then induces vomiting        The following portions of the patient's history were reviewed and updated as appropriate: allergies, current medications, past family history, past medical history, past social history, past surgical history and problem list     Review of Systems   Constitutional: Negative for activity change, chills and fever  HENT: Negative for congestion  Respiratory: Negative for cough, shortness of breath and wheezing  Cardiovascular: Negative for chest pain and palpitations  Gastrointestinal: Negative for diarrhea, nausea and vomiting  Genitourinary: Negative for difficulty urinating  Skin: Negative for pallor  Neurological: Positive for light-headedness  Negative for dizziness and headaches  Objective     Vitals:Blood pressure 120/80, pulse 78, temperature (!) 96 5 °F (35 8 °C), resp  rate 18, height 5' 9" (1 753 m), weight 101 kg (222 lb 12 8 oz)  Physical Exam:  Physical Exam   Constitutional: He is oriented to person, place, and time  He appears well-developed and well-nourished  HENT:   Head: Normocephalic and atraumatic  Eyes: Conjunctivae and EOM are normal    Neck: Normal range of motion  Neck supple  Cardiovascular: Normal rate, regular rhythm and normal heart sounds  Exam reveals no friction rub  No murmur heard  Pulmonary/Chest: Effort normal and breath sounds normal  No respiratory distress  He has no wheezes  He has no rales  Abdominal: Soft  Musculoskeletal: Normal range of motion  Neurological: He is alert and oriented to person, place, and time  Skin: Skin is warm and dry  Vitals reviewed

## 2019-11-14 NOTE — ASSESSMENT & PLAN NOTE
Continue follow-up with Psychiatry, Dr Tania Bateman    Advised to discuss eating disorder with Psychiatry as well

## 2019-11-14 NOTE — ASSESSMENT & PLAN NOTE
Lab Results   Component Value Date    HGBA1C 8 0 (H) 08/29/2019       Continue metformin 1000 mg daily, Januvia 50 mg daily  Will recheck A1c, urine for microalbumin  If A1c is still elevated will increase metformin to 1000 mg b i d    Again stressed on importance lifestyle modification with diet and exercise  Also gave the option of diabetes education, but patient is not interested  Patient recently had appointment with Ophthalmology and podiatry, will obtain records  Advised to eat small meals at frequent intervals

## 2019-12-02 DIAGNOSIS — E11.65 TYPE 2 DIABETES MELLITUS WITH HYPERGLYCEMIA, UNSPECIFIED WHETHER LONG TERM INSULIN USE (HCC): ICD-10-CM

## 2019-12-04 ENCOUNTER — TELEPHONE (OUTPATIENT)
Dept: FAMILY MEDICINE CLINIC | Facility: CLINIC | Age: 39
End: 2019-12-04

## 2019-12-04 ENCOUNTER — APPOINTMENT (OUTPATIENT)
Dept: LAB | Facility: CLINIC | Age: 39
End: 2019-12-04
Payer: MEDICARE

## 2019-12-04 DIAGNOSIS — E11.69 TYPE 2 DIABETES MELLITUS WITH OTHER SPECIFIED COMPLICATION, WITHOUT LONG-TERM CURRENT USE OF INSULIN (HCC): ICD-10-CM

## 2019-12-04 LAB
CREAT UR-MCNC: 69 MG/DL
EST. AVERAGE GLUCOSE BLD GHB EST-MCNC: 197 MG/DL
HBA1C MFR BLD: 8.5 % (ref 4.2–6.3)
MICROALBUMIN UR-MCNC: 109 MG/L (ref 0–20)
MICROALBUMIN/CREAT 24H UR: 158 MG/G CREATININE (ref 0–30)

## 2019-12-04 PROCEDURE — 36415 COLL VENOUS BLD VENIPUNCTURE: CPT

## 2019-12-04 PROCEDURE — 82043 UR ALBUMIN QUANTITATIVE: CPT | Performed by: FAMILY MEDICINE

## 2019-12-04 PROCEDURE — 83036 HEMOGLOBIN GLYCOSYLATED A1C: CPT

## 2019-12-04 PROCEDURE — 82570 ASSAY OF URINE CREATININE: CPT | Performed by: FAMILY MEDICINE

## 2019-12-12 DIAGNOSIS — E11.65 TYPE 2 DIABETES MELLITUS WITH HYPERGLYCEMIA, UNSPECIFIED WHETHER LONG TERM INSULIN USE (HCC): ICD-10-CM

## 2019-12-13 DIAGNOSIS — F25.0 SCHIZOAFFECTIVE DISORDER, BIPOLAR TYPE (HCC): Chronic | ICD-10-CM

## 2019-12-13 DIAGNOSIS — E11.65 TYPE 2 DIABETES MELLITUS WITH HYPERGLYCEMIA, UNSPECIFIED WHETHER LONG TERM INSULIN USE (HCC): ICD-10-CM

## 2019-12-13 RX ORDER — METFORMIN HYDROCHLORIDE EXTENDED-RELEASE TABLETS 1000 MG/1
1000 TABLET, FILM COATED, EXTENDED RELEASE ORAL
Qty: 30 TABLET | Refills: 5 | Status: SHIPPED | OUTPATIENT
Start: 2019-12-13 | End: 2020-02-24 | Stop reason: SDUPTHER

## 2020-01-25 ENCOUNTER — APPOINTMENT (OUTPATIENT)
Dept: LAB | Age: 40
End: 2020-01-25
Payer: MEDICARE

## 2020-01-25 ENCOUNTER — TRANSCRIBE ORDERS (OUTPATIENT)
Dept: ADMINISTRATIVE | Age: 40
End: 2020-01-25

## 2020-01-25 DIAGNOSIS — Z79.899 ENCOUNTER FOR LONG-TERM (CURRENT) USE OF OTHER MEDICATIONS: Primary | ICD-10-CM

## 2020-01-25 DIAGNOSIS — Z79.899 ENCOUNTER FOR LONG-TERM (CURRENT) USE OF OTHER MEDICATIONS: ICD-10-CM

## 2020-01-25 LAB
ALBUMIN SERPL BCP-MCNC: 3.1 G/DL (ref 3.5–5)
ALP SERPL-CCNC: 70 U/L (ref 46–116)
ALT SERPL W P-5'-P-CCNC: 28 U/L (ref 12–78)
ANION GAP SERPL CALCULATED.3IONS-SCNC: 5 MMOL/L (ref 4–13)
AST SERPL W P-5'-P-CCNC: 8 U/L (ref 5–45)
BASOPHILS # BLD AUTO: 0.06 THOUSANDS/ΜL (ref 0–0.1)
BASOPHILS NFR BLD AUTO: 1 % (ref 0–1)
BILIRUB SERPL-MCNC: 0.33 MG/DL (ref 0.2–1)
BUN SERPL-MCNC: 11 MG/DL (ref 5–25)
CALCIUM SERPL-MCNC: 8.5 MG/DL (ref 8.3–10.1)
CHLORIDE SERPL-SCNC: 103 MMOL/L (ref 100–108)
CO2 SERPL-SCNC: 30 MMOL/L (ref 21–32)
CREAT SERPL-MCNC: 1.32 MG/DL (ref 0.6–1.3)
EOSINOPHIL # BLD AUTO: 0.14 THOUSAND/ΜL (ref 0–0.61)
EOSINOPHIL NFR BLD AUTO: 2 % (ref 0–6)
ERYTHROCYTE [DISTWIDTH] IN BLOOD BY AUTOMATED COUNT: 11.1 % (ref 11.6–15.1)
GFR SERPL CREATININE-BSD FRML MDRD: 68 ML/MIN/1.73SQ M
GLUCOSE P FAST SERPL-MCNC: 221 MG/DL (ref 65–99)
HCT VFR BLD AUTO: 46.4 % (ref 36.5–49.3)
HGB BLD-MCNC: 15.6 G/DL (ref 12–17)
IMM GRANULOCYTES # BLD AUTO: 0.07 THOUSAND/UL (ref 0–0.2)
IMM GRANULOCYTES NFR BLD AUTO: 1 % (ref 0–2)
LYMPHOCYTES # BLD AUTO: 3.57 THOUSANDS/ΜL (ref 0.6–4.47)
LYMPHOCYTES NFR BLD AUTO: 41 % (ref 14–44)
MCH RBC QN AUTO: 29.8 PG (ref 26.8–34.3)
MCHC RBC AUTO-ENTMCNC: 33.6 G/DL (ref 31.4–37.4)
MCV RBC AUTO: 89 FL (ref 82–98)
MONOCYTES # BLD AUTO: 0.66 THOUSAND/ΜL (ref 0.17–1.22)
MONOCYTES NFR BLD AUTO: 8 % (ref 4–12)
NEUTROPHILS # BLD AUTO: 4.27 THOUSANDS/ΜL (ref 1.85–7.62)
NEUTS SEG NFR BLD AUTO: 47 % (ref 43–75)
NRBC BLD AUTO-RTO: 0 /100 WBCS
PLATELET # BLD AUTO: 146 THOUSANDS/UL (ref 149–390)
PMV BLD AUTO: 10 FL (ref 8.9–12.7)
POTASSIUM SERPL-SCNC: 4 MMOL/L (ref 3.5–5.3)
PROT SERPL-MCNC: 6.6 G/DL (ref 6.4–8.2)
RBC # BLD AUTO: 5.24 MILLION/UL (ref 3.88–5.62)
SODIUM SERPL-SCNC: 138 MMOL/L (ref 136–145)
WBC # BLD AUTO: 8.77 THOUSAND/UL (ref 4.31–10.16)

## 2020-01-25 PROCEDURE — 85025 COMPLETE CBC W/AUTO DIFF WBC: CPT

## 2020-01-25 PROCEDURE — 80165 DIPROPYLACETIC ACID FREE: CPT

## 2020-01-25 PROCEDURE — 80053 COMPREHEN METABOLIC PANEL: CPT

## 2020-01-25 PROCEDURE — 36415 COLL VENOUS BLD VENIPUNCTURE: CPT

## 2020-01-27 LAB — VALPROATE FREE SERPL-MCNC: 6.8 UG/ML (ref 6–22)

## 2020-02-05 ENCOUNTER — TELEPHONE (OUTPATIENT)
Dept: FAMILY MEDICINE CLINIC | Facility: CLINIC | Age: 40
End: 2020-02-05

## 2020-02-05 NOTE — TELEPHONE ENCOUNTER
Pharmacist send us the request for medication refill Vitamin E 400 unit soft gel 1 capsule by mouth daily at 8 am  Thanks Dr Gallo Bhatti

## 2020-02-24 ENCOUNTER — OFFICE VISIT (OUTPATIENT)
Dept: FAMILY MEDICINE CLINIC | Facility: CLINIC | Age: 40
End: 2020-02-24

## 2020-02-24 VITALS
SYSTOLIC BLOOD PRESSURE: 110 MMHG | WEIGHT: 225 LBS | HEIGHT: 69 IN | HEART RATE: 68 BPM | DIASTOLIC BLOOD PRESSURE: 80 MMHG | BODY MASS INDEX: 33.33 KG/M2 | RESPIRATION RATE: 18 BRPM | TEMPERATURE: 97.5 F

## 2020-02-24 DIAGNOSIS — K59.00 CONSTIPATION, UNSPECIFIED CONSTIPATION TYPE: ICD-10-CM

## 2020-02-24 DIAGNOSIS — R05.9 COUGH: ICD-10-CM

## 2020-02-24 DIAGNOSIS — F25.0 SCHIZOAFFECTIVE DISORDER, BIPOLAR TYPE (HCC): Chronic | ICD-10-CM

## 2020-02-24 DIAGNOSIS — E11.65 TYPE 2 DIABETES MELLITUS WITH HYPERGLYCEMIA, WITHOUT LONG-TERM CURRENT USE OF INSULIN (HCC): ICD-10-CM

## 2020-02-24 DIAGNOSIS — G44.209 TENSION HEADACHE: ICD-10-CM

## 2020-02-24 DIAGNOSIS — H25.11 NUCLEAR SCLEROTIC CATARACT OF RIGHT EYE: ICD-10-CM

## 2020-02-24 DIAGNOSIS — Z11.4 SCREENING FOR HIV (HUMAN IMMUNODEFICIENCY VIRUS): Primary | ICD-10-CM

## 2020-02-24 DIAGNOSIS — E78.5 HYPERLIPIDEMIA, UNSPECIFIED HYPERLIPIDEMIA TYPE: ICD-10-CM

## 2020-02-24 DIAGNOSIS — E55.9 VITAMIN D DEFICIENCY: ICD-10-CM

## 2020-02-24 DIAGNOSIS — Z13.5 SCREENING FOR DIABETIC RETINOPATHY: ICD-10-CM

## 2020-02-24 PROCEDURE — 99213 OFFICE O/P EST LOW 20 MIN: CPT | Performed by: FAMILY MEDICINE

## 2020-02-24 PROCEDURE — 3008F BODY MASS INDEX DOCD: CPT | Performed by: FAMILY MEDICINE

## 2020-02-24 PROCEDURE — 4010F ACE/ARB THERAPY RXD/TAKEN: CPT | Performed by: FAMILY MEDICINE

## 2020-02-24 PROCEDURE — 3066F NEPHROPATHY DOC TX: CPT | Performed by: FAMILY MEDICINE

## 2020-02-24 PROCEDURE — 1036F TOBACCO NON-USER: CPT | Performed by: FAMILY MEDICINE

## 2020-02-24 RX ORDER — GUAIFENESIN 100 MG/5ML
200 SYRUP ORAL 3 TIMES DAILY PRN
Qty: 120 ML | Refills: 0 | Status: SHIPPED | OUTPATIENT
Start: 2020-02-24 | End: 2020-02-24

## 2020-02-24 RX ORDER — LISINOPRIL 2.5 MG/1
2.5 TABLET ORAL DAILY
Qty: 90 TABLET | Refills: 3 | Status: SHIPPED | OUTPATIENT
Start: 2020-02-24 | End: 2021-01-18 | Stop reason: SDUPTHER

## 2020-02-24 RX ORDER — CALCIUM CARBONATE 500(1250)
1 TABLET ORAL
Qty: 30 TABLET | Refills: 0 | Status: SHIPPED | OUTPATIENT
Start: 2020-02-24 | End: 2020-05-01 | Stop reason: SDUPTHER

## 2020-02-24 RX ORDER — MELATONIN
1000 DAILY
Qty: 30 TABLET | Refills: 0 | Status: SHIPPED | OUTPATIENT
Start: 2020-02-24 | End: 2020-03-04 | Stop reason: SDUPTHER

## 2020-02-24 RX ORDER — LISINOPRIL 5 MG/1
5 TABLET ORAL DAILY
Qty: 90 TABLET | Refills: 1 | Status: CANCELLED | OUTPATIENT
Start: 2020-02-24

## 2020-02-24 RX ORDER — VITAMIN E 268 MG
400 CAPSULE ORAL DAILY
Qty: 31 CAPSULE | Refills: 0 | Status: SHIPPED | OUTPATIENT
Start: 2020-02-24 | End: 2020-03-04 | Stop reason: SDUPTHER

## 2020-02-24 RX ORDER — METFORMIN HYDROCHLORIDE EXTENDED-RELEASE TABLETS 1000 MG/1
1000 TABLET, FILM COATED, EXTENDED RELEASE ORAL 2 TIMES DAILY WITH MEALS
Qty: 60 TABLET | Refills: 5 | Status: SHIPPED | OUTPATIENT
Start: 2020-02-24 | End: 2020-07-02 | Stop reason: SDUPTHER

## 2020-02-24 RX ORDER — ACETAMINOPHEN 325 MG/1
650 TABLET ORAL EVERY 6 HOURS PRN
Qty: 30 TABLET | Refills: 0 | Status: SHIPPED | OUTPATIENT
Start: 2020-02-24 | End: 2020-04-29 | Stop reason: SDUPTHER

## 2020-02-24 RX ORDER — MELATONIN
1000 DAILY
Qty: 30 TABLET | Refills: 0 | Status: SHIPPED | OUTPATIENT
Start: 2020-02-24 | End: 2020-02-24

## 2020-02-24 RX ORDER — GUAIFENESIN 100 MG/5ML
200 SYRUP ORAL 3 TIMES DAILY PRN
Qty: 120 ML | Refills: 0 | Status: SHIPPED | OUTPATIENT
Start: 2020-02-24 | End: 2020-03-05

## 2020-02-24 RX ORDER — CALCIUM CARBONATE 500(1250)
1 TABLET ORAL
Qty: 30 TABLET | Refills: 0 | Status: SHIPPED | OUTPATIENT
Start: 2020-02-24 | End: 2020-02-24

## 2020-02-24 RX ORDER — ACETAMINOPHEN 325 MG/1
650 TABLET ORAL EVERY 6 HOURS PRN
Qty: 30 TABLET | Refills: 0 | Status: SHIPPED | OUTPATIENT
Start: 2020-02-24 | End: 2020-02-24

## 2020-02-24 RX ORDER — VITAMIN E 268 MG
400 CAPSULE ORAL DAILY
Qty: 31 CAPSULE | Refills: 0 | Status: SHIPPED | OUTPATIENT
Start: 2020-02-24 | End: 2020-02-24

## 2020-02-24 NOTE — PROGRESS NOTES
Assessment/Plan:    Type 2 diabetes mellitus with hyperglycemia (HCC)    Lab Results   Component Value Date    HGBA1C 8 5 (H) 12/04/2019     Currently on metformin 1000 mg daily, Januvia 50 mg daily  A1c in August was 8 which is now 8 5  Will increase metformin to 1000 mg b i d   Patient also shows micro albuminuria, with most recent microalbumin creatinine ratio 158  Recommend starting Lisinopril 2 5 mg daily  Also most recent CMP showed 1 32 with GFR 68  Will recheck BMP  Again stressed upon lifestyle modification with diet and exercise  Will obtain records from ophthalmology and podiatry       Diagnoses and all orders for this visit:    Screening for HIV (human immunodeficiency virus)  -     HIV 1/2 AG-AB combo; Future        BMI Counseling: Body mass index is 33 23 kg/m²  The BMI is above normal  Nutrition recommendations include reducing portion sizes, decreasing overall calorie intake, 3-5 servings of fruits/vegetables daily and reducing fast food intake  Exercise recommendations include moderate aerobic physical activity for 150 minutes/week  Subjective:      Patient ID: Cady Scales is a 46 y o  male  Orelia Promise is here for routine follow-up  He has no acute concerns today  He has lot of questions about soda today, he likes drinking soda at least 3 or 4 times today, he is trying to cut down  He is here with his caregiver  Per caregiver he recently had an eye exam, and also follows up with Podiatry  He does not have any acute concerns today  Caregiver is also concerned about his diet  The following portions of the patient's history were reviewed and updated as appropriate: allergies, current medications, past family history, past medical history, past social history, past surgical history and problem list     Review of Systems   Constitutional: Negative for activity change, chills and fever  HENT: Negative for congestion  Respiratory: Negative for shortness of breath  Cardiovascular: Negative for chest pain  Gastrointestinal: Negative for diarrhea, nausea and vomiting  Genitourinary: Negative for difficulty urinating  Musculoskeletal: Negative for back pain  Skin: Negative for rash  Neurological: Negative for dizziness and headaches  Psychiatric/Behavioral: Positive for behavioral problems  The patient is nervous/anxious  Objective:      /80 (BP Location: Left arm, Patient Position: Sitting, Cuff Size: Large)   Pulse 68   Temp 97 5 °F (36 4 °C) (Tympanic)   Resp 18   Ht 5' 9" (1 753 m)   Wt 102 kg (225 lb)   BMI 33 23 kg/m²          Physical Exam   Constitutional: He is oriented to person, place, and time  He appears well-developed and well-nourished  HENT:   Head: Normocephalic and atraumatic  Mouth/Throat: Oropharynx is clear and moist    Eyes: Conjunctivae and EOM are normal    Neck: Normal range of motion  Neck supple  Cardiovascular: Normal rate, regular rhythm and normal heart sounds  Exam reveals no friction rub  No murmur heard  Pulmonary/Chest: Effort normal and breath sounds normal  No respiratory distress  He has no wheezes  He has no rales  Abdominal: Soft  Bowel sounds are normal    Musculoskeletal: Normal range of motion  Neurological: He is alert and oriented to person, place, and time  Skin: Skin is warm and dry  Vitals reviewed

## 2020-02-24 NOTE — ASSESSMENT & PLAN NOTE
Lab Results   Component Value Date    HGBA1C 8 5 (H) 12/04/2019     Currently on metformin 1000 mg daily, Januvia 50 mg daily  A1c in August was 8 which is now 8 5  Will increase metformin to 1000 mg b i d   Patient also shows micro albuminuria, with most recent microalbumin creatinine ratio 158  Recommend starting Lisinopril 2 5 mg daily  Also most recent CMP showed 1 32 with GFR 68  Will recheck BMP       Again stressed upon lifestyle modification with diet and exercise  Will obtain records from ophthalmology and podiatry

## 2020-03-04 DIAGNOSIS — H25.11 NUCLEAR SCLEROTIC CATARACT OF RIGHT EYE: ICD-10-CM

## 2020-03-04 DIAGNOSIS — G44.209 TENSION HEADACHE: ICD-10-CM

## 2020-03-04 DIAGNOSIS — K59.00 CONSTIPATION, UNSPECIFIED CONSTIPATION TYPE: ICD-10-CM

## 2020-03-04 DIAGNOSIS — E55.9 VITAMIN D DEFICIENCY: ICD-10-CM

## 2020-03-04 RX ORDER — DOCUSATE SODIUM 100 MG/1
100 CAPSULE, LIQUID FILLED ORAL DAILY
Qty: 31 CAPSULE | Refills: 5 | Status: SHIPPED | OUTPATIENT
Start: 2020-03-04 | End: 2020-09-03 | Stop reason: SDUPTHER

## 2020-03-04 RX ORDER — VITAMIN E 268 MG
400 CAPSULE ORAL DAILY
Qty: 31 CAPSULE | Refills: 5 | Status: SHIPPED | OUTPATIENT
Start: 2020-03-04 | End: 2020-09-03 | Stop reason: SDUPTHER

## 2020-03-04 RX ORDER — MELATONIN
1000 DAILY
Qty: 31 TABLET | Refills: 5 | Status: SHIPPED | OUTPATIENT
Start: 2020-03-04 | End: 2020-09-03 | Stop reason: SDUPTHER

## 2020-03-17 ENCOUNTER — TRANSCRIBE ORDERS (OUTPATIENT)
Dept: LAB | Facility: CLINIC | Age: 40
End: 2020-03-17

## 2020-03-17 ENCOUNTER — APPOINTMENT (OUTPATIENT)
Dept: LAB | Facility: CLINIC | Age: 40
End: 2020-03-17
Payer: MEDICARE

## 2020-03-17 DIAGNOSIS — Z79.899 ENCOUNTER FOR LONG-TERM (CURRENT) USE OF OTHER MEDICATIONS: ICD-10-CM

## 2020-03-17 DIAGNOSIS — Z11.4 SCREENING FOR HUMAN IMMUNODEFICIENCY VIRUS: ICD-10-CM

## 2020-03-17 DIAGNOSIS — Z11.4 SCREENING FOR HIV (HUMAN IMMUNODEFICIENCY VIRUS): ICD-10-CM

## 2020-03-17 DIAGNOSIS — IMO0002 UNCONTROLLED DIABETES MELLITUS WITH COMPLICATIONS: ICD-10-CM

## 2020-03-17 DIAGNOSIS — E11.65 TYPE 2 DIABETES MELLITUS WITH HYPERGLYCEMIA, WITHOUT LONG-TERM CURRENT USE OF INSULIN (HCC): ICD-10-CM

## 2020-03-17 DIAGNOSIS — Z79.899 ENCOUNTER FOR LONG-TERM (CURRENT) USE OF OTHER MEDICATIONS: Primary | ICD-10-CM

## 2020-03-17 LAB
ALBUMIN SERPL BCP-MCNC: 3.2 G/DL (ref 3.5–5)
ALP SERPL-CCNC: 81 U/L (ref 46–116)
ALT SERPL W P-5'-P-CCNC: 27 U/L (ref 12–78)
ANION GAP SERPL CALCULATED.3IONS-SCNC: 5 MMOL/L (ref 4–13)
AST SERPL W P-5'-P-CCNC: 15 U/L (ref 5–45)
BASOPHILS # BLD AUTO: 0.07 THOUSANDS/ΜL (ref 0–0.1)
BASOPHILS NFR BLD AUTO: 1 % (ref 0–1)
BILIRUB SERPL-MCNC: 0.57 MG/DL (ref 0.2–1)
BUN SERPL-MCNC: 10 MG/DL (ref 5–25)
CALCIUM SERPL-MCNC: 8.7 MG/DL (ref 8.3–10.1)
CHLORIDE SERPL-SCNC: 101 MMOL/L (ref 100–108)
CHOLEST SERPL-MCNC: 89 MG/DL (ref 50–200)
CO2 SERPL-SCNC: 30 MMOL/L (ref 21–32)
CREAT SERPL-MCNC: 1.42 MG/DL (ref 0.6–1.3)
EOSINOPHIL # BLD AUTO: 0.16 THOUSAND/ΜL (ref 0–0.61)
EOSINOPHIL NFR BLD AUTO: 2 % (ref 0–6)
ERYTHROCYTE [DISTWIDTH] IN BLOOD BY AUTOMATED COUNT: 11.1 % (ref 11.6–15.1)
EST. AVERAGE GLUCOSE BLD GHB EST-MCNC: 209 MG/DL
GFR SERPL CREATININE-BSD FRML MDRD: 62 ML/MIN/1.73SQ M
GLUCOSE P FAST SERPL-MCNC: 173 MG/DL (ref 65–99)
HBA1C MFR BLD: 8.9 %
HCT VFR BLD AUTO: 49.2 % (ref 36.5–49.3)
HDLC SERPL-MCNC: 25 MG/DL
HGB BLD-MCNC: 16.7 G/DL (ref 12–17)
IMM GRANULOCYTES # BLD AUTO: 0.09 THOUSAND/UL (ref 0–0.2)
IMM GRANULOCYTES NFR BLD AUTO: 1 % (ref 0–2)
LDLC SERPL CALC-MCNC: 11 MG/DL (ref 0–100)
LYMPHOCYTES # BLD AUTO: 3.69 THOUSANDS/ΜL (ref 0.6–4.47)
LYMPHOCYTES NFR BLD AUTO: 36 % (ref 14–44)
MCH RBC QN AUTO: 30 PG (ref 26.8–34.3)
MCHC RBC AUTO-ENTMCNC: 33.9 G/DL (ref 31.4–37.4)
MCV RBC AUTO: 88 FL (ref 82–98)
MONOCYTES # BLD AUTO: 0.72 THOUSAND/ΜL (ref 0.17–1.22)
MONOCYTES NFR BLD AUTO: 7 % (ref 4–12)
NEUTROPHILS # BLD AUTO: 5.56 THOUSANDS/ΜL (ref 1.85–7.62)
NEUTS SEG NFR BLD AUTO: 53 % (ref 43–75)
NONHDLC SERPL-MCNC: 64 MG/DL
NRBC BLD AUTO-RTO: 0 /100 WBCS
PLATELET # BLD AUTO: 170 THOUSANDS/UL (ref 149–390)
PMV BLD AUTO: 10.3 FL (ref 8.9–12.7)
POTASSIUM SERPL-SCNC: 4.4 MMOL/L (ref 3.5–5.3)
PROT SERPL-MCNC: 6.9 G/DL (ref 6.4–8.2)
RBC # BLD AUTO: 5.57 MILLION/UL (ref 3.88–5.62)
SODIUM SERPL-SCNC: 136 MMOL/L (ref 136–145)
TRIGL SERPL-MCNC: 263 MG/DL
TSH SERPL DL<=0.05 MIU/L-ACNC: 1.17 UIU/ML (ref 0.36–3.74)
VALPROATE SERPL-MCNC: 42 UG/ML (ref 50–100)
WBC # BLD AUTO: 10.29 THOUSAND/UL (ref 4.31–10.16)

## 2020-03-17 PROCEDURE — 36415 COLL VENOUS BLD VENIPUNCTURE: CPT

## 2020-03-17 PROCEDURE — 85025 COMPLETE CBC W/AUTO DIFF WBC: CPT

## 2020-03-17 PROCEDURE — 80061 LIPID PANEL: CPT

## 2020-03-17 PROCEDURE — 80053 COMPREHEN METABOLIC PANEL: CPT

## 2020-03-17 PROCEDURE — 87389 HIV-1 AG W/HIV-1&-2 AB AG IA: CPT

## 2020-03-17 PROCEDURE — 80164 ASSAY DIPROPYLACETIC ACD TOT: CPT

## 2020-03-17 PROCEDURE — 84443 ASSAY THYROID STIM HORMONE: CPT

## 2020-03-17 PROCEDURE — 83036 HEMOGLOBIN GLYCOSYLATED A1C: CPT

## 2020-03-18 LAB — HIV 1+2 AB+HIV1 P24 AG SERPL QL IA: NORMAL

## 2020-03-27 DIAGNOSIS — R05.9 COUGH: Primary | ICD-10-CM

## 2020-03-27 NOTE — TELEPHONE ENCOUNTER
RECEIVED CALL REQUESTING A REFILL FOR HALLS COUGH DROPS, HE IS TO TAKE ONE BY MOUTH, AND UP TO FOUR TIMES DAILY  PRN ONLY FOR COUGH AND OR SORE THROAT  PLEASE SEND TO [facility]  PT IS FROM A GROUP HOME  THANK YOU

## 2020-04-01 DIAGNOSIS — G44.209 TENSION HEADACHE: ICD-10-CM

## 2020-04-03 DIAGNOSIS — G44.209 TENSION HEADACHE: ICD-10-CM

## 2020-04-29 ENCOUNTER — TELEPHONE (OUTPATIENT)
Dept: FAMILY MEDICINE CLINIC | Facility: CLINIC | Age: 40
End: 2020-04-29

## 2020-04-29 DIAGNOSIS — G44.209 TENSION HEADACHE: ICD-10-CM

## 2020-04-29 RX ORDER — ACETAMINOPHEN 325 MG/1
650 TABLET ORAL EVERY 6 HOURS PRN
Qty: 30 TABLET | Refills: 0 | Status: SHIPPED | OUTPATIENT
Start: 2020-04-29 | End: 2020-07-02 | Stop reason: SDUPTHER

## 2020-05-01 DIAGNOSIS — E03.9 HYPOTHYROIDISM, UNSPECIFIED TYPE: ICD-10-CM

## 2020-05-01 DIAGNOSIS — F25.0 SCHIZOAFFECTIVE DISORDER, BIPOLAR TYPE (HCC): Chronic | ICD-10-CM

## 2020-05-01 DIAGNOSIS — E78.5 HYPERLIPIDEMIA, UNSPECIFIED HYPERLIPIDEMIA TYPE: ICD-10-CM

## 2020-05-01 DIAGNOSIS — E78.49 OTHER HYPERLIPIDEMIA: ICD-10-CM

## 2020-05-01 RX ORDER — ATORVASTATIN CALCIUM 40 MG/1
40 TABLET, FILM COATED ORAL
Qty: 30 TABLET | Refills: 5 | Status: SHIPPED | OUTPATIENT
Start: 2020-05-01 | End: 2020-11-19 | Stop reason: SDUPTHER

## 2020-05-01 RX ORDER — CALCIUM CARBONATE 500(1250)
1 TABLET ORAL
Qty: 30 TABLET | Refills: 5 | Status: SHIPPED | OUTPATIENT
Start: 2020-05-01 | End: 2020-11-19 | Stop reason: SDUPTHER

## 2020-05-01 RX ORDER — LEVOTHYROXINE SODIUM 0.03 MG/1
25 TABLET ORAL
Qty: 30 TABLET | Refills: 5 | Status: SHIPPED | OUTPATIENT
Start: 2020-05-01 | End: 2020-11-19 | Stop reason: SDUPTHER

## 2020-05-01 RX ORDER — LORATADINE 10 MG/1
10 TABLET ORAL DAILY
Qty: 30 TABLET | Refills: 5 | Status: SHIPPED | OUTPATIENT
Start: 2020-05-01 | End: 2020-11-19 | Stop reason: SDUPTHER

## 2020-06-01 ENCOUNTER — TELEPHONE (OUTPATIENT)
Dept: FAMILY MEDICINE CLINIC | Facility: CLINIC | Age: 40
End: 2020-06-01

## 2020-06-01 NOTE — TELEPHONE ENCOUNTER
[address] [place] [address] requesting refill of Guaifenesin 100 MG  Not found on script list, may need to make appointment for patient?    Thank you

## 2020-06-03 ENCOUNTER — TELEPHONE (OUTPATIENT)
Dept: FAMILY MEDICINE CLINIC | Facility: CLINIC | Age: 40
End: 2020-06-03

## 2020-06-03 DIAGNOSIS — E11.65 TYPE 2 DIABETES MELLITUS WITH HYPERGLYCEMIA, UNSPECIFIED WHETHER LONG TERM INSULIN USE (HCC): ICD-10-CM

## 2020-06-03 NOTE — TELEPHONE ENCOUNTER
Fax was received requesting a refill on Januvia 50mg  Patient takes one tablet by mouth daily after breakfast  Please send to [facility]  Thank you

## 2020-06-09 RX ORDER — CHLORPROMAZINE HYDROCHLORIDE 100 MG/1
100 TABLET, FILM COATED ORAL 3 TIMES DAILY
COMMUNITY
End: 2022-05-21 | Stop reason: SDUPTHER

## 2020-06-09 RX ORDER — LANOLIN ALCOHOL/MO/W.PET/CERES
CREAM (GRAM) TOPICAL AS NEEDED
COMMUNITY
End: 2021-09-20 | Stop reason: SDUPTHER

## 2020-06-09 RX ORDER — TRIHEXYPHENIDYL HYDROCHLORIDE 2 MG/1
2 TABLET ORAL
COMMUNITY
End: 2021-01-12 | Stop reason: ALTCHOICE

## 2020-06-10 ENCOUNTER — OFFICE VISIT (OUTPATIENT)
Dept: FAMILY MEDICINE CLINIC | Facility: CLINIC | Age: 40
End: 2020-06-10

## 2020-06-10 VITALS
TEMPERATURE: 98.2 F | DIASTOLIC BLOOD PRESSURE: 70 MMHG | HEART RATE: 88 BPM | WEIGHT: 202 LBS | BODY MASS INDEX: 29.92 KG/M2 | RESPIRATION RATE: 18 BRPM | SYSTOLIC BLOOD PRESSURE: 110 MMHG | HEIGHT: 69 IN

## 2020-06-10 DIAGNOSIS — Z11.1 PPD SCREENING TEST: ICD-10-CM

## 2020-06-10 DIAGNOSIS — E11.65 TYPE 2 DIABETES MELLITUS WITH HYPERGLYCEMIA, WITHOUT LONG-TERM CURRENT USE OF INSULIN (HCC): ICD-10-CM

## 2020-06-10 DIAGNOSIS — Z00.00 ANNUAL PHYSICAL EXAM: ICD-10-CM

## 2020-06-10 DIAGNOSIS — Z13.5 SCREENING FOR DIABETIC RETINOPATHY: Primary | ICD-10-CM

## 2020-06-10 DIAGNOSIS — Z11.1 ENCOUNTER FOR PPD TEST: ICD-10-CM

## 2020-06-10 PROCEDURE — 99395 PREV VISIT EST AGE 18-39: CPT | Performed by: FAMILY MEDICINE

## 2020-06-10 PROCEDURE — 86580 TB INTRADERMAL TEST: CPT | Performed by: FAMILY MEDICINE

## 2020-06-10 PROCEDURE — 3052F HG A1C>EQUAL 8.0%<EQUAL 9.0%: CPT | Performed by: FAMILY MEDICINE

## 2020-06-12 ENCOUNTER — CLINICAL SUPPORT (OUTPATIENT)
Dept: FAMILY MEDICINE CLINIC | Facility: CLINIC | Age: 40
End: 2020-06-12

## 2020-06-12 DIAGNOSIS — Z11.1 PPD SCREENING TEST: Primary | ICD-10-CM

## 2020-06-12 LAB
INDURATION: 0 MM
TB SKIN TEST: NEGATIVE

## 2020-06-15 ENCOUNTER — TELEPHONE (OUTPATIENT)
Dept: FAMILY MEDICINE CLINIC | Facility: CLINIC | Age: 40
End: 2020-06-15

## 2020-06-29 ENCOUNTER — APPOINTMENT (OUTPATIENT)
Dept: LAB | Facility: CLINIC | Age: 40
End: 2020-06-29
Payer: MEDICARE

## 2020-06-29 ENCOUNTER — TRANSCRIBE ORDERS (OUTPATIENT)
Dept: LAB | Facility: CLINIC | Age: 40
End: 2020-06-29

## 2020-06-29 DIAGNOSIS — E11.65 TYPE 2 DIABETES MELLITUS WITH HYPERGLYCEMIA, WITHOUT LONG-TERM CURRENT USE OF INSULIN (HCC): ICD-10-CM

## 2020-06-29 LAB
ANION GAP SERPL CALCULATED.3IONS-SCNC: 8 MMOL/L (ref 4–13)
BUN SERPL-MCNC: 9 MG/DL (ref 5–25)
CALCIUM SERPL-MCNC: 8.1 MG/DL (ref 8.3–10.1)
CHLORIDE SERPL-SCNC: 100 MMOL/L (ref 100–108)
CO2 SERPL-SCNC: 28 MMOL/L (ref 21–32)
CREAT SERPL-MCNC: 1.22 MG/DL (ref 0.6–1.3)
EST. AVERAGE GLUCOSE BLD GHB EST-MCNC: 140 MG/DL
GFR SERPL CREATININE-BSD FRML MDRD: 75 ML/MIN/1.73SQ M
GLUCOSE P FAST SERPL-MCNC: 160 MG/DL (ref 65–99)
HBA1C MFR BLD: 6.5 %
POTASSIUM SERPL-SCNC: 4 MMOL/L (ref 3.5–5.3)
SODIUM SERPL-SCNC: 136 MMOL/L (ref 136–145)

## 2020-06-29 PROCEDURE — 80048 BASIC METABOLIC PNL TOTAL CA: CPT

## 2020-06-29 PROCEDURE — 36415 COLL VENOUS BLD VENIPUNCTURE: CPT

## 2020-06-29 PROCEDURE — 83036 HEMOGLOBIN GLYCOSYLATED A1C: CPT

## 2020-07-02 DIAGNOSIS — G44.209 TENSION HEADACHE: ICD-10-CM

## 2020-07-02 DIAGNOSIS — F25.0 SCHIZOAFFECTIVE DISORDER, BIPOLAR TYPE (HCC): Chronic | ICD-10-CM

## 2020-07-02 RX ORDER — ACETAMINOPHEN 325 MG/1
650 TABLET ORAL EVERY 6 HOURS PRN
Qty: 30 TABLET | Refills: 0 | Status: SHIPPED | OUTPATIENT
Start: 2020-07-02 | End: 2020-09-11 | Stop reason: SDUPTHER

## 2020-07-02 RX ORDER — METFORMIN HYDROCHLORIDE EXTENDED-RELEASE TABLETS 1000 MG/1
1000 TABLET, FILM COATED, EXTENDED RELEASE ORAL 2 TIMES DAILY WITH MEALS
Qty: 62 TABLET | Refills: 5 | Status: SHIPPED | OUTPATIENT
Start: 2020-07-02 | End: 2021-01-27 | Stop reason: SDUPTHER

## 2020-07-03 ENCOUNTER — HOSPITAL ENCOUNTER (EMERGENCY)
Facility: HOSPITAL | Age: 40
Discharge: HOME | End: 2020-07-04
Attending: EMERGENCY MEDICINE | Admitting: EMERGENCY MEDICINE
Payer: MEDICARE

## 2020-07-03 DIAGNOSIS — F41.9 ANXIETY: Primary | ICD-10-CM

## 2020-07-03 PROCEDURE — 99284 EMERGENCY DEPT VISIT MOD MDM: CPT

## 2020-07-03 PROCEDURE — 96372 THER/PROPH/DIAG INJ SC/IM: CPT

## 2020-07-03 PROCEDURE — 99284 EMERGENCY DEPT VISIT MOD MDM: CPT | Performed by: EMERGENCY MEDICINE

## 2020-07-03 RX ORDER — CHLORPROMAZINE HYDROCHLORIDE 25 MG/ML
12.5 INJECTION INTRAMUSCULAR ONCE
Status: COMPLETED | OUTPATIENT
Start: 2020-07-03 | End: 2020-07-03

## 2020-07-03 RX ADMIN — CHLORPROMAZINE HYDROCHLORIDE 12.5 MG: 25 INJECTION INTRAMUSCULAR at 23:32

## 2020-07-03 NOTE — TELEPHONE ENCOUNTER
Received fax from [facility] [address] requesting for form to be reviewed, sign and fax back  Form placed in PCP's bin  Thank you  Refill request received for Pepcid 20mg    Last office visit: 6/8/2020  Next office visit: Visit date not found  Last refill: 5/29/20  Last labs: 1/17/20

## 2020-07-04 VITALS
HEART RATE: 96 BPM | SYSTOLIC BLOOD PRESSURE: 116 MMHG | WEIGHT: 193.12 LBS | TEMPERATURE: 98.2 F | RESPIRATION RATE: 18 BRPM | OXYGEN SATURATION: 97 % | DIASTOLIC BLOOD PRESSURE: 68 MMHG | BODY MASS INDEX: 28.52 KG/M2
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
## 2020-07-04 NOTE — ED NOTES
Group home staff requested that patient speak with Crisis  Dr Angel Harding consulted for crisis to see patient  Crisis already in department and will see patient       Dain Miranda, RN  07/03/20 8742

## 2020-07-06 DIAGNOSIS — J30.2 SEASONAL ALLERGIES: Primary | ICD-10-CM

## 2020-07-27 ENCOUNTER — HOSPITAL ENCOUNTER (EMERGENCY)
Facility: HOSPITAL | Age: 40
Discharge: HOME | End: 2020-07-27
Attending: EMERGENCY MEDICINE | Admitting: EMERGENCY MEDICINE
Payer: MEDICARE

## 2020-07-27 VITALS
OXYGEN SATURATION: 98 % | BODY MASS INDEX: 29.62 KG/M2 | HEIGHT: 69 IN | DIASTOLIC BLOOD PRESSURE: 88 MMHG | TEMPERATURE: 98.1 F | SYSTOLIC BLOOD PRESSURE: 128 MMHG | WEIGHT: 200 LBS | HEART RATE: 90 BPM | RESPIRATION RATE: 17 BRPM

## 2020-07-27 DIAGNOSIS — F41.9 ANXIETY: Primary | ICD-10-CM

## 2020-07-27 LAB
AMPHETAMINES SERPL QL SCN: NEGATIVE
BARBITURATES UR QL: NEGATIVE
BENZODIAZ UR QL: NEGATIVE
COCAINE UR QL: NEGATIVE
ETHANOL SERPL-MCNC: <10 MG/DL
METHADONE UR QL: NEGATIVE
OPIATES UR QL SCN: NEGATIVE
OXYCODONE+OXYMORPHONE UR QL SCN: NEGATIVE
PCP UR QL: NEGATIVE
THC UR QL: NEGATIVE

## 2020-07-27 PROCEDURE — 80320 DRUG SCREEN QUANTALCOHOLS: CPT | Performed by: EMERGENCY MEDICINE

## 2020-07-27 PROCEDURE — 99284 EMERGENCY DEPT VISIT MOD MDM: CPT | Performed by: EMERGENCY MEDICINE

## 2020-07-27 PROCEDURE — 36415 COLL VENOUS BLD VENIPUNCTURE: CPT | Performed by: EMERGENCY MEDICINE

## 2020-07-27 PROCEDURE — 99284 EMERGENCY DEPT VISIT MOD MDM: CPT

## 2020-07-27 PROCEDURE — 80307 DRUG TEST PRSMV CHEM ANLYZR: CPT | Performed by: EMERGENCY MEDICINE

## 2020-07-27 RX ORDER — LORAZEPAM 1 MG/1
1 TABLET ORAL ONCE
Status: COMPLETED | OUTPATIENT
Start: 2020-07-27 | End: 2020-07-27

## 2020-07-27 RX ADMIN — LORAZEPAM 1 MG: 1 TABLET ORAL at 22:45
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
## 2020-07-28 NOTE — ED NOTES
Group home staff came to pick pt up at this time and he left department at this time in no distress      Douglas Szymanski RN  07/27/20 1599
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
## 2020-07-29 ENCOUNTER — DOCTOR'S OFFICE (OUTPATIENT)
Dept: URBAN - METROPOLITAN AREA CLINIC 136 | Facility: CLINIC | Age: 40
Setting detail: OPHTHALMOLOGY
End: 2020-07-29
Payer: COMMERCIAL

## 2020-07-29 DIAGNOSIS — E11.3291: ICD-10-CM

## 2020-07-29 DIAGNOSIS — H04.123: ICD-10-CM

## 2020-07-29 DIAGNOSIS — E11.9: ICD-10-CM

## 2020-07-29 DIAGNOSIS — H27.8: ICD-10-CM

## 2020-07-29 PROBLEM — H04.122 DRY EYE; RIGHT EYE, LEFT EYE: Status: ACTIVE | Noted: 2020-07-29

## 2020-07-29 PROBLEM — H27.9: Status: ACTIVE | Noted: 2019-04-30

## 2020-07-29 PROBLEM — H04.121 DRY EYE; RIGHT EYE, LEFT EYE: Status: ACTIVE | Noted: 2020-07-29

## 2020-07-29 PROCEDURE — 92014 COMPRE OPH EXAM EST PT 1/>: CPT | Performed by: OPHTHALMOLOGY

## 2020-07-29 ASSESSMENT — CONFRONTATIONAL VISUAL FIELD TEST (CVF)
OD_FINDINGS: FULL
OS_FINDINGS: FULL

## 2020-07-29 ASSESSMENT — SPHEQUIV_DERIVED
OS_SPHEQUIV: 0
OD_SPHEQUIV: 0.25

## 2020-07-29 ASSESSMENT — REFRACTION_AUTOREFRACTION
OS_AXIS: 174
OS_SPHERE: +1.00
OD_CYLINDER: -2.50
OS_CYLINDER: -2.00
OD_AXIS: 170
OD_SPHERE: +1.50

## 2020-07-29 ASSESSMENT — SUPERFICIAL PUNCTATE KERATITIS (SPK)
OS_SPK: T
OD_SPK: T

## 2020-07-29 ASSESSMENT — VISUAL ACUITY
OD_BCVA: 20/40-1
OS_BCVA: 20/30-2

## 2020-09-01 ENCOUNTER — OFFICE VISIT (OUTPATIENT)
Dept: FAMILY MEDICINE CLINIC | Facility: CLINIC | Age: 40
End: 2020-09-01

## 2020-09-01 VITALS
TEMPERATURE: 97.8 F | SYSTOLIC BLOOD PRESSURE: 120 MMHG | HEIGHT: 69 IN | HEART RATE: 70 BPM | DIASTOLIC BLOOD PRESSURE: 80 MMHG | BODY MASS INDEX: 30.07 KG/M2 | RESPIRATION RATE: 16 BRPM | WEIGHT: 203 LBS

## 2020-09-01 DIAGNOSIS — Z23 NEED FOR VACCINATION FOR H FLU TYPE B: ICD-10-CM

## 2020-09-01 DIAGNOSIS — Z13.5 SCREENING FOR DIABETIC RETINOPATHY: ICD-10-CM

## 2020-09-01 DIAGNOSIS — Z00.00 MEDICARE ANNUAL WELLNESS VISIT, SUBSEQUENT: Primary | ICD-10-CM

## 2020-09-01 PROCEDURE — G0008 ADMIN INFLUENZA VIRUS VAC: HCPCS | Performed by: FAMILY MEDICINE

## 2020-09-01 PROCEDURE — 90686 IIV4 VACC NO PRSV 0.5 ML IM: CPT | Performed by: FAMILY MEDICINE

## 2020-09-01 PROCEDURE — G0438 PPPS, INITIAL VISIT: HCPCS | Performed by: FAMILY MEDICINE

## 2020-09-01 NOTE — PATIENT INSTRUCTIONS
Medicare Preventive Visit Patient Instructions  Thank you for completing your Welcome to Medicare Visit or Medicare Annual Wellness Visit today  Your next wellness visit will be due in one year (9/1/2021)  The screening/preventive services that you may require over the next 5-10 years are detailed below  Some tests may not apply to you based off risk factors and/or age  Screening tests ordered at today's visit but not completed yet may show as past due  Also, please note that scanned in results may not display below  Preventive Screenings:  Service Recommendations Previous Testing/Comments   Colorectal Cancer Screening  · Colonoscopy    · Fecal Occult Blood Test (FOBT)/Fecal Immunochemical Test (FIT)  · Fecal DNA/Cologuard Test  · Flexible Sigmoidoscopy Age: 54-65 years old   Colonoscopy: every 10 years (May be performed more frequently if at higher risk)  OR  FOBT/FIT: every 1 year  OR  Cologuard: every 3 years  OR  Sigmoidoscopy: every 5 years  Screening may be recommended earlier than age 48 if at higher risk for colorectal cancer  Also, an individualized decision between you and your healthcare provider will decide whether screening between the ages of 74-80 would be appropriate  Colonoscopy: Not on file  FOBT/FIT: Not on file  Cologuard: Not on file  Sigmoidoscopy: Not on file         Prostate Cancer Screening Individualized decision between patient and health care provider in men between ages of 53-78   Medicare will cover every 12 months beginning on the day after your 50th birthday PSA: No results in last 5 years          Hepatitis C Screening Once for adults born between [facility]  More frequently in patients at high risk for Hepatitis C Hep C Antibody: Not on file       Diabetes Screening 1-2 times per year if you're at risk for diabetes or have pre-diabetes Fasting glucose: 160 mg/dL   A1C: 6 5 %       Cholesterol Screening Once every 5 years if you don't have a lipid disorder   May order more often based on risk factors  Lipid panel: 03/17/2020          Other Preventive Screenings Covered by Medicare:  1  Abdominal Aortic Aneurysm (AAA) Screening: covered once if your at risk  You're considered to be at risk if you have a family history of AAA or a male between the age of 73-68 who smoking at least 100 cigarettes in your lifetime  2  Lung Cancer Screening: covers low dose CT scan once per year if you meet all of the following conditions: (1) Age 50-69; (2) No signs or symptoms of lung cancer; (3) Current smoker or have quit smoking within the last 15 years; (4) You have a tobacco smoking history of at least 30 pack years (packs per day x number of years you smoked); (5) You get a written order from a healthcare provider  3  Glaucoma Screening: covered annually if you're considered high risk: (1) You have diabetes OR (2) Family history of glaucoma OR (3)  aged 48 and older OR (3)  American aged 72 and older  3  Osteoporosis Screening: covered every 2 years if you meet one of the following conditions: (1) Have a vertebral abnormality; (2) On glucocorticoid therapy for more than 3 months; (3) Have primary hyperparathyroidism; (4) On osteoporosis medications and need to assess response to drug therapy  5  HIV Screening: covered annually if you're between the age of 12-76  Also covered annually if you are younger than 13 and older than 72 with risk factors for HIV infection  For pregnant patients, it is covered up to 3 times per pregnancy      Immunizations:  Immunization Recommendations   Influenza Vaccine Annual influenza vaccination during flu season is recommended for all persons aged >= 6 months who do not have contraindications   Pneumococcal Vaccine (Prevnar and Pneumovax)  * Prevnar = PCV13  * Pneumovax = PPSV23 Adults 25-60 years old: 1-3 doses may be recommended based on certain risk factors  Adults 72 years old: Prevnar (PCV13) vaccine recommended followed by Pneumovax (PPSV23) vaccine  If already received PPSV23 since turning 65, then PCV13 recommended at least one year after PPSV23 dose  Hepatitis B Vaccine 3 dose series if at intermediate or high risk (ex: diabetes, end stage renal disease, liver disease)   Tetanus (Td) Vaccine - COST NOT COVERED BY MEDICARE PART B Following completion of primary series, a booster dose should be given every 10 years to maintain immunity against tetanus  Td may also be given as tetanus wound prophylaxis  Tdap Vaccine - COST NOT COVERED BY MEDICARE PART B Recommended at least once for all adults  For pregnant patients, recommended with each pregnancy  Shingles Vaccine (Shingrix) - COST NOT COVERED BY MEDICARE PART B  2 shot series recommended in those aged 48 and above     Health Maintenance Due:  There are no preventive care reminders to display for this patient  Immunizations Due:      Topic Date Due    Pneumococcal Vaccine: Pediatrics (0 to 5 Years) and At-Risk Patients (6 to 59 Years) (1 of 1 - PPSV23) 09/28/1987    Influenza Vaccine  07/01/2020     Advance Directives   What are advance directives? Advance directives are legal documents that state your wishes and plans for medical care  These plans are made ahead of time in case you lose your ability to make decisions for yourself  Advance directives can apply to any medical decision, such as the treatments you want, and if you want to donate organs  What are the types of advance directives? There are many types of advance directives, and each state has rules about how to use them  You may choose a combination of any of the following:  · Living will: This is a written record of the treatment you want  You can also choose which treatments you do not want, which to limit, and which to stop at a certain time  This includes surgery, medicine, IV fluid, and tube feedings  · Durable power of  for healthcare [facility]):   This is a written record that states who you want to make healthcare choices for you when you are unable to make them for yourself  This person, called a proxy, is usually a family member or a friend  You may choose more than 1 proxy  · Do not resuscitate (DNR) order:  A DNR order is used in case your heart stops beating or you stop breathing  It is a request not to have certain forms of treatment, such as CPR  A DNR order may be included in other types of advance directives  · Medical directive: This covers the care that you want if you are in a coma, near death, or unable to make decisions for yourself  You can list the treatments you want for each condition  Treatment may include pain medicine, surgery, blood transfusions, dialysis, IV or tube feedings, and a ventilator (breathing machine)  · Values history: This document has questions about your views, beliefs, and how you feel and think about life  This information can help others choose the care that you would choose  Why are advance directives important? An advance directive helps you control your care  Although spoken wishes may be used, it is better to have your wishes written down  Spoken wishes can be misunderstood, or not followed  Treatments may be given even if you do not want them  An advance directive may make it easier for your family to make difficult choices about your care  Weight Management   Why it is important to manage your weight:  Being overweight increases your risk of health conditions such as heart disease, high blood pressure, type 2 diabetes, and certain types of cancer  It can also increase your risk for osteoarthritis, sleep apnea, and other respiratory problems  Aim for a slow, steady weight loss  Even a small amount of weight loss can lower your risk of health problems  How to lose weight safely:  A safe and healthy way to lose weight is to eat fewer calories and get regular exercise   You can lose up about 1 pound a week by decreasing the number of calories you eat by 500 calories each day  Healthy meal plan for weight management:  A healthy meal plan includes a variety of foods, contains fewer calories, and helps you stay healthy  A healthy meal plan includes the following:  · Eat whole-grain foods more often  A healthy meal plan should contain fiber  Fiber is the part of grains, fruits, and vegetables that is not broken down by your body  Whole-grain foods are healthy and provide extra fiber in your diet  Some examples of whole-grain foods are whole-wheat breads and pastas, oatmeal, brown rice, and bulgur  · Eat a variety of vegetables every day  Include dark, leafy greens such as spinach, kale, stu greens, and mustard greens  Eat yellow and orange vegetables such as carrots, sweet potatoes, and winter squash  · Eat a variety of fruits every day  Choose fresh or canned fruit (canned in its own juice or light syrup) instead of juice  Fruit juice has very little or no fiber  · Eat low-fat dairy foods  Drink fat-free (skim) milk or 1% milk  Eat fat-free yogurt and low-fat cottage cheese  Try low-fat cheeses such as mozzarella and other reduced-fat cheeses  · Choose meat and other protein foods that are low in fat  Choose beans or other legumes such as split peas or lentils  Choose fish, skinless poultry (chicken or turkey), or lean cuts of red meat (beef or pork)  Before you cook meat or poultry, cut off any visible fat  · Use less fat and oil  Try baking foods instead of frying them  Add less fat, such as margarine, sour cream, regular salad dressing and mayonnaise to foods  Eat fewer high-fat foods  Some examples of high-fat foods include french fries, doughnuts, ice cream, and cakes  · Eat fewer sweets  Limit foods and drinks that are high in sugar  This includes candy, cookies, regular soda, and sweetened drinks  Exercise:  Exercise at least 30 minutes per day on most days of the week  Some examples of exercise include walking, biking, dancing, and swimming  You can also fit in more physical activity by taking the stairs instead of the elevator or parking farther away from stores  Ask your healthcare provider about the best exercise plan for you  © Copyright [employer] 2018 Information is for End User's use only and may not be sold, redistributed or otherwise used for commercial purposes  All illustrations and images included in CareNotes® are the copyrighted property of [employer]  or [facility] Preventive Visit Patient Instructions  Thank you for completing your Welcome to Medicare Visit or Medicare Annual Wellness Visit today  Your next wellness visit will be due in one year (9/1/2021)  The screening/preventive services that you may require over the next 5-10 years are detailed below  Some tests may not apply to you based off risk factors and/or age  Screening tests ordered at today's visit but not completed yet may show as past due  Also, please note that scanned in results may not display below  Preventive Screenings:  Service Recommendations Previous Testing/Comments   Colorectal Cancer Screening  · Colonoscopy    · Fecal Occult Blood Test (FOBT)/Fecal Immunochemical Test (FIT)  · Fecal DNA/Cologuard Test  · Flexible Sigmoidoscopy Age: 54-65 years old   Colonoscopy: every 10 years (May be performed more frequently if at higher risk)  OR  FOBT/FIT: every 1 year  OR  Cologuard: every 3 years  OR  Sigmoidoscopy: every 5 years  Screening may be recommended earlier than age 48 if at higher risk for colorectal cancer  Also, an individualized decision between you and your healthcare provider will decide whether screening between the ages of 74-80 would be appropriate   Colonoscopy: Not on file  FOBT/FIT: Not on file  Cologuard: Not on file  Sigmoidoscopy: Not on file         Prostate Cancer Screening Individualized decision between patient and health care provider in men between ages of 53-78   Medicare will cover every 12 months beginning on the day after your 50th birthday PSA: No results in last 5 years     Screening Not Indicated     Hepatitis C Screening Once for adults born between 1945 and 1965  More frequently in patients at high risk for Hepatitis C Hep C Antibody: Not on file       Diabetes Screening 1-2 times per year if you're at risk for diabetes or have pre-diabetes Fasting glucose: 160 mg/dL   A1C: 6 5 %    Screening Not Indicated  History Diabetes   Cholesterol Screening Once every 5 years if you don't have a lipid disorder  May order more often based on risk factors  Lipid panel: 03/17/2020    Screening Not Indicated  History Lipid Disorder      Other Preventive Screenings Covered by Medicare:  6  Abdominal Aortic Aneurysm (AAA) Screening: covered once if your at risk  You're considered to be at risk if you have a family history of AAA or a male between the age of 73-68 who smoking at least 100 cigarettes in your lifetime  7  Lung Cancer Screening: covers low dose CT scan once per year if you meet all of the following conditions: (1) Age 50-69; (2) No signs or symptoms of lung cancer; (3) Current smoker or have quit smoking within the last 15 years; (4) You have a tobacco smoking history of at least 30 pack years (packs per day x number of years you smoked); (5) You get a written order from a healthcare provider  8  Glaucoma Screening: covered annually if you're considered high risk: (1) You have diabetes OR (2) Family history of glaucoma OR (3)  aged 48 and older OR (3)  American aged 72 and older  5  Osteoporosis Screening: covered every 2 years if you meet one of the following conditions: (1) Have a vertebral abnormality; (2) On glucocorticoid therapy for more than 3 months; (3) Have primary hyperparathyroidism; (4) On osteoporosis medications and need to assess response to drug therapy  10  HIV Screening: covered annually if you're between the age of 12-76   Also covered annually if you are younger than 13 and older than 72 with risk factors for HIV infection  For pregnant patients, it is covered up to 3 times per pregnancy  Immunizations:  Immunization Recommendations   Influenza Vaccine Annual influenza vaccination during flu season is recommended for all persons aged >= 6 months who do not have contraindications   Pneumococcal Vaccine (Prevnar and Pneumovax)  * Prevnar = PCV13  * Pneumovax = PPSV23 Adults 25-60 years old: 1-3 doses may be recommended based on certain risk factors  Adults 72 years old: Prevnar (PCV13) vaccine recommended followed by Pneumovax (PPSV23) vaccine  If already received PPSV23 since turning 65, then PCV13 recommended at least one year after PPSV23 dose  Hepatitis B Vaccine 3 dose series if at intermediate or high risk (ex: diabetes, end stage renal disease, liver disease)   Tetanus (Td) Vaccine - COST NOT COVERED BY MEDICARE PART B Following completion of primary series, a booster dose should be given every 10 years to maintain immunity against tetanus  Td may also be given as tetanus wound prophylaxis  Tdap Vaccine - COST NOT COVERED BY MEDICARE PART B Recommended at least once for all adults  For pregnant patients, recommended with each pregnancy  Shingles Vaccine (Shingrix) - COST NOT COVERED BY MEDICARE PART B  2 shot series recommended in those aged 48 and above     Health Maintenance Due:  There are no preventive care reminders to display for this patient  Immunizations Due:      Topic Date Due    Pneumococcal Vaccine: Pediatrics (0 to 5 Years) and At-Risk Patients (6 to 59 Years) (1 of 1 - PPSV23) 09/28/1987    Influenza Vaccine  07/01/2020     Advance Directives   What are advance directives? Advance directives are legal documents that state your wishes and plans for medical care  These plans are made ahead of time in case you lose your ability to make decisions for yourself   Advance directives can apply to any medical decision, such as the treatments you want, and if you want to donate organs  What are the types of advance directives? There are many types of advance directives, and each state has rules about how to use them  You may choose a combination of any of the following:  · Living will: This is a written record of the treatment you want  You can also choose which treatments you do not want, which to limit, and which to stop at a certain time  This includes surgery, medicine, IV fluid, and tube feedings  · Durable power of  for healthcare [facility]): This is a written record that states who you want to make healthcare choices for you when you are unable to make them for yourself  This person, called a proxy, is usually a family member or a friend  You may choose more than 1 proxy  · Do not resuscitate (DNR) order:  A DNR order is used in case your heart stops beating or you stop breathing  It is a request not to have certain forms of treatment, such as CPR  A DNR order may be included in other types of advance directives  · Medical directive: This covers the care that you want if you are in a coma, near death, or unable to make decisions for yourself  You can list the treatments you want for each condition  Treatment may include pain medicine, surgery, blood transfusions, dialysis, IV or tube feedings, and a ventilator (breathing machine)  · Values history: This document has questions about your views, beliefs, and how you feel and think about life  This information can help others choose the care that you would choose  Why are advance directives important? An advance directive helps you control your care  Although spoken wishes may be used, it is better to have your wishes written down  Spoken wishes can be misunderstood, or not followed  Treatments may be given even if you do not want them  An advance directive may make it easier for your family to make difficult choices about your care     Weight Management   Why it is important to manage your weight:  Being overweight increases your risk of health conditions such as heart disease, high blood pressure, type 2 diabetes, and certain types of cancer  It can also increase your risk for osteoarthritis, sleep apnea, and other respiratory problems  Aim for a slow, steady weight loss  Even a small amount of weight loss can lower your risk of health problems  How to lose weight safely:  A safe and healthy way to lose weight is to eat fewer calories and get regular exercise  You can lose up about 1 pound a week by decreasing the number of calories you eat by 500 calories each day  Healthy meal plan for weight management:  A healthy meal plan includes a variety of foods, contains fewer calories, and helps you stay healthy  A healthy meal plan includes the following:  · Eat whole-grain foods more often  A healthy meal plan should contain fiber  Fiber is the part of grains, fruits, and vegetables that is not broken down by your body  Whole-grain foods are healthy and provide extra fiber in your diet  Some examples of whole-grain foods are whole-wheat breads and pastas, oatmeal, brown rice, and bulgur  · Eat a variety of vegetables every day  Include dark, leafy greens such as spinach, kale, stu greens, and mustard greens  Eat yellow and orange vegetables such as carrots, sweet potatoes, and winter squash  · Eat a variety of fruits every day  Choose fresh or canned fruit (canned in its own juice or light syrup) instead of juice  Fruit juice has very little or no fiber  · Eat low-fat dairy foods  Drink fat-free (skim) milk or 1% milk  Eat fat-free yogurt and low-fat cottage cheese  Try low-fat cheeses such as mozzarella and other reduced-fat cheeses  · Choose meat and other protein foods that are low in fat  Choose beans or other legumes such as split peas or lentils  Choose fish, skinless poultry (chicken or turkey), or lean cuts of red meat (beef or pork)   Before you cook meat or poultry, cut off any visible fat  · Use less fat and oil  Try baking foods instead of frying them  Add less fat, such as margarine, sour cream, regular salad dressing and mayonnaise to foods  Eat fewer high-fat foods  Some examples of high-fat foods include french fries, doughnuts, ice cream, and cakes  · Eat fewer sweets  Limit foods and drinks that are high in sugar  This includes candy, cookies, regular soda, and sweetened drinks  Exercise:  Exercise at least 30 minutes per day on most days of the week  Some examples of exercise include walking, biking, dancing, and swimming  You can also fit in more physical activity by taking the stairs instead of the elevator or parking farther away from stores  Ask your healthcare provider about the best exercise plan for you  © Copyright [employer] 2018 Information is for End User's use only and may not be sold, redistributed or otherwise used for commercial purposes  All illustrations and images included in CareNotes® are the copyrighted property of [employer]  or [facility] Preventive Visit Patient Instructions  Thank you for completing your Welcome to Medicare Visit or Medicare Annual Wellness Visit today  Your next wellness visit will be due in one year (9/1/2021)  The screening/preventive services that you may require over the next 5-10 years are detailed below  Some tests may not apply to you based off risk factors and/or age  Screening tests ordered at today's visit but not completed yet may show as past due  Also, please note that scanned in results may not display below    Preventive Screenings:  Service Recommendations Previous Testing/Comments   Colorectal Cancer Screening  · Colonoscopy    · Fecal Occult Blood Test (FOBT)/Fecal Immunochemical Test (FIT)  · Fecal DNA/Cologuard Test  · Flexible Sigmoidoscopy Age: 54-65 years old   Colonoscopy: every 10 years (May be performed more frequently if at higher risk)  OR  FOBT/FIT: every 1 year OR  Cologuard: every 3 years  OR  Sigmoidoscopy: every 5 years  Screening may be recommended earlier than age 48 if at higher risk for colorectal cancer  Also, an individualized decision between you and your healthcare provider will decide whether screening between the ages of 74-80 would be appropriate  Colonoscopy: Not on file  FOBT/FIT: Not on file  Cologuard: Not on file  Sigmoidoscopy: Not on file         Prostate Cancer Screening Individualized decision between patient and health care provider in men between ages of 53-78   Medicare will cover every 12 months beginning on the day after your 50th birthday PSA: No results in last 5 years     Screening Not Indicated     Hepatitis C Screening Once for adults born between [facility]  More frequently in patients at high risk for Hepatitis C Hep C Antibody: Not on file       Diabetes Screening 1-2 times per year if you're at risk for diabetes or have pre-diabetes Fasting glucose: 160 mg/dL   A1C: 6 5 %    Screening Not Indicated  History Diabetes   Cholesterol Screening Once every 5 years if you don't have a lipid disorder  May order more often based on risk factors  Lipid panel: 03/17/2020    Screening Not Indicated  History Lipid Disorder      Other Preventive Screenings Covered by Medicare:  11  Abdominal Aortic Aneurysm (AAA) Screening: covered once if your at risk  You're considered to be at risk if you have a family history of AAA or a male between the age of 73-68 who smoking at least 100 cigarettes in your lifetime  12  Lung Cancer Screening: covers low dose CT scan once per year if you meet all of the following conditions: (1) Age 50-69; (2) No signs or symptoms of lung cancer; (3) Current smoker or have quit smoking within the last 15 years; (4) You have a tobacco smoking history of at least 30 pack years (packs per day x number of years you smoked); (5) You get a written order from a healthcare provider    13  Glaucoma Screening: covered annually if you're considered high risk: (1) You have diabetes OR (2) Family history of glaucoma OR (3)  aged 48 and older OR (4)  American aged 72 and older  15  Osteoporosis Screening: covered every 2 years if you meet one of the following conditions: (1) Have a vertebral abnormality; (2) On glucocorticoid therapy for more than 3 months; (3) Have primary hyperparathyroidism; (4) On osteoporosis medications and need to assess response to drug therapy  15  HIV Screening: covered annually if you're between the age of 12-76  Also covered annually if you are younger than 13 and older than 72 with risk factors for HIV infection  For pregnant patients, it is covered up to 3 times per pregnancy  Immunizations:  Immunization Recommendations   Influenza Vaccine Annual influenza vaccination during flu season is recommended for all persons aged >= 6 months who do not have contraindications   Pneumococcal Vaccine (Prevnar and Pneumovax)  * Prevnar = PCV13  * Pneumovax = PPSV23 Adults 25-60 years old: 1-3 doses may be recommended based on certain risk factors  Adults 72 years old: Prevnar (PCV13) vaccine recommended followed by Pneumovax (PPSV23) vaccine  If already received PPSV23 since turning 65, then PCV13 recommended at least one year after PPSV23 dose  Hepatitis B Vaccine 3 dose series if at intermediate or high risk (ex: diabetes, end stage renal disease, liver disease)   Tetanus (Td) Vaccine - COST NOT COVERED BY MEDICARE PART B Following completion of primary series, a booster dose should be given every 10 years to maintain immunity against tetanus  Td may also be given as tetanus wound prophylaxis  Tdap Vaccine - COST NOT COVERED BY MEDICARE PART B Recommended at least once for all adults  For pregnant patients, recommended with each pregnancy     Shingles Vaccine (Shingrix) - COST NOT COVERED BY MEDICARE PART B  2 shot series recommended in those aged 48 and above     Health Maintenance Due:  There are no preventive care reminders to display for this patient  Immunizations Due:      Topic Date Due    Pneumococcal Vaccine: Pediatrics (0 to 5 Years) and At-Risk Patients (6 to 59 Years) (1 of 1 - PPSV23) 09/28/1987    Influenza Vaccine  07/01/2020     Advance Directives   What are advance directives? Advance directives are legal documents that state your wishes and plans for medical care  These plans are made ahead of time in case you lose your ability to make decisions for yourself  Advance directives can apply to any medical decision, such as the treatments you want, and if you want to donate organs  What are the types of advance directives? There are many types of advance directives, and each state has rules about how to use them  You may choose a combination of any of the following:  · Living will: This is a written record of the treatment you want  You can also choose which treatments you do not want, which to limit, and which to stop at a certain time  This includes surgery, medicine, IV fluid, and tube feedings  · Durable power of  for healthcare [place] [facility]): This is a written record that states who you want to make healthcare choices for you when you are unable to make them for yourself  This person, called a proxy, is usually a family member or a friend  You may choose more than 1 proxy  · Do not resuscitate (DNR) order:  A DNR order is used in case your heart stops beating or you stop breathing  It is a request not to have certain forms of treatment, such as CPR  A DNR order may be included in other types of advance directives  · Medical directive: This covers the care that you want if you are in a coma, near death, or unable to make decisions for yourself  You can list the treatments you want for each condition  Treatment may include pain medicine, surgery, blood transfusions, dialysis, IV or tube feedings, and a ventilator (breathing machine)  · Values history:   This document has questions about your views, beliefs, and how you feel and think about life  This information can help others choose the care that you would choose  Why are advance directives important? An advance directive helps you control your care  Although spoken wishes may be used, it is better to have your wishes written down  Spoken wishes can be misunderstood, or not followed  Treatments may be given even if you do not want them  An advance directive may make it easier for your family to make difficult choices about your care  Weight Management   Why it is important to manage your weight:  Being overweight increases your risk of health conditions such as heart disease, high blood pressure, type 2 diabetes, and certain types of cancer  It can also increase your risk for osteoarthritis, sleep apnea, and other respiratory problems  Aim for a slow, steady weight loss  Even a small amount of weight loss can lower your risk of health problems  How to lose weight safely:  A safe and healthy way to lose weight is to eat fewer calories and get regular exercise  You can lose up about 1 pound a week by decreasing the number of calories you eat by 500 calories each day  Healthy meal plan for weight management:  A healthy meal plan includes a variety of foods, contains fewer calories, and helps you stay healthy  A healthy meal plan includes the following:  · Eat whole-grain foods more often  A healthy meal plan should contain fiber  Fiber is the part of grains, fruits, and vegetables that is not broken down by your body  Whole-grain foods are healthy and provide extra fiber in your diet  Some examples of whole-grain foods are whole-wheat breads and pastas, oatmeal, brown rice, and bulgur  · Eat a variety of vegetables every day  Include dark, leafy greens such as spinach, kale, stu greens, and mustard greens  Eat yellow and orange vegetables such as carrots, sweet potatoes, and winter squash     · Eat a variety of fruits every day  Choose fresh or canned fruit (canned in its own juice or light syrup) instead of juice  Fruit juice has very little or no fiber  · Eat low-fat dairy foods  Drink fat-free (skim) milk or 1% milk  Eat fat-free yogurt and low-fat cottage cheese  Try low-fat cheeses such as mozzarella and other reduced-fat cheeses  · Choose meat and other protein foods that are low in fat  Choose beans or other legumes such as split peas or lentils  Choose fish, skinless poultry (chicken or turkey), or lean cuts of red meat (beef or pork)  Before you cook meat or poultry, cut off any visible fat  · Use less fat and oil  Try baking foods instead of frying them  Add less fat, such as margarine, sour cream, regular salad dressing and mayonnaise to foods  Eat fewer high-fat foods  Some examples of high-fat foods include french fries, doughnuts, ice cream, and cakes  · Eat fewer sweets  Limit foods and drinks that are high in sugar  This includes candy, cookies, regular soda, and sweetened drinks  Exercise:  Exercise at least 30 minutes per day on most days of the week  Some examples of exercise include walking, biking, dancing, and swimming  You can also fit in more physical activity by taking the stairs instead of the elevator or parking farther away from stores  Ask your healthcare provider about the best exercise plan for you  © Copyright [employer] 2018 Information is for End User's use only and may not be sold, redistributed or otherwise used for commercial purposes  All illustrations and images included in CareNotes® are the copyrighted property of A D A M , Inc  or [facility] Preventive Visit Patient Instructions  Thank you for completing your Welcome to Medicare Visit or Medicare Annual Wellness Visit today  Your next wellness visit will be due in one year (9/1/2021)  The screening/preventive services that you may require over the next 5-10 years are detailed below   Some tests may not apply to you based off risk factors and/or age  Screening tests ordered at today's visit but not completed yet may show as past due  Also, please note that scanned in results may not display below  Preventive Screenings:  Service Recommendations Previous Testing/Comments   Colorectal Cancer Screening  · Colonoscopy    · Fecal Occult Blood Test (FOBT)/Fecal Immunochemical Test (FIT)  · Fecal DNA/Cologuard Test  · Flexible Sigmoidoscopy Age: 54-65 years old   Colonoscopy: every 10 years (May be performed more frequently if at higher risk)  OR  FOBT/FIT: every 1 year  OR  Cologuard: every 3 years  OR  Sigmoidoscopy: every 5 years  Screening may be recommended earlier than age 48 if at higher risk for colorectal cancer  Also, an individualized decision between you and your healthcare provider will decide whether screening between the ages of 74-80 would be appropriate  Colonoscopy: Not on file  FOBT/FIT: Not on file  Cologuard: Not on file  Sigmoidoscopy: Not on file    Risks and Benefits Discussed     Prostate Cancer Screening Individualized decision between patient and health care provider in men between ages of 53-78   Medicare will cover every 12 months beginning on the day after your 50th birthday PSA: No results in last 5 years     Screening Not Indicated     Hepatitis C Screening Once for adults [facility]  More frequently in patients at high risk for Hepatitis C Hep C Antibody: Not on file       Diabetes Screening 1-2 times per year if you're at risk for diabetes or have pre-diabetes Fasting glucose: 160 mg/dL   A1C: 6 5 %    Screening Not Indicated  History Diabetes   Cholesterol Screening Once every 5 years if you don't have a lipid disorder  May order more often based on risk factors   Lipid panel: 03/17/2020    Screening Not Indicated  History Lipid Disorder      Other Preventive Screenings Covered by Medicare:  16  Abdominal Aortic Aneurysm (AAA) Screening: covered once if your at risk  You're considered to be at risk if you have a family history of AAA or a male between the age of 73-68 who smoking at least 100 cigarettes in your lifetime  17  Lung Cancer Screening: covers low dose CT scan once per year if you meet all of the following conditions: (1) Age 50-69; (2) No signs or symptoms of lung cancer; (3) Current smoker or have quit smoking within the last 15 years; (4) You have a tobacco smoking history of at least 30 pack years (packs per day x number of years you smoked); (5) You get a written order from a healthcare provider  25  Glaucoma Screening: covered annually if you're considered high risk: (1) You have diabetes OR (2) Family history of glaucoma OR (3)  aged 48 and older OR (3)  American aged 72 and older  23  Osteoporosis Screening: covered every 2 years if you meet one of the following conditions: (1) Have a vertebral abnormality; (2) On glucocorticoid therapy for more than 3 months; (3) Have primary hyperparathyroidism; (4) On osteoporosis medications and need to assess response to drug therapy  20  HIV Screening: covered annually if you're between the age of 12-76  Also covered annually if you are younger than 13 and older than 72 with risk factors for HIV infection  For pregnant patients, it is covered up to 3 times per pregnancy  Immunizations:  Immunization Recommendations   Influenza Vaccine Annual influenza vaccination during flu season is recommended for all persons aged >= 6 months who do not have contraindications   Pneumococcal Vaccine (Prevnar and Pneumovax)  * Prevnar = PCV13  * Pneumovax = PPSV23 Adults 25-60 years old: 1-3 doses may be recommended based on certain risk factors  Adults 72 years old: Prevnar (PCV13) vaccine recommended followed by Pneumovax (PPSV23) vaccine  If already received PPSV23 since turning 65, then PCV13 recommended at least one year after PPSV23 dose     Hepatitis B Vaccine 3 dose series if at intermediate or high risk (ex: diabetes, end stage renal disease, liver disease)   Tetanus (Td) Vaccine - COST NOT COVERED BY MEDICARE PART B Following completion of primary series, a booster dose should be given every 10 years to maintain immunity against tetanus  Td may also be given as tetanus wound prophylaxis  Tdap Vaccine - COST NOT COVERED BY MEDICARE PART B Recommended at least once for all adults  For pregnant patients, recommended with each pregnancy  Shingles Vaccine (Shingrix) - COST NOT COVERED BY MEDICARE PART B  2 shot series recommended in those aged 48 and above     Health Maintenance Due:  There are no preventive care reminders to display for this patient  Immunizations Due:      Topic Date Due    Pneumococcal Vaccine: Pediatrics (0 to 5 Years) and At-Risk Patients (6 to 59 Years) (1 of 1 - PPSV23) 09/28/1987    Influenza Vaccine  07/01/2020     Advance Directives   What are advance directives? Advance directives are legal documents that state your wishes and plans for medical care  These plans are made ahead of time in case you lose your ability to make decisions for yourself  Advance directives can apply to any medical decision, such as the treatments you want, and if you want to donate organs  What are the types of advance directives? There are many types of advance directives, and each state has rules about how to use them  You may choose a combination of any of the following:  · Living will: This is a written record of the treatment you want  You can also choose which treatments you do not want, which to limit, and which to stop at a certain time  This includes surgery, medicine, IV fluid, and tube feedings  · Durable power of  for healthcare [place] [facility]): This is a written record that states who you want to make healthcare choices for you when you are unable to make them for yourself  This person, called a proxy, is usually a family member or a friend   You may choose more than 1 proxy   · Do not resuscitate (DNR) order:  A DNR order is used in case your heart stops beating or you stop breathing  It is a request not to have certain forms of treatment, such as CPR  A DNR order may be included in other types of advance directives  · Medical directive: This covers the care that you want if you are in a coma, near death, or unable to make decisions for yourself  You can list the treatments you want for each condition  Treatment may include pain medicine, surgery, blood transfusions, dialysis, IV or tube feedings, and a ventilator (breathing machine)  · Values history: This document has questions about your views, beliefs, and how you feel and think about life  This information can help others choose the care that you would choose  Why are advance directives important? An advance directive helps you control your care  Although spoken wishes may be used, it is better to have your wishes written down  Spoken wishes can be misunderstood, or not followed  Treatments may be given even if you do not want them  An advance directive may make it easier for your family to make difficult choices about your care  Weight Management   Why it is important to manage your weight:  Being overweight increases your risk of health conditions such as heart disease, high blood pressure, type 2 diabetes, and certain types of cancer  It can also increase your risk for osteoarthritis, sleep apnea, and other respiratory problems  Aim for a slow, steady weight loss  Even a small amount of weight loss can lower your risk of health problems  How to lose weight safely:  A safe and healthy way to lose weight is to eat fewer calories and get regular exercise  You can lose up about 1 pound a week by decreasing the number of calories you eat by 500 calories each day  Healthy meal plan for weight management:  A healthy meal plan includes a variety of foods, contains fewer calories, and helps you stay healthy   A healthy meal plan includes the following:  · Eat whole-grain foods more often  A healthy meal plan should contain fiber  Fiber is the part of grains, fruits, and vegetables that is not broken down by your body  Whole-grain foods are healthy and provide extra fiber in your diet  Some examples of whole-grain foods are whole-wheat breads and pastas, oatmeal, brown rice, and bulgur  · Eat a variety of vegetables every day  Include dark, leafy greens such as spinach, kale, stu greens, and mustard greens  Eat yellow and orange vegetables such as carrots, sweet potatoes, and winter squash  · Eat a variety of fruits every day  Choose fresh or canned fruit (canned in its own juice or light syrup) instead of juice  Fruit juice has very little or no fiber  · Eat low-fat dairy foods  Drink fat-free (skim) milk or 1% milk  Eat fat-free yogurt and low-fat cottage cheese  Try low-fat cheeses such as mozzarella and other reduced-fat cheeses  · Choose meat and other protein foods that are low in fat  Choose beans or other legumes such as split peas or lentils  Choose fish, skinless poultry (chicken or turkey), or lean cuts of red meat (beef or pork)  Before you cook meat or poultry, cut off any visible fat  · Use less fat and oil  Try baking foods instead of frying them  Add less fat, such as margarine, sour cream, regular salad dressing and mayonnaise to foods  Eat fewer high-fat foods  Some examples of high-fat foods include french fries, doughnuts, ice cream, and cakes  · Eat fewer sweets  Limit foods and drinks that are high in sugar  This includes candy, cookies, regular soda, and sweetened drinks  Exercise:  Exercise at least 30 minutes per day on most days of the week  Some examples of exercise include walking, biking, dancing, and swimming  You can also fit in more physical activity by taking the stairs instead of the elevator or parking farther away from stores   Ask your healthcare provider about the best exercise plan for you  © Copyright [employer] 2018 Information is for End User's use only and may not be sold, redistributed or otherwise used for commercial purposes   All illustrations and images included in CareNotes® are the copyrighted property of A D A M , Inc  or [address] [place], [place] [address]rickey ilda
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
## 2020-09-03 DIAGNOSIS — E55.9 VITAMIN D DEFICIENCY: ICD-10-CM

## 2020-09-03 DIAGNOSIS — H25.11 NUCLEAR SCLEROTIC CATARACT OF RIGHT EYE: ICD-10-CM

## 2020-09-03 DIAGNOSIS — K59.00 CONSTIPATION, UNSPECIFIED CONSTIPATION TYPE: ICD-10-CM

## 2020-09-04 RX ORDER — MELATONIN
1000 DAILY
Qty: 31 TABLET | Refills: 5 | Status: SHIPPED | OUTPATIENT
Start: 2020-09-04 | End: 2021-03-26 | Stop reason: SDUPTHER

## 2020-09-04 RX ORDER — DOCUSATE SODIUM 100 MG/1
100 CAPSULE, LIQUID FILLED ORAL DAILY
Qty: 31 CAPSULE | Refills: 5 | Status: SHIPPED | OUTPATIENT
Start: 2020-09-04 | End: 2020-09-25 | Stop reason: SDUPTHER

## 2020-09-04 RX ORDER — VITAMIN E 268 MG
400 CAPSULE ORAL DAILY
Qty: 30 CAPSULE | Refills: 3 | Status: SHIPPED | OUTPATIENT
Start: 2020-09-04 | End: 2021-01-27 | Stop reason: SDUPTHER

## 2020-09-11 DIAGNOSIS — G44.209 TENSION HEADACHE: ICD-10-CM

## 2020-09-11 RX ORDER — ACETAMINOPHEN 325 MG/1
650 TABLET ORAL EVERY 6 HOURS PRN
Qty: 30 TABLET | Refills: 2 | Status: SHIPPED | OUTPATIENT
Start: 2020-09-11 | End: 2021-07-16 | Stop reason: ALTCHOICE

## 2020-09-14 DIAGNOSIS — G44.209 TENSION HEADACHE: ICD-10-CM

## 2020-09-15 RX ORDER — ACETAMINOPHEN 325 MG/1
650 TABLET ORAL EVERY 6 HOURS PRN
Qty: 30 TABLET | Refills: 5 | OUTPATIENT
Start: 2020-09-15

## 2020-09-25 DIAGNOSIS — K59.00 CONSTIPATION, UNSPECIFIED CONSTIPATION TYPE: ICD-10-CM

## 2020-09-25 RX ORDER — DOCUSATE SODIUM 100 MG/1
100 CAPSULE, LIQUID FILLED ORAL DAILY
Qty: 31 CAPSULE | Refills: 5 | Status: SHIPPED | OUTPATIENT
Start: 2020-09-25 | End: 2021-03-26 | Stop reason: SDUPTHER

## 2020-09-28 ENCOUNTER — TRANSCRIBE ORDERS (OUTPATIENT)
Dept: ADMINISTRATIVE | Age: 40
End: 2020-09-28

## 2020-09-28 ENCOUNTER — APPOINTMENT (OUTPATIENT)
Dept: LAB | Age: 40
End: 2020-09-28
Payer: MEDICARE

## 2020-09-28 DIAGNOSIS — Z79.899 ENCOUNTER FOR LONG-TERM (CURRENT) USE OF OTHER MEDICATIONS: Primary | ICD-10-CM

## 2020-09-28 DIAGNOSIS — Z79.899 ENCOUNTER FOR LONG-TERM (CURRENT) USE OF OTHER MEDICATIONS: ICD-10-CM

## 2020-09-28 LAB
ALBUMIN SERPL BCP-MCNC: 3.3 G/DL (ref 3.5–5)
ALP SERPL-CCNC: 72 U/L (ref 46–116)
ALT SERPL W P-5'-P-CCNC: 44 U/L (ref 12–78)
ANION GAP SERPL CALCULATED.3IONS-SCNC: 7 MMOL/L (ref 4–13)
AST SERPL W P-5'-P-CCNC: 29 U/L (ref 5–45)
BASOPHILS # BLD AUTO: 0.03 THOUSANDS/ΜL (ref 0–0.1)
BASOPHILS NFR BLD AUTO: 0 % (ref 0–1)
BILIRUB SERPL-MCNC: 0.38 MG/DL (ref 0.2–1)
BUN SERPL-MCNC: 9 MG/DL (ref 5–25)
CALCIUM ALBUM COR SERPL-MCNC: 9.3 MG/DL (ref 8.3–10.1)
CALCIUM SERPL-MCNC: 8.7 MG/DL (ref 8.3–10.1)
CHLORIDE SERPL-SCNC: 106 MMOL/L (ref 100–108)
CHOLEST SERPL-MCNC: 79 MG/DL (ref 50–200)
CO2 SERPL-SCNC: 29 MMOL/L (ref 21–32)
CREAT SERPL-MCNC: 1.33 MG/DL (ref 0.6–1.3)
EOSINOPHIL # BLD AUTO: 0.22 THOUSAND/ΜL (ref 0–0.61)
EOSINOPHIL NFR BLD AUTO: 3 % (ref 0–6)
ERYTHROCYTE [DISTWIDTH] IN BLOOD BY AUTOMATED COUNT: 11.7 % (ref 11.6–15.1)
GFR SERPL CREATININE-BSD FRML MDRD: 67 ML/MIN/1.73SQ M
GLUCOSE P FAST SERPL-MCNC: 132 MG/DL (ref 65–99)
HCT VFR BLD AUTO: 45.2 % (ref 36.5–49.3)
HDLC SERPL-MCNC: 24 MG/DL
HGB BLD-MCNC: 15.5 G/DL (ref 12–17)
IMM GRANULOCYTES # BLD AUTO: 0.06 THOUSAND/UL (ref 0–0.2)
IMM GRANULOCYTES NFR BLD AUTO: 1 % (ref 0–2)
LDLC SERPL CALC-MCNC: 11 MG/DL (ref 0–100)
LYMPHOCYTES # BLD AUTO: 2.89 THOUSANDS/ΜL (ref 0.6–4.47)
LYMPHOCYTES NFR BLD AUTO: 33 % (ref 14–44)
MCH RBC QN AUTO: 30.8 PG (ref 26.8–34.3)
MCHC RBC AUTO-ENTMCNC: 34.3 G/DL (ref 31.4–37.4)
MCV RBC AUTO: 90 FL (ref 82–98)
MONOCYTES # BLD AUTO: 0.77 THOUSAND/ΜL (ref 0.17–1.22)
MONOCYTES NFR BLD AUTO: 9 % (ref 4–12)
NEUTROPHILS # BLD AUTO: 4.67 THOUSANDS/ΜL (ref 1.85–7.62)
NEUTS SEG NFR BLD AUTO: 54 % (ref 43–75)
NONHDLC SERPL-MCNC: 55 MG/DL
NRBC BLD AUTO-RTO: 0 /100 WBCS
PLATELET # BLD AUTO: 164 THOUSANDS/UL (ref 149–390)
PMV BLD AUTO: 10.7 FL (ref 8.9–12.7)
POTASSIUM SERPL-SCNC: 4.4 MMOL/L (ref 3.5–5.3)
PROT SERPL-MCNC: 6.1 G/DL (ref 6.4–8.2)
RBC # BLD AUTO: 5.03 MILLION/UL (ref 3.88–5.62)
SODIUM SERPL-SCNC: 142 MMOL/L (ref 136–145)
TRIGL SERPL-MCNC: 219 MG/DL
TSH SERPL DL<=0.05 MIU/L-ACNC: 2.02 UIU/ML (ref 0.36–3.74)
VALPROATE SERPL-MCNC: 40 UG/ML (ref 50–100)
WBC # BLD AUTO: 8.64 THOUSAND/UL (ref 4.31–10.16)

## 2020-09-28 PROCEDURE — 84443 ASSAY THYROID STIM HORMONE: CPT

## 2020-09-28 PROCEDURE — 80061 LIPID PANEL: CPT

## 2020-09-28 PROCEDURE — 80164 ASSAY DIPROPYLACETIC ACD TOT: CPT

## 2020-09-28 PROCEDURE — 80053 COMPREHEN METABOLIC PANEL: CPT

## 2020-09-28 PROCEDURE — 36415 COLL VENOUS BLD VENIPUNCTURE: CPT

## 2020-09-28 PROCEDURE — 85025 COMPLETE CBC W/AUTO DIFF WBC: CPT

## 2020-10-06 DIAGNOSIS — G44.209 TENSION HEADACHE: ICD-10-CM

## 2020-10-13 ENCOUNTER — OFFICE VISIT (OUTPATIENT)
Dept: FAMILY MEDICINE CLINIC | Facility: CLINIC | Age: 40
End: 2020-10-13

## 2020-10-13 VITALS
BODY MASS INDEX: 29.18 KG/M2 | TEMPERATURE: 96.5 F | SYSTOLIC BLOOD PRESSURE: 110 MMHG | HEART RATE: 85 BPM | WEIGHT: 197 LBS | DIASTOLIC BLOOD PRESSURE: 80 MMHG | HEIGHT: 69 IN

## 2020-10-13 DIAGNOSIS — N18.2 CKD (CHRONIC KIDNEY DISEASE) STAGE 2, GFR 60-89 ML/MIN: ICD-10-CM

## 2020-10-13 DIAGNOSIS — Z23 ENCOUNTER FOR IMMUNIZATION: Primary | ICD-10-CM

## 2020-10-13 DIAGNOSIS — E11.65 TYPE 2 DIABETES MELLITUS WITH HYPERGLYCEMIA, WITHOUT LONG-TERM CURRENT USE OF INSULIN (HCC): ICD-10-CM

## 2020-10-13 LAB — SL AMB POCT HEMOGLOBIN AIC: 6.4 (ref ?–6.5)

## 2020-10-13 PROCEDURE — 83036 HEMOGLOBIN GLYCOSYLATED A1C: CPT | Performed by: FAMILY MEDICINE

## 2020-10-13 PROCEDURE — 99213 OFFICE O/P EST LOW 20 MIN: CPT | Performed by: FAMILY MEDICINE

## 2020-10-27 ENCOUNTER — OFFICE VISIT (OUTPATIENT)
Dept: FAMILY MEDICINE CLINIC | Facility: CLINIC | Age: 40
End: 2020-10-27

## 2020-10-27 VITALS
WEIGHT: 202.8 LBS | RESPIRATION RATE: 18 BRPM | DIASTOLIC BLOOD PRESSURE: 100 MMHG | HEART RATE: 82 BPM | SYSTOLIC BLOOD PRESSURE: 134 MMHG | TEMPERATURE: 96.8 F | BODY MASS INDEX: 30.04 KG/M2 | HEIGHT: 69 IN

## 2020-10-27 DIAGNOSIS — E11.42 DIABETIC PERIPHERAL NEUROPATHY (HCC): Primary | ICD-10-CM

## 2020-10-27 PROCEDURE — 99213 OFFICE O/P EST LOW 20 MIN: CPT | Performed by: FAMILY MEDICINE

## 2020-10-27 RX ORDER — GABAPENTIN 100 MG/1
100 CAPSULE ORAL
Qty: 30 CAPSULE | Refills: 1 | Status: SHIPPED | OUTPATIENT
Start: 2020-10-27 | End: 2020-12-22 | Stop reason: SDUPTHER

## 2020-11-13 DIAGNOSIS — E78.49 OTHER HYPERLIPIDEMIA: ICD-10-CM

## 2020-11-13 DIAGNOSIS — E03.9 HYPOTHYROIDISM, UNSPECIFIED TYPE: ICD-10-CM

## 2020-11-13 DIAGNOSIS — E78.5 HYPERLIPIDEMIA, UNSPECIFIED HYPERLIPIDEMIA TYPE: ICD-10-CM

## 2020-11-13 DIAGNOSIS — F25.0 SCHIZOAFFECTIVE DISORDER, BIPOLAR TYPE (HCC): Chronic | ICD-10-CM

## 2020-11-13 RX ORDER — ATORVASTATIN CALCIUM 40 MG/1
40 TABLET, FILM COATED ORAL
Qty: 30 TABLET | Refills: 5 | Status: CANCELLED | OUTPATIENT
Start: 2020-11-13 | End: 2020-12-13

## 2020-11-13 RX ORDER — LORATADINE 10 MG/1
10 TABLET ORAL DAILY
Qty: 30 TABLET | Refills: 5 | Status: CANCELLED | OUTPATIENT
Start: 2020-11-13 | End: 2020-12-13

## 2020-11-13 RX ORDER — CALCIUM CARBONATE 500(1250)
1 TABLET ORAL
Qty: 30 TABLET | Refills: 5 | Status: CANCELLED | OUTPATIENT
Start: 2020-11-13 | End: 2020-12-13

## 2020-11-13 RX ORDER — LEVOTHYROXINE SODIUM 0.03 MG/1
25 TABLET ORAL
Qty: 30 TABLET | Refills: 5 | Status: CANCELLED | OUTPATIENT
Start: 2020-11-13 | End: 2020-12-13

## 2020-11-16 ENCOUNTER — TELEPHONE (OUTPATIENT)
Dept: FAMILY MEDICINE CLINIC | Facility: CLINIC | Age: 40
End: 2020-11-16

## 2020-11-19 ENCOUNTER — TELEPHONE (OUTPATIENT)
Dept: FAMILY MEDICINE CLINIC | Facility: CLINIC | Age: 40
End: 2020-11-19

## 2020-11-19 DIAGNOSIS — E03.9 HYPOTHYROIDISM, UNSPECIFIED TYPE: ICD-10-CM

## 2020-11-19 DIAGNOSIS — E78.49 OTHER HYPERLIPIDEMIA: ICD-10-CM

## 2020-11-19 DIAGNOSIS — E78.5 HYPERLIPIDEMIA, UNSPECIFIED HYPERLIPIDEMIA TYPE: ICD-10-CM

## 2020-11-19 DIAGNOSIS — F25.0 SCHIZOAFFECTIVE DISORDER, BIPOLAR TYPE (HCC): Chronic | ICD-10-CM

## 2020-11-24 DIAGNOSIS — E11.65 TYPE 2 DIABETES MELLITUS WITH HYPERGLYCEMIA, WITHOUT LONG-TERM CURRENT USE OF INSULIN (HCC): ICD-10-CM

## 2020-11-24 DIAGNOSIS — E11.69 TYPE 2 DIABETES MELLITUS WITH OTHER SPECIFIED COMPLICATION, WITHOUT LONG-TERM CURRENT USE OF INSULIN (HCC): ICD-10-CM

## 2020-11-24 RX ORDER — LEVOTHYROXINE SODIUM 0.03 MG/1
25 TABLET ORAL
Qty: 30 TABLET | Refills: 5 | Status: SHIPPED | OUTPATIENT
Start: 2020-11-24 | End: 2021-05-10 | Stop reason: SDUPTHER

## 2020-11-24 RX ORDER — CALCIUM CARBONATE 500(1250)
1 TABLET ORAL
Qty: 30 TABLET | Refills: 5 | Status: SHIPPED | OUTPATIENT
Start: 2020-11-24 | End: 2021-05-10 | Stop reason: SDUPTHER

## 2020-11-24 RX ORDER — LORATADINE 10 MG/1
10 TABLET ORAL DAILY
Qty: 30 TABLET | Refills: 5 | Status: SHIPPED | OUTPATIENT
Start: 2020-11-24 | End: 2021-05-10 | Stop reason: SDUPTHER

## 2020-11-24 RX ORDER — LANCETS 28 GAUGE
EACH MISCELLANEOUS
Qty: 1 EACH | Refills: 3 | Status: SHIPPED | OUTPATIENT
Start: 2020-11-24 | End: 2022-01-21 | Stop reason: SDUPTHER

## 2020-11-24 RX ORDER — BLOOD-GLUCOSE METER
KIT MISCELLANEOUS
Qty: 100 EACH | Refills: 1 | Status: SHIPPED | OUTPATIENT
Start: 2020-11-24 | End: 2022-01-20 | Stop reason: SDUPTHER

## 2020-11-24 RX ORDER — ATORVASTATIN CALCIUM 40 MG/1
40 TABLET, FILM COATED ORAL
Qty: 30 TABLET | Refills: 5 | Status: SHIPPED | OUTPATIENT
Start: 2020-11-24 | End: 2021-05-10 | Stop reason: SDUPTHER

## 2020-11-30 ENCOUNTER — TELEPHONE (OUTPATIENT)
Dept: FAMILY MEDICINE CLINIC | Facility: CLINIC | Age: 40
End: 2020-11-30

## 2020-12-08 DIAGNOSIS — N18.2 CKD (CHRONIC KIDNEY DISEASE) STAGE 2, GFR 60-89 ML/MIN: ICD-10-CM

## 2020-12-08 DIAGNOSIS — E11.65 TYPE 2 DIABETES MELLITUS WITH HYPERGLYCEMIA, WITHOUT LONG-TERM CURRENT USE OF INSULIN (HCC): Primary | ICD-10-CM

## 2020-12-22 DIAGNOSIS — E11.65 TYPE 2 DIABETES MELLITUS WITH HYPERGLYCEMIA, UNSPECIFIED WHETHER LONG TERM INSULIN USE (HCC): ICD-10-CM

## 2020-12-22 DIAGNOSIS — E11.42 DIABETIC PERIPHERAL NEUROPATHY (HCC): ICD-10-CM

## 2020-12-24 RX ORDER — GABAPENTIN 100 MG/1
100 CAPSULE ORAL
Qty: 30 CAPSULE | Refills: 5 | Status: SHIPPED | OUTPATIENT
Start: 2020-12-24 | End: 2021-06-29 | Stop reason: SDUPTHER

## 2021-01-08 ENCOUNTER — TELEPHONE (OUTPATIENT)
Dept: FAMILY MEDICINE CLINIC | Facility: CLINIC | Age: 41
End: 2021-01-08

## 2021-01-12 ENCOUNTER — TELEMEDICINE (OUTPATIENT)
Dept: FAMILY MEDICINE CLINIC | Facility: CLINIC | Age: 41
End: 2021-01-12

## 2021-01-12 DIAGNOSIS — E11.65 TYPE 2 DIABETES MELLITUS WITH HYPERGLYCEMIA, WITHOUT LONG-TERM CURRENT USE OF INSULIN (HCC): Primary | ICD-10-CM

## 2021-01-12 NOTE — PROGRESS NOTES
[id number] [facility] [address]  [phone]  [phone]  Clinical Pharmacy Consultation    Encounter provider: Jennifer Apple Pharmacist    Patient agrees to participate in a virtual check in via telephone or video visit instead of presenting to the office to address urgent/immediate medical needs  After connecting through telephone, the patient was identified by name and date of birth  Resa Rubinstein was informed that this was a telemedicine visit and that the exam was being conducted confidentially over secure lines  My office door was closed  No one else was in the room  Alona JaraRubinstein acknowledged consent and understanding of privacy and security of the telemedicine visit  Assessment/Plan  1  Type 2 diabetes mellitus with hyperglycemia, without long-term current use of insulin [facility])  Assessment & Plan:    Lab Results   Component Value Date    HGBA1C 6 4 10/13/2020   Controlled  A1c at goal of less than 7%  Continue current treatment with metformin 1000 mg b i d  and Januvia 50 mg daily  A1c due on next visit with PCP  Counseled patient on diabetic diet and encouraged exercise ½ hour at least 5 days per week  Medication list was updated and discussed with patient  The following medication related items were identified:   Discontinued medications: Artane (no longer taking per patient-therapy completed close)   Clinically significant drug interactions identified:   · Chlorpromazine, trazodone and hydroxyzine may result in increased risk of CNS depression and increased risk of QT-interval prolongation  Patient is tolerating these medications well  Monitor EKG  · Gabapentin and lorazepam or chlorpromazine or hydroxyzine; lorazepam and trazodone may result in respiratory depression   Side effects from medication(s) reported:  None    The patient communicates understanding of the treatment plan    Items to be addressed at future visit with the pharmacist: medication reconciliation and medication education  Next Medication Management Appointment with the clinical pharmacist: June 29 at 1:45 pm (virtual visit) and as needed per patient request     M*Modal software was used to dictate this note  It may contain errors with dictating incorrect words or incorrect spelling  Please contact the provider directly with any questions  Thank you for your understanding  Subjective  Vanessa Moore is a 45 y o  male  Focus of today's visit with the clinical pharmacist: medication reconciliation, medication education, polypharmacy consult  Spoke with Dasha Ackerman - caregiver  Caregiver reports patient uses Refresh eye drops for dry eyes, invega injection 234 mg monthly, and Biotin mouth wash 10 mL as needed  SMBG recent readings:  Before breakfast: 145, 208, 134     Home BP readings: 105/83, 96/80    Social History     Tobacco Use   Smoking Status Former Smoker   Smokeless Tobacco Never Used   Tobacco Comment    per [employer]-former smoker      Exercise:  Not currently exercising   Diet: drinks 2 coca-cola per day   Adherence to medication regimen:  100%   Method of adherence:  Patient lives in a group home with caregivers helping with medications      Allergies   Allergen Reactions    Augmentin [Amoxicillin-Pot Clavulanate]     Bactrim [Sulfamethoxazole-Trimethoprim]     Benztropine     Erythromycin     Lithium     Mellaril [Thioridazine]     Nsaids     Tegretol [Carbamazepine]           Current Outpatient Medications:     b complex vitamins tablet, Take 1 tablet by mouth daily 1 cap by mouth daily @ 8am, Disp: 31 tablet, Rfl: 5    chlorproMAZINE (THORAZINE) 100 mg tablet, Take 100 mg by mouth 3 (three) times a day , Disp: , Rfl:     cholecalciferol (VITAMIN D3) 1,000 units tablet, Take 1 tablet (1,000 Units total) by mouth daily, Disp: 31 tablet, Rfl: 5    docusate sodium (COLACE) 100 mg capsule, Take 1 capsule (100 mg total) by mouth daily, Disp: 31 capsule, Rfl: 5    Emollient (EUCERIN) lotion, Apply topically as needed for dry skin, Disp: , Rfl:     gabapentin (NEURONTIN) 100 mg capsule, Take 1 capsule (100 mg total) by mouth daily at bedtime, Disp: 30 capsule, Rfl: 5    hydrOXYzine HCL (ATARAX) 25 mg tablet, Take 1 tablet (25 mg total) by mouth every 8 (eight) hours as needed (mild anxiety), Disp: 30 tablet, Rfl: 0    Lancets (freestyle) lancets, Use as instructed, Disp: 1 each, Rfl: 3    lisinopril (ZESTRIL) 2 5 mg tablet, Take 1 tablet (2 5 mg total) by mouth daily, Disp: 90 tablet, Rfl: 3    sitaGLIPtin (JANUVIA) 50 mg tablet, Take 1 tablet (50 mg total) by mouth daily, Disp: 30 tablet, Rfl: 5    acetaminophen (TYLENOL) 325 mg tablet, Take 2 tablets (650 mg total) by mouth every 6 (six) hours as needed for mild pain, moderate pain, severe pain, headaches or fever (pain) (Patient not taking: Reported on 1/12/2021), Disp: 30 tablet, Rfl: 2    atorvastatin (LIPITOR) 40 mg tablet, Take 1 tablet (40 mg total) by mouth daily at bedtime, Disp: 30 tablet, Rfl: 5    bismuth subsalicylate (PEPTO BISMOL) 524 mg/30 mL oral suspension, Take 15 mL (262 mg total) by mouth every 6 (six) hours as needed for indigestion (Patient not taking: Reported on 1/12/2021), Disp: 360 mL, Rfl: 0    calcium carbonate (OYSTER SHELL,OSCAL) 500 mg, Take 1 tablet by mouth daily with breakfast, Disp: 30 tablet, Rfl: 5    divalproex sodium (DEPAKOTE) 500 mg EC tablet, Take 1 tablet (500 mg total) by mouth 2 (two) times a day, Disp: 60 tablet, Rfl: 0    glucose blood (FREESTYLE LITE) test strip, Use as instructed, Disp: 100 each, Rfl: 1    glucose monitoring kit (FREESTYLE) monitoring kit, 1 each by Does not apply route 2 (two) times a week, Disp: 1 each, Rfl: 1    guaiFENesin (ROBITUSSIN) 100 MG/5ML oral liquid, Take 10 mL (200 mg total) by mouth 3 (three) times a day as needed for cough (Patient not taking: Reported on 1/12/2021), Disp: 120 mL, Rfl: 2   levothyroxine 25 mcg tablet, Take 1 tablet (25 mcg total) by mouth daily in the early morning, Disp: 30 tablet, Rfl: 5    loratadine (CLARITIN) 10 mg tablet, Take 1 tablet (10 mg total) by mouth daily, Disp: 30 tablet, Rfl: 5    LORazepam (ATIVAN) 1 mg tablet, Take 0 5 tablets (0 5 mg total) by mouth every 8 (eight) hours as needed for anxiety (moderate anxiety) for up to 10 days, Disp: 30 tablet, Rfl: 0    Menthol (Halls Cough Drops) 5 8 MG LOZG, Apply 1 lozenge (5 8 mg total) to the mouth or throat 4 (four) times a day as needed (cough) (Patient not taking: Reported on 1/12/2021), Disp: 30 lozenge, Rfl: 5    metFORMIN (FORTAMET) 1000 MG (OSM) 24 hr tablet, Take 1 tablet (1,000 mg total) by mouth 2 (two) times a day with meals, Disp: 62 tablet, Rfl: 5    propranolol (INDERAL) 20 mg tablet, Take 1 tablet (20 mg total) by mouth every 12 (twelve) hours, Disp: 60 tablet, Rfl: 0    Sunscreens (TROPICAL GOLD SUNBLOCK) LOTN, Apply topically 3 (three) times a day as needed (sunburn) Apply quarter amount 3x/day prn (Patient not taking: Reported on 1/12/2021), Disp: 1 Bottle, Rfl: 0    traZODone (DESYREL) 50 mg tablet, Take 1 tablet (50 mg total) by mouth daily at bedtime as needed (insomnia), Disp: 30 tablet, Rfl: 0    vitamin E, tocopherol, 400 units capsule, Take 1 capsule (400 Units total) by mouth daily, Disp: 30 capsule, Rfl: 3    Objective  Vitals:    Wt Readings from Last 3 Encounters:   10/27/20 92 kg (202 lb 12 8 oz)   10/13/20 89 4 kg (197 lb)   09/01/20 92 1 kg (203 lb)     Temp Readings from Last 3 Encounters:   10/27/20 (!) 96 8 °F (36 °C) (Tympanic)   10/13/20 (!) 96 5 °F (35 8 °C) (Tympanic)   09/01/20 97 8 °F (36 6 °C) (Tympanic)     BP Readings from Last 3 Encounters:   10/27/20 134/100   10/13/20 110/80   09/01/20 120/80     Pulse Readings from Last 3 Encounters:   10/27/20 82   10/13/20 85   09/01/20 70       Labs:  Lab Results   Component Value Date    HGBA1C 6 4 10/13/2020     Lab Results Component Value Date    LDLCALC 11 09/28/2020     Lab Results   Component Value Date    CHOLESTEROL 79 09/28/2020     Lab Results   Component Value Date    HDL 24 (L) 09/28/2020     Lab Results   Component Value Date    TRIG 219 (H) 09/28/2020     Lab Results   Component Value Date    NONHDLC 55 09/28/2020       Marleni Alcocer, Pharmacist    Demographics  Interaction Method: Phone  Type of Intervention: New    Topic(s) Addressed  Medication Management  Polypharmacy    Intervention(s) Made    Pharmacologic:      Medication Discontinuation    Prevent or Manage Adverse Drug Reaction    Drug Interaction    Non-Pharmacologic:  Adherence Addressed    Tool(s) Used  Not Applicable    Time Spent:   Time Spent in Direct Patient Care: 45 minutes    Recommendation(s) Accepted by the Patient/Caregiver:  All Accepted

## 2021-01-13 ENCOUNTER — TELEPHONE (OUTPATIENT)
Dept: FAMILY MEDICINE CLINIC | Facility: CLINIC | Age: 41
End: 2021-01-13

## 2021-01-14 NOTE — ASSESSMENT & PLAN NOTE
Lab Results   Component Value Date    HGBA1C 6 4 10/13/2020   Controlled  A1c at goal of less than 7%  Continue current treatment with metformin 1000 mg b i d  and Januvia 50 mg daily  A1c due on next visit with PCP  Counseled patient on diabetic diet and encouraged exercise ½ hour at least 5 days per week

## 2021-01-18 ENCOUNTER — TELEPHONE (OUTPATIENT)
Dept: FAMILY MEDICINE CLINIC | Facility: CLINIC | Age: 41
End: 2021-01-18

## 2021-01-18 DIAGNOSIS — E11.65 TYPE 2 DIABETES MELLITUS WITH HYPERGLYCEMIA, WITHOUT LONG-TERM CURRENT USE OF INSULIN (HCC): ICD-10-CM

## 2021-01-18 RX ORDER — LISINOPRIL 2.5 MG/1
2.5 TABLET ORAL DAILY
Qty: 90 TABLET | Refills: 3 | Status: SHIPPED | OUTPATIENT
Start: 2021-01-18 | End: 2022-01-12 | Stop reason: SDUPTHER

## 2021-01-18 NOTE — TELEPHONE ENCOUNTER
Leigh Ann Neri from [facility] pharmacy requesting refill for patient for lisiniprol (ZESTRIL) 2 5mg tablet   Pt has an appt tomorrow morning with PCP

## 2021-01-18 NOTE — TELEPHONE ENCOUNTER
Received medical casenote from [employer] to be completed for visit on 1/12/21  In your bin

## 2021-01-19 ENCOUNTER — OFFICE VISIT (OUTPATIENT)
Dept: FAMILY MEDICINE CLINIC | Facility: CLINIC | Age: 41
End: 2021-01-19

## 2021-01-19 VITALS
RESPIRATION RATE: 18 BRPM | BODY MASS INDEX: 30.21 KG/M2 | OXYGEN SATURATION: 98 % | HEART RATE: 89 BPM | SYSTOLIC BLOOD PRESSURE: 100 MMHG | HEIGHT: 69 IN | WEIGHT: 204 LBS | TEMPERATURE: 96.3 F | DIASTOLIC BLOOD PRESSURE: 70 MMHG

## 2021-01-19 DIAGNOSIS — E11.65 TYPE 2 DIABETES MELLITUS WITH HYPERGLYCEMIA, WITHOUT LONG-TERM CURRENT USE OF INSULIN (HCC): Primary | ICD-10-CM

## 2021-01-19 DIAGNOSIS — Z87.19 HISTORY OF CONSTIPATION: ICD-10-CM

## 2021-01-19 DIAGNOSIS — N18.2 CKD (CHRONIC KIDNEY DISEASE) STAGE 2, GFR 60-89 ML/MIN: ICD-10-CM

## 2021-01-19 DIAGNOSIS — E78.5 HYPERLIPIDEMIA, UNSPECIFIED HYPERLIPIDEMIA TYPE: ICD-10-CM

## 2021-01-19 DIAGNOSIS — E03.9 HYPOTHYROIDISM, UNSPECIFIED TYPE: ICD-10-CM

## 2021-01-19 LAB — SL AMB POCT HEMOGLOBIN AIC: 6.8 (ref ?–6.5)

## 2021-01-19 PROCEDURE — 83036 HEMOGLOBIN GLYCOSYLATED A1C: CPT | Performed by: FAMILY MEDICINE

## 2021-01-19 PROCEDURE — 99213 OFFICE O/P EST LOW 20 MIN: CPT | Performed by: FAMILY MEDICINE

## 2021-01-19 NOTE — ASSESSMENT & PLAN NOTE
Lab Results   Component Value Date    EGFR 67 09/28/2020    EGFR 75 06/29/2020    EGFR 62 03/17/2020    CREATININE 1 33 (H) 09/28/2020    CREATININE 1 22 06/29/2020    CREATININE 1 42 (H) 03/17/2020     Patient with hx of CKD stage 2 with baseline GFR around 60-70s    Will recheck BMP to monitor

## 2021-01-19 NOTE — ASSESSMENT & PLAN NOTE
Lab Results   Component Value Date    HGBA1C 6 8 (A) 01/19/2021     Control, A1c goal of less than 7%  Continue current regimen 12 metformin 1000 mg b i d , Januvia 50 mg daily  Check A1c again in 3 months  Encourage continue adherence diabetic diet, decrease  Amount of overall sugar intake   exercise at least 150 minutes of moderate intensity

## 2021-01-19 NOTE — PROGRESS NOTES
Assessment/Plan:    CKD (chronic kidney disease) stage 2, GFR 60-89 ml/min  Lab Results   Component Value Date    EGFR 67 09/28/2020    EGFR 75 06/29/2020    EGFR 62 03/17/2020    CREATININE 1 33 (H) 09/28/2020    CREATININE 1 22 06/29/2020    CREATININE 1 42 (H) 03/17/2020     Patient with hx of CKD stage 2 with baseline GFR around 60-70s  Will recheck BMP to monitor    Hypothyroidism  Continue Synthroid 25 mcg daily    Type 2 diabetes mellitus with hyperglycemia (HCC)    Lab Results   Component Value Date    HGBA1C 6 8 (A) 01/19/2021     Control, A1c goal of less than 7%  Continue current regimen 12 metformin 1000 mg b i d , Januvia 50 mg daily  Check A1c again in 3 months  Encourage continue adherence diabetic diet, decrease  Amount of overall sugar intake   exercise at least 150 minutes of moderate intensity    Hyperlipidemia  Continue atorvastatin 40mg daily    History of constipation  Denies any issues with BM at this time        Subjective:      Patient ID: Deepti Steve is a 45 y o  male  HPI    46 yo male presents for follow up regarding his chronic medical conditions  Patient resides in a group home and is accompanied by a care takers today  Patient was last seen on 10/27/20 and was started on Gabapentin 100mg HS for neuropathy symptoms  Since starting gabapentin, patient reports significant improvement in the burning sensation of his bilateral foot  Patient blood pressure on initial presentation 150/110  Repeat bp check on bilateral arms shows bp of 100/70  Patient get his bp checked every Monday, last 3 bps: 96/80, 105/83, 91/72  No recent illness, no contact with individuals with COVID-19    Review of Systems   Constitutional: Negative for chills and fever  HENT: Negative for congestion, rhinorrhea and sore throat  Respiratory: Negative for cough and shortness of breath  Cardiovascular: Negative for chest pain     Gastrointestinal: Negative for abdominal pain, constipation, diarrhea, nausea and vomiting  Genitourinary: Negative for dysuria  Neurological: Negative for dizziness, light-headedness and headaches  Psychiatric/Behavioral: Negative for behavioral problems and sleep disturbance  Objective:    /70 (BP Location: Left arm, Patient Position: Sitting, Cuff Size: Standard)   Pulse 89   Temp (!) 96 3 °F (35 7 °C) (Tympanic)   Resp 18   Ht 5' 9" (1 753 m)   Wt 92 5 kg (204 lb)   SpO2 98%   BMI 30 13 kg/m²       Physical Exam  Vitals signs reviewed  Constitutional:       General: He is not in acute distress  Appearance: Normal appearance  He is obese  He is not ill-appearing, toxic-appearing or diaphoretic  HENT:      Head: Normocephalic  Nose: Nose normal    Cardiovascular:      Rate and Rhythm: Normal rate and regular rhythm  Pulses: Normal pulses  Heart sounds: Normal heart sounds  No murmur  Pulmonary:      Effort: Pulmonary effort is normal  No respiratory distress  Breath sounds: Normal breath sounds  Abdominal:      General: Abdomen is flat  Bowel sounds are normal  There is no distension  Palpations: Abdomen is soft  Musculoskeletal: Normal range of motion  General: No swelling, tenderness, deformity or signs of injury  Right lower leg: No edema  Left lower leg: No edema  Lymphadenopathy:      Cervical: No cervical adenopathy  Skin:     General: Skin is warm and dry  Capillary Refill: Capillary refill takes less than 2 seconds  Coloration: Skin is not jaundiced  Neurological:      General: No focal deficit present  Mental Status: He is alert and oriented to person, place, and time  Mental status is at baseline  Cranial Nerves: No cranial nerve deficit  Psychiatric:         Mood and Affect: Mood normal             KERWIN Dangelo  Family Medicine, PGY-3    Please excuse any "sound-alike" errors that may have ocurred during the process of dictation   Parts of this note have been dictated and there may be errors present in the transcription process  Thank you

## 2021-01-25 ENCOUNTER — LAB (OUTPATIENT)
Dept: LAB | Age: 41
End: 2021-01-25
Payer: MEDICARE

## 2021-01-25 DIAGNOSIS — N18.2 CKD (CHRONIC KIDNEY DISEASE) STAGE 2, GFR 60-89 ML/MIN: ICD-10-CM

## 2021-01-25 LAB
ANION GAP SERPL CALCULATED.3IONS-SCNC: 6 MMOL/L (ref 4–13)
BUN SERPL-MCNC: 5 MG/DL (ref 5–25)
CALCIUM SERPL-MCNC: 8.3 MG/DL (ref 8.3–10.1)
CHLORIDE SERPL-SCNC: 101 MMOL/L (ref 100–108)
CO2 SERPL-SCNC: 29 MMOL/L (ref 21–32)
CREAT SERPL-MCNC: 1.07 MG/DL (ref 0.6–1.3)
GFR SERPL CREATININE-BSD FRML MDRD: 87 ML/MIN/1.73SQ M
GLUCOSE P FAST SERPL-MCNC: 141 MG/DL (ref 65–99)
POTASSIUM SERPL-SCNC: 4.6 MMOL/L (ref 3.5–5.3)
SODIUM SERPL-SCNC: 136 MMOL/L (ref 136–145)

## 2021-01-25 PROCEDURE — 36415 COLL VENOUS BLD VENIPUNCTURE: CPT

## 2021-01-25 PROCEDURE — 80048 BASIC METABOLIC PNL TOTAL CA: CPT

## 2021-01-27 DIAGNOSIS — H25.11 NUCLEAR SCLEROTIC CATARACT OF RIGHT EYE: ICD-10-CM

## 2021-01-27 DIAGNOSIS — F25.0 SCHIZOAFFECTIVE DISORDER, BIPOLAR TYPE (HCC): Chronic | ICD-10-CM

## 2021-01-29 ENCOUNTER — TELEPHONE (OUTPATIENT)
Dept: FAMILY MEDICINE CLINIC | Facility: CLINIC | Age: 41
End: 2021-01-29

## 2021-01-29 RX ORDER — METFORMIN HYDROCHLORIDE EXTENDED-RELEASE TABLETS 1000 MG/1
1000 TABLET, FILM COATED, EXTENDED RELEASE ORAL 2 TIMES DAILY WITH MEALS
Qty: 62 TABLET | Refills: 5 | Status: SHIPPED | OUTPATIENT
Start: 2021-01-29 | End: 2021-07-13 | Stop reason: SDUPTHER

## 2021-01-29 RX ORDER — VITAMIN E 268 MG
400 CAPSULE ORAL DAILY
Qty: 30 CAPSULE | Refills: 5 | Status: SHIPPED | OUTPATIENT
Start: 2021-01-29 | End: 2021-07-13 | Stop reason: SDUPTHER

## 2021-01-29 NOTE — TELEPHONE ENCOUNTER
Yaneth from [facility] called requesting lab results for patient that were done on 1/25/2021   Crystal can be reached at [phone]

## 2021-01-29 NOTE — TELEPHONE ENCOUNTER
Called caretaker with BMP results from 1/25/21  Glucose elevated, currently taking Metformin 1000mg bid  Did tell caretaker you would be in touch if there are any Rx changes?

## 2021-03-26 DIAGNOSIS — E55.9 VITAMIN D DEFICIENCY: ICD-10-CM

## 2021-03-26 DIAGNOSIS — K59.00 CONSTIPATION, UNSPECIFIED CONSTIPATION TYPE: ICD-10-CM

## 2021-03-26 NOTE — TELEPHONE ENCOUNTER
Yaneth from patient's group home called   Patient needs refill on   docusate sodium (COLACE) 100 mg capsule   VITAMIN D3 10482 units  Needs ASAP

## 2021-03-29 RX ORDER — DOCUSATE SODIUM 100 MG/1
100 CAPSULE, LIQUID FILLED ORAL DAILY
Qty: 31 CAPSULE | Refills: 5 | Status: SHIPPED | OUTPATIENT
Start: 2021-03-29 | End: 2021-09-08

## 2021-03-29 RX ORDER — MELATONIN
1000 DAILY
Qty: 31 TABLET | Refills: 5 | Status: SHIPPED | OUTPATIENT
Start: 2021-03-29 | End: 2021-04-20 | Stop reason: SDUPTHER

## 2021-03-30 ENCOUNTER — TELEPHONE (OUTPATIENT)
Dept: FAMILY MEDICINE CLINIC | Facility: CLINIC | Age: 41
End: 2021-03-30

## 2021-03-30 NOTE — TELEPHONE ENCOUNTER
Nina from [facility]   Patient needs refill on  docusate sodium (COLACE) 100 mg capsule [[id number]  Vitamin D 1000 Units  Needs ASAS

## 2021-04-05 ENCOUNTER — APPOINTMENT (EMERGENCY)
Dept: CT IMAGING | Facility: HOSPITAL | Age: 41
End: 2021-04-05
Payer: MEDICARE

## 2021-04-05 ENCOUNTER — HOSPITAL ENCOUNTER (EMERGENCY)
Facility: HOSPITAL | Age: 41
End: 2021-04-06
Attending: EMERGENCY MEDICINE | Admitting: EMERGENCY MEDICINE
Payer: MEDICARE

## 2021-04-05 DIAGNOSIS — F25.9 SCHIZOAFFECTIVE DISORDER (HCC): Primary | ICD-10-CM

## 2021-04-05 DIAGNOSIS — R45.851 SUICIDAL IDEATION: ICD-10-CM

## 2021-04-05 LAB
AMPHETAMINES SERPL QL SCN: NEGATIVE
BARBITURATES UR QL: NEGATIVE
BENZODIAZ UR QL: NEGATIVE
COCAINE UR QL: NEGATIVE
ETHANOL EXG-MCNC: 0 MG/DL
GLUCOSE SERPL-MCNC: 93 MG/DL (ref 65–140)
METHADONE UR QL: NEGATIVE
OPIATES UR QL SCN: NEGATIVE
OXYCODONE+OXYMORPHONE UR QL SCN: NEGATIVE
PCP UR QL: NEGATIVE
THC UR QL: NEGATIVE

## 2021-04-05 PROCEDURE — 99285 EMERGENCY DEPT VISIT HI MDM: CPT | Performed by: EMERGENCY MEDICINE

## 2021-04-05 PROCEDURE — 0241U HB NFCT DS VIR RESP RNA 4 TRGT: CPT | Performed by: EMERGENCY MEDICINE

## 2021-04-05 PROCEDURE — 70450 CT HEAD/BRAIN W/O DYE: CPT

## 2021-04-05 PROCEDURE — 82075 ASSAY OF BREATH ETHANOL: CPT | Performed by: EMERGENCY MEDICINE

## 2021-04-05 PROCEDURE — 80307 DRUG TEST PRSMV CHEM ANLYZR: CPT | Performed by: EMERGENCY MEDICINE

## 2021-04-05 PROCEDURE — 82948 REAGENT STRIP/BLOOD GLUCOSE: CPT

## 2021-04-05 PROCEDURE — 99285 EMERGENCY DEPT VISIT HI MDM: CPT

## 2021-04-05 RX ORDER — CARBOXYMETHYLCELLULOSE SODIUM 5 MG/ML
1 SOLUTION/ DROPS OPHTHALMIC 3 TIMES DAILY PRN
COMMUNITY
End: 2021-11-18 | Stop reason: SDUPTHER

## 2021-04-05 NOTE — LETTER
[address] [facility]  [address] [facility]   Dept: [phone]                                                                 RJJVFT TRANSFER CONSENT    NAME Allan Jack                            1980                              MRN [id number]    I have been informed of my rights regarding examination, treatment, and transfer   by Dr Paul Perez*    Benefits: Other benefits (Include comment)_______________________(Inpatient Psych Tx (201))    Risks: Potential for delay in receiving treatment    Consent for Transfer:  I acknowledge that my medical condition has been evaluated and explained to me by the emergency department physician or other qualified medical person and/or my attending physician, who has recommended that I be transferred to the service of  Accepting Physician: Imani Butt PA-C at [address] Name, [address] : [address] [address] [place] [address]  The above potential benefits of such transfer, the potential risks associated with such transfer, and the probable risks of not being transferred have been explained to me, and I fully understand them  The doctor has explained that, in my case, the benefits of transfer outweigh the risks  I agree to be transferred  I authorize the performance of emergency medical procedures and treatments upon me in both transit and upon arrival at the receiving facility  Additionally, I authorize the release of any and all medical records to the receiving facility and request they be transported with me, if possible  I understand that the safest mode of transportation during a medical emergency is an ambulance and that the Hospital advocates the use of this mode of transport   Risks of traveling to the receiving facility by car, including absence of medical control, life sustaining equipment, such as oxygen, and medical personnel has been explained to me and I fully understand them  (ÁLVARO CORRECT BOX BELOW)  [X]  I consent to the stated transfer and to be transported by ambulance/helicopter  [  ]  I consent to the stated transfer, but refuse transportation by ambulance and accept full responsibility for my transportation by car  I understand the risks of non-ambulance transfers and I exonerate the Hospital and its staff from any deterioration in my condition that results from this refusal     X___________________________________________    DATE  21  TIME________  Signature of patient or legally responsible individual signing on patient behalf           RELATIONSHIP TO PATIENT_________________________                        Provider Certification    NAME Brie Blanco                            1980                              MRN [id number]    A medical screening exam was performed on the above named patient  Based on the examination:    Condition Necessitating Transfer The primary encounter diagnosis was Schizoaffective disorder ([facility] [address] )  A diagnosis of Suicidal ideation was also pertinent to this visit  Patient Condition: The patient has been stabilized such that within reasonable medical probability, no material deterioration of the patient condition or the condition of the unborn child(sanchez) is likely to result from the transfer    Reason for Transfer: Level of Care needed not available at this facility    Transfer Requirements: [address]   [place], [place]   · Space available and qualified personnel available for treatment as acknowledged by LEROY Apple  · Agreed to accept transfer and to provide appropriate medical treatment as acknowledged by       Caroline Beverly PA-C  · Appropriate medical records of the examination and treatment of the patient are provided at the time of transfer   [address], [address] __JG_____  · Transfer will be performed by qualified personnel from [facility]  and appropriate transfer equipment as required, including the use of necessary and appropriate life support measures  Provider Certification: I have examined the patient and explained the following risks and benefits of being transferred/refusing transfer to the patient/family:  The patient is stable for psychiatric transfer because they are medically stable, and is protected from harming him/herself or others during transport    Based on these reasonable risks and benefits to the patient and/or the unborn child(sanchez), and based upon the information available at the time of the patients examination, I certify that the medical benefits reasonably to be expected from the provision of appropriate medical treatments at another medical facility outweigh the increasing risks, if any, to the individuals medical condition, and in the case of labor to the unborn child, from effecting the transfer      X____________________________________________ DATE 04/06/21        TIME_______      ORIGINAL - SEND TO MEDICAL RECORDS   COPY - SEND WITH PATIENT DURING TRANSFER

## 2021-04-06 ENCOUNTER — HOSPITAL ENCOUNTER (INPATIENT)
Facility: HOSPITAL | Age: 41
LOS: 7 days | Discharge: HOME | DRG: 885 | End: 2021-04-13
Attending: STUDENT IN AN ORGANIZED HEALTH CARE EDUCATION/TRAINING PROGRAM | Admitting: STUDENT IN AN ORGANIZED HEALTH CARE EDUCATION/TRAINING PROGRAM
Payer: MEDICARE

## 2021-04-06 VITALS
DIASTOLIC BLOOD PRESSURE: 72 MMHG | HEIGHT: 69 IN | HEART RATE: 96 BPM | RESPIRATION RATE: 16 BRPM | SYSTOLIC BLOOD PRESSURE: 108 MMHG | WEIGHT: 200 LBS | OXYGEN SATURATION: 99 % | TEMPERATURE: 97.6 F | BODY MASS INDEX: 29.62 KG/M2

## 2021-04-06 DIAGNOSIS — F25.9 SCHIZOAFFECTIVE DISORDER (HCC): ICD-10-CM

## 2021-04-06 DIAGNOSIS — F25.0 SCHIZOAFFECTIVE DISORDER, BIPOLAR TYPE (HCC): Chronic | ICD-10-CM

## 2021-04-06 DIAGNOSIS — R29.818 EXTRAPYRAMIDAL SYMPTOM: Primary | ICD-10-CM

## 2021-04-06 LAB
FLUAV RNA RESP QL NAA+PROBE: NEGATIVE
FLUBV RNA RESP QL NAA+PROBE: NEGATIVE
GLUCOSE SERPL-MCNC: 132 MG/DL (ref 65–140)
GLUCOSE SERPL-MCNC: 133 MG/DL (ref 65–140)
GLUCOSE SERPL-MCNC: 133 MG/DL (ref 65–140)
GLUCOSE SERPL-MCNC: 160 MG/DL (ref 65–140)
RSV RNA RESP QL NAA+PROBE: NEGATIVE
SARS-COV-2 RNA RESP QL NAA+PROBE: NEGATIVE

## 2021-04-06 PROCEDURE — 82948 REAGENT STRIP/BLOOD GLUCOSE: CPT

## 2021-04-06 PROCEDURE — 96372 THER/PROPH/DIAG INJ SC/IM: CPT

## 2021-04-06 RX ORDER — LORATADINE 10 MG/1
10 TABLET ORAL DAILY
Status: CANCELLED | OUTPATIENT
Start: 2021-04-06

## 2021-04-06 RX ORDER — DIPHENHYDRAMINE HCL 25 MG
50 TABLET ORAL EVERY 6 HOURS PRN
Status: DISCONTINUED | OUTPATIENT
Start: 2021-04-06 | End: 2021-04-13 | Stop reason: HOSPADM

## 2021-04-06 RX ORDER — HYDROXYZINE HYDROCHLORIDE 25 MG/1
25 TABLET, FILM COATED ORAL 3 TIMES DAILY
Status: DISCONTINUED | OUTPATIENT
Start: 2021-04-06 | End: 2021-04-06 | Stop reason: HOSPADM

## 2021-04-06 RX ORDER — POLYETHYLENE GLYCOL 3350 17 G/17G
17 POWDER, FOR SOLUTION ORAL DAILY PRN
Status: DISCONTINUED | OUTPATIENT
Start: 2021-04-06 | End: 2021-04-13 | Stop reason: HOSPADM

## 2021-04-06 RX ORDER — LISINOPRIL 2.5 MG/1
2.5 TABLET ORAL DAILY
Status: CANCELLED | OUTPATIENT
Start: 2021-04-07

## 2021-04-06 RX ORDER — TRAZODONE HYDROCHLORIDE 50 MG/1
50 TABLET ORAL
Status: DISCONTINUED | OUTPATIENT
Start: 2021-04-06 | End: 2021-04-13 | Stop reason: HOSPADM

## 2021-04-06 RX ORDER — LEVOTHYROXINE SODIUM 0.03 MG/1
25 TABLET ORAL
Status: DISCONTINUED | OUTPATIENT
Start: 2021-04-06 | End: 2021-04-06 | Stop reason: HOSPADM

## 2021-04-06 RX ORDER — HALOPERIDOL 2 MG/1
2 TABLET ORAL
Status: DISCONTINUED | OUTPATIENT
Start: 2021-04-06 | End: 2021-04-10

## 2021-04-06 RX ORDER — HALOPERIDOL 5 MG
5 TABLET ORAL
Status: DISCONTINUED | OUTPATIENT
Start: 2021-04-06 | End: 2021-04-10

## 2021-04-06 RX ORDER — DIPHENHYDRAMINE HYDROCHLORIDE 50 MG/ML
50 INJECTION INTRAMUSCULAR; INTRAVENOUS EVERY 6 HOURS PRN
Status: DISCONTINUED | OUTPATIENT
Start: 2021-04-06 | End: 2021-04-13 | Stop reason: HOSPADM

## 2021-04-06 RX ORDER — LORAZEPAM 0.5 MG/1
0.5 TABLET ORAL 3 TIMES DAILY
Status: CANCELLED | OUTPATIENT
Start: 2021-04-06

## 2021-04-06 RX ORDER — GABAPENTIN 100 MG/1
100 CAPSULE ORAL
Status: CANCELLED | OUTPATIENT
Start: 2021-04-06

## 2021-04-06 RX ORDER — HALOPERIDOL 5 MG
5 TABLET ORAL
Status: CANCELLED | OUTPATIENT
Start: 2021-04-06

## 2021-04-06 RX ORDER — DOCUSATE SODIUM 100 MG/1
100 CAPSULE, LIQUID FILLED ORAL 2 TIMES DAILY
Status: DISCONTINUED | OUTPATIENT
Start: 2021-04-06 | End: 2021-04-12

## 2021-04-06 RX ORDER — OLANZAPINE 10 MG/1
5 INJECTION, POWDER, LYOPHILIZED, FOR SOLUTION INTRAMUSCULAR ONCE
Status: COMPLETED | OUTPATIENT
Start: 2021-04-06 | End: 2021-04-06

## 2021-04-06 RX ORDER — DIVALPROEX SODIUM 500 MG/1
500 TABLET, EXTENDED RELEASE ORAL 2 TIMES DAILY
Status: CANCELLED | OUTPATIENT
Start: 2021-04-06

## 2021-04-06 RX ORDER — HYDROXYZINE HYDROCHLORIDE 25 MG/1
25 TABLET, FILM COATED ORAL 3 TIMES DAILY
Status: CANCELLED | OUTPATIENT
Start: 2021-04-06

## 2021-04-06 RX ORDER — VITAMIN E 268 MG
400 CAPSULE ORAL DAILY
Status: CANCELLED | OUTPATIENT
Start: 2021-04-06

## 2021-04-06 RX ORDER — LEVOTHYROXINE SODIUM 0.03 MG/1
25 TABLET ORAL
Status: CANCELLED | OUTPATIENT
Start: 2021-04-07

## 2021-04-06 RX ORDER — ZIPRASIDONE MESYLATE 20 MG/ML
20 INJECTION, POWDER, LYOPHILIZED, FOR SOLUTION INTRAMUSCULAR ONCE
Status: DISCONTINUED | OUTPATIENT
Start: 2021-04-06 | End: 2021-04-06

## 2021-04-06 RX ORDER — VITAMIN E 268 MG
400 CAPSULE ORAL DAILY
Status: DISCONTINUED | OUTPATIENT
Start: 2021-04-06 | End: 2021-04-13 | Stop reason: HOSPADM

## 2021-04-06 RX ORDER — DIPHENHYDRAMINE HYDROCHLORIDE 50 MG/ML
50 INJECTION INTRAMUSCULAR; INTRAVENOUS EVERY 6 HOURS PRN
Status: DISCONTINUED | OUTPATIENT
Start: 2021-04-06 | End: 2021-04-06

## 2021-04-06 RX ORDER — TRAZODONE HYDROCHLORIDE 50 MG/1
50 TABLET ORAL
Status: CANCELLED | OUTPATIENT
Start: 2021-04-06

## 2021-04-06 RX ORDER — LORAZEPAM 2 MG/ML
2 INJECTION INTRAMUSCULAR
Status: DISCONTINUED | OUTPATIENT
Start: 2021-04-06 | End: 2021-04-13 | Stop reason: HOSPADM

## 2021-04-06 RX ORDER — CHLORPROMAZINE HYDROCHLORIDE 100 MG/1
100 TABLET, FILM COATED ORAL 3 TIMES DAILY
Status: DISCONTINUED | OUTPATIENT
Start: 2021-04-06 | End: 2021-04-13 | Stop reason: HOSPADM

## 2021-04-06 RX ORDER — LISINOPRIL 2.5 MG/1
2.5 TABLET ORAL DAILY
Status: DISCONTINUED | OUTPATIENT
Start: 2021-04-07 | End: 2021-04-13 | Stop reason: HOSPADM

## 2021-04-06 RX ORDER — DIPHENHYDRAMINE HCL 25 MG
50 TABLET ORAL EVERY 6 HOURS PRN
Status: CANCELLED | OUTPATIENT
Start: 2021-04-06

## 2021-04-06 RX ORDER — ATORVASTATIN CALCIUM 40 MG/1
40 TABLET, FILM COATED ORAL
Status: DISCONTINUED | OUTPATIENT
Start: 2021-04-06 | End: 2021-04-13 | Stop reason: HOSPADM

## 2021-04-06 RX ORDER — MAGNESIUM HYDROXIDE/ALUMINUM HYDROXICE/SIMETHICONE 120; 1200; 1200 MG/30ML; MG/30ML; MG/30ML
30 SUSPENSION ORAL EVERY 4 HOURS PRN
Status: DISCONTINUED | OUTPATIENT
Start: 2021-04-06 | End: 2021-04-13 | Stop reason: HOSPADM

## 2021-04-06 RX ORDER — DIPHENHYDRAMINE HYDROCHLORIDE 50 MG/ML
50 INJECTION INTRAMUSCULAR; INTRAVENOUS EVERY 6 HOURS PRN
Status: CANCELLED | OUTPATIENT
Start: 2021-04-06

## 2021-04-06 RX ORDER — HALOPERIDOL 5 MG/ML
5 INJECTION INTRAMUSCULAR
Status: DISCONTINUED | OUTPATIENT
Start: 2021-04-06 | End: 2021-04-13 | Stop reason: HOSPADM

## 2021-04-06 RX ORDER — CALCIUM CARBONATE 500(1250)
1 TABLET ORAL
Status: CANCELLED | OUTPATIENT
Start: 2021-04-07

## 2021-04-06 RX ORDER — LORAZEPAM 2 MG/ML
2 INJECTION INTRAMUSCULAR
Status: CANCELLED | OUTPATIENT
Start: 2021-04-06

## 2021-04-06 RX ORDER — LORAZEPAM 2 MG/ML
1 INJECTION INTRAMUSCULAR
Status: CANCELLED | OUTPATIENT
Start: 2021-04-06

## 2021-04-06 RX ORDER — POLYETHYLENE GLYCOL 3350 17 G/17G
17 POWDER, FOR SOLUTION ORAL DAILY PRN
Status: CANCELLED | OUTPATIENT
Start: 2021-04-06

## 2021-04-06 RX ORDER — ACETAMINOPHEN 325 MG/1
975 TABLET ORAL EVERY 6 HOURS PRN
Status: DISCONTINUED | OUTPATIENT
Start: 2021-04-06 | End: 2021-04-13 | Stop reason: HOSPADM

## 2021-04-06 RX ORDER — LORATADINE 10 MG/1
10 TABLET ORAL DAILY
Status: DISCONTINUED | OUTPATIENT
Start: 2021-04-06 | End: 2021-04-13 | Stop reason: HOSPADM

## 2021-04-06 RX ORDER — ACETAMINOPHEN 325 MG/1
650 TABLET ORAL EVERY 6 HOURS PRN
Status: CANCELLED | OUTPATIENT
Start: 2021-04-06

## 2021-04-06 RX ORDER — GABAPENTIN 100 MG/1
100 CAPSULE ORAL
Status: DISCONTINUED | OUTPATIENT
Start: 2021-04-06 | End: 2021-04-13 | Stop reason: HOSPADM

## 2021-04-06 RX ORDER — DIVALPROEX SODIUM 500 MG/1
500 TABLET, EXTENDED RELEASE ORAL 2 TIMES DAILY
Status: DISCONTINUED | OUTPATIENT
Start: 2021-04-06 | End: 2021-04-06 | Stop reason: HOSPADM

## 2021-04-06 RX ORDER — MINERAL OIL AND PETROLATUM 150; 830 MG/G; MG/G
1 OINTMENT OPHTHALMIC
Status: DISCONTINUED | OUTPATIENT
Start: 2021-04-06 | End: 2021-04-13 | Stop reason: HOSPADM

## 2021-04-06 RX ORDER — ACETAMINOPHEN 325 MG/1
650 TABLET ORAL EVERY 4 HOURS PRN
Status: DISCONTINUED | OUTPATIENT
Start: 2021-04-06 | End: 2021-04-13 | Stop reason: HOSPADM

## 2021-04-06 RX ORDER — ACETAMINOPHEN 325 MG/1
975 TABLET ORAL EVERY 6 HOURS PRN
Status: CANCELLED | OUTPATIENT
Start: 2021-04-06

## 2021-04-06 RX ORDER — LISINOPRIL 2.5 MG/1
2.5 TABLET ORAL ONCE
Status: COMPLETED | OUTPATIENT
Start: 2021-04-06 | End: 2021-04-06

## 2021-04-06 RX ORDER — HALOPERIDOL 5 MG/ML
2.5 INJECTION INTRAMUSCULAR
Status: DISCONTINUED | OUTPATIENT
Start: 2021-04-06 | End: 2021-04-13 | Stop reason: HOSPADM

## 2021-04-06 RX ORDER — CHLORPROMAZINE HYDROCHLORIDE 100 MG/1
100 TABLET, FILM COATED ORAL 3 TIMES DAILY
Status: DISCONTINUED | OUTPATIENT
Start: 2021-04-06 | End: 2021-04-06 | Stop reason: HOSPADM

## 2021-04-06 RX ORDER — AMOXICILLIN 250 MG
1 CAPSULE ORAL DAILY PRN
Status: CANCELLED | OUTPATIENT
Start: 2021-04-06

## 2021-04-06 RX ORDER — AMOXICILLIN 250 MG
1 CAPSULE ORAL DAILY PRN
Status: DISCONTINUED | OUTPATIENT
Start: 2021-04-06 | End: 2021-04-13 | Stop reason: HOSPADM

## 2021-04-06 RX ORDER — CALCIUM CARBONATE 500(1250)
1 TABLET ORAL
Status: DISCONTINUED | OUTPATIENT
Start: 2021-04-07 | End: 2021-04-13 | Stop reason: HOSPADM

## 2021-04-06 RX ORDER — MELATONIN
1000 DAILY
Status: DISCONTINUED | OUTPATIENT
Start: 2021-04-07 | End: 2021-04-13 | Stop reason: HOSPADM

## 2021-04-06 RX ORDER — LEVOTHYROXINE SODIUM 0.03 MG/1
25 TABLET ORAL
Status: DISCONTINUED | OUTPATIENT
Start: 2021-04-07 | End: 2021-04-13 | Stop reason: HOSPADM

## 2021-04-06 RX ORDER — ATORVASTATIN CALCIUM 40 MG/1
40 TABLET, FILM COATED ORAL
Status: CANCELLED | OUTPATIENT
Start: 2021-04-06

## 2021-04-06 RX ORDER — CHLORPROMAZINE HYDROCHLORIDE 100 MG/1
100 TABLET, FILM COATED ORAL 3 TIMES DAILY
Status: CANCELLED | OUTPATIENT
Start: 2021-04-06

## 2021-04-06 RX ORDER — ACETAMINOPHEN 325 MG/1
650 TABLET ORAL EVERY 4 HOURS PRN
Status: CANCELLED | OUTPATIENT
Start: 2021-04-06

## 2021-04-06 RX ORDER — CALCIUM CARBONATE 500(1250)
1 TABLET ORAL
Status: DISCONTINUED | OUTPATIENT
Start: 2021-04-06 | End: 2021-04-06 | Stop reason: HOSPADM

## 2021-04-06 RX ORDER — HALOPERIDOL 5 MG/ML
2.5 INJECTION INTRAMUSCULAR
Status: CANCELLED | OUTPATIENT
Start: 2021-04-06

## 2021-04-06 RX ORDER — DOCUSATE SODIUM 100 MG/1
100 CAPSULE, LIQUID FILLED ORAL 2 TIMES DAILY
Status: CANCELLED | OUTPATIENT
Start: 2021-04-06

## 2021-04-06 RX ORDER — HALOPERIDOL 1 MG/1
2 TABLET ORAL
Status: CANCELLED | OUTPATIENT
Start: 2021-04-06

## 2021-04-06 RX ORDER — DIVALPROEX SODIUM 500 MG/1
500 TABLET, EXTENDED RELEASE ORAL 2 TIMES DAILY
Status: DISCONTINUED | OUTPATIENT
Start: 2021-04-06 | End: 2021-04-13 | Stop reason: HOSPADM

## 2021-04-06 RX ORDER — PROPRANOLOL HYDROCHLORIDE 20 MG/1
20 TABLET ORAL EVERY 12 HOURS SCHEDULED
Status: DISCONTINUED | OUTPATIENT
Start: 2021-04-06 | End: 2021-04-13 | Stop reason: HOSPADM

## 2021-04-06 RX ORDER — MELATONIN
1000 DAILY
Status: CANCELLED | OUTPATIENT
Start: 2021-04-07

## 2021-04-06 RX ORDER — ATORVASTATIN CALCIUM 40 MG/1
40 TABLET, FILM COATED ORAL
Status: DISCONTINUED | OUTPATIENT
Start: 2021-04-06 | End: 2021-04-06 | Stop reason: HOSPADM

## 2021-04-06 RX ORDER — MAGNESIUM HYDROXIDE/ALUMINUM HYDROXICE/SIMETHICONE 120; 1200; 1200 MG/30ML; MG/30ML; MG/30ML
30 SUSPENSION ORAL EVERY 4 HOURS PRN
Status: CANCELLED | OUTPATIENT
Start: 2021-04-06

## 2021-04-06 RX ORDER — HALOPERIDOL 5 MG/ML
5 INJECTION INTRAMUSCULAR
Status: CANCELLED | OUTPATIENT
Start: 2021-04-06

## 2021-04-06 RX ORDER — LORAZEPAM 0.5 MG/1
0.5 TABLET ORAL 3 TIMES DAILY
Status: DISCONTINUED | OUTPATIENT
Start: 2021-04-06 | End: 2021-04-13 | Stop reason: HOSPADM

## 2021-04-06 RX ORDER — ACETAMINOPHEN 325 MG/1
650 TABLET ORAL EVERY 6 HOURS PRN
Status: DISCONTINUED | OUTPATIENT
Start: 2021-04-06 | End: 2021-04-13 | Stop reason: HOSPADM

## 2021-04-06 RX ORDER — GABAPENTIN 100 MG/1
100 CAPSULE ORAL
Status: DISCONTINUED | OUTPATIENT
Start: 2021-04-06 | End: 2021-04-06 | Stop reason: HOSPADM

## 2021-04-06 RX ORDER — MINERAL OIL AND PETROLATUM 150; 830 MG/G; MG/G
1 OINTMENT OPHTHALMIC
Status: CANCELLED | OUTPATIENT
Start: 2021-04-06

## 2021-04-06 RX ORDER — PROPRANOLOL HYDROCHLORIDE 20 MG/1
20 TABLET ORAL EVERY 12 HOURS SCHEDULED
Status: CANCELLED | OUTPATIENT
Start: 2021-04-06

## 2021-04-06 RX ORDER — HYDROXYZINE HYDROCHLORIDE 25 MG/1
25 TABLET, FILM COATED ORAL 3 TIMES DAILY
Status: DISCONTINUED | OUTPATIENT
Start: 2021-04-06 | End: 2021-04-13 | Stop reason: HOSPADM

## 2021-04-06 RX ORDER — LORAZEPAM 2 MG/ML
1 INJECTION INTRAMUSCULAR
Status: DISCONTINUED | OUTPATIENT
Start: 2021-04-06 | End: 2021-04-13 | Stop reason: HOSPADM

## 2021-04-06 RX ADMIN — CHLORPROMAZINE HYDROCHLORIDE 100 MG: 100 TABLET, FILM COATED ORAL at 08:49

## 2021-04-06 RX ADMIN — Medication 400 UNITS: at 16:33

## 2021-04-06 RX ADMIN — LORAZEPAM 0.5 MG: 0.5 TABLET ORAL at 15:51

## 2021-04-06 RX ADMIN — HYDROXYZINE HYDROCHLORIDE 25 MG: 25 TABLET, FILM COATED ORAL at 15:51

## 2021-04-06 RX ADMIN — TRAZODONE HYDROCHLORIDE 50 MG: 50 TABLET ORAL at 21:27

## 2021-04-06 RX ADMIN — CHLORPROMAZINE HYDROCHLORIDE 100 MG: 100 TABLET, SUGAR COATED ORAL at 21:27

## 2021-04-06 RX ADMIN — LEVOTHYROXINE SODIUM 25 MCG: 25 TABLET ORAL at 08:49

## 2021-04-06 RX ADMIN — OLANZAPINE 5 MG: 10 INJECTION, POWDER, FOR SOLUTION INTRAMUSCULAR at 03:02

## 2021-04-06 RX ADMIN — GABAPENTIN 100 MG: 100 CAPSULE ORAL at 21:27

## 2021-04-06 RX ADMIN — WATER 2.1 ML: 1 INJECTION INTRAMUSCULAR; INTRAVENOUS; SUBCUTANEOUS at 03:02

## 2021-04-06 RX ADMIN — SITAGLIPTIN 100 MG: 100 TABLET, FILM COATED ORAL at 08:49

## 2021-04-06 RX ADMIN — ATORVASTATIN CALCIUM 40 MG: 40 TABLET, FILM COATED ORAL at 16:33

## 2021-04-06 RX ADMIN — LISINOPRIL 2.5 MG: 2.5 TABLET ORAL at 08:49

## 2021-04-06 RX ADMIN — METFORMIN HYDROCHLORIDE 1000 MG: 500 TABLET ORAL at 16:33

## 2021-04-06 RX ADMIN — PROPRANOLOL HYDROCHLORIDE 20 MG: 20 TABLET ORAL at 21:27

## 2021-04-06 RX ADMIN — CHLORPROMAZINE HYDROCHLORIDE 100 MG: 100 TABLET, SUGAR COATED ORAL at 14:35

## 2021-04-06 RX ADMIN — LORAZEPAM 0.5 MG: 0.5 TABLET ORAL at 21:27

## 2021-04-06 RX ADMIN — DOCUSATE SODIUM 100 MG: 100 CAPSULE, LIQUID FILLED ORAL at 18:13

## 2021-04-06 RX ADMIN — LORATADINE 10 MG: 10 TABLET ORAL at 14:35

## 2021-04-06 RX ADMIN — HYDROXYZINE HYDROCHLORIDE 25 MG: 25 TABLET, FILM COATED ORAL at 08:49

## 2021-04-06 RX ADMIN — DIVALPROEX SODIUM 500 MG: 500 TABLET, EXTENDED RELEASE ORAL at 21:27

## 2021-04-06 RX ADMIN — HYDROXYZINE HYDROCHLORIDE 25 MG: 25 TABLET, FILM COATED ORAL at 21:27

## 2021-04-06 RX ADMIN — CALCIUM 1 TABLET: 500 TABLET ORAL at 08:49

## 2021-04-06 RX ADMIN — METFORMIN HYDROCHLORIDE 1000 MG: 500 TABLET ORAL at 08:49

## 2021-04-06 RX ADMIN — DIVALPROEX SODIUM 500 MG: 500 TABLET, FILM COATED, EXTENDED RELEASE ORAL at 08:49
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
## 2021-04-06 NOTE — PLAN OF CARE
Nursing will meet with you twice a day to assess for suicidal thoughts, auditory hallucinations and anxiety  We will educate you on your illness, medications and help you develop alternative coping skills to deal with life stressors

## 2021-04-06 NOTE — ED NOTES
Bed Search Efforts    [facility]- no acute beds available currently  [facility]- no beds  [place]- no high acuity beds  [facility]- may have beds on high acuity unit tomorrow call after 9am  [facility]- no beds  [facility]- [facility] male beds  [facility]- no beds        Intake has patient's 201 for review pending bed availability
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
## 2021-04-06 NOTE — PROGRESS NOTES
Patient's medications were confirmed with Gerard Webber    Per Pharmacist Dangelo Can, pt has been on benzo for at least two years and these meds are current on the patient's medications list

## 2021-04-06 NOTE — ED NOTES
Jose Maria Jimenez, Director of [facility] [address] @ [phone]      Camron Terrell [facility]   04/06/21 00:07

## 2021-04-06 NOTE — ED NOTES
Patient is accepted at [facility]  Patient is accepted by Aki Summers PA-C per Anh in Intake  Transportation is arranged with Paula Abraham at [facility]  Transportation is scheduled for 1230pm      Nurse report is to be called to [phone] prior to patient transfer      Isaac Hassan LMSW  04/06/21  [id number]

## 2021-04-06 NOTE — ED NOTES
Both pt and Dr Giovanni Wood signed the 12 document  Will need AM bed search  May need in-network placement due to Hx of aggressive behaviors  CW to fax 201 to Intake      [facility], [address]  04/05/21 23:58

## 2021-04-06 NOTE — PROGRESS NOTES
Message left for Anny Lyn at the group home to confirm patients allergies including benzodiazepines that was listed on allergies and scheduled on home medication list  [phone]
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
## 2021-04-06 NOTE — ED NOTES
Insurance Authorization for admission:   Phone call placed to [employer]  Phone number: [phone]  Spoke to Opal Maxin to complete COB  Level of care: [facility] (201)  UR to contact Reviewer, [phone] [id number] [facility]       Esperanza Gomez LMSW  04/06/21  5917

## 2021-04-06 NOTE — ED NOTES
Patient has Medicare A and B as primary insurance, with secondary [facility] Recipient [place] # [id number]

## 2021-04-06 NOTE — PLAN OF CARE
Problem: SELF HARM/SUICIDALITY  Goal: Will have no self-injury during hospital stay  Description: INTERVENTIONS:  - Q 15 MINUTES: Routine safety checks  - Q WAKING SHIFT & PRN: Assess risk to determine if routine checks are adequate to maintain patient safety  - Encourage patient to participate actively in care by formulating a plan to combat response to suicidal ideation, identify supports and resources  Outcome: Progressing     Problem: PSYCHOSIS  Goal: Will report no hallucinations or delusions  Description: Interventions:  - Administer medication as  ordered  - Every waking shifts and PRN assess for the presence of hallucinations and or delusions  - Assist with reality testing to support increasing orientation  - Assess if patient's hallucinations or delusions are encouraging self-harm or harm to others and intervene as appropriate  Outcome: Not Progressing     Problem: ANXIETY  Goal: Will report anxiety at manageable levels  Description: INTERVENTIONS:  - Administer medication as ordered  - Teach and encourage coping skills  - Provide emotional support  - Assess patient/family for anxiety and ability to cope  Outcome: Not Progressing  Goal: By discharge: Patient will verbalize 2 strategies to deal with anxiety  Description: Interventions:  - Identify any obvious source/trigger to anxiety  - Staff will assist patient in applying identified coping technique/skills  - Encourage attendance of scheduled groups and activities  Outcome: Not Progressing

## 2021-04-07 PROBLEM — Z00.8 MEDICAL CLEARANCE FOR PSYCHIATRIC ADMISSION: Status: ACTIVE | Noted: 2021-04-07

## 2021-04-07 LAB
ALBUMIN SERPL BCP-MCNC: 2.7 G/DL (ref 3.5–5)
ALP SERPL-CCNC: 64 U/L (ref 46–116)
ALT SERPL W P-5'-P-CCNC: 31 U/L (ref 12–78)
ANION GAP SERPL CALCULATED.3IONS-SCNC: 8 MMOL/L (ref 4–13)
AST SERPL W P-5'-P-CCNC: 16 U/L (ref 5–45)
ATRIAL RATE: 86 BPM
BASOPHILS # BLD AUTO: 0.05 THOUSANDS/ΜL (ref 0–0.1)
BASOPHILS NFR BLD AUTO: 1 % (ref 0–1)
BILIRUB SERPL-MCNC: 0.4 MG/DL (ref 0.2–1)
BILIRUB UR QL STRIP: NEGATIVE
BUN SERPL-MCNC: 13 MG/DL (ref 5–25)
CALCIUM ALBUM COR SERPL-MCNC: 9.1 MG/DL (ref 8.3–10.1)
CALCIUM SERPL-MCNC: 8.1 MG/DL (ref 8.3–10.1)
CHLORIDE SERPL-SCNC: 108 MMOL/L (ref 100–108)
CLARITY UR: CLEAR
CO2 SERPL-SCNC: 28 MMOL/L (ref 21–32)
COLOR UR: YELLOW
CREAT SERPL-MCNC: 1.21 MG/DL (ref 0.6–1.3)
EOSINOPHIL # BLD AUTO: 0.35 THOUSAND/ΜL (ref 0–0.61)
EOSINOPHIL NFR BLD AUTO: 4 % (ref 0–6)
ERYTHROCYTE [DISTWIDTH] IN BLOOD BY AUTOMATED COUNT: 11.4 % (ref 11.6–15.1)
GFR SERPL CREATININE-BSD FRML MDRD: 75 ML/MIN/1.73SQ M
GLUCOSE SERPL-MCNC: 123 MG/DL (ref 65–140)
GLUCOSE SERPL-MCNC: 128 MG/DL (ref 65–140)
GLUCOSE SERPL-MCNC: 132 MG/DL (ref 65–140)
GLUCOSE SERPL-MCNC: 141 MG/DL (ref 65–140)
GLUCOSE SERPL-MCNC: 80 MG/DL (ref 65–140)
GLUCOSE UR STRIP-MCNC: NEGATIVE MG/DL
HCT VFR BLD AUTO: 42.2 % (ref 36.5–49.3)
HGB BLD-MCNC: 14.2 G/DL (ref 12–17)
HGB UR QL STRIP.AUTO: NEGATIVE
IMM GRANULOCYTES # BLD AUTO: 0.05 THOUSAND/UL (ref 0–0.2)
IMM GRANULOCYTES NFR BLD AUTO: 1 % (ref 0–2)
KETONES UR STRIP-MCNC: ABNORMAL MG/DL
LEUKOCYTE ESTERASE UR QL STRIP: NEGATIVE
LYMPHOCYTES # BLD AUTO: 3.52 THOUSANDS/ΜL (ref 0.6–4.47)
LYMPHOCYTES NFR BLD AUTO: 37 % (ref 14–44)
MCH RBC QN AUTO: 30.3 PG (ref 26.8–34.3)
MCHC RBC AUTO-ENTMCNC: 33.6 G/DL (ref 31.4–37.4)
MCV RBC AUTO: 90 FL (ref 82–98)
MONOCYTES # BLD AUTO: 0.76 THOUSAND/ΜL (ref 0.17–1.22)
MONOCYTES NFR BLD AUTO: 8 % (ref 4–12)
NEUTROPHILS # BLD AUTO: 4.72 THOUSANDS/ΜL (ref 1.85–7.62)
NEUTS SEG NFR BLD AUTO: 49 % (ref 43–75)
NITRITE UR QL STRIP: NEGATIVE
NRBC BLD AUTO-RTO: 0 /100 WBCS
P AXIS: 38 DEGREES
PH UR STRIP.AUTO: 5.5 [PH]
PLATELET # BLD AUTO: 155 THOUSANDS/UL (ref 149–390)
PMV BLD AUTO: 10.6 FL (ref 8.9–12.7)
POTASSIUM SERPL-SCNC: 4.5 MMOL/L (ref 3.5–5.3)
PR INTERVAL: 160 MS
PROT SERPL-MCNC: 5.6 G/DL (ref 6.4–8.2)
PROT UR STRIP-MCNC: NEGATIVE MG/DL
QRS AXIS: -3 DEGREES
QRSD INTERVAL: 98 MS
QT INTERVAL: 396 MS
QTC INTERVAL: 473 MS
RBC # BLD AUTO: 4.68 MILLION/UL (ref 3.88–5.62)
RPR SER QL: NORMAL
SODIUM SERPL-SCNC: 144 MMOL/L (ref 136–145)
SP GR UR STRIP.AUTO: 1.02 (ref 1–1.03)
T WAVE AXIS: 27 DEGREES
TSH SERPL DL<=0.05 MIU/L-ACNC: 0.71 UIU/ML (ref 0.36–3.74)
UROBILINOGEN UR QL STRIP.AUTO: 0.2 E.U./DL
VALPROATE SERPL-MCNC: 32 UG/ML (ref 50–100)
VENTRICULAR RATE: 86 BPM
WBC # BLD AUTO: 9.45 THOUSAND/UL (ref 4.31–10.16)

## 2021-04-07 PROCEDURE — 81003 URINALYSIS AUTO W/O SCOPE: CPT | Performed by: PHYSICIAN ASSISTANT

## 2021-04-07 PROCEDURE — 86592 SYPHILIS TEST NON-TREP QUAL: CPT | Performed by: PHYSICIAN ASSISTANT

## 2021-04-07 PROCEDURE — 84443 ASSAY THYROID STIM HORMONE: CPT | Performed by: PHYSICIAN ASSISTANT

## 2021-04-07 PROCEDURE — 80164 ASSAY DIPROPYLACETIC ACD TOT: CPT | Performed by: PHYSICIAN ASSISTANT

## 2021-04-07 PROCEDURE — 99223 1ST HOSP IP/OBS HIGH 75: CPT | Performed by: STUDENT IN AN ORGANIZED HEALTH CARE EDUCATION/TRAINING PROGRAM

## 2021-04-07 PROCEDURE — 99223 1ST HOSP IP/OBS HIGH 75: CPT | Performed by: PHYSICIAN ASSISTANT

## 2021-04-07 PROCEDURE — 93010 ELECTROCARDIOGRAM REPORT: CPT | Performed by: INTERNAL MEDICINE

## 2021-04-07 PROCEDURE — 80053 COMPREHEN METABOLIC PANEL: CPT | Performed by: PHYSICIAN ASSISTANT

## 2021-04-07 PROCEDURE — 82948 REAGENT STRIP/BLOOD GLUCOSE: CPT

## 2021-04-07 PROCEDURE — 93005 ELECTROCARDIOGRAM TRACING: CPT

## 2021-04-07 PROCEDURE — 85025 COMPLETE CBC W/AUTO DIFF WBC: CPT | Performed by: PHYSICIAN ASSISTANT

## 2021-04-07 RX ADMIN — ATORVASTATIN CALCIUM 40 MG: 40 TABLET, FILM COATED ORAL at 16:31

## 2021-04-07 RX ADMIN — METFORMIN HYDROCHLORIDE 1000 MG: 500 TABLET ORAL at 16:31

## 2021-04-07 RX ADMIN — CHLORPROMAZINE HYDROCHLORIDE 100 MG: 100 TABLET, SUGAR COATED ORAL at 08:46

## 2021-04-07 RX ADMIN — Medication 400 UNITS: at 08:44

## 2021-04-07 RX ADMIN — DOCUSATE SODIUM 100 MG: 100 CAPSULE, LIQUID FILLED ORAL at 08:47

## 2021-04-07 RX ADMIN — DIVALPROEX SODIUM 500 MG: 500 TABLET, EXTENDED RELEASE ORAL at 08:47

## 2021-04-07 RX ADMIN — PROPRANOLOL HYDROCHLORIDE 20 MG: 20 TABLET ORAL at 21:41

## 2021-04-07 RX ADMIN — CALCIUM 1 TABLET: 500 TABLET ORAL at 08:45

## 2021-04-07 RX ADMIN — METFORMIN HYDROCHLORIDE 1000 MG: 500 TABLET ORAL at 08:44

## 2021-04-07 RX ADMIN — CHLORPROMAZINE HYDROCHLORIDE 100 MG: 100 TABLET, SUGAR COATED ORAL at 16:31

## 2021-04-07 RX ADMIN — DIVALPROEX SODIUM 500 MG: 500 TABLET, EXTENDED RELEASE ORAL at 21:41

## 2021-04-07 RX ADMIN — LORAZEPAM 0.5 MG: 0.5 TABLET ORAL at 21:41

## 2021-04-07 RX ADMIN — LEVOTHYROXINE SODIUM 25 MCG: 25 TABLET ORAL at 06:48

## 2021-04-07 RX ADMIN — LORAZEPAM 0.5 MG: 0.5 TABLET ORAL at 08:46

## 2021-04-07 RX ADMIN — LORAZEPAM 0.5 MG: 0.5 TABLET ORAL at 16:31

## 2021-04-07 RX ADMIN — HYDROXYZINE HYDROCHLORIDE 25 MG: 25 TABLET, FILM COATED ORAL at 21:41

## 2021-04-07 RX ADMIN — CHLORPROMAZINE HYDROCHLORIDE 100 MG: 100 TABLET, SUGAR COATED ORAL at 21:41

## 2021-04-07 RX ADMIN — SITAGLIPTIN 50 MG: 50 TABLET, FILM COATED ORAL at 08:46

## 2021-04-07 RX ADMIN — LISINOPRIL 2.5 MG: 2.5 TABLET ORAL at 08:46

## 2021-04-07 RX ADMIN — PROPRANOLOL HYDROCHLORIDE 20 MG: 20 TABLET ORAL at 08:47

## 2021-04-07 RX ADMIN — Medication 1000 UNITS: at 08:46

## 2021-04-07 RX ADMIN — HYDROXYZINE HYDROCHLORIDE 25 MG: 25 TABLET, FILM COATED ORAL at 08:47

## 2021-04-07 RX ADMIN — LORATADINE 10 MG: 10 TABLET ORAL at 08:46

## 2021-04-07 RX ADMIN — TRAZODONE HYDROCHLORIDE 50 MG: 50 TABLET ORAL at 21:41

## 2021-04-07 RX ADMIN — DOCUSATE SODIUM 100 MG: 100 CAPSULE, LIQUID FILLED ORAL at 17:08

## 2021-04-07 RX ADMIN — GABAPENTIN 100 MG: 100 CAPSULE ORAL at 21:41

## 2021-04-07 RX ADMIN — HYDROXYZINE HYDROCHLORIDE 25 MG: 25 TABLET, FILM COATED ORAL at 16:31

## 2021-04-07 NOTE — PLAN OF CARE
Problem: SELF HARM/SUICIDALITY  Goal: Will have no self-injury during hospital stay  Description: INTERVENTIONS:  - Q 15 MINUTES: Routine safety checks  - Q WAKING SHIFT & PRN: Assess risk to determine if routine checks are adequate to maintain patient safety  - Encourage patient to participate actively in care by formulating a plan to combat response to suicidal ideation, identify supports and resources  Outcome: Progressing     Problem: PSYCHOSIS  Goal: Will report no hallucinations or delusions  Description: Interventions:  - Administer medication as  ordered  - Every waking shifts and PRN assess for the presence of hallucinations and or delusions  - Assist with reality testing to support increasing orientation  - Assess if patient's hallucinations or delusions are encouraging self-harm or harm to others and intervene as appropriate  Outcome: Progressing     Problem: ANXIETY  Goal: Will report anxiety at manageable levels  Description: INTERVENTIONS:  - Administer medication as ordered  - Teach and encourage coping skills  - Provide emotional support  - Assess patient/family for anxiety and ability to cope  Outcome: Progressing  Goal: By discharge: Patient will verbalize 2 strategies to deal with anxiety  Description: Interventions:  - Identify any obvious source/trigger to anxiety  - Staff will assist patient in applying identified coping technique/skills  - Encourage attendance of scheduled groups and activities  Outcome: Progressing

## 2021-04-07 NOTE — ASSESSMENT & PLAN NOTE
Lab Results   Component Value Date    HGBA1C 6 8 (A) 01/19/2021       Recent Labs     04/06/21  0542 04/06/21  0848 04/06/21  1123 04/06/21  1613   POCGLU 132 160* 133 133       Blood Sugar Average: Last 72 hrs:  (P) 133   · Continue metformin, januvia  · SSI + accuchek  · Diabetic diet
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
## 2021-04-07 NOTE — CMS CERTIFICATION NOTE
Recertification: Based upon physical, mental and social evaluations, I certify that inpatient psychiatric services continue to be medically necessary for this patient for a duration of 7 midnights for the treatment of  Schizoaffective disorder, bipolar type [facility])   Available alternative community resources still do not meet the patient's mental health care needs  I further attest that an established written individualized plan of care has been updated and is outlined in the patient's medical records
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
## 2021-04-07 NOTE — PROGRESS NOTES
6 tshirts  1 button up shirt  1 PJ pants  1 pair of shorts with string  1 pair of shorts with no string  6 pairs of socks  6 underwear

## 2021-04-07 NOTE — PROGRESS NOTES
Pt seclusive to room throughout the morning  Reports feeling "tired and weak"  Denies AH today  Denies any thoughts of self harm  OOB for lunch, eating in tv room without issue  No irritability or agitation noted thus far

## 2021-04-07 NOTE — PLAN OF CARE
Problem: SELF HARM/SUICIDALITY  Goal: Will have no self-injury during hospital stay  Description: INTERVENTIONS:  - Q 15 MINUTES: Routine safety checks  - Q WAKING SHIFT & PRN: Assess risk to determine if routine checks are adequate to maintain patient safety  - Encourage patient to participate actively in care by formulating a plan to combat response to suicidal ideation, identify supports and resources  Outcome: Progressing     Problem: PSYCHOSIS  Goal: Will report no hallucinations or delusions  Description: Interventions:  - Administer medication as  ordered  - Every waking shifts and PRN assess for the presence of hallucinations and or delusions  - Assist with reality testing to support increasing orientation  - Assess if patient's hallucinations or delusions are encouraging self-harm or harm to others and intervene as appropriate  Outcome: Progressing     Problem: ANXIETY  Goal: Will report anxiety at manageable levels  Description: INTERVENTIONS:  - Administer medication as ordered  - Teach and encourage coping skills  - Provide emotional support  - Assess patient/family for anxiety and ability to cope  Outcome: Progressing
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
## 2021-04-07 NOTE — PROGRESS NOTES
Diagnosis of Schizoaffective disorder, bipolar type reviewed  Short term goals for decrease in depressive symptoms, decrease in anxiety symptoms, decrease in psychotic symptoms, decrease in suicidal thoughts, decrease in self abusive behaviors, decrease in level of agitation, improvement in insight, improvement in reality testing, improvement in self care discussed  Dc date not yet determined  All parties in agreement and treatment plan signed        04/07/21 1520   Team Meeting   Meeting Type Tx Team Meeting   Team Members Present   Team Members Present Physician;Nurse;   Physician Team Member Dr Paulette Bello Team Member [facility] Management Team Member Freya   Patient/Family Present   Patient Present Yes   Patient's Family Present No

## 2021-04-07 NOTE — PROGRESS NOTES
Pt is in his room lying in his bed at the time of interview  Pt is pleasant and cooperative with fair eye contact  Pt reports to this writer when I'm in the hospital "I like to stay about two to three weeks to get the proper treatment"  Pt also reported that he has not yet called his mom since she is usually upset when he comes to the hospital   He verbalized that he will call her when he get back to the group home  Pt did report that he spoke with his aunt  Pt denies SI/HI/AVH and denies having any further concerns or questions

## 2021-04-07 NOTE — TREATMENT PLAN
TREATMENT PLAN REVIEW - [facility] Magdi Dhillon 45 y o  1980 male MRN: [id number]    [address] [place] [address] Room / Bed: [facility] 252/[facility] 82[facility] Encounter: [id number]          Admit Date/Time:  4/6/2021  1:54 PM    Treatment Team: Attending Provider: Anant Blackwood MD; Consulting Physician: Reji Alfaro MD; Patient Care Technician: Marques León; Patient Care Technician: Alka Marina; Care Manager: Dennis Contreras RN; Patient Care Technician: Kemar Perez; Patient Care Technician: Alysa Porter; Registered Nurse: Paramjit Marlow LPN; Registered Nurse: Gabriela Kaplan RN; Occupational Therapy Assistant: JAKE Roman; Charge Nurse: Oscar Miramontes RN; Security: Clary Sánchez;  : Leonor Vences; Registered Nurse: Layla Whiteside RN    Diagnosis: Principal Problem:    Schizoaffective disorder, bipolar type ([facility] 75 )  Active Problems:    Mild intellectual disability    Obsessive-compulsive disorder, unspecified    Type 2 diabetes mellitus with hyperglycemia ([place] [address] )    Hyperlipidemia    Hypothyroidism    Medical clearance for psychiatric admission      Patient Strengths/Assets: motivation for treatment/growth, patient is on a voluntary commitment    Patient Barriers/Limitations: poor insight    Short Term Goals: decrease in depressive symptoms, decrease in anxiety symptoms, decrease in psychotic symptoms, decrease in suicidal thoughts, decrease in self abusive behaviors, decrease in level of agitation, improvement in insight, improvement in reality testing, improvement in self care    Long Term Goals: improvement in depression, improvement in anxiety, resolution of depressive symptoms, resolution of manic symptoms, stabilization of mood, free of suicidal thoughts, no self abusive behavior, improved insight    Progress Towards Goals: starting psychiatric medications as prescribed    Recommended Treatment: medication management, patient medication education, group therapy, milieu therapy, continued Behavioral Health psychiatric evaluation/assessment process    Treatment Frequency: daily medication monitoring, group and milieu therapy daily, monitoring through interdisciplinary rounds, monitoring through weekly patient care conferences    Expected Discharge Date:  5-7 days    Discharge Plan: referral for outpatient medication management with a psychiatrist, referral for outpatient psychotherapy    Treatment Plan Created/Updated By: Florecita Laird MD

## 2021-04-08 LAB
GLUCOSE SERPL-MCNC: 100 MG/DL (ref 65–140)
GLUCOSE SERPL-MCNC: 188 MG/DL (ref 65–140)
GLUCOSE SERPL-MCNC: 78 MG/DL (ref 65–140)
GLUCOSE SERPL-MCNC: 97 MG/DL (ref 65–140)

## 2021-04-08 PROCEDURE — 82948 REAGENT STRIP/BLOOD GLUCOSE: CPT

## 2021-04-08 PROCEDURE — 99232 SBSQ HOSP IP/OBS MODERATE 35: CPT | Performed by: STUDENT IN AN ORGANIZED HEALTH CARE EDUCATION/TRAINING PROGRAM

## 2021-04-08 RX ADMIN — TRAZODONE HYDROCHLORIDE 50 MG: 50 TABLET ORAL at 21:00

## 2021-04-08 RX ADMIN — LEVOTHYROXINE SODIUM 25 MCG: 25 TABLET ORAL at 06:50

## 2021-04-08 RX ADMIN — CHLORPROMAZINE HYDROCHLORIDE 100 MG: 100 TABLET, SUGAR COATED ORAL at 21:00

## 2021-04-08 RX ADMIN — DIVALPROEX SODIUM 500 MG: 500 TABLET, EXTENDED RELEASE ORAL at 21:00

## 2021-04-08 RX ADMIN — CHLORPROMAZINE HYDROCHLORIDE 100 MG: 100 TABLET, SUGAR COATED ORAL at 09:41

## 2021-04-08 RX ADMIN — LORAZEPAM 0.5 MG: 0.5 TABLET ORAL at 21:00

## 2021-04-08 RX ADMIN — LORAZEPAM 0.5 MG: 0.5 TABLET ORAL at 09:40

## 2021-04-08 RX ADMIN — PROPRANOLOL HYDROCHLORIDE 20 MG: 20 TABLET ORAL at 09:41

## 2021-04-08 RX ADMIN — SITAGLIPTIN 50 MG: 50 TABLET, FILM COATED ORAL at 09:40

## 2021-04-08 RX ADMIN — LISINOPRIL 2.5 MG: 2.5 TABLET ORAL at 09:41

## 2021-04-08 RX ADMIN — INSULIN LISPRO 1 UNITS: 100 INJECTION, SOLUTION INTRAVENOUS; SUBCUTANEOUS at 12:17

## 2021-04-08 RX ADMIN — DOCUSATE SODIUM 100 MG: 100 CAPSULE, LIQUID FILLED ORAL at 09:40

## 2021-04-08 RX ADMIN — HYDROXYZINE HYDROCHLORIDE 25 MG: 25 TABLET, FILM COATED ORAL at 21:00

## 2021-04-08 RX ADMIN — GABAPENTIN 100 MG: 100 CAPSULE ORAL at 21:00

## 2021-04-08 RX ADMIN — PROPRANOLOL HYDROCHLORIDE 20 MG: 20 TABLET ORAL at 21:00

## 2021-04-08 RX ADMIN — METFORMIN HYDROCHLORIDE 1000 MG: 500 TABLET ORAL at 09:40

## 2021-04-08 RX ADMIN — LORAZEPAM 0.5 MG: 0.5 TABLET ORAL at 16:47

## 2021-04-08 RX ADMIN — HYDROXYZINE HYDROCHLORIDE 25 MG: 25 TABLET, FILM COATED ORAL at 09:41

## 2021-04-08 RX ADMIN — CHLORPROMAZINE HYDROCHLORIDE 100 MG: 100 TABLET, SUGAR COATED ORAL at 16:47

## 2021-04-08 RX ADMIN — DOCUSATE SODIUM AND SENNOSIDES 1 TABLET: 8.6; 5 TABLET ORAL at 16:47

## 2021-04-08 RX ADMIN — CALCIUM 1 TABLET: 500 TABLET ORAL at 09:41

## 2021-04-08 RX ADMIN — Medication 1000 UNITS: at 09:40

## 2021-04-08 RX ADMIN — METFORMIN HYDROCHLORIDE 1000 MG: 500 TABLET ORAL at 16:47

## 2021-04-08 RX ADMIN — HYDROXYZINE HYDROCHLORIDE 25 MG: 25 TABLET, FILM COATED ORAL at 16:47

## 2021-04-08 RX ADMIN — DIVALPROEX SODIUM 500 MG: 500 TABLET, EXTENDED RELEASE ORAL at 09:42

## 2021-04-08 RX ADMIN — Medication 400 UNITS: at 09:40

## 2021-04-08 RX ADMIN — LORATADINE 10 MG: 10 TABLET ORAL at 09:41

## 2021-04-08 RX ADMIN — ATORVASTATIN CALCIUM 40 MG: 40 TABLET, FILM COATED ORAL at 16:47

## 2021-04-08 NOTE — PROGRESS NOTES
Pt denies SI/HI  Pt spoke to his aunt on the phone  No PRNs     DC date not yet determined        04/08/21 0752   Team Meeting   Meeting Type Daily Rounds   Team Members Present   Team Members Present Physician;Nurse;;Occupational Therapist   Physician Team Member Dr Pool Garcia / Kellie Lipscomb / Peggy Fleischer Team Member Hemanth Boo / Sue Sterling Management Team Member Carin Ball / [facility]   OT Team Member Sharee Garcias / OT student   Patient/Family Present   Patient Present No   Patient's Family Present No

## 2021-04-08 NOTE — CASE MANAGEMENT
DUKE spoke to pt mom Sindhu Carrasco ([phone]) to provide an update  She reported that she is upset and frustrated with pt  She reported "he is a hospital fan, he is there for a change of scenery because he is bored " Mom reported "it is a waste of money for him being there and he is bleeding medicare " She reported that she spoke to pt that night and she thought he was fine and should not have gone to the hospital      Mom reported that pt is "all set at his home, he has a nice house, pool table, exercise bike, netflix, basketball court, Wii and anything else he could need " "He has good people working with him and caring for him "     Mom reported "he know what to say to get me upset, I am not going to call him and if he calls me I am going to hang up " She reported that she feels that she knows pt is aware of his behaviors and what he is doing  Mom reported that she loves patient and that she wishes she could see him but she has done a lot for him in his life and feels bad she can't care for him anymore  She reported "I am 77years old, I am still working, and I have my  and other sons that have things going on "     Mom reported that she feels that the group home should be doing more to occupy pt's time and keep him busy  DUKE informed mom that team is looking at dc for early next week and will provide her with an update as it becomes available

## 2021-04-08 NOTE — PROGRESS NOTES
Pt remains mostly seclusive to his room, denies SI/HI, AVH  Denies anxiety and depression  Pt encouraged to attend groups and socialize with peers however pt appears disinterested with this  Denies any concerns or questions at this time

## 2021-04-08 NOTE — PROGRESS NOTES
Progress Note - Behavioral Health   Jessie Cowan 45 y o  male MRN: [id number]  Unit/Bed#: [facility] 252-01 Encounter: [id number]    Assessment/Plan   Principal Problem:    Schizoaffective disorder, bipolar type ([facility] 75 )  Active Problems:    Mild intellectual disability    Obsessive-compulsive disorder, unspecified    Type 2 diabetes mellitus with hyperglycemia ([facility] 75 )    Hyperlipidemia    Hypothyroidism    Medical clearance for psychiatric admission      Subjective: Patient was seen, chart reviewed and case discussed with team  As per report no behavioral issues on the unit  Patient appears seclusive and withdrawn to room  Denies any depression or anxiety  Endorses voices at times  Denies SI/HI  He is compliant with medications  Denies any side effects     Psychiatric Review of Systems:  Behavior over the last 24 hours:  unchanged  Sleep: normal  Appetite: normal  Medication side effects: No  ROS: no complaints, all others negative    Current Medications:  Current Facility-Administered Medications   Medication Dose Route Frequency    acetaminophen (TYLENOL) tablet 650 mg  650 mg Oral Q6H PRN    acetaminophen (TYLENOL) tablet 650 mg  650 mg Oral Q4H PRN    acetaminophen (TYLENOL) tablet 975 mg  975 mg Oral Q6H PRN    aluminum-magnesium hydroxide-simethicone (MYLANTA) oral suspension 30 mL  30 mL Oral Q4H PRN    artificial tear (LUBRIFRESH P M ) ophthalmic ointment 1 application  1 application Both Eyes C4P PRN    atorvastatin (LIPITOR) tablet 40 mg  40 mg Oral Daily With Dinner    calcium carbonate (OYSTER SHELL,OSCAL) 500 mg tablet 1 tablet  1 tablet Oral Daily With Breakfast    chlorproMAZINE (THORAZINE) tablet 100 mg  100 mg Oral TID    cholecalciferol (VITAMIN D3) tablet 1,000 Units  1,000 Units Oral Daily    diphenhydrAMINE (BENADRYL) injection 50 mg  50 mg Intramuscular Q6H PRN    diphenhydrAMINE (BENADRYL) tablet 50 mg  50 mg Oral Q6H PRN    divalproex sodium (DEPAKOTE ER) 24 hr tablet 500 mg  500 mg Oral BID    docusate sodium (COLACE) capsule 100 mg  100 mg Oral BID    gabapentin (NEURONTIN) capsule 100 mg  100 mg Oral HS    haloperidol (HALDOL) tablet 2 mg  2 mg Oral Q4H PRN Max 6/day    haloperidol (HALDOL) tablet 5 mg  5 mg Oral Q6H PRN Max 4/day    haloperidol (HALDOL) tablet 5 mg  5 mg Oral Q4H PRN Max 4/day    haloperidol lactate (HALDOL) injection 2 5 mg  2 5 mg Intramuscular Q6H PRN Max 4/day    And    LORazepam (ATIVAN) injection 1 mg  1 mg Intramuscular Q6H PRN Max 4/day    haloperidol lactate (HALDOL) injection 5 mg  5 mg Intramuscular Q4H PRN Max 4/day    And    LORazepam (ATIVAN) injection 2 mg  2 mg Intramuscular Q4H PRN Max 4/day    hydrOXYzine HCL (ATARAX) tablet 25 mg  25 mg Oral TID    insulin lispro (HumaLOG) 100 units/mL subcutaneous injection 1-6 Units  1-6 Units Subcutaneous TID AC    insulin lispro (HumaLOG) 100 units/mL subcutaneous injection 1-6 Units  1-6 Units Subcutaneous HS    levothyroxine tablet 25 mcg  25 mcg Oral Early Morning    lisinopril (ZESTRIL) tablet 2 5 mg  2 5 mg Oral Daily    loratadine (CLARITIN) tablet 10 mg  10 mg Oral Daily    LORazepam (ATIVAN) tablet 0 5 mg  0 5 mg Oral TID    metFORMIN (GLUCOPHAGE) tablet 1,000 mg  1,000 mg Oral BID With Meals    polyethylene glycol (MIRALAX) packet 17 g  17 g Oral Daily PRN    propranolol (INDERAL) tablet 20 mg  20 mg Oral Q12H [address]    senna-docusate sodium (SENOKOT S) 8 6-50 mg per tablet 1 tablet  1 tablet Oral Daily PRN    sitaGLIPtin (JANUVIA) tablet 50 mg  50 mg Oral Daily    traZODone (DESYREL) tablet 50 mg  50 mg Oral HS PRN    traZODone (DESYREL) tablet 50 mg  50 mg Oral HS    vitamin E (tocopherol) capsule 400 Units  400 Units Oral Daily       Behavioral Health Medications: all current active meds have been reviewed      Vitals:  Vitals:    04/08/21 0940   BP: 110/62   Pulse:    Resp:    Temp:    SpO2:        Laboratory results:  I have personally reviewed all pertinent laboratory/tests results  Mental Status Evaluation:  Appearance:  age appropriate and bearded   Behavior:  guarded   Speech:  pressured   Mood:  anxious   Affect:  labile   Language Appropriate   Thought Process:  concrete   Thought Content:  normal   Perceptual Disturbances: Auditory hallucinations without commands   Risk Potential: Suicidal Ideations none, Homicidal Ideations none and Potential for Aggression No   Sensorium:  person, place and time/date   Cognition:  recent and remote memory grossly intact   Consciousness:  alert    Attention: attention span appeared shorter than expected for age   Insight:  limited   Judgment: fair   Gait/Station: normal gait/station   Motor Activity: no abnormal movements     Progress Toward Goals: Progressing    Recommended Treatment: Continue with pharmacotherapy, group therapy, milieu therapy and occupational therapy

## 2021-04-08 NOTE — PROGRESS NOTES
Pt continues to remain seclusive to room, states he sleeping well at night however enjoys resting during the day  Pt is up for meals however declined to attend groups  Denies SI/HI, AVH, anxiety and depression

## 2021-04-08 NOTE — CASE MANAGEMENT
CM called pt  Idalmis Sr ([phone])  Idalmis Sr reported that before going to the ED, pt had call him and "was cursing him out  He tried to grab a rubber spatula to hurt himself  I said that is not going to do much, so he found a metal spatula and hit his stomach as well as grabbed a pot and hit himself in the head "     Idalmis Sr reported that pt has a "cycle of coming to the hospital and then he will go a long time without going " Pt was at [facility] in the past for 6months  He reported that pt attempts to get reactions out of others by his behaviors  Idalmis Sr reported that pt has in the past, grabbed women's breasts and back sides  He is often touching others at the group home  Idalmis Sr reported "I am okay with it, but some people are not and he gets mad when they are not okay with it "     Idalmis Sr reported that pt is to ask staff first if they are okay with pt touching them  Idalmis Sr reported that pt and mom/dad do not have a good relationship at this time  He reported that mom does not want pt in the hospital  Mom frustrated with group home and how they handled things  Idalmis Sr reported that he told mom that they have to follow safety procedures when pt makes threats and/or causes physical harm to himself or others  Idalmis Sr reported that pt psychiatrist at [facility], Dr Melo Perez has been out on medical leave and he reported that pt "couldn't wait to see a psychiatrist and that was a trigger that led to behaviors "     Idalmis Sr reported another trigger for pt was that he was talking to his mom on the phone on Easter and she told him that pt brother had visited her and pt was upset that he was not able to see them  Mom had explained that they received their COVID vaccinations  Pt was still upset and also reported he has been thinking about how he misses his grandparents who dies 5-6 years ago  Idalmis Sr confirmed that pt is due for his [place] injection on Wednesday 4/14/2021   CM asked if he can drop it off to hospital and staff will administer it while here  Jessica Callejas will plan to drop it off on Monday 4/12/2021  CM informed him that team is looking at dc for early next week

## 2021-04-09 LAB
GLUCOSE SERPL-MCNC: 105 MG/DL (ref 65–140)
GLUCOSE SERPL-MCNC: 142 MG/DL (ref 65–140)
GLUCOSE SERPL-MCNC: 156 MG/DL (ref 65–140)
GLUCOSE SERPL-MCNC: 166 MG/DL (ref 65–140)

## 2021-04-09 PROCEDURE — 82948 REAGENT STRIP/BLOOD GLUCOSE: CPT

## 2021-04-09 PROCEDURE — 99232 SBSQ HOSP IP/OBS MODERATE 35: CPT | Performed by: PHYSICIAN ASSISTANT

## 2021-04-09 RX ADMIN — CHLORPROMAZINE HYDROCHLORIDE 100 MG: 100 TABLET, SUGAR COATED ORAL at 16:19

## 2021-04-09 RX ADMIN — DIVALPROEX SODIUM 500 MG: 500 TABLET, EXTENDED RELEASE ORAL at 09:36

## 2021-04-09 RX ADMIN — CHLORPROMAZINE HYDROCHLORIDE 100 MG: 100 TABLET, SUGAR COATED ORAL at 21:51

## 2021-04-09 RX ADMIN — INSULIN LISPRO 1 UNITS: 100 INJECTION, SOLUTION INTRAVENOUS; SUBCUTANEOUS at 12:33

## 2021-04-09 RX ADMIN — METFORMIN HYDROCHLORIDE 1000 MG: 500 TABLET ORAL at 16:19

## 2021-04-09 RX ADMIN — CHLORPROMAZINE HYDROCHLORIDE 100 MG: 100 TABLET, SUGAR COATED ORAL at 09:36

## 2021-04-09 RX ADMIN — SITAGLIPTIN 50 MG: 50 TABLET, FILM COATED ORAL at 09:34

## 2021-04-09 RX ADMIN — LORAZEPAM 0.5 MG: 0.5 TABLET ORAL at 09:36

## 2021-04-09 RX ADMIN — Medication 400 UNITS: at 09:37

## 2021-04-09 RX ADMIN — DOCUSATE SODIUM 100 MG: 100 CAPSULE, LIQUID FILLED ORAL at 09:36

## 2021-04-09 RX ADMIN — HYDROXYZINE HYDROCHLORIDE 25 MG: 25 TABLET, FILM COATED ORAL at 21:51

## 2021-04-09 RX ADMIN — DOCUSATE SODIUM 100 MG: 100 CAPSULE, LIQUID FILLED ORAL at 17:24

## 2021-04-09 RX ADMIN — GABAPENTIN 100 MG: 100 CAPSULE ORAL at 21:50

## 2021-04-09 RX ADMIN — HYDROXYZINE HYDROCHLORIDE 25 MG: 25 TABLET, FILM COATED ORAL at 09:36

## 2021-04-09 RX ADMIN — PROPRANOLOL HYDROCHLORIDE 20 MG: 20 TABLET ORAL at 21:51

## 2021-04-09 RX ADMIN — CALCIUM 1 TABLET: 500 TABLET ORAL at 09:34

## 2021-04-09 RX ADMIN — TRAZODONE HYDROCHLORIDE 50 MG: 50 TABLET ORAL at 21:51

## 2021-04-09 RX ADMIN — METFORMIN HYDROCHLORIDE 1000 MG: 500 TABLET ORAL at 09:35

## 2021-04-09 RX ADMIN — HYDROXYZINE HYDROCHLORIDE 25 MG: 25 TABLET, FILM COATED ORAL at 16:20

## 2021-04-09 RX ADMIN — DIVALPROEX SODIUM 500 MG: 500 TABLET, EXTENDED RELEASE ORAL at 21:51

## 2021-04-09 RX ADMIN — INSULIN LISPRO 1 UNITS: 100 INJECTION, SOLUTION INTRAVENOUS; SUBCUTANEOUS at 21:54

## 2021-04-09 RX ADMIN — LORAZEPAM 0.5 MG: 0.5 TABLET ORAL at 16:20

## 2021-04-09 RX ADMIN — ATORVASTATIN CALCIUM 40 MG: 40 TABLET, FILM COATED ORAL at 16:19

## 2021-04-09 RX ADMIN — Medication 1000 UNITS: at 09:34

## 2021-04-09 RX ADMIN — PROPRANOLOL HYDROCHLORIDE 20 MG: 20 TABLET ORAL at 09:35

## 2021-04-09 RX ADMIN — LISINOPRIL 2.5 MG: 2.5 TABLET ORAL at 09:35

## 2021-04-09 RX ADMIN — LORAZEPAM 0.5 MG: 0.5 TABLET ORAL at 21:51

## 2021-04-09 RX ADMIN — LORATADINE 10 MG: 10 TABLET ORAL at 09:36

## 2021-04-09 NOTE — PROGRESS NOTES
Progress Note - Behavioral Health   Hardik Holliday 45 y o  male MRN: [id number]  Unit/Bed#: [facility] 252-01 Encounter: [id number]    Assessment/Plan   Principal Problem:    Schizoaffective disorder, bipolar type ([facility] )  Active Problems:    Mild intellectual disability    Obsessive-compulsive disorder, unspecified    Type 2 diabetes mellitus with hyperglycemia ([facility] 75 )    Hyperlipidemia    Hypothyroidism    Medical clearance for psychiatric admission      Subjective: Patient was seen, chart reviewed and case discussed with team   Patient is seclusive to room not attending groups  [place] thought process and minimal in conversation  States he is not sure why he acted out at his group home  States he was angry and wanted to hurt himself at that time  Currently is denying suicidal thoughts  Denied homicidal thoughts  States that his depression and anxiety are under control  Is not exhibiting manic behavior  Denied all forms of hallucinations  Did not appear paranoid  Are reports that patient does like to point and touch people  No behavior exhibited today of this  Compliant with medications  Appears to be tolerating medications well without serious side effects  Is due for [place] IM injection in coming days      Psychiatric Review of Systems:  Behavior over the last 24 hours:  improved  Sleep: normal  Appetite: normal  Medication side effects: No  ROS: no complaints, all others negative    Current Medications:  Current Facility-Administered Medications   Medication Dose Route Frequency    acetaminophen (TYLENOL) tablet 650 mg  650 mg Oral Q6H PRN    acetaminophen (TYLENOL) tablet 650 mg  650 mg Oral Q4H PRN    acetaminophen (TYLENOL) tablet 975 mg  975 mg Oral Q6H PRN    aluminum-magnesium hydroxide-simethicone (MYLANTA) oral suspension 30 mL  30 mL Oral Q4H PRN    artificial tear (LUBRIFRESH P M ) ophthalmic ointment 1 application  1 application Both Eyes O0Z PRN    atorvastatin (LIPITOR) tablet 40 mg  40 mg Oral Daily With Dinner    calcium carbonate (OYSTER SHELL,OSCAL) 500 mg tablet 1 tablet  1 tablet Oral Daily With Breakfast    chlorproMAZINE (THORAZINE) tablet 100 mg  100 mg Oral TID    cholecalciferol (VITAMIN D3) tablet 1,000 Units  1,000 Units Oral Daily    diphenhydrAMINE (BENADRYL) injection 50 mg  50 mg Intramuscular Q6H PRN    diphenhydrAMINE (BENADRYL) tablet 50 mg  50 mg Oral Q6H PRN    divalproex sodium (DEPAKOTE ER) 24 hr tablet 500 mg  500 mg Oral BID    docusate sodium (COLACE) capsule 100 mg  100 mg Oral BID    gabapentin (NEURONTIN) capsule 100 mg  100 mg Oral HS    haloperidol (HALDOL) tablet 2 mg  2 mg Oral Q4H PRN Max 6/day    haloperidol (HALDOL) tablet 5 mg  5 mg Oral Q6H PRN Max 4/day    haloperidol (HALDOL) tablet 5 mg  5 mg Oral Q4H PRN Max 4/day    haloperidol lactate (HALDOL) injection 2 5 mg  2 5 mg Intramuscular Q6H PRN Max 4/day    And    LORazepam (ATIVAN) injection 1 mg  1 mg Intramuscular Q6H PRN Max 4/day    haloperidol lactate (HALDOL) injection 5 mg  5 mg Intramuscular Q4H PRN Max 4/day    And    LORazepam (ATIVAN) injection 2 mg  2 mg Intramuscular Q4H PRN Max 4/day    hydrOXYzine HCL (ATARAX) tablet 25 mg  25 mg Oral TID    insulin lispro (HumaLOG) 100 units/mL subcutaneous injection 1-6 Units  1-6 Units Subcutaneous TID AC    insulin lispro (HumaLOG) 100 units/mL subcutaneous injection 1-6 Units  1-6 Units Subcutaneous HS    levothyroxine tablet 25 mcg  25 mcg Oral Early Morning    lisinopril (ZESTRIL) tablet 2 5 mg  2 5 mg Oral Daily    loratadine (CLARITIN) tablet 10 mg  10 mg Oral Daily    LORazepam (ATIVAN) tablet 0 5 mg  0 5 mg Oral TID    metFORMIN (GLUCOPHAGE) tablet 1,000 mg  1,000 mg Oral BID With Meals    polyethylene glycol (MIRALAX) packet 17 g  17 g Oral Daily PRN    propranolol (INDERAL) tablet 20 mg  20 mg Oral Q12H DANTE    senna-docusate sodium (SENOKOT S) 8 6-50 mg per tablet 1 tablet  1 tablet Oral Daily PRN    sitaGLIPtin (JANUVIA) tablet 50 mg  50 mg Oral Daily    traZODone (DESYREL) tablet 50 mg  50 mg Oral HS PRN    traZODone (DESYREL) tablet 50 mg  50 mg Oral HS    vitamin E (tocopherol) capsule 400 Units  400 Units Oral Daily       Behavioral Health Medications: all current active meds have been reviewed and continue current psychiatric medications  Vitals:  Vitals:    04/09/21 0732   BP: 115/77   Pulse: 75   Resp: 16   Temp: (!) 96 8 °F (36 °C)   SpO2:        Laboratory results:    I have personally reviewed all pertinent laboratory/tests results  Most Recent Labs:   Lab Results   Component Value Date    WBC 9 45 04/07/2021    RBC 4 68 04/07/2021    HGB 14 2 04/07/2021    HCT 42 2 04/07/2021     04/07/2021    RDW 11 4 (L) 04/07/2021    NEUTROABS 4 72 04/07/2021    SODIUM 144 04/07/2021    K 4 5 04/07/2021     04/07/2021    CO2 28 04/07/2021    BUN 13 04/07/2021    CREATININE 1 21 04/07/2021    GLUC 128 04/07/2021    GLUF 141 (H) 01/25/2021    CALCIUM 8 1 (L) 04/07/2021    AST 16 04/07/2021    ALT 31 04/07/2021    ALKPHOS 64 04/07/2021    TP 5 6 (L) 04/07/2021    ALB 2 7 (L) 04/07/2021    TBILI 0 40 04/07/2021    CHOLESTEROL 79 09/28/2020    HDL 24 (L) 09/28/2020    TRIG 219 (H) 09/28/2020    LDLCALC 11 09/28/2020    NONHDLC 55 09/28/2020    VALPROICTOT 32 (L) 04/07/2021    AMMONIA 29 02/14/2018    ZWI5CKSGICEG 0 711 04/07/2021    FREET4 1 23 08/03/2017    RPR Non-Reactive 04/07/2021    HGBA1C 6 8 (A) 01/19/2021     06/29/2020       Mental Status Evaluation:  Appearance:  casually dressed   Behavior:  guarded and seclusive   Speech:  loud   Mood:  Less depressed and anxious   Affect:  increased in range   Language Appropriate   Thought Process:  concrete   Thought Content:  obsessions   Perceptual Disturbances: None   Risk Potential: Denied SI/HI    Potential for aggression possible   Sensorium:  person and place   Cognition:  recent and remote memory grossly intact   Consciousness:  awake    Attention: attention span appeared shorter than expected for age   Insight:  limited   Judgment: limited   Gait/Station: normal gait/station and normal balance   Motor Activity: no abnormal movements     Progress Toward Goals:  Progressing    Recommended Treatment: Continue with pharmacotherapy, group therapy, milieu therapy and occupational therapy  1   Continue current medications  [place] IM injection in coming days  2  Collaborate with group home for disposition planning    Risks, benefits and possible side effects of Medications:   Risks, benefits, and possible side effects of medications explained to patient and patient verbalizes understanding

## 2021-04-09 NOTE — PROGRESS NOTES
Pt reported auditory hallucinations at time   Denies SI/HI     Dc date: Tuesday or Wednesday (?)      04/09/21 5657   Team Meeting   Meeting Type Daily Rounds   Team Members Present   Team Members Present Physician;Nurse;;Occupational Therapist   Physician Team Member Dr Marie Conde / Rupert Hernández / PA student   Nursing Team Member Deangelo Kilgore / Betty Almanza Management Team Member Tanika Mcnulty / Rossi Min   OT Team Member Jensen Toro / Faina Loza   Patient/Family Present   Patient Present No   Patient's Family Present No

## 2021-04-09 NOTE — PROGRESS NOTES
Pt remains mostly seclusive to his room, coming out of room briefly to walk halls before returning to room  Pt attending meals however declines group  Denies SI/HI, AVH  Compliant with scheduled medications and denies any questions or concerns at this time

## 2021-04-09 NOTE — CASE MANAGEMENT
CM received call from pt mom for an update  CM called back to inform her that pt is doing well  He spends most of his time in his room but has been attending some groups, and has been compliant with medication and meals  CM informed her that pt will receive his [place] Injection on Monday and then look at dc for Tuesday 4/13/21  Mom requested that she be able to call nurses station over the weekend to get a brief update on pt  CM let her know that CM will pass along that information to let her know

## 2021-04-10 LAB
GLUCOSE SERPL-MCNC: 108 MG/DL (ref 65–140)
GLUCOSE SERPL-MCNC: 143 MG/DL (ref 65–140)
GLUCOSE SERPL-MCNC: 156 MG/DL (ref 65–140)
GLUCOSE SERPL-MCNC: 96 MG/DL (ref 65–140)
VALPROATE SERPL-MCNC: 44 UG/ML (ref 50–100)

## 2021-04-10 PROCEDURE — 80164 ASSAY DIPROPYLACETIC ACD TOT: CPT | Performed by: PHYSICIAN ASSISTANT

## 2021-04-10 PROCEDURE — 99232 SBSQ HOSP IP/OBS MODERATE 35: CPT | Performed by: PSYCHIATRY & NEUROLOGY

## 2021-04-10 PROCEDURE — 82948 REAGENT STRIP/BLOOD GLUCOSE: CPT

## 2021-04-10 RX ORDER — OLANZAPINE 10 MG/1
5 INJECTION, POWDER, LYOPHILIZED, FOR SOLUTION INTRAMUSCULAR EVERY 8 HOURS PRN
Status: DISCONTINUED | OUTPATIENT
Start: 2021-04-10 | End: 2021-04-13 | Stop reason: HOSPADM

## 2021-04-10 RX ORDER — OLANZAPINE 2.5 MG/1
2.5 TABLET ORAL EVERY 8 HOURS PRN
Status: DISCONTINUED | OUTPATIENT
Start: 2021-04-10 | End: 2021-04-13 | Stop reason: HOSPADM

## 2021-04-10 RX ADMIN — DOCUSATE SODIUM 100 MG: 100 CAPSULE, LIQUID FILLED ORAL at 17:07

## 2021-04-10 RX ADMIN — ATORVASTATIN CALCIUM 40 MG: 40 TABLET, FILM COATED ORAL at 16:03

## 2021-04-10 RX ADMIN — DIPHENHYDRAMINE HYDROCHLORIDE 50 MG: 25 TABLET ORAL at 17:06

## 2021-04-10 RX ADMIN — TRAZODONE HYDROCHLORIDE 50 MG: 50 TABLET ORAL at 21:18

## 2021-04-10 RX ADMIN — LISINOPRIL 2.5 MG: 2.5 TABLET ORAL at 09:53

## 2021-04-10 RX ADMIN — GABAPENTIN 100 MG: 100 CAPSULE ORAL at 21:18

## 2021-04-10 RX ADMIN — HYDROXYZINE HYDROCHLORIDE 25 MG: 25 TABLET, FILM COATED ORAL at 21:17

## 2021-04-10 RX ADMIN — LEVOTHYROXINE SODIUM 25 MCG: 25 TABLET ORAL at 06:37

## 2021-04-10 RX ADMIN — DIVALPROEX SODIUM 500 MG: 500 TABLET, EXTENDED RELEASE ORAL at 13:06

## 2021-04-10 RX ADMIN — LORAZEPAM 0.5 MG: 0.5 TABLET ORAL at 16:03

## 2021-04-10 RX ADMIN — Medication 1000 UNITS: at 09:53

## 2021-04-10 RX ADMIN — LORAZEPAM 0.5 MG: 0.5 TABLET ORAL at 09:53

## 2021-04-10 RX ADMIN — DIVALPROEX SODIUM 500 MG: 500 TABLET, EXTENDED RELEASE ORAL at 21:17

## 2021-04-10 RX ADMIN — CALCIUM 1 TABLET: 500 TABLET ORAL at 09:53

## 2021-04-10 RX ADMIN — INSULIN LISPRO 1 UNITS: 100 INJECTION, SOLUTION INTRAVENOUS; SUBCUTANEOUS at 12:04

## 2021-04-10 RX ADMIN — DOCUSATE SODIUM 100 MG: 100 CAPSULE, LIQUID FILLED ORAL at 09:53

## 2021-04-10 RX ADMIN — CHLORPROMAZINE HYDROCHLORIDE 100 MG: 100 TABLET, SUGAR COATED ORAL at 21:17

## 2021-04-10 RX ADMIN — HYDROXYZINE HYDROCHLORIDE 25 MG: 25 TABLET, FILM COATED ORAL at 16:03

## 2021-04-10 RX ADMIN — HYDROXYZINE HYDROCHLORIDE 25 MG: 25 TABLET, FILM COATED ORAL at 09:53

## 2021-04-10 RX ADMIN — LORATADINE 10 MG: 10 TABLET ORAL at 09:53

## 2021-04-10 RX ADMIN — METFORMIN HYDROCHLORIDE 1000 MG: 500 TABLET ORAL at 16:03

## 2021-04-10 RX ADMIN — SITAGLIPTIN 50 MG: 50 TABLET, FILM COATED ORAL at 09:53

## 2021-04-10 RX ADMIN — CHLORPROMAZINE HYDROCHLORIDE 100 MG: 100 TABLET, SUGAR COATED ORAL at 16:03

## 2021-04-10 RX ADMIN — PROPRANOLOL HYDROCHLORIDE 20 MG: 20 TABLET ORAL at 21:18

## 2021-04-10 RX ADMIN — OLANZAPINE 2.5 MG: 2.5 TABLET, FILM COATED ORAL at 13:15

## 2021-04-10 RX ADMIN — METFORMIN HYDROCHLORIDE 1000 MG: 500 TABLET ORAL at 09:52

## 2021-04-10 RX ADMIN — CHLORPROMAZINE HYDROCHLORIDE 100 MG: 100 TABLET, SUGAR COATED ORAL at 09:53

## 2021-04-10 RX ADMIN — Medication 400 UNITS: at 09:54

## 2021-04-10 RX ADMIN — LORAZEPAM 0.5 MG: 0.5 TABLET ORAL at 21:17

## 2021-04-10 RX ADMIN — PROPRANOLOL HYDROCHLORIDE 20 MG: 20 TABLET ORAL at 09:53

## 2021-04-10 NOTE — TREATMENT TEAM
AM rounds- seclusive to room, denies SI/HI, anxiety and depression  Denies AVH however appears to be responding to internal stimuli at times  Slept overnight

## 2021-04-10 NOTE — NURSING NOTE
Patient c/o agitation and impulsivity  He reports for example, not wanting to drop his food on the floor due to loss of control  zyprexa administered for patient complaints and reports it being mild to moderately effective

## 2021-04-10 NOTE — PROGRESS NOTES
Patient walking halls most the evening  Denies any needs or concerns, was scant in conversation with writer and not forthcoming on details regarding his present psych symptoms  Pt mumbles to self and does appear preoccupied when walking halls and sitting alone in his room  Pt encouraged to attend groups, is compliant with meds and meals

## 2021-04-10 NOTE — PLAN OF CARE
Problem: SELF HARM/SUICIDALITY  Goal: Will have no self-injury during hospital stay  Description: INTERVENTIONS:  - Q 15 MINUTES: Routine safety checks  - Q WAKING SHIFT & PRN: Assess risk to determine if routine checks are adequate to maintain patient safety  - Encourage patient to participate actively in care by formulating a plan to combat response to suicidal ideation, identify supports and resources  Outcome: Progressing     Problem: PSYCHOSIS  Goal: Will report no hallucinations or delusions  Description: Interventions:  - Administer medication as  ordered  - Every waking shifts and PRN assess for the presence of hallucinations and or delusions  - Assist with reality testing to support increasing orientation  - Assess if patient's hallucinations or delusions are encouraging self-harm or harm to others and intervene as appropriate  Outcome: Progressing     Problem: ANXIETY  Goal: Will report anxiety at manageable levels  Description: INTERVENTIONS:  - Administer medication as ordered  - Teach and encourage coping skills  - Provide emotional support  - Assess patient/family for anxiety and ability to cope  Outcome: Progressing  Goal: By discharge: Patient will verbalize 2 strategies to deal with anxiety  Description: Interventions:  - Identify any obvious source/trigger to anxiety  - Staff will assist patient in applying identified coping technique/skills  - Encourage attendance of scheduled groups and activities  Outcome: Progressing Yes

## 2021-04-10 NOTE — PROGRESS NOTES
Progress Note - Behavioral Health   Gabriele Burgos 45 y o  male MRN: [id number]  Unit/Bed#: U 252-01 Encounter: @[facility]        Assessment/Plan   Principal Problem:    Schizoaffective disorder, bipolar type ([facility] [address] )  Active Problems:    Mild intellectual disability    Obsessive-compulsive disorder, unspecified    Type 2 diabetes mellitus with hyperglycemia ([facility] )    Hyperlipidemia    Hypothyroidism    Medical clearance for psychiatric admission      Subjective: The patient was seen today for continuing care and reviewed with treatment team     Ned Jackson  reports that  He is looking forward to get his Betty Martínezen shot and go back to the group home next week  He denies having any perceptual disturbances,  and does not appear to be responding to internal stimuli  He reports that he does not know why acted that way in the group home  He is mostly secluded in the room does not participate in groups and activities  He slept through the night  Patient has been compliant with medication and tolerating well  Patient denies any SI or HI  Patient is able to contract  for safety, verbally at this time  No management issues reported by staff overnight      Current Medications:  Current Facility-Administered Medications   Medication Dose Route Frequency Provider Last Rate    acetaminophen  650 mg Oral Q6H PRN Sissy Rosado PA-C      acetaminophen  650 mg Oral Q4H PRN Sissy Rosado PA-C      acetaminophen  975 mg Oral Q6H PRN Sissy Rosado PA-C      aluminum-magnesium hydroxide-simethicone  30 mL Oral Q4H PRN Sissy Rosado PA-C      artificial tear  1 application Both Eyes R0Y PRN Sissy Rosado PA-C      atorvastatin  40 mg Oral Daily With Klein SoupNEHA      calcium carbonate  1 tablet Oral Daily With Breakfast Sissy Rosado PA-C      chlorproMAZINE  100 mg Oral TID Sissy Rosado PA-C      cholecalciferol  1,000 Units Oral Daily Sissy Rosado PA-C      diphenhydrAMINE 50 mg Intramuscular Q6H PRN [facility] Sal[facility], NEHA      diphenhydrAMINE  50 mg Oral Q6H PRN [facility], NEHA      divalproex sodium  500 mg Oral BID [facility], PA-MARQUITA      docusate sodium  100 mg Oral BID [facility], PA-MARQUITA      gabapentin  100 mg Oral HS [facility] Sal[facility], PA-MARQUITA      haloperidol  2 mg Oral Q4H PRN Max 6/day [facility], PA-MARQUITA      haloperidol  5 mg Oral Q6H PRN Max 4/day [facility], PA-MARQUITA      haloperidol  5 mg Oral Q4H PRN Max 4/day [facility], PA-MARQUITA      haloperidol lactate  2 5 mg Intramuscular Q6H PRN Max 4/[facility]      And    LORazepam  1 mg Intramuscular Q6H PRN Max 4/day [facility], NEHA      haloperidol lactate  5 mg Intramuscular Q4H PRN Max 4/day [facility] NE[facility]      And    LORazepam  2 mg Intramuscular Q4H PRN Max 4/day [facility] NEHA Marina      hydrOXYzine HCL  25 mg Oral TID [facility] NEHA Marina      insulin lispro  1-6 Units Subcutaneous TID AC [facility] Liana Cookie, PA-MARQUITA      insulin lispro  1-6 Units Subcutaneous HS [facility] Liana Gary PA-C      levothyroxine  25 mcg Oral Early Morning [facility]      lisinopril  2 5 mg Oral Daily [place], [place]      loratadine  10 mg Oral Daily [place], [place]      LORazepam  0 5 mg Oral TID [facility] NEHA Marina      metFORMIN  1,000 mg Oral BID With Meals Jerrica Marina PA-C      polyethylene glycol  17 g Oral Daily PRN [facility] kessler      propranolol  20 mg Oral Q12H [facility] [place], [place]      senna-docusate sodium  1 tablet Oral Daily PRN [facility], NEHA      sitaGLIPtin  50 mg Oral Daily [place], [place]      traZODone  50 mg Oral HS PRN [facility], NEHA      traZODone  50 mg Oral HS [facility], PA-C      vitamin E (tocopherol)  400 Units Oral Daily Jerrica Marina PA-C         Behavioral Health Medications: all current active meds have been reviewed and continue current psychiatric medications  Vital signs in last 24 hours:  Temp:  [97 3 °F (36 3 °C)-98 8 °F (37 1 °C)] 97 3 °F (36 3 °C)  HR:  [] 79  Resp:  [16] 16  BP: (104-118)/(56-81) 104/67    Laboratory results:  I have personally reviewed all pertinent laboratory/tests results  Psychiatric Review of Systems:  Behavior over the last 24 hours:  unchanged  Sleep: normal  Appetite: normal  Medication side effects: No  ROS: no complaints and All other negative    Mental Status Evaluation:  Appearance:  Age appropriate, baldheaded, bearded,laying in bed, good eye contact   Behavior:  evasive    Speech:  normal pitch and normal volume   Mood:  oaky   Affect:  constricted   Thought Process:  concrete   Thought Content:   no delusions elicited   Perceptual Disturbances: None   Risk Potential: Suicidal Ideations none, Homicidal Ideations none and Potential for Aggression Yes due to poor impulse control   Sensorium:  person, place and time/date   Consciousness:  alert and awake    Insight:  limited    Judgment: limited   Gait/Station: normal gait/station   Motor Activity: no abnormal movements       Progress Toward Goals:  progressing    Recommended Treatment: 1  Continue with group therapy, milieu therapy and occupational therapy     2 Continue following current medications:   Current Facility-Administered Medications   Medication Dose Route Frequency Provider Last Rate    acetaminophen  650 mg Oral Q6H PRN Ephraima Joe, PA-C      acetaminophen  650 mg Oral Q4H PRN Mandie Diaz, PA-C      acetaminophen  975 mg Oral Q6H PRN Mandie Diaz, PA-C      aluminum-magnesium hydroxide-simethicone  30 mL Oral Q4H PRN Mandie Diaz, PA-C      artificial tear  1 application Both Eyes E5A PRN Mandie Diaz PA-C      atorvastatin  40 mg Oral Daily With [place], [place]      calcium carbonate  1 tablet Oral Daily With Breakfast RANI [place]-MARQUITA      chlorproMAZINE  100 mg Oral TID [facility], PA-C      cholecalciferol  1,000 Units Oral Daily [facility], [place]      diphenhydrAMINE  50 mg Intramuscular Q6H PRN [facility] Book, PA-C      diphenhydrAMINE  50 mg Oral Q6H PRN [facility] Book, PA-C      divalproex sodium  500 mg Oral BID [facility], PA-C      docusate sodium  100 mg Oral BID [facility], PA-C      gabapentin  100 mg Oral HS [facility] Book, PA-C      haloperidol  2 mg Oral Q4H PRN Max 6/day [facility] Book, PA-C      haloperidol  5 mg Oral Q6H PRN Max 4/day [facility], PA-C      haloperidol  5 mg Oral Q4H PRN Max 4/day [facility] Book, PA-C      haloperidol lactate  2 5 mg Intramuscular Q6H PRN Max 4/day [facility], PA-C      And    LORazepam  1 mg Intramuscular Q6H PRN Max 4/day [facility], PA-C      haloperidol lactate  5 mg Intramuscular Q4H PRN Max 4/day [facility], PA-C      And    LORazepam  2 mg Intramuscular Q4H PRN Max 4/day [facility] Book, PA-C      hydrOXYzine HCL  25 mg Oral TID [facility], PA-C      insulin lispro  1-6 Units Subcutaneous TID AC Neoma Lyme Cookie, PA-C      insulin lispro  1-6 Units Subcutaneous HS Neoma Lyme Cookie, PA-C      levothyroxine  25 mcg Oral Early Morning [facility], PA-C      lisinopril  2 5 mg Oral Daily [facility], PA-C      loratadine  10 mg Oral Daily [place], [place]      LORazepam  0 5 mg Oral TID [facility], PA-C      metFORMIN  1,000 mg Oral BID With Meals [facility], PA-C      polyethylene glycol  17 g Oral Daily PRN [facility], PA-C      propranolol  20 mg Oral Q12H [address] [place], [place]      senna-docusate sodium  1 tablet Oral Daily PRN [facility], PA-C      sitaGLIPtin  50 mg Oral Daily [place], [place]      traZODone  50 mg Oral HS PRN [facility], PA-C      traZODone  50 mg Oral HS [facility], PA-C      vitamin E (tocopherol)  400 Units Oral Daily Julian Vila PA-C         Risks, benefits and possible side effects of Medications:   Risks, benefits, and possible side effects of medications explained to patient and patient verbalizes understanding  This note has been constructed using a voice recognition system  Occasional wrong word or "sound a like" substitutions may have occurred due to the inherent limitations of voice recognition software  There may be translation, syntax,  or grammatical errors  If you have any questions, please contact the dictating provider      Lazarus Catchings, MD  04/10/21

## 2021-04-10 NOTE — PROGRESS NOTES
Pt is in his room lying in his bed at the time of interview  Pt is pleasant and cooperative with fair eye contact  Pt reports to this writer "I keep dropping my food, I wanna be stable first"  Pt denies SI/HI/AVH and denies having any further concerns or questions

## 2021-04-10 NOTE — PLAN OF CARE
Problem: SELF HARM/SUICIDALITY  Goal: Will have no self-injury during hospital stay  Description: INTERVENTIONS:  - Q 15 MINUTES: Routine safety checks  - Q WAKING SHIFT & PRN: Assess risk to determine if routine checks are adequate to maintain patient safety  - Encourage patient to participate actively in care by formulating a plan to combat response to suicidal ideation, identify supports and resources  Outcome: Progressing     Problem: PSYCHOSIS  Goal: Will report no hallucinations or delusions  Description: Interventions:  - Administer medication as  ordered  - Every waking shifts and PRN assess for the presence of hallucinations and or delusions  - Assist with reality testing to support increasing orientation  - Assess if patient's hallucinations or delusions are encouraging self-harm or harm to others and intervene as appropriate  Outcome: Progressing     Problem: ANXIETY  Goal: Will report anxiety at manageable levels  Description: INTERVENTIONS:  - Administer medication as ordered  - Teach and encourage coping skills  - Provide emotional support  - Assess patient/family for anxiety and ability to cope  Outcome: Progressing  Goal: By discharge: Patient will verbalize 2 strategies to deal with anxiety  Description: Interventions:  - Identify any obvious source/trigger to anxiety  - Staff will assist patient in applying identified coping technique/skills  - Encourage attendance of scheduled groups and activities  Outcome: Progressing     Problem: Ineffective Coping  Goal: Participates in unit activities  Description: Interventions:  - Provide therapeutic environment   - Provide required programming   - Redirect inappropriate behaviors   Outcome: Progressing     Problem: DISCHARGE PLANNING  Goal: Discharge to home or other facility with appropriate resources  Description: INTERVENTIONS:  - Identify barriers to discharge w/patient and caregiver  - Arrange for needed discharge resources and transportation as appropriate  - Identify discharge learning needs (meds, wound care, etc )  - Arrange for interpretive services to assist at discharge as needed  - Refer to Case Management Department for coordinating discharge planning if the patient needs post-hospital services based on physician/advanced practitioner order or complex needs related to functional status, cognitive ability, or social support system  Outcome: Progressing

## 2021-04-10 NOTE — PROGRESS NOTES
Mostly isolative to room, OOB for meals/meds and briefly walks halls  Flat affect, brightens with approach  Overall pleasant during interaction  Reports good sleep last night  Denies SI/HI/AVH, is not RIS and does not appear preoccupied  Reports "impulsive" thoughts and that he dropped his lunch tray (dropped dinner tray x2 last evening)  Pt offered radio and/or PRN Haldol-5mg (Pt states a "seizure" allergy to Haldol)  MD notified, Zyprexa ordered and Pt agreeable  Denies further concerns

## 2021-04-11 LAB
GLUCOSE SERPL-MCNC: 121 MG/DL (ref 65–140)
GLUCOSE SERPL-MCNC: 121 MG/DL (ref 65–140)
GLUCOSE SERPL-MCNC: 132 MG/DL (ref 65–140)
GLUCOSE SERPL-MCNC: 165 MG/DL (ref 65–140)

## 2021-04-11 PROCEDURE — 82948 REAGENT STRIP/BLOOD GLUCOSE: CPT

## 2021-04-11 PROCEDURE — 99232 SBSQ HOSP IP/OBS MODERATE 35: CPT | Performed by: PSYCHIATRY & NEUROLOGY

## 2021-04-11 RX ADMIN — CHLORPROMAZINE HYDROCHLORIDE 100 MG: 100 TABLET, SUGAR COATED ORAL at 21:11

## 2021-04-11 RX ADMIN — DIVALPROEX SODIUM 500 MG: 500 TABLET, EXTENDED RELEASE ORAL at 10:12

## 2021-04-11 RX ADMIN — HYDROXYZINE HYDROCHLORIDE 25 MG: 25 TABLET, FILM COATED ORAL at 21:11

## 2021-04-11 RX ADMIN — LEVOTHYROXINE SODIUM 25 MCG: 25 TABLET ORAL at 06:15

## 2021-04-11 RX ADMIN — Medication 1000 UNITS: at 10:12

## 2021-04-11 RX ADMIN — TRAZODONE HYDROCHLORIDE 50 MG: 50 TABLET ORAL at 00:48

## 2021-04-11 RX ADMIN — CHLORPROMAZINE HYDROCHLORIDE 100 MG: 100 TABLET, SUGAR COATED ORAL at 10:12

## 2021-04-11 RX ADMIN — CALCIUM 1 TABLET: 500 TABLET ORAL at 10:11

## 2021-04-11 RX ADMIN — LORAZEPAM 0.5 MG: 0.5 TABLET ORAL at 16:17

## 2021-04-11 RX ADMIN — LORAZEPAM 0.5 MG: 0.5 TABLET ORAL at 21:11

## 2021-04-11 RX ADMIN — INSULIN LISPRO 1 UNITS: 100 INJECTION, SOLUTION INTRAVENOUS; SUBCUTANEOUS at 12:09

## 2021-04-11 RX ADMIN — SITAGLIPTIN 50 MG: 50 TABLET, FILM COATED ORAL at 10:11

## 2021-04-11 RX ADMIN — HYDROXYZINE HYDROCHLORIDE 25 MG: 25 TABLET, FILM COATED ORAL at 16:17

## 2021-04-11 RX ADMIN — ATORVASTATIN CALCIUM 40 MG: 40 TABLET, FILM COATED ORAL at 16:17

## 2021-04-11 RX ADMIN — DIVALPROEX SODIUM 500 MG: 500 TABLET, EXTENDED RELEASE ORAL at 21:11

## 2021-04-11 RX ADMIN — METFORMIN HYDROCHLORIDE 1000 MG: 500 TABLET ORAL at 16:17

## 2021-04-11 RX ADMIN — METFORMIN HYDROCHLORIDE 1000 MG: 500 TABLET ORAL at 10:13

## 2021-04-11 RX ADMIN — LORATADINE 10 MG: 10 TABLET ORAL at 10:13

## 2021-04-11 RX ADMIN — CHLORPROMAZINE HYDROCHLORIDE 100 MG: 100 TABLET, SUGAR COATED ORAL at 16:17

## 2021-04-11 RX ADMIN — HYDROXYZINE HYDROCHLORIDE 25 MG: 25 TABLET, FILM COATED ORAL at 10:11

## 2021-04-11 RX ADMIN — DOCUSATE SODIUM 100 MG: 100 CAPSULE, LIQUID FILLED ORAL at 10:12

## 2021-04-11 RX ADMIN — PROPRANOLOL HYDROCHLORIDE 20 MG: 20 TABLET ORAL at 10:12

## 2021-04-11 RX ADMIN — Medication 400 UNITS: at 10:13

## 2021-04-11 RX ADMIN — DOCUSATE SODIUM 100 MG: 100 CAPSULE, LIQUID FILLED ORAL at 17:47

## 2021-04-11 RX ADMIN — TRAZODONE HYDROCHLORIDE 50 MG: 50 TABLET ORAL at 21:11

## 2021-04-11 RX ADMIN — LISINOPRIL 2.5 MG: 2.5 TABLET ORAL at 10:12

## 2021-04-11 RX ADMIN — GABAPENTIN 100 MG: 100 CAPSULE ORAL at 21:11

## 2021-04-11 RX ADMIN — LORAZEPAM 0.5 MG: 0.5 TABLET ORAL at 10:11

## 2021-04-11 RX ADMIN — ACETAMINOPHEN 975 MG: 325 TABLET, FILM COATED ORAL at 21:12

## 2021-04-11 NOTE — TREATMENT TEAM
AM rounds- pleasant and cooperative, denies SI/HI, continues to report impulsive thoughts to drop his tray  Received prn medications  Slept well

## 2021-04-11 NOTE — PROGRESS NOTES
Pt remains pleasant in conversation, denies SI/HI, anxiety and depression  Pt states feeling like he may be ready for discharge this week and would like to discuss this with the doctor  Pt attended groups throughout the day

## 2021-04-11 NOTE — PROGRESS NOTES
Pt is in his room lying in his bed at the time of interview   Pt is pleasant and cooperative with fair eye contact   Pt reports to this writer "I Donzell Ridges go back home  I'm supposed to get my shot tomorrow"  Pt denies SI/HI/AVH and denies having any further concerns or questions

## 2021-04-11 NOTE — PROGRESS NOTES
Progress Note - Behavioral Health   Migdalia Perry 45 y o  male MRN: [id number]  Unit/Bed#: U 252-01 Encounter: @[facility]        Assessment/Plan   Principal Problem:    Schizoaffective disorder, bipolar type ([facility] [address] )  Active Problems:    Mild intellectual disability    Obsessive-compulsive disorder, unspecified    Type 2 diabetes mellitus with hyperglycemia ([facility] )    Hyperlipidemia    Hypothyroidism    Medical clearance for psychiatric admission      Subjective: The patient was seen today for continuing care and reviewed with treatment team     Cleveland Somers today reports that he is less anxious  Thinking about possible discharge after he gets his shot  Terms mood is better today  He attended some groups and activities today  He is feeling tired now  As per staff he has been more social and visible in the unit compared to before  Slept through the night  Has occasional impulsive thoughts  Yesterday evening he took Zyprexa p r n and tolerated it well  Depakote level 44 yesterday  Denies any active SI or HI  Denies any perceptual disturbances  He has been compliant with his medication  No management issues reported by the staff overnight  On 04/10/2021 patient reported that he is looking forward to get his Invega shot and go back to the group home next week  He denies having any perceptual disturbances,  and does not appear to be responding to internal stimuli  He reports that he does not know why acted that way in the group home  He is mostly secluded in the room does not participate in groups and activities  He slept through the night  Patient has been compliant with medication and tolerating well  Patient denies any SI or HI  Patient is able to contract  for safety, verbally at this time  No management issues reported by staff overnight      Current Medications:  Current Facility-Administered Medications   Medication Dose Route Frequency Provider Last Rate    acetaminophen  650 mg Oral Q6H PRN Roshni Cinnamon Aleta [place], PA-C      acetaminophen  650 mg Oral Q4H PRN [facility], PA-C      acetaminophen  975 mg Oral Q6H PRN [facility], PA-C      aluminum-magnesium hydroxide-simethicone  30 mL Oral Q4H PRN [facility], PA-C      artificial tear  1 application Both Eyes A3F PRN [facility], PA-C      atorvastatin  40 mg Oral Daily With [place], [place]      calcium carbonate  1 tablet Oral Daily With Breakfast [facility], PA-C      chlorproMAZINE  100 mg Oral TID [facility], PA-C      cholecalciferol  1,000 Units Oral Daily [facility], [place]      diphenhydrAMINE  50 mg Intramuscular Q6H PRN [facility], PA-C      diphenhydrAMINE  50 mg Oral Q6H PRN [facility], PA-C      divalproex sodium  500 mg Oral BID [facility], PA-C      docusate sodium  100 mg Oral BID [facility], PA-C      gabapentin  100 mg Oral HS [facility], PA-C      haloperidol lactate  2 5 mg Intramuscular Q6H PRN Max 4/day [facility], PA-C      And    LORazepam  1 mg Intramuscular Q6H PRN Max 4/day [facility], PA-C      haloperidol lactate  5 mg Intramuscular Q4H PRN Max 4/day [facility], PA-C      And    LORazepam  2 mg Intramuscular Q4H PRN Max 4/day [facility], PA-C      hydrOXYzine HCL  25 mg Oral TID [facility], PA-C      insulin lispro  1-6 Units Subcutaneous TID AC Shivani Blue Cookie, PA-C      insulin lispro  1-6 Units Subcutaneous HS Shivani Blue Cookie, PA-C      levothyroxine  25 mcg Oral Early Morning [facility], PA-C      lisinopril  2 5 mg Oral Daily [facility], [place]      loratadine  10 mg Oral Daily [facility], [place]      LORazepam  0 5 mg Oral TID [facility], PA-C      metFORMIN  1,000 mg Oral BID With Meals [facility], PA-C      OLANZapine  5 mg Intramuscular Q8H PRN Sonja Ellison MD      OLANZapine  2 5 mg Oral Q8H PRN Sonja Ellison MD      polyethylene glycol  17 g Oral Daily PRN Shelby Craig PA-C      propranolol  20 mg Oral Q12H [facility] Shelby Craig [place]      senna-docusate sodium  1 tablet Oral Daily PRN Shelby Craig PA-C      sitaGLIPtin  50 mg Oral Daily Nettie Brown      traZODone  50 mg Oral HS PRN Shelby Craig PA-C      traZODone  50 mg Oral HS Shelby Craig [place]      vitamin E (tocopherol)  400 Units Oral Daily Shelby Craig PA-C         Behavioral Health Medications: all current active meds have been reviewed and continue current psychiatric medications  Vital signs in last 24 hours:  Temp:  [97 6 °F (36 4 °C)-98 3 °F (36 8 °C)] 98 3 °F (36 8 °C)  HR:  [] 80  Resp:  [16-17] 16  BP: (106-120)/(67-74) 120/72    Laboratory results:  I have personally reviewed all pertinent laboratory/tests results  Psychiatric Review of Systems:  Behavior over the last 24 hours:  improved  Sleep: normal  Appetite: normal  Medication side effects: No  ROS: no complaints  Mental Status Evaluation:    Appearance:  Age appropriate, baldheaded, bearded, laying in bed, good eye contact   Behavior:  cooperative   Speech:  normal pitch and normal volume   Mood:  Good today   Affect:  constricted   Thought Process:  concrete   Thought Content:   no delusions elicited   Perceptual Disturbances: None   Risk Potential: Suicidal Ideations none, Homicidal Ideations none and Potential for Aggression Yes due to poor impulse control   Sensorium:  person, place and time/date   Consciousness:  alert and awake    Insight:  limited    Judgment: limited   Gait/Station: normal gait/station   Motor Activity: no abnormal movements     Progress Toward Goals:  progressing    Recommended Treatment: 1  Continue with group therapy, milieu therapy and occupational therapy     2 Continue following current medications:   Current Facility-Administered Medications   Medication Dose Route Frequency Provider Last Rate    acetaminophen  650 mg Oral Q6H PRN Sanderannabelle Blair, NEHA      acetaminophen  650 mg Oral Q4H PRN Sander Braver, PA-MARQUITA      acetaminophen  975 mg Oral Q6H PRN Sander Braver, PA-MARQUITA      aluminum-magnesium hydroxide-simethicone  30 mL Oral Q4H PRN Dale Braver, PA-MARQUITA      artificial tear  1 application Both Eyes Q6Q PRN Sander Brajose, NEHA      atorvastatin  40 mg Oral Daily With [place], [place]      calcium carbonate  1 tablet Oral Daily With Breakfast Sander Blair PA-C      chlorproMAZINE  100 mg Oral TID [facility], [facility]ITA      cholecalciferol  1,000 Units Oral Daily [place], [place]      diphenhydrAMINE  50 mg Intramuscular Q6H PRN Sander Braver, NEHA      diphenhydrAMINE  50 mg Oral Q6H PRN Sander Brajose, NEHA      divalproex sodium  500 mg Oral BID [facility], NEHA      docusate sodium  100 mg Oral BID [facility], NEHA      gabapentin  100 mg Oral HS Sander NEHA Blair      haloperidol lactate  2 5 mg Intramuscular Q6H PRN Max 4/day Sander BraNEHA garcia      And    LORazepam  1 mg Intramuscular Q6H PRN Max 4/day Sander BraNEHA garcia      haloperidol lactate  5 mg Intramuscular Q4H PRN Max 4/day Sander BraNEHA garcia      And    LORazepam  2 mg Intramuscular Q4H PRN Max 4/day Sander BraNEHA garcia      hydrOXYzine HCL  25 mg Oral TID Sander BraNEHA garcia      insulin lispro  1-6 Units Subcutaneous TID AC Monae Netter Cookie, PA-MARQUITA      insulin lispro  1-6 Units Subcutaneous HS Monae Netter Cookie, PA-C      levothyroxine  25 mcg Oral Early Morning Dale BraverNEHA      lisinopril  2 5 mg Oral Daily [place], [place]      loratadine  10 mg Oral Daily [place], [place]      LORazepam  0 5 mg Oral TID [facility], NEHA      metFORMIN  1,000 mg Oral BID With Meals Sanderannabelle Blair PA-C      OLANZapine  5 mg Intramuscular Q8H PRN Genie Weinstein MD      OLANZapine  2 5 mg Oral Q8H PRN Genie Weinstein MD      polyethylene glycol  17 g Oral Daily PRN Nara Coombs PA-C      propranolol  20 mg Oral Q12H [address] [place], [place]      senna-docusate sodium  1 tablet Oral Daily PRN Nara Coombs PA-C      sitaGLIPtin  50 mg Oral Daily [place], [place]      traZODone  50 mg Oral HS PRN Nara SeamanNEHA      traZODone  50 mg Oral HS [place], [place]      vitamin E (tocopherol)  400 Units Oral Daily Nara Coombs PA-C         Risks, benefits and possible side effects of Medications:   Risks, benefits, and possible side effects of medications explained to patient and patient verbalizes understanding  This note has been constructed using a voice recognition system  Occasional wrong word or "sound a like" substitutions may have occurred due to the inherent limitations of voice recognition software  There may be translation, syntax,  or grammatical errors  If you have any questions, please contact the dictating provider      Belen Henriquez MD  04/11/21

## 2021-04-11 NOTE — PROGRESS NOTES
Was given Trazodone 50 mg po prn/sleep at 0050  Good effect obtained, as observed asleep in bed & remained asleep until 0400, when up briefly for a drink  Napping thereafter

## 2021-04-12 LAB
GLUCOSE SERPL-MCNC: 101 MG/DL (ref 65–140)
GLUCOSE SERPL-MCNC: 105 MG/DL (ref 65–140)
GLUCOSE SERPL-MCNC: 120 MG/DL (ref 65–140)
GLUCOSE SERPL-MCNC: 125 MG/DL (ref 65–140)

## 2021-04-12 PROCEDURE — 99232 SBSQ HOSP IP/OBS MODERATE 35: CPT | Performed by: PHYSICIAN ASSISTANT

## 2021-04-12 PROCEDURE — 82948 REAGENT STRIP/BLOOD GLUCOSE: CPT

## 2021-04-12 RX ORDER — TRIHEXYPHENIDYL HYDROCHLORIDE 2 MG/1
2 TABLET ORAL
Status: DISCONTINUED | OUTPATIENT
Start: 2021-04-12 | End: 2021-04-13 | Stop reason: HOSPADM

## 2021-04-12 RX ORDER — DOCUSATE SODIUM 100 MG/1
100 CAPSULE, LIQUID FILLED ORAL DAILY
Status: DISCONTINUED | OUTPATIENT
Start: 2021-04-13 | End: 2021-04-13 | Stop reason: HOSPADM

## 2021-04-12 RX ADMIN — TRIHEXYPHENIDYL HYDROCHLORIDE 2 MG: 2 TABLET ORAL at 13:14

## 2021-04-12 RX ADMIN — ATORVASTATIN CALCIUM 40 MG: 40 TABLET, FILM COATED ORAL at 16:49

## 2021-04-12 RX ADMIN — GABAPENTIN 100 MG: 100 CAPSULE ORAL at 21:36

## 2021-04-12 RX ADMIN — DIVALPROEX SODIUM 500 MG: 500 TABLET, EXTENDED RELEASE ORAL at 21:36

## 2021-04-12 RX ADMIN — LORAZEPAM 0.5 MG: 0.5 TABLET ORAL at 21:36

## 2021-04-12 RX ADMIN — DOCUSATE SODIUM 100 MG: 100 CAPSULE, LIQUID FILLED ORAL at 09:32

## 2021-04-12 RX ADMIN — Medication 400 UNITS: at 09:32

## 2021-04-12 RX ADMIN — TRAZODONE HYDROCHLORIDE 50 MG: 50 TABLET ORAL at 23:57

## 2021-04-12 RX ADMIN — CHLORPROMAZINE HYDROCHLORIDE 100 MG: 100 TABLET, SUGAR COATED ORAL at 09:33

## 2021-04-12 RX ADMIN — CALCIUM 1 TABLET: 500 TABLET ORAL at 09:32

## 2021-04-12 RX ADMIN — HYDROXYZINE HYDROCHLORIDE 25 MG: 25 TABLET, FILM COATED ORAL at 21:36

## 2021-04-12 RX ADMIN — LISINOPRIL 2.5 MG: 2.5 TABLET ORAL at 09:32

## 2021-04-12 RX ADMIN — LORATADINE 10 MG: 10 TABLET ORAL at 09:33

## 2021-04-12 RX ADMIN — TRAZODONE HYDROCHLORIDE 50 MG: 50 TABLET ORAL at 21:36

## 2021-04-12 RX ADMIN — LORAZEPAM 0.5 MG: 0.5 TABLET ORAL at 16:49

## 2021-04-12 RX ADMIN — METFORMIN HYDROCHLORIDE 1000 MG: 500 TABLET ORAL at 16:49

## 2021-04-12 RX ADMIN — SITAGLIPTIN 50 MG: 50 TABLET, FILM COATED ORAL at 09:33

## 2021-04-12 RX ADMIN — PROPRANOLOL HYDROCHLORIDE 20 MG: 20 TABLET ORAL at 09:32

## 2021-04-12 RX ADMIN — LEVOTHYROXINE SODIUM 25 MCG: 25 TABLET ORAL at 06:00

## 2021-04-12 RX ADMIN — LORAZEPAM 0.5 MG: 0.5 TABLET ORAL at 09:32

## 2021-04-12 RX ADMIN — CHLORPROMAZINE HYDROCHLORIDE 100 MG: 100 TABLET, SUGAR COATED ORAL at 16:49

## 2021-04-12 RX ADMIN — HYDROXYZINE HYDROCHLORIDE 25 MG: 25 TABLET, FILM COATED ORAL at 09:32

## 2021-04-12 RX ADMIN — DIVALPROEX SODIUM 500 MG: 500 TABLET, EXTENDED RELEASE ORAL at 09:32

## 2021-04-12 RX ADMIN — TRIHEXYPHENIDYL HYDROCHLORIDE 2 MG: 2 TABLET ORAL at 16:49

## 2021-04-12 RX ADMIN — METFORMIN HYDROCHLORIDE 1000 MG: 500 TABLET ORAL at 09:32

## 2021-04-12 RX ADMIN — CHLORPROMAZINE HYDROCHLORIDE 100 MG: 100 TABLET, SUGAR COATED ORAL at 21:36

## 2021-04-12 RX ADMIN — Medication 1000 UNITS: at 09:33

## 2021-04-12 RX ADMIN — HYDROXYZINE HYDROCHLORIDE 25 MG: 25 TABLET, FILM COATED ORAL at 16:49

## 2021-04-12 RX ADMIN — PROPRANOLOL HYDROCHLORIDE 20 MG: 20 TABLET ORAL at 21:36

## 2021-04-12 NOTE — PROGRESS NOTES
Progress Note - Behavioral Health   Shanel Srinivasan 45 y o  male MRN: [id number]  Unit/Bed#: [facility] 252-01 Encounter: [id number]    Assessment/Plan   Principal Problem:    Schizoaffective disorder, bipolar type ([facility] )  Active Problems:    Mild intellectual disability    Obsessive-compulsive disorder, unspecified    Type 2 diabetes mellitus with hyperglycemia ([facility] )    Hyperlipidemia    Hypothyroidism    Medical clearance for psychiatric admission      Subjective: Patient was seen, chart reviewed and case discussed with team   Patient focused on discharge  Is remorseful for the behavior that he had prior to coming to the hospital   Adamantly denying suicidal ideation  No reports of agitation  Denied psychosis and delusional material   Not currently manic  States depression and anxiety are under control at this time  Compliant with medications  Appears to be tolerating medications well without serious side effects  Is due for [place] IM injection today and will be required to be brought in by his outpatient provider      Psychiatric Review of Systems:  Behavior over the last 24 hours:  improved  Sleep: normal  Appetite: normal  Medication side effects: No  ROS: no complaints, all others negative    Current Medications:  Current Facility-Administered Medications   Medication Dose Route Frequency    acetaminophen (TYLENOL) tablet 650 mg  650 mg Oral Q6H PRN    acetaminophen (TYLENOL) tablet 650 mg  650 mg Oral Q4H PRN    acetaminophen (TYLENOL) tablet 975 mg  975 mg Oral Q6H PRN    aluminum-magnesium hydroxide-simethicone (MYLANTA) oral suspension 30 mL  30 mL Oral Q4H PRN    artificial tear (LUBRIFRESH P M ) ophthalmic ointment 1 application  1 application Both Eyes J3L PRN    atorvastatin (LIPITOR) tablet 40 mg  40 mg Oral Daily With Dinner    calcium carbonate (OYSTER SHELL,OSCAL) 500 mg tablet 1 tablet  1 tablet Oral Daily With Breakfast    chlorproMAZINE (THORAZINE) tablet 100 mg  100 mg Oral TID  cholecalciferol (VITAMIN D3) tablet 1,000 Units  1,000 Units Oral Daily    diphenhydrAMINE (BENADRYL) injection 50 mg  50 mg Intramuscular Q6H PRN    diphenhydrAMINE (BENADRYL) tablet 50 mg  50 mg Oral Q6H PRN    divalproex sodium (DEPAKOTE ER) 24 hr tablet 500 mg  500 mg Oral BID    docusate sodium (COLACE) capsule 100 mg  100 mg Oral BID    gabapentin (NEURONTIN) capsule 100 mg  100 mg Oral HS    haloperidol lactate (HALDOL) injection 2 5 mg  2 5 mg Intramuscular Q6H PRN Max 4/day    And    LORazepam (ATIVAN) injection 1 mg  1 mg Intramuscular Q6H PRN Max 4/day    haloperidol lactate (HALDOL) injection 5 mg  5 mg Intramuscular Q4H PRN Max 4/day    And    LORazepam (ATIVAN) injection 2 mg  2 mg Intramuscular Q4H PRN Max 4/day    hydrOXYzine HCL (ATARAX) tablet 25 mg  25 mg Oral TID    insulin lispro (HumaLOG) 100 units/mL subcutaneous injection 1-6 Units  1-6 Units Subcutaneous TID AC    insulin lispro (HumaLOG) 100 units/mL subcutaneous injection 1-6 Units  1-6 Units Subcutaneous HS    levothyroxine tablet 25 mcg  25 mcg Oral Early Morning    lisinopril (ZESTRIL) tablet 2 5 mg  2 5 mg Oral Daily    loratadine (CLARITIN) tablet 10 mg  10 mg Oral Daily    LORazepam (ATIVAN) tablet 0 5 mg  0 5 mg Oral TID    metFORMIN (GLUCOPHAGE) tablet 1,000 mg  1,000 mg Oral BID With Meals    OLANZapine (ZyPREXA) IM injection 5 mg  5 mg Intramuscular Q8H PRN    OLANZapine (ZyPREXA) tablet 2 5 mg  2 5 mg Oral Q8H PRN    polyethylene glycol (MIRALAX) packet 17 g  17 g Oral Daily PRN    propranolol (INDERAL) tablet 20 mg  20 mg Oral Q12H [facility]    senna-docusate sodium (SENOKOT S) 8 6-50 mg per tablet 1 tablet  1 tablet Oral Daily PRN    sitaGLIPtin (JANUVIA) tablet 50 mg  50 mg Oral Daily    traZODone (DESYREL) tablet 50 mg  50 mg Oral HS PRN    traZODone (DESYREL) tablet 50 mg  50 mg Oral HS    vitamin E (tocopherol) capsule 400 Units  400 Units Oral Daily       Behavioral Health Medications: all current active meds have been reviewed and continue current psychiatric medications  Vitals:  Vitals:    04/12/21 0742   BP: 123/81   Pulse: 89   Resp: 18   Temp: 97 9 °F (36 6 °C)   SpO2:        Laboratory results:    I have personally reviewed all pertinent laboratory/tests results  Most Recent Labs:   Lab Results   Component Value Date    WBC 9 45 04/07/2021    RBC 4 68 04/07/2021    HGB 14 2 04/07/2021    HCT 42 2 04/07/2021     04/07/2021    RDW 11 4 (L) 04/07/2021    NEUTROABS 4 72 04/07/2021    SODIUM 144 04/07/2021    K 4 5 04/07/2021     04/07/2021    CO2 28 04/07/2021    BUN 13 04/07/2021    CREATININE 1 21 04/07/2021    GLUC 128 04/07/2021    GLUF 141 (H) 01/25/2021    CALCIUM 8 1 (L) 04/07/2021    AST 16 04/07/2021    ALT 31 04/07/2021    ALKPHOS 64 04/07/2021    TP 5 6 (L) 04/07/2021    ALB 2 7 (L) 04/07/2021    TBILI 0 40 04/07/2021    CHOLESTEROL 79 09/28/2020    HDL 24 (L) 09/28/2020    TRIG 219 (H) 09/28/2020    LDLCALC 11 09/28/2020    NONHDLC 55 09/28/2020    VALPROICTOT 44 (L) 04/10/2021    AMMONIA 29 02/14/2018    AOF4YLIFSQUX 0 711 04/07/2021    FREET4 1 23 08/03/2017    RPR Non-Reactive 04/07/2021    HGBA1C 6 8 (A) 01/19/2021     06/29/2020       Mental Status Evaluation:  Appearance:  disheveled   Behavior:  guarded but more cooperative   Speech:  loud   Mood:  euthymic   Affect:  constricted   Language Appropriate   Thought Process:  concrete   Thought Content:  obsessions   Perceptual Disturbances: None   Risk Potential: Denied SI/HI    Potential for aggression: low   Sensorium:  person and place   Cognition:  recent and remote memory grossly intact   Consciousness:  awake    Attention: attention span appeared shorter than expected for age   Insight:  limited   Judgment: limited   Gait/Station: normal gait/station and normal balance   Motor Activity: no abnormal movements     Progress Toward Goals: progressing    Recommended Treatment: Continue with pharmacotherapy, group therapy, milieu therapy and occupational therapy  1   Continue current medications  2  Derald Pupa IM injection when it arrives from outpatient provider (unknown dose)  3  Disposition planning with possible DC tomorrow     Risks, benefits and possible side effects of Medications:   Risks, benefits, and possible side effects of medications explained to patient and patient verbalizes understanding

## 2021-04-12 NOTE — CASE MANAGEMENT
[facility] called group home  Palak Gabriella ([phone]) to check in a provide an update  CM informed him that team is looking at dc for Tuesday 4/13/2021  Palak Quiroz will bring pt INVEGA to [facility] [place] today so staff can administer before dc tomorrow  Palak Quiroz requested that he add on pt Nurse Celeste Romeo) and Behavior Specialist Vin Orellana) to the call to have a discharge planning meeting to discuss updates and current medications  CM informed [facility] and Denise Black of pt update that he has been doing well on the unit and no observed behavioral issues and that he is reporting he is ready to go home  CM informed Denise Black of pt medications and will fax her medication list to her fax# [phone] for her to review  She reported that they still have pt medications so if there were not any changes they will not need scripts  [facility] spoke to team about dc time and Palak Quiroz will pick pt up Tuesday 4/13/2021 around 12noon

## 2021-04-12 NOTE — CASE MANAGEMENT
CM faxed pt medication list to Nurse Jeremiah Morales at Group home (Fax# [phone])    Nurse Jeremiah Morales from pt group home reported that pt's psychiatrist Dr Amarjit Hamilton is out on medical leave and there is not a return date at this time  Jeremiah Morales reported that they will have pt PCP monitor his medications until doctor or replacement returns   (PCP: Kayce Escobedo MD  [phone])

## 2021-04-12 NOTE — PROGRESS NOTES
Pt calm and cooperative on interaction  Pt c/o minor throat irritation this morning and states "I might be getting a cold"  VSS  Reviewed comfort measures available  Pt reports otherwise feeling well and ready to go home  Denies any thoughts of harm to self or others  Denies [facility]  Napping in bed throughout the morning

## 2021-04-12 NOTE — PROGRESS NOTES
Pt pleasant and cooperative  Pt reporting that he is looking forward to dc  Pt Depakote level on 4/10/2021 was 44       PRN: Tylenol, Trazodone     DC: Tuesday 4/11/2021 (?) - Caregiver Melissa Du to drop off pt INVEGA today      04/12/21 0752   Team Meeting   Meeting Type Daily Rounds   Team Members Present   Team Members Present Physician;Nurse;;Occupational Therapist   Physician Team Member Dr Shanell Weiss / Evi Zimmerman / Lorin Schwab Team Member Malika Hand / Deneen Vargas Management Team Member Enrrique Wong / Johan Cardenas / Amanda Doll   OT Team Member Aftab Chávez / Blanche Hernandez Student   Patient/Family Present   Patient Present No   Patient's Family Present No

## 2021-04-12 NOTE — PROGRESS NOTES
Patient complained of nausea  States he ate pasta for lunch and may have indigestion but also reports anxiety due to  Excitement for discharge tomorrow  Encouraged to rest and notify staff if symptoms become worse

## 2021-04-12 NOTE — PROGRESS NOTES
Patient pleasant, denies all psych symptoms and reports "I will never try to stab myself again, that hurt "  Pt hopeful to return to his residence tomorrow morning, and tolerated Invega IM today at [address] in [facility]  Compliant with meds and meals

## 2021-04-12 NOTE — DISCHARGE INSTR - APPOINTMENTS
Deloris Morton RN, our [employer], will be calling you after your discharge, on the phone number that you provided  She will be available as an additional support, if needed  If you wish to speak with her, you may contact [facility] at [phone]

## 2021-04-12 NOTE — NURSING NOTE
Patient's mother, Froylan Mann, called for an update  This writer spoke with Froylan Mann giving her update of patient being cooperative and pleasant with patient stating "I Rowan Clayton go back home "  Froylan Mann states "I guess his vacation is over and he is bored "  She reports "I'm not trying to brag but he has a $500 pool table and a $500 bicycle and a basketball court  I don't want him to be taking up a bed that someone else really needs  He doesn't need to be there "  Froylan Mann states she lives in [place] and she now has been fully vaccinated  She reports she plans to visit him soon  Froylan Mann had no other questions or concerns

## 2021-04-12 NOTE — CASE MANAGEMENT
CM met with pt to check in  He was in his room resting  CM spoke to pt about planned dc for tomorrow, 4/13/2021 and that his  Charley Patten will pick him up around 12 noon  Pt verbalized understanding and agreement with this plan  Pt reported that he enjoys drawing, taking car rides and hanging out at his house  Pt continued to report "I wasn't mad at anyone in the house, I was hearing voices, well not voices but thoughts and I wanted to commit suicide  I don't know if they believe me or not but I told Charley Patten and he was going to call Dr Garry Reilly but I got too bad and needed to come to the hospital " [facility] encouraged pt to talk with house staff when he is having negative thoughts and they can talk to him and help him

## 2021-04-12 NOTE — DISCHARGE INSTR - OTHER ORDERS
You will discharge today to your home at [address] [place] [address], [address] [facility]         Alley Maldonado is a toll-free telephone number for people in [facility] who are seeking a listening ear for additional support in their recovery from mental illness  The PEER LINE is peer-run and peer-friendly  You can call the Peer Line 24 hours a day  Phone: 3-510-YS-PEERS /(0-505.878.2177)   Emergency [address] [facility] Emergency Services: [phone]  [address]  [address] [facility], [address] [place] [address]     Text CONNECT to 681958 from anywhere in the [place], anytime, about any type of crisis  A live, trained Crisis Counselor receives the text and lets you know that they are here to listen  The volunteer Crisis Counselor will help you move from a hot moment to a cool moment  Warm Line: [phone], ([phone] [phone], [phone]  If it is not quite a crisis, but you want to talk to someone, 24 hours/day, 7 days/week:  Someone to listen; someone who cares  The [facility] on Mental Illness ([facility]) offers various education & support groups for you & your family  For more information visit their website at   http://Yogome/     Dial 2-1-1 to get connected/get help  Free, confidential information & referral available 24/7: Aging Services, Child & Youth Services, Counseling, Education/Training, Food/Shelter/Clothing, Health Services, Parenting, Substance Abuse, Support Groups, Volunteer Opportunities, & much more  Phone: 2-1-1 or 627-485-5664, Web: GBU NL398QFHY DEV, Email: Ronnie@yahoo com                What you need to know about coronavirus disease 2019 (COVID-19)     What is coronavirus disease 2019 (COVID-19)? Coronavirus disease 2019 (COVID-19) is a respiratory illness that can spread from person to person  The virus that causes COVID-19 is a novel coronavirus that was first identified during an investigation into an outbreak in [place], [place]    Can people in the [address]  get COVID-19? Yes  COVID-19 is spreading from person to person in parts of the [place]  Risk of infection with COVID-19 is higher for people who are close contacts of someone known to have COVID-19, for example healthcare workers, or household members  Other people at higher risk for infection are those who live in or have recently been in an area with ongoing spread of COVID-19  Learn more about places with ongoing spread at   [facility]  html#geographic  Have there been cases of COVID-19 in the [place] ?   Yes  The first case of COVID-19 in the [place] was reported on January 21, 2020  The current count of cases of COVID-19 in the [place] is available on [employer] at [facility]  How does COVID-19 spread? The virus that causes COVID-19 probably emerged from an animal source, but is now spreading from person to person  The virus is thought to spread mainly between people who are in close contact with one another (within about 6 feet) through respiratory droplets produced when an infected person coughs or sneezes  It also may be possible that a person can get COVID-19 by touching a surface or object that has the virus on it and then touching their own mouth, nose, or possibly their eyes, but this is not thought to be the main way the virus spreads  Learn what is known about the spread of newly emerged coronaviruses at [facility]  What are the symptoms of COVID-19? Patients with COVID-19 have had mild to severe respiratory illness with symptoms of   fever   cough   shortness of breath  What are severe complications from this virus? Some patients have pneumonia in both lungs, multi-organ failure and in some cases death  How can I help protect myself?    People can help protect themselves from respiratory illness with everyday preventive actions  Avoid close contact with people who are sick  Avoid touching your eyes, nose, and mouth with unwashed hands  Wash your hands often with soap and water for at least 20 seconds  Use an alcohol-based hand  that contains at least 60% alcohol if soap and water are not available  If you are sick, to keep from spreading respiratory illness to others, you should   Stay home when you are sick  Cover your cough or sneeze with a tissue, then throw the tissue in the trash  Clean and disinfect frequently touched objects and surfaces  What should I do if I recently traveled from an area with ongoing spread of COVID-19? If you have traveled from an affected area, there may be restrictions on your movements for up to 2 weeks  If you develop symptoms during that period (fever, cough, trouble breathing), seek medical advice  Call the office of your health care provider before you go, and tell them about your travel and your symptoms  They will give you instructions on how to get care without exposing other people to your illness  While sick, avoid contact with people, don't go out and delay any travel to reduce the possibility of spreading illness to others  Is there a vaccine? There is currently no vaccine to protect against COVID-19  The best way to prevent infection is to take everyday preventive actions, like avoiding close contact with people who are sick and washing your hands often  Is there a treatment? There is no specific antiviral treatment for COVID-19  People with COVID-19 can seek medical care to help relieve symptoms  For more information: www cdc gov/XALFX03VP 529837-E 03/03/2020       What to do if you are sick withcoronavirus disease 2019 (COVID-19)     If you are sick with COVID-19 or suspect you are infected with the virus that causes COVID-19, follow the steps below to help prevent the disease from spreading to people in your home and community     Stay home except to get medical care   You should restrict activities outside your home, except for getting medical care  Do not go to work, school, or public areas  Avoid using public transportation, ride-sharing, or taxis  Separate yourself from other people and animals in your home  People: As much as possible, you should stay in a specific room and away from other people in your home  Also, you should use a separate bathroom, if available  Animals: Do not handle pets or other animals while sick  See COVID-19 and Animals for more information  Call ahead before visiting your doctor   If you have a medical appointment, call the healthcare provider and tell them that you have or may have COVID-19  This will help the healthcare provider's office take steps to keep other people from getting infected or exposed  Wear a facemask  You should wear a facemask when you are around other people (e g , sharing a room or vehicle) or pets and before you enter a healthcare provider's office  If you are not able to wear a facemask (for example, because it causes trouble breathing), then people who live with you should not stay in the same room with you, or they should wear a facemask if they enteryour room  Cover your coughs and sneezes   Cover your mouth and nose with a tissue when you cough or sneeze  Throw used tissues in a lined trash can; immediately wash your hands with soap and water for at least 20 seconds or clean your hands with an alcohol-based hand  that contains at least 60 to 95% alcohol, covering all surfaces of your hands and rubbing them together until they feel dry  Soap and water should be used preferentially if hands are visibly dirty  Avoid sharing personal household items   You should not share dishes, drinking glasses, cups, eating utensils, towels, or bedding with other people or pets in your home  After using these items, they should be washed thoroughly with soap and water     Clean your hands often  Wash your hands often with soap and water for at least 20 seconds  If soap and water are not available, clean your hands with an alcohol-based hand  that contains at least 60% alcohol, covering all surfaces of your hands and rubbing them together until they feel dry  Soap and water should be used preferentially if hands are visibly dirty  Avoid touching your eyes, nose, and mouth with unwashed hands  Clean all "high-touch" surfaces every day  High touch surfaces include counters, tabletops, doorknobs, bathroom fixtures, toilets, phones, keyboards, tablets, and bedside tables  Also, clean any surfaces that may have blood, stool, or body fluids on them  Use a household cleaning spray or wipe, according to the label instructions  Labels contain instructions for safe and effective use of the cleaning product including precautions you should take when applying the product, such as wearing gloves and making sure you have good ventilation during use of the product  Monitor your symptoms  Seek prompt medical attention if your illness is worsening (e g , difficulty breathing)  Before seeking care, call your healthcare provider and tell them that you have, or are being evaluated for, COVID-19  Put on a facemask before you enter the facility  These steps will help the healthcare provider's office to keep other people in the office or waiting room from getting infected or exposed  Ask your healthcare provider to call the local or state health department  Persons who are placed under active monitoring or facilitated self-monitoring should follow instructions provided by their local health department or occupational health professionals, as appropriate  If you have a medical emergency and need to call 911, notify the dispatch personnel that you have, or are being evaluated for COVID-19  If possible, put on a facemask before emergency medical services arrive    Discontinuing home isolation  Patients with confirmed COVID-19 should remain under home isolation precautions until the risk of secondary transmission to others is thought to be low  The decision to discontinue home isolation precautions should be made on a case-by-case basis, in consultation with healthcare providers and state and local health departments  For more information: www cdc gov/CSMUJ13HZ 421611-I 02/24/2020       Stay home when you are sick, except to get medical care  Wash your hands often with soap and water for at least 20 seconds  Cover your cough or sneeze with a tissue, then throw the tissue in the trash  Clean and disinfect frequently touched objects and surfaces  Avoid touching your eyes, nose, and mouth  STOP THE SPREAD OF GERMS  For more information: www cdc gov/COVID19 Avoid close contact with people who are sick  Help prevent the spread of respiratory diseases like COVID-19

## 2021-04-13 VITALS
SYSTOLIC BLOOD PRESSURE: 116 MMHG | RESPIRATION RATE: 18 BRPM | HEART RATE: 96 BPM | BODY MASS INDEX: 28.41 KG/M2 | HEIGHT: 69 IN | TEMPERATURE: 97.9 F | WEIGHT: 191.8 LBS | OXYGEN SATURATION: 98 % | DIASTOLIC BLOOD PRESSURE: 68 MMHG

## 2021-04-13 LAB
GLUCOSE SERPL-MCNC: 108 MG/DL (ref 65–140)
GLUCOSE SERPL-MCNC: 99 MG/DL (ref 65–140)

## 2021-04-13 PROCEDURE — 99239 HOSP IP/OBS DSCHRG MGMT >30: CPT | Performed by: STUDENT IN AN ORGANIZED HEALTH CARE EDUCATION/TRAINING PROGRAM

## 2021-04-13 PROCEDURE — 82948 REAGENT STRIP/BLOOD GLUCOSE: CPT

## 2021-04-13 RX ORDER — TRIHEXYPHENIDYL HYDROCHLORIDE 2 MG/1
2 TABLET ORAL
Refills: 0
Start: 2021-04-13 | End: 2021-10-31

## 2021-04-13 RX ADMIN — CHLORPROMAZINE HYDROCHLORIDE 100 MG: 100 TABLET, SUGAR COATED ORAL at 08:14

## 2021-04-13 RX ADMIN — Medication 400 UNITS: at 08:16

## 2021-04-13 RX ADMIN — SITAGLIPTIN 50 MG: 50 TABLET, FILM COATED ORAL at 08:15

## 2021-04-13 RX ADMIN — TRIHEXYPHENIDYL HYDROCHLORIDE 2 MG: 2 TABLET ORAL at 08:15

## 2021-04-13 RX ADMIN — LISINOPRIL 2.5 MG: 2.5 TABLET ORAL at 08:14

## 2021-04-13 RX ADMIN — HYDROXYZINE HYDROCHLORIDE 25 MG: 25 TABLET, FILM COATED ORAL at 08:14

## 2021-04-13 RX ADMIN — METFORMIN HYDROCHLORIDE 1000 MG: 500 TABLET ORAL at 08:14

## 2021-04-13 RX ADMIN — LORATADINE 10 MG: 10 TABLET ORAL at 08:15

## 2021-04-13 RX ADMIN — DIVALPROEX SODIUM 500 MG: 500 TABLET, EXTENDED RELEASE ORAL at 08:17

## 2021-04-13 RX ADMIN — LORAZEPAM 0.5 MG: 0.5 TABLET ORAL at 08:15

## 2021-04-13 RX ADMIN — LEVOTHYROXINE SODIUM 25 MCG: 25 TABLET ORAL at 05:03

## 2021-04-13 RX ADMIN — CALCIUM 1 TABLET: 500 TABLET ORAL at 08:14

## 2021-04-13 RX ADMIN — DOCUSATE SODIUM 100 MG: 100 CAPSULE, LIQUID FILLED ORAL at 08:15

## 2021-04-13 RX ADMIN — PROPRANOLOL HYDROCHLORIDE 20 MG: 20 TABLET ORAL at 08:15

## 2021-04-13 RX ADMIN — Medication 1000 UNITS: at 08:15

## 2021-04-13 NOTE — DISCHARGE INSTRUCTIONS
Schizoaffective Disorder   WHAT YOU NEED TO KNOW:   Schizoaffective disorder is a long-term mental illness that may change how you think, feel, and act around others  You may not know what is real and what is not real    DISCHARGE INSTRUCTIONS:   Medicines:   · Antipsychotics: These medicines help decrease psychotic symptoms or severe agitation  You may need antiparkinson medicine to control muscle stiffness, twitches, and restlessness caused by antipsychotic medicines  · Antianxiety medicine: This medicine may be given to decrease anxiety and help you feel calm and relaxed  · Antidepressants: These medicines are given to decrease or stop the symptoms of depression, anxiety, and behavior problems  · Mood stabilizers: These medicines help control mood swings  · Anticonvulsants: This medicine is given to control seizures  It may also be used to decrease violent behavior and control your mood swings  · Blood pressure medicines: These may be used to help decrease motor tics (uncontrolled movements)  They may also help you feel calmer, more focused, and less irritable  · Anticholinergics: This medicine decreases the side effects of other medicines  · Take your medicine as directed  Contact your healthcare provider if you think your medicine is not helping or if you have side effects  Tell him or her if you are allergic to any medicine  Keep a list of the medicines, vitamins, and herbs you take  Include the amounts, and when and why you take them  Bring the list or the pill bottles to follow-up visits  Carry your medicine list with you in case of an emergency  Follow up with your healthcare provider or psychiatrist as directed: You may need to return to have your blood pressure and other symptoms checked  You may need blood tests to check the level of medicine in your blood  Write down your questions so you remember to ask them during your visits  Manage your symptoms:   The following may help you feel better or prevent symptoms of schizoaffective disorder from coming back:  · Find support for yourself and your family:  Talk with others to help you cope with your illness better  This may also help to improve how you relate to others  · Keep all medical appointments: This will help manage your disease and the side-effects from medicines you may be taking  · Use your medicines as directed:  Put your medicines in a pillbox placed in an area you can easily see  Use a watch with an alarm to help you remember when it is time to take your medicine  Tell your healthcare provider if you know or think you might be pregnant  Do not stop taking your medicines without your healthcare provider's okay  A sudden stop can cause serious medical problems  · Watch for early signs of a relapse and seek help immediately:      ? How you think, feel, and see things has changed  ? You behave differently than usual     ? You become more nervous and upset, but do not know why     ? You eat less and have trouble sleeping  ? You have little or no interest in friends or activities  For support and more information:   · [employer]  [address] [facility], [facility] [address] , [address]  Phone: [phone]  Phone: [phone]  Web Address: [employer] br  org    · [address] [place] [address], Office of Public [employer] [place] [employer]  [address] [place] [address] [place], [address]  [place], [place] [address]   Phone: [phone]  Phone: [phone]  Web Address: Brandin tn  Contact your healthcare provider or psychiatrist if:   · You think you are having a relapse  · You are having side effects from your medicine, or they are not helping      · You are not sleeping well or are sleeping more than usual     · You cannot eat or are eating more than usual     · You have muscle spasms, stiffness, or trouble walking  · Your sad feelings or thoughts change the way you function during the day  · You have questions or concerns about your condition or care  Seek care immediately or call 911 if:   · You feel like hurting or killing yourself or others  · You feel that your condition is getting worse  · You feel very upset, threaten someone, or you feel violent  · You suddenly have changes in your vision  · You suddenly have chest pain, trouble breathing, or a fever  © Copyright [address] Information is for End User's use only and may not be sold, redistributed or otherwise used for commercial purposes  All illustrations and images included in CareNotes® are the copyrighted property of [employer]  or [facility] Ned Chu   The above information is an  only  It is not intended as medical advice for individual conditions or treatments  Talk to your doctor, nurse or pharmacist before following any medical regimen to see if it is safe and effective for you

## 2021-04-13 NOTE — BH TRANSITION RECORD
Contact Information: If you have any questions, concerns, pended studies, tests and/or procedures, or emergencies regarding your inpatient behavioral health visit  Please contact [address] [place] [address] behavioral health unit [phone] and ask to speak to a , nurse or physician  A contact is available 24 hours/ 7 days a week at this number  Summary of Procedures Performed During your Stay:  Below is a list of major procedures performed during your hospital stay and a summary of results:  - No major procedures performed  Pending Studies (From admission, onward)    None        If studies are pending at discharge, follow up with your PCP and/or referring provider

## 2021-04-13 NOTE — CASE MANAGEMENT
[facility] called [facility]  ([phone]) to confirm that pt will be discharged today  Jerome Pearson reported that they will pick pt up between 10-11am  [facility] provided Jerome Pearson with nurses station number for him to call when he arrives and they will bring pt down  [facility] informed Jerome Pearson that pt discharge summary and medication list will be faxed to them later today

## 2021-04-13 NOTE — NURSING NOTE
Pt given PRN Trazodone 50 mg for sleep  Medication ineffective  Pt up walking the halls  Pt provided sound machine  Pt returned sound machine  Pt walking halls again  Pt restless, focused on discharge  Pt reported to RN "I just want to go home  I'm leaving at 12 today " Pt encouraged to return to bed and try to sleep  Will continue to monitor

## 2021-04-13 NOTE — PROGRESS NOTES
Pt reported acid reflux after eating pasta  Pt also reported that it might have been anxiety due to being discharged today  Staff reported that pt did not sleep well and was pacing the halls  Pt denies SI/HI       DC: Today between 11-12 to group home - staff will pick him up      04/13/21 [id number]   Team Meeting   Meeting Type Daily Rounds   Team Members Present   Team Members Present Physician;Nurse;;Occupational Therapist   Physician Team Member Dr Macarena Baugh / Buzz Hendrix / Marina Blake Team Member Cyndi Almeida / Jamil Pro Management Team Member Ricky Osler / Charmaine Wilson / Karl Camejo   OT Team Member Rishabh Milner / Jono Argueta student   Patient/Family Present   Patient Present No   Patient's Family Present No

## 2021-04-13 NOTE — PROGRESS NOTES
Pt pleasant and appropriate in conversation  Feeling anxious r/t discharge today however is looking forward to going home  Pt reports usually getting nervous upon discharge as he does not want to return to the hospital   Discussed support system and coping skills to utilize  Denies SI/HI/AVH

## 2021-04-13 NOTE — NURSING NOTE
AVS reviewed  Pt expresses understanding  No prescriptions given  Pt discharged home via support staff
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
## 2021-04-19 DIAGNOSIS — F25.0 SCHIZOAFFECTIVE DISORDER, BIPOLAR TYPE (HCC): Chronic | ICD-10-CM

## 2021-04-19 DIAGNOSIS — R05.9 COUGH: ICD-10-CM

## 2021-04-20 ENCOUNTER — OFFICE VISIT (OUTPATIENT)
Dept: FAMILY MEDICINE CLINIC | Facility: CLINIC | Age: 41
End: 2021-04-20

## 2021-04-20 VITALS
HEART RATE: 94 BPM | OXYGEN SATURATION: 98 % | BODY MASS INDEX: 29 KG/M2 | WEIGHT: 195.8 LBS | RESPIRATION RATE: 18 BRPM | HEIGHT: 69 IN | TEMPERATURE: 97.5 F | DIASTOLIC BLOOD PRESSURE: 90 MMHG | SYSTOLIC BLOOD PRESSURE: 130 MMHG

## 2021-04-20 DIAGNOSIS — E78.49 OTHER HYPERLIPIDEMIA: ICD-10-CM

## 2021-04-20 DIAGNOSIS — G44.209 TENSION HEADACHE: ICD-10-CM

## 2021-04-20 DIAGNOSIS — E55.9 VITAMIN D DEFICIENCY: ICD-10-CM

## 2021-04-20 DIAGNOSIS — E03.9 HYPOTHYROIDISM, UNSPECIFIED TYPE: ICD-10-CM

## 2021-04-20 DIAGNOSIS — E11.42 DIABETIC PERIPHERAL NEUROPATHY (HCC): ICD-10-CM

## 2021-04-20 DIAGNOSIS — G25.0 ESSENTIAL TREMOR: ICD-10-CM

## 2021-04-20 DIAGNOSIS — E11.65 TYPE 2 DIABETES MELLITUS WITH HYPERGLYCEMIA, WITHOUT LONG-TERM CURRENT USE OF INSULIN (HCC): Primary | ICD-10-CM

## 2021-04-20 LAB — SL AMB POCT HEMOGLOBIN AIC: 6.1 (ref ?–6.5)

## 2021-04-20 PROCEDURE — 83036 HEMOGLOBIN GLYCOSYLATED A1C: CPT | Performed by: FAMILY MEDICINE

## 2021-04-20 PROCEDURE — 99214 OFFICE O/P EST MOD 30 MIN: CPT | Performed by: FAMILY MEDICINE

## 2021-04-20 RX ORDER — MELATONIN
1000 DAILY
Qty: 31 TABLET | Refills: 5 | Status: SHIPPED | OUTPATIENT
Start: 2021-04-20 | End: 2021-11-15 | Stop reason: SDUPTHER

## 2021-04-20 RX ORDER — MENTHOL 5.8 MG
5.8 LOZENGE MUCOUS MEMBRANE 4 TIMES DAILY PRN
Qty: 30 LOZENGE | Refills: 5 | Status: SHIPPED | OUTPATIENT
Start: 2021-04-20 | End: 2022-05-09 | Stop reason: SDUPTHER

## 2021-04-20 NOTE — ASSESSMENT & PLAN NOTE
Lab Results   Component Value Date    HGBA1C 6 1 04/20/2021    improved, denies any numbness and tingling in his lower extremities   no issue with gait

## 2021-04-20 NOTE — PROGRESS NOTES
Assessment/Plan:    Diabetic peripheral neuropathy (HCC)    Lab Results   Component Value Date    HGBA1C 6 1 04/20/2021    improved, denies any numbness and tingling in his lower extremities   no issue with gait    Hypothyroidism   TSH last completed 04/07/2021 0 711, within normal range   continue levothyroxine 25 mcg daily    Type 2 diabetes mellitus with hyperglycemia ([address] )    Lab Results   Component Value Date    HGBA1C 6 1 04/20/2021    well controlled diabetes, current hemoglobin 6 1   continue metformin 1000 mg twice a day,  Januvia 50 mg daily   patient received blood sugar checks every Monday and Thursday, has not had any hypoglycemic episodes   patient is already on lisinopril atorvastatin     patient's blood sugar has been ranging from 90 to 150s    Essential tremor   None noted today   continue propanolol 20 mg BID    Other hyperlipidemia   Continue atorvastatin 40 mg daily   last lipid panel back in September 2020      Subjective:      Patient ID: Jessie Cowan is a 45 y o  male  HPI    41-year-old male patient here for routine follow-up for his chronic medical conditions  Patient lives in a group home setting and is accompanied by his caretaker today  Of note, patient was hospitalized from 04/06/2021 for increased auditory hallucination, self-harm behavior as well as a aggressive behavior  Patient denies any missing medication or additional stressor, cannot specify what caused this episode of Behavior change  patient's last appointment with the psychiatrist or therapist about 3-4 weeks ago  Since discharge, patient denies any auditory hallucination  patient specify that his auditory hallucination included instruction to strike himself on the head as well as to stab himself in the stomach      patient was last seen by me on 01/19/2021  Hemoglobin A1c at that time 6 8, currently 6 1  Review of Systems   Constitutional: Negative for chills and fever     HENT: Negative for congestion, postnasal drip and sore throat  Respiratory: Negative for cough and shortness of breath  Cardiovascular: Negative for chest pain  Gastrointestinal: Negative for abdominal pain, constipation, diarrhea, nausea and vomiting  Genitourinary: Negative for dysuria  Musculoskeletal: Negative for arthralgias, back pain, gait problem and myalgias  Neurological: Negative for headaches  Psychiatric/Behavioral: Negative for hallucinations, self-injury, sleep disturbance and suicidal ideas  Recent  Episode of aggressive behavior with auditory hallucination, now resolved  Caretaker denies any significant behavior change after discharge from the hospital     Objective:    /90 (BP Location: Left arm, Patient Position: Sitting, Cuff Size: Standard)   Pulse 94   Temp 97 5 °F (36 4 °C) (Temporal)   Resp 18   Ht 5' 9" (1 753 m)   Wt 88 8 kg (195 lb 12 8 oz)   SpO2 98%   BMI 28 91 kg/m²     Physical Exam  Vitals signs reviewed  Constitutional:       General: He is not in acute distress  Appearance: Normal appearance  He is obese  He is not ill-appearing, toxic-appearing or diaphoretic  HENT:      Head: Normocephalic  Cardiovascular:      Rate and Rhythm: Normal rate and regular rhythm  Pulses: Normal pulses  Heart sounds: Normal heart sounds  No murmur  No friction rub  No gallop  Pulmonary:      Effort: Pulmonary effort is normal  No respiratory distress  Breath sounds: Normal breath sounds  Abdominal:      General: Abdomen is flat  Bowel sounds are normal  There is no distension  Palpations: Abdomen is soft  Musculoskeletal: Normal range of motion  General: No swelling, tenderness, deformity or signs of injury  Skin:     General: Skin is warm and dry  Capillary Refill: Capillary refill takes less than 2 seconds  Coloration: Skin is not jaundiced  Neurological:      General: No focal deficit present        Mental Status: He is alert and oriented to person, place, and time  Mental status is at baseline  Psychiatric:         Mood and Affect: Mood normal           KERWIN Tripathi  Family Medicine, PGY-3    Please excuse any "sound-alike" errors that may have ocurred during the process of dictation  Parts of this note have been dictated and there may be errors present in the transcription process  Thank you

## 2021-04-20 NOTE — ASSESSMENT & PLAN NOTE
Lab Results   Component Value Date    HGBA1C 6 1 04/20/2021    well controlled diabetes, current hemoglobin 6 1   continue metformin 1000 mg twice a day   patient received blood sugar checks every Monday and Thursday, has not had any hypoglycemic episodes   patient is already on lisinopril atorvastatin

## 2021-04-26 DIAGNOSIS — K59.00 CONSTIPATION, UNSPECIFIED CONSTIPATION TYPE: ICD-10-CM

## 2021-05-10 DIAGNOSIS — F25.0 SCHIZOAFFECTIVE DISORDER, BIPOLAR TYPE (HCC): Chronic | ICD-10-CM

## 2021-05-10 DIAGNOSIS — E03.9 HYPOTHYROIDISM, UNSPECIFIED TYPE: ICD-10-CM

## 2021-05-10 DIAGNOSIS — E78.5 HYPERLIPIDEMIA, UNSPECIFIED HYPERLIPIDEMIA TYPE: ICD-10-CM

## 2021-05-10 DIAGNOSIS — E78.49 OTHER HYPERLIPIDEMIA: ICD-10-CM

## 2021-05-10 RX ORDER — ATORVASTATIN CALCIUM 40 MG/1
40 TABLET, FILM COATED ORAL
Qty: 30 TABLET | Refills: 5 | Status: SHIPPED | OUTPATIENT
Start: 2021-05-10 | End: 2021-11-15 | Stop reason: SDUPTHER

## 2021-05-10 RX ORDER — LORATADINE 10 MG/1
10 TABLET ORAL DAILY
Qty: 30 TABLET | Refills: 5 | Status: SHIPPED | OUTPATIENT
Start: 2021-05-10 | End: 2021-11-15 | Stop reason: SDUPTHER

## 2021-05-10 RX ORDER — LEVOTHYROXINE SODIUM 0.03 MG/1
25 TABLET ORAL
Qty: 30 TABLET | Refills: 5 | Status: SHIPPED | OUTPATIENT
Start: 2021-05-10 | End: 2021-11-15 | Stop reason: SDUPTHER

## 2021-05-10 RX ORDER — CALCIUM CARBONATE 500(1250)
1 TABLET ORAL
Qty: 30 TABLET | Refills: 5 | Status: SHIPPED | OUTPATIENT
Start: 2021-05-10 | End: 2021-11-15 | Stop reason: SDUPTHER

## 2021-06-15 ENCOUNTER — OFFICE VISIT (OUTPATIENT)
Dept: FAMILY MEDICINE CLINIC | Facility: CLINIC | Age: 41
End: 2021-06-15

## 2021-06-15 VITALS
HEART RATE: 84 BPM | DIASTOLIC BLOOD PRESSURE: 90 MMHG | HEIGHT: 69 IN | BODY MASS INDEX: 28.82 KG/M2 | TEMPERATURE: 97.6 F | WEIGHT: 194.6 LBS | OXYGEN SATURATION: 94 % | SYSTOLIC BLOOD PRESSURE: 128 MMHG | RESPIRATION RATE: 18 BRPM

## 2021-06-15 DIAGNOSIS — H91.93 DECREASED HEARING OF BOTH EARS: ICD-10-CM

## 2021-06-15 DIAGNOSIS — E11.65 TYPE 2 DIABETES MELLITUS WITH HYPERGLYCEMIA, WITHOUT LONG-TERM CURRENT USE OF INSULIN (HCC): Primary | ICD-10-CM

## 2021-06-15 DIAGNOSIS — E03.9 HYPOTHYROIDISM, UNSPECIFIED TYPE: ICD-10-CM

## 2021-06-15 PROCEDURE — 99213 OFFICE O/P EST LOW 20 MIN: CPT | Performed by: FAMILY MEDICINE

## 2021-06-15 NOTE — PROGRESS NOTES
Assessment/Plan:    Type 2 diabetes mellitus with hyperglycemia (HCC)    Lab Results   Component Value Date    HGBA1C 6 1 04/20/2021      Sugars ranging from 130 to 140s  strongly discouraged patient from over indulging in sugary food or drink such as mm or soda  recommend patient have no more than 1 can of soda a day,  Definitely to not drink 6 can of soda a day as this may cause you to get sick   continue checking blood sugar in the morning   return 1 month for hemoglobin A1c recheck    Decreased hearing of both ears  Decreased auditory acuity on certain frequencies on  Bilateral ear   will refer to audiology for evaluation    Hypothyroidism   Continue levothyroxine 25 mcg daily     Subjective:      Patient ID: Mahogany Osullivan is a 45 y o  male  HPI    20-year-old male patient here for hearing exam as well as completion of his inform paperwork  Patient is accompanied by his caretaker today  Patient states the in the last 1 days, he has some upset stomach and 1 episode of emesis  After further questioning, patient reviewed that this may be due to overeating, patient has 3 nutrient bars as well as a large bowl of spaghetti that day  Denies anyone  Else with similar symptoms  Patient denies any nausea or emesis today  Patient continues to drink 2 cancer soft drink a day  Last hemoglobin A1c completed  04/20/2021,  6 1  Patient is currently on metformin 1000 mg twice a day as well as Januvia daily  Denies any episodes of hypoglycemia  Review of Systems   Constitutional: Negative for appetite change, chills and fever  HENT: Negative for congestion, rhinorrhea and sore throat  Respiratory: Negative for shortness of breath  Cardiovascular: Negative for chest pain  Gastrointestinal: Positive for nausea and vomiting  Negative for abdominal pain, constipation and diarrhea  Nausea vomiting, resolved likely due to overeating   Genitourinary: Negative for enuresis     Musculoskeletal: Negative for gait problem  Neurological: Negative for dizziness, light-headedness and headaches  Psychiatric/Behavioral: Negative for sleep disturbance  Objective:    /90 (BP Location: Left arm, Patient Position: Sitting, Cuff Size: Standard)   Pulse 84   Temp 97 6 °F (36 4 °C) (Temporal)   Resp 18   Ht 5' 9" (1 753 m)   Wt 88 3 kg (194 lb 9 6 oz)   SpO2 94%   BMI 28 74 kg/m²     Physical Exam  Vitals reviewed  Constitutional:       General: He is not in acute distress  Appearance: Normal appearance  He is not ill-appearing, toxic-appearing or diaphoretic  HENT:      Head: Normocephalic  Nose: Nose normal    Cardiovascular:      Rate and Rhythm: Normal rate and regular rhythm  Pulses: Normal pulses  Heart sounds: Normal heart sounds  No murmur heard  Pulmonary:      Effort: Pulmonary effort is normal  No respiratory distress  Breath sounds: Normal breath sounds  Abdominal:      General: Abdomen is flat  Bowel sounds are normal  There is no distension  Palpations: Abdomen is soft  Musculoskeletal:         General: No swelling, tenderness, deformity or signs of injury  Normal range of motion  Skin:     General: Skin is warm and dry  Capillary Refill: Capillary refill takes less than 2 seconds  Coloration: Skin is pale  Skin is not jaundiced  Findings: No bruising or erythema  Neurological:      General: No focal deficit present  Mental Status: He is alert and oriented to person, place, and time  Psychiatric:         Mood and Affect: Mood normal        patient decrease hearing loss audiology exam:  Right ear: 4000/35  Left ear:500/25   4000/35      KERWIN Springer    Family Medicine, PGY-3

## 2021-06-15 NOTE — ASSESSMENT & PLAN NOTE
Lab Results   Component Value Date    HGBA1C 6 1 04/20/2021      Sugars ranging from 130 to 140s  strongly discouraged patient from over indulging in sugary food or drink such as mm or soda  recommend patient have no more than 1 can of soda a day,  Definitely to not drink 6 can of soda a day as this may cause you to get sick   continue checking blood sugar in the morning   return 1 month for hemoglobin A1c recheck

## 2021-06-15 NOTE — ASSESSMENT & PLAN NOTE
Decreased auditory acuity on certain frequencies on  Bilateral ear   will refer to audiology for evaluation

## 2021-06-22 ENCOUNTER — OFFICE VISIT (OUTPATIENT)
Dept: AUDIOLOGY | Age: 41
End: 2021-06-22
Payer: MEDICARE

## 2021-06-22 DIAGNOSIS — H90.3 SENSORINEURAL HEARING LOSS, BILATERAL: Primary | ICD-10-CM

## 2021-06-22 DIAGNOSIS — H91.93 DECREASED HEARING OF BOTH EARS: ICD-10-CM

## 2021-06-22 PROCEDURE — 92567 TYMPANOMETRY: CPT | Performed by: AUDIOLOGIST

## 2021-06-22 PROCEDURE — 92557 COMPREHENSIVE HEARING TEST: CPT | Performed by: AUDIOLOGIST

## 2021-06-22 NOTE — PROGRESS NOTES
HEARING EVALUATION    Name:  Zoey Martinez  :  1980  Age:  45 y o  Date of Evaluation: 21     History: Failed Screen  Reason for visit: Zoey Martinez is being seen today at the request of Dr Prashant Chacko for an evaluation of hearing  Patient reports no concern over hearing and denies tinnitus or dizziness  EVALUATION:    Otoscopic Evaluation:   Right Ear: Clear and healthy ear canal and tympanic membrane   Left Ear: Clear and healthy ear canal and tympanic membrane    Tympanometry:   Right: Type A - normal middle ear pressure and compliance   Left: Type A - normal middle ear pressure and compliance    Audiogram Results:  Wadell Roles initially needed reinstruction and encouragement to respond to speech stimuli at threshold, but once encouraged he responded consistently  Results indicate normal peripheral hearing sensitivity except for mild high frequency loss at 8k Hz in each ear  Excellent speech discrimination ability, bilaterally  *see attached audiogram      RECOMMENDATIONS:  Annual hearing eval, Return to [facility]  for F/U and Copy to Patient/Caregiver    PATIENT EDUCATION:   Discussed results and recommendations with patient and caregiver  Questions were addressed and the caregiver was encouraged to contact our department should concerns arise        Yaneth Bolanos   Clinical Audiologist

## 2021-06-29 DIAGNOSIS — E11.42 DIABETIC PERIPHERAL NEUROPATHY (HCC): ICD-10-CM

## 2021-06-29 DIAGNOSIS — E11.65 TYPE 2 DIABETES MELLITUS WITH HYPERGLYCEMIA, UNSPECIFIED WHETHER LONG TERM INSULIN USE (HCC): ICD-10-CM

## 2021-06-29 RX ORDER — GABAPENTIN 100 MG/1
100 CAPSULE ORAL
Qty: 30 CAPSULE | Refills: 5 | Status: SHIPPED | OUTPATIENT
Start: 2021-06-29 | End: 2022-05-27

## 2021-07-04 ENCOUNTER — APPOINTMENT (EMERGENCY)
Dept: RADIOLOGY | Facility: HOSPITAL | Age: 41
End: 2021-07-04
Payer: MEDICARE

## 2021-07-04 ENCOUNTER — HOSPITAL ENCOUNTER (EMERGENCY)
Facility: HOSPITAL | Age: 41
Discharge: HOME | End: 2021-07-04
Attending: EMERGENCY MEDICINE | Admitting: EMERGENCY MEDICINE
Payer: MEDICARE

## 2021-07-04 VITALS
DIASTOLIC BLOOD PRESSURE: 82 MMHG | BODY MASS INDEX: 28.73 KG/M2 | RESPIRATION RATE: 18 BRPM | OXYGEN SATURATION: 99 % | HEIGHT: 69 IN | TEMPERATURE: 97.8 F | WEIGHT: 194 LBS | HEART RATE: 131 BPM | SYSTOLIC BLOOD PRESSURE: 127 MMHG

## 2021-07-04 DIAGNOSIS — R46.89 AGGRESSIVE BEHAVIOR: Primary | ICD-10-CM

## 2021-07-04 LAB
AMPHETAMINES SERPL QL SCN: NEGATIVE
ANION GAP SERPL CALCULATED.3IONS-SCNC: 6 MMOL/L (ref 4–13)
BARBITURATES UR QL: NEGATIVE
BASE EX.OXY STD BLDV CALC-SCNC: 52.1 % (ref 60–80)
BASE EXCESS BLDV CALC-SCNC: 1.6 MMOL/L
BASOPHILS # BLD AUTO: 0.05 THOUSANDS/ΜL (ref 0–0.1)
BASOPHILS NFR BLD AUTO: 0 % (ref 0–1)
BENZODIAZ UR QL: NEGATIVE
BUN SERPL-MCNC: 11 MG/DL (ref 5–25)
CALCIUM SERPL-MCNC: 8.4 MG/DL (ref 8.3–10.1)
CHLORIDE SERPL-SCNC: 102 MMOL/L (ref 100–108)
CO2 SERPL-SCNC: 31 MMOL/L (ref 21–32)
COCAINE UR QL: NEGATIVE
CREAT SERPL-MCNC: 1.58 MG/DL (ref 0.6–1.3)
EOSINOPHIL # BLD AUTO: 0.2 THOUSAND/ΜL (ref 0–0.61)
EOSINOPHIL NFR BLD AUTO: 1 % (ref 0–6)
ERYTHROCYTE [DISTWIDTH] IN BLOOD BY AUTOMATED COUNT: 11 % (ref 11.6–15.1)
ETHANOL EXG-MCNC: 0 MG/DL
GFR SERPL CREATININE-BSD FRML MDRD: 54 ML/MIN/1.73SQ M
GLUCOSE SERPL-MCNC: 143 MG/DL (ref 65–140)
HCO3 BLDV-SCNC: 27.4 MMOL/L (ref 24–30)
HCT VFR BLD AUTO: 45 % (ref 36.5–49.3)
HGB BLD-MCNC: 15.3 G/DL (ref 12–17)
IMM GRANULOCYTES # BLD AUTO: 0.16 THOUSAND/UL (ref 0–0.2)
IMM GRANULOCYTES NFR BLD AUTO: 1 % (ref 0–2)
LYMPHOCYTES # BLD AUTO: 2 THOUSANDS/ΜL (ref 0.6–4.47)
LYMPHOCYTES NFR BLD AUTO: 11 % (ref 14–44)
MCH RBC QN AUTO: 30.1 PG (ref 26.8–34.3)
MCHC RBC AUTO-ENTMCNC: 34 G/DL (ref 31.4–37.4)
MCV RBC AUTO: 89 FL (ref 82–98)
METHADONE UR QL: NEGATIVE
MONOCYTES # BLD AUTO: 1.61 THOUSAND/ΜL (ref 0.17–1.22)
MONOCYTES NFR BLD AUTO: 9 % (ref 4–12)
NEUTROPHILS # BLD AUTO: 13.61 THOUSANDS/ΜL (ref 1.85–7.62)
NEUTS SEG NFR BLD AUTO: 78 % (ref 43–75)
NRBC BLD AUTO-RTO: 0 /100 WBCS
O2 CT BLDV-SCNC: 11.4 ML/DL
OPIATES UR QL SCN: NEGATIVE
OXYCODONE+OXYMORPHONE UR QL SCN: NEGATIVE
PCO2 BLDV: 47.4 MM HG (ref 42–50)
PCP UR QL: NEGATIVE
PH BLDV: 7.38 [PH] (ref 7.3–7.4)
PLATELET # BLD AUTO: 165 THOUSANDS/UL (ref 149–390)
PMV BLD AUTO: 9.8 FL (ref 8.9–12.7)
PO2 BLDV: 29.5 MM HG (ref 35–45)
POTASSIUM SERPL-SCNC: 4.2 MMOL/L (ref 3.5–5.3)
RBC # BLD AUTO: 5.08 MILLION/UL (ref 3.88–5.62)
SODIUM SERPL-SCNC: 139 MMOL/L (ref 136–145)
THC UR QL: NEGATIVE
WBC # BLD AUTO: 17.63 THOUSAND/UL (ref 4.31–10.16)

## 2021-07-04 PROCEDURE — 99285 EMERGENCY DEPT VISIT HI MDM: CPT

## 2021-07-04 PROCEDURE — 36415 COLL VENOUS BLD VENIPUNCTURE: CPT | Performed by: EMERGENCY MEDICINE

## 2021-07-04 PROCEDURE — 85025 COMPLETE CBC W/AUTO DIFF WBC: CPT | Performed by: EMERGENCY MEDICINE

## 2021-07-04 PROCEDURE — 99285 EMERGENCY DEPT VISIT HI MDM: CPT | Performed by: EMERGENCY MEDICINE

## 2021-07-04 PROCEDURE — 82075 ASSAY OF BREATH ETHANOL: CPT | Performed by: EMERGENCY MEDICINE

## 2021-07-04 PROCEDURE — 73030 X-RAY EXAM OF SHOULDER: CPT

## 2021-07-04 PROCEDURE — 82805 BLOOD GASES W/O2 SATURATION: CPT | Performed by: EMERGENCY MEDICINE

## 2021-07-04 PROCEDURE — 80048 BASIC METABOLIC PNL TOTAL CA: CPT | Performed by: EMERGENCY MEDICINE

## 2021-07-04 PROCEDURE — 80307 DRUG TEST PRSMV CHEM ANLYZR: CPT | Performed by: EMERGENCY MEDICINE

## 2021-07-04 RX ORDER — HYDROXYZINE HYDROCHLORIDE 25 MG/1
25 TABLET, FILM COATED ORAL ONCE
Status: COMPLETED | OUTPATIENT
Start: 2021-07-04 | End: 2021-07-04

## 2021-07-04 RX ADMIN — HYDROXYZINE HYDROCHLORIDE 25 MG: 25 TABLET, FILM COATED ORAL at 19:54

## 2021-07-04 NOTE — ED NOTES
Crisis at [facility] 430  07/04/21 [facility] 96  07/04/21 1928

## 2021-07-04 NOTE — ED NOTES
Dr Jaden Villarreal [facility] at patient bedside to medically evaluate patient       Ab Vogt RN  07/04/21 5414
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
## 2021-07-13 DIAGNOSIS — H25.11 NUCLEAR SCLEROTIC CATARACT OF RIGHT EYE: ICD-10-CM

## 2021-07-13 DIAGNOSIS — F25.0 SCHIZOAFFECTIVE DISORDER, BIPOLAR TYPE (HCC): Chronic | ICD-10-CM

## 2021-07-13 RX ORDER — METFORMIN HYDROCHLORIDE EXTENDED-RELEASE TABLETS 1000 MG/1
1000 TABLET, FILM COATED, EXTENDED RELEASE ORAL 2 TIMES DAILY WITH MEALS
Qty: 62 TABLET | Refills: 5 | Status: SHIPPED | OUTPATIENT
Start: 2021-07-13 | End: 2022-01-06 | Stop reason: SDUPTHER

## 2021-07-13 RX ORDER — VITAMIN E 268 MG
400 CAPSULE ORAL DAILY
Qty: 31 CAPSULE | Refills: 5 | Status: SHIPPED | OUTPATIENT
Start: 2021-07-13 | End: 2022-01-06 | Stop reason: SDUPTHER

## 2021-07-16 ENCOUNTER — OFFICE VISIT (OUTPATIENT)
Dept: FAMILY MEDICINE CLINIC | Facility: CLINIC | Age: 41
End: 2021-07-16

## 2021-07-16 VITALS
HEART RATE: 88 BPM | RESPIRATION RATE: 18 BRPM | HEIGHT: 69 IN | SYSTOLIC BLOOD PRESSURE: 110 MMHG | BODY MASS INDEX: 28.73 KG/M2 | WEIGHT: 194 LBS | TEMPERATURE: 97.7 F | DIASTOLIC BLOOD PRESSURE: 80 MMHG

## 2021-07-16 DIAGNOSIS — E11.65 TYPE 2 DIABETES MELLITUS WITH HYPERGLYCEMIA, WITHOUT LONG-TERM CURRENT USE OF INSULIN (HCC): ICD-10-CM

## 2021-07-16 DIAGNOSIS — E55.9 VITAMIN D DEFICIENCY: ICD-10-CM

## 2021-07-16 DIAGNOSIS — M25.511 ACUTE PAIN OF RIGHT SHOULDER: Primary | ICD-10-CM

## 2021-07-16 DIAGNOSIS — E66.09 OBESITY DUE TO EXCESS CALORIES WITHOUT SERIOUS COMORBIDITY, UNSPECIFIED CLASSIFICATION: ICD-10-CM

## 2021-07-16 DIAGNOSIS — Z87.19 HISTORY OF CONSTIPATION: ICD-10-CM

## 2021-07-16 DIAGNOSIS — N18.2 CKD (CHRONIC KIDNEY DISEASE) STAGE 2, GFR 60-89 ML/MIN: ICD-10-CM

## 2021-07-16 DIAGNOSIS — H91.93 DECREASED HEARING OF BOTH EARS: ICD-10-CM

## 2021-07-16 DIAGNOSIS — E78.5 HYPERLIPIDEMIA, UNSPECIFIED HYPERLIPIDEMIA TYPE: ICD-10-CM

## 2021-07-16 DIAGNOSIS — E03.9 HYPOTHYROIDISM, UNSPECIFIED TYPE: ICD-10-CM

## 2021-07-16 PROBLEM — E78.49 OTHER HYPERLIPIDEMIA: Status: RESOLVED | Noted: 2019-07-02 | Resolved: 2021-07-16

## 2021-07-16 LAB — SL AMB POCT HEMOGLOBIN AIC: 6 (ref ?–6.5)

## 2021-07-16 PROCEDURE — 83036 HEMOGLOBIN GLYCOSYLATED A1C: CPT | Performed by: FAMILY MEDICINE

## 2021-07-16 PROCEDURE — 99213 OFFICE O/P EST LOW 20 MIN: CPT | Performed by: FAMILY MEDICINE

## 2021-07-16 RX ORDER — BACLOFEN 10 MG/1
10 TABLET ORAL 3 TIMES DAILY
Qty: 21 TABLET | Refills: 0 | Status: SHIPPED | OUTPATIENT
Start: 2021-07-16 | End: 2022-02-17 | Stop reason: SDUPTHER

## 2021-07-16 RX ORDER — SENNOSIDES 8.6 MG
650 CAPSULE ORAL EVERY 8 HOURS PRN
Qty: 30 TABLET | Refills: 0 | Status: SHIPPED | OUTPATIENT
Start: 2021-07-16 | End: 2021-09-29 | Stop reason: SDUPTHER

## 2021-07-16 NOTE — ASSESSMENT & PLAN NOTE
Recent eval in ED on 7/4/21 for right shoulder/upper arm pain after hitting should/arm against door  Pt states after coming back to his home he hit the door again  Pain is 5-10, localized, worse with movement, improve with rest, tried ice pack, OTC tylenol, cold shower, rubbing lotion without relief   Have not tried NSAID due to CKD-2    - Likely MSK pain, less likely fracture given no bony tenderness and low force impact   - Trial short course of Tylenol, Baclofen, Voltaren topical   - Reassess at next visit, consider XR of right shoulder if no improvement

## 2021-07-16 NOTE — ASSESSMENT & PLAN NOTE
H/o Vit-D deficiency   - On home Vitamin-D3 1000U and calcium-carbonate 500mg daily   - No Vit-D level per chart check   - Continue home Vit-D3 and calcium for now   - Will check Vit-D level   - Reassess at 3-months follow-up visit

## 2021-07-16 NOTE — ASSESSMENT & PLAN NOTE
Stable     - On home Lipitor 40mg daily   - Last Lipid panel 9/28/20 - Chol 79, , HDL 24, LDL 11   - Continue home Lipitor 40mg for now   - Will recheck Lipid panel   - Reassess at 3-months follow-up

## 2021-07-16 NOTE — ASSESSMENT & PLAN NOTE
Stable     - On home metformin 1g BID, Januvia 50mg daily   - HgbA1c 6 0 on 7/16, improved from 6 1 on 4/20    - Home -150s   - Continue home regimen for now   -  on diet, exercise, and lifestyle modification   - Recommend accucheck qAM   - Repeat HgbA1c in 3 months   - Reassess at follow-up appointment

## 2021-07-16 NOTE — ASSESSMENT & PLAN NOTE
H/o Obesity  - Body mass index is 28 65 kg/m²  on 7/16  - BMI Counseling: Body mass index is 28 65 kg/m²  The BMI is above normal  Nutrition recommendations include reducing portion sizes, decreasing overall calorie intake, 3-5 servings of fruits/vegetables daily, reducing fast food intake, consuming healthier snacks, decreasing soda and/or juice intake, moderation in carbohydrate intake, increasing intake of lean protein, reducing intake of saturated fat and trans fat and reducing intake of cholesterol  Exercise recommendations include moderate aerobic physical activity for 150 minutes/week, vigorous aerobic physical activity for 75 minutes/week, exercising 3-5 times per week, joining a gym and strength training exercises

## 2021-07-16 NOTE — ASSESSMENT & PLAN NOTE
Worsening    - Last Cr 1 58 (7/4), worse from 1 21 (4/7)   - Avoid nephrotoxic drugs like NSAIDs   - Will recheck BMP prior to next visit   - Reassess at 3-month follow-up

## 2021-07-16 NOTE — ASSESSMENT & PLAN NOTE
H/o hypothyroidism   - Pt denies any symptoms at this time   - On home Levothyroxine 25mcg daily   - TSH 0 711 on 4/7/21, no T4 reflex   - Continue home Levothyroxine for now   - Will reassess at 3-months follow-up
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
## 2021-08-02 ENCOUNTER — APPOINTMENT (OUTPATIENT)
Dept: LAB | Age: 41
End: 2021-08-02
Payer: MEDICARE

## 2021-08-02 DIAGNOSIS — Z79.899 ENCOUNTER FOR LONG-TERM (CURRENT) USE OF OTHER MEDICATIONS: ICD-10-CM

## 2021-08-02 LAB
ALBUMIN SERPL BCP-MCNC: 2.8 G/DL (ref 3.5–5)
ALP SERPL-CCNC: 69 U/L (ref 46–116)
ALT SERPL W P-5'-P-CCNC: 35 U/L (ref 12–78)
ANION GAP SERPL CALCULATED.3IONS-SCNC: 6 MMOL/L (ref 4–13)
AST SERPL W P-5'-P-CCNC: 19 U/L (ref 5–45)
BASOPHILS # BLD AUTO: 0.06 THOUSANDS/ΜL (ref 0–0.1)
BASOPHILS NFR BLD AUTO: 1 % (ref 0–1)
BILIRUB SERPL-MCNC: 0.44 MG/DL (ref 0.2–1)
BUN SERPL-MCNC: 8 MG/DL (ref 5–25)
CALCIUM ALBUM COR SERPL-MCNC: 9.7 MG/DL (ref 8.3–10.1)
CALCIUM SERPL-MCNC: 8.7 MG/DL (ref 8.3–10.1)
CHLORIDE SERPL-SCNC: 104 MMOL/L (ref 100–108)
CHOLEST SERPL-MCNC: 80 MG/DL (ref 50–200)
CO2 SERPL-SCNC: 29 MMOL/L (ref 21–32)
CREAT SERPL-MCNC: 1.28 MG/DL (ref 0.6–1.3)
EOSINOPHIL # BLD AUTO: 0.36 THOUSAND/ΜL (ref 0–0.61)
EOSINOPHIL NFR BLD AUTO: 4 % (ref 0–6)
ERYTHROCYTE [DISTWIDTH] IN BLOOD BY AUTOMATED COUNT: 11.5 % (ref 11.6–15.1)
EST. AVERAGE GLUCOSE BLD GHB EST-MCNC: 131 MG/DL
GFR SERPL CREATININE-BSD FRML MDRD: 70 ML/MIN/1.73SQ M
GLUCOSE P FAST SERPL-MCNC: 145 MG/DL (ref 65–99)
HBA1C MFR BLD: 6.2 %
HCT VFR BLD AUTO: 44.7 % (ref 36.5–49.3)
HDLC SERPL-MCNC: 33 MG/DL
HGB BLD-MCNC: 14.8 G/DL (ref 12–17)
IMM GRANULOCYTES # BLD AUTO: 0.08 THOUSAND/UL (ref 0–0.2)
IMM GRANULOCYTES NFR BLD AUTO: 1 % (ref 0–2)
LDLC SERPL CALC-MCNC: 29 MG/DL (ref 0–100)
LYMPHOCYTES # BLD AUTO: 2.83 THOUSANDS/ΜL (ref 0.6–4.47)
LYMPHOCYTES NFR BLD AUTO: 32 % (ref 14–44)
MCH RBC QN AUTO: 30.3 PG (ref 26.8–34.3)
MCHC RBC AUTO-ENTMCNC: 33.1 G/DL (ref 31.4–37.4)
MCV RBC AUTO: 92 FL (ref 82–98)
MONOCYTES # BLD AUTO: 0.55 THOUSAND/ΜL (ref 0.17–1.22)
MONOCYTES NFR BLD AUTO: 6 % (ref 4–12)
NEUTROPHILS # BLD AUTO: 4.94 THOUSANDS/ΜL (ref 1.85–7.62)
NEUTS SEG NFR BLD AUTO: 56 % (ref 43–75)
NONHDLC SERPL-MCNC: 47 MG/DL
NRBC BLD AUTO-RTO: 0 /100 WBCS
PLATELET # BLD AUTO: 172 THOUSANDS/UL (ref 149–390)
PMV BLD AUTO: 10.5 FL (ref 8.9–12.7)
POTASSIUM SERPL-SCNC: 4.8 MMOL/L (ref 3.5–5.3)
PROT SERPL-MCNC: 6.3 G/DL (ref 6.4–8.2)
RBC # BLD AUTO: 4.88 MILLION/UL (ref 3.88–5.62)
SODIUM SERPL-SCNC: 139 MMOL/L (ref 136–145)
TRIGL SERPL-MCNC: 91 MG/DL
TSH SERPL DL<=0.05 MIU/L-ACNC: 0.84 UIU/ML (ref 0.36–3.74)
VALPROATE SERPL-MCNC: 52 UG/ML (ref 50–100)
WBC # BLD AUTO: 8.82 THOUSAND/UL (ref 4.31–10.16)

## 2021-08-02 PROCEDURE — 80061 LIPID PANEL: CPT

## 2021-08-02 PROCEDURE — 84443 ASSAY THYROID STIM HORMONE: CPT

## 2021-08-02 PROCEDURE — 83036 HEMOGLOBIN GLYCOSYLATED A1C: CPT

## 2021-08-02 PROCEDURE — 80053 COMPREHEN METABOLIC PANEL: CPT

## 2021-08-02 PROCEDURE — 36415 COLL VENOUS BLD VENIPUNCTURE: CPT

## 2021-08-02 PROCEDURE — 80164 ASSAY DIPROPYLACETIC ACD TOT: CPT

## 2021-08-02 PROCEDURE — 85025 COMPLETE CBC W/AUTO DIFF WBC: CPT

## 2021-08-13 ENCOUNTER — OFFICE VISIT (OUTPATIENT)
Dept: FAMILY MEDICINE CLINIC | Facility: CLINIC | Age: 41
End: 2021-08-13

## 2021-08-13 VITALS
HEART RATE: 76 BPM | BODY MASS INDEX: 28.56 KG/M2 | WEIGHT: 192.8 LBS | SYSTOLIC BLOOD PRESSURE: 112 MMHG | TEMPERATURE: 97.3 F | RESPIRATION RATE: 16 BRPM | DIASTOLIC BLOOD PRESSURE: 74 MMHG | HEIGHT: 69 IN

## 2021-08-13 DIAGNOSIS — R30.9 PAINFUL URINATION: Primary | ICD-10-CM

## 2021-08-13 LAB
SL AMB  POCT GLUCOSE, UA: NORMAL
SL AMB LEUKOCYTE ESTERASE,UA: NORMAL
SL AMB POCT BILIRUBIN,UA: NORMAL
SL AMB POCT BLOOD,UA: NORMAL
SL AMB POCT CLARITY,UA: CLEAR
SL AMB POCT COLOR,UA: YELLOW
SL AMB POCT KETONES,UA: NORMAL
SL AMB POCT NITRITE,UA: NORMAL
SL AMB POCT PH,UA: 6.5
SL AMB POCT SPECIFIC GRAVITY,UA: 1
SL AMB POCT URINE PROTEIN: NORMAL
SL AMB POCT UROBILINOGEN: NORMAL

## 2021-08-13 PROCEDURE — 99213 OFFICE O/P EST LOW 20 MIN: CPT | Performed by: FAMILY MEDICINE

## 2021-08-13 PROCEDURE — 81002 URINALYSIS NONAUTO W/O SCOPE: CPT | Performed by: FAMILY MEDICINE

## 2021-08-13 NOTE — ASSESSMENT & PLAN NOTE
Reports mild pressure/pain on urination  POCT UA neg for Leukocyte esterase, nitrite  Reports masturbation frequently  Most likely secondary to aggressive self stimulation   Pt does not have an active sexual partner, no concerns for STD  Encouraged fluid hydration, refrain from excessive masturbation  Can try  OTC pain meds as needed
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
## 2021-08-16 ENCOUNTER — TELEPHONE (OUTPATIENT)
Dept: FAMILY MEDICINE CLINIC | Facility: CLINIC | Age: 41
End: 2021-08-16

## 2021-08-17 ENCOUNTER — TELEPHONE (OUTPATIENT)
Dept: FAMILY MEDICINE CLINIC | Facility: CLINIC | Age: 41
End: 2021-08-17

## 2021-08-17 NOTE — TELEPHONE ENCOUNTER
Yaneth calling to see if Dr Aston Schumacher gor BW that was ordered by Kecia Escobar requesting results to be reviewed to discuss further with Phyche and caregiver

## 2021-09-08 DIAGNOSIS — K59.00 CONSTIPATION, UNSPECIFIED CONSTIPATION TYPE: ICD-10-CM

## 2021-09-08 RX ORDER — DOCUSATE SODIUM 100 MG
CAPSULE ORAL
Qty: 30 CAPSULE | Refills: 0 | Status: SHIPPED | OUTPATIENT
Start: 2021-09-08 | End: 2021-09-29 | Stop reason: SDUPTHER

## 2021-09-20 DIAGNOSIS — R68.2 DRY MOUTH: Primary | ICD-10-CM

## 2021-09-20 DIAGNOSIS — L85.3 DRY SKIN: ICD-10-CM

## 2021-09-20 RX ORDER — SALIVA STIMULANT COMB. NO.7
1 GEL (GRAM) MUCOUS MEMBRANE 2 TIMES DAILY
Qty: 42 G | Refills: 5 | Status: SHIPPED | OUTPATIENT
Start: 2021-09-20

## 2021-09-20 RX ORDER — LANOLIN ALCOHOL/MO/W.PET/CERES
CREAM (GRAM) TOPICAL AS NEEDED
Qty: 240 ML | Refills: 5 | Status: SHIPPED | OUTPATIENT
Start: 2021-09-20

## 2021-09-29 DIAGNOSIS — M25.511 ACUTE PAIN OF RIGHT SHOULDER: ICD-10-CM

## 2021-09-29 DIAGNOSIS — K59.00 CONSTIPATION, UNSPECIFIED CONSTIPATION TYPE: ICD-10-CM

## 2021-09-29 DIAGNOSIS — J30.2 SEASONAL ALLERGIES: ICD-10-CM

## 2021-09-29 DIAGNOSIS — G25.0 ESSENTIAL TREMOR: ICD-10-CM

## 2021-09-29 RX ORDER — SENNOSIDES 8.6 MG
650 CAPSULE ORAL EVERY 8 HOURS PRN
Qty: 30 TABLET | Refills: 5 | Status: SHIPPED | OUTPATIENT
Start: 2021-09-29 | End: 2022-05-10 | Stop reason: SDUPTHER

## 2021-09-29 RX ORDER — PROPRANOLOL HYDROCHLORIDE 20 MG/1
20 TABLET ORAL EVERY 12 HOURS SCHEDULED
Qty: 60 TABLET | Refills: 5 | Status: SHIPPED | OUTPATIENT
Start: 2021-09-29 | End: 2021-12-29 | Stop reason: SDUPTHER

## 2021-09-29 RX ORDER — DOCUSATE SODIUM 100 MG/1
100 CAPSULE, LIQUID FILLED ORAL DAILY
Qty: 30 CAPSULE | Refills: 5 | Status: SHIPPED | OUTPATIENT
Start: 2021-09-29 | End: 2022-04-06 | Stop reason: SDUPTHER

## 2021-09-29 NOTE — TELEPHONE ENCOUNTER
Reviewed chart, Biotene & Emollient already ordered  Entered additional Rx requested for sign off if you approve

## 2021-09-29 NOTE — TELEPHONE ENCOUNTER
Beatriz Savage called asking for refills for following medication that has       Emollient  Propranolol  Stool Softener  Tylenol arthritis ER  Biotene  Robitussin

## 2021-10-08 ENCOUNTER — TELEPHONE (OUTPATIENT)
Dept: FAMILY MEDICINE CLINIC | Facility: CLINIC | Age: 41
End: 2021-10-08

## 2021-10-12 DIAGNOSIS — G44.209 TENSION HEADACHE: ICD-10-CM

## 2021-10-12 RX ORDER — VITAMIN B COMPLEX
1 TABLET ORAL DAILY
Qty: 30 TABLET | Refills: 5 | Status: SHIPPED | OUTPATIENT
Start: 2021-10-12 | End: 2022-04-06 | Stop reason: SDUPTHER

## 2021-10-18 ENCOUNTER — APPOINTMENT (OUTPATIENT)
Dept: LAB | Age: 41
End: 2021-10-18
Payer: MEDICARE

## 2021-10-18 ENCOUNTER — OFFICE VISIT (OUTPATIENT)
Dept: LAB | Age: 41
End: 2021-10-18
Payer: MEDICARE

## 2021-10-18 DIAGNOSIS — Z79.899 ENCOUNTER FOR LONG-TERM (CURRENT) USE OF OTHER MEDICATIONS: ICD-10-CM

## 2021-10-18 DIAGNOSIS — E55.9 VITAMIN D DEFICIENCY: ICD-10-CM

## 2021-10-18 DIAGNOSIS — N18.2 CKD (CHRONIC KIDNEY DISEASE) STAGE 2, GFR 60-89 ML/MIN: ICD-10-CM

## 2021-10-18 DIAGNOSIS — E78.5 HYPERLIPIDEMIA, UNSPECIFIED HYPERLIPIDEMIA TYPE: ICD-10-CM

## 2021-10-18 LAB
25(OH)D3 SERPL-MCNC: 47.8 NG/ML (ref 30–100)
ANION GAP SERPL CALCULATED.3IONS-SCNC: 2 MMOL/L (ref 4–13)
ATRIAL RATE: 71 BPM
BUN SERPL-MCNC: 11 MG/DL (ref 5–25)
CALCIUM SERPL-MCNC: 8.7 MG/DL (ref 8.3–10.1)
CHLORIDE SERPL-SCNC: 105 MMOL/L (ref 100–108)
CHOLEST SERPL-MCNC: 80 MG/DL (ref 50–200)
CO2 SERPL-SCNC: 31 MMOL/L (ref 21–32)
CREAT SERPL-MCNC: 1.27 MG/DL (ref 0.6–1.3)
GFR SERPL CREATININE-BSD FRML MDRD: 70 ML/MIN/1.73SQ M
GLUCOSE P FAST SERPL-MCNC: 182 MG/DL (ref 65–99)
HDLC SERPL-MCNC: 31 MG/DL
LDLC SERPL CALC-MCNC: 21 MG/DL (ref 0–100)
P AXIS: 35 DEGREES
POTASSIUM SERPL-SCNC: 4.2 MMOL/L (ref 3.5–5.3)
PR INTERVAL: 168 MS
QRS AXIS: 10 DEGREES
QRSD INTERVAL: 96 MS
QT INTERVAL: 412 MS
QTC INTERVAL: 447 MS
SODIUM SERPL-SCNC: 138 MMOL/L (ref 136–145)
T WAVE AXIS: 20 DEGREES
TRIGL SERPL-MCNC: 138 MG/DL
VENTRICULAR RATE: 71 BPM

## 2021-10-18 PROCEDURE — 80048 BASIC METABOLIC PNL TOTAL CA: CPT

## 2021-10-18 PROCEDURE — 82306 VITAMIN D 25 HYDROXY: CPT

## 2021-10-18 PROCEDURE — 36415 COLL VENOUS BLD VENIPUNCTURE: CPT

## 2021-10-18 PROCEDURE — 80061 LIPID PANEL: CPT

## 2021-10-18 PROCEDURE — 93010 ELECTROCARDIOGRAM REPORT: CPT | Performed by: INTERNAL MEDICINE

## 2021-10-18 PROCEDURE — 93005 ELECTROCARDIOGRAM TRACING: CPT

## 2021-10-24 ENCOUNTER — HOSPITAL ENCOUNTER (EMERGENCY)
Facility: HOSPITAL | Age: 41
Discharge: HOME | End: 2021-10-25
Attending: EMERGENCY MEDICINE
Payer: MEDICARE

## 2021-10-24 DIAGNOSIS — R46.89 AGGRESSIVE BEHAVIOR: ICD-10-CM

## 2021-10-24 DIAGNOSIS — R45.851 PASSIVE SUICIDAL IDEATIONS: ICD-10-CM

## 2021-10-24 DIAGNOSIS — R44.0 AUDITORY HALLUCINATIONS: Primary | ICD-10-CM

## 2021-10-24 LAB
AMPHETAMINES SERPL QL SCN: NEGATIVE
BARBITURATES UR QL: NEGATIVE
BENZODIAZ UR QL: NEGATIVE
COCAINE UR QL: NEGATIVE
ETHANOL EXG-MCNC: 0 MG/DL
FLUAV RNA RESP QL NAA+PROBE: NEGATIVE
FLUBV RNA RESP QL NAA+PROBE: NEGATIVE
METHADONE UR QL: NEGATIVE
OPIATES UR QL SCN: NEGATIVE
OXYCODONE+OXYMORPHONE UR QL SCN: NEGATIVE
PCP UR QL: NEGATIVE
RSV RNA RESP QL NAA+PROBE: NEGATIVE
SARS-COV-2 RNA RESP QL NAA+PROBE: NEGATIVE
THC UR QL: NEGATIVE

## 2021-10-24 PROCEDURE — 99285 EMERGENCY DEPT VISIT HI MDM: CPT | Performed by: EMERGENCY MEDICINE

## 2021-10-24 PROCEDURE — 0241U HB NFCT DS VIR RESP RNA 4 TRGT: CPT | Performed by: EMERGENCY MEDICINE

## 2021-10-24 PROCEDURE — 82075 ASSAY OF BREATH ETHANOL: CPT | Performed by: EMERGENCY MEDICINE

## 2021-10-24 PROCEDURE — 99285 EMERGENCY DEPT VISIT HI MDM: CPT

## 2021-10-24 PROCEDURE — 80307 DRUG TEST PRSMV CHEM ANLYZR: CPT | Performed by: EMERGENCY MEDICINE

## 2021-10-24 RX ORDER — PROPRANOLOL HYDROCHLORIDE 20 MG/1
20 TABLET ORAL EVERY 12 HOURS SCHEDULED
Status: DISCONTINUED | OUTPATIENT
Start: 2021-10-24 | End: 2021-10-25 | Stop reason: HOSPADM

## 2021-10-24 RX ORDER — LEVOTHYROXINE SODIUM 0.03 MG/1
25 TABLET ORAL
Status: DISCONTINUED | OUTPATIENT
Start: 2021-10-25 | End: 2021-10-25 | Stop reason: HOSPADM

## 2021-10-24 RX ORDER — LISINOPRIL 5 MG/1
2.5 TABLET ORAL DAILY
Status: DISCONTINUED | OUTPATIENT
Start: 2021-10-25 | End: 2021-10-25 | Stop reason: HOSPADM

## 2021-10-24 RX ORDER — DOCUSATE SODIUM 100 MG/1
100 CAPSULE, LIQUID FILLED ORAL DAILY
Status: DISCONTINUED | OUTPATIENT
Start: 2021-10-25 | End: 2021-10-25 | Stop reason: HOSPADM

## 2021-10-24 RX ORDER — CHLORPROMAZINE HYDROCHLORIDE 25 MG/1
100 TABLET, FILM COATED ORAL 3 TIMES DAILY
Status: DISCONTINUED | OUTPATIENT
Start: 2021-10-24 | End: 2021-10-25 | Stop reason: HOSPADM

## 2021-10-24 RX ORDER — DIVALPROEX SODIUM 250 MG/1
500 TABLET, DELAYED RELEASE ORAL EVERY 12 HOURS SCHEDULED
Status: DISCONTINUED | OUTPATIENT
Start: 2021-10-24 | End: 2021-10-25 | Stop reason: HOSPADM

## 2021-10-24 RX ORDER — ACETAMINOPHEN 325 MG/1
650 TABLET ORAL ONCE
Status: COMPLETED | OUTPATIENT
Start: 2021-10-24 | End: 2021-10-24

## 2021-10-24 RX ORDER — TRAZODONE HYDROCHLORIDE 50 MG/1
50 TABLET ORAL
Status: DISCONTINUED | OUTPATIENT
Start: 2021-10-24 | End: 2021-10-25 | Stop reason: HOSPADM

## 2021-10-24 RX ORDER — HYDROXYZINE HYDROCHLORIDE 25 MG/1
25 TABLET, FILM COATED ORAL EVERY 6 HOURS PRN
Status: DISCONTINUED | OUTPATIENT
Start: 2021-10-24 | End: 2021-10-25

## 2021-10-24 RX ORDER — GABAPENTIN 100 MG/1
100 CAPSULE ORAL
Status: DISCONTINUED | OUTPATIENT
Start: 2021-10-24 | End: 2021-10-25 | Stop reason: HOSPADM

## 2021-10-24 RX ORDER — TRIHEXYPHENIDYL HYDROCHLORIDE 2 MG/1
2 TABLET ORAL
Status: DISCONTINUED | OUTPATIENT
Start: 2021-10-25 | End: 2021-10-25

## 2021-10-24 RX ORDER — LORATADINE 10 MG/1
10 TABLET ORAL DAILY
Status: DISCONTINUED | OUTPATIENT
Start: 2021-10-25 | End: 2021-10-25 | Stop reason: HOSPADM

## 2021-10-24 RX ORDER — LORAZEPAM 0.5 MG/1
0.5 TABLET ORAL EVERY 8 HOURS PRN
Status: DISCONTINUED | OUTPATIENT
Start: 2021-10-24 | End: 2021-10-25

## 2021-10-24 RX ADMIN — ACETAMINOPHEN 650 MG: 325 TABLET, FILM COATED ORAL at 23:20

## 2021-10-24 RX ADMIN — TRAZODONE HYDROCHLORIDE 50 MG: 50 TABLET ORAL at 21:21

## 2021-10-24 RX ADMIN — GABAPENTIN 100 MG: 100 CAPSULE ORAL at 21:21

## 2021-10-24 RX ADMIN — DIVALPROEX SODIUM 500 MG: 250 TABLET, DELAYED RELEASE ORAL at 20:34

## 2021-10-24 RX ADMIN — CHLORPROMAZINE HYDROCHLORIDE 100 MG: 25 TABLET, SUGAR COATED ORAL at 20:34

## 2021-10-24 RX ADMIN — HYDROXYZINE HYDROCHLORIDE 25 MG: 25 TABLET ORAL at 20:34

## 2021-10-24 RX ADMIN — PROPRANOLOL HYDROCHLORIDE 20 MG: 20 TABLET ORAL at 20:34

## 2021-10-24 RX ADMIN — LORAZEPAM 0.5 MG: 0.5 TABLET ORAL at 20:34

## 2021-10-25 VITALS
HEIGHT: 69 IN | HEART RATE: 118 BPM | OXYGEN SATURATION: 99 % | TEMPERATURE: 98.2 F | RESPIRATION RATE: 18 BRPM | DIASTOLIC BLOOD PRESSURE: 81 MMHG | SYSTOLIC BLOOD PRESSURE: 124 MMHG | WEIGHT: 193.78 LBS | BODY MASS INDEX: 28.7 KG/M2

## 2021-10-25 PROCEDURE — 99214 OFFICE O/P EST MOD 30 MIN: CPT | Performed by: PSYCHIATRY & NEUROLOGY

## 2021-10-25 PROCEDURE — 96374 THER/PROPH/DIAG INJ IV PUSH: CPT

## 2021-10-25 RX ORDER — ATORVASTATIN CALCIUM 40 MG/1
40 TABLET, FILM COATED ORAL
Status: DISCONTINUED | OUTPATIENT
Start: 2021-10-25 | End: 2021-10-25 | Stop reason: HOSPADM

## 2021-10-25 RX ORDER — ACETAMINOPHEN 325 MG/1
650 TABLET ORAL ONCE
Status: COMPLETED | OUTPATIENT
Start: 2021-10-25 | End: 2021-10-25

## 2021-10-25 RX ORDER — LORAZEPAM 0.5 MG/1
0.5 TABLET ORAL EVERY 8 HOURS
Status: DISCONTINUED | OUTPATIENT
Start: 2021-10-25 | End: 2021-10-25 | Stop reason: HOSPADM

## 2021-10-25 RX ORDER — HYDROXYZINE HYDROCHLORIDE 25 MG/1
25 TABLET, FILM COATED ORAL 3 TIMES DAILY
Status: DISCONTINUED | OUTPATIENT
Start: 2021-10-25 | End: 2021-10-25 | Stop reason: HOSPADM

## 2021-10-25 RX ORDER — CHLORPROMAZINE HYDROCHLORIDE 100 MG/1
100 TABLET, FILM COATED ORAL 2 TIMES DAILY PRN
COMMUNITY

## 2021-10-25 RX ORDER — LORAZEPAM 2 MG/ML
2 INJECTION INTRAMUSCULAR ONCE
Status: COMPLETED | OUTPATIENT
Start: 2021-10-25 | End: 2021-10-25

## 2021-10-25 RX ADMIN — LISINOPRIL 2.5 MG: 5 TABLET ORAL at 08:43

## 2021-10-25 RX ADMIN — LORAZEPAM 2 MG: 2 INJECTION INTRAMUSCULAR; INTRAVENOUS at 14:08

## 2021-10-25 RX ADMIN — ACETAMINOPHEN 650 MG: 325 TABLET, FILM COATED ORAL at 07:38

## 2021-10-25 RX ADMIN — LEVOTHYROXINE SODIUM 25 MCG: 25 TABLET ORAL at 05:52

## 2021-10-25 RX ADMIN — DIVALPROEX SODIUM 500 MG: 250 TABLET, DELAYED RELEASE ORAL at 08:42

## 2021-10-25 RX ADMIN — CHLORPROMAZINE HYDROCHLORIDE 100 MG: 25 TABLET, SUGAR COATED ORAL at 08:41

## 2021-10-25 RX ADMIN — METFORMIN HYDROCHLORIDE 1000 MG: 500 TABLET ORAL at 07:38

## 2021-10-25 RX ADMIN — SITAGLIPTIN 50 MG: 50 TABLET, FILM COATED ORAL at 09:18

## 2021-10-25 RX ADMIN — LORAZEPAM 0.5 MG: 0.5 TABLET ORAL at 05:52

## 2021-10-25 RX ADMIN — LORATADINE 10 MG: 10 TABLET ORAL at 08:43

## 2021-10-25 RX ADMIN — PROPRANOLOL HYDROCHLORIDE 20 MG: 20 TABLET ORAL at 08:41

## 2021-10-25 RX ADMIN — DOCUSATE SODIUM 100 MG: 100 CAPSULE ORAL at 08:42

## 2021-10-25 RX ADMIN — HYDROXYZINE HYDROCHLORIDE 25 MG: 25 TABLET ORAL at 08:42

## 2021-10-26 ENCOUNTER — IMMUNIZATIONS (OUTPATIENT)
Dept: FAMILY MEDICINE CLINIC | Facility: HOSPITAL | Age: 41
End: 2021-10-26

## 2021-10-26 DIAGNOSIS — Z23 ENCOUNTER FOR IMMUNIZATION: Primary | ICD-10-CM

## 2021-10-26 PROCEDURE — 91300 COVID-19 PFIZER VACC 0.3 ML: CPT

## 2021-10-26 PROCEDURE — 0001A COVID-19 PFIZER VACC 0.3 ML: CPT

## 2021-10-31 ENCOUNTER — HOSPITAL ENCOUNTER (INPATIENT)
Facility: HOSPITAL | Age: 41
LOS: 3 days | DRG: 641 | End: 2021-11-04
Attending: EMERGENCY MEDICINE | Admitting: INTERNAL MEDICINE
Payer: MEDICARE

## 2021-10-31 ENCOUNTER — APPOINTMENT (EMERGENCY)
Dept: RADIOLOGY | Facility: HOSPITAL | Age: 41
DRG: 641 | End: 2021-10-31
Payer: MEDICARE

## 2021-10-31 ENCOUNTER — APPOINTMENT (EMERGENCY)
Dept: CT IMAGING | Facility: HOSPITAL | Age: 41
DRG: 641 | End: 2021-10-31
Payer: MEDICARE

## 2021-10-31 DIAGNOSIS — R91.1 PULMONARY NODULE: ICD-10-CM

## 2021-10-31 DIAGNOSIS — E86.0 DEHYDRATION: ICD-10-CM

## 2021-10-31 DIAGNOSIS — R45.851 SUICIDAL IDEATION: ICD-10-CM

## 2021-10-31 DIAGNOSIS — E87.1 HYPONATREMIA: Primary | ICD-10-CM

## 2021-10-31 DIAGNOSIS — S00.83XA CONTUSION OF FACE, INITIAL ENCOUNTER: ICD-10-CM

## 2021-10-31 DIAGNOSIS — S09.90XA INJURY OF HEAD, INITIAL ENCOUNTER: ICD-10-CM

## 2021-10-31 DIAGNOSIS — F20.9 SCHIZOPHRENIA (HCC): ICD-10-CM

## 2021-10-31 DIAGNOSIS — F31.9 BIPOLAR DISORDER (HCC): ICD-10-CM

## 2021-10-31 DIAGNOSIS — R46.89 AGGRESSIVE BEHAVIOR: ICD-10-CM

## 2021-10-31 DIAGNOSIS — R11.0 NAUSEA: ICD-10-CM

## 2021-10-31 DIAGNOSIS — R44.0 AUDITORY HALLUCINATION: ICD-10-CM

## 2021-10-31 PROBLEM — R65.10 SIRS (SYSTEMIC INFLAMMATORY RESPONSE SYNDROME) (HCC): Status: ACTIVE | Noted: 2021-10-31

## 2021-10-31 PROBLEM — D72.829 LEUKOCYTOSIS: Status: ACTIVE | Noted: 2021-10-31

## 2021-10-31 LAB
ALBUMIN SERPL BCP-MCNC: 3 G/DL (ref 3.5–5)
ALP SERPL-CCNC: 71 U/L (ref 46–116)
ALT SERPL W P-5'-P-CCNC: 34 U/L (ref 12–78)
AMPHETAMINES SERPL QL SCN: NEGATIVE
ANION GAP SERPL CALCULATED.3IONS-SCNC: 12 MMOL/L (ref 4–13)
APAP SERPL-MCNC: <2 UG/ML (ref 10–20)
APTT PPP: 36 SECONDS (ref 23–37)
AST SERPL W P-5'-P-CCNC: 23 U/L (ref 5–45)
BARBITURATES UR QL: NEGATIVE
BASOPHILS # BLD AUTO: 0.09 THOUSANDS/ΜL (ref 0–0.1)
BASOPHILS NFR BLD AUTO: 1 % (ref 0–1)
BENZODIAZ UR QL: NEGATIVE
BILIRUB SERPL-MCNC: 0.63 MG/DL (ref 0.2–1)
BUN SERPL-MCNC: 8 MG/DL (ref 5–25)
CALCIUM ALBUM COR SERPL-MCNC: 8.4 MG/DL (ref 8.3–10.1)
CALCIUM SERPL-MCNC: 7.6 MG/DL (ref 8.3–10.1)
CHLORIDE SERPL-SCNC: 91 MMOL/L (ref 100–108)
CO2 SERPL-SCNC: 25 MMOL/L (ref 21–32)
COCAINE UR QL: NEGATIVE
CREAT SERPL-MCNC: 1.39 MG/DL (ref 0.6–1.3)
EOSINOPHIL # BLD AUTO: 0.45 THOUSAND/ΜL (ref 0–0.61)
EOSINOPHIL NFR BLD AUTO: 3 % (ref 0–6)
ERYTHROCYTE [DISTWIDTH] IN BLOOD BY AUTOMATED COUNT: 11 % (ref 11.6–15.1)
ETHANOL SERPL-MCNC: <3 MG/DL (ref 0–3)
FLUAV RNA RESP QL NAA+PROBE: NEGATIVE
FLUBV RNA RESP QL NAA+PROBE: NEGATIVE
GFR SERPL CREATININE-BSD FRML MDRD: 63 ML/MIN/1.73SQ M
GLUCOSE SERPL-MCNC: 141 MG/DL (ref 65–140)
GLUCOSE SERPL-MCNC: 146 MG/DL (ref 65–140)
GLUCOSE SERPL-MCNC: 149 MG/DL (ref 65–140)
GLUCOSE SERPL-MCNC: 161 MG/DL (ref 65–140)
GLUCOSE SERPL-MCNC: 162 MG/DL (ref 65–140)
GLUCOSE SERPL-MCNC: 168 MG/DL (ref 65–140)
GLUCOSE SERPL-MCNC: 217 MG/DL (ref 65–140)
HCT VFR BLD AUTO: 39.9 % (ref 36.5–49.3)
HGB BLD-MCNC: 14.1 G/DL (ref 12–17)
IMM GRANULOCYTES # BLD AUTO: 0.17 THOUSAND/UL (ref 0–0.2)
IMM GRANULOCYTES NFR BLD AUTO: 1 % (ref 0–2)
INR PPP: 1 (ref 0.84–1.19)
LYMPHOCYTES # BLD AUTO: 3.27 THOUSANDS/ΜL (ref 0.6–4.47)
LYMPHOCYTES NFR BLD AUTO: 23 % (ref 14–44)
MCH RBC QN AUTO: 30.1 PG (ref 26.8–34.3)
MCHC RBC AUTO-ENTMCNC: 35.3 G/DL (ref 31.4–37.4)
MCV RBC AUTO: 85 FL (ref 82–98)
METHADONE UR QL: NEGATIVE
MONOCYTES # BLD AUTO: 1.29 THOUSAND/ΜL (ref 0.17–1.22)
MONOCYTES NFR BLD AUTO: 9 % (ref 4–12)
NEUTROPHILS # BLD AUTO: 9.27 THOUSANDS/ΜL (ref 1.85–7.62)
NEUTS SEG NFR BLD AUTO: 63 % (ref 43–75)
NRBC BLD AUTO-RTO: 0 /100 WBCS
OPIATES UR QL SCN: NEGATIVE
OSMOLALITY UR/SERPL-RTO: 268 MMOL/KG (ref 282–298)
OSMOLALITY UR: 81 MMOL/KG
OXYCODONE+OXYMORPHONE UR QL SCN: NEGATIVE
PCP UR QL: NEGATIVE
PLATELET # BLD AUTO: 155 THOUSANDS/UL (ref 149–390)
PMV BLD AUTO: 9.6 FL (ref 8.9–12.7)
POTASSIUM SERPL-SCNC: 3.7 MMOL/L (ref 3.5–5.3)
PROT SERPL-MCNC: 5.7 G/DL (ref 6.4–8.2)
PROTHROMBIN TIME: 13.2 SECONDS (ref 11.6–14.5)
RBC # BLD AUTO: 4.69 MILLION/UL (ref 3.88–5.62)
RSV RNA RESP QL NAA+PROBE: NEGATIVE
SALICYLATES SERPL-MCNC: <3 MG/DL (ref 3–20)
SARS-COV-2 RNA RESP QL NAA+PROBE: NEGATIVE
SODIUM 24H UR-SCNC: 16 MOL/L
SODIUM SERPL-SCNC: 128 MMOL/L (ref 136–145)
SODIUM SERPL-SCNC: 135 MMOL/L (ref 136–145)
THC UR QL: NEGATIVE
TSH SERPL DL<=0.05 MIU/L-ACNC: 3.44 UIU/ML (ref 0.36–3.74)
URATE SERPL-MCNC: 6.7 MG/DL (ref 4.2–8)
WBC # BLD AUTO: 14.54 THOUSAND/UL (ref 4.31–10.16)

## 2021-10-31 PROCEDURE — 85025 COMPLETE CBC W/AUTO DIFF WBC: CPT | Performed by: EMERGENCY MEDICINE

## 2021-10-31 PROCEDURE — 82948 REAGENT STRIP/BLOOD GLUCOSE: CPT

## 2021-10-31 PROCEDURE — 99285 EMERGENCY DEPT VISIT HI MDM: CPT

## 2021-10-31 PROCEDURE — 85730 THROMBOPLASTIN TIME PARTIAL: CPT | Performed by: EMERGENCY MEDICINE

## 2021-10-31 PROCEDURE — 80307 DRUG TEST PRSMV CHEM ANLYZR: CPT | Performed by: EMERGENCY MEDICINE

## 2021-10-31 PROCEDURE — 80053 COMPREHEN METABOLIC PANEL: CPT | Performed by: EMERGENCY MEDICINE

## 2021-10-31 PROCEDURE — 0241U HB NFCT DS VIR RESP RNA 4 TRGT: CPT | Performed by: EMERGENCY MEDICINE

## 2021-10-31 PROCEDURE — 80179 DRUG ASSAY SALICYLATE: CPT | Performed by: EMERGENCY MEDICINE

## 2021-10-31 PROCEDURE — 99220 PR INITIAL OBSERVATION CARE/DAY 70 MINUTES: CPT | Performed by: INTERNAL MEDICINE

## 2021-10-31 PROCEDURE — 96361 HYDRATE IV INFUSION ADD-ON: CPT

## 2021-10-31 PROCEDURE — 84300 ASSAY OF URINE SODIUM: CPT | Performed by: INTERNAL MEDICINE

## 2021-10-31 PROCEDURE — 96374 THER/PROPH/DIAG INJ IV PUSH: CPT

## 2021-10-31 PROCEDURE — 84295 ASSAY OF SERUM SODIUM: CPT | Performed by: INTERNAL MEDICINE

## 2021-10-31 PROCEDURE — 83935 ASSAY OF URINE OSMOLALITY: CPT | Performed by: INTERNAL MEDICINE

## 2021-10-31 PROCEDURE — 82077 ASSAY SPEC XCP UR&BREATH IA: CPT | Performed by: EMERGENCY MEDICINE

## 2021-10-31 PROCEDURE — 84550 ASSAY OF BLOOD/URIC ACID: CPT | Performed by: INTERNAL MEDICINE

## 2021-10-31 PROCEDURE — 99285 EMERGENCY DEPT VISIT HI MDM: CPT | Performed by: EMERGENCY MEDICINE

## 2021-10-31 PROCEDURE — 80143 DRUG ASSAY ACETAMINOPHEN: CPT | Performed by: EMERGENCY MEDICINE

## 2021-10-31 PROCEDURE — 83930 ASSAY OF BLOOD OSMOLALITY: CPT | Performed by: INTERNAL MEDICINE

## 2021-10-31 PROCEDURE — 36415 COLL VENOUS BLD VENIPUNCTURE: CPT | Performed by: EMERGENCY MEDICINE

## 2021-10-31 PROCEDURE — 84443 ASSAY THYROID STIM HORMONE: CPT | Performed by: EMERGENCY MEDICINE

## 2021-10-31 PROCEDURE — 72125 CT NECK SPINE W/O DYE: CPT

## 2021-10-31 PROCEDURE — 70450 CT HEAD/BRAIN W/O DYE: CPT

## 2021-10-31 PROCEDURE — 85610 PROTHROMBIN TIME: CPT | Performed by: EMERGENCY MEDICINE

## 2021-10-31 PROCEDURE — 71045 X-RAY EXAM CHEST 1 VIEW: CPT

## 2021-10-31 RX ORDER — LEVOTHYROXINE SODIUM 0.03 MG/1
25 TABLET ORAL
Status: DISCONTINUED | OUTPATIENT
Start: 2021-10-31 | End: 2021-11-04 | Stop reason: HOSPADM

## 2021-10-31 RX ORDER — ACETAMINOPHEN 325 MG/1
650 TABLET ORAL EVERY 6 HOURS PRN
Status: DISCONTINUED | OUTPATIENT
Start: 2021-10-31 | End: 2021-11-01

## 2021-10-31 RX ORDER — SODIUM CHLORIDE 9 MG/ML
75 INJECTION, SOLUTION INTRAVENOUS CONTINUOUS
Status: DISCONTINUED | OUTPATIENT
Start: 2021-10-31 | End: 2021-10-31

## 2021-10-31 RX ORDER — PROPRANOLOL HYDROCHLORIDE 20 MG/1
20 TABLET ORAL EVERY 12 HOURS SCHEDULED
Status: DISCONTINUED | OUTPATIENT
Start: 2021-10-31 | End: 2021-11-04 | Stop reason: HOSPADM

## 2021-10-31 RX ORDER — CHLORPROMAZINE HYDROCHLORIDE 100 MG/1
100 TABLET, FILM COATED ORAL 2 TIMES DAILY PRN
Status: DISCONTINUED | OUTPATIENT
Start: 2021-10-31 | End: 2021-11-04 | Stop reason: HOSPADM

## 2021-10-31 RX ORDER — ATORVASTATIN CALCIUM 40 MG/1
40 TABLET, FILM COATED ORAL
Status: DISCONTINUED | OUTPATIENT
Start: 2021-10-31 | End: 2021-11-04 | Stop reason: HOSPADM

## 2021-10-31 RX ORDER — LISINOPRIL 2.5 MG/1
2.5 TABLET ORAL DAILY
Status: DISCONTINUED | OUTPATIENT
Start: 2021-10-31 | End: 2021-11-04 | Stop reason: HOSPADM

## 2021-10-31 RX ORDER — ONDANSETRON 2 MG/ML
4 INJECTION INTRAMUSCULAR; INTRAVENOUS ONCE
Status: COMPLETED | OUTPATIENT
Start: 2021-10-31 | End: 2021-10-31

## 2021-10-31 RX ORDER — GABAPENTIN 100 MG/1
100 CAPSULE ORAL
Status: DISCONTINUED | OUTPATIENT
Start: 2021-10-31 | End: 2021-11-04 | Stop reason: HOSPADM

## 2021-10-31 RX ORDER — VITAMIN E 268 MG
400 CAPSULE ORAL DAILY
Status: DISCONTINUED | OUTPATIENT
Start: 2021-10-31 | End: 2021-11-04 | Stop reason: HOSPADM

## 2021-10-31 RX ORDER — CHLORPROMAZINE HYDROCHLORIDE 25 MG/ML
100 INJECTION INTRAMUSCULAR ONCE
Status: COMPLETED | OUTPATIENT
Start: 2021-10-31 | End: 2021-10-31

## 2021-10-31 RX ORDER — CHLORPROMAZINE HYDROCHLORIDE 100 MG/1
100 TABLET, FILM COATED ORAL 3 TIMES DAILY
Status: DISCONTINUED | OUTPATIENT
Start: 2021-10-31 | End: 2021-11-04 | Stop reason: HOSPADM

## 2021-10-31 RX ORDER — ONDANSETRON 2 MG/ML
4 INJECTION INTRAMUSCULAR; INTRAVENOUS EVERY 6 HOURS PRN
Status: DISCONTINUED | OUTPATIENT
Start: 2021-10-31 | End: 2021-11-04 | Stop reason: HOSPADM

## 2021-10-31 RX ORDER — BACLOFEN 10 MG/1
10 TABLET ORAL 3 TIMES DAILY
Status: DISCONTINUED | OUTPATIENT
Start: 2021-10-31 | End: 2021-11-04 | Stop reason: HOSPADM

## 2021-10-31 RX ORDER — LORAZEPAM 0.5 MG/1
0.5 TABLET ORAL 3 TIMES DAILY
Status: DISCONTINUED | OUTPATIENT
Start: 2021-10-31 | End: 2021-11-04 | Stop reason: HOSPADM

## 2021-10-31 RX ORDER — MELATONIN
1000 DAILY
Status: DISCONTINUED | OUTPATIENT
Start: 2021-10-31 | End: 2021-11-04 | Stop reason: HOSPADM

## 2021-10-31 RX ORDER — HYDROXYZINE HYDROCHLORIDE 25 MG/1
25 TABLET, FILM COATED ORAL 3 TIMES DAILY
Status: DISCONTINUED | OUTPATIENT
Start: 2021-10-31 | End: 2021-11-04 | Stop reason: HOSPADM

## 2021-10-31 RX ORDER — DIVALPROEX SODIUM 500 MG/1
500 TABLET, DELAYED RELEASE ORAL 2 TIMES DAILY
Status: DISCONTINUED | OUTPATIENT
Start: 2021-10-31 | End: 2021-11-04 | Stop reason: HOSPADM

## 2021-10-31 RX ORDER — OLANZAPINE 10 MG/1
5 INJECTION, POWDER, LYOPHILIZED, FOR SOLUTION INTRAMUSCULAR ONCE
Status: COMPLETED | OUTPATIENT
Start: 2021-10-31 | End: 2021-10-31

## 2021-10-31 RX ORDER — XYLITOL/YERBA SANTA
5 AEROSOL, SPRAY WITH PUMP (ML) MUCOUS MEMBRANE 4 TIMES DAILY PRN
Status: DISCONTINUED | OUTPATIENT
Start: 2021-10-31 | End: 2021-11-04 | Stop reason: HOSPADM

## 2021-10-31 RX ORDER — CALCIUM CARBONATE 500(1250)
1 TABLET ORAL
Status: DISCONTINUED | OUTPATIENT
Start: 2021-10-31 | End: 2021-11-04 | Stop reason: HOSPADM

## 2021-10-31 RX ORDER — LANOLIN ALCOHOL/MO/W.PET/CERES
3 CREAM (GRAM) TOPICAL
Status: DISCONTINUED | OUTPATIENT
Start: 2021-11-01 | End: 2021-11-04 | Stop reason: HOSPADM

## 2021-10-31 RX ORDER — DOCUSATE SODIUM 100 MG/1
100 CAPSULE, LIQUID FILLED ORAL DAILY
Status: DISCONTINUED | OUTPATIENT
Start: 2021-10-31 | End: 2021-11-04 | Stop reason: HOSPADM

## 2021-10-31 RX ADMIN — ONDANSETRON 4 MG: 2 INJECTION INTRAMUSCULAR; INTRAVENOUS at 01:16

## 2021-10-31 RX ADMIN — HYDROXYZINE HYDROCHLORIDE 25 MG: 25 TABLET ORAL at 15:59

## 2021-10-31 RX ADMIN — DIVALPROEX SODIUM 500 MG: 500 TABLET, DELAYED RELEASE ORAL at 08:29

## 2021-10-31 RX ADMIN — LORAZEPAM 0.5 MG: 0.5 TABLET ORAL at 08:30

## 2021-10-31 RX ADMIN — CHLORPROMAZINE HYDROCHLORIDE 100 MG: 25 INJECTION INTRAMUSCULAR at 04:46

## 2021-10-31 RX ADMIN — SODIUM CHLORIDE 500 ML: 0.9 INJECTION, SOLUTION INTRAVENOUS at 01:08

## 2021-10-31 RX ADMIN — CHLORPROMAZINE HYDROCHLORIDE 100 MG: 100 TABLET, FILM COATED ORAL at 08:30

## 2021-10-31 RX ADMIN — WATER 10 ML: 1 INJECTION INTRAMUSCULAR; INTRAVENOUS; SUBCUTANEOUS at 18:48

## 2021-10-31 RX ADMIN — Medication 400 UNITS: at 08:29

## 2021-10-31 RX ADMIN — PROPRANOLOL HYDROCHLORIDE 20 MG: 20 TABLET ORAL at 21:27

## 2021-10-31 RX ADMIN — DOCUSATE SODIUM 100 MG: 100 CAPSULE ORAL at 08:30

## 2021-10-31 RX ADMIN — BACLOFEN 10 MG: 10 TABLET ORAL at 15:58

## 2021-10-31 RX ADMIN — CHLORPROMAZINE HYDROCHLORIDE 100 MG: 100 TABLET, FILM COATED ORAL at 21:46

## 2021-10-31 RX ADMIN — LISINOPRIL 2.5 MG: 2.5 TABLET ORAL at 08:30

## 2021-10-31 RX ADMIN — PROPRANOLOL HYDROCHLORIDE 20 MG: 20 TABLET ORAL at 08:32

## 2021-10-31 RX ADMIN — CALCIUM 1 TABLET: 500 TABLET ORAL at 08:31

## 2021-10-31 RX ADMIN — BACLOFEN 10 MG: 10 TABLET ORAL at 08:31

## 2021-10-31 RX ADMIN — LEVOTHYROXINE SODIUM 25 MCG: 25 TABLET ORAL at 06:19

## 2021-10-31 RX ADMIN — Medication 1000 UNITS: at 08:30

## 2021-10-31 RX ADMIN — LORAZEPAM 0.5 MG: 0.5 TABLET ORAL at 21:27

## 2021-10-31 RX ADMIN — OLANZAPINE 5 MG: 10 INJECTION, POWDER, FOR SOLUTION INTRAMUSCULAR at 18:48

## 2021-10-31 RX ADMIN — SODIUM CHLORIDE 125 ML/HR: 0.9 INJECTION, SOLUTION INTRAVENOUS at 04:18

## 2021-10-31 RX ADMIN — ACETAMINOPHEN 650 MG: 325 TABLET, FILM COATED ORAL at 11:27

## 2021-10-31 RX ADMIN — LORAZEPAM 0.5 MG: 0.5 TABLET ORAL at 15:58

## 2021-10-31 RX ADMIN — GABAPENTIN 100 MG: 100 CAPSULE ORAL at 21:27

## 2021-10-31 RX ADMIN — INSULIN LISPRO 3 UNITS: 100 INJECTION, SOLUTION INTRAVENOUS; SUBCUTANEOUS at 16:00

## 2021-10-31 RX ADMIN — HYDROXYZINE HYDROCHLORIDE 25 MG: 25 TABLET ORAL at 21:27

## 2021-10-31 RX ADMIN — B-COMPLEX W/ C & FOLIC ACID TAB 1 TABLET: TAB at 08:30

## 2021-10-31 RX ADMIN — DIVALPROEX SODIUM 500 MG: 500 TABLET, DELAYED RELEASE ORAL at 17:06

## 2021-10-31 RX ADMIN — HYDROXYZINE HYDROCHLORIDE 25 MG: 25 TABLET ORAL at 08:30

## 2021-10-31 RX ADMIN — ATORVASTATIN CALCIUM 40 MG: 40 TABLET, FILM COATED ORAL at 21:27

## 2021-10-31 RX ADMIN — BACLOFEN 10 MG: 10 TABLET ORAL at 21:27

## 2021-10-31 RX ADMIN — CHLORPROMAZINE HYDROCHLORIDE 100 MG: 100 TABLET, FILM COATED ORAL at 15:59

## 2021-11-01 PROBLEM — R65.10 SIRS (SYSTEMIC INFLAMMATORY RESPONSE SYNDROME) (HCC): Status: RESOLVED | Noted: 2021-10-31 | Resolved: 2021-11-01

## 2021-11-01 PROBLEM — E87.1 HYPONATREMIA: Status: RESOLVED | Noted: 2021-10-31 | Resolved: 2021-11-01

## 2021-11-01 LAB
ANION GAP SERPL CALCULATED.3IONS-SCNC: 7 MMOL/L (ref 4–13)
BASOPHILS # BLD AUTO: 0.05 THOUSANDS/ΜL (ref 0–0.1)
BASOPHILS NFR BLD AUTO: 1 % (ref 0–1)
BUN SERPL-MCNC: 9 MG/DL (ref 5–25)
CALCIUM SERPL-MCNC: 8.3 MG/DL (ref 8.3–10.1)
CHLORIDE SERPL-SCNC: 106 MMOL/L (ref 100–108)
CO2 SERPL-SCNC: 28 MMOL/L (ref 21–32)
CREAT SERPL-MCNC: 1.34 MG/DL (ref 0.6–1.3)
EOSINOPHIL # BLD AUTO: 0.37 THOUSAND/ΜL (ref 0–0.61)
EOSINOPHIL NFR BLD AUTO: 4 % (ref 0–6)
ERYTHROCYTE [DISTWIDTH] IN BLOOD BY AUTOMATED COUNT: 11.5 % (ref 11.6–15.1)
FLUAV RNA RESP QL NAA+PROBE: NEGATIVE
FLUBV RNA RESP QL NAA+PROBE: NEGATIVE
GFR SERPL CREATININE-BSD FRML MDRD: 66 ML/MIN/1.73SQ M
GLUCOSE SERPL-MCNC: 163 MG/DL (ref 65–140)
GLUCOSE SERPL-MCNC: 206 MG/DL (ref 65–140)
GLUCOSE SERPL-MCNC: 207 MG/DL (ref 65–140)
GLUCOSE SERPL-MCNC: 228 MG/DL (ref 65–140)
GLUCOSE SERPL-MCNC: 250 MG/DL (ref 65–140)
HCT VFR BLD AUTO: 41.8 % (ref 36.5–49.3)
HGB BLD-MCNC: 14.2 G/DL (ref 12–17)
IMM GRANULOCYTES # BLD AUTO: 0.07 THOUSAND/UL (ref 0–0.2)
IMM GRANULOCYTES NFR BLD AUTO: 1 % (ref 0–2)
LYMPHOCYTES # BLD AUTO: 2.82 THOUSANDS/ΜL (ref 0.6–4.47)
LYMPHOCYTES NFR BLD AUTO: 28 % (ref 14–44)
MCH RBC QN AUTO: 29.7 PG (ref 26.8–34.3)
MCHC RBC AUTO-ENTMCNC: 34 G/DL (ref 31.4–37.4)
MCV RBC AUTO: 87 FL (ref 82–98)
MONOCYTES # BLD AUTO: 1.07 THOUSAND/ΜL (ref 0.17–1.22)
MONOCYTES NFR BLD AUTO: 11 % (ref 4–12)
NEUTROPHILS # BLD AUTO: 5.79 THOUSANDS/ΜL (ref 1.85–7.62)
NEUTS SEG NFR BLD AUTO: 55 % (ref 43–75)
NRBC BLD AUTO-RTO: 0 /100 WBCS
PLATELET # BLD AUTO: 157 THOUSANDS/UL (ref 149–390)
PMV BLD AUTO: 9.5 FL (ref 8.9–12.7)
POTASSIUM SERPL-SCNC: 4.3 MMOL/L (ref 3.5–5.3)
RBC # BLD AUTO: 4.78 MILLION/UL (ref 3.88–5.62)
RSV RNA RESP QL NAA+PROBE: NEGATIVE
SARS-COV-2 RNA RESP QL NAA+PROBE: NEGATIVE
SODIUM SERPL-SCNC: 141 MMOL/L (ref 136–145)
WBC # BLD AUTO: 10.17 THOUSAND/UL (ref 4.31–10.16)

## 2021-11-01 PROCEDURE — 99213 OFFICE O/P EST LOW 20 MIN: CPT | Performed by: PSYCHIATRY & NEUROLOGY

## 2021-11-01 PROCEDURE — 99225 PR SBSQ OBSERVATION CARE/DAY 25 MINUTES: CPT | Performed by: INTERNAL MEDICINE

## 2021-11-01 PROCEDURE — 80048 BASIC METABOLIC PNL TOTAL CA: CPT | Performed by: INTERNAL MEDICINE

## 2021-11-01 PROCEDURE — 0241U HB NFCT DS VIR RESP RNA 4 TRGT: CPT

## 2021-11-01 PROCEDURE — 82948 REAGENT STRIP/BLOOD GLUCOSE: CPT

## 2021-11-01 PROCEDURE — 85025 COMPLETE CBC W/AUTO DIFF WBC: CPT | Performed by: INTERNAL MEDICINE

## 2021-11-01 RX ORDER — ACETAMINOPHEN 325 MG/1
975 TABLET ORAL EVERY 6 HOURS PRN
Status: DISCONTINUED | OUTPATIENT
Start: 2021-11-01 | End: 2021-11-04 | Stop reason: HOSPADM

## 2021-11-01 RX ORDER — DIPHENHYDRAMINE HCL 25 MG
25 TABLET ORAL EVERY 8 HOURS PRN
Status: DISCONTINUED | OUTPATIENT
Start: 2021-11-01 | End: 2021-11-04 | Stop reason: HOSPADM

## 2021-11-01 RX ORDER — OLANZAPINE 10 MG/1
5 INJECTION, POWDER, LYOPHILIZED, FOR SOLUTION INTRAMUSCULAR EVERY 8 HOURS PRN
Status: DISCONTINUED | OUTPATIENT
Start: 2021-11-01 | End: 2021-11-04

## 2021-11-01 RX ADMIN — DIVALPROEX SODIUM 500 MG: 500 TABLET, DELAYED RELEASE ORAL at 18:17

## 2021-11-01 RX ADMIN — INSULIN LISPRO 2 UNITS: 100 INJECTION, SOLUTION INTRAVENOUS; SUBCUTANEOUS at 13:00

## 2021-11-01 RX ADMIN — DIVALPROEX SODIUM 500 MG: 500 TABLET, DELAYED RELEASE ORAL at 08:45

## 2021-11-01 RX ADMIN — CHLORPROMAZINE HYDROCHLORIDE 100 MG: 100 TABLET, FILM COATED ORAL at 16:19

## 2021-11-01 RX ADMIN — CALCIUM 1 TABLET: 500 TABLET ORAL at 08:52

## 2021-11-01 RX ADMIN — PROPRANOLOL HYDROCHLORIDE 20 MG: 20 TABLET ORAL at 08:45

## 2021-11-01 RX ADMIN — HYDROXYZINE HYDROCHLORIDE 25 MG: 25 TABLET ORAL at 16:19

## 2021-11-01 RX ADMIN — LORAZEPAM 0.5 MG: 0.5 TABLET ORAL at 08:46

## 2021-11-01 RX ADMIN — Medication 1000 UNITS: at 08:45

## 2021-11-01 RX ADMIN — INSULIN LISPRO 1 UNITS: 100 INJECTION, SOLUTION INTRAVENOUS; SUBCUTANEOUS at 08:47

## 2021-11-01 RX ADMIN — BACLOFEN 10 MG: 10 TABLET ORAL at 16:19

## 2021-11-01 RX ADMIN — INSULIN LISPRO 2 UNITS: 100 INJECTION, SOLUTION INTRAVENOUS; SUBCUTANEOUS at 16:20

## 2021-11-01 RX ADMIN — BACLOFEN 10 MG: 10 TABLET ORAL at 08:45

## 2021-11-01 RX ADMIN — HYDROXYZINE HYDROCHLORIDE 25 MG: 25 TABLET ORAL at 08:46

## 2021-11-01 RX ADMIN — Medication 1 SPRAY: at 22:54

## 2021-11-01 RX ADMIN — Medication 3 MG: at 00:34

## 2021-11-01 RX ADMIN — Medication 3 MG: at 22:49

## 2021-11-01 RX ADMIN — CHLORPROMAZINE HYDROCHLORIDE 100 MG: 100 TABLET, FILM COATED ORAL at 10:38

## 2021-11-01 RX ADMIN — BACLOFEN 10 MG: 10 TABLET ORAL at 22:50

## 2021-11-01 RX ADMIN — LEVOTHYROXINE SODIUM 25 MCG: 25 TABLET ORAL at 05:18

## 2021-11-01 RX ADMIN — B-COMPLEX W/ C & FOLIC ACID TAB 1 TABLET: TAB at 08:45

## 2021-11-01 RX ADMIN — GABAPENTIN 100 MG: 100 CAPSULE ORAL at 22:49

## 2021-11-01 RX ADMIN — LORAZEPAM 0.5 MG: 0.5 TABLET ORAL at 22:49

## 2021-11-01 RX ADMIN — DOCUSATE SODIUM 100 MG: 100 CAPSULE ORAL at 08:45

## 2021-11-01 RX ADMIN — LORAZEPAM 0.5 MG: 0.5 TABLET ORAL at 16:19

## 2021-11-01 RX ADMIN — ACETAMINOPHEN 650 MG: 325 TABLET, FILM COATED ORAL at 16:18

## 2021-11-01 RX ADMIN — Medication 400 UNITS: at 08:45

## 2021-11-01 RX ADMIN — ATORVASTATIN CALCIUM 40 MG: 40 TABLET, FILM COATED ORAL at 22:49

## 2021-11-01 RX ADMIN — HYDROXYZINE HYDROCHLORIDE 25 MG: 25 TABLET ORAL at 22:49

## 2021-11-01 RX ADMIN — PROPRANOLOL HYDROCHLORIDE 20 MG: 20 TABLET ORAL at 22:53

## 2021-11-01 RX ADMIN — INSULIN LISPRO 2 UNITS: 100 INJECTION, SOLUTION INTRAVENOUS; SUBCUTANEOUS at 22:49

## 2021-11-01 RX ADMIN — Medication 1 SPRAY: at 13:44

## 2021-11-01 RX ADMIN — CHLORPROMAZINE HYDROCHLORIDE 100 MG: 100 TABLET, FILM COATED ORAL at 22:50

## 2021-11-02 PROBLEM — D72.829 LEUKOCYTOSIS: Status: RESOLVED | Noted: 2021-10-31 | Resolved: 2021-11-02

## 2021-11-02 LAB
GLUCOSE SERPL-MCNC: 195 MG/DL (ref 65–140)
GLUCOSE SERPL-MCNC: 203 MG/DL (ref 65–140)
GLUCOSE SERPL-MCNC: 287 MG/DL (ref 65–140)
GLUCOSE SERPL-MCNC: 295 MG/DL (ref 65–140)

## 2021-11-02 PROCEDURE — 82948 REAGENT STRIP/BLOOD GLUCOSE: CPT

## 2021-11-02 PROCEDURE — 99231 SBSQ HOSP IP/OBS SF/LOW 25: CPT | Performed by: INTERNAL MEDICINE

## 2021-11-02 RX ADMIN — Medication 3 MG: at 21:17

## 2021-11-02 RX ADMIN — B-COMPLEX W/ C & FOLIC ACID TAB 1 TABLET: TAB at 08:35

## 2021-11-02 RX ADMIN — PROPRANOLOL HYDROCHLORIDE 20 MG: 20 TABLET ORAL at 08:35

## 2021-11-02 RX ADMIN — BACLOFEN 10 MG: 10 TABLET ORAL at 08:35

## 2021-11-02 RX ADMIN — ACETAMINOPHEN 975 MG: 325 TABLET, FILM COATED ORAL at 08:40

## 2021-11-02 RX ADMIN — CHLORPROMAZINE HYDROCHLORIDE 100 MG: 100 TABLET, FILM COATED ORAL at 17:53

## 2021-11-02 RX ADMIN — DIVALPROEX SODIUM 500 MG: 500 TABLET, DELAYED RELEASE ORAL at 17:52

## 2021-11-02 RX ADMIN — OLANZAPINE 5 MG: 10 INJECTION, POWDER, FOR SOLUTION INTRAMUSCULAR at 19:27

## 2021-11-02 RX ADMIN — ATORVASTATIN CALCIUM 40 MG: 40 TABLET, FILM COATED ORAL at 21:17

## 2021-11-02 RX ADMIN — DOCUSATE SODIUM 100 MG: 100 CAPSULE ORAL at 08:36

## 2021-11-02 RX ADMIN — METFORMIN HYDROCHLORIDE 1000 MG: 500 TABLET, FILM COATED ORAL at 17:53

## 2021-11-02 RX ADMIN — INSULIN LISPRO 2 UNITS: 100 INJECTION, SOLUTION INTRAVENOUS; SUBCUTANEOUS at 08:37

## 2021-11-02 RX ADMIN — LEVOTHYROXINE SODIUM 25 MCG: 25 TABLET ORAL at 05:25

## 2021-11-02 RX ADMIN — Medication 1 SPRAY: at 08:37

## 2021-11-02 RX ADMIN — DIPHENHYDRAMINE HCL 25 MG: 25 TABLET, COATED ORAL at 23:12

## 2021-11-02 RX ADMIN — HYDROXYZINE HYDROCHLORIDE 25 MG: 25 TABLET ORAL at 08:35

## 2021-11-02 RX ADMIN — LORAZEPAM 0.5 MG: 0.5 TABLET ORAL at 21:17

## 2021-11-02 RX ADMIN — PROPRANOLOL HYDROCHLORIDE 20 MG: 20 TABLET ORAL at 21:20

## 2021-11-02 RX ADMIN — BACLOFEN 10 MG: 10 TABLET ORAL at 21:18

## 2021-11-02 RX ADMIN — GABAPENTIN 100 MG: 100 CAPSULE ORAL at 21:17

## 2021-11-02 RX ADMIN — HYDROXYZINE HYDROCHLORIDE 25 MG: 25 TABLET ORAL at 17:52

## 2021-11-02 RX ADMIN — HYDROXYZINE HYDROCHLORIDE 25 MG: 25 TABLET ORAL at 21:19

## 2021-11-02 RX ADMIN — LISINOPRIL 2.5 MG: 2.5 TABLET ORAL at 08:35

## 2021-11-02 RX ADMIN — INSULIN LISPRO 4 UNITS: 100 INJECTION, SOLUTION INTRAVENOUS; SUBCUTANEOUS at 11:31

## 2021-11-02 RX ADMIN — Medication 1000 UNITS: at 08:35

## 2021-11-02 RX ADMIN — Medication 400 UNITS: at 08:35

## 2021-11-02 RX ADMIN — Medication 1 SPRAY: at 15:40

## 2021-11-02 RX ADMIN — LORAZEPAM 0.5 MG: 0.5 TABLET ORAL at 08:35

## 2021-11-02 RX ADMIN — LORAZEPAM 0.5 MG: 0.5 TABLET ORAL at 17:53

## 2021-11-02 RX ADMIN — DIVALPROEX SODIUM 500 MG: 500 TABLET, DELAYED RELEASE ORAL at 08:35

## 2021-11-02 RX ADMIN — CALCIUM 1 TABLET: 500 TABLET ORAL at 08:35

## 2021-11-02 RX ADMIN — INSULIN LISPRO 4 UNITS: 100 INJECTION, SOLUTION INTRAVENOUS; SUBCUTANEOUS at 17:53

## 2021-11-02 RX ADMIN — BACLOFEN 10 MG: 10 TABLET ORAL at 17:52

## 2021-11-02 RX ADMIN — CHLORPROMAZINE HYDROCHLORIDE 100 MG: 100 TABLET, FILM COATED ORAL at 21:19

## 2021-11-02 RX ADMIN — CHLORPROMAZINE HYDROCHLORIDE 100 MG: 100 TABLET, FILM COATED ORAL at 08:37

## 2021-11-02 RX ADMIN — INSULIN LISPRO 1 UNITS: 100 INJECTION, SOLUTION INTRAVENOUS; SUBCUTANEOUS at 21:20

## 2021-11-03 LAB
GLUCOSE SERPL-MCNC: 201 MG/DL (ref 65–140)
GLUCOSE SERPL-MCNC: 212 MG/DL (ref 65–140)
GLUCOSE SERPL-MCNC: 225 MG/DL (ref 65–140)
GLUCOSE SERPL-MCNC: 242 MG/DL (ref 65–140)

## 2021-11-03 PROCEDURE — 99231 SBSQ HOSP IP/OBS SF/LOW 25: CPT | Performed by: INTERNAL MEDICINE

## 2021-11-03 PROCEDURE — 82948 REAGENT STRIP/BLOOD GLUCOSE: CPT

## 2021-11-03 RX ADMIN — CHLORPROMAZINE HYDROCHLORIDE 100 MG: 100 TABLET, FILM COATED ORAL at 15:05

## 2021-11-03 RX ADMIN — CHLORPROMAZINE HYDROCHLORIDE 100 MG: 100 TABLET, FILM COATED ORAL at 21:26

## 2021-11-03 RX ADMIN — CHLORPROMAZINE HYDROCHLORIDE 100 MG: 100 TABLET, FILM COATED ORAL at 08:54

## 2021-11-03 RX ADMIN — DIVALPROEX SODIUM 500 MG: 500 TABLET, DELAYED RELEASE ORAL at 08:53

## 2021-11-03 RX ADMIN — Medication 400 UNITS: at 08:53

## 2021-11-03 RX ADMIN — INSULIN LISPRO 4 UNITS: 100 INJECTION, SOLUTION INTRAVENOUS; SUBCUTANEOUS at 16:41

## 2021-11-03 RX ADMIN — DIVALPROEX SODIUM 500 MG: 500 TABLET, DELAYED RELEASE ORAL at 18:04

## 2021-11-03 RX ADMIN — Medication 1000 UNITS: at 08:53

## 2021-11-03 RX ADMIN — BACLOFEN 10 MG: 10 TABLET ORAL at 08:53

## 2021-11-03 RX ADMIN — INSULIN LISPRO 4 UNITS: 100 INJECTION, SOLUTION INTRAVENOUS; SUBCUTANEOUS at 11:12

## 2021-11-03 RX ADMIN — PROPRANOLOL HYDROCHLORIDE 20 MG: 20 TABLET ORAL at 21:23

## 2021-11-03 RX ADMIN — INSULIN LISPRO 4 UNITS: 100 INJECTION, SOLUTION INTRAVENOUS; SUBCUTANEOUS at 08:55

## 2021-11-03 RX ADMIN — BACLOFEN 10 MG: 10 TABLET ORAL at 21:23

## 2021-11-03 RX ADMIN — HYDROXYZINE HYDROCHLORIDE 25 MG: 25 TABLET ORAL at 08:53

## 2021-11-03 RX ADMIN — ATORVASTATIN CALCIUM 40 MG: 40 TABLET, FILM COATED ORAL at 21:24

## 2021-11-03 RX ADMIN — CALCIUM 1 TABLET: 500 TABLET ORAL at 09:00

## 2021-11-03 RX ADMIN — HYDROXYZINE HYDROCHLORIDE 25 MG: 25 TABLET ORAL at 21:24

## 2021-11-03 RX ADMIN — METFORMIN HYDROCHLORIDE 1000 MG: 500 TABLET, FILM COATED ORAL at 16:37

## 2021-11-03 RX ADMIN — LEVOTHYROXINE SODIUM 25 MCG: 25 TABLET ORAL at 06:06

## 2021-11-03 RX ADMIN — B-COMPLEX W/ C & FOLIC ACID TAB 1 TABLET: TAB at 08:53

## 2021-11-03 RX ADMIN — LORAZEPAM 0.5 MG: 0.5 TABLET ORAL at 08:53

## 2021-11-03 RX ADMIN — OLANZAPINE 5 MG: 10 INJECTION, POWDER, FOR SOLUTION INTRAMUSCULAR at 16:32

## 2021-11-03 RX ADMIN — SITAGLIPTIN 50 MG: 50 TABLET, FILM COATED ORAL at 11:13

## 2021-11-03 RX ADMIN — BACLOFEN 10 MG: 10 TABLET ORAL at 15:04

## 2021-11-03 RX ADMIN — GABAPENTIN 100 MG: 100 CAPSULE ORAL at 21:24

## 2021-11-03 RX ADMIN — LORAZEPAM 0.5 MG: 0.5 TABLET ORAL at 21:23

## 2021-11-03 RX ADMIN — INSULIN LISPRO 2 UNITS: 100 INJECTION, SOLUTION INTRAVENOUS; SUBCUTANEOUS at 21:30

## 2021-11-03 RX ADMIN — HYDROXYZINE HYDROCHLORIDE 25 MG: 25 TABLET ORAL at 15:04

## 2021-11-03 RX ADMIN — DOCUSATE SODIUM 100 MG: 100 CAPSULE ORAL at 08:53

## 2021-11-03 RX ADMIN — METFORMIN HYDROCHLORIDE 1000 MG: 500 TABLET, FILM COATED ORAL at 09:00

## 2021-11-03 RX ADMIN — Medication 3 MG: at 21:23

## 2021-11-03 RX ADMIN — LORAZEPAM 0.5 MG: 0.5 TABLET ORAL at 15:05

## 2021-11-03 RX ADMIN — INSULIN LISPRO 2 UNITS: 100 INJECTION, SOLUTION INTRAVENOUS; SUBCUTANEOUS at 21:28

## 2021-11-04 VITALS
OXYGEN SATURATION: 94 % | BODY MASS INDEX: 28.58 KG/M2 | HEIGHT: 69 IN | DIASTOLIC BLOOD PRESSURE: 69 MMHG | WEIGHT: 193 LBS | HEART RATE: 99 BPM | TEMPERATURE: 98.3 F | SYSTOLIC BLOOD PRESSURE: 124 MMHG | RESPIRATION RATE: 18 BRPM

## 2021-11-04 LAB
GLUCOSE SERPL-MCNC: 152 MG/DL (ref 65–140)
GLUCOSE SERPL-MCNC: 181 MG/DL (ref 65–140)
GLUCOSE SERPL-MCNC: 317 MG/DL (ref 65–140)

## 2021-11-04 PROCEDURE — RECHECK: Performed by: INTERNAL MEDICINE

## 2021-11-04 PROCEDURE — 99238 HOSP IP/OBS DSCHRG MGMT 30/<: CPT | Performed by: INTERNAL MEDICINE

## 2021-11-04 PROCEDURE — 82948 REAGENT STRIP/BLOOD GLUCOSE: CPT

## 2021-11-04 RX ORDER — OLANZAPINE 10 MG/1
5 INJECTION, POWDER, LYOPHILIZED, FOR SOLUTION INTRAMUSCULAR EVERY 8 HOURS PRN
Status: DISCONTINUED | OUTPATIENT
Start: 2021-11-04 | End: 2021-11-04 | Stop reason: HOSPADM

## 2021-11-04 RX ADMIN — LORAZEPAM 0.5 MG: 0.5 TABLET ORAL at 16:38

## 2021-11-04 RX ADMIN — SITAGLIPTIN 50 MG: 50 TABLET, FILM COATED ORAL at 09:12

## 2021-11-04 RX ADMIN — INSULIN LISPRO 2 UNITS: 100 INJECTION, SOLUTION INTRAVENOUS; SUBCUTANEOUS at 16:39

## 2021-11-04 RX ADMIN — DIVALPROEX SODIUM 500 MG: 500 TABLET, DELAYED RELEASE ORAL at 17:49

## 2021-11-04 RX ADMIN — METFORMIN HYDROCHLORIDE 1000 MG: 500 TABLET, FILM COATED ORAL at 09:09

## 2021-11-04 RX ADMIN — Medication 1000 UNITS: at 09:08

## 2021-11-04 RX ADMIN — LISINOPRIL 2.5 MG: 2.5 TABLET ORAL at 09:10

## 2021-11-04 RX ADMIN — PROPRANOLOL HYDROCHLORIDE 20 MG: 20 TABLET ORAL at 09:10

## 2021-11-04 RX ADMIN — B-COMPLEX W/ C & FOLIC ACID TAB 1 TABLET: TAB at 09:10

## 2021-11-04 RX ADMIN — DIVALPROEX SODIUM 500 MG: 500 TABLET, DELAYED RELEASE ORAL at 09:08

## 2021-11-04 RX ADMIN — BACLOFEN 10 MG: 10 TABLET ORAL at 09:09

## 2021-11-04 RX ADMIN — LORAZEPAM 0.5 MG: 0.5 TABLET ORAL at 09:09

## 2021-11-04 RX ADMIN — CHLORPROMAZINE HYDROCHLORIDE 100 MG: 100 TABLET, FILM COATED ORAL at 16:39

## 2021-11-04 RX ADMIN — INSULIN LISPRO 2 UNITS: 100 INJECTION, SOLUTION INTRAVENOUS; SUBCUTANEOUS at 09:12

## 2021-11-04 RX ADMIN — LEVOTHYROXINE SODIUM 25 MCG: 25 TABLET ORAL at 06:52

## 2021-11-04 RX ADMIN — BACLOFEN 10 MG: 10 TABLET ORAL at 16:39

## 2021-11-04 RX ADMIN — Medication 400 UNITS: at 09:10

## 2021-11-04 RX ADMIN — METFORMIN HYDROCHLORIDE 1000 MG: 500 TABLET, FILM COATED ORAL at 16:38

## 2021-11-04 RX ADMIN — OLANZAPINE 5 MG: 10 INJECTION, POWDER, FOR SOLUTION INTRAMUSCULAR at 16:40

## 2021-11-04 RX ADMIN — INSULIN LISPRO 8 UNITS: 100 INJECTION, SOLUTION INTRAVENOUS; SUBCUTANEOUS at 12:23

## 2021-11-04 RX ADMIN — CHLORPROMAZINE HYDROCHLORIDE 100 MG: 100 TABLET, FILM COATED ORAL at 09:11

## 2021-11-04 RX ADMIN — HYDROXYZINE HYDROCHLORIDE 25 MG: 25 TABLET ORAL at 09:09

## 2021-11-04 RX ADMIN — OLANZAPINE 5 MG: 10 INJECTION, POWDER, FOR SOLUTION INTRAMUSCULAR at 00:02

## 2021-11-04 RX ADMIN — DOCUSATE SODIUM 100 MG: 100 CAPSULE ORAL at 09:09

## 2021-11-04 RX ADMIN — HYDROXYZINE HYDROCHLORIDE 25 MG: 25 TABLET ORAL at 16:39

## 2021-11-04 RX ADMIN — CALCIUM 1 TABLET: 500 TABLET ORAL at 09:09

## 2021-11-04 RX ADMIN — WATER 2.1 ML: 1 INJECTION INTRAMUSCULAR; INTRAVENOUS; SUBCUTANEOUS at 01:26

## 2021-11-05 ENCOUNTER — TRANSITIONAL CARE MANAGEMENT (OUTPATIENT)
Dept: FAMILY MEDICINE CLINIC | Facility: CLINIC | Age: 41
End: 2021-11-05

## 2021-11-05 ENCOUNTER — ANTICOAG VISIT (OUTPATIENT)
Dept: FAMILY MEDICINE CLINIC | Facility: CLINIC | Age: 41
End: 2021-11-05

## 2021-11-15 DIAGNOSIS — E78.49 OTHER HYPERLIPIDEMIA: ICD-10-CM

## 2021-11-15 DIAGNOSIS — E55.9 VITAMIN D DEFICIENCY: ICD-10-CM

## 2021-11-15 DIAGNOSIS — E03.9 HYPOTHYROIDISM, UNSPECIFIED TYPE: ICD-10-CM

## 2021-11-15 DIAGNOSIS — E78.5 HYPERLIPIDEMIA, UNSPECIFIED HYPERLIPIDEMIA TYPE: ICD-10-CM

## 2021-11-15 DIAGNOSIS — F25.0 SCHIZOAFFECTIVE DISORDER, BIPOLAR TYPE (HCC): Chronic | ICD-10-CM

## 2021-11-15 RX ORDER — CALCIUM CARBONATE 500(1250)
1 TABLET ORAL
Qty: 30 TABLET | Refills: 5 | Status: SHIPPED | OUTPATIENT
Start: 2021-11-15 | End: 2022-05-13 | Stop reason: SDUPTHER

## 2021-11-15 RX ORDER — LORATADINE 10 MG/1
10 TABLET ORAL DAILY
Qty: 30 TABLET | Refills: 5 | Status: SHIPPED | OUTPATIENT
Start: 2021-11-15 | End: 2022-05-05 | Stop reason: SDUPTHER

## 2021-11-15 RX ORDER — LEVOTHYROXINE SODIUM 0.03 MG/1
25 TABLET ORAL
Qty: 30 TABLET | Refills: 5 | Status: SHIPPED | OUTPATIENT
Start: 2021-11-15 | End: 2022-05-05 | Stop reason: SDUPTHER

## 2021-11-15 RX ORDER — ATORVASTATIN CALCIUM 40 MG/1
40 TABLET, FILM COATED ORAL
Qty: 30 TABLET | Refills: 5 | Status: SHIPPED | OUTPATIENT
Start: 2021-11-15 | End: 2022-05-05 | Stop reason: SDUPTHER

## 2021-11-15 RX ORDER — MELATONIN
1000 DAILY
Qty: 31 TABLET | Refills: 5 | Status: SHIPPED | OUTPATIENT
Start: 2021-11-15 | End: 2022-05-13 | Stop reason: SDUPTHER

## 2021-11-16 ENCOUNTER — TELEPHONE (OUTPATIENT)
Dept: FAMILY MEDICINE CLINIC | Facility: CLINIC | Age: 41
End: 2021-11-16

## 2021-11-18 DIAGNOSIS — H25.11 NUCLEAR SCLEROTIC CATARACT OF RIGHT EYE: Primary | ICD-10-CM

## 2021-11-19 ENCOUNTER — OFFICE VISIT (OUTPATIENT)
Dept: FAMILY MEDICINE CLINIC | Facility: CLINIC | Age: 41
End: 2021-11-19

## 2021-11-19 VITALS
DIASTOLIC BLOOD PRESSURE: 82 MMHG | TEMPERATURE: 98.1 F | RESPIRATION RATE: 16 BRPM | HEART RATE: 89 BPM | HEIGHT: 69 IN | OXYGEN SATURATION: 100 % | BODY MASS INDEX: 29.89 KG/M2 | WEIGHT: 201.8 LBS | SYSTOLIC BLOOD PRESSURE: 142 MMHG

## 2021-11-19 DIAGNOSIS — E11.65 TYPE 2 DIABETES MELLITUS WITH HYPERGLYCEMIA, WITHOUT LONG-TERM CURRENT USE OF INSULIN (HCC): Primary | ICD-10-CM

## 2021-11-19 PROCEDURE — 99213 OFFICE O/P EST LOW 20 MIN: CPT | Performed by: FAMILY MEDICINE

## 2021-11-19 RX ORDER — CARBOXYMETHYLCELLULOSE SODIUM 5 MG/ML
1 SOLUTION/ DROPS OPHTHALMIC 3 TIMES DAILY PRN
Qty: 70 EACH | Refills: 5 | Status: SHIPPED | OUTPATIENT
Start: 2021-11-19 | End: 2022-05-18 | Stop reason: SDUPTHER

## 2021-12-03 ENCOUNTER — OFFICE VISIT (OUTPATIENT)
Dept: FAMILY MEDICINE CLINIC | Facility: CLINIC | Age: 41
End: 2021-12-03

## 2021-12-03 VITALS
HEIGHT: 69 IN | BODY MASS INDEX: 30.07 KG/M2 | HEART RATE: 79 BPM | OXYGEN SATURATION: 98 % | SYSTOLIC BLOOD PRESSURE: 118 MMHG | WEIGHT: 203 LBS | DIASTOLIC BLOOD PRESSURE: 78 MMHG | TEMPERATURE: 95.8 F

## 2021-12-03 DIAGNOSIS — Z23 ENCOUNTER FOR VACCINATION: ICD-10-CM

## 2021-12-03 DIAGNOSIS — Z00.00 MEDICARE ANNUAL WELLNESS VISIT, INITIAL: Primary | ICD-10-CM

## 2021-12-03 PROCEDURE — 90670 PCV13 VACCINE IM: CPT | Performed by: FAMILY MEDICINE

## 2021-12-03 PROCEDURE — G0438 PPPS, INITIAL VISIT: HCPCS | Performed by: FAMILY MEDICINE

## 2021-12-03 PROCEDURE — G0009 ADMIN PNEUMOCOCCAL VACCINE: HCPCS | Performed by: FAMILY MEDICINE

## 2021-12-20 ENCOUNTER — HOSPITAL ENCOUNTER (EMERGENCY)
Facility: HOSPITAL | Age: 41
Discharge: HOME | End: 2021-12-20
Attending: EMERGENCY MEDICINE | Admitting: EMERGENCY MEDICINE
Payer: COMMERCIAL

## 2021-12-20 VITALS
HEART RATE: 96 BPM | BODY MASS INDEX: 30.07 KG/M2 | SYSTOLIC BLOOD PRESSURE: 145 MMHG | OXYGEN SATURATION: 99 % | DIASTOLIC BLOOD PRESSURE: 76 MMHG | HEIGHT: 69 IN | RESPIRATION RATE: 18 BRPM | TEMPERATURE: 97.4 F | WEIGHT: 203 LBS
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
## 2021-12-20 DIAGNOSIS — Z71.1 WORRIED WELL: ICD-10-CM

## 2021-12-20 PROCEDURE — 99282 EMERGENCY DEPT VISIT SF MDM: CPT | Performed by: EMERGENCY MEDICINE

## 2021-12-20 PROCEDURE — 99283 EMERGENCY DEPT VISIT LOW MDM: CPT
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
## 2021-12-29 ENCOUNTER — OFFICE VISIT (OUTPATIENT)
Dept: FAMILY MEDICINE CLINIC | Facility: CLINIC | Age: 41
End: 2021-12-29

## 2021-12-29 VITALS
BODY MASS INDEX: 29.51 KG/M2 | WEIGHT: 199.2 LBS | HEART RATE: 84 BPM | RESPIRATION RATE: 14 BRPM | DIASTOLIC BLOOD PRESSURE: 80 MMHG | SYSTOLIC BLOOD PRESSURE: 130 MMHG | HEIGHT: 69 IN | TEMPERATURE: 97.3 F

## 2021-12-29 DIAGNOSIS — R05.9 COUGH: Primary | ICD-10-CM

## 2021-12-29 DIAGNOSIS — G25.0 ESSENTIAL TREMOR: ICD-10-CM

## 2021-12-29 PROCEDURE — G2025 DIS SITE TELE SVCS RHC/FQHC: HCPCS | Performed by: FAMILY MEDICINE

## 2021-12-30 PROCEDURE — U0003 INFECTIOUS AGENT DETECTION BY NUCLEIC ACID (DNA OR RNA); SEVERE ACUTE RESPIRATORY SYNDROME CORONAVIRUS 2 (SARS-COV-2) (CORONAVIRUS DISEASE [COVID-19]), AMPLIFIED PROBE TECHNIQUE, MAKING USE OF HIGH THROUGHPUT TECHNOLOGIES AS DESCRIBED BY CMS-2020-01-R: HCPCS | Performed by: FAMILY MEDICINE

## 2022-01-03 RX ORDER — PROPRANOLOL HYDROCHLORIDE 20 MG/1
20 TABLET ORAL EVERY 12 HOURS SCHEDULED
Qty: 60 TABLET | Refills: 5 | Status: SHIPPED | OUTPATIENT
Start: 2022-01-03 | End: 2022-01-05 | Stop reason: SDUPTHER

## 2022-01-05 DIAGNOSIS — G25.0 ESSENTIAL TREMOR: ICD-10-CM

## 2022-01-05 DIAGNOSIS — F25.0 SCHIZOAFFECTIVE DISORDER, BIPOLAR TYPE (HCC): Chronic | ICD-10-CM

## 2022-01-06 DIAGNOSIS — F25.0 SCHIZOAFFECTIVE DISORDER, BIPOLAR TYPE (HCC): Chronic | ICD-10-CM

## 2022-01-06 DIAGNOSIS — H25.11 NUCLEAR SCLEROTIC CATARACT OF RIGHT EYE: ICD-10-CM

## 2022-01-07 ENCOUNTER — TELEMEDICINE (OUTPATIENT)
Dept: FAMILY MEDICINE CLINIC | Facility: CLINIC | Age: 42
End: 2022-01-07

## 2022-01-07 DIAGNOSIS — U07.1 COVID-19 VIRUS INFECTION: Primary | ICD-10-CM

## 2022-01-07 PROCEDURE — G2025 DIS SITE TELE SVCS RHC/FQHC: HCPCS | Performed by: FAMILY MEDICINE
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
## 2022-01-09 RX ORDER — METFORMIN HYDROCHLORIDE EXTENDED-RELEASE TABLETS 1000 MG/1
1000 TABLET, FILM COATED, EXTENDED RELEASE ORAL 2 TIMES DAILY WITH MEALS
Qty: 62 TABLET | Refills: 5 | OUTPATIENT
Start: 2022-01-09 | End: 2022-02-08

## 2022-01-09 RX ORDER — VITAMIN E 268 MG
400 CAPSULE ORAL DAILY
Qty: 31 CAPSULE | Refills: 5 | Status: SHIPPED | OUTPATIENT
Start: 2022-01-09 | End: 2022-07-13 | Stop reason: SDUPTHER

## 2022-01-09 RX ORDER — PROPRANOLOL HYDROCHLORIDE 20 MG/1
20 TABLET ORAL EVERY 12 HOURS SCHEDULED
Qty: 60 TABLET | Refills: 5 | Status: SHIPPED | OUTPATIENT
Start: 2022-01-09 | End: 2022-07-08

## 2022-01-09 RX ORDER — METFORMIN HYDROCHLORIDE EXTENDED-RELEASE TABLETS 1000 MG/1
1000 TABLET, FILM COATED, EXTENDED RELEASE ORAL 2 TIMES DAILY WITH MEALS
Qty: 62 TABLET | Refills: 5 | Status: SHIPPED | OUTPATIENT
Start: 2022-01-09 | End: 2022-07-13 | Stop reason: SDUPTHER

## 2022-01-12 DIAGNOSIS — E11.65 TYPE 2 DIABETES MELLITUS WITH HYPERGLYCEMIA, WITHOUT LONG-TERM CURRENT USE OF INSULIN (HCC): ICD-10-CM

## 2022-01-12 RX ORDER — LISINOPRIL 2.5 MG/1
2.5 TABLET ORAL DAILY
Qty: 90 TABLET | Refills: 0 | Status: SHIPPED | OUTPATIENT
Start: 2022-01-12 | End: 2022-04-06 | Stop reason: SDUPTHER

## 2022-01-12 NOTE — TELEPHONE ENCOUNTER
Last visit for his diabetes was on 11/19/2021 and no lab to recheck his cr was placed  Patient have an upcoming appt on 2/21/2022 for follow up  Can medication be sent to his pharmacy? Or patient need complete labs first  Please advise  Thank you

## 2022-01-19 DIAGNOSIS — E11.65 TYPE 2 DIABETES MELLITUS WITH HYPERGLYCEMIA, UNSPECIFIED WHETHER LONG TERM INSULIN USE (HCC): ICD-10-CM

## 2022-01-20 DIAGNOSIS — E11.69 TYPE 2 DIABETES MELLITUS WITH OTHER SPECIFIED COMPLICATION, WITHOUT LONG-TERM CURRENT USE OF INSULIN (HCC): ICD-10-CM

## 2022-01-20 RX ORDER — BLOOD-GLUCOSE METER
KIT MISCELLANEOUS
Qty: 100 EACH | Refills: 0 | Status: SHIPPED | OUTPATIENT
Start: 2022-01-20

## 2022-01-21 DIAGNOSIS — E11.65 TYPE 2 DIABETES MELLITUS WITH HYPERGLYCEMIA, WITHOUT LONG-TERM CURRENT USE OF INSULIN (HCC): ICD-10-CM

## 2022-01-21 RX ORDER — LANCETS 28 GAUGE
EACH MISCELLANEOUS
Qty: 100 EACH | Refills: 5 | Status: SHIPPED | OUTPATIENT
Start: 2022-01-21

## 2022-02-02 ENCOUNTER — APPOINTMENT (OUTPATIENT)
Dept: LAB | Age: 42
End: 2022-02-02
Payer: MEDICARE

## 2022-02-02 DIAGNOSIS — Z79.899 ENCOUNTER FOR LONG-TERM (CURRENT) USE OF OTHER MEDICATIONS: ICD-10-CM

## 2022-02-02 LAB
ALBUMIN SERPL BCP-MCNC: 3 G/DL (ref 3.5–5)
ALP SERPL-CCNC: 70 U/L (ref 46–116)
ALT SERPL W P-5'-P-CCNC: 27 U/L (ref 12–78)
ANION GAP SERPL CALCULATED.3IONS-SCNC: 4 MMOL/L (ref 4–13)
AST SERPL W P-5'-P-CCNC: 13 U/L (ref 5–45)
BASOPHILS # BLD AUTO: 0.06 THOUSANDS/ΜL (ref 0–0.1)
BASOPHILS NFR BLD AUTO: 1 % (ref 0–1)
BILIRUB SERPL-MCNC: 0.47 MG/DL (ref 0.2–1)
BUN SERPL-MCNC: 13 MG/DL (ref 5–25)
CALCIUM ALBUM COR SERPL-MCNC: 9 MG/DL (ref 8.3–10.1)
CALCIUM SERPL-MCNC: 8.2 MG/DL (ref 8.3–10.1)
CHLORIDE SERPL-SCNC: 101 MMOL/L (ref 100–108)
CO2 SERPL-SCNC: 31 MMOL/L (ref 21–32)
CREAT SERPL-MCNC: 1.29 MG/DL (ref 0.6–1.3)
EOSINOPHIL # BLD AUTO: 0.65 THOUSAND/ΜL (ref 0–0.61)
EOSINOPHIL NFR BLD AUTO: 8 % (ref 0–6)
ERYTHROCYTE [DISTWIDTH] IN BLOOD BY AUTOMATED COUNT: 11.5 % (ref 11.6–15.1)
EST. AVERAGE GLUCOSE BLD GHB EST-MCNC: 174 MG/DL
GFR SERPL CREATININE-BSD FRML MDRD: 68 ML/MIN/1.73SQ M
GLUCOSE P FAST SERPL-MCNC: 216 MG/DL (ref 65–99)
HBA1C MFR BLD: 7.7 %
HCT VFR BLD AUTO: 43.5 % (ref 36.5–49.3)
HGB BLD-MCNC: 14.7 G/DL (ref 12–17)
IMM GRANULOCYTES # BLD AUTO: 0.07 THOUSAND/UL (ref 0–0.2)
IMM GRANULOCYTES NFR BLD AUTO: 1 % (ref 0–2)
LYMPHOCYTES # BLD AUTO: 2.95 THOUSANDS/ΜL (ref 0.6–4.47)
LYMPHOCYTES NFR BLD AUTO: 35 % (ref 14–44)
MCH RBC QN AUTO: 30 PG (ref 26.8–34.3)
MCHC RBC AUTO-ENTMCNC: 33.8 G/DL (ref 31.4–37.4)
MCV RBC AUTO: 89 FL (ref 82–98)
MONOCYTES # BLD AUTO: 0.77 THOUSAND/ΜL (ref 0.17–1.22)
MONOCYTES NFR BLD AUTO: 9 % (ref 4–12)
NEUTROPHILS # BLD AUTO: 3.93 THOUSANDS/ΜL (ref 1.85–7.62)
NEUTS SEG NFR BLD AUTO: 46 % (ref 43–75)
NRBC BLD AUTO-RTO: 0 /100 WBCS
PLATELET # BLD AUTO: 152 THOUSANDS/UL (ref 149–390)
PMV BLD AUTO: 9.7 FL (ref 8.9–12.7)
POTASSIUM SERPL-SCNC: 4.1 MMOL/L (ref 3.5–5.3)
PROT SERPL-MCNC: 6.4 G/DL (ref 6.4–8.2)
RBC # BLD AUTO: 4.9 MILLION/UL (ref 3.88–5.62)
SODIUM SERPL-SCNC: 136 MMOL/L (ref 136–145)
VALPROATE SERPL-MCNC: 33 UG/ML (ref 50–100)
WBC # BLD AUTO: 8.43 THOUSAND/UL (ref 4.31–10.16)

## 2022-02-02 PROCEDURE — 80053 COMPREHEN METABOLIC PANEL: CPT

## 2022-02-02 PROCEDURE — 83036 HEMOGLOBIN GLYCOSYLATED A1C: CPT

## 2022-02-02 PROCEDURE — 85025 COMPLETE CBC W/AUTO DIFF WBC: CPT

## 2022-02-02 PROCEDURE — 36415 COLL VENOUS BLD VENIPUNCTURE: CPT

## 2022-02-02 PROCEDURE — 80164 ASSAY DIPROPYLACETIC ACD TOT: CPT

## 2022-02-08 DIAGNOSIS — M25.511 ACUTE PAIN OF RIGHT SHOULDER: ICD-10-CM

## 2022-02-08 RX ORDER — BACLOFEN 10 MG/1
10 TABLET ORAL 3 TIMES DAILY
Qty: 21 TABLET | Refills: 0 | OUTPATIENT
Start: 2022-02-08 | End: 2022-02-15

## 2022-02-08 NOTE — TELEPHONE ENCOUNTER
Baclofen was prescribed for his acute pain 6 months ago  It's not appropriate for refill at this time  If patient is in pain, please schedule a visit for him    Thank you,  -Dr Lisbet Martinez

## 2022-02-14 ENCOUNTER — RA CDI HCC (OUTPATIENT)
Dept: OTHER | Facility: HOSPITAL | Age: 42
End: 2022-02-14

## 2022-02-14 NOTE — PROGRESS NOTES
[facility]  coding opportunities             Chart Reviewed * (Number of) Inbasket suggestions sent to Provider: 1                  Patients insurance company: Medicare           [id number]

## 2022-02-15 ENCOUNTER — TELEPHONE (OUTPATIENT)
Dept: FAMILY MEDICINE CLINIC | Facility: CLINIC | Age: 42
End: 2022-02-15

## 2022-02-15 NOTE — TELEPHONE ENCOUNTER
Asking for a call back  In need of medication but has never had it prescribed by us  It was done by a [facility]    Martha Mcnulty asking what needs to be done so he can get the medication since he will be out of it      Asking for a call back to explain the situation    Martha Mcnulty [phone]

## 2022-02-15 NOTE — TELEPHONE ENCOUNTER
Spoke with Theresa, patient was given Baclofen when d/c from [facility] as he was c/o of muscle spasm  He is scheduled for f/u, in the meantime, Baclofen d/c  Note sent to pharmacy and [facility]

## 2022-02-17 DIAGNOSIS — M25.511 ACUTE PAIN OF RIGHT SHOULDER: ICD-10-CM

## 2022-02-17 RX ORDER — BACLOFEN 10 MG/1
10 TABLET ORAL 3 TIMES DAILY
Qty: 42 TABLET | Refills: 0 | Status: SHIPPED | OUTPATIENT
Start: 2022-02-17 | End: 2022-07-08

## 2022-02-21 ENCOUNTER — OFFICE VISIT (OUTPATIENT)
Dept: FAMILY MEDICINE CLINIC | Facility: CLINIC | Age: 42
End: 2022-02-21

## 2022-02-21 VITALS
SYSTOLIC BLOOD PRESSURE: 110 MMHG | HEIGHT: 69 IN | WEIGHT: 205 LBS | OXYGEN SATURATION: 98 % | DIASTOLIC BLOOD PRESSURE: 79 MMHG | BODY MASS INDEX: 30.36 KG/M2 | RESPIRATION RATE: 18 BRPM | TEMPERATURE: 98.2 F | HEART RATE: 96 BPM

## 2022-02-21 DIAGNOSIS — Z12.5 PROSTATE CANCER SCREENING: ICD-10-CM

## 2022-02-21 DIAGNOSIS — R39.12 POOR URINARY STREAM: ICD-10-CM

## 2022-02-21 DIAGNOSIS — E11.65 TYPE 2 DIABETES MELLITUS WITH HYPERGLYCEMIA, WITHOUT LONG-TERM CURRENT USE OF INSULIN (HCC): Primary | ICD-10-CM

## 2022-02-21 PROCEDURE — 99213 OFFICE O/P EST LOW 20 MIN: CPT | Performed by: FAMILY MEDICINE

## 2022-02-21 NOTE — ASSESSMENT & PLAN NOTE
Poorly controlled on home Metformin 1g BID and Januvia 100mg daily  - Goal HgbA1c < 7  - Home -160 in Jan, 220-260 in Feb  - Patient and caretaker states has been eating a lot of sugary snacks which attributes to his worsening A1c  Patient order to-go cookies and chocolates    - C/w current home regimen for now  - RTC in 3 months for HgbA1c recheck  - Patient agreeable to titrate medication if HgbA1c persistently above goal    Lab Results   Component Value Date    HGBA1C 7 7 (H) 02/02/2022    HGBA1C 6 8 11/05/2021    HGBA1C 6 2 (H) 08/02/2021
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
## 2022-02-23 ENCOUNTER — TELEPHONE (OUTPATIENT)
Dept: FAMILY MEDICINE CLINIC | Facility: CLINIC | Age: 42
End: 2022-02-23

## 2022-02-23 NOTE — TELEPHONE ENCOUNTER
Folder Dr Pelon Garcia     Name of Form - Person Directed Support    Form to be Faxed (Fax #), [phone]

## 2022-02-28 ENCOUNTER — APPOINTMENT (OUTPATIENT)
Dept: LAB | Age: 42
End: 2022-02-28
Payer: MEDICARE

## 2022-02-28 DIAGNOSIS — R39.12 POOR URINARY STREAM: ICD-10-CM

## 2022-02-28 DIAGNOSIS — Z12.5 PROSTATE CANCER SCREENING: ICD-10-CM

## 2022-02-28 PROCEDURE — 36415 COLL VENOUS BLD VENIPUNCTURE: CPT

## 2022-02-28 PROCEDURE — 84154 ASSAY OF PSA FREE: CPT

## 2022-02-28 PROCEDURE — 84153 ASSAY OF PSA TOTAL: CPT

## 2022-03-02 LAB
PSA FREE MFR SERPL: 84 %
PSA FREE SERPL-MCNC: 0.42 NG/ML
PSA SERPL-MCNC: 0.5 NG/ML (ref 0–4)

## 2022-03-03 ENCOUNTER — TELEPHONE (OUTPATIENT)
Dept: FAMILY MEDICINE CLINIC | Facility: CLINIC | Age: 42
End: 2022-03-03

## 2022-03-03 NOTE — TELEPHONE ENCOUNTER
Patient has visit 3/4/22, we can do it then or they can schedule with his next f/u  Please let Ashia know

## 2022-03-04 ENCOUNTER — OFFICE VISIT (OUTPATIENT)
Dept: FAMILY MEDICINE CLINIC | Facility: CLINIC | Age: 42
End: 2022-03-04

## 2022-03-04 ENCOUNTER — TELEPHONE (OUTPATIENT)
Dept: FAMILY MEDICINE CLINIC | Facility: CLINIC | Age: 42
End: 2022-03-04

## 2022-03-04 VITALS
WEIGHT: 210 LBS | OXYGEN SATURATION: 99 % | SYSTOLIC BLOOD PRESSURE: 159 MMHG | RESPIRATION RATE: 18 BRPM | HEIGHT: 69 IN | HEART RATE: 76 BPM | TEMPERATURE: 96.4 F | DIASTOLIC BLOOD PRESSURE: 91 MMHG | BODY MASS INDEX: 31.1 KG/M2

## 2022-03-04 DIAGNOSIS — R10.13 EPIGASTRIC PAIN: Primary | ICD-10-CM

## 2022-03-04 PROCEDURE — 99213 OFFICE O/P EST LOW 20 MIN: CPT | Performed by: FAMILY MEDICINE

## 2022-03-04 NOTE — ASSESSMENT & PLAN NOTE
Pt c/o acute onset post-prandial epigastric pain 1 week ago (2/25) a/w nausea and vomiting  - Pain is 10/10, sharp, non-radiating, a/w nausea/vomiting, worse with food, improves with peptobismol and ginger-singh  Episode lasted for approx 2 days and completed resolved since  - Denies any fever, diarrhea, melana, hematochezia, or other red-flag symptoms  No recent traveling or sick contact    - Currently asymptomatic  - Abdominal exam unremarkable, no mass/tenderness noted  - Symptoms likely gastroenteritis vs gastritis  - Rec avoiding spicy/sour food, smaller more frequent meals, elevate head of bed, and avoid lying down immediately after meal  - Continue to monitor off meds at this time  - Does not rec invasive procedure like EGD at this time  - RTC if symptoms return  - Consider EGD to assess for gastritis if symptoms worsen or becoming more frequent
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
## 2022-03-04 NOTE — TELEPHONE ENCOUNTER
Folder (To be completed by) - DR Missy Smyth     Name of Form -   Medical [facility]      patient is here for appts and to paper back with them

## 2022-04-06 DIAGNOSIS — K59.00 CONSTIPATION, UNSPECIFIED CONSTIPATION TYPE: ICD-10-CM

## 2022-04-06 DIAGNOSIS — G44.209 TENSION HEADACHE: ICD-10-CM

## 2022-04-06 DIAGNOSIS — E11.65 TYPE 2 DIABETES MELLITUS WITH HYPERGLYCEMIA, WITHOUT LONG-TERM CURRENT USE OF INSULIN (HCC): ICD-10-CM

## 2022-04-06 RX ORDER — LISINOPRIL 2.5 MG/1
2.5 TABLET ORAL DAILY
Qty: 90 TABLET | Refills: 0 | Status: SHIPPED | OUTPATIENT
Start: 2022-04-06 | End: 2022-07-13 | Stop reason: SDUPTHER

## 2022-04-06 RX ORDER — VITAMIN B COMPLEX
1 TABLET ORAL DAILY
Qty: 30 TABLET | Refills: 5 | Status: SHIPPED | OUTPATIENT
Start: 2022-04-06

## 2022-04-06 RX ORDER — DOCUSATE SODIUM 100 MG/1
100 CAPSULE, LIQUID FILLED ORAL DAILY
Qty: 30 CAPSULE | Refills: 5 | Status: SHIPPED | OUTPATIENT
Start: 2022-04-06

## 2022-05-05 DIAGNOSIS — F25.0 SCHIZOAFFECTIVE DISORDER, BIPOLAR TYPE (HCC): Chronic | ICD-10-CM

## 2022-05-05 DIAGNOSIS — E03.9 HYPOTHYROIDISM, UNSPECIFIED TYPE: ICD-10-CM

## 2022-05-05 DIAGNOSIS — E78.49 OTHER HYPERLIPIDEMIA: ICD-10-CM

## 2022-05-05 RX ORDER — ATORVASTATIN CALCIUM 40 MG/1
40 TABLET, FILM COATED ORAL
Qty: 30 TABLET | Refills: 5 | Status: SHIPPED | OUTPATIENT
Start: 2022-05-05 | End: 2022-07-08

## 2022-05-05 RX ORDER — LORATADINE 10 MG/1
10 TABLET ORAL DAILY
Qty: 30 TABLET | Refills: 5 | Status: SHIPPED | OUTPATIENT
Start: 2022-05-05 | End: 2022-07-08

## 2022-05-05 RX ORDER — LEVOTHYROXINE SODIUM 0.03 MG/1
25 TABLET ORAL
Qty: 30 TABLET | Refills: 5 | Status: SHIPPED | OUTPATIENT
Start: 2022-05-05 | End: 2022-07-08

## 2022-05-09 DIAGNOSIS — F25.0 SCHIZOAFFECTIVE DISORDER, BIPOLAR TYPE (HCC): Chronic | ICD-10-CM

## 2022-05-09 DIAGNOSIS — K59.00 CONSTIPATION, UNSPECIFIED CONSTIPATION TYPE: ICD-10-CM

## 2022-05-09 DIAGNOSIS — R05.9 COUGH: ICD-10-CM

## 2022-05-10 DIAGNOSIS — M25.511 ACUTE PAIN OF RIGHT SHOULDER: ICD-10-CM

## 2022-05-10 DIAGNOSIS — F25.0 SCHIZOAFFECTIVE DISORDER, BIPOLAR TYPE (HCC): Chronic | ICD-10-CM

## 2022-05-10 DIAGNOSIS — R05.9 COUGH: ICD-10-CM

## 2022-05-10 RX ORDER — MENTHOL 5.8 MG
5.8 LOZENGE MUCOUS MEMBRANE 4 TIMES DAILY PRN
Qty: 30 LOZENGE | Refills: 5 | Status: SHIPPED | OUTPATIENT
Start: 2022-05-10

## 2022-05-10 RX ORDER — SENNOSIDES 8.6 MG
650 CAPSULE ORAL EVERY 8 HOURS PRN
Qty: 30 TABLET | Refills: 5 | Status: SHIPPED | OUTPATIENT
Start: 2022-05-10

## 2022-05-10 RX ORDER — MENTHOL 5.8 MG
5.8 LOZENGE MUCOUS MEMBRANE 4 TIMES DAILY PRN
Qty: 30 LOZENGE | Refills: 0 | Status: SHIPPED | OUTPATIENT
Start: 2022-05-10 | End: 2022-05-10 | Stop reason: SDUPTHER

## 2022-05-11 ENCOUNTER — HOSPITAL ENCOUNTER (EMERGENCY)
Facility: HOSPITAL | Age: 42
Discharge: HOME | End: 2022-05-11
Attending: EMERGENCY MEDICINE | Admitting: EMERGENCY MEDICINE
Payer: MEDICARE

## 2022-05-11 VITALS
SYSTOLIC BLOOD PRESSURE: 108 MMHG | RESPIRATION RATE: 16 BRPM | DIASTOLIC BLOOD PRESSURE: 73 MMHG | HEART RATE: 85 BPM | OXYGEN SATURATION: 97 % | TEMPERATURE: 97.9 F

## 2022-05-11 DIAGNOSIS — R46.89 AGGRESSIVE BEHAVIOR: Primary | ICD-10-CM

## 2022-05-11 DIAGNOSIS — R45.1 AGITATION: ICD-10-CM

## 2022-05-11 PROCEDURE — 96372 THER/PROPH/DIAG INJ SC/IM: CPT

## 2022-05-11 PROCEDURE — 99282 EMERGENCY DEPT VISIT SF MDM: CPT | Performed by: EMERGENCY MEDICINE

## 2022-05-11 PROCEDURE — 99284 EMERGENCY DEPT VISIT MOD MDM: CPT

## 2022-05-11 RX ORDER — OLANZAPINE 10 MG/1
10 INJECTION, POWDER, LYOPHILIZED, FOR SOLUTION INTRAMUSCULAR ONCE
Status: COMPLETED | OUTPATIENT
Start: 2022-05-11 | End: 2022-05-11

## 2022-05-11 RX ADMIN — WATER: 1 INJECTION INTRAMUSCULAR; INTRAVENOUS; SUBCUTANEOUS at 22:08

## 2022-05-11 RX ADMIN — OLANZAPINE 10 MG: 10 INJECTION, POWDER, LYOPHILIZED, FOR SOLUTION INTRAMUSCULAR at 22:04
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
## 2022-05-12 NOTE — ED NOTES
ED Dr asked for Crisis Worker (Amanda Kam) to chat with patient's (Pt)'s staff member as she is discharging Pt home as there are no medical or psychiatric concerns at this time  ED Dr stated that Pt was upset earlier as this happens before a visit from Pt's family  CW discussed Dr's discharge with group home staff and he stated that he understands but is waiting for house supervisor approval to take him back to facility  CW explained that he is required to take him home and if facility does not want Pt to return they need to discuss that process with [facility]

## 2022-05-12 NOTE — DISCHARGE INSTRUCTIONS
Follow-up with your primary care provider for re-evaluation of agitation in the next 1 to 2 days    Return to the emergency department if symptoms persist or worsen

## 2022-05-13 ENCOUNTER — OFFICE VISIT (OUTPATIENT)
Dept: FAMILY MEDICINE CLINIC | Facility: CLINIC | Age: 42
End: 2022-05-13

## 2022-05-13 VITALS
SYSTOLIC BLOOD PRESSURE: 102 MMHG | OXYGEN SATURATION: 99 % | DIASTOLIC BLOOD PRESSURE: 75 MMHG | WEIGHT: 199.2 LBS | HEART RATE: 83 BPM | RESPIRATION RATE: 16 BRPM | HEIGHT: 69 IN | BODY MASS INDEX: 29.51 KG/M2 | TEMPERATURE: 98 F

## 2022-05-13 DIAGNOSIS — E78.5 HYPERLIPIDEMIA, UNSPECIFIED HYPERLIPIDEMIA TYPE: ICD-10-CM

## 2022-05-13 DIAGNOSIS — E11.65 TYPE 2 DIABETES MELLITUS WITH HYPERGLYCEMIA, WITHOUT LONG-TERM CURRENT USE OF INSULIN (HCC): Primary | ICD-10-CM

## 2022-05-13 DIAGNOSIS — E55.9 VITAMIN D DEFICIENCY: ICD-10-CM

## 2022-05-13 LAB — SL AMB POCT HEMOGLOBIN AIC: 7.1 (ref ?–6.5)

## 2022-05-13 PROCEDURE — 99213 OFFICE O/P EST LOW 20 MIN: CPT | Performed by: FAMILY MEDICINE

## 2022-05-13 PROCEDURE — 83036 HEMOGLOBIN GLYCOSYLATED A1C: CPT | Performed by: FAMILY MEDICINE

## 2022-05-13 RX ORDER — CALCIUM CARBONATE 500(1250)
1 TABLET ORAL
Qty: 30 TABLET | Refills: 5 | Status: SHIPPED | OUTPATIENT
Start: 2022-05-13 | End: 2022-07-08

## 2022-05-13 RX ORDER — MELATONIN
1000 DAILY
Qty: 31 TABLET | Refills: 5 | Status: SHIPPED | OUTPATIENT
Start: 2022-05-13

## 2022-05-13 NOTE — ASSESSMENT & PLAN NOTE
Near goal on home Metformin 1g BID and Januvia 100mg daily  - Currently asympt and compliant with treatment plan  - Goal HgbA1c < 7  - Screening:  - Last BUN 13 : Cr 1 29 on 2/22/22  - Last Microalb:Cr 158 on 2/14/19 (need to collect urine next visit)  - DM foot exam 5/13/22 unremarkable  - Last eye exam (next appt 7/26/22)  - Home -150s  - Patient and caretaker states has been eating a lot of sugary snacks which attributes to his worsening A1c  Patient order to-go cookies and chocolates    - Rec lifestyle improvement including weight loss, low carb diet, and exercise  - C/w current regimen for now  - Weekly home BG check and bring log to follow-up visit  - RTC in 3 months for HgbA1c recheck  - Consider uptitrating oral antihyperglycemics in HgbA1c persistently above goal    Lab Results   Component Value Date/Time    HGBA1C 7 1 (A) 05/13/2022 10:33 AM    HGBA1C 7 7 (H) 02/02/2022 08:59 AM    HGBA1C 6 8 11/05/2021 12:00 AM    HGBA1C 6 2 (H) 08/02/2021 10:45 AM    HGBA1C 6 0 07/16/2021 02:14 PM    HGBA1C 6 1 04/20/2021 11:11 AM    HGBA1C 6 5 (H) 06/29/2020 09:16 AM    HGBA1C 5 9 (H) 03/15/2018 09:23 AM
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
## 2022-05-16 ENCOUNTER — APPOINTMENT (EMERGENCY)
Dept: RADIOLOGY | Facility: HOSPITAL | Age: 42
End: 2022-05-16
Payer: MEDICARE

## 2022-05-16 ENCOUNTER — HOSPITAL ENCOUNTER (EMERGENCY)
Facility: HOSPITAL | Age: 42
Discharge: HOME | End: 2022-05-16
Attending: EMERGENCY MEDICINE
Payer: MEDICARE

## 2022-05-16 VITALS
BODY MASS INDEX: 29.53 KG/M2 | OXYGEN SATURATION: 98 % | DIASTOLIC BLOOD PRESSURE: 109 MMHG | RESPIRATION RATE: 16 BRPM | HEART RATE: 90 BPM | SYSTOLIC BLOOD PRESSURE: 161 MMHG | TEMPERATURE: 98.3 F | WEIGHT: 200 LBS

## 2022-05-16 DIAGNOSIS — R07.89 ATYPICAL CHEST PAIN: ICD-10-CM

## 2022-05-16 DIAGNOSIS — F43.0 STRESS REACTION: ICD-10-CM

## 2022-05-16 DIAGNOSIS — R07.9 CHEST PAIN: Primary | ICD-10-CM

## 2022-05-16 LAB
ALBUMIN SERPL BCP-MCNC: 3.5 G/DL (ref 3.5–5)
ALP SERPL-CCNC: 54 U/L (ref 34–104)
ALT SERPL W P-5'-P-CCNC: 21 U/L (ref 7–52)
ANION GAP SERPL CALCULATED.3IONS-SCNC: 7 MMOL/L (ref 4–13)
AST SERPL W P-5'-P-CCNC: 16 U/L (ref 13–39)
BASOPHILS # BLD AUTO: 0.06 THOUSANDS/ΜL (ref 0–0.1)
BASOPHILS NFR BLD AUTO: 1 % (ref 0–1)
BILIRUB SERPL-MCNC: 0.45 MG/DL (ref 0.2–1)
BUN SERPL-MCNC: 11 MG/DL (ref 5–25)
CALCIUM SERPL-MCNC: 8 MG/DL (ref 8.4–10.2)
CARDIAC TROPONIN I PNL SERPL HS: 2 NG/L
CHLORIDE SERPL-SCNC: 101 MMOL/L (ref 96–108)
CO2 SERPL-SCNC: 29 MMOL/L (ref 21–32)
CREAT SERPL-MCNC: 1.17 MG/DL (ref 0.6–1.3)
EOSINOPHIL # BLD AUTO: 0.61 THOUSAND/ΜL (ref 0–0.61)
EOSINOPHIL NFR BLD AUTO: 6 % (ref 0–6)
ERYTHROCYTE [DISTWIDTH] IN BLOOD BY AUTOMATED COUNT: 11.5 % (ref 11.6–15.1)
GFR SERPL CREATININE-BSD FRML MDRD: 77 ML/MIN/1.73SQ M
GLUCOSE SERPL-MCNC: 113 MG/DL (ref 65–140)
HCT VFR BLD AUTO: 42.7 % (ref 36.5–49.3)
HGB BLD-MCNC: 14.4 G/DL (ref 12–17)
IMM GRANULOCYTES # BLD AUTO: 0.08 THOUSAND/UL (ref 0–0.2)
IMM GRANULOCYTES NFR BLD AUTO: 1 % (ref 0–2)
LYMPHOCYTES # BLD AUTO: 3.16 THOUSANDS/ΜL (ref 0.6–4.47)
LYMPHOCYTES NFR BLD AUTO: 30 % (ref 14–44)
MCH RBC QN AUTO: 29.9 PG (ref 26.8–34.3)
MCHC RBC AUTO-ENTMCNC: 33.7 G/DL (ref 31.4–37.4)
MCV RBC AUTO: 89 FL (ref 82–98)
MONOCYTES # BLD AUTO: 1.01 THOUSAND/ΜL (ref 0.17–1.22)
MONOCYTES NFR BLD AUTO: 10 % (ref 4–12)
NEUTROPHILS # BLD AUTO: 5.72 THOUSANDS/ΜL (ref 1.85–7.62)
NEUTS SEG NFR BLD AUTO: 52 % (ref 43–75)
NRBC BLD AUTO-RTO: 0 /100 WBCS
PLATELET # BLD AUTO: 180 THOUSANDS/UL (ref 149–390)
PMV BLD AUTO: 9.7 FL (ref 8.9–12.7)
POTASSIUM SERPL-SCNC: 4.1 MMOL/L (ref 3.5–5.3)
PROT SERPL-MCNC: 6 G/DL (ref 6.4–8.4)
RBC # BLD AUTO: 4.81 MILLION/UL (ref 3.88–5.62)
SODIUM SERPL-SCNC: 137 MMOL/L (ref 135–147)
WBC # BLD AUTO: 10.64 THOUSAND/UL (ref 4.31–10.16)

## 2022-05-16 PROCEDURE — 93005 ELECTROCARDIOGRAM TRACING: CPT

## 2022-05-16 PROCEDURE — 85025 COMPLETE CBC W/AUTO DIFF WBC: CPT | Performed by: EMERGENCY MEDICINE

## 2022-05-16 PROCEDURE — 84484 ASSAY OF TROPONIN QUANT: CPT | Performed by: EMERGENCY MEDICINE

## 2022-05-16 PROCEDURE — 80053 COMPREHEN METABOLIC PANEL: CPT | Performed by: EMERGENCY MEDICINE

## 2022-05-16 PROCEDURE — 99285 EMERGENCY DEPT VISIT HI MDM: CPT

## 2022-05-16 PROCEDURE — 99285 EMERGENCY DEPT VISIT HI MDM: CPT | Performed by: EMERGENCY MEDICINE

## 2022-05-16 PROCEDURE — 71045 X-RAY EXAM CHEST 1 VIEW: CPT

## 2022-05-16 PROCEDURE — 36415 COLL VENOUS BLD VENIPUNCTURE: CPT

## 2022-05-17 ENCOUNTER — HOSPITAL ENCOUNTER (EMERGENCY)
Facility: HOSPITAL | Age: 42
Discharge: HOME | End: 2022-05-18
Attending: EMERGENCY MEDICINE
Payer: MEDICARE

## 2022-05-17 DIAGNOSIS — F41.9 ANXIETY: ICD-10-CM

## 2022-05-17 DIAGNOSIS — F32.A DEPRESSION: Primary | ICD-10-CM

## 2022-05-17 LAB
AMPHETAMINES SERPL QL SCN: NEGATIVE
BARBITURATES UR QL: NEGATIVE
BENZODIAZ UR QL: NEGATIVE
COCAINE UR QL: NEGATIVE
ETHANOL EXG-MCNC: 0 MG/DL
METHADONE UR QL: NEGATIVE
OPIATES UR QL SCN: NEGATIVE
OXYCODONE+OXYMORPHONE UR QL SCN: NEGATIVE
PCP UR QL: NEGATIVE
THC UR QL: NEGATIVE

## 2022-05-17 PROCEDURE — 0241U HB NFCT DS VIR RESP RNA 4 TRGT: CPT | Performed by: EMERGENCY MEDICINE

## 2022-05-17 PROCEDURE — 99285 EMERGENCY DEPT VISIT HI MDM: CPT

## 2022-05-17 PROCEDURE — 96372 THER/PROPH/DIAG INJ SC/IM: CPT

## 2022-05-17 PROCEDURE — 80307 DRUG TEST PRSMV CHEM ANLYZR: CPT | Performed by: EMERGENCY MEDICINE

## 2022-05-17 PROCEDURE — 82075 ASSAY OF BREATH ETHANOL: CPT | Performed by: EMERGENCY MEDICINE

## 2022-05-17 PROCEDURE — 99285 EMERGENCY DEPT VISIT HI MDM: CPT | Performed by: EMERGENCY MEDICINE

## 2022-05-17 RX ORDER — OLANZAPINE 10 MG/1
10 INJECTION, POWDER, LYOPHILIZED, FOR SOLUTION INTRAMUSCULAR ONCE
Status: COMPLETED | OUTPATIENT
Start: 2022-05-17 | End: 2022-05-17

## 2022-05-17 RX ADMIN — OLANZAPINE 10 MG: 10 INJECTION, POWDER, FOR SOLUTION INTRAMUSCULAR at 23:21
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
## 2022-05-18 ENCOUNTER — OFFICE VISIT (OUTPATIENT)
Dept: FAMILY MEDICINE CLINIC | Facility: CLINIC | Age: 42
End: 2022-05-18

## 2022-05-18 VITALS
DIASTOLIC BLOOD PRESSURE: 100 MMHG | TEMPERATURE: 97.2 F | RESPIRATION RATE: 18 BRPM | OXYGEN SATURATION: 100 % | HEART RATE: 101 BPM | WEIGHT: 197.2 LBS | BODY MASS INDEX: 29.21 KG/M2 | SYSTOLIC BLOOD PRESSURE: 140 MMHG | HEIGHT: 69 IN

## 2022-05-18 VITALS
SYSTOLIC BLOOD PRESSURE: 102 MMHG | WEIGHT: 210 LBS | BODY MASS INDEX: 31.1 KG/M2 | HEART RATE: 64 BPM | HEIGHT: 69 IN | DIASTOLIC BLOOD PRESSURE: 70 MMHG | TEMPERATURE: 98 F | RESPIRATION RATE: 18 BRPM | OXYGEN SATURATION: 97 %

## 2022-05-18 DIAGNOSIS — R07.9 CHEST PAIN DUE TO PSYCHOLOGICAL STRESS: Primary | ICD-10-CM

## 2022-05-18 DIAGNOSIS — F25.0 SCHIZOAFFECTIVE DISORDER, BIPOLAR TYPE (HCC): Chronic | ICD-10-CM

## 2022-05-18 DIAGNOSIS — F84.0 AUTISM SPECTRUM: ICD-10-CM

## 2022-05-18 DIAGNOSIS — F43.9 CHEST PAIN DUE TO PSYCHOLOGICAL STRESS: Primary | ICD-10-CM

## 2022-05-18 LAB
FLUAV RNA RESP QL NAA+PROBE: NEGATIVE
FLUBV RNA RESP QL NAA+PROBE: NEGATIVE
RSV RNA RESP QL NAA+PROBE: NEGATIVE
SARS-COV-2 RNA RESP QL NAA+PROBE: NEGATIVE

## 2022-05-18 PROCEDURE — 99213 OFFICE O/P EST LOW 20 MIN: CPT | Performed by: FAMILY MEDICINE

## 2022-05-18 RX ORDER — CARBOXYMETHYLCELLULOSE SODIUM AND GLYCERIN 5; 9 MG/ML; MG/ML
SOLUTION/ DROPS OPHTHALMIC
COMMUNITY
Start: 2022-05-11

## 2022-05-18 RX ORDER — LORAZEPAM 0.5 MG/1
0.5 TABLET ORAL 3 TIMES DAILY
COMMUNITY
Start: 2022-04-15

## 2022-05-18 NOTE — ED NOTES
The patient is a 45-year-old male who arrived to the emergency department last night via EMS after an emotional outburst secondary to a bad conversation with his mother  The patient is a group home resident with intellectual disabilities and mental health history  He is well known to this examiner and had multiple ED encounters with past inpatient psychiatric admissions  The patient was recently seen in this emergency department for chest pain  This was likely related to anxiety and was also attributed to the conversation he had with his mother where in his mother declined a visit that he was hoping to schedule  The patient demonstrated rather impressive insight, stating that he was just upset with his mother, but he has since calmed down and would like to go home  The patient denies any current suicidal ideas, plan, or intent  He denies any recent threats or acts of self-harm  He is insightfully comments that he has no access to anything that could hurt himself  He reports that it has been sometime since he has engaged in any suicidal or self-injurious behavior  He also denies any current homicidal ideas, plan, or intent  He does indicate a history of violent outbursts, which is noted at baseline  There was some indication that he had some homicidal thoughts last night after his emotional outburst   He denies this currently  Reports also indicated that he was experiencing auditory hallucinations telling him to hurt others, but this is noted at baseline  The patient denies current auditory hallucinations and clarified that he was hearing voices prior to his most recent Invega injection, but denies hearing voices currently  He denies current visual hallucinations  He denies delusions and paranoid thinking  He denies feeling depressed    He denies feeling anxious currently, but does indicate that he has been significantly anxious recently and is easily upset by any denial or delay of his wants or needs   The patient does appear anxious  Speech is pressured, but this appears to be baseline  He reports adequate sleep and hygiene  He also reports adequate appetite and there is no concerns for his overall health  Group home staff voices support of having the patient return home  They agree that he has calmed considerably since last night and they have no objection to discharge

## 2022-05-18 NOTE — ED CARE HANDOFF
Emergency Department Sign Out Note        Sign out and transfer of care from Dr Syed Lee  See Separate Emergency Department note  The patient, Lazarus Chessman, was evaluated by the previous provider for panic attack, depression  Workup Completed:  Medically cleared, pending COVID resulted, received shot of Zyprexa    ED Course / Workup Pending (followup): Awaiting ED crisis eval for disposition  NO SI/HII/VH, does have AH which is baseline  No indication for 302  Procedures  MDM        Disposition  Final diagnoses:   None     ED Disposition     None      Follow-up Information    None       Patient's Medications   Discharge Prescriptions    No medications on file     No discharge procedures on file         ED Provider  Electronically Signed by     Lesvia De La Paz MD  05/18/22 8885

## 2022-05-18 NOTE — DISCHARGE INSTRUCTIONS
This writer discussed the patients current presentation and recommended discharge plan with Dr Odin Gallegos  They agree with the patient being discharged at this time with referrals and/or information about: hotline / warm line numbers; walk-in clinic information; local outpatient resource list for therapists / counselors, psychologists, psychiatrists, and partial programs; and, online / internet resources and support groups  Advised to utilize natural and existing supports  Advised for safety planning and effective coping skills  Advised to return to ED if necessary  [place] Crisis Number: Fiona Brown [phone]  National Suicide Hotline: 4-795-748-8568  Crisis Text Line: Text Connect to 069243  The patient was Instructed to follow up with their established providers / supports as managed through [facility] (group home)  This writer and the patient completed a safety plan  The patient was provided with a copy of their safety plan with encouragement to utilize the plan following discharge  In addition, the patient was instructed to call [facility] crisis, other crisis services, 911 or to go to the nearest ER immediately if their situation changes at any time  Discussion was held with group home staff John Whitaker, regarding the process of completing a 36 petition in their county if necessary  This writer discussed discharge plans with the patient and group home staff from [address] [place] [address], who agrees with and understands the discharge plans  SAFETY PLAN  Warning Signs (thoughts, images, mood, behavior, situations) of a potential crisis: pacing, tearfulness, conversations with family       Coping Skills (what can I do to take my mind off the problem, or to keep myself safe): listen to music, watch TV, go for a ride      Outside Support (who can I reach out to for support and help):  Juli Burdick Suicide Prevention Hotline:  [facility] [address]: [phone]  [place]: [place] [address] [address] [address] [phone] - [facility]   [phone] - Peer Support Talk Line (1-9pm daily)  [phone] - Teen Support Talk Line (1-9pm daily)  [address] [place] ([place]) [phone] - [address] [facility]

## 2022-05-18 NOTE — ED NOTES
This writer discussed the patients current presentation and recommended discharge plan with Dr Dana Gibbs  They agree with the patient being discharged at this time with referrals and/or information about: hotline / warm line numbers; walk-in clinic information; local outpatient resource list for therapists / counselors, psychologists, psychiatrists, and partial programs; and, online / internet resources and support groups  Advised to utilize natural and existing supports  Advised for safety planning and effective coping skills  Advised to return to ED if necessary  [place] Crisis Number: [facility] [phone]  National Suicide Hotline: 3-845-415-842-917-5065  Crisis Text Line: Text Connect to 923196  The patient was Instructed to follow up with their established providers / supports as managed through [facility] (group home)  This writer and the patient completed a safety plan  The patient was provided with a copy of their safety plan with encouragement to utilize the plan following discharge  In addition, the patient was instructed to call [facility] crisis, other crisis services, 911 or to go to the nearest ER immediately if their situation changes at any time  Discussion was held with group home staff Nate Chaudhry, regarding the process of completing a 36 petition in their county if necessary  This writer discussed discharge plans with the patient and group home staff from [address] [place] [address], who agrees with and understands the discharge plans  SAFETY PLAN  Warning Signs (thoughts, images, mood, behavior, situations) of a potential crisis: pacing, tearfulness, conversations with family       Coping Skills (what can I do to take my mind off the problem, or to keep myself safe): listen to music, watch TV, go for a ride      Outside Support (who can I reach out to for support and help):  Bette Benedict Suicide Prevention Hotline:  [facility] 02 60 [facility] [address]: [phone]  [place]: [place] [address] [address] [address] [phone] - [place] [phone] - Peer Support Talk Line (1-9pm daily)  [phone] - Teen Support Talk Line (1-9pm daily)  [address] [place] [address] ([place]) [phone] - [address] [facility]

## 2022-05-18 NOTE — PROGRESS NOTES
Assessment/Plan:    Schizoaffective disorder, bipolar type [facility])  Patient here for follow up ED visit   Pressured speech and anxious at the beginning of exam reviewed negative cardiac blood work from ED visit  Insight timer used with chakra sounds and deep breathing  Patient calm and resting by the end of exam  Focused and educated on deep breathing exercises  EKG completed today EKG: normal EKG, normal sinus rhythm, unchanged from previous tracings  He denies suicidal and homicidal ideations  Reviewed focusing on breathing when thinking of triggers  Patient to continue current medications invega, atarak, depakote, and thorazine   Has follow up with psychiatrist today  ED precautions reviewed          Diagnoses and all orders for this visit:    Chest pain due to psychological stress  -     ECG 12 lead; Future    Schizoaffective disorder, bipolar type ([address] )    Autism spectrum          Subjective:      Patient ID: Jose Mena is a 37 y o  male  Denies suicidal as well as  homicidal ideations     Panic Attack  This is a recurrent problem  The current episode started in the past 7 days  The problem occurs daily  The problem has been unchanged  Associated symptoms include chest pain  Pertinent negatives include no chills, congestion, coughing, nausea, numbness or vomiting  Exacerbated by: Triggers include discussing his mother  Treatments tried: [address] [place] [address] psychiatry and behavioral medicine  The treatment provided mild relief  The following portions of the patient's history were reviewed and updated as appropriate: allergies, current medications, past family history, past medical history, past social history, past surgical history and problem list     Review of Systems   Constitutional: Negative for chills  HENT: Negative for congestion  Respiratory: Negative for cough  Cardiovascular: Positive for chest pain  Gastrointestinal: Negative for nausea and vomiting  Neurological: Negative for numbness  Objective:      /100 (BP Location: Left arm, Patient Position: Sitting, Cuff Size: Standard)   Pulse 101   Temp (!) 97 2 °F (36 2 °C) (Temporal)   Resp 18   Ht 5' 9" (1 753 m)   Wt 89 4 kg (197 lb 3 2 oz)   SpO2 100%   BMI 29 12 kg/m²          Physical Exam  Vitals and nursing note reviewed  Constitutional:       General: He is not in acute distress  Appearance: Normal appearance  He is not ill-appearing, toxic-appearing or diaphoretic  HENT:      Head: Normocephalic and atraumatic  Nose: Nose normal    Eyes:      Conjunctiva/sclera: Conjunctivae normal       Pupils: Pupils are equal, round, and reactive to light  Cardiovascular:      Rate and Rhythm: Normal rate and regular rhythm  Pulses: Normal pulses  Pulmonary:      Effort: Pulmonary effort is normal  No respiratory distress  Abdominal:      Palpations: Abdomen is soft  Musculoskeletal:      Cervical back: Normal range of motion  Skin:     General: Skin is warm and dry  Capillary Refill: Capillary refill takes less than 2 seconds  Neurological:      Mental Status: He is alert and oriented to person, place, and time  Mental status is at baseline  Cranial Nerves: No cranial nerve deficit  Sensory: No sensory deficit  Psychiatric:         Attention and Perception: Attention normal          Mood and Affect: Mood is anxious  Affect is tearful  Affect is not blunt, flat or angry  Speech: He is communicative  Speech is rapid and pressured and tangential (able to redirect)  Speech is not delayed or slurred  Behavior: Behavior is hyperactive  Behavior is not agitated, slowed, aggressive, withdrawn or combative  Behavior is cooperative  Thought Content: Thought content does not include suicidal ideation  Thought content does not include homicidal or suicidal plan
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
## 2022-05-18 NOTE — ED CARE HANDOFF
Emergency Department Sign Out Note        Sign out and transfer of care from   [facility]  See Separate Emergency Department note  The patient, Jovany Paz, was evaluated by the previous provider for Depression  Workup Completed:  Medically cleared for psychiatric evaluation by previous team       ED Course / Workup Pending (followup): Awaiting eval by ED crisis  No indication for 302  Signed out to Dr Riki Dubon pending crisis eval                                       Procedures  MDM        Disposition  Final diagnoses:   None     ED Disposition     None      Follow-up Information    None       Patient's Medications   Discharge Prescriptions    No medications on file     No discharge procedures on file         ED Provider  Electronically Signed by     Danya Elizalde MD  05/20/22 1244

## 2022-05-18 NOTE — ASSESSMENT & PLAN NOTE
Patient here for follow up ED visit   Pressured speech and anxious at the beginning of exam reviewed negative cardiac blood work from ED visit  Insight timer used with chakra sounds and deep breathing  Patient calm and resting by the end of exam  Focused and educated on deep breathing exercises  EKG completed today EKG: normal EKG, normal sinus rhythm, unchanged from previous tracings    He denies suicidal and homicidal ideations  Reviewed focusing on breathing when thinking of triggers  Patient to continue current medications invega, atarak, depakote, and thorazine   Has follow up with psychiatrist today  ED precautions reviewed

## 2022-05-20 ENCOUNTER — HOSPITAL ENCOUNTER (EMERGENCY)
Facility: HOSPITAL | Age: 42
Discharge: HOME | End: 2022-05-24
Attending: EMERGENCY MEDICINE
Payer: MEDICARE

## 2022-05-20 ENCOUNTER — HOSPITAL ENCOUNTER (EMERGENCY)
Facility: HOSPITAL | Age: 42
Discharge: HOME | End: 2022-05-20
Attending: EMERGENCY MEDICINE
Payer: MEDICARE

## 2022-05-20 VITALS
DIASTOLIC BLOOD PRESSURE: 97 MMHG | SYSTOLIC BLOOD PRESSURE: 152 MMHG | OXYGEN SATURATION: 99 % | TEMPERATURE: 97.7 F | HEART RATE: 98 BPM | RESPIRATION RATE: 18 BRPM

## 2022-05-20 DIAGNOSIS — F32.A DEPRESSION, UNSPECIFIED DEPRESSION TYPE: Primary | ICD-10-CM

## 2022-05-20 DIAGNOSIS — R45.851 SUICIDAL IDEATION: Primary | ICD-10-CM

## 2022-05-20 DIAGNOSIS — R46.89 BEHAVIORAL CHANGE: ICD-10-CM

## 2022-05-20 LAB
ATRIAL RATE: 82 BPM
ETHANOL EXG-MCNC: 0 MG/DL
P AXIS: 33 DEGREES
PR INTERVAL: 172 MS
QRS AXIS: -3 DEGREES
QRSD INTERVAL: 94 MS
QT INTERVAL: 374 MS
QTC INTERVAL: 436 MS
T WAVE AXIS: 20 DEGREES
VENTRICULAR RATE: 82 BPM

## 2022-05-20 PROCEDURE — 99213 OFFICE O/P EST LOW 20 MIN: CPT | Performed by: PSYCHIATRY & NEUROLOGY

## 2022-05-20 PROCEDURE — 99284 EMERGENCY DEPT VISIT MOD MDM: CPT | Performed by: EMERGENCY MEDICINE

## 2022-05-20 PROCEDURE — 93010 ELECTROCARDIOGRAM REPORT: CPT | Performed by: INTERNAL MEDICINE

## 2022-05-20 PROCEDURE — 99284 EMERGENCY DEPT VISIT MOD MDM: CPT

## 2022-05-20 PROCEDURE — 99285 EMERGENCY DEPT VISIT HI MDM: CPT | Performed by: EMERGENCY MEDICINE

## 2022-05-20 PROCEDURE — 82075 ASSAY OF BREATH ETHANOL: CPT | Performed by: EMERGENCY MEDICINE

## 2022-05-20 RX ORDER — LORAZEPAM 0.5 MG/1
0.5 TABLET ORAL ONCE
Status: COMPLETED | OUTPATIENT
Start: 2022-05-20 | End: 2022-05-20

## 2022-05-20 RX ADMIN — LORAZEPAM 0.5 MG: 0.5 TABLET ORAL at 20:49
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
## 2022-05-21 LAB
AMPHETAMINES SERPL QL SCN: NEGATIVE
BARBITURATES UR QL: NEGATIVE
BENZODIAZ UR QL: NEGATIVE
COCAINE UR QL: NEGATIVE
FLUAV RNA RESP QL NAA+PROBE: NEGATIVE
FLUBV RNA RESP QL NAA+PROBE: NEGATIVE
GLUCOSE SERPL-MCNC: 195 MG/DL (ref 65–140)
METHADONE UR QL: NEGATIVE
OPIATES UR QL SCN: NEGATIVE
OXYCODONE+OXYMORPHONE UR QL SCN: NEGATIVE
PCP UR QL: NEGATIVE
RSV RNA RESP QL NAA+PROBE: NEGATIVE
SARS-COV-2 RNA RESP QL NAA+PROBE: NEGATIVE
THC UR QL: NEGATIVE

## 2022-05-21 PROCEDURE — 96372 THER/PROPH/DIAG INJ SC/IM: CPT

## 2022-05-21 PROCEDURE — 82948 REAGENT STRIP/BLOOD GLUCOSE: CPT

## 2022-05-21 PROCEDURE — 0241U HB NFCT DS VIR RESP RNA 4 TRGT: CPT | Performed by: EMERGENCY MEDICINE

## 2022-05-21 PROCEDURE — 80307 DRUG TEST PRSMV CHEM ANLYZR: CPT | Performed by: EMERGENCY MEDICINE

## 2022-05-21 RX ORDER — CHLORPROMAZINE HYDROCHLORIDE 25 MG/1
100 TABLET, FILM COATED ORAL 3 TIMES DAILY
Status: DISCONTINUED | OUTPATIENT
Start: 2022-05-21 | End: 2022-05-24 | Stop reason: HOSPADM

## 2022-05-21 RX ORDER — PROPRANOLOL HYDROCHLORIDE 20 MG/1
20 TABLET ORAL EVERY 12 HOURS SCHEDULED
Status: DISCONTINUED | OUTPATIENT
Start: 2022-05-21 | End: 2022-05-24 | Stop reason: HOSPADM

## 2022-05-21 RX ORDER — OLANZAPINE 10 MG/1
5 INJECTION, POWDER, LYOPHILIZED, FOR SOLUTION INTRAMUSCULAR EVERY 6 HOURS PRN
Status: DISCONTINUED | OUTPATIENT
Start: 2022-05-21 | End: 2022-05-24 | Stop reason: HOSPADM

## 2022-05-21 RX ORDER — LISINOPRIL 5 MG/1
2.5 TABLET ORAL DAILY
Status: DISCONTINUED | OUTPATIENT
Start: 2022-05-21 | End: 2022-05-24 | Stop reason: HOSPADM

## 2022-05-21 RX ORDER — GABAPENTIN 100 MG/1
100 CAPSULE ORAL
Status: DISCONTINUED | OUTPATIENT
Start: 2022-05-21 | End: 2022-05-24 | Stop reason: HOSPADM

## 2022-05-21 RX ORDER — LORAZEPAM 0.5 MG/1
0.5 TABLET ORAL 3 TIMES DAILY
Status: DISCONTINUED | OUTPATIENT
Start: 2022-05-21 | End: 2022-05-24 | Stop reason: HOSPADM

## 2022-05-21 RX ORDER — DOCUSATE SODIUM 100 MG/1
100 CAPSULE, LIQUID FILLED ORAL DAILY
Status: DISCONTINUED | OUTPATIENT
Start: 2022-05-21 | End: 2022-05-24 | Stop reason: HOSPADM

## 2022-05-21 RX ORDER — AMMONIUM LACTATE 12 G/100G
CREAM TOPICAL DAILY
COMMUNITY

## 2022-05-21 RX ORDER — TRAZODONE HYDROCHLORIDE 50 MG/1
50 TABLET ORAL
Status: DISCONTINUED | OUTPATIENT
Start: 2022-05-21 | End: 2022-05-24 | Stop reason: HOSPADM

## 2022-05-21 RX ORDER — METFORMIN HYDROCHLORIDE 500 MG/1
1000 TABLET, EXTENDED RELEASE ORAL 2 TIMES DAILY WITH MEALS
Status: DISCONTINUED | OUTPATIENT
Start: 2022-05-21 | End: 2022-05-24 | Stop reason: HOSPADM

## 2022-05-21 RX ORDER — CHLORPROMAZINE HYDROCHLORIDE 25 MG/1
100 TABLET, FILM COATED ORAL 2 TIMES DAILY PRN
Status: DISCONTINUED | OUTPATIENT
Start: 2022-05-21 | End: 2022-05-24 | Stop reason: HOSPADM

## 2022-05-21 RX ORDER — ATORVASTATIN CALCIUM 40 MG/1
40 TABLET, FILM COATED ORAL
Status: DISCONTINUED | OUTPATIENT
Start: 2022-05-21 | End: 2022-05-24 | Stop reason: HOSPADM

## 2022-05-21 RX ORDER — CHLORPROMAZINE HYDROCHLORIDE 100 MG/1
100 TABLET, FILM COATED ORAL 3 TIMES DAILY
COMMUNITY

## 2022-05-21 RX ORDER — DIVALPROEX SODIUM 250 MG/1
500 TABLET, DELAYED RELEASE ORAL EVERY 12 HOURS SCHEDULED
Status: DISCONTINUED | OUTPATIENT
Start: 2022-05-21 | End: 2022-05-24 | Stop reason: HOSPADM

## 2022-05-21 RX ORDER — LORATADINE 10 MG/1
10 TABLET ORAL DAILY
Status: DISCONTINUED | OUTPATIENT
Start: 2022-05-21 | End: 2022-05-24 | Stop reason: HOSPADM

## 2022-05-21 RX ORDER — HYDROXYZINE HYDROCHLORIDE 25 MG/1
25 TABLET, FILM COATED ORAL EVERY 6 HOURS PRN
Status: DISCONTINUED | OUTPATIENT
Start: 2022-05-21 | End: 2022-05-24 | Stop reason: HOSPADM

## 2022-05-21 RX ORDER — LEVOTHYROXINE SODIUM 0.03 MG/1
25 TABLET ORAL
Status: DISCONTINUED | OUTPATIENT
Start: 2022-05-22 | End: 2022-05-24 | Stop reason: HOSPADM

## 2022-05-21 RX ORDER — OLANZAPINE 10 MG/1
5 INJECTION, POWDER, LYOPHILIZED, FOR SOLUTION INTRAMUSCULAR ONCE AS NEEDED
Status: COMPLETED | OUTPATIENT
Start: 2022-05-21 | End: 2022-05-21

## 2022-05-21 RX ORDER — ACETAMINOPHEN 325 MG/1
650 TABLET ORAL EVERY 8 HOURS PRN
Status: DISCONTINUED | OUTPATIENT
Start: 2022-05-21 | End: 2022-05-24 | Stop reason: HOSPADM

## 2022-05-21 RX ORDER — OLANZAPINE 10 MG/1
5 INJECTION, POWDER, LYOPHILIZED, FOR SOLUTION INTRAMUSCULAR ONCE
Status: COMPLETED | OUTPATIENT
Start: 2022-05-21 | End: 2022-05-21

## 2022-05-21 RX ADMIN — METFORMIN ER 500 MG 1000 MG: 500 TABLET ORAL at 17:21

## 2022-05-21 RX ADMIN — LORAZEPAM 0.5 MG: 0.5 TABLET ORAL at 15:01

## 2022-05-21 RX ADMIN — CHLORPROMAZINE HYDROCHLORIDE 100 MG: 25 TABLET, FILM COATED ORAL at 15:01

## 2022-05-21 RX ADMIN — GABAPENTIN 100 MG: 100 CAPSULE ORAL at 23:22

## 2022-05-21 RX ADMIN — OLANZAPINE 5 MG: 10 INJECTION, POWDER, FOR SOLUTION INTRAMUSCULAR at 01:34

## 2022-05-21 RX ADMIN — OLANZAPINE 5 MG: 10 INJECTION, POWDER, FOR SOLUTION INTRAMUSCULAR at 18:52

## 2022-05-21 RX ADMIN — DIVALPROEX SODIUM 500 MG: 250 TABLET, DELAYED RELEASE ORAL at 23:22

## 2022-05-21 RX ADMIN — WATER 1.2 ML: 1 INJECTION INTRAMUSCULAR; INTRAVENOUS; SUBCUTANEOUS at 18:53

## 2022-05-21 RX ADMIN — CHLORPROMAZINE HYDROCHLORIDE 100 MG: 25 TABLET, FILM COATED ORAL at 23:22

## 2022-05-21 RX ADMIN — LISINOPRIL 2.5 MG: 5 TABLET ORAL at 15:01

## 2022-05-21 RX ADMIN — HYDROXYZINE HYDROCHLORIDE 25 MG: 25 TABLET, FILM COATED ORAL at 17:21

## 2022-05-21 RX ADMIN — DOCUSATE SODIUM 100 MG: 100 CAPSULE, LIQUID FILLED ORAL at 15:01

## 2022-05-21 RX ADMIN — WATER 2.1 ML: 1 INJECTION INTRAMUSCULAR; INTRAVENOUS; SUBCUTANEOUS at 01:34

## 2022-05-21 RX ADMIN — PROPRANOLOL HYDROCHLORIDE 20 MG: 20 TABLET ORAL at 23:22

## 2022-05-21 RX ADMIN — LORAZEPAM 0.5 MG: 0.5 TABLET ORAL at 23:22

## 2022-05-21 RX ADMIN — LORATADINE 10 MG: 10 TABLET ORAL at 15:01

## 2022-05-21 RX ADMIN — ATORVASTATIN CALCIUM 40 MG: 40 TABLET, FILM COATED ORAL at 23:22

## 2022-05-21 NOTE — ED NOTES
Patient comes to window asking to speak to crisis worker  Crisis worker and [facility] nurse in to speak with him         Roni Babcock  05/21/22 1921

## 2022-05-21 NOTE — ED NOTES
Patient calm and cooperative at this time and in no distress  Will continue to monitor         Earnstine Rank  05/21/22 [id number]
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
## 2022-05-21 NOTE — ED NOTES
Psychiatry consult placed with AUBRIE Weinstein  05/21/22 500 [facility] Abigail  05/21/22 8002

## 2022-05-21 NOTE — ED NOTES
302 was delegated by [facility] and Dr Madison Farrell MD, upheld  Patient served and read his 36 and Mi [place] of rights  He appeared to understand uninterested asked that they be given to staff  PDS states they will call guardian Roni Coon to notify her of  completed 36 and ACT 68 to [facility]  Original 302 placed in Secured holding wall folder  302 sent to Intake/oncoming Crisis worker to bed search and complete insurance pre cert

## 2022-05-21 NOTE — ED NOTES
Pt stated that he was feeling very anxious, and requested some medication    Cw text to ED DR, at patients request
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
## 2022-05-21 NOTE — ED NOTES
Pt states he feels anxious requested ativan, provider made aware       Tena Parada [facility]  05/20/22 2047

## 2022-05-21 NOTE — ED NOTES
Pt accompanied by group home care taker back to group [place]     Dipak Jimmie, [facility]  05/20/22 2051
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
## 2022-05-22 LAB
GLUCOSE SERPL-MCNC: 174 MG/DL (ref 65–140)
GLUCOSE SERPL-MCNC: 198 MG/DL (ref 65–140)

## 2022-05-22 PROCEDURE — 96372 THER/PROPH/DIAG INJ SC/IM: CPT

## 2022-05-22 PROCEDURE — 82948 REAGENT STRIP/BLOOD GLUCOSE: CPT

## 2022-05-22 RX ADMIN — LORAZEPAM 0.5 MG: 0.5 TABLET ORAL at 09:13

## 2022-05-22 RX ADMIN — METFORMIN ER 500 MG 1000 MG: 500 TABLET ORAL at 18:00

## 2022-05-22 RX ADMIN — WATER: 1 INJECTION INTRAMUSCULAR; INTRAVENOUS; SUBCUTANEOUS at 19:16

## 2022-05-22 RX ADMIN — DIVALPROEX SODIUM 500 MG: 250 TABLET, DELAYED RELEASE ORAL at 21:19

## 2022-05-22 RX ADMIN — GABAPENTIN 100 MG: 100 CAPSULE ORAL at 21:29

## 2022-05-22 RX ADMIN — LORAZEPAM 0.5 MG: 0.5 TABLET ORAL at 21:20

## 2022-05-22 RX ADMIN — METFORMIN ER 500 MG 1000 MG: 500 TABLET ORAL at 09:27

## 2022-05-22 RX ADMIN — DIVALPROEX SODIUM 500 MG: 250 TABLET, DELAYED RELEASE ORAL at 09:13

## 2022-05-22 RX ADMIN — DOCUSATE SODIUM 100 MG: 100 CAPSULE, LIQUID FILLED ORAL at 09:12

## 2022-05-22 RX ADMIN — ATORVASTATIN CALCIUM 40 MG: 40 TABLET, FILM COATED ORAL at 21:29

## 2022-05-22 RX ADMIN — LEVOTHYROXINE SODIUM 25 MCG: 25 TABLET ORAL at 06:40

## 2022-05-22 RX ADMIN — PROPRANOLOL HYDROCHLORIDE 20 MG: 20 TABLET ORAL at 21:19

## 2022-05-22 RX ADMIN — LORAZEPAM 0.5 MG: 0.5 TABLET ORAL at 14:00

## 2022-05-22 RX ADMIN — LISINOPRIL 2.5 MG: 5 TABLET ORAL at 09:13

## 2022-05-22 RX ADMIN — PROPRANOLOL HYDROCHLORIDE 20 MG: 20 TABLET ORAL at 09:13

## 2022-05-22 RX ADMIN — CHLORPROMAZINE HYDROCHLORIDE 100 MG: 25 TABLET, SUGAR COATED ORAL at 18:36

## 2022-05-22 RX ADMIN — CHLORPROMAZINE HYDROCHLORIDE 100 MG: 25 TABLET, FILM COATED ORAL at 09:13

## 2022-05-22 RX ADMIN — TRAZODONE HYDROCHLORIDE 50 MG: 50 TABLET ORAL at 18:36

## 2022-05-22 RX ADMIN — OLANZAPINE 5 MG: 10 INJECTION, POWDER, LYOPHILIZED, FOR SOLUTION INTRAMUSCULAR at 15:50

## 2022-05-22 RX ADMIN — HYDROXYZINE HYDROCHLORIDE 25 MG: 25 TABLET, FILM COATED ORAL at 15:51

## 2022-05-22 RX ADMIN — CHLORPROMAZINE HYDROCHLORIDE 100 MG: 25 TABLET, FILM COATED ORAL at 21:19

## 2022-05-22 RX ADMIN — SITAGLIPTIN 100 MG: 100 TABLET, FILM COATED ORAL at 09:13

## 2022-05-22 RX ADMIN — LORATADINE 10 MG: 10 TABLET ORAL at 09:13

## 2022-05-22 NOTE — ED NOTES
Ask the pharmacist at [facility] what  was used for your previous Metformin refill and if it is possible to request this for future refills.     - Continue with your metformin after checking with pharmacy  - Continue your other medication  - Start taking Ranitidine for your acid reflux, stop omeprazole  - Follow up in 1 month for physical with Dr. Coughlin   Bed search    [facility] Cantu): no bed  [facility]na Magalie [facility]): No bed  [facility] Maguire): left message  [facility] No bed  [facility]): No bed  [facility]  [facility]): No bed  [facility]):  No bed  [facility] Itzel): Clinical faxed  [facility]  [facility]): no bed

## 2022-05-22 NOTE — ED NOTES
Ordered patient dinner tray  Care taker seated outside [facility] FACILITY doors  1;1 sitter is present       Darrell Gaviria  05/22/22 6254

## 2022-05-22 NOTE — ED NOTES
Lunch tray ordered  Patient resting comfortably  1;1 sitter is present       Dain Moon  05/22/22 7336

## 2022-05-22 NOTE — ED NOTES
CW spoke with patient's mother and gave an update that patient is doing well and bed search continues at this time
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
## 2022-05-22 NOTE — ED NOTES
Patient sleeping and in no distress at this time  Will continue to monitor       Darrell Gaviria  05/21/22 1213

## 2022-05-22 NOTE — ED NOTES
Patient resting comfortably with no wants or complaints at this time  Will continue to monitor       Maximus Toro  05/21/22 2002

## 2022-05-22 NOTE — ED NOTES
Patient eating breakfast at this time     [facility]  05/22/22 9447

## 2022-05-22 NOTE — ED NOTES
Patient is sleeping  Care taker continues staying outside the [facility] FACILITY doors  1;1 sitter is present         Brigitteia Mina  05/22/22 2696

## 2022-05-22 NOTE — ED NOTES
Patient sleeping at this time      Otoniel Mckay, [facility]  05/22/22 2183

## 2022-05-22 NOTE — ED NOTES
Patient sleeping and no distress at this time  1;1 sitter is present       Hajrinder Humphrey  05/22/22 0108

## 2022-05-22 NOTE — ED NOTES
Patient sleeping with lights off and tv on in room  Group home care taker sitting just outside the [address] [place] [address] area  1;1 sitter is present       Maximus Toro  05/22/22 1014

## 2022-05-22 NOTE — ED NOTES
Patient sleeping and in no distress at this time  Care taker is at bedside    1;1 sitter is present     Virginia Mcpherson  05/22/22 9576

## 2022-05-22 NOTE — ED NOTES
PA Promise:     [address] 05/22/2022 05/22/2022    Medicaid Category: PJW  Program Status: 80  Service Program: [id number] 05/22/2022 05/22/2022    Medicaid Category: PJW  Program Status: 80  Service Program: Vera Spare 05/22/2022 05/22/2022    Other or Additional Payor MEDICARE PART B 05/22/2022 05/22/2022  Other or Additional Payor MEDICARE PART A 05/22/2022 05/22/2

## 2022-05-22 NOTE — ED NOTES
Bed Search:    Ashkan Dueñas) 3 male-need to be mood or dual   Arloa Archana Vidal) adult beds  [address],Zach A (answering service)  Mishel Gonzalez) no beds  [facility] Keila Aguero) adult beds  [facility] Ashlyn Ernestina) no beds  [facility] (answering service)  [facility] Debora Glynn) adult beds  Derek Santana) no beds  [place] Kelsey Esparza) unable to answer  [facility] Carolina Leal) adult potentially  [place] Chick Mins) no beds for several days  Donald (no answer)

## 2022-05-22 NOTE — ED NOTES
Patient sleeping  Group home care taker still sitting just outside the [facility] FACILITY doors  1;1 sitter is present       Alex Richards  05/22/22 0674

## 2022-05-22 NOTE — ED NOTES
Patient sleeping with lights and tv on in room  Care taker sitting outside [facility] FACILITY doors  1;1 sitter is present       Jesus Fabian  05/22/22 2688

## 2022-05-22 NOTE — ED NOTES
Patient sleeping and in no distress at this time  Care taker sitting just outside the [facility] area  Will continue to monitor       Brayan [facility]  05/21/22 8150

## 2022-05-23 LAB
GLUCOSE SERPL-MCNC: 130 MG/DL (ref 65–140)
GLUCOSE SERPL-MCNC: 149 MG/DL (ref 65–140)
GLUCOSE SERPL-MCNC: 159 MG/DL (ref 65–140)
GLUCOSE SERPL-MCNC: 192 MG/DL (ref 65–140)

## 2022-05-23 PROCEDURE — 82948 REAGENT STRIP/BLOOD GLUCOSE: CPT

## 2022-05-23 RX ADMIN — LORATADINE 10 MG: 10 TABLET ORAL at 08:46

## 2022-05-23 RX ADMIN — LORAZEPAM 0.5 MG: 0.5 TABLET ORAL at 21:24

## 2022-05-23 RX ADMIN — DOCUSATE SODIUM 100 MG: 100 CAPSULE, LIQUID FILLED ORAL at 08:47

## 2022-05-23 RX ADMIN — PROPRANOLOL HYDROCHLORIDE 20 MG: 20 TABLET ORAL at 08:47

## 2022-05-23 RX ADMIN — DIVALPROEX SODIUM 500 MG: 250 TABLET, DELAYED RELEASE ORAL at 08:47

## 2022-05-23 RX ADMIN — CHLORPROMAZINE HYDROCHLORIDE 100 MG: 25 TABLET, FILM COATED ORAL at 16:53

## 2022-05-23 RX ADMIN — CHLORPROMAZINE HYDROCHLORIDE 100 MG: 25 TABLET, FILM COATED ORAL at 08:46

## 2022-05-23 RX ADMIN — LORAZEPAM 0.5 MG: 0.5 TABLET ORAL at 08:47

## 2022-05-23 RX ADMIN — CHLORPROMAZINE HYDROCHLORIDE 100 MG: 25 TABLET, FILM COATED ORAL at 21:24

## 2022-05-23 RX ADMIN — LEVOTHYROXINE SODIUM 25 MCG: 25 TABLET ORAL at 05:48

## 2022-05-23 RX ADMIN — DIVALPROEX SODIUM 500 MG: 250 TABLET, DELAYED RELEASE ORAL at 21:24

## 2022-05-23 RX ADMIN — LORAZEPAM 0.5 MG: 0.5 TABLET ORAL at 14:20

## 2022-05-23 RX ADMIN — ATORVASTATIN CALCIUM 40 MG: 40 TABLET, FILM COATED ORAL at 21:24

## 2022-05-23 RX ADMIN — LISINOPRIL 2.5 MG: 5 TABLET ORAL at 08:47

## 2022-05-23 RX ADMIN — GABAPENTIN 100 MG: 100 CAPSULE ORAL at 21:24

## 2022-05-23 RX ADMIN — SITAGLIPTIN 100 MG: 100 TABLET, FILM COATED ORAL at 08:47

## 2022-05-23 RX ADMIN — METFORMIN ER 500 MG 1000 MG: 500 TABLET ORAL at 08:47

## 2022-05-23 NOTE — ED NOTES
Pt is in room asleep  Pt shows no signs of distress will continue to monitor       [facility]  05/23/22 0815

## 2022-05-23 NOTE — ED NOTES
1915 received report from day shift nurse and assume patient care. Pt is calm and stable . Denies any pain at this time. No s/sx of distress. Safety precautions in place. Call light with in reach. Will continue to monitor.   Patient calm and cooperative with no wants or complaints at this time  Care taker and 1l1 is present       Maria Pierce  05/22/22 3352

## 2022-05-23 NOTE — ED NOTES
[place]-spoke with Francisco Grubbs, no beds   Gaston Georges with Giuliana Ponce, no beds   [facility]- spoke with Stalin Reyes, no beds   [facility]- spoke with Lucero Delong, no beds   [facility]- spoke with Kemal Caballero, no beds   [place]-no answer   [facility]- spoke with Eden Lyons, no beds   Xenia Levi- spoke with coco, they are reviewing for low acuity 201 female trauma discharge beds   [facility]- spoke with Dash Nichols, no beds   [facility]- spoke with Arjun Fuller, no beds   [place]-no answer  [place]-no answer, called 3xs   [place]- spoke with Isaiah, no beds   [facility]- no answer

## 2022-05-23 NOTE — ED NOTES
Patient resting comfortably with lights and tv on  Care taker and 1;1 sitter present       Lucero Rank  05/22/22 0677

## 2022-05-23 NOTE — ED NOTES
Crisis worker contacted by [facility] deferring placement due to pt aggression per KERWIN Zamudio S

## 2022-05-23 NOTE — ED NOTES
Bed search:    Nico [place] - denied for acuity per Avelina Cruz - no beds per [facility] - [place] - per? Linh Castellanos, no beds, but also do not have appropriate programming to meet patient's needs  [place] - no beds per Markell?   [address] [place] no beds per 1 [facility] - no beds per [facility] - yes beds per Carolyne Dale, chart faxed  [facility] [place] - no beds per [facility] - no beds per [facility] - voice mail message left, requesting a return call  Heidy Mace - still no available beds today but they still have his chart for review per staff  [address] [place] [address] - at full capacity for today per staff  [place] - no beds per [facility]- at capacity per [employer] - yes beds per Marcia, farnaz faxed      KERWIN Amador S

## 2022-05-23 NOTE — ED NOTES
Call to patient's mother, Telma Michaels ([phone]), in response to a message she left  She was advised that the bed search has been exhausted and will continue  She voiced support and understanding  She does ask that he not be notified that she has been calling as it tends to agitate him

## 2022-05-23 NOTE — ED NOTES
Patient calm and cooperative  Care taker and 1;1 sitter is present        Alix Martinez  05/22/22 7275

## 2022-05-23 NOTE — ED NOTES
PT requesting to go to closest hospital possible  PT stated he will wait as long as it takes for somewhere close in network       Cj Hart  05/22/22 2014       Percy Caldwell  05/22/22 2015

## 2022-05-23 NOTE — ED NOTES
No [facility] or [address] accepted at the following facilities: [facility], [address], [employer] (Ventura Landaverde), Tyler Rasmussen, [address] [place], [place]via[place]darius[place], [place], [address], [facility], [facility] 374, [facility], [address] [facility], [facility], [facility] / Antony Underwood / Bryce Greer (431 petitioner must appear in person and facilities are several hours away)  [place] - per Brennon East, they have no appropriate beds  Shayne Pederseno - per Trish Huynh, they have no appropriate beds  [place] - per Ani Wu, no beds, but also do not have appropriate programming to meet patient's needs  [place] - per Markell, no beds available today   [address] [place] per Michela Burns, they have no appropriate beds available today  [facility] - unable to reach admissions staff (, Gerber Courts forwarded call, but it rings without answer)  [place] - per [facility], they have no appropriate beds  [place] - per Luz Share, they have only dual beds available today  [place] - per Patience, they do not have an appropriate bed  [facility] - no beds per [facility] - voice mail message left, requesting a return call  [place] - per Jessica Barger, they are not anticipating any available beds today, but she stated that the referral can be faxed for review if anything becomes available; referral to be faxed  [address] - no beds per Joanna Benton - no beds per [facility] - [facility]- no appropriate beds per Melany Holden - no appropriate beds per Annette Kovacs (IDD is exclusionary)    Referrals were faxed to the following facilities: [place] and Intake  Crisis to follow-up

## 2022-05-23 NOTE — ED NOTES
Patient's [facility] group home staff member was updated regarding (exhausted) bed search  He was advised that we are still awaiting network bed availability, and that bed search would continue

## 2022-05-24 VITALS
BODY MASS INDEX: 30.47 KG/M2 | RESPIRATION RATE: 18 BRPM | DIASTOLIC BLOOD PRESSURE: 74 MMHG | TEMPERATURE: 98.3 F | SYSTOLIC BLOOD PRESSURE: 117 MMHG | HEART RATE: 100 BPM | OXYGEN SATURATION: 96 % | WEIGHT: 206.35 LBS

## 2022-05-24 LAB
GLUCOSE SERPL-MCNC: 156 MG/DL (ref 65–140)
GLUCOSE SERPL-MCNC: 250 MG/DL (ref 65–140)

## 2022-05-24 PROCEDURE — 82948 REAGENT STRIP/BLOOD GLUCOSE: CPT

## 2022-05-24 PROCEDURE — 99214 OFFICE O/P EST MOD 30 MIN: CPT | Performed by: PSYCHIATRY & NEUROLOGY

## 2022-05-24 RX ORDER — ARIPIPRAZOLE 5 MG/1
5 TABLET ORAL DAILY
Qty: 30 TABLET | Refills: 1 | Status: SHIPPED | OUTPATIENT
Start: 2022-05-24 | End: 2022-07-08

## 2022-05-24 RX ADMIN — LEVOTHYROXINE SODIUM 25 MCG: 25 TABLET ORAL at 08:53

## 2022-05-24 RX ADMIN — METFORMIN ER 500 MG 1000 MG: 500 TABLET ORAL at 00:03

## 2022-05-24 RX ADMIN — LORATADINE 10 MG: 10 TABLET ORAL at 08:53

## 2022-05-24 RX ADMIN — DOCUSATE SODIUM 100 MG: 100 CAPSULE, LIQUID FILLED ORAL at 08:53

## 2022-05-24 RX ADMIN — CHLORPROMAZINE HYDROCHLORIDE 100 MG: 25 TABLET, FILM COATED ORAL at 15:38

## 2022-05-24 RX ADMIN — HYDROXYZINE HYDROCHLORIDE 25 MG: 25 TABLET, FILM COATED ORAL at 15:38

## 2022-05-24 RX ADMIN — LORAZEPAM 0.5 MG: 0.5 TABLET ORAL at 13:59

## 2022-05-24 RX ADMIN — CHLORPROMAZINE HYDROCHLORIDE 100 MG: 25 TABLET, FILM COATED ORAL at 08:52

## 2022-05-24 RX ADMIN — LORAZEPAM 0.5 MG: 0.5 TABLET ORAL at 08:53

## 2022-05-24 RX ADMIN — DIVALPROEX SODIUM 500 MG: 250 TABLET, DELAYED RELEASE ORAL at 08:52

## 2022-05-24 RX ADMIN — Medication 524 MG: at 00:03

## 2022-05-24 RX ADMIN — METFORMIN ER 500 MG 1000 MG: 500 TABLET ORAL at 08:53

## 2022-05-24 RX ADMIN — SITAGLIPTIN 100 MG: 100 TABLET, FILM COATED ORAL at 08:53

## 2022-05-24 NOTE — ED NOTES
This writer discussed the patients current presentation and recommended discharge plan with Dr Cyndee Petersen  They agree with the patient being discharged at this time with referrals and/or information about medication  The patient was Instructed to follow up with their psychiatrist    The patient was provided with referral information for:   Patient has outpatient services  Medication being changed to include Abilify  This writer and the patient completed a safety plan  The patient was provided with a copy of their safety plan with encouragement to utilize the plan following discharge  This writer discussed discharge plans with the patient  who agrees with and understands the discharge plans           SAFETY PLAN  Warning Signs (thoughts, images, mood, behavior, situations) of a potential crisis: getting upset, wanting to harm self or others      Coping Skills (what can I do to take my mind off the problem, or to keep myself safe): Keep occupied by drawing, listening to music, going places      Outside Support (who can I reach out to for support and help): Dr Braydon Tse, [facility] [address] [place] [address] Suicide Prevention Hotline:  [address]: [facility]: [facility] [address] [phone] - Crisis   [phone] - Peer Support Talk Line (1-9pm daily)  [phone] - Teen Support Talk Line (1-9pm daily)  [address] [facility] [address] [facility] [phone] - 2696 [facility]

## 2022-05-24 NOTE — ED NOTES
No [facility] or [address] accepted at the following facilities: [facility], [address], [employer] (Hettie Monday), SatantaTyler alanisAmy 98, [place], [address], [place] [facility], [facility] 374, [address], [facility], [address] / [place] / [place] (750 petitioner must appear in person and facilities are several hours away)       Previously denied at the following facilities as they do not have appropriate programming and/or due to acuity:   Marcel Phillips), Demetrius Quevedo), Paul (Kyra Velazquez),  (Tolu), ONEL (Cecilio Quiroz), Sofia (Sandrine Anthony), Rosemary (Lauren), Donald (Michelle Johnson), [facility]    Joselito Guevara - per Miguel A Nugent, they can review; referral faxed  [facility] - unable to reach admissions staff ( forwarded call, but there was no answer); will fax in case they have beds available  [facility] - no beds per [address] - voice mail message left, requesting a return call  [place] - per Taz Laboy, they can review; referral to be faxed  [facility] - no beds per [facility] - no beds per Amish Woods     Referrals were faxed to the following facilities: [place], [facility], [place] and Intake  Crisis to follow-up

## 2022-05-24 NOTE — ED NOTES
Patient is asking for Zyprexa for anxiety  Crisis Worker asked if he was feeling agitated and he stated, "a little bit    I have been here so long    they can't find me a bed"  Patient remained calm and controlled and was able to articulate appropriately  Advised him that we are awaiting a telepsych consult to reassess the need for inpatient  He states, "yeah    they can't find me a bed so I might as well go home"  Advised him that he can discuss this with the psychiatrist, and reminded that if he goes home, he has to assure that he won't act out  He stated, "yeah, I can't act out "    Advised RN of his request for medication via TT

## 2022-05-24 NOTE — ED NOTES
Crisis worker contacted intake for update on bed placement  There is still no availability as of now        KERWIN Arechiga S

## 2022-05-24 NOTE — ED NOTES
Spoke with Lizet Blankenship at [facility], regarding telepsych determining patient could be discharged and overturn the 302  Lizet Blankenship states that telepsych note can be sent to her in lieu of telepsych doctor completing a physician exam page  Note sent to Essie Anderson at [facility]       LEROY Covington  05/24/22    3025
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
## 2022-05-24 NOTE — ED CARE HANDOFF
Emergency Department Sign Out Note        Sign out and transfer of care from Dr Dexter Vera  See Separate Emergency Department note  The patient, Giovanni Salazar, was evaluated by the previous provider for depression  Workup Completed: Follow-up psychiatric consultation    ED Course / Workup Pending (followup): Patient ultimately to be discharged, psychiatrist feels comfortable starting on Abilify in discharging patient and group home comfortable this, will DC  ED Course as of 05/24/22 1623   Tue May 24, 2022   1620 Patient seen and evaluated by Psychiatry, Dr Shanice Candelaria, feels patient is cleared for discharge  , recommending starting on 5 mg of Abilify p o  Daily while continuing the rest of his medications  Will discharge  Procedures  MDM        Disposition  Final diagnoses:   Suicidal ideation     Time reflects when diagnosis was documented in both MDM as applicable and the Disposition within this note     Time User Action Codes Description Comment    5/21/2022 12:18 AM Mikael Riedel T Add [R45 [id number]] Suicidal ideation       ED Disposition     ED Disposition   Discharge    Condition   Stable    Date/Time   Tue May 24, 2022  4:22 PM    Comment     Patient is medically clear for psychiatric admission  Disposition pending  MD Documentation    [address] Most Recent Value   Sending MD Dr Keny Bueno      Follow-up Information    None       Patient's Medications   Discharge Prescriptions    ARIPIPRAZOLE (ABILIFY) 5 MG TABLET    Take 1 tablet (5 mg total) by mouth in the morning  Start Date: 5/24/2022 End Date: --       Order Dose: 5 mg       Quantity: 30 tablet    Refills: 1     No discharge procedures on file         ED Provider  Electronically Signed by     Court Farrar DO  05/24/22 DO Kimmy  05/24/22 7646

## 2022-05-24 NOTE — ED NOTES
Chad contacted for patient psychiatric consult at this time  This RN will be contacted by Dr Dayana Mitchell via  Niru Spear when is available for consult        Corona Sky RN  05/24/22 7234

## 2022-05-24 NOTE — ED NOTES
This RN entered pt room, pt asking for IM injection of zyprexa for agitation  RN informed pt he had ativan due at this time  RN offered to give pt ativan now, reassess within the hour and decide whether he would require additional medication for sx  Pt agreeable to plan  Pt rambling about placement, desire to go home  RN reminded pt of upcoming psychiatry consult this afternoon, offered comfort measures, told pt he would have more clarity/information after his appointment  Pt verbalized understanding  Will continue to assess       Ghislaine Isaacs RN  05/24/22 [id number]

## 2022-05-24 NOTE — ED NOTES
Call to follow-up with referrals sent earlier today  No answer / extensive hold without option for voice mail at [place] [phone]  Called [facility] and spoke with Santiago Blair, who reports that patient was denied due to acuity

## 2022-05-24 NOTE — ED NOTES
Bed search exhausted with no available network beds  Patient has been stable in ED for at least the past 2 days (by observation of Crisis Worker) and via chart review  No behaviors or expressions of lethality  Discussed case with Dr Pop Lee and Psychiatry (who are not available today)  Psychiatric consult ordered to assess for discharge  Psychiatric Consult will need to be completed with [facility]    RN advised via TT

## 2022-05-24 NOTE — ED CARE HANDOFF
Emergency Department Sign Out Note        Sign out and transfer of care from Dr Tamiko Rodriguez  al  See Separate Emergency Department note  The patient, Vanessa Moore, was evaluated by the previous provider for SI and auditory hallucinations  Workup Completed:  yes    ED Course / Workup Pending (followup):  302 placement as per crisis  Signed out to Dr Genaro Grover in AM                                       Procedures  MDM        Disposition  Final diagnoses:   Suicidal ideation     Time reflects when diagnosis was documented in both MDM as applicable and the Disposition within this note     Time User Action Codes Description Comment    5/21/2022 12:18 AM [address] [place] [address] Hardik Heidi JIMENEZ Add [[id number]] Suicidal ideation       ED Disposition     ED Disposition   Transfer to [address] [place] [address]   --    Date/Time   Sat May 21, 2022 12:18 AM    Comment     Patient is medically clear for psychiatric admission  Disposition pending  MD Documentation    Janet Caban Most Recent Value   Sending MD Dr Dhaval Acevedo    None       Patient's Medications   Discharge Prescriptions    No medications on file     No discharge procedures on file         ED Provider  Electronically Signed by     Florentino García MD  05/24/22 8641

## 2022-05-27 ENCOUNTER — OFFICE VISIT (OUTPATIENT)
Dept: FAMILY MEDICINE CLINIC | Facility: CLINIC | Age: 42
End: 2022-05-27

## 2022-05-27 VITALS
WEIGHT: 197.4 LBS | BODY MASS INDEX: 29.24 KG/M2 | OXYGEN SATURATION: 98 % | DIASTOLIC BLOOD PRESSURE: 72 MMHG | TEMPERATURE: 97.3 F | HEIGHT: 69 IN | SYSTOLIC BLOOD PRESSURE: 104 MMHG | HEART RATE: 92 BPM | RESPIRATION RATE: 18 BRPM

## 2022-05-27 DIAGNOSIS — F99 PSYCHIATRIC DISORDER: Primary | ICD-10-CM

## 2022-05-27 PROCEDURE — 99213 OFFICE O/P EST LOW 20 MIN: CPT | Performed by: FAMILY MEDICINE
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
## 2022-05-27 NOTE — ASSESSMENT & PLAN NOTE
Lab Results   Component Value Date/Time    [id number] 3 436 10/31/2021 12:59 AM    [id number] 0 844 08/02/2021 10:45 AM    [id number] 0 711 04/07/2021 06:27 AM

## 2022-06-06 ENCOUNTER — CLINICAL SUPPORT (OUTPATIENT)
Dept: FAMILY MEDICINE CLINIC | Facility: CLINIC | Age: 42
End: 2022-06-06

## 2022-06-06 ENCOUNTER — TELEPHONE (OUTPATIENT)
Dept: FAMILY MEDICINE CLINIC | Facility: CLINIC | Age: 42
End: 2022-06-06

## 2022-06-06 DIAGNOSIS — Z11.1 SCREENING-PULMONARY TB: Primary | ICD-10-CM

## 2022-06-06 PROCEDURE — 86580 TB INTRADERMAL TEST: CPT

## 2022-06-06 NOTE — TELEPHONE ENCOUNTER
Completed [employer] casenote for PPD placement today  Taken to front office for scanning

## 2022-06-08 LAB
INDURATION: 0 MM
TB SKIN TEST: NEGATIVE

## 2022-06-08 NOTE — TELEPHONE ENCOUNTER
Patient presented today for PPD reading  PPD negative  Additional form completed for patient from [employer]  Copy of form made to be scanned into patient's chart  Original form given to patient and their caregiver

## 2022-06-30 ENCOUNTER — OFFICE VISIT (OUTPATIENT)
Dept: AUDIOLOGY | Age: 42
End: 2022-06-30
Payer: MEDICARE

## 2022-06-30 DIAGNOSIS — H90.3 SENSORY HEARING LOSS, BILATERAL: Primary | ICD-10-CM

## 2022-06-30 PROCEDURE — 92557 COMPREHENSIVE HEARING TEST: CPT | Performed by: AUDIOLOGIST

## 2022-06-30 PROCEDURE — 92567 TYMPANOMETRY: CPT | Performed by: AUDIOLOGIST

## 2022-06-30 NOTE — PROGRESS NOTES
HEARING EVALUATION    Name:  Cady Scales  :  1980  Age:  37 y o  Date of Evaluation: 22     History: Annual  Reason for visit: Cady Scales is being seen today at the request of Dr Henok Lundberg for an evaluation of hearing  Patient reports no concern over hearing  EVALUATION:    Otoscopic Evaluation:   Right Ear: Clear and healthy ear canal and tympanic membrane   Left Ear: Clear and healthy ear canal and tympanic membrane    Tympanometry:   Right: Type A - normal middle ear pressure and compliance   Left: Type A - normal middle ear pressure and compliance    Audiogram Results:  Normal hearing sensitivity except for mild high frequency loss at 6k Hz right and 6k and 8k Hz left  Stable from last evaluation 2021   /  *see attached audiogram    RECOMMENDATIONS:  Annual hearing eval, Return to [facility]  for F/U and Copy to Patient/Caregiver    PATIENT EDUCATION:   Discussed results and recommendations with patient  Questions were addressed and the patient was encouraged to contact our department should concerns arise        Yaneth Nix   Clinical Audiologist

## 2022-07-08 ENCOUNTER — TELEPHONE (OUTPATIENT)
Dept: FAMILY MEDICINE CLINIC | Facility: CLINIC | Age: 42
End: 2022-07-08

## 2022-07-08 ENCOUNTER — OFFICE VISIT (OUTPATIENT)
Dept: FAMILY MEDICINE CLINIC | Facility: CLINIC | Age: 42
End: 2022-07-08

## 2022-07-08 VITALS
HEART RATE: 75 BPM | SYSTOLIC BLOOD PRESSURE: 124 MMHG | OXYGEN SATURATION: 98 % | WEIGHT: 194.6 LBS | RESPIRATION RATE: 18 BRPM | DIASTOLIC BLOOD PRESSURE: 98 MMHG | BODY MASS INDEX: 28.82 KG/M2 | HEIGHT: 69 IN | TEMPERATURE: 97.3 F

## 2022-07-08 DIAGNOSIS — F25.0 SCHIZOAFFECTIVE DISORDER, BIPOLAR TYPE (HCC): Primary | Chronic | ICD-10-CM

## 2022-07-08 DIAGNOSIS — Z11.59 ENCOUNTER FOR HEPATITIS C SCREENING TEST FOR LOW RISK PATIENT: ICD-10-CM

## 2022-07-08 DIAGNOSIS — Z12.5 SCREENING FOR PROSTATE CANCER: ICD-10-CM

## 2022-07-08 DIAGNOSIS — E03.9 HYPOTHYROIDISM, UNSPECIFIED TYPE: ICD-10-CM

## 2022-07-08 DIAGNOSIS — E11.69 TYPE 2 DIABETES MELLITUS WITH OTHER SPECIFIED COMPLICATION, WITHOUT LONG-TERM CURRENT USE OF INSULIN (HCC): ICD-10-CM

## 2022-07-08 DIAGNOSIS — F70 MILD INTELLECTUAL DISABILITY: Chronic | ICD-10-CM

## 2022-07-08 DIAGNOSIS — Z13.220 SCREENING FOR HYPERCHOLESTEROLEMIA: ICD-10-CM

## 2022-07-08 PROBLEM — Z00.00 ANNUAL PHYSICAL EXAM: Status: ACTIVE | Noted: 2022-07-08

## 2022-07-08 PROCEDURE — 99213 OFFICE O/P EST LOW 20 MIN: CPT | Performed by: FAMILY MEDICINE

## 2022-07-08 RX ORDER — ARIPIPRAZOLE 10 MG/1
10 TABLET ORAL DAILY
COMMUNITY

## 2022-07-08 NOTE — PATIENT INSTRUCTIONS

## 2022-07-08 NOTE — TELEPHONE ENCOUNTER
- Form in (BLUE) clinical folder     - Name of the form is: ([address])    - Form to be filled out by: (DR Higginbotham Course)    - Form to be: picked up (PATIENT)    - Patient made aware of 10 business day policy and verbalized understanding

## 2022-07-08 NOTE — TELEPHONE ENCOUNTER
Forms where handed to Dr Sin Day at appt by caregiver  Forms where completed today  Forms placed in BLUE clerical folder for   Thank you
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
## 2022-07-13 ENCOUNTER — TELEPHONE (OUTPATIENT)
Dept: FAMILY MEDICINE CLINIC | Facility: CLINIC | Age: 42
End: 2022-07-13

## 2022-07-13 DIAGNOSIS — F25.0 SCHIZOAFFECTIVE DISORDER, BIPOLAR TYPE (HCC): Chronic | ICD-10-CM

## 2022-07-13 DIAGNOSIS — H25.11 NUCLEAR SCLEROTIC CATARACT OF RIGHT EYE: ICD-10-CM

## 2022-07-13 DIAGNOSIS — E11.65 TYPE 2 DIABETES MELLITUS WITH HYPERGLYCEMIA, WITHOUT LONG-TERM CURRENT USE OF INSULIN (HCC): ICD-10-CM

## 2022-07-14 RX ORDER — LISINOPRIL 2.5 MG/1
2.5 TABLET ORAL DAILY
Qty: 90 TABLET | Refills: 0 | Status: SHIPPED | OUTPATIENT
Start: 2022-07-14 | End: 2022-10-14 | Stop reason: SDUPTHER

## 2022-07-14 RX ORDER — METFORMIN HYDROCHLORIDE EXTENDED-RELEASE TABLETS 1000 MG/1
1000 TABLET, FILM COATED, EXTENDED RELEASE ORAL 2 TIMES DAILY WITH MEALS
Qty: 62 TABLET | Refills: 5 | Status: SHIPPED | OUTPATIENT
Start: 2022-07-14 | End: 2022-10-18

## 2022-07-14 RX ORDER — VITAMIN E 268 MG
400 CAPSULE ORAL DAILY
Qty: 31 CAPSULE | Refills: 5 | Status: SHIPPED | OUTPATIENT
Start: 2022-07-14 | End: 2022-10-18

## 2022-07-15 ENCOUNTER — TELEPHONE (OUTPATIENT)
Dept: FAMILY MEDICINE CLINIC | Facility: CLINIC | Age: 42
End: 2022-07-15

## 2022-07-19 ENCOUNTER — OFFICE VISIT (OUTPATIENT)
Dept: URGENT CARE | Age: 42
End: 2022-07-19
Payer: MEDICARE

## 2022-07-19 VITALS
RESPIRATION RATE: 18 BRPM | HEART RATE: 77 BPM | TEMPERATURE: 96.7 F | DIASTOLIC BLOOD PRESSURE: 64 MMHG | SYSTOLIC BLOOD PRESSURE: 116 MMHG | OXYGEN SATURATION: 96 %

## 2022-07-19 DIAGNOSIS — H57.11 ACUTE PAIN IN RIGHT EYE: ICD-10-CM

## 2022-07-19 DIAGNOSIS — S05.01XA ABRASION OF RIGHT CORNEA, INITIAL ENCOUNTER: Primary | ICD-10-CM

## 2022-07-19 PROCEDURE — 99213 OFFICE O/P EST LOW 20 MIN: CPT | Performed by: PHYSICIAN ASSISTANT

## 2022-07-19 PROCEDURE — G0463 HOSPITAL OUTPT CLINIC VISIT: HCPCS | Performed by: PHYSICIAN ASSISTANT

## 2022-07-19 RX ORDER — TETRACAINE HYDROCHLORIDE 5 MG/ML
2 SOLUTION OPHTHALMIC ONCE
Status: COMPLETED | OUTPATIENT
Start: 2022-07-19 | End: 2022-07-19

## 2022-07-19 RX ORDER — TOBRAMYCIN 3 MG/ML
1 SOLUTION/ DROPS OPHTHALMIC
Qty: 5 ML | Refills: 0 | Status: SHIPPED | OUTPATIENT
Start: 2022-07-19 | End: 2022-07-26

## 2022-07-19 RX ADMIN — TETRACAINE HYDROCHLORIDE 2 DROP: 5 SOLUTION OPHTHALMIC at 15:25

## 2022-07-19 NOTE — PATIENT INSTRUCTIONS
Use eye drops as directed  If symptoms do not improve in 2-3 days, follow-up with PCP or eye doctors  If symptoms worsen, report to the emergency room

## 2022-07-19 NOTE — ASSESSMENT & PLAN NOTE
Stable, denies SI/HI  Continue current regimen per psychiatry: Abilify 10mg qd  Caregiver reports patient is compliant with medications and has outpt follow-up with psychiatry

## 2022-07-19 NOTE — ASSESSMENT & PLAN NOTE
Currently lives at a group home with caregiver present during today's visit  Able to complete most ADLs independently but does require caregiver support
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
## 2022-07-27 ENCOUNTER — OFFICE VISIT (OUTPATIENT)
Dept: FAMILY MEDICINE CLINIC | Facility: CLINIC | Age: 42
End: 2022-07-27

## 2022-07-27 VITALS
BODY MASS INDEX: 28.2 KG/M2 | OXYGEN SATURATION: 92 % | HEART RATE: 89 BPM | TEMPERATURE: 97.4 F | RESPIRATION RATE: 18 BRPM | DIASTOLIC BLOOD PRESSURE: 74 MMHG | WEIGHT: 190.4 LBS | HEIGHT: 69 IN | SYSTOLIC BLOOD PRESSURE: 105 MMHG

## 2022-07-27 DIAGNOSIS — S05.01XD ABRASION OF RIGHT CORNEA, SUBSEQUENT ENCOUNTER: Primary | ICD-10-CM

## 2022-07-27 PROBLEM — S05.01XA RIGHT CORNEAL ABRASION: Status: ACTIVE | Noted: 2022-07-27

## 2022-07-27 PROCEDURE — 99213 OFFICE O/P EST LOW 20 MIN: CPT | Performed by: FAMILY MEDICINE

## 2022-07-27 NOTE — ASSESSMENT & PLAN NOTE
Right corneal abrasion 7/19/22 s/p Tobrex gtt by urgent care  - Currently asymptomatic  - Exam with Fluorescein negative for uptake  Remainder of eye exam unremarkable    - Can stop Tobrex gtt at this time  - RTC as needed
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
## 2022-08-12 DIAGNOSIS — E11.65 TYPE 2 DIABETES MELLITUS WITH HYPERGLYCEMIA, UNSPECIFIED WHETHER LONG TERM INSULIN USE (HCC): ICD-10-CM

## 2022-08-12 NOTE — TELEPHONE ENCOUNTER
Marina from [facility] Called for medication refill for   -Januvia 100 mg tab  Please send medication to [address] [facility]   Thanks

## 2022-08-16 ENCOUNTER — OFFICE VISIT (OUTPATIENT)
Dept: FAMILY MEDICINE CLINIC | Facility: CLINIC | Age: 42
End: 2022-08-16

## 2022-08-16 VITALS
SYSTOLIC BLOOD PRESSURE: 133 MMHG | TEMPERATURE: 97.2 F | OXYGEN SATURATION: 98 % | BODY MASS INDEX: 28.08 KG/M2 | WEIGHT: 189.6 LBS | DIASTOLIC BLOOD PRESSURE: 86 MMHG | HEART RATE: 73 BPM | HEIGHT: 69 IN | RESPIRATION RATE: 16 BRPM

## 2022-08-16 DIAGNOSIS — E11.65 TYPE 2 DIABETES MELLITUS WITH HYPERGLYCEMIA, WITHOUT LONG-TERM CURRENT USE OF INSULIN (HCC): Primary | ICD-10-CM

## 2022-08-16 LAB
CREAT UR-MCNC: 54.6 MG/DL
LEFT EYE DIABETIC RETINOPATHY: ABNORMAL
LEFT EYE IMAGE QUALITY: ABNORMAL
LEFT EYE MACULAR EDEMA: ABNORMAL
LEFT EYE OTHER RETINOPATHY: ABNORMAL
MICROALBUMIN UR-MCNC: 8.9 MG/L (ref 0–20)
MICROALBUMIN/CREAT 24H UR: 16 MG/G CREATININE (ref 0–30)
RIGHT EYE DIABETIC RETINOPATHY: ABNORMAL
RIGHT EYE IMAGE QUALITY: ABNORMAL
RIGHT EYE MACULAR EDEMA: ABNORMAL
RIGHT EYE OTHER RETINOPATHY: ABNORMAL
SEVERITY (EYE EXAM): ABNORMAL
SL AMB POCT HEMOGLOBIN AIC: 6.3 (ref ?–6.5)

## 2022-08-16 PROCEDURE — 99213 OFFICE O/P EST LOW 20 MIN: CPT | Performed by: FAMILY MEDICINE

## 2022-08-16 PROCEDURE — 82570 ASSAY OF URINE CREATININE: CPT | Performed by: FAMILY MEDICINE

## 2022-08-16 PROCEDURE — 82043 UR ALBUMIN QUANTITATIVE: CPT | Performed by: FAMILY MEDICINE

## 2022-08-16 PROCEDURE — 83036 HEMOGLOBIN GLYCOSYLATED A1C: CPT | Performed by: FAMILY MEDICINE

## 2022-08-16 NOTE — ASSESSMENT & PLAN NOTE
Well controlled on home Metformin 1g BID and Januvia 100mg daily  - Currently asympt and compliant with treatment plan  - Goal HgbA1c < 7  - Screening:  - 2/22/22 BUN 13:Cr 1 29   - 2/14/19 Microalb:Cr 158 -- will reorder today  - 5/13/22 DM foot exam unremarkable  - IRIS -- ordered today  - Home -150s  - Patient and caretaker states has been eating a lot of sugary snacks which attributes to his worsening A1c  Patient order to-go cookies and chocolates    - Rec lifestyle improvement including weight loss, low carb diet, and exercise  - C/w current regimen for now  - Weekly home BG check and bring log to follow-up visit  - RTC in 3 months for HgbA1c recheck  - Consider uptitrating oral antihyperglycemics in HgbA1c persistently above goal    Lab Results   Component Value Date/Time    HGBA1C 6 3 08/16/2022 01:20 PM    HGBA1C 7 7 (H) 02/02/2022 08:59 AM    HGBA1C 5 9 (H) 03/15/2018 09:23 AM
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
## 2022-08-22 ENCOUNTER — OFFICE VISIT (OUTPATIENT)
Dept: FAMILY MEDICINE CLINIC | Facility: CLINIC | Age: 42
End: 2022-08-22

## 2022-08-22 VITALS
TEMPERATURE: 97.5 F | OXYGEN SATURATION: 97 % | SYSTOLIC BLOOD PRESSURE: 127 MMHG | WEIGHT: 190 LBS | HEART RATE: 94 BPM | RESPIRATION RATE: 20 BRPM | HEIGHT: 69 IN | DIASTOLIC BLOOD PRESSURE: 82 MMHG | BODY MASS INDEX: 28.14 KG/M2

## 2022-08-22 DIAGNOSIS — R10.13 EPIGASTRIC PAIN: ICD-10-CM

## 2022-08-22 DIAGNOSIS — E11.65 TYPE 2 DIABETES MELLITUS WITH HYPERGLYCEMIA, WITHOUT LONG-TERM CURRENT USE OF INSULIN (HCC): Primary | ICD-10-CM

## 2022-08-22 PROCEDURE — 99213 OFFICE O/P EST LOW 20 MIN: CPT | Performed by: FAMILY MEDICINE

## 2022-08-22 RX ORDER — ALUMINUM HYDROXIDE, MAGNESIUM HYDROXIDE, SIMETHICONE 400; 400; 40 MG/10ML; MG/10ML; MG/10ML
20 SUSPENSION ORAL
Qty: 355 ML | Refills: 0 | Status: SHIPPED | OUTPATIENT
Start: 2022-08-22 | End: 2022-08-30 | Stop reason: SDUPTHER

## 2022-08-22 RX ORDER — ALCOHOL ANTISEPTIC PADS
PADS, MEDICATED (EA) TOPICAL AS NEEDED
Qty: 200 EACH | Refills: 1 | Status: SHIPPED | OUTPATIENT
Start: 2022-08-22

## 2022-08-22 NOTE — PROGRESS NOTES
Assessment/Plan:    Epigastric pain  - ongoing since last Wednesday, after eating at [facility]  - has had intermittent vomiting and diarrhea, last was this morning but no blood seen in either  - this is improving per caretaker  - suspect stomach irritation from GI bug   - rx mylanta for epigastric pain  - recommend limiting dairy and eating smaller portions for the next week or so       Diagnoses and all orders for this visit:    Type 2 diabetes mellitus with hyperglycemia, without long-term current use of insulin (HCC)  -     Alcohol Swabs (Curity Alcohol Preps) 70 % PADS; Use if needed (when checking blood glucose)    Epigastric pain  -     aluminum-magnesium hydroxide-simethicone (MAALOX) 787-737-50 MG/5ML SUSP; Take 20 mL by mouth 4 (four) times a day (before meals and at bedtime)          Subjective:      Patient ID: Balaji Ricketts is a 37 y o  male  36 y/o M presents for stomach pain  Went to [facility], developed vomiting that day and last time he threw up was this morning  Has had watery loose since then, last time was this morning  This has been occurring 1-2 times, mostly after eating  No blood in vomit or stool  Still been able to eat and drink  Has eaten pasta, cheese  No seafood  Has been using pepto bismol on Wednesday, Thursday, and Friday  This is improving  The following portions of the patient's history were reviewed and updated as appropriate: allergies, current medications, past family history, past medical history, past social history, past surgical history and problem list     Review of Systems   Constitutional: Negative for chills and fever  HENT: Negative for congestion and rhinorrhea  Eyes: Negative for visual disturbance  Respiratory: Negative for cough and shortness of breath  Cardiovascular: Negative for chest pain and palpitations  Gastrointestinal: Positive for abdominal pain, diarrhea and vomiting  Negative for constipation and nausea     Genitourinary: Negative for dysuria  Objective:      /82 (BP Location: Right arm, Patient Position: Sitting, Cuff Size: Standard)   Pulse 94   Temp 97 5 °F (36 4 °C) (Temporal)   Resp 20   Ht 5' 9" (1 753 m)   Wt 86 2 kg (190 lb)   SpO2 97%   BMI 28 06 kg/m²          Physical Exam  Vitals reviewed  Constitutional:       General: He is not in acute distress  Appearance: Normal appearance  HENT:      Head: Normocephalic and atraumatic  Right Ear: External ear normal       Left Ear: External ear normal       Nose: Nose normal       Mouth/Throat:      Pharynx: Oropharynx is clear  Eyes:      Conjunctiva/sclera: Conjunctivae normal    Cardiovascular:      Rate and Rhythm: Normal rate and regular rhythm  Pulses: Normal pulses  Heart sounds: Normal heart sounds  Pulmonary:      Effort: Pulmonary effort is normal       Breath sounds: Normal breath sounds  Abdominal:      Palpations: Abdomen is soft  Comments: TTP upper abdominal    Musculoskeletal:      Cervical back: Neck supple  Right lower leg: No edema  Left lower leg: No edema  Skin:     General: Skin is warm  Capillary Refill: Capillary refill takes less than 2 seconds  Neurological:      Mental Status: He is alert and oriented to person, place, and time  Gait: Gait normal    Psychiatric:         Mood and Affect: Mood normal          Behavior: Behavior normal          Thought Content:  Thought content normal          Judgment: Judgment normal  no dizziness/no decreased eating/drinking

## 2022-08-22 NOTE — ASSESSMENT & PLAN NOTE
- ongoing since last Wednesday, after eating at [facility]  - has had intermittent vomiting and diarrhea, last was this morning but no blood seen in either  - this is improving per caretaker  - suspect stomach irritation from GI bug   - rx mylanta for epigastric pain  - recommend limiting dairy and eating smaller portions for the next week or so

## 2022-08-24 ENCOUNTER — TELEPHONE (OUTPATIENT)
Dept: FAMILY MEDICINE CLINIC | Facility: CLINIC | Age: 42
End: 2022-08-24

## 2022-08-24 NOTE — TELEPHONE ENCOUNTER
Patient was seen on 08/22/2022 by Dr Joel Emanuel, which is what form is in regard to  Form will remain in blue folder in clinical area until completed by Dr Joel Emanuel

## 2022-08-24 NOTE — TELEPHONE ENCOUNTER
Folder (To be completed by) - Dr Whit Collins     Name of Form - Medical Examination [place]  Person Directed Support    Color folder (Yellow)    Form to be Faxed (Fax #), Mailed (Address), or Picked up (By whom) -    Patient was made aware of the 7 business day form policy

## 2022-08-26 NOTE — TELEPHONE ENCOUNTER
Called [facility] spoke to Hoang connors,  made aware form was completed and ready for       Scanned to Chart

## 2022-08-30 ENCOUNTER — APPOINTMENT (OUTPATIENT)
Dept: LAB | Age: 42
End: 2022-08-30
Payer: MEDICARE

## 2022-08-30 DIAGNOSIS — Z11.59 ENCOUNTER FOR HEPATITIS C SCREENING TEST FOR LOW RISK PATIENT: ICD-10-CM

## 2022-08-30 DIAGNOSIS — E11.69 TYPE 2 DIABETES MELLITUS WITH OTHER SPECIFIED COMPLICATION, WITHOUT LONG-TERM CURRENT USE OF INSULIN (HCC): ICD-10-CM

## 2022-08-30 DIAGNOSIS — E11.65 TYPE 2 DIABETES MELLITUS WITH HYPERGLYCEMIA, UNSPECIFIED WHETHER LONG TERM INSULIN USE (HCC): ICD-10-CM

## 2022-08-30 DIAGNOSIS — Z13.220 SCREENING FOR HYPERCHOLESTEROLEMIA: ICD-10-CM

## 2022-08-30 DIAGNOSIS — R10.13 EPIGASTRIC PAIN: ICD-10-CM

## 2022-08-30 LAB
CHOLEST SERPL-MCNC: 75 MG/DL
HCV AB SER QL: NORMAL
HDLC SERPL-MCNC: 32 MG/DL
LDLC SERPL CALC-MCNC: 14 MG/DL (ref 0–100)
NONHDLC SERPL-MCNC: 43 MG/DL
TRIGL SERPL-MCNC: 144 MG/DL
TSH SERPL DL<=0.05 MIU/L-ACNC: 1.86 UIU/ML (ref 0.45–4.5)

## 2022-08-30 PROCEDURE — 36415 COLL VENOUS BLD VENIPUNCTURE: CPT

## 2022-08-30 PROCEDURE — 80061 LIPID PANEL: CPT

## 2022-08-30 PROCEDURE — 86803 HEPATITIS C AB TEST: CPT

## 2022-08-30 PROCEDURE — 84443 ASSAY THYROID STIM HORMONE: CPT

## 2022-08-30 RX ORDER — ALUMINUM HYDROXIDE, MAGNESIUM HYDROXIDE, SIMETHICONE 400; 400; 40 MG/10ML; MG/10ML; MG/10ML
20 SUSPENSION ORAL
Qty: 710 ML | Refills: 5 | Status: SHIPPED | OUTPATIENT
Start: 2022-08-30

## 2022-08-31 NOTE — TELEPHONE ENCOUNTER
Received voicemail from [facility]  requesting medication refill of Tomasuvia  After chart review, medication has been refilled for patient  Will place call to her to let her know that this was completed

## 2022-09-08 ENCOUNTER — OFFICE VISIT (OUTPATIENT)
Dept: FAMILY MEDICINE CLINIC | Facility: CLINIC | Age: 42
End: 2022-09-08

## 2022-09-08 VITALS
HEART RATE: 92 BPM | RESPIRATION RATE: 16 BRPM | HEIGHT: 69 IN | TEMPERATURE: 97.9 F | WEIGHT: 187 LBS | BODY MASS INDEX: 27.7 KG/M2 | SYSTOLIC BLOOD PRESSURE: 110 MMHG | DIASTOLIC BLOOD PRESSURE: 78 MMHG

## 2022-09-08 DIAGNOSIS — R10.13 EPIGASTRIC PAIN: Primary | ICD-10-CM

## 2022-09-08 PROCEDURE — 99213 OFFICE O/P EST LOW 20 MIN: CPT | Performed by: FAMILY MEDICINE

## 2022-09-08 RX ORDER — FAMOTIDINE 20 MG/1
20 TABLET, FILM COATED ORAL 2 TIMES DAILY
Qty: 28 TABLET | Refills: 0 | Status: SHIPPED | OUTPATIENT
Start: 2022-09-08 | End: 2022-10-18

## 2022-09-08 RX ORDER — PANTOPRAZOLE SODIUM 40 MG/1
40 TABLET, DELAYED RELEASE ORAL DAILY
Qty: 14 TABLET | Refills: 0 | Status: SHIPPED | OUTPATIENT
Start: 2022-09-08 | End: 2022-10-18

## 2022-09-08 NOTE — ASSESSMENT & PLAN NOTE
Patient reports epigastric pain after meals started approximately 1 month ago  Patient states he has been drinking a lot of sodas  - pain is 10/10 at worst, sharp/pressure, localized, exacerbated by eating, lasting for few hours and resolved spontaneously  - denies any current pain  - denies fever, chill, weight loss, hematemesis, hematochezia, melena  - exam unremarkable  No abdominal tenderness on deep palpation  Normal bowel sounds    - presentation and symptoms consistent with gastritis  - trial short course of PPI and H2 blocker  - follow-up after 2 weeks to re-evaluate

## 2022-09-08 NOTE — PROGRESS NOTES
CLINIC VISIT NOTE - 87 Niru Bustillos 37 y o  male MRN: [id number]  Encounter: [id number],  Primary Care Provider: Tamra Duverney, MD      Assessment/Plan:         Problem List Items Addressed This Visit        Other    Epigastric pain - Primary     Patient reports epigastric pain after meals started approximately 1 month ago  Patient states he has been drinking a lot of sodas  - pain is 10/10 at worst, sharp/pressure, localized, exacerbated by eating, lasting for few hours and resolved spontaneously  - denies any current pain  - denies fever, chill, weight loss, hematemesis, hematochezia, melena  - exam unremarkable  No abdominal tenderness on deep palpation  Normal bowel sounds  - presentation and symptoms consistent with gastritis  - trial short course of PPI and H2 blocker  - follow-up after 2 weeks to re-evaluate         Relevant Medications    pantoprazole (PROTONIX) 40 mg tablet    famotidine (PEPCID) 20 mg tablet            Subjective:      Patient ID: Issa Bustillos is a 37 y o  male  HPI   Pt presents to the clinic with staff member from his long-term care facility  Pt not able to hold food down  Vomits after everything he eats  Aches and pains in his legs (last night)  This started last month (around August 18th)  Pt complains of epigastric pain and pain in the sternum  Feels like he is going to spit up vomit also  Patient most days having these symptoms  Pt has been taking pepto-bismol, mylanta and these have not helped him  Coke syrup helped  Pt says it is like a sharp pain/a pressure  Pain happens only when he eats food or when he vomits  Caregiver does not notice any weight loss  Pain does not radiate to the back  Pt denies jaundice or scleral icterus  Pt says the pain happen right after finishing eating  He says it is 10/10  Pt feels very light headed and has a headache after eating with the upset stomach   Pt eats mashed potatoes, pasta, food to try to hold down the food, but it has not worked  Pt says that eating in smaller portions did not help, and he still vomit  No hematemesis/hematochezia  Pt says the vomit looks like the food he just ate  Pt says he does not consume much dairy, but he does consume bread  When asked to point to where the pain is, the patient indicates that it is diffuse throughout the abdomen  The following portions of the patient's history were reviewed and updated as appropriate: allergies, current medications, past family history, past medical history, past social history, past surgical history and problem list     Review of Systems   Constitutional: Negative for chills and fever  HENT: Negative for ear pain, sinus pain, sore throat and trouble swallowing  Eyes: Negative for pain and discharge  Respiratory: Negative for shortness of breath  Cardiovascular: Negative for chest pain, palpitations and leg swelling  Gastrointestinal: Positive for abdominal pain, diarrhea and vomiting  Negative for nausea  Endocrine: Negative for polydipsia and polyuria  Genitourinary: Negative for dysuria and hematuria  Musculoskeletal: Negative for arthralgias and myalgias  Skin: Negative for rash and wound  Neurological: Negative for seizures and syncope  Psychiatric/Behavioral: Negative for behavioral problems  Objective:      /78   Pulse 92   Temp 97 9 °F (36 6 °C) (Temporal)   Resp 16   Ht 5' 9" (1 753 m)   Wt 84 8 kg (187 lb)   BMI 27 62 kg/m²          Physical Exam  Vitals and nursing note reviewed  Constitutional:       General: He is not in acute distress  Appearance: Normal appearance  He is normal weight  He is not ill-appearing, toxic-appearing or diaphoretic  HENT:      Head: Normocephalic and atraumatic  Right Ear: External ear normal       Left Ear: External ear normal       Nose: Nose normal       Mouth/Throat:      Pharynx: Oropharynx is clear     Eyes:      Extraocular Movements: Extraocular movements intact  Conjunctiva/sclera: Conjunctivae normal    Cardiovascular:      Rate and Rhythm: Normal rate  Pulmonary:      Effort: Pulmonary effort is normal    Abdominal:      General: Abdomen is flat  Bowel sounds are normal  There is no distension  Palpations: Abdomen is soft  There is no mass  Tenderness: There is no abdominal tenderness  There is no guarding or rebound  Hernia: No hernia is present  Musculoskeletal:         General: Normal range of motion  Cervical back: Normal range of motion  Skin:     General: Skin is warm  Coloration: Skin is not jaundiced or pale  Neurological:      General: No focal deficit present  Mental Status: He is alert  Mental status is at baseline     Psychiatric:         Mood and Affect: Mood normal          Behavior: Behavior normal            Mahendra Carreno MD  PGY-2, [facility]  09/08/22, 5:27 PM

## 2022-09-21 ENCOUNTER — TELEPHONE (OUTPATIENT)
Dept: FAMILY MEDICINE CLINIC | Facility: CLINIC | Age: 42
End: 2022-09-21

## 2022-09-21 NOTE — TELEPHONE ENCOUNTER
Folder (To be completed by) - Dr Kwame Thompson     Name of Form - Person Directed Supports (medical examination case note)      Color folder (YELLOW)    Form to be Faxed to [phone]    Patient was made aware of the 7 business day form policy

## 2022-09-22 ENCOUNTER — TELEPHONE (OUTPATIENT)
Dept: FAMILY MEDICINE CLINIC | Facility: CLINIC | Age: 42
End: 2022-09-22

## 2022-09-22 NOTE — TELEPHONE ENCOUNTER
Form has been completed by Dr Karthik Owens and placed in yellow folder in clerical area to be scanned into patient's chart and faxed accordingly

## 2022-09-27 ENCOUNTER — OFFICE VISIT (OUTPATIENT)
Dept: FAMILY MEDICINE CLINIC | Facility: CLINIC | Age: 42
End: 2022-09-27

## 2022-09-27 VITALS
OXYGEN SATURATION: 97 % | HEIGHT: 69 IN | HEART RATE: 100 BPM | WEIGHT: 189.8 LBS | TEMPERATURE: 96.2 F | BODY MASS INDEX: 28.11 KG/M2 | SYSTOLIC BLOOD PRESSURE: 100 MMHG | RESPIRATION RATE: 18 BRPM | DIASTOLIC BLOOD PRESSURE: 80 MMHG

## 2022-09-27 DIAGNOSIS — R10.13 EPIGASTRIC PAIN: Primary | ICD-10-CM

## 2022-09-27 PROCEDURE — 99213 OFFICE O/P EST LOW 20 MIN: CPT | Performed by: FAMILY MEDICINE

## 2022-09-27 NOTE — PROGRESS NOTES
Name: Alea Langston      : 1980      MRN: [id number]  Encounter Provider: Oskar Pimentel MD  Encounter Date: 2022   Encounter department: [address] [place] [address]  Epigastric pain  Assessment & Plan:  Patient reports epigastric pain has essentially resolved after short trial of PPI and H2 blocker  - denies any current symptoms and has been asymptomatic for greater than 2 weeks  -  on diet including reduction of sour/acidic food and smaller meals  - patient can discontinue PPI and H2 blocker  - no further treatment or evaluation is needed at this time  - follow-up as needed             Return for Next scheduled follow up  Subjective      15-year-old male presents for follow-up of his epigastric pain  Patient states he has been compliant with his PPI and H2 blocker regimen and has been asymptomatic for greater than 2 weeks  Patient denies any other questions or concerns this time  Review of Systems   Constitutional: Negative for chills and fever  HENT: Negative for ear pain, sinus pain, sore throat and trouble swallowing  Eyes: Negative for pain and discharge  Respiratory: Negative for shortness of breath  Cardiovascular: Negative for chest pain, palpitations and leg swelling  Gastrointestinal: Negative for abdominal pain, nausea and vomiting  Endocrine: Negative for polydipsia and polyuria  Genitourinary: Negative for dysuria and hematuria  Musculoskeletal: Negative for arthralgias and myalgias  Skin: Negative for rash and wound  Neurological: Negative for seizures and syncope  Psychiatric/Behavioral: Negative for behavioral problems         Current Outpatient Medications on File Prior to Visit   Medication Sig    acetaminophen (TYLENOL) 650 mg CR tablet Take 1 tablet (650 mg total) by mouth every 8 (eight) hours as needed for mild pain    Alcohol Swabs (Curity Alcohol Preps) 70 % PADS Use if needed (when checking blood glucose)    aluminum-magnesium hydroxide-simethicone (MAALOX) 200-200-20 MG/5ML SUSP Take 20 mL by mouth 4 (four) times a day (before meals and at bedtime)    ammonium lactate (LAC-HYDRIN) 12 % cream Apply topically in the morning To both feet    ARIPiprazole (ABILIFY) 10 mg tablet Take 10 mg by mouth daily    Artificial Saliva (Biotene OralBalance Dry Mouth) GEL Apply 1 application to the mouth or throat 2 (two) times a day (Patient taking differently: Apply 1 application to the mouth or throat every 4 (four) hours as needed)    atorvastatin (LIPITOR) 40 mg tablet Take 1 tablet (40 mg total) by mouth daily at bedtime    B Complex Vitamins (B Complex 50) TABS Take 1 tablet by mouth daily 1 cap by mouth daily @ 8am    baclofen 10 mg tablet Take 1 tablet (10 mg total) by mouth 3 (three) times a day for 14 days    bismuth subsalicylate (PEPTO BISMOL) 524 mg/30 mL oral suspension Take 15 mL (262 mg total) by mouth every 6 (six) hours as needed for indigestion    calcium carbonate (OYSTER SHELL,OSCAL) 500 mg Take 1 tablet by mouth daily with breakfast    chlorproMAZINE (THORAZINE) 100 mg tablet Take 100 mg by mouth as needed in the morning and 100 mg as needed in the evening for nausea   chlorproMAZINE (THORAZINE) 100 mg tablet Take 100 mg by mouth in the morning and 100 mg in the evening and 100 mg before bedtime   cholecalciferol (VITAMIN D3) 1,000 units tablet Take 1 tablet (1,000 Units total) by mouth in the morning      Diclofenac Sodium (VOLTAREN) 1 % Apply 2 g topically 4 (four) times a day    divalproex sodium (DEPAKOTE) 500 mg EC tablet Take 1 tablet (500 mg total) by mouth 2 (two) times a day    docusate sodium (Stool Softener) 100 mg capsule Take 1 capsule (100 mg total) by mouth daily Take 1 capsule daily  @ 8:00 PM    Emollient (eucerin) lotion Apply topically as needed for dry skin    famotidine (PEPCID) 20 mg tablet Take 1 tablet (20 mg total) by mouth 2 (two) times a day for 14 days    glucose blood (FREESTYLE LITE) test strip Use as instructed    glucose monitoring kit (FREESTYLE) monitoring kit 1 each by Does not apply route 2 (two) times a week    guaiFENesin (ROBITUSSIN) 100 MG/5ML oral liquid Take 10 mL (200 mg total) by mouth 3 (three) times a day as needed for cough    Lancets (freestyle) lancets Use to test blood sugars 2x weekly on Mon & Thurs @ 8AM    levothyroxine 25 mcg tablet Take 1 tablet (25 mcg total) by mouth daily in the early morning    lisinopril (ZESTRIL) 2 5 mg tablet Take 1 tablet (2 5 mg total) by mouth daily    loratadine (CLARITIN) 10 mg tablet Take 1 tablet (10 mg total) by mouth daily    LORazepam (ATIVAN) 0 5 mg tablet Take 0 5 mg by mouth in the morning and 0 5 mg in the evening and 0 5 mg before bedtime   8AM, 2PM, 8PM     Menthol ([place] Cough Drops) 5 8 MG LOZG Apply 1 lozenge (5 8 mg total) to the mouth or throat 4 (four) times a day as needed (cough)    metFORMIN (FORTAMET) 1000 MG (OSM) 24 hr tablet Take 1 tablet (1,000 mg total) by mouth 2 (two) times a day with meals    pantoprazole (PROTONIX) 40 mg tablet Take 1 tablet (40 mg total) by mouth daily for 14 days    propranolol (INDERAL) 20 mg tablet Take 1 tablet (20 mg total) by mouth every 12 (twelve) hours    Refresh Optive 0 5-0 9 % SOLN     sitaGLIPtin (JANUVIA) 100 mg tablet Take 1 tablet (100 mg total) by mouth daily Daily after breakfast    Sunscreens (Tropical Gold Sunblock) LOTN Apply topically 3 (three) times a day as needed (sunburn) Apply quarter amount 3x/day prn    traZODone (DESYREL) 50 mg tablet Take 1 tablet (50 mg total) by mouth daily at bedtime as needed (insomnia)    vitamin E, tocopherol, 400 units capsule Take 1 capsule (400 Units total) by mouth daily       Objective     /80 (BP Location: Left arm, Patient Position: Sitting, Cuff Size: Standard)   Pulse 100   Temp (!) 96 2 °F (35 7 °C) (Temporal)   Resp 18   Ht 5' 9" (1 753 m)   Wt 86 1 kg (189 lb 12 8 oz)   SpO2 97%   BMI 28 03 kg/m²     Physical Exam  Vitals and nursing note reviewed  Constitutional:       General: He is not in acute distress  Appearance: Normal appearance  He is normal weight  He is not ill-appearing, toxic-appearing or diaphoretic  HENT:      Head: Normocephalic and atraumatic  Right Ear: External ear normal       Left Ear: External ear normal       Nose: Nose normal       Mouth/Throat:      Pharynx: Oropharynx is clear  Eyes:      Extraocular Movements: Extraocular movements intact  Conjunctiva/sclera: Conjunctivae normal    Cardiovascular:      Rate and Rhythm: Normal rate  Pulmonary:      Effort: Pulmonary effort is normal    Abdominal:      General: There is no distension  Musculoskeletal:         General: Normal range of motion  Cervical back: Normal range of motion  Skin:     General: Skin is warm  Coloration: Skin is not jaundiced or pale  Neurological:      General: No focal deficit present  Mental Status: He is alert  Mental status is at baseline     Psychiatric:         Mood and Affect: Mood normal          Behavior: Behavior normal        Simone Diallo MD

## 2022-09-27 NOTE — ASSESSMENT & PLAN NOTE
Patient reports epigastric pain has essentially resolved after short trial of PPI and H2 blocker  - denies any current symptoms and has been asymptomatic for greater than 2 weeks  -  on diet including reduction of sour/acidic food and smaller meals  - patient can discontinue PPI and H2 blocker  - no further treatment or evaluation is needed at this time  - follow-up as needed

## 2022-09-28 ENCOUNTER — TELEPHONE (OUTPATIENT)
Dept: FAMILY MEDICINE CLINIC | Facility: CLINIC | Age: 42
End: 2022-09-28

## 2022-09-28 NOTE — TELEPHONE ENCOUNTER
Folder (To be completed by) - Dr Aixa Vazquez     Name of Form - Person directed supports case note from visit 9/27/2022    Color folder (Yellow)    Form to be Faxed [phone]    Patient was made aware of the 7 business day form policy

## 2022-10-12 PROBLEM — R05.9 COUGH: Status: RESOLVED | Noted: 2021-12-29 | Resolved: 2022-10-12

## 2022-10-14 DIAGNOSIS — E11.65 TYPE 2 DIABETES MELLITUS WITH HYPERGLYCEMIA, WITHOUT LONG-TERM CURRENT USE OF INSULIN (HCC): ICD-10-CM

## 2022-10-14 DIAGNOSIS — K59.00 CONSTIPATION, UNSPECIFIED CONSTIPATION TYPE: ICD-10-CM

## 2022-10-14 DIAGNOSIS — G44.209 TENSION HEADACHE: ICD-10-CM

## 2022-10-14 RX ORDER — DOCUSATE SODIUM 100 MG/1
100 CAPSULE, LIQUID FILLED ORAL DAILY
Qty: 30 CAPSULE | Refills: 5 | Status: SHIPPED | OUTPATIENT
Start: 2022-10-14

## 2022-10-14 RX ORDER — VITAMIN B COMPLEX
1 TABLET ORAL DAILY
Qty: 30 TABLET | Refills: 5 | Status: SHIPPED | OUTPATIENT
Start: 2022-10-14

## 2022-10-14 RX ORDER — LISINOPRIL 2.5 MG/1
2.5 TABLET ORAL DAILY
Qty: 90 TABLET | Refills: 0 | Status: SHIPPED | OUTPATIENT
Start: 2022-10-14

## 2022-10-14 NOTE — TELEPHONE ENCOUNTER
Received VM on med line from [place] [id number] requesting refills on patient behalf for the following medications:    B Complex Vitamins   Lisinopril 2 5 mg tablet   Docusate sodium 100 mg capsule

## 2022-10-15 VITALS
RESPIRATION RATE: 16 BRPM | HEART RATE: 100 BPM | BODY MASS INDEX: 28.58 KG/M2 | TEMPERATURE: 98.4 F | DIASTOLIC BLOOD PRESSURE: 108 MMHG | SYSTOLIC BLOOD PRESSURE: 150 MMHG | WEIGHT: 193.56 LBS | OXYGEN SATURATION: 100 %

## 2022-10-15 LAB
BILIRUB UR QL STRIP: NEGATIVE
CLARITY UR: CLEAR
COLOR UR: COLORLESS
GLUCOSE UR STRIP-MCNC: NEGATIVE MG/DL
HGB UR QL STRIP.AUTO: NEGATIVE
KETONES UR STRIP-MCNC: NEGATIVE MG/DL
LEUKOCYTE ESTERASE UR QL STRIP: NEGATIVE
NITRITE UR QL STRIP: NEGATIVE
PH UR STRIP.AUTO: 5.5 [PH]
PROT UR STRIP-MCNC: NEGATIVE MG/DL
SP GR UR STRIP.AUTO: 1 (ref 1–1.03)
UROBILINOGEN UR STRIP-ACNC: <2 MG/DL

## 2022-10-15 PROCEDURE — 99283 EMERGENCY DEPT VISIT LOW MDM: CPT

## 2022-10-15 PROCEDURE — 81003 URINALYSIS AUTO W/O SCOPE: CPT | Performed by: EMERGENCY MEDICINE

## 2022-10-16 ENCOUNTER — HOSPITAL ENCOUNTER (EMERGENCY)
Facility: HOSPITAL | Age: 42
Discharge: HOME | End: 2022-10-16
Attending: EMERGENCY MEDICINE
Payer: MEDICARE

## 2022-10-16 DIAGNOSIS — N39.0 UTI (URINARY TRACT INFECTION): Primary | ICD-10-CM

## 2022-10-16 PROCEDURE — 87491 CHLMYD TRACH DNA AMP PROBE: CPT | Performed by: EMERGENCY MEDICINE

## 2022-10-16 PROCEDURE — 87591 N.GONORRHOEAE DNA AMP PROB: CPT | Performed by: EMERGENCY MEDICINE

## 2022-10-16 PROCEDURE — 99284 EMERGENCY DEPT VISIT MOD MDM: CPT | Performed by: EMERGENCY MEDICINE

## 2022-10-16 RX ORDER — LEVOFLOXACIN 500 MG/1
500 TABLET, FILM COATED ORAL ONCE
Status: DISCONTINUED | OUTPATIENT
Start: 2022-10-16 | End: 2022-10-16

## 2022-10-16 RX ORDER — LEVOFLOXACIN 500 MG/1
500 TABLET, FILM COATED ORAL ONCE
Status: COMPLETED | OUTPATIENT
Start: 2022-10-16 | End: 2022-10-16

## 2022-10-16 RX ORDER — LEVOFLOXACIN 500 MG/1
500 TABLET, FILM COATED ORAL DAILY
Qty: 7 TABLET | Refills: 0 | Status: SHIPPED | OUTPATIENT
Start: 2022-10-16 | End: 2022-10-23

## 2022-10-16 RX ADMIN — LEVOFLOXACIN 500 MG: 500 TABLET, FILM COATED ORAL at 01:19

## 2022-10-16 NOTE — DISCHARGE INSTRUCTIONS
Please make sure to follow up with your primary care physician in 5-7 days for reevaluation of your symptoms  If you develop fever, back pain, urinary retention, blood in your urine, or any other concerning symptoms, please return to the emergency department as it be signs of worsening illness
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
## 2022-10-17 LAB
C TRACH DNA SPEC QL NAA+PROBE: NEGATIVE
N GONORRHOEA DNA SPEC QL NAA+PROBE: NEGATIVE

## 2022-10-18 ENCOUNTER — OFFICE VISIT (OUTPATIENT)
Dept: FAMILY MEDICINE CLINIC | Facility: CLINIC | Age: 42
End: 2022-10-18

## 2022-10-18 ENCOUNTER — TELEPHONE (OUTPATIENT)
Dept: FAMILY MEDICINE CLINIC | Facility: CLINIC | Age: 42
End: 2022-10-18

## 2022-10-18 VITALS
BODY MASS INDEX: 28.11 KG/M2 | HEIGHT: 69 IN | DIASTOLIC BLOOD PRESSURE: 98 MMHG | OXYGEN SATURATION: 98 % | TEMPERATURE: 97.2 F | WEIGHT: 189.8 LBS | HEART RATE: 90 BPM | SYSTOLIC BLOOD PRESSURE: 128 MMHG | RESPIRATION RATE: 18 BRPM

## 2022-10-18 DIAGNOSIS — R30.0 DYSURIA: Primary | ICD-10-CM

## 2022-10-18 PROCEDURE — 99213 OFFICE O/P EST LOW 20 MIN: CPT | Performed by: FAMILY MEDICINE

## 2022-10-18 NOTE — TELEPHONE ENCOUNTER
Form reviewed and signed by Dr Kwame Thompson  Form placed in the Yellow folder in the [address] [place] [address] for scan and call Caregiver to  Form  Caregiver do not give us any fax number to faxed to them   Thanks

## 2022-10-18 NOTE — TELEPHONE ENCOUNTER
Folder (To be completed by) -Dr Jefry Lan     Name of Form - Medical Examination [place]    Color folder (Yellow    Form to be given back to patient    Patient was made aware of the 7-10 business day form policy

## 2022-10-18 NOTE — ASSESSMENT & PLAN NOTE
Recent ED evaluation on 10/16/22 for dysuria and urinary hesitancy, which patient attributed to drinking a large amount of soda the day before  - ED workup including UA and GC chlamydia was negative  - Prophylactically treated with levofloxacin 500mg x7 days for suspected UTI with symptoms resolution   - Symptoms have resolved and patient denies any symptoms at this time without any other questions or concerns  - abdominal exam:  Unremarkable  No tenderness, guarding, or rebound  Negative CVA tenderness    - presentation and exam consistent with UTI versus passing of small renal calculi    Plan:  - encourage adequate oral hydration with water  - recommend decreasing amount of soda intake  - follow-up as needed

## 2022-10-18 NOTE — PROGRESS NOTES
Name: Gabriele Burgos      : 1980      MRN: [id number]  Encounter Provider: Kev Whitlock MD  Encounter Date: 10/18/2022   Encounter department: [address] [place] [address]  Dysuria  Assessment & Plan:  Recent ED evaluation on 10/16/22 for dysuria and urinary hesitancy, which patient attributed to drinking a large amount of soda the day before  - ED workup including UA and GC chlamydia was negative  - Prophylactically treated with levofloxacin 500mg x7 days for suspected UTI with symptoms resolution   - Symptoms have resolved and patient denies any symptoms at this time without any other questions or concerns  - abdominal exam:  Unremarkable  No tenderness, guarding, or rebound  Negative CVA tenderness  - presentation and exam consistent with UTI versus passing of small renal calculi    Plan:  - encourage adequate oral hydration with water  - recommend decreasing amount of soda intake  - follow-up as needed             Subjective      41-year-old male group home patient presents for ED follow-up for his testicular pain  Patient presented to ED on 10/16/22 for concern of dysuria and urinary hesitancy, which he attributed to drinking a large amount of soft drink the day before  ED workup including UA and GC chlamydia was negative  Patient was prophylactically treated with levofloxacin for suspected UTI  Patient states he has been compliant with medication and symptom has resolved  Patient denies any symptoms at this time without any other questions or concerns  Review of Systems   Constitutional: Negative for chills and fever  HENT: Negative for ear pain, sinus pain, sore throat and trouble swallowing  Eyes: Negative for pain and discharge  Respiratory: Negative for shortness of breath  Cardiovascular: Negative for chest pain, palpitations and leg swelling  Gastrointestinal: Negative for abdominal pain, nausea and vomiting  Endocrine: Negative for polydipsia and polyuria  Genitourinary: Negative for dysuria and hematuria  Musculoskeletal: Negative for arthralgias and myalgias  Skin: Negative for rash and wound  Neurological: Negative for seizures and syncope  Psychiatric/Behavioral: Negative for behavioral problems  Current Outpatient Medications on File Prior to Visit   Medication Sig   • acetaminophen (TYLENOL) 650 mg CR tablet Take 1 tablet (650 mg total) by mouth every 8 (eight) hours as needed for mild pain   • Alcohol Swabs (Curity Alcohol Preps) 70 % PADS Use if needed (when checking blood glucose)   • aluminum-magnesium hydroxide-simethicone (MAALOX) 200-200-20 MG/5ML SUSP Take 20 mL by mouth 4 (four) times a day (before meals and at bedtime)   • ammonium lactate (LAC-HYDRIN) 12 % cream Apply topically in the morning To both feet   • ARIPiprazole (ABILIFY) 10 mg tablet Take 10 mg by mouth daily   • Artificial Saliva (Biotene OralBalance Dry Mouth) GEL Apply 1 application to the mouth or throat 2 (two) times a day (Patient taking differently: Apply 1 application to the mouth or throat every 4 (four) hours as needed)   • atorvastatin (LIPITOR) 40 mg tablet Take 1 tablet (40 mg total) by mouth daily at bedtime   • B Complex Vitamins (B Complex 50) TABS Take 1 tablet by mouth daily 1 cap by mouth daily @ 8am   • baclofen 10 mg tablet Take 1 tablet (10 mg total) by mouth 3 (three) times a day for 14 days   • bismuth subsalicylate (PEPTO BISMOL) 524 mg/30 mL oral suspension Take 15 mL (262 mg total) by mouth every 6 (six) hours as needed for indigestion   • calcium carbonate (OYSTER SHELL,OSCAL) 500 mg Take 1 tablet by mouth daily with breakfast   • chlorproMAZINE (THORAZINE) 100 mg tablet Take 100 mg by mouth as needed in the morning and 100 mg as needed in the evening for nausea  • chlorproMAZINE (THORAZINE) 100 mg tablet Take 100 mg by mouth in the morning and 100 mg in the evening and 100 mg before bedtime  • cholecalciferol (VITAMIN D3) 1,000 units tablet Take 1 tablet (1,000 Units total) by mouth in the morning  • Diclofenac Sodium (VOLTAREN) 1 % Apply 2 g topically 4 (four) times a day   • divalproex sodium (DEPAKOTE) 500 mg EC tablet Take 1 tablet (500 mg total) by mouth 2 (two) times a day   • docusate sodium (Stool Softener) 100 mg capsule Take 1 capsule (100 mg total) by mouth daily Take 1 capsule daily  @ 8:00 PM   • Emollient (eucerin) lotion Apply topically as needed for dry skin   • famotidine (PEPCID) 20 mg tablet Take 1 tablet (20 mg total) by mouth 2 (two) times a day for 14 days   • glucose blood (FREESTYLE LITE) test strip Use as instructed   • glucose monitoring kit (FREESTYLE) monitoring kit 1 each by Does not apply route 2 (two) times a week   • guaiFENesin (ROBITUSSIN) 100 MG/5ML oral liquid Take 10 mL (200 mg total) by mouth 3 (three) times a day as needed for cough   • Lancets (freestyle) lancets Use to test blood sugars 2x weekly on Mon & Thurs @ 8AM   • levofloxacin (LEVAQUIN) 500 mg tablet Take 1 tablet (500 mg total) by mouth daily for 7 days   • levothyroxine 25 mcg tablet Take 1 tablet (25 mcg total) by mouth daily in the early morning   • lisinopril (ZESTRIL) 2 5 mg tablet Take 1 tablet (2 5 mg total) by mouth daily   • loratadine (CLARITIN) 10 mg tablet Take 1 tablet (10 mg total) by mouth daily   • LORazepam (ATIVAN) 0 5 mg tablet Take 0 5 mg by mouth in the morning and 0 5 mg in the evening and 0 5 mg before bedtime   8AM, 2PM, 8PM    • Menthol (Halls Cough Drops) 5 8 MG LOZG Apply 1 lozenge (5 8 mg total) to the mouth or throat 4 (four) times a day as needed (cough)   • metFORMIN (FORTAMET) 1000 MG (OSM) 24 hr tablet Take 1 tablet (1,000 mg total) by mouth 2 (two) times a day with meals   • pantoprazole (PROTONIX) 40 mg tablet Take 1 tablet (40 mg total) by mouth daily for 14 days   • propranolol (INDERAL) 20 mg tablet Take 1 tablet (20 mg total) by mouth every 12 (twelve) hours   • Refresh Optive 0 5-0 9 % SOLN    • sitaGLIPtin (JANUVIA) 100 mg tablet Take 1 tablet (100 mg total) by mouth daily Daily after breakfast   • Sunscreens (Tropical Gold Sunblock) LOTN Apply topically 3 (three) times a day as needed (sunburn) Apply quarter amount 3x/day prn   • traZODone (DESYREL) 50 mg tablet Take 1 tablet (50 mg total) by mouth daily at bedtime as needed (insomnia)   • vitamin E, tocopherol, 400 units capsule Take 1 capsule (400 Units total) by mouth daily       Objective     /98 (BP Location: Left arm, Patient Position: Sitting, Cuff Size: Standard)   Pulse 90   Temp (!) 97 2 °F (36 2 °C) (Temporal)   Resp 18   Ht 5' 9" (1 753 m)   Wt 86 1 kg (189 lb 12 8 oz)   SpO2 98%   BMI 28 03 kg/m²     Physical Exam  Vitals and nursing note reviewed  Constitutional:       General: He is not in acute distress  Appearance: Normal appearance  He is normal weight  He is not ill-appearing, toxic-appearing or diaphoretic  HENT:      Head: Normocephalic and atraumatic  Right Ear: External ear normal       Left Ear: External ear normal       Nose: Nose normal       Mouth/Throat:      Pharynx: Oropharynx is clear  Eyes:      Extraocular Movements: Extraocular movements intact  Conjunctiva/sclera: Conjunctivae normal    Cardiovascular:      Rate and Rhythm: Normal rate  Pulmonary:      Effort: Pulmonary effort is normal    Abdominal:      General: There is no distension  Tenderness: There is no abdominal tenderness  There is no right CVA tenderness, left CVA tenderness, guarding or rebound  Musculoskeletal:         General: Normal range of motion  Cervical back: Normal range of motion  Skin:     General: Skin is warm  Coloration: Skin is not jaundiced or pale  Neurological:      General: No focal deficit present  Mental Status: He is alert  Mental status is at baseline     Psychiatric:         Mood and Affect: Mood normal          Behavior: Behavior normal  Lili Bateman MD

## 2022-11-03 DIAGNOSIS — K59.00 CONSTIPATION, UNSPECIFIED CONSTIPATION TYPE: Primary | ICD-10-CM

## 2022-11-03 RX ORDER — POLYETHYLENE GLYCOL 3350 17 G/17G
17 POWDER, FOR SOLUTION ORAL DAILY
Qty: 100 EACH | Refills: 0 | Status: SHIPPED | OUTPATIENT
Start: 2022-11-03

## 2022-11-03 RX ORDER — SENNA AND DOCUSATE SODIUM 50; 8.6 MG/1; MG/1
2 TABLET, FILM COATED ORAL 2 TIMES DAILY PRN
Qty: 28 TABLET | Refills: 0 | Status: SHIPPED | OUTPATIENT
Start: 2022-11-03 | End: 2022-11-03

## 2022-11-03 RX ORDER — SENNA AND DOCUSATE SODIUM 50; 8.6 MG/1; MG/1
2 TABLET, FILM COATED ORAL DAILY
Qty: 60 TABLET | Refills: 3 | Status: SHIPPED | OUTPATIENT
Start: 2022-11-03

## 2022-11-07 DIAGNOSIS — E11.65 TYPE 2 DIABETES MELLITUS WITH HYPERGLYCEMIA, WITHOUT LONG-TERM CURRENT USE OF INSULIN (HCC): Primary | ICD-10-CM

## 2022-11-07 RX ORDER — CALCIUM CITRATE/VITAMIN D3 200MG-6.25
1 TABLET ORAL 2 TIMES WEEKLY
Qty: 50 STRIP | Refills: 1 | Status: SHIPPED | OUTPATIENT
Start: 2022-11-07

## 2022-11-07 NOTE — TELEPHONE ENCOUNTER
True Metrix Glucose test   Qty 50 str  See form scanned to chart   Patient was using Free Style Lite

## 2022-11-08 ENCOUNTER — APPOINTMENT (OUTPATIENT)
Dept: LAB | Age: 42
End: 2022-11-08

## 2022-11-08 DIAGNOSIS — Z79.899 ENCOUNTER FOR LONG-TERM (CURRENT) USE OF OTHER MEDICATIONS: ICD-10-CM

## 2022-11-08 LAB
ALBUMIN SERPL BCP-MCNC: 2.8 G/DL (ref 3.5–5)
ALP SERPL-CCNC: 64 U/L (ref 46–116)
ALT SERPL W P-5'-P-CCNC: 36 U/L (ref 12–78)
ANION GAP SERPL CALCULATED.3IONS-SCNC: 4 MMOL/L (ref 4–13)
AST SERPL W P-5'-P-CCNC: 15 U/L (ref 5–45)
BASOPHILS # BLD AUTO: 0.06 THOUSANDS/ÂΜL (ref 0–0.1)
BASOPHILS NFR BLD AUTO: 1 % (ref 0–1)
BILIRUB SERPL-MCNC: 0.4 MG/DL (ref 0.2–1)
BUN SERPL-MCNC: 10 MG/DL (ref 5–25)
CALCIUM ALBUM COR SERPL-MCNC: 9.5 MG/DL (ref 8.3–10.1)
CALCIUM SERPL-MCNC: 8.5 MG/DL (ref 8.3–10.1)
CHLORIDE SERPL-SCNC: 104 MMOL/L (ref 96–108)
CHOLEST SERPL-MCNC: 76 MG/DL
CO2 SERPL-SCNC: 31 MMOL/L (ref 21–32)
CREAT SERPL-MCNC: 1.3 MG/DL (ref 0.6–1.3)
EOSINOPHIL # BLD AUTO: 0.42 THOUSAND/ÂΜL (ref 0–0.61)
EOSINOPHIL NFR BLD AUTO: 5 % (ref 0–6)
ERYTHROCYTE [DISTWIDTH] IN BLOOD BY AUTOMATED COUNT: 11.3 % (ref 11.6–15.1)
EST. AVERAGE GLUCOSE BLD GHB EST-MCNC: 146 MG/DL
GFR SERPL CREATININE-BSD FRML MDRD: 67 ML/MIN/1.73SQ M
GLUCOSE P FAST SERPL-MCNC: 198 MG/DL (ref 65–99)
HBA1C MFR BLD: 6.7 %
HCT VFR BLD AUTO: 45.6 % (ref 36.5–49.3)
HDLC SERPL-MCNC: 34 MG/DL
HGB BLD-MCNC: 14.7 G/DL (ref 12–17)
IMM GRANULOCYTES # BLD AUTO: 0.08 THOUSAND/UL (ref 0–0.2)
IMM GRANULOCYTES NFR BLD AUTO: 1 % (ref 0–2)
LDLC SERPL CALC-MCNC: 24 MG/DL (ref 0–100)
LYMPHOCYTES # BLD AUTO: 2.89 THOUSANDS/ÂΜL (ref 0.6–4.47)
LYMPHOCYTES NFR BLD AUTO: 31 % (ref 14–44)
MCH RBC QN AUTO: 29.2 PG (ref 26.8–34.3)
MCHC RBC AUTO-ENTMCNC: 32.2 G/DL (ref 31.4–37.4)
MCV RBC AUTO: 91 FL (ref 82–98)
MONOCYTES # BLD AUTO: 0.66 THOUSAND/ÂΜL (ref 0.17–1.22)
MONOCYTES NFR BLD AUTO: 7 % (ref 4–12)
NEUTROPHILS # BLD AUTO: 5.21 THOUSANDS/ÂΜL (ref 1.85–7.62)
NEUTS SEG NFR BLD AUTO: 55 % (ref 43–75)
NONHDLC SERPL-MCNC: 42 MG/DL
NRBC BLD AUTO-RTO: 0 /100 WBCS
PLATELET # BLD AUTO: 170 THOUSANDS/UL (ref 149–390)
PMV BLD AUTO: 10.7 FL (ref 8.9–12.7)
POTASSIUM SERPL-SCNC: 4.4 MMOL/L (ref 3.5–5.3)
PROT SERPL-MCNC: 6.4 G/DL (ref 6.4–8.4)
RBC # BLD AUTO: 5.04 MILLION/UL (ref 3.88–5.62)
SODIUM SERPL-SCNC: 139 MMOL/L (ref 135–147)
TRIGL SERPL-MCNC: 92 MG/DL
TSH SERPL DL<=0.05 MIU/L-ACNC: 1.05 UIU/ML (ref 0.45–4.5)
VALPROATE SERPL-MCNC: 33 UG/ML (ref 50–100)
WBC # BLD AUTO: 9.32 THOUSAND/UL (ref 4.31–10.16)

## 2022-11-22 ENCOUNTER — OFFICE VISIT (OUTPATIENT)
Dept: FAMILY MEDICINE CLINIC | Facility: CLINIC | Age: 42
End: 2022-11-22

## 2022-11-22 VITALS
BODY MASS INDEX: 28.64 KG/M2 | WEIGHT: 193.4 LBS | HEART RATE: 95 BPM | HEIGHT: 69 IN | TEMPERATURE: 96.5 F | OXYGEN SATURATION: 100 % | SYSTOLIC BLOOD PRESSURE: 120 MMHG | RESPIRATION RATE: 18 BRPM | DIASTOLIC BLOOD PRESSURE: 80 MMHG

## 2022-11-22 DIAGNOSIS — E11.65 TYPE 2 DIABETES MELLITUS WITH HYPERGLYCEMIA, WITHOUT LONG-TERM CURRENT USE OF INSULIN (HCC): Primary | ICD-10-CM

## 2022-11-22 LAB — SL AMB POCT HEMOGLOBIN AIC: 6.9 (ref ?–6.5)

## 2022-11-22 NOTE — PROGRESS NOTES
Name: Vanessa Moore      : 1980      MRN: [id number]  Encounter Provider: Minerva Hall MD  Encounter Date: 2022   Encounter department: [address] [place] [address]  Type 2 diabetes mellitus with hyperglycemia, without long-term current use of insulin ([facility] 75 )  Assessment & Plan:  Blood glucose adequately controlled on home Metformin 1g BID and Januvia 100mg daily  - Patient reports intermittent burning sensation over both hands and feet started approx 5 years ago  - Currently asymptomatic and compliant with treatment plan  - Home fasting -150s  - Goals BG: Preprandial: 80-130mg/dL and Postprandial: <180mg/dL  - Goal HgbA1c < 7% for average adults  - Screening:  - Renal function on 22 is stable CKD-3  - Lipid panel on 22 is within an acceptable range  - Microalb:Cr on 22 is within an acceptable range (LDL 24)  - DM foot exam on 22 is Negative for diabetic neuropathy    Plan:  - Continue with current regimen for now  - Defers statin therapy at this time for LDL < 70  - Recommend bi-weekly home fasting BG check and bring log to follow-up visit  - Counseled lifestyle improvement weight loss, follow a diabetic diet, and decrease high carbohydrates snacks  - Counseled on medication compliance and exercise as tolerated  - RTC in 3 months for HgbA1c recheck  - Consider uptitrating oral antihyperglycemics in HgbA1c persistently above goal    Lab Results   Component Value Date/Time    HGBA1C 6 9 (A) 2022 11:06 AM    HGBA1C 6 7 (H) 2022 09:45 AM    HGBA1C 6 3 2022 01:20 PM    HGBA1C 7 1 (A) 2022 10:33 AM    HGBA1C 7 7 (H) 2022 08:59 AM    HGBA1C 6 8 2021 12:00 AM    HGBA1C 6 2 (H) 2021 10:45 AM    HGBA1C 5 9 (H) 03/15/2018 09:23 AM       Orders:  -     POCT hemoglobin A1c           Subjective      52-year-old male present with his caretaker for diabetes follow-up    Patient states he has been compliant with his medication and denies any side effects  Patient states that his diet has slightly improved compared to prior with reduction in sodas and desserts  No other questions or concerns this time  Review of Systems   Constitutional: Negative for chills and fever  HENT: Negative for ear pain, sinus pain, sore throat and trouble swallowing  Eyes: Negative for pain and discharge  Respiratory: Negative for shortness of breath  Cardiovascular: Negative for chest pain, palpitations and leg swelling  Gastrointestinal: Negative for abdominal pain, nausea and vomiting  Endocrine: Negative for polydipsia and polyuria  Genitourinary: Negative for dysuria and hematuria  Musculoskeletal: Negative for arthralgias and myalgias  Skin: Negative for rash and wound  Neurological: Negative for seizures and syncope  Psychiatric/Behavioral: Negative for behavioral problems         Current Outpatient Medications on File Prior to Visit   Medication Sig   • acetaminophen (TYLENOL) 650 mg CR tablet Take 1 tablet (650 mg total) by mouth every 8 (eight) hours as needed for mild pain   • Alcohol Swabs (Curity Alcohol Preps) 70 % PADS Use if needed (when checking blood glucose)   • aluminum-magnesium hydroxide-simethicone (MAALOX) 200-200-20 MG/5ML SUSP Take 20 mL by mouth 4 (four) times a day (before meals and at bedtime)   • ammonium lactate (LAC-HYDRIN) 12 % cream Apply topically in the morning To both feet   • ARIPiprazole (ABILIFY) 10 mg tablet Take 10 mg by mouth daily   • Artificial Saliva (Biotene OralBalance Dry Mouth) GEL Apply 1 application to the mouth or throat 2 (two) times a day (Patient taking differently: Apply 1 application to the mouth or throat every 4 (four) hours as needed)   • atorvastatin (LIPITOR) 40 mg tablet Take 1 tablet (40 mg total) by mouth daily at bedtime   • B Complex Vitamins (B Complex 50) TABS Take 1 tablet by mouth daily 1 cap by mouth daily @ 8am   • baclofen 10 mg tablet Take 1 tablet (10 mg total) by mouth 3 (three) times a day for 14 days   • bisacodyl (DULCOLAX) 5 mg EC tablet Take 2 tablets (10 mg total) by mouth daily as needed for constipation (Take medication as needed if no bowel movement in 48 hours)   • bismuth subsalicylate (PEPTO BISMOL) 524 mg/30 mL oral suspension Take 15 mL (262 mg total) by mouth every 6 (six) hours as needed for indigestion   • calcium carbonate (OYSTER SHELL,OSCAL) 500 mg Take 1 tablet by mouth daily with breakfast   • chlorproMAZINE (THORAZINE) 100 mg tablet Take 100 mg by mouth as needed in the morning and 100 mg as needed in the evening for nausea  • chlorproMAZINE (THORAZINE) 100 mg tablet Take 100 mg by mouth in the morning and 100 mg in the evening and 100 mg before bedtime  • cholecalciferol (VITAMIN D3) 1,000 units tablet Take 1 tablet (1,000 Units total) by mouth in the morning     • Diclofenac Sodium (VOLTAREN) 1 % Apply 2 g topically 4 (four) times a day   • divalproex sodium (DEPAKOTE) 500 mg EC tablet Take 1 tablet (500 mg total) by mouth 2 (two) times a day   • Emollient (eucerin) lotion Apply topically as needed for dry skin   • famotidine (PEPCID) 20 mg tablet Take 1 tablet (20 mg total) by mouth 2 (two) times a day for 14 days   • glucose blood (True Metrix Blood Glucose Test) test strip Use 1 each 2 (two) times a week Use as instructed   • glucose monitoring kit (FREESTYLE) monitoring kit 1 each by Does not apply route 2 (two) times a week   • guaiFENesin (ROBITUSSIN) 100 MG/5ML oral liquid Take 10 mL (200 mg total) by mouth 3 (three) times a day as needed for cough   • Lancets (freestyle) lancets Use to test blood sugars 2x weekly on Mon & Thurs @ 8AM   • levothyroxine 25 mcg tablet Take 1 tablet (25 mcg total) by mouth daily in the early morning   • lisinopril (ZESTRIL) 2 5 mg tablet Take 1 tablet (2 5 mg total) by mouth daily   • loratadine (CLARITIN) 10 mg tablet Take 1 tablet (10 mg total) by mouth daily   • LORazepam (ATIVAN) 0 5 mg tablet Take 0 5 mg by mouth in the morning and 0 5 mg in the evening and 0 5 mg before bedtime  8AM, 2PM, 8PM    • Menthol (Halls Cough Drops) 5 8 MG LOZG Apply 1 lozenge (5 8 mg total) to the mouth or throat 4 (four) times a day as needed (cough)   • metFORMIN (FORTAMET) 1000 MG (OSM) 24 hr tablet Take 1 tablet (1,000 mg total) by mouth 2 (two) times a day with meals   • pantoprazole (PROTONIX) 40 mg tablet Take 1 tablet (40 mg total) by mouth daily for 14 days   • polyethylene glycol (MIRALAX) 17 g packet Take 17 g by mouth daily   • propranolol (INDERAL) 20 mg tablet Take 1 tablet (20 mg total) by mouth every 12 (twelve) hours   • Refresh Optive 0 5-0 9 % SOLN    • senna-docusate sodium (SENOKOT-S) 8 6-50 mg per tablet Take 2 tablets by mouth daily   • sitaGLIPtin (JANUVIA) 100 mg tablet Take 1 tablet (100 mg total) by mouth daily Daily after breakfast   • Sunscreens (Tropical Gold Sunblock) LOTN Apply topically 3 (three) times a day as needed (sunburn) Apply quarter amount 3x/day prn   • traZODone (DESYREL) 50 mg tablet Take 1 tablet (50 mg total) by mouth daily at bedtime as needed (insomnia)   • vitamin E, tocopherol, 400 units capsule Take 1 capsule (400 Units total) by mouth daily       Objective     /80 (BP Location: Left arm, Patient Position: Sitting, Cuff Size: Standard)   Pulse 95   Temp (!) 96 5 °F (35 8 °C) (Temporal)   Resp 18   Ht 5' 9" (1 753 m)   Wt 87 7 kg (193 lb 6 4 oz)   SpO2 100%   BMI 28 56 kg/m²     Physical Exam  Vitals and nursing note reviewed  Constitutional:       General: He is not in acute distress  Appearance: Normal appearance  He is normal weight  He is not ill-appearing, toxic-appearing or diaphoretic  HENT:      Head: Normocephalic and atraumatic  Right Ear: External ear normal       Left Ear: External ear normal       Nose: Nose normal       Mouth/Throat:      Pharynx: Oropharynx is clear     Eyes:      Extraocular Movements: Extraocular movements intact  Conjunctiva/sclera: Conjunctivae normal    Cardiovascular:      Rate and Rhythm: Normal rate  Pulses: no weak pulses          Dorsalis pedis pulses are 2+ on the right side and 2+ on the left side  Pulmonary:      Effort: Pulmonary effort is normal    Abdominal:      General: There is no distension  Musculoskeletal:         General: Normal range of motion  Cervical back: Normal range of motion  Feet:      Right foot:      Skin integrity: No ulcer, skin breakdown, erythema, warmth, callus or dry skin  Left foot:      Skin integrity: No ulcer, skin breakdown, erythema, warmth, callus or dry skin  Skin:     General: Skin is warm  Coloration: Skin is not jaundiced or pale  Neurological:      General: No focal deficit present  Mental Status: He is alert  Mental status is at baseline  Psychiatric:         Mood and Affect: Mood normal          Behavior: Behavior normal           Diabetic Foot Exam    Patient's shoes and socks removed  Right Foot/Ankle   Right Foot Inspection  Skin Exam: skin normal and skin intact  No dry skin, no warmth, no callus, no erythema, no maceration, no abnormal color, no pre-ulcer, no ulcer and no callus  Toe Exam: ROM and strength within normal limits  No swelling, no tenderness, erythema and  no right toe deformity    Sensory   Vibration: intact  Monofilament testing: intact    Vascular  Capillary refills: < 3 seconds  The right DP pulse is 2+  Right Toe  - Comprehensive Exam  Ecchymosis: none  Arch: normal  Hammertoes: absent  Claw Toes: absent  Swelling: none   Tenderness: none         Left Foot/Ankle  Left Foot Inspection  Skin Exam: skin normal and skin intact  No dry skin, no warmth, no erythema, no maceration, normal color, no pre-ulcer, no ulcer and no callus  Toe Exam: ROM and strength within normal limits  No swelling, no tenderness, no erythema and no left toe deformity       Sensory   Vibration: intact  Monofilament testing: intact    Vascular  Capillary refills: < 3 seconds  The left DP pulse is 2+       Left Toe  - Comprehensive Exam  Ecchymosis: none  Arch: normal  Hammertoes: absent  Claw toes: absent  Swelling: none   Tenderness: none           Assign Risk Category  No deformity present  No loss of protective sensation  No weak pulses  Risk: 0      Juan R Sheikh MD

## 2022-11-22 NOTE — ASSESSMENT & PLAN NOTE
Blood glucose adequately controlled on home Metformin 1g BID and Januvia 100mg daily  - Patient reports intermittent burning sensation over both hands and feet started approx 5 years ago  - Currently asymptomatic and compliant with treatment plan  - Home fasting -150s  - Goals BG: Preprandial: 80-130mg/dL and Postprandial: <180mg/dL  - Goal HgbA1c < 7% for average adults  - Screening:  - Renal function on 11/8/22 is stable CKD-3  - Lipid panel on 11/8/22 is within an acceptable range  - Microalb:Cr on 8/16/22 is within an acceptable range (LDL 24)  - DM foot exam on 5/13/22 is Negative for diabetic neuropathy    Plan:  - Continue with current regimen for now  - Defers statin therapy at this time for LDL < 70  - Recommend bi-weekly home fasting BG check and bring log to follow-up visit  - Counseled lifestyle improvement weight loss, follow a diabetic diet, and decrease high carbohydrates snacks  - Counseled on medication compliance and exercise as tolerated    - RTC in 3 months for HgbA1c recheck  - Consider uptitrating oral antihyperglycemics in HgbA1c persistently above goal    Lab Results   Component Value Date/Time    HGBA1C 6 9 (A) 11/22/2022 11:06 AM    HGBA1C 6 7 (H) 11/08/2022 09:45 AM    HGBA1C 6 3 08/16/2022 01:20 PM    HGBA1C 7 1 (A) 05/13/2022 10:33 AM    HGBA1C 7 7 (H) 02/02/2022 08:59 AM    HGBA1C 6 8 11/05/2021 12:00 AM    HGBA1C 6 2 (H) 08/02/2021 10:45 AM    HGBA1C 5 9 (H) 03/15/2018 09:23 AM

## 2022-11-29 ENCOUNTER — RA CDI HCC (OUTPATIENT)
Dept: OTHER | Facility: HOSPITAL | Age: 42
End: 2022-11-29

## 2022-11-29 DIAGNOSIS — E03.9 HYPOTHYROIDISM, UNSPECIFIED TYPE: ICD-10-CM

## 2022-11-29 DIAGNOSIS — F25.0 SCHIZOAFFECTIVE DISORDER, BIPOLAR TYPE (HCC): Chronic | ICD-10-CM

## 2022-11-29 DIAGNOSIS — E78.5 HYPERLIPIDEMIA, UNSPECIFIED HYPERLIPIDEMIA TYPE: ICD-10-CM

## 2022-11-29 DIAGNOSIS — E55.9 VITAMIN D DEFICIENCY: ICD-10-CM

## 2022-11-29 DIAGNOSIS — E78.49 OTHER HYPERLIPIDEMIA: ICD-10-CM

## 2022-11-29 RX ORDER — ATORVASTATIN CALCIUM 40 MG/1
40 TABLET, FILM COATED ORAL
Qty: 30 TABLET | Refills: 5 | Status: SHIPPED | OUTPATIENT
Start: 2022-11-29 | End: 2022-12-29

## 2022-11-29 RX ORDER — MELATONIN
1000 DAILY
Qty: 31 TABLET | Refills: 5 | Status: SHIPPED | OUTPATIENT
Start: 2022-11-29

## 2022-11-29 RX ORDER — LEVOTHYROXINE SODIUM 0.03 MG/1
25 TABLET ORAL
Qty: 30 TABLET | Refills: 5 | Status: SHIPPED | OUTPATIENT
Start: 2022-11-29 | End: 2022-12-29

## 2022-11-29 RX ORDER — LORATADINE 10 MG/1
10 TABLET ORAL DAILY
Qty: 30 TABLET | Refills: 5 | Status: SHIPPED | OUTPATIENT
Start: 2022-11-29 | End: 2022-12-29

## 2022-11-29 RX ORDER — CALCIUM CARBONATE 500(1250)
1 TABLET ORAL
Qty: 30 TABLET | Refills: 5 | Status: SHIPPED | OUTPATIENT
Start: 2022-11-29 | End: 2022-12-29

## 2022-11-29 NOTE — PROGRESS NOTES
[facility]  coding opportunities          Chart Reviewed number of suggestions sent to Provider: 3     Patients Insurance     Medicare Insurance: [facility]        [id number]  [id number]

## 2022-12-02 ENCOUNTER — HOSPITAL ENCOUNTER (EMERGENCY)
Facility: HOSPITAL | Age: 42
Discharge: HOME | End: 2022-12-02
Attending: EMERGENCY MEDICINE

## 2022-12-02 VITALS
HEART RATE: 95 BPM | TEMPERATURE: 98.4 F | DIASTOLIC BLOOD PRESSURE: 84 MMHG | OXYGEN SATURATION: 98 % | SYSTOLIC BLOOD PRESSURE: 139 MMHG | RESPIRATION RATE: 20 BRPM

## 2022-12-02 DIAGNOSIS — R10.13 EPIGASTRIC PAIN: Primary | ICD-10-CM

## 2022-12-02 DIAGNOSIS — R10.9 ABDOMINAL PAIN: ICD-10-CM

## 2022-12-02 LAB
ALBUMIN SERPL BCP-MCNC: 3.7 G/DL (ref 3.5–5)
ALP SERPL-CCNC: 55 U/L (ref 34–104)
ALT SERPL W P-5'-P-CCNC: 13 U/L (ref 7–52)
ANION GAP SERPL CALCULATED.3IONS-SCNC: 7 MMOL/L (ref 4–13)
AST SERPL W P-5'-P-CCNC: 13 U/L (ref 13–39)
BASOPHILS # BLD AUTO: 0.06 THOUSANDS/ÂΜL (ref 0–0.1)
BASOPHILS NFR BLD AUTO: 1 % (ref 0–1)
BILIRUB SERPL-MCNC: 0.71 MG/DL (ref 0.2–1)
BUN SERPL-MCNC: 12 MG/DL (ref 5–25)
CALCIUM SERPL-MCNC: 8.7 MG/DL (ref 8.4–10.2)
CHLORIDE SERPL-SCNC: 103 MMOL/L (ref 96–108)
CO2 SERPL-SCNC: 28 MMOL/L (ref 21–32)
CREAT SERPL-MCNC: 1.2 MG/DL (ref 0.6–1.3)
EOSINOPHIL # BLD AUTO: 0.49 THOUSAND/ÂΜL (ref 0–0.61)
EOSINOPHIL NFR BLD AUTO: 5 % (ref 0–6)
ERYTHROCYTE [DISTWIDTH] IN BLOOD BY AUTOMATED COUNT: 11.2 % (ref 11.6–15.1)
GFR SERPL CREATININE-BSD FRML MDRD: 74 ML/MIN/1.73SQ M
GLUCOSE SERPL-MCNC: 103 MG/DL (ref 65–140)
HCT VFR BLD AUTO: 46.5 % (ref 36.5–49.3)
HGB BLD-MCNC: 15.4 G/DL (ref 12–17)
IMM GRANULOCYTES # BLD AUTO: 0.07 THOUSAND/UL (ref 0–0.2)
IMM GRANULOCYTES NFR BLD AUTO: 1 % (ref 0–2)
LIPASE SERPL-CCNC: 152 U/L (ref 11–82)
LYMPHOCYTES # BLD AUTO: 2.81 THOUSANDS/ÂΜL (ref 0.6–4.47)
LYMPHOCYTES NFR BLD AUTO: 26 % (ref 14–44)
MCH RBC QN AUTO: 29.9 PG (ref 26.8–34.3)
MCHC RBC AUTO-ENTMCNC: 33.1 G/DL (ref 31.4–37.4)
MCV RBC AUTO: 90 FL (ref 82–98)
MONOCYTES # BLD AUTO: 0.88 THOUSAND/ÂΜL (ref 0.17–1.22)
MONOCYTES NFR BLD AUTO: 8 % (ref 4–12)
NEUTROPHILS # BLD AUTO: 6.44 THOUSANDS/ÂΜL (ref 1.85–7.62)
NEUTS SEG NFR BLD AUTO: 59 % (ref 43–75)
NRBC BLD AUTO-RTO: 0 /100 WBCS
PLATELET # BLD AUTO: 154 THOUSANDS/UL (ref 149–390)
PMV BLD AUTO: 9.9 FL (ref 8.9–12.7)
POTASSIUM SERPL-SCNC: 4.4 MMOL/L (ref 3.5–5.3)
PROT SERPL-MCNC: 6.3 G/DL (ref 6.4–8.4)
RBC # BLD AUTO: 5.15 MILLION/UL (ref 3.88–5.62)
SODIUM SERPL-SCNC: 138 MMOL/L (ref 135–147)
WBC # BLD AUTO: 10.75 THOUSAND/UL (ref 4.31–10.16)

## 2022-12-02 RX ORDER — ONDANSETRON 2 MG/ML
4 INJECTION INTRAMUSCULAR; INTRAVENOUS ONCE
Status: COMPLETED | OUTPATIENT
Start: 2022-12-02 | End: 2022-12-02

## 2022-12-02 RX ADMIN — ONDANSETRON 4 MG: 2 INJECTION INTRAMUSCULAR; INTRAVENOUS at 18:52
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
## 2022-12-03 NOTE — DISCHARGE INSTRUCTIONS
Your exam and testing was reassuring  Please eat bland foods, and avoid spicy/fried/citrus or vinegar based foods  Please also follow up with GI if your symptoms persist   If you feel worse please come back to the ER  Your lipase was mildly elevated, please follow up with your doctor to have it rechecked next week

## 2022-12-06 ENCOUNTER — OFFICE VISIT (OUTPATIENT)
Dept: FAMILY MEDICINE CLINIC | Facility: CLINIC | Age: 42
End: 2022-12-06

## 2022-12-06 VITALS
DIASTOLIC BLOOD PRESSURE: 83 MMHG | HEART RATE: 83 BPM | OXYGEN SATURATION: 98 % | BODY MASS INDEX: 28.88 KG/M2 | SYSTOLIC BLOOD PRESSURE: 115 MMHG | TEMPERATURE: 98.4 F | RESPIRATION RATE: 22 BRPM | HEIGHT: 69 IN | WEIGHT: 195 LBS

## 2022-12-06 DIAGNOSIS — Z00.00 MEDICARE ANNUAL WELLNESS VISIT, SUBSEQUENT: Primary | ICD-10-CM

## 2022-12-06 RX ORDER — FLUORIDE TOOTHPASTE
TOOTHPASTE DENTAL
COMMUNITY
Start: 2022-10-04

## 2022-12-06 RX ORDER — POLYETHYLENE GLYCOL 3350 17 G/17G
POWDER, FOR SOLUTION ORAL
COMMUNITY
Start: 2022-11-03

## 2022-12-06 RX ORDER — TRAZODONE HYDROCHLORIDE 100 MG/1
TABLET ORAL
COMMUNITY
Start: 2022-11-16
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
## 2022-12-06 NOTE — PATIENT INSTRUCTIONS
Medicare Preventive Visit Patient Instructions  Thank you for completing your Welcome to Medicare Visit or Medicare Annual Wellness Visit today  Your next wellness visit will be due in one year (12/7/2023)  The screening/preventive services that you may require over the next 5-10 years are detailed below  Some tests may not apply to you based off risk factors and/or age  Screening tests ordered at today's visit but not completed yet may show as past due  Also, please note that scanned in results may not display below  Preventive Screenings:  Service Recommendations Previous Testing/Comments   Colorectal Cancer Screening  · Colonoscopy    · Fecal Occult Blood Test (FOBT)/Fecal Immunochemical Test (FIT)  · Fecal DNA/Cologuard Test  · Flexible Sigmoidoscopy Age: 39-70 years old   Colonoscopy: every 10 years (May be performed more frequently if at higher risk)  OR  FOBT/FIT: every 1 year  OR  Cologuard: every 3 years  OR  Sigmoidoscopy: every 5 years  Screening may be recommended earlier than age 39 if at higher risk for colorectal cancer  Also, an individualized decision between you and your healthcare provider will decide whether screening between the ages of 74-80 would be appropriate   Colonoscopy: Not on file  FOBT/FIT: Not on file  Cologuard: Not on file  Sigmoidoscopy: Not on file          Prostate Cancer Screening Individualized decision between patient and health care provider in men between ages of 53-78   Medicare will cover every 12 months beginning on the day after your 50th birthday PSA: 0 5 ng/mL     Screening Not Indicated     Hepatitis C Screening Once for adults born between 1945 and 1965  More frequently in patients at high risk for Hepatitis C Hep C Antibody: 08/30/2022    Screening Current   Diabetes Screening 1-2 times per year if you're at risk for diabetes or have pre-diabetes Fasting glucose: 198 mg/dL (11/8/2022)  A1C: 6 9 (11/22/2022)  Screening Not Indicated  History Diabetes   Cholesterol Screening Once every 5 years if you don't have a lipid disorder  May order more often based on risk factors  Lipid panel: 11/08/2022  Screening Not Indicated  History Lipid Disorder      Other Preventive Screenings Covered by Medicare:  1  Abdominal Aortic Aneurysm (AAA) Screening: covered once if your at risk  You're considered to be at risk if you have a family history of AAA or a male between the age of 73-68 who smoking at least 100 cigarettes in your lifetime  2  Lung Cancer Screening: covers low dose CT scan once per year if you meet all of the following conditions: (1) Age 50-69; (2) No signs or symptoms of lung cancer; (3) Current smoker or have quit smoking within the last 15 years; (4) You have a tobacco smoking history of at least 20 pack years (packs per day x number of years you smoked); (5) You get a written order from a healthcare provider  3  Glaucoma Screening: covered annually if you're considered high risk: (1) You have diabetes OR (2) Family history of glaucoma OR (3)  aged 48 and older OR (3)  American aged 72 and older  3  Osteoporosis Screening: covered every 2 years if you meet one of the following conditions: (1) Have a vertebral abnormality; (2) On glucocorticoid therapy for more than 3 months; (3) Have primary hyperparathyroidism; (4) On osteoporosis medications and need to assess response to drug therapy  5  HIV Screening: covered annually if you're between the age of 12-76  Also covered annually if you are younger than 13 and older than 72 with risk factors for HIV infection  For pregnant patients, it is covered up to 3 times per pregnancy      Immunizations:  Immunization Recommendations   Influenza Vaccine Annual influenza vaccination during flu season is recommended for all persons aged >= 6 months who do not have contraindications   Pneumococcal Vaccine   * Pneumococcal conjugate vaccine = PCV13 (Prevnar 13), PCV15 (Vaxneuvance), PCV20 (Prevnar 20)  * Pneumococcal polysaccharide vaccine = PPSV23 (Pneumovax) Adults 2364 years old: 1-3 doses may be recommended based on certain risk factors  Adults 72 years old: 1-2 doses may be recommended based off what pneumonia vaccine you previously received   Hepatitis B Vaccine 3 dose series if at intermediate or high risk (ex: diabetes, end stage renal disease, liver disease)   Tetanus (Td) Vaccine - COST NOT COVERED BY MEDICARE PART B Following completion of primary series, a booster dose should be given every 10 years to maintain immunity against tetanus  Td may also be given as tetanus wound prophylaxis  Tdap Vaccine - COST NOT COVERED BY MEDICARE PART B Recommended at least once for all adults  For pregnant patients, recommended with each pregnancy  Shingles Vaccine (Shingrix) - COST NOT COVERED BY MEDICARE PART B  2 shot series recommended in those aged 48 and above     Health Maintenance Due:      Topic Date Due   • HIV Screening  Completed   • Hepatitis C Screening  Completed     Immunizations Due:      Topic Date Due   • Hepatitis B Vaccine (1 of 3 - 3-dose series) Never done   • COVID-19 Vaccine (2 - Pfizer series) 11/16/2021   • Influenza Vaccine (1) 09/01/2022   • Pneumococcal Vaccine: Pediatrics (0 to 5 Years) and At-Risk Patients (6 to 59 Years) (2 - PPSV23 if available, else PCV20) 12/03/2022     Advance Directives   What are advance directives? Advance directives are legal documents that state your wishes and plans for medical care  These plans are made ahead of time in case you lose your ability to make decisions for yourself  Advance directives can apply to any medical decision, such as the treatments you want, and if you want to donate organs  What are the types of advance directives? There are many types of advance directives, and each state has rules about how to use them  You may choose a combination of any of the following:  · Living will: This is a written record of the treatment you want  You can also choose which treatments you do not want, which to limit, and which to stop at a certain time  This includes surgery, medicine, IV fluid, and tube feedings  · Durable power of  for healthcare [facility]): This is a written record that states who you want to make healthcare choices for you when you are unable to make them for yourself  This person, called a proxy, is usually a family member or a friend  You may choose more than 1 proxy  · Do not resuscitate (DNR) order:  A DNR order is used in case your heart stops beating or you stop breathing  It is a request not to have certain forms of treatment, such as CPR  A DNR order may be included in other types of advance directives  · Medical directive: This covers the care that you want if you are in a coma, near death, or unable to make decisions for yourself  You can list the treatments you want for each condition  Treatment may include pain medicine, surgery, blood transfusions, dialysis, IV or tube feedings, and a ventilator (breathing machine)  · Values history: This document has questions about your views, beliefs, and how you feel and think about life  This information can help others choose the care that you would choose  Why are advance directives important? An advance directive helps you control your care  Although spoken wishes may be used, it is better to have your wishes written down  Spoken wishes can be misunderstood, or not followed  Treatments may be given even if you do not want them  An advance directive may make it easier for your family to make difficult choices about your care  Weight Management   Why it is important to manage your weight:  Being overweight increases your risk of health conditions such as heart disease, high blood pressure, type 2 diabetes, and certain types of cancer  It can also increase your risk for osteoarthritis, sleep apnea, and other respiratory problems  Aim for a slow, steady weight loss   Even a small amount of weight loss can lower your risk of health problems  How to lose weight safely:  A safe and healthy way to lose weight is to eat fewer calories and get regular exercise  You can lose up about 1 pound a week by decreasing the number of calories you eat by 500 calories each day  Healthy meal plan for weight management:  A healthy meal plan includes a variety of foods, contains fewer calories, and helps you stay healthy  A healthy meal plan includes the following:  · Eat whole-grain foods more often  A healthy meal plan should contain fiber  Fiber is the part of grains, fruits, and vegetables that is not broken down by your body  Whole-grain foods are healthy and provide extra fiber in your diet  Some examples of whole-grain foods are whole-wheat breads and pastas, oatmeal, brown rice, and bulgur  · Eat a variety of vegetables every day  Include dark, leafy greens such as spinach, kale, stu greens, and mustard greens  Eat yellow and orange vegetables such as carrots, sweet potatoes, and winter squash  · Eat a variety of fruits every day  Choose fresh or canned fruit (canned in its own juice or light syrup) instead of juice  Fruit juice has very little or no fiber  · Eat low-fat dairy foods  Drink fat-free (skim) milk or 1% milk  Eat fat-free yogurt and low-fat cottage cheese  Try low-fat cheeses such as mozzarella and other reduced-fat cheeses  · Choose meat and other protein foods that are low in fat  Choose beans or other legumes such as split peas or lentils  Choose fish, skinless poultry (chicken or turkey), or lean cuts of red meat (beef or pork)  Before you cook meat or poultry, cut off any visible fat  · Use less fat and oil  Try baking foods instead of frying them  Add less fat, such as margarine, sour cream, regular salad dressing and mayonnaise to foods  Eat fewer high-fat foods  Some examples of high-fat foods include french fries, doughnuts, ice cream, and cakes    · Eat fewer sweets  Limit foods and drinks that are high in sugar  This includes candy, cookies, regular soda, and sweetened drinks  Exercise:  Exercise at least 30 minutes per day on most days of the week  Some examples of exercise include walking, biking, dancing, and swimming  You can also fit in more physical activity by taking the stairs instead of the elevator or parking farther away from stores  Ask your healthcare provider about the best exercise plan for you  © Copyright [employer] 2018 Information is for End User's use only and may not be sold, redistributed or otherwise used for commercial purposes   All illustrations and images included in CareNotes® are the copyrighted property of A D A M , Inc  or [address] [place] [address]

## 2022-12-07 DIAGNOSIS — Z12.5 PROSTATE CANCER SCREENING: Primary | ICD-10-CM

## 2022-12-15 ENCOUNTER — TELEPHONE (OUTPATIENT)
Dept: FAMILY MEDICINE CLINIC | Facility: CLINIC | Age: 42
End: 2022-12-15

## 2022-12-16 ENCOUNTER — TELEPHONE (OUTPATIENT)
Dept: FAMILY MEDICINE CLINIC | Facility: CLINIC | Age: 42
End: 2022-12-16

## 2022-12-17 DIAGNOSIS — L85.3 DRY SKIN: ICD-10-CM

## 2022-12-17 RX ORDER — LANOLIN ALCOHOL/MO/W.PET/CERES
CREAM (GRAM) TOPICAL AS NEEDED
Qty: 240 ML | Refills: 5 | Status: SHIPPED | OUTPATIENT
Start: 2022-12-17

## 2022-12-27 ENCOUNTER — OFFICE VISIT (OUTPATIENT)
Dept: URGENT CARE | Age: 42
End: 2022-12-27

## 2022-12-27 VITALS — TEMPERATURE: 97 F | OXYGEN SATURATION: 98 % | HEART RATE: 107 BPM | RESPIRATION RATE: 16 BRPM

## 2022-12-27 DIAGNOSIS — J06.9 UPPER RESPIRATORY TRACT INFECTION, UNSPECIFIED TYPE: Primary | ICD-10-CM
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
## 2022-12-28 DIAGNOSIS — J30.2 SEASONAL ALLERGIES: ICD-10-CM

## 2022-12-28 LAB
FLUAV RNA RESP QL NAA+PROBE: NEGATIVE
FLUBV RNA RESP QL NAA+PROBE: NEGATIVE
SARS-COV-2 RNA RESP QL NAA+PROBE: POSITIVE

## 2022-12-28 RX ORDER — GUAIFENESIN 100 MG/5ML
200 SYRUP ORAL 3 TIMES DAILY PRN
Qty: 120 ML | Refills: 2 | Status: SHIPPED | OUTPATIENT
Start: 2022-12-28

## 2022-12-28 NOTE — TELEPHONE ENCOUNTER
Good Morning Dr Mauro Calderon  Please review and refill if appropriate  Last refill was a year ago   Thanks

## 2023-01-05 ENCOUNTER — APPOINTMENT (OUTPATIENT)
Dept: LAB | Age: 43
End: 2023-01-05

## 2023-01-05 DIAGNOSIS — Z79.899 ENCOUNTER FOR LONG-TERM (CURRENT) USE OF OTHER MEDICATIONS: ICD-10-CM

## 2023-01-05 LAB
25(OH)D3 SERPL-MCNC: 57.3 NG/ML (ref 30–100)
ALBUMIN SERPL BCP-MCNC: 2.8 G/DL (ref 3.5–5)
ALP SERPL-CCNC: 69 U/L (ref 46–116)
ALT SERPL W P-5'-P-CCNC: 22 U/L (ref 12–78)
ANION GAP SERPL CALCULATED.3IONS-SCNC: 4 MMOL/L (ref 4–13)
AST SERPL W P-5'-P-CCNC: 16 U/L (ref 5–45)
BASOPHILS # BLD AUTO: 0.04 THOUSANDS/ÂΜL (ref 0–0.1)
BASOPHILS NFR BLD AUTO: 1 % (ref 0–1)
BILIRUB SERPL-MCNC: 0.39 MG/DL (ref 0.2–1)
BUN SERPL-MCNC: 9 MG/DL (ref 5–25)
CALCIUM ALBUM COR SERPL-MCNC: 9.6 MG/DL (ref 8.3–10.1)
CALCIUM SERPL-MCNC: 8.6 MG/DL (ref 8.3–10.1)
CHLORIDE SERPL-SCNC: 104 MMOL/L (ref 96–108)
CHOLEST SERPL-MCNC: 69 MG/DL
CO2 SERPL-SCNC: 30 MMOL/L (ref 21–32)
CREAT SERPL-MCNC: 1.21 MG/DL (ref 0.6–1.3)
EOSINOPHIL # BLD AUTO: 0.4 THOUSAND/ÂΜL (ref 0–0.61)
EOSINOPHIL NFR BLD AUTO: 5 % (ref 0–6)
ERYTHROCYTE [DISTWIDTH] IN BLOOD BY AUTOMATED COUNT: 10.9 % (ref 11.6–15.1)
GFR SERPL CREATININE-BSD FRML MDRD: 73 ML/MIN/1.73SQ M
GLUCOSE P FAST SERPL-MCNC: 126 MG/DL (ref 65–99)
HCT VFR BLD AUTO: 42.2 % (ref 36.5–49.3)
HDLC SERPL-MCNC: 26 MG/DL
HGB BLD-MCNC: 14.1 G/DL (ref 12–17)
IMM GRANULOCYTES # BLD AUTO: 0.08 THOUSAND/UL (ref 0–0.2)
IMM GRANULOCYTES NFR BLD AUTO: 1 % (ref 0–2)
LDLC SERPL CALC-MCNC: 25 MG/DL (ref 0–100)
LYMPHOCYTES # BLD AUTO: 2.39 THOUSANDS/ÂΜL (ref 0.6–4.47)
LYMPHOCYTES NFR BLD AUTO: 31 % (ref 14–44)
MCH RBC QN AUTO: 29.3 PG (ref 26.8–34.3)
MCHC RBC AUTO-ENTMCNC: 33.4 G/DL (ref 31.4–37.4)
MCV RBC AUTO: 88 FL (ref 82–98)
MONOCYTES # BLD AUTO: 0.53 THOUSAND/ÂΜL (ref 0.17–1.22)
MONOCYTES NFR BLD AUTO: 7 % (ref 4–12)
NEUTROPHILS # BLD AUTO: 4.23 THOUSANDS/ÂΜL (ref 1.85–7.62)
NEUTS SEG NFR BLD AUTO: 55 % (ref 43–75)
NONHDLC SERPL-MCNC: 43 MG/DL
NRBC BLD AUTO-RTO: 0 /100 WBCS
PLATELET # BLD AUTO: 224 THOUSANDS/UL (ref 149–390)
PMV BLD AUTO: 10.2 FL (ref 8.9–12.7)
POTASSIUM SERPL-SCNC: 4.2 MMOL/L (ref 3.5–5.3)
PROT SERPL-MCNC: 6.8 G/DL (ref 6.4–8.4)
RBC # BLD AUTO: 4.81 MILLION/UL (ref 3.88–5.62)
SODIUM SERPL-SCNC: 138 MMOL/L (ref 135–147)
TRIGL SERPL-MCNC: 89 MG/DL
TSH SERPL DL<=0.05 MIU/L-ACNC: 1.62 UIU/ML (ref 0.45–4.5)
VALPROATE SERPL-MCNC: 29 UG/ML (ref 50–100)
WBC # BLD AUTO: 7.67 THOUSAND/UL (ref 4.31–10.16)

## 2023-01-08 LAB
EST. AVERAGE GLUCOSE BLD GHB EST-MCNC: 143 MG/DL
HBA1C MFR BLD: 6.6 %

## 2023-01-12 ENCOUNTER — TELEPHONE (OUTPATIENT)
Dept: FAMILY MEDICINE CLINIC | Facility: CLINIC | Age: 43
End: 2023-01-12

## 2023-01-12 DIAGNOSIS — K59.00 CONSTIPATION, UNSPECIFIED CONSTIPATION TYPE: ICD-10-CM

## 2023-01-12 NOTE — TELEPHONE ENCOUNTER
PLEASE DISREGARD  Patient refused to come in for visit    Folder (Dr Sunni Mckay) -     Name of Form - [facility] Medical Examination [place] (for visit 01/12/12)    Color folder (Yellow)    Form to be (to Caregiver after appt today)

## 2023-01-17 DIAGNOSIS — K59.00 CONSTIPATION, UNSPECIFIED CONSTIPATION TYPE: ICD-10-CM

## 2023-01-17 RX ORDER — POLYETHYLENE GLYCOL 3350 17 G/17G
17 POWDER, FOR SOLUTION ORAL DAILY
Qty: 100 EACH | Refills: 0 | Status: SHIPPED | OUTPATIENT
Start: 2023-01-17

## 2023-01-18 ENCOUNTER — OFFICE VISIT (OUTPATIENT)
Dept: GASTROENTEROLOGY | Facility: AMBULARY SURGERY CENTER | Age: 43
End: 2023-01-18

## 2023-01-18 VITALS
SYSTOLIC BLOOD PRESSURE: 118 MMHG | DIASTOLIC BLOOD PRESSURE: 80 MMHG | HEART RATE: 97 BPM | HEIGHT: 69 IN | BODY MASS INDEX: 28.58 KG/M2 | OXYGEN SATURATION: 99 % | WEIGHT: 193 LBS

## 2023-01-18 DIAGNOSIS — R10.13 EPIGASTRIC PAIN: ICD-10-CM

## 2023-01-18 RX ORDER — CHLORPROMAZINE HYDROCHLORIDE 200 MG/1
TABLET, FILM COATED ORAL
COMMUNITY
Start: 2023-01-13 | End: 2023-01-24
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
## 2023-01-20 DIAGNOSIS — H25.11 NUCLEAR SCLEROTIC CATARACT OF RIGHT EYE: ICD-10-CM

## 2023-01-20 DIAGNOSIS — E11.65 TYPE 2 DIABETES MELLITUS WITH HYPERGLYCEMIA, WITHOUT LONG-TERM CURRENT USE OF INSULIN (HCC): ICD-10-CM

## 2023-01-20 DIAGNOSIS — F25.0 SCHIZOAFFECTIVE DISORDER, BIPOLAR TYPE (HCC): Chronic | ICD-10-CM

## 2023-01-20 RX ORDER — METFORMIN HYDROCHLORIDE EXTENDED-RELEASE TABLETS 1000 MG/1
1000 TABLET, FILM COATED, EXTENDED RELEASE ORAL 2 TIMES DAILY WITH MEALS
Qty: 62 TABLET | Refills: 5 | Status: SHIPPED | OUTPATIENT
Start: 2023-01-20 | End: 2023-02-19

## 2023-01-20 RX ORDER — VITAMIN E 268 MG
400 CAPSULE ORAL DAILY
Qty: 31 CAPSULE | Refills: 5 | Status: SHIPPED | OUTPATIENT
Start: 2023-01-20 | End: 2023-02-19

## 2023-01-20 RX ORDER — LISINOPRIL 2.5 MG/1
2.5 TABLET ORAL DAILY
Qty: 31 TABLET | Refills: 5 | Status: SHIPPED | OUTPATIENT
Start: 2023-01-20

## 2023-01-24 ENCOUNTER — TELEPHONE (OUTPATIENT)
Dept: FAMILY MEDICINE CLINIC | Facility: CLINIC | Age: 43
End: 2023-01-24

## 2023-01-24 ENCOUNTER — OFFICE VISIT (OUTPATIENT)
Dept: FAMILY MEDICINE CLINIC | Facility: CLINIC | Age: 43
End: 2023-01-24

## 2023-01-24 VITALS
BODY MASS INDEX: 28.8 KG/M2 | OXYGEN SATURATION: 100 % | TEMPERATURE: 98.1 F | SYSTOLIC BLOOD PRESSURE: 107 MMHG | HEART RATE: 79 BPM | WEIGHT: 195 LBS | DIASTOLIC BLOOD PRESSURE: 74 MMHG

## 2023-01-24 DIAGNOSIS — E11.65 TYPE 2 DIABETES MELLITUS WITH HYPERGLYCEMIA, UNSPECIFIED WHETHER LONG TERM INSULIN USE (HCC): ICD-10-CM

## 2023-01-24 DIAGNOSIS — U07.1 COVID-19 VIRUS INFECTION: Primary | ICD-10-CM

## 2023-01-24 RX ORDER — DIVALPROEX SODIUM 500 MG/1
500 TABLET, EXTENDED RELEASE ORAL EVERY MORNING
COMMUNITY
Start: 2023-01-17

## 2023-01-24 RX ORDER — DIVALPROEX SODIUM 250 MG/1
250 TABLET, EXTENDED RELEASE ORAL
COMMUNITY
Start: 2023-01-17

## 2023-01-24 NOTE — ASSESSMENT & PLAN NOTE
COVID (+) 12/27/22 at urgent care with congestion, rhinorrhea, and postnasal drip  - Was treated with OTC Robitussin's as needed and symptom has essentially resolved  - Denies any current respiratory symptoms at this time  No other questions or concerns  - Patient's caregiver states that he is fully vaccinated for COVID with booster  Will bring vaccination card at next visit

## 2023-01-24 NOTE — PROGRESS NOTES
Name: Carol King      : 1980      MRN: [id number]  Encounter Provider: Yassine Lloyd MD  Encounter Date: 2023   Encounter department: [address] [place] [address]  COVID-19 virus infection  Assessment & Plan:  COVID (+) 22 at urgent care with congestion, rhinorrhea, and postnasal drip  - Was treated with OTC Robitussin's as needed and symptom has essentially resolved  - Denies any current respiratory symptoms at this time  No other questions or concerns  - Patient's caregiver states that he is fully vaccinated for COVID with booster  Will bring vaccination card at next visit  Follow-up: Return in about 3 months (around 2023) for HgbA1c recheck  Subjective      Chief Complaint   Patient presents with   • Follow up urgent care for covid positive      Symptoms resolved     HPI   41-year-old male presents for COVID follow-up  Patient diagnosed with COVID on 22 at urgent care with congestion, rhinorrhea, and postnasal drip  Patient was treated with OTC Robitussin's as needed and symptom has essentially resolved  Patient denies any current respiratory symptoms at this time  No other questions or concerns  Patient's caregiver states that he is fully vaccinated for COVID with booster  Will bring vaccination card at next visit  Review of Systems   Constitutional: Negative for chills and fever  HENT: Negative for trouble swallowing  Eyes: Negative for pain and redness  Respiratory: Negative for shortness of breath  Cardiovascular: Negative for chest pain  Gastrointestinal: Negative for abdominal pain  Endocrine: Negative for cold intolerance and heat intolerance  Genitourinary: Negative for dysuria and hematuria  Musculoskeletal: Negative for arthralgias and myalgias  Skin: Negative for wound  Neurological: Negative for seizures and syncope     Psychiatric/Behavioral: Negative for behavioral problems         Current Outpatient Medications on File Prior to Visit   Medication Sig   • acetaminophen (TYLENOL) 650 mg CR tablet Take 1 tablet (650 mg total) by mouth every 8 (eight) hours as needed for mild pain   • Alcohol Swabs (Curity Alcohol Preps) 70 % PADS Use if needed (when checking blood glucose)   • aluminum-magnesium hydroxide-simethicone (MAALOX) 200-200-20 MG/5ML SUSP Take 20 mL by mouth 4 (four) times a day (before meals and at bedtime)   • ammonium lactate (LAC-HYDRIN) 12 % cream Apply topically in the morning To both feet   • ARIPiprazole (ABILIFY) 10 mg tablet Take 10 mg by mouth daily   • Artificial Saliva (Biotene OralBalance Dry Mouth) GEL Apply 1 application to the mouth or throat 2 (two) times a day (Patient taking differently: Apply 1 application to the mouth or throat every 4 (four) hours as needed)   • atorvastatin (LIPITOR) 40 mg tablet Take 1 tablet (40 mg total) by mouth daily at bedtime   • B Complex Vitamins (B Complex 50) TABS Take 1 tablet by mouth daily 1 cap by mouth daily @ 8am   • baclofen 10 mg tablet Take 1 tablet (10 mg total) by mouth 3 (three) times a day for 14 days   • bisacodyl (DULCOLAX) 5 mg EC tablet Take 2 tablets (10 mg total) by mouth daily as needed for constipation (Take medication as needed if no bowel movement in 48 hours)   • bismuth subsalicylate (PEPTO BISMOL) 524 mg/30 mL oral suspension Take 15 mL (262 mg total) by mouth every 6 (six) hours as needed for indigestion   • calcium carbonate (OYSTER SHELL,OSCAL) 500 mg Take 1 tablet by mouth daily with breakfast   • chlorproMAZINE (THORAZINE) 100 mg tablet Take 100 mg by mouth 2 (two) times a day   • cholecalciferol (VITAMIN D3) 1,000 units tablet Take 1 tablet (1,000 Units total) by mouth daily   • Diclofenac Sodium (VOLTAREN) 1 % Apply 2 g topically 4 (four) times a day   • divalproex sodium (DEPAKOTE ER) 250 mg 24 hr tablet Take 250 mg by mouth daily at bedtime   • divalproex sodium (DEPAKOTE ER) 500 mg 24 hr tablet Take 500 mg by mouth every morning   • Emollient (eucerin) lotion Apply topically as needed for dry skin   • famotidine (PEPCID) 20 mg tablet Take 1 tablet (20 mg total) by mouth 2 (two) times a day for 14 days   • glucose blood (True Metrix Blood Glucose Test) test strip Use 1 each 2 (two) times a week Use as instructed   • glucose monitoring kit (FREESTYLE) monitoring kit 1 each by Does not apply route 2 (two) times a week   • guaiFENesin (ROBITUSSIN) 100 MG/5ML oral liquid Take 10 mL (200 mg total) by mouth 3 (three) times a day as needed for cough   • Lancets (freestyle) lancets Use to test blood sugars 2x weekly on Mon & Thurs @ 8AM   • levothyroxine 25 mcg tablet Take 1 tablet (25 mcg total) by mouth daily in the early morning   • lisinopril (ZESTRIL) 2 5 mg tablet Take 1 tablet (2 5 mg total) by mouth daily   • loratadine (CLARITIN) 10 mg tablet Take 1 tablet (10 mg total) by mouth daily   • LORazepam (ATIVAN) 0 5 mg tablet Take 0 5 mg by mouth in the morning and 0 5 mg in the evening and 0 5 mg before bedtime   8AM, 2PM, 8PM    • Menthol (Halls Cough Drops) 5 8 MG LOZG Apply 1 lozenge (5 8 mg total) to the mouth or throat 4 (four) times a day as needed (cough)   • metFORMIN (FORTAMET) 1000 MG (OSM) 24 hr tablet Take 1 tablet (1,000 mg total) by mouth 2 (two) times a day with meals   • Mouthwashes (Biotene Dry Mouth) LIQD    • pantoprazole (PROTONIX) 40 mg tablet Take 1 tablet (40 mg total) by mouth daily for 14 days   • polyethylene glycol (GLYCOLAX) 17 GM/SCOOP powder    • polyethylene glycol (MIRALAX) 17 g packet Take 17 g by mouth daily   • propranolol (INDERAL) 20 mg tablet Take 1 tablet (20 mg total) by mouth every 12 (twelve) hours   • Refresh Optive 0 5-0 9 % SOLN    • senna-docusate sodium (SENOKOT-S) 8 6-50 mg per tablet Take 2 tablets by mouth daily   • sitaGLIPtin (JANUVIA) 100 mg tablet Take 1 tablet (100 mg total) by mouth daily Daily after breakfast   • Sunscreens (Tropical Gold Sunblock) LOTN Apply topically 3 (three) times a day as needed (sunburn) Apply quarter amount 3x/day prn   • traZODone (DESYREL) 100 mg tablet    • vitamin E, tocopherol, 400 units capsule Take 1 capsule (400 Units total) by mouth daily   • [DISCONTINUED] chlorproMAZINE (THORAZINE) 100 mg tablet Take 100 mg by mouth as needed in the morning and 100 mg as needed in the evening for nausea  • [DISCONTINUED] chlorproMAZINE (THORAZINE) 200 mg tablet    • [DISCONTINUED] traZODone (DESYREL) 50 mg tablet Take 1 tablet (50 mg total) by mouth daily at bedtime as needed (insomnia)   • divalproex sodium (DEPAKOTE) 500 mg EC tablet Take 1 tablet (500 mg total) by mouth 2 (two) times a day       Objective     /74 (BP Location: Left arm, Patient Position: Sitting, Cuff Size: Standard)   Pulse 79   Temp 98 1 °F (36 7 °C) (Temporal)   Wt 88 5 kg (195 lb)   SpO2 100%   BMI 28 80 kg/m²     Physical Exam  Vitals and nursing note reviewed  Constitutional:       General: He is not in acute distress  Appearance: Normal appearance  He is normal weight  He is not ill-appearing, toxic-appearing or diaphoretic  HENT:      Head: Normocephalic and atraumatic  Right Ear: External ear normal       Left Ear: External ear normal       Nose: Nose normal       Mouth/Throat:      Pharynx: Oropharynx is clear  Eyes:      Extraocular Movements: Extraocular movements intact  Conjunctiva/sclera: Conjunctivae normal    Cardiovascular:      Rate and Rhythm: Normal rate  Pulmonary:      Effort: Pulmonary effort is normal    Abdominal:      General: There is no distension  Musculoskeletal:         General: Normal range of motion  Cervical back: Normal range of motion  Skin:     General: Skin is warm  Coloration: Skin is not jaundiced or pale  Neurological:      General: No focal deficit present  Mental Status: He is alert  Mental status is at baseline     Psychiatric:         Mood and Affect: Mood normal  Behavior: Behavior normal        Nelson Gale MD     Patient Instructions   1  Follow-up in 3 months for diabetes check  2   Bring COVID vaccination card at next visit

## 2023-01-24 NOTE — TELEPHONE ENCOUNTER
Folder (To be completed by) - Dr Lonny Argueta     Name of Form - Person Directed Support medical examination casenote    Color folder (Yellow)    Form to be completed at visit and give back to patient    Patient was made aware of the 7-10 business day form policy

## 2023-01-25 ENCOUNTER — APPOINTMENT (OUTPATIENT)
Dept: LAB | Age: 43
End: 2023-01-25

## 2023-01-25 DIAGNOSIS — Z79.899 ENCOUNTER FOR LONG-TERM (CURRENT) USE OF OTHER MEDICATIONS: ICD-10-CM

## 2023-01-25 LAB — VALPROATE SERPL-MCNC: 39 UG/ML (ref 50–100)

## 2023-02-02 ENCOUNTER — TELEPHONE (OUTPATIENT)
Dept: FAMILY MEDICINE CLINIC | Facility: CLINIC | Age: 43
End: 2023-02-02

## 2023-02-09 DIAGNOSIS — K59.00 CONSTIPATION, UNSPECIFIED CONSTIPATION TYPE: ICD-10-CM

## 2023-02-10 RX ORDER — SENNA AND DOCUSATE SODIUM 50; 8.6 MG/1; MG/1
2 TABLET, FILM COATED ORAL DAILY
Qty: 60 TABLET | Refills: 5 | Status: SHIPPED | OUTPATIENT
Start: 2023-02-10

## 2023-03-01 ENCOUNTER — APPOINTMENT (OUTPATIENT)
Dept: LAB | Age: 43
End: 2023-03-01

## 2023-03-01 DIAGNOSIS — Z12.5 PROSTATE CANCER SCREENING: ICD-10-CM

## 2023-03-01 LAB — PSA SERPL-MCNC: 0.7 NG/ML (ref 0–4)

## 2023-03-14 ENCOUNTER — OFFICE VISIT (OUTPATIENT)
Dept: FAMILY MEDICINE CLINIC | Facility: CLINIC | Age: 43
End: 2023-03-14

## 2023-03-14 ENCOUNTER — TELEPHONE (OUTPATIENT)
Dept: FAMILY MEDICINE CLINIC | Facility: CLINIC | Age: 43
End: 2023-03-14

## 2023-03-14 VITALS
DIASTOLIC BLOOD PRESSURE: 70 MMHG | OXYGEN SATURATION: 98 % | SYSTOLIC BLOOD PRESSURE: 102 MMHG | TEMPERATURE: 97.9 F | WEIGHT: 198.2 LBS | HEART RATE: 80 BPM | BODY MASS INDEX: 29.27 KG/M2 | RESPIRATION RATE: 18 BRPM

## 2023-03-14 DIAGNOSIS — E11.65 TYPE 2 DIABETES MELLITUS WITH HYPERGLYCEMIA, WITHOUT LONG-TERM CURRENT USE OF INSULIN (HCC): Primary | ICD-10-CM

## 2023-03-14 DIAGNOSIS — Z71.89 COMPLEX CARE COORDINATION: Primary | ICD-10-CM

## 2023-03-14 NOTE — TELEPHONE ENCOUNTER
Folder (To be completed by) - Dr Stacie Galdamez     Name of Form - Medical exam casenote    Color folder -yellow    Form to be Faxed (Fax #), Mailed (Address), or Picked up (By whom) -    To be given to caregiver at end of visit

## 2023-03-14 NOTE — ASSESSMENT & PLAN NOTE
Blood glucose adequately controlled on home Metformin 1g BID and Januvia 100mg daily  - Patient reports intermittent burning sensation over both hands and feet started approx 5 years ago  - Currently asymptomatic and compliant with treatment plan  - Home fasting -150s  - Goals BG: Preprandial: 80-130mg/dL and Postprandial: <180mg/dL  - Goal HgbA1c < 7% for average adults  - Screening:  - Renal function on 11/8/22 is stable CKD-3  - Lipid panel on 11/8/22 is within an acceptable range  - Microalb:Cr on 8/16/22 is within an acceptable range (LDL 24)  - DM foot exam on 5/13/22 is Negative for diabetic neuropathy    Plan:  - Continue with current regimen for now  - Defers statin therapy at this time for LDL < 70  - Recommend bi-weekly home fasting BG check and bring log to follow-up visit  - Counseled lifestyle improvement weight loss, follow a diabetic diet, and decrease high carbohydrates snacks  - Counseled on medication compliance and exercise as tolerated    - RTC in 3 months for HgbA1c recheck  - Consider uptitrating oral antihyperglycemics in HgbA1c persistently above goal    Lab Results   Component Value Date/Time    HGBA1C 6 6 (H) 01/05/2023 08:36 AM    HGBA1C 6 9 (A) 11/22/2022 11:06 AM    HGBA1C 6 7 (H) 11/08/2022 09:45 AM    HGBA1C 6 3 08/16/2022 01:20 PM    HGBA1C 7 1 (A) 05/13/2022 10:33 AM    HGBA1C 7 7 (H) 02/02/2022 08:59 AM    HGBA1C 5 9 (H) 03/15/2018 09:23 AM

## 2023-03-14 NOTE — PROGRESS NOTES
Name: Umesh Majano      : 1980      MRN: [id number]  Encounter Provider: Kalli Mariscal MD  Encounter Date: 3/14/2023   Encounter department: [address] [place] [address]  Type 2 diabetes mellitus with hyperglycemia, without long-term current use of insulin ([facility] )  Assessment & Plan:  Blood glucose adequately controlled on home Metformin 1g BID and Januvia 100mg daily  - Patient reports intermittent burning sensation over both hands and feet started approx 5 years ago  - Currently asymptomatic and compliant with treatment plan  - Home fasting -150s  - Goals BG: Preprandial: 80-130mg/dL and Postprandial: <180mg/dL  - Goal HgbA1c < 7% for average adults  - Screening:  - Renal function on 22 is stable CKD-3  - Lipid panel on 22 is within an acceptable range  - Microalb:Cr on 22 is within an acceptable range (LDL 24)  - DM foot exam on 22 is Negative for diabetic neuropathy    Plan:  - Continue with current regimen for now  - Defers statin therapy at this time for LDL < 70  - Recommend bi-weekly home fasting BG check and bring log to follow-up visit  - Counseled lifestyle improvement weight loss, follow a diabetic diet, and decrease high carbohydrates snacks  - Counseled on medication compliance and exercise as tolerated  - RTC in 3 months for HgbA1c recheck  - Consider uptitrating oral antihyperglycemics in HgbA1c persistently above goal    Lab Results   Component Value Date/Time    HGBA1C 6 6 (H) 2023 08:36 AM    HGBA1C 6 9 (A) 2022 11:06 AM    HGBA1C 6 7 (H) 2022 09:45 AM    HGBA1C 6 3 2022 01:20 PM    HGBA1C 7 1 (A) 2022 10:33 AM    HGBA1C 7 7 (H) 2022 08:59 AM    HGBA1C 5 9 (H) 03/15/2018 09:23 AM              Follow-up: Return in about 3 months (around 2023) for Annual physical and HgbA1c recheck      Subjective     Chief Complaint   Patient presents with   • Diabetes     Follow up       HPI 58-year-old male presents for diabetes follow-up and lab review  Patient states he has been compliant with his medication and denies any side effects or hypoglycemic episodes  Review of Systems   Constitutional: Negative for chills and fever  HENT: Negative for trouble swallowing  Eyes: Negative for pain and redness  Respiratory: Negative for shortness of breath  Cardiovascular: Negative for chest pain  Gastrointestinal: Negative for abdominal pain  Endocrine: Negative for cold intolerance and heat intolerance  Genitourinary: Negative for dysuria and hematuria  Musculoskeletal: Negative for arthralgias and myalgias  Skin: Negative for wound  Neurological: Negative for seizures and syncope  Psychiatric/Behavioral: Negative for behavioral problems  Current Outpatient Medications on File Prior to Visit   Medication Sig   • acetaminophen (TYLENOL) 650 mg CR tablet Take 1 tablet (650 mg total) by mouth every 8 (eight) hours as needed for mild pain   • Alcohol Swabs (Curity Alcohol Preps) 70 % PADS Use if needed (when checking blood glucose)   • aluminum-magnesium hydroxide-simethicone (MAALOX) 200-200-20 MG/5ML SUSP Take 20 mL by mouth 4 (four) times a day (before meals and at bedtime)   • ammonium lactate (LAC-HYDRIN) 12 % cream Apply topically in the morning To both feet   • ARIPiprazole (ABILIFY) 10 mg tablet Take 10 mg by mouth daily   • Artificial Saliva (Biotene OralBalance Dry Mouth) GEL Apply 1 application to the mouth or throat 2 (two) times a day (Patient taking differently: Apply 1 application   to the mouth or throat every 4 (four) hours as needed)   • atorvastatin (LIPITOR) 40 mg tablet Take 1 tablet (40 mg total) by mouth daily at bedtime   • B Complex Vitamins (B Complex 50) TABS Take 1 tablet by mouth daily 1 cap by mouth daily @ 8am   • baclofen 10 mg tablet Take 1 tablet (10 mg total) by mouth 3 (three) times a day for 14 days   • bisacodyl (DULCOLAX) 5 mg EC tablet Take 2 tablets (10 mg total) by mouth daily as needed for constipation (Take medication as needed if no bowel movement in 48 hours)   • bismuth subsalicylate (PEPTO BISMOL) 524 mg/30 mL oral suspension Take 15 mL (262 mg total) by mouth every 6 (six) hours as needed for indigestion   • calcium carbonate (OYSTER SHELL,OSCAL) 500 mg Take 1 tablet by mouth daily with breakfast   • chlorproMAZINE (THORAZINE) 100 mg tablet Take 100 mg by mouth 2 (two) times a day   • cholecalciferol (VITAMIN D3) 1,000 units tablet Take 1 tablet (1,000 Units total) by mouth daily   • Diclofenac Sodium (VOLTAREN) 1 % Apply 2 g topically 4 (four) times a day   • divalproex sodium (DEPAKOTE ER) 250 mg 24 hr tablet Take 250 mg by mouth daily at bedtime   • divalproex sodium (DEPAKOTE ER) 500 mg 24 hr tablet Take 500 mg by mouth every morning   • divalproex sodium (DEPAKOTE) 500 mg EC tablet Take 1 tablet (500 mg total) by mouth 2 (two) times a day   • Emollient (eucerin) lotion Apply topically as needed for dry skin   • famotidine (PEPCID) 20 mg tablet Take 1 tablet (20 mg total) by mouth 2 (two) times a day for 14 days   • glucose blood (True Metrix Blood Glucose Test) test strip Use 1 each 2 (two) times a week Use as instructed   • glucose monitoring kit (FREESTYLE) monitoring kit 1 each by Does not apply route 2 (two) times a week   • guaiFENesin (ROBITUSSIN) 100 MG/5ML oral liquid Take 10 mL (200 mg total) by mouth 3 (three) times a day as needed for cough   • Lancets (freestyle) lancets Use to test blood sugars 2x weekly on Mon & Thurs @ 8AM   • levothyroxine 25 mcg tablet Take 1 tablet (25 mcg total) by mouth daily in the early morning   • lisinopril (ZESTRIL) 2 5 mg tablet Take 1 tablet (2 5 mg total) by mouth daily   • loratadine (CLARITIN) 10 mg tablet Take 1 tablet (10 mg total) by mouth daily   • LORazepam (ATIVAN) 0 5 mg tablet Take 0 5 mg by mouth in the morning and 0 5 mg in the evening and 0 5 mg before bedtime   8AM, 2PM, 8PM  • Menthol (Halls Cough Drops) 5 8 MG LOZG Apply 1 lozenge (5 8 mg total) to the mouth or throat 4 (four) times a day as needed (cough)   • metFORMIN (FORTAMET) 1000 MG (OSM) 24 hr tablet Take 1 tablet (1,000 mg total) by mouth 2 (two) times a day with meals   • Mouthwashes (Biotene Dry Mouth) LIQD    • pantoprazole (PROTONIX) 40 mg tablet Take 1 tablet (40 mg total) by mouth daily for 14 days   • polyethylene glycol (GLYCOLAX) 17 GM/SCOOP powder    • polyethylene glycol (MIRALAX) 17 g packet Take 17 g by mouth daily   • propranolol (INDERAL) 20 mg tablet Take 1 tablet (20 mg total) by mouth every 12 (twelve) hours   • Refresh Optive 0 5-0 9 % SOLN    • senna-docusate sodium (SENOKOT-S) 8 6-50 mg per tablet Take 2 tablets by mouth daily   • sitaGLIPtin (JANUVIA) 100 mg tablet Take 1 tablet (100 mg total) by mouth daily Daily after breakfast   • Sunscreens (Tropical Gold Sunblock) LOTN Apply topically 3 (three) times a day as needed (sunburn) Apply quarter amount 3x/day prn   • traZODone (DESYREL) 100 mg tablet    • vitamin E, tocopherol, 400 units capsule Take 1 capsule (400 Units total) by mouth daily       Objective     /70   Pulse 80   Temp 97 9 °F (36 6 °C) (Temporal)   Resp 18   Wt 89 9 kg (198 lb 3 2 oz)   SpO2 98%   BMI 29 27 kg/m²     Physical Exam  Vitals and nursing note reviewed  Constitutional:       General: He is not in acute distress  Appearance: Normal appearance  He is normal weight  He is not ill-appearing, toxic-appearing or diaphoretic  HENT:      Head: Normocephalic and atraumatic  Right Ear: External ear normal       Left Ear: External ear normal       Nose: Nose normal       Mouth/Throat:      Pharynx: Oropharynx is clear  Eyes:      Extraocular Movements: Extraocular movements intact  Conjunctiva/sclera: Conjunctivae normal    Cardiovascular:      Rate and Rhythm: Normal rate     Pulmonary:      Effort: Pulmonary effort is normal    Abdominal:      General: There is no distension  Musculoskeletal:         General: Normal range of motion  Cervical back: Normal range of motion  Skin:     General: Skin is warm  Coloration: Skin is not jaundiced or pale  Neurological:      General: No focal deficit present  Mental Status: He is alert  Mental status is at baseline     Psychiatric:         Mood and Affect: Mood normal          Behavior: Behavior normal        Bora Miller MD

## 2023-03-15 ENCOUNTER — PATIENT OUTREACH (OUTPATIENT)
Dept: FAMILY MEDICINE CLINIC | Facility: CLINIC | Age: 43
End: 2023-03-15

## 2023-03-15 NOTE — PROGRESS NOTES
Outpatient Care Manager Note    Received IB referral for Chronic Care Management outreach  The patient medical history includes type 2 diabetes mellitus, chronic kidney disease, stage 2, mild intellectual disability, schizoaffective disorder and autisms spectrum  RN CM called and spoke to the patient's  , Guido Petersen, regarding the Chronic Care Management program   I introduced myself and my role as an Outpatient Care Manager and the Chronic Care Management program    The  states the program would be beneficial for the patient  RN CM suggested she contact the patient to arrange a date and time I can speak to the patient with the assistance of the patient's   Ms Herminio Guzman will get back to RN CM with a date and time  Patient's  does not have any further questions, concerns, or other needs at this time   has my contact # and PCP office # if needed

## 2023-03-17 ENCOUNTER — TELEPHONE (OUTPATIENT)
Dept: FAMILY MEDICINE CLINIC | Facility: CLINIC | Age: 43
End: 2023-03-17

## 2023-03-22 ENCOUNTER — PATIENT OUTREACH (OUTPATIENT)
Dept: FAMILY MEDICINE CLINIC | Facility: CLINIC | Age: 43
End: 2023-03-22

## 2023-03-22 DIAGNOSIS — E11.65 TYPE 2 DIABETES MELLITUS WITH HYPERGLYCEMIA, WITHOUT LONG-TERM CURRENT USE OF INSULIN (HCC): Primary | ICD-10-CM

## 2023-03-22 NOTE — PROGRESS NOTES
Outpatient Care Manager Note    I spoke to the patient's , Nataly Levine and patient is in agreement for Chronic Care Management program   An appointment has been scheduled for 3/28/23 at 11:00 to perform adult assessment with the patient and his house staff for assistance  The patient can be contacted at [phone]  Chronic Care Management Program Consent:    Patient informed of availability of Chronic Care Management services  The services will billed monthly by their Primary Care Provider only  Patient is informed there may be a monthly cost sharing associated with the Chronic Care Management services  Patient is aware that financial counseling is available to assist with any co-pay questions or concerns  Chronic Care Management services include:    • 24/7 access to care  • Comprehensive plan of care created by the provider  • Individualized care planning by the care manager(s)  • Transitional care support  The patient is informed that they have the right to stop Chronic Care Management services at anytime  Patient consents to Chronic Care Management services? yes    Patient informed that consent is needed only once unless the patient switches qualifying providers  Case workerdoes not have any further questions, concerns, or other needs at this time   has my contact # and PCP office # if needed   Pt is agreeable for further outreach

## 2023-03-23 DIAGNOSIS — E11.65 TYPE 2 DIABETES MELLITUS WITH HYPERGLYCEMIA, WITHOUT LONG-TERM CURRENT USE OF INSULIN (HCC): Primary | ICD-10-CM

## 2023-03-27 NOTE — TELEPHONE ENCOUNTER
Received fax from patient's pharmacy stating Metformin 1000 tablets requires prior authorization  Prior authorization has been submitted through fax to patient's insurance 
Roger Jamison to check the status of patient's prior authorization, spoke to Pritesh  Per Pritesh, prior authorization was not received by insurance through fax  Pritesh states she spoke to the pharmacist to have decision made over the phone for approval/denial  Prior authorization has been denied, patient needs to try the Metformin ER  Case ID: [id number]  Please order alternate medication if appropriate  Thank you!
Sherif [facility], pharmacy staff member states medication was already filled and prior authorization is not needed 
Initial (On Arrival)

## 2023-03-28 ENCOUNTER — PATIENT OUTREACH (OUTPATIENT)
Dept: FAMILY MEDICINE CLINIC | Facility: CLINIC | Age: 43
End: 2023-03-28

## 2023-03-28 DIAGNOSIS — E11.65 TYPE 2 DIABETES MELLITUS WITH HYPERGLYCEMIA, WITHOUT LONG-TERM CURRENT USE OF INSULIN (HCC): Primary | ICD-10-CM

## 2023-03-28 NOTE — PROGRESS NOTES
Outpatient Care Manager Note    RN CM completed adult start of care assessment  Patient and staff of group home assisted patient with answering assessment questions  Next Chronic Care Management outreach scheduled for 4/11/23 at 11:00 with the patient and staff  Patient does not have any further questions, concerns, or other needs at this time  Pt has my contact # and PCP office # if needed   Pt is agreeable for further outreach

## 2023-04-03 ENCOUNTER — TELEPHONE (OUTPATIENT)
Dept: FAMILY MEDICINE CLINIC | Facility: CLINIC | Age: 43
End: 2023-04-03

## 2023-04-16 ENCOUNTER — HOSPITAL ENCOUNTER (INPATIENT)
Facility: HOSPITAL | Age: 43
LOS: 1 days | Discharge: HOME | End: 2023-04-18
Attending: EMERGENCY MEDICINE | Admitting: INTERNAL MEDICINE

## 2023-04-16 DIAGNOSIS — R11.2 NAUSEA AND VOMITING: ICD-10-CM

## 2023-04-16 DIAGNOSIS — E83.42 HYPOMAGNESEMIA: ICD-10-CM

## 2023-04-16 DIAGNOSIS — R79.89 ELEVATED LACTIC ACID LEVEL: ICD-10-CM

## 2023-04-16 DIAGNOSIS — R10.9 ABDOMINAL PAIN: ICD-10-CM

## 2023-04-16 DIAGNOSIS — K52.9 COLITIS: Primary | ICD-10-CM

## 2023-04-16 LAB
BASOPHILS # BLD AUTO: 0.06 THOUSANDS/ΜL (ref 0–0.1)
BASOPHILS NFR BLD AUTO: 1 % (ref 0–1)
EOSINOPHIL # BLD AUTO: 0.48 THOUSAND/ΜL (ref 0–0.61)
EOSINOPHIL NFR BLD AUTO: 4 % (ref 0–6)
ERYTHROCYTE [DISTWIDTH] IN BLOOD BY AUTOMATED COUNT: 11.5 % (ref 11.6–15.1)
HCT VFR BLD AUTO: 41.8 % (ref 36.5–49.3)
HGB BLD-MCNC: 13.8 G/DL (ref 12–17)
IMM GRANULOCYTES # BLD AUTO: 0.05 THOUSAND/UL (ref 0–0.2)
IMM GRANULOCYTES NFR BLD AUTO: 1 % (ref 0–2)
LYMPHOCYTES # BLD AUTO: 3.3 THOUSANDS/ΜL (ref 0.6–4.47)
LYMPHOCYTES NFR BLD AUTO: 30 % (ref 14–44)
MCH RBC QN AUTO: 28.9 PG (ref 26.8–34.3)
MCHC RBC AUTO-ENTMCNC: 33 G/DL (ref 31.4–37.4)
MCV RBC AUTO: 87 FL (ref 82–98)
MONOCYTES # BLD AUTO: 0.81 THOUSAND/ΜL (ref 0.17–1.22)
MONOCYTES NFR BLD AUTO: 7 % (ref 4–12)
NEUTROPHILS # BLD AUTO: 6.21 THOUSANDS/ΜL (ref 1.85–7.62)
NEUTS SEG NFR BLD AUTO: 57 % (ref 43–75)
NRBC BLD AUTO-RTO: 0 /100 WBCS
PLATELET # BLD AUTO: 145 THOUSANDS/UL (ref 149–390)
PMV BLD AUTO: 9.6 FL (ref 8.9–12.7)
RBC # BLD AUTO: 4.78 MILLION/UL (ref 3.88–5.62)
WBC # BLD AUTO: 10.91 THOUSAND/UL (ref 4.31–10.16)

## 2023-04-16 RX ORDER — ONDANSETRON 2 MG/ML
4 INJECTION INTRAMUSCULAR; INTRAVENOUS ONCE
Status: COMPLETED | OUTPATIENT
Start: 2023-04-16 | End: 2023-04-16

## 2023-04-16 RX ORDER — SODIUM CHLORIDE 9 MG/ML
150 INJECTION, SOLUTION INTRAVENOUS CONTINUOUS
Status: DISCONTINUED | OUTPATIENT
Start: 2023-04-16 | End: 2023-04-17

## 2023-04-16 RX ORDER — FAMOTIDINE 10 MG/ML
20 INJECTION, SOLUTION INTRAVENOUS ONCE
Status: COMPLETED | OUTPATIENT
Start: 2023-04-16 | End: 2023-04-16

## 2023-04-16 RX ADMIN — SODIUM CHLORIDE 1000 ML: 0.9 INJECTION, SOLUTION INTRAVENOUS at 23:46

## 2023-04-16 RX ADMIN — ONDANSETRON 4 MG: 2 INJECTION INTRAMUSCULAR; INTRAVENOUS at 23:47

## 2023-04-16 RX ADMIN — FAMOTIDINE 20 MG: 10 INJECTION, SOLUTION INTRAVENOUS at 23:48

## 2023-04-17 ENCOUNTER — APPOINTMENT (EMERGENCY)
Dept: CT IMAGING | Facility: HOSPITAL | Age: 43
End: 2023-04-17

## 2023-04-17 PROBLEM — K52.9 GASTROENTERITIS: Status: ACTIVE | Noted: 2023-04-17

## 2023-04-17 PROBLEM — R79.89 ELEVATED LACTIC ACID LEVEL: Status: ACTIVE | Noted: 2023-04-17

## 2023-04-17 PROBLEM — E83.42 HYPOMAGNESEMIA: Status: ACTIVE | Noted: 2023-04-17

## 2023-04-17 LAB
ALBUMIN SERPL BCP-MCNC: 3.4 G/DL (ref 3.5–5)
ALP SERPL-CCNC: 49 U/L (ref 34–104)
ALT SERPL W P-5'-P-CCNC: 14 U/L (ref 7–52)
ANION GAP SERPL CALCULATED.3IONS-SCNC: 7 MMOL/L (ref 4–13)
APTT PPP: 25 SECONDS (ref 23–37)
AST SERPL W P-5'-P-CCNC: 13 U/L (ref 13–39)
ATRIAL RATE: 88 BPM
BILIRUB SERPL-MCNC: 0.34 MG/DL (ref 0.2–1)
BILIRUB UR QL STRIP: NEGATIVE
BUN SERPL-MCNC: 15 MG/DL (ref 5–25)
CALCIUM ALBUM COR SERPL-MCNC: 8.7 MG/DL (ref 8.3–10.1)
CALCIUM SERPL-MCNC: 8.2 MG/DL (ref 8.4–10.2)
CHLORIDE SERPL-SCNC: 102 MMOL/L (ref 96–108)
CLARITY UR: CLEAR
CO2 SERPL-SCNC: 26 MMOL/L (ref 21–32)
COLOR UR: COLORLESS
CREAT SERPL-MCNC: 1.02 MG/DL (ref 0.6–1.3)
GFR SERPL CREATININE-BSD FRML MDRD: 90 ML/MIN/1.73SQ M
GLUCOSE SERPL-MCNC: 114 MG/DL (ref 65–140)
GLUCOSE SERPL-MCNC: 120 MG/DL (ref 65–140)
GLUCOSE SERPL-MCNC: 127 MG/DL (ref 65–140)
GLUCOSE SERPL-MCNC: 155 MG/DL (ref 65–140)
GLUCOSE SERPL-MCNC: 159 MG/DL (ref 65–140)
GLUCOSE UR STRIP-MCNC: NEGATIVE MG/DL
HGB UR QL STRIP.AUTO: NEGATIVE
INR PPP: 0.97 (ref 0.84–1.19)
KETONES UR STRIP-MCNC: NEGATIVE MG/DL
LACTATE SERPL-SCNC: 1.9 MMOL/L (ref 0.5–2)
LACTATE SERPL-SCNC: 2.8 MMOL/L (ref 0.5–2)
LEUKOCYTE ESTERASE UR QL STRIP: NEGATIVE
LIPASE SERPL-CCNC: 133 U/L (ref 11–82)
MAGNESIUM SERPL-MCNC: 1 MG/DL (ref 1.9–2.7)
MAGNESIUM SERPL-MCNC: 1.7 MG/DL (ref 1.9–2.7)
NITRITE UR QL STRIP: NEGATIVE
P AXIS: 46 DEGREES
PH UR STRIP.AUTO: 5 [PH]
POTASSIUM SERPL-SCNC: 4.1 MMOL/L (ref 3.5–5.3)
PR INTERVAL: 164 MS
PROT SERPL-MCNC: 5.6 G/DL (ref 6.4–8.4)
PROT UR STRIP-MCNC: NEGATIVE MG/DL
PROTHROMBIN TIME: 13.1 SECONDS (ref 11.6–14.5)
QRS AXIS: -43 DEGREES
QRSD INTERVAL: 100 MS
QT INTERVAL: 372 MS
QTC INTERVAL: 450 MS
SODIUM SERPL-SCNC: 135 MMOL/L (ref 135–147)
SP GR UR STRIP.AUTO: 1.02 (ref 1–1.03)
T WAVE AXIS: 52 DEGREES
UROBILINOGEN UR STRIP-ACNC: <2 MG/DL
VENTRICULAR RATE: 88 BPM

## 2023-04-17 RX ORDER — MELATONIN
1000 DAILY
Status: DISCONTINUED | OUTPATIENT
Start: 2023-04-17 | End: 2023-04-18 | Stop reason: HOSPADM

## 2023-04-17 RX ORDER — MAGNESIUM SULFATE HEPTAHYDRATE 40 MG/ML
2 INJECTION, SOLUTION INTRAVENOUS ONCE
Status: COMPLETED | OUTPATIENT
Start: 2023-04-17 | End: 2023-04-17

## 2023-04-17 RX ORDER — ACETAMINOPHEN 325 MG/1
650 TABLET ORAL EVERY 6 HOURS PRN
Status: DISCONTINUED | OUTPATIENT
Start: 2023-04-17 | End: 2023-04-18 | Stop reason: HOSPADM

## 2023-04-17 RX ORDER — TRAZODONE HYDROCHLORIDE 100 MG/1
100 TABLET ORAL
Status: DISCONTINUED | OUTPATIENT
Start: 2023-04-17 | End: 2023-04-18 | Stop reason: HOSPADM

## 2023-04-17 RX ORDER — CIPROFLOXACIN 2 MG/ML
400 INJECTION, SOLUTION INTRAVENOUS ONCE
Status: COMPLETED | OUTPATIENT
Start: 2023-04-17 | End: 2023-04-17

## 2023-04-17 RX ORDER — METRONIDAZOLE 500 MG/100ML
500 INJECTION, SOLUTION INTRAVENOUS ONCE
Status: COMPLETED | OUTPATIENT
Start: 2023-04-17 | End: 2023-04-17

## 2023-04-17 RX ORDER — FAMOTIDINE 20 MG/1
20 TABLET, FILM COATED ORAL 2 TIMES DAILY
Status: DISCONTINUED | OUTPATIENT
Start: 2023-04-17 | End: 2023-04-18 | Stop reason: HOSPADM

## 2023-04-17 RX ORDER — DIVALPROEX SODIUM 500 MG/1
500 TABLET, EXTENDED RELEASE ORAL EVERY MORNING
Status: DISCONTINUED | OUTPATIENT
Start: 2023-04-17 | End: 2023-04-18 | Stop reason: HOSPADM

## 2023-04-17 RX ORDER — CALCIUM CARBONATE 500(1250)
1 TABLET ORAL
Status: DISCONTINUED | OUTPATIENT
Start: 2023-04-18 | End: 2023-04-18 | Stop reason: HOSPADM

## 2023-04-17 RX ORDER — PROPRANOLOL HYDROCHLORIDE 20 MG/1
20 TABLET ORAL EVERY 12 HOURS SCHEDULED
Status: DISCONTINUED | OUTPATIENT
Start: 2023-04-17 | End: 2023-04-18 | Stop reason: HOSPADM

## 2023-04-17 RX ORDER — LORATADINE 10 MG/1
10 TABLET ORAL DAILY
Status: DISCONTINUED | OUTPATIENT
Start: 2023-04-17 | End: 2023-04-18 | Stop reason: HOSPADM

## 2023-04-17 RX ORDER — LORAZEPAM 0.5 MG/1
0.5 TABLET ORAL 3 TIMES DAILY
Status: DISCONTINUED | OUTPATIENT
Start: 2023-04-17 | End: 2023-04-18 | Stop reason: HOSPADM

## 2023-04-17 RX ORDER — ARIPIPRAZOLE 5 MG/1
10 TABLET ORAL DAILY
Status: DISCONTINUED | OUTPATIENT
Start: 2023-04-17 | End: 2023-04-18 | Stop reason: HOSPADM

## 2023-04-17 RX ORDER — ATORVASTATIN CALCIUM 40 MG/1
40 TABLET, FILM COATED ORAL
Status: DISCONTINUED | OUTPATIENT
Start: 2023-04-17 | End: 2023-04-18 | Stop reason: HOSPADM

## 2023-04-17 RX ORDER — SODIUM CHLORIDE, SODIUM LACTATE, POTASSIUM CHLORIDE, CALCIUM CHLORIDE 600; 310; 30; 20 MG/100ML; MG/100ML; MG/100ML; MG/100ML
125 INJECTION, SOLUTION INTRAVENOUS CONTINUOUS
Status: DISPENSED | OUTPATIENT
Start: 2023-04-17 | End: 2023-04-17

## 2023-04-17 RX ORDER — DIVALPROEX SODIUM 500 MG/1
500 TABLET, DELAYED RELEASE ORAL EVERY 12 HOURS SCHEDULED
Status: DISCONTINUED | OUTPATIENT
Start: 2023-04-17 | End: 2023-04-18 | Stop reason: HOSPADM

## 2023-04-17 RX ORDER — ONDANSETRON 2 MG/ML
4 INJECTION INTRAMUSCULAR; INTRAVENOUS EVERY 8 HOURS PRN
Status: DISCONTINUED | OUTPATIENT
Start: 2023-04-17 | End: 2023-04-17

## 2023-04-17 RX ORDER — DIVALPROEX SODIUM 250 MG/1
250 TABLET, EXTENDED RELEASE ORAL
Status: DISCONTINUED | OUTPATIENT
Start: 2023-04-17 | End: 2023-04-18 | Stop reason: HOSPADM

## 2023-04-17 RX ORDER — INSULIN LISPRO 100 [IU]/ML
1-6 INJECTION, SOLUTION INTRAVENOUS; SUBCUTANEOUS
Status: DISCONTINUED | OUTPATIENT
Start: 2023-04-17 | End: 2023-04-18 | Stop reason: HOSPADM

## 2023-04-17 RX ORDER — MAGNESIUM SULFATE HEPTAHYDRATE 40 MG/ML
2 INJECTION, SOLUTION INTRAVENOUS ONCE
Status: DISCONTINUED | OUTPATIENT
Start: 2023-04-17 | End: 2023-04-17

## 2023-04-17 RX ORDER — MAGNESIUM HYDROXIDE/ALUMINUM HYDROXICE/SIMETHICONE 120; 1200; 1200 MG/30ML; MG/30ML; MG/30ML
20 SUSPENSION ORAL
Status: DISCONTINUED | OUTPATIENT
Start: 2023-04-17 | End: 2023-04-18 | Stop reason: HOSPADM

## 2023-04-17 RX ORDER — CHLORPROMAZINE HYDROCHLORIDE 100 MG/1
100 TABLET, FILM COATED ORAL 2 TIMES DAILY
Status: DISCONTINUED | OUTPATIENT
Start: 2023-04-17 | End: 2023-04-18 | Stop reason: HOSPADM

## 2023-04-17 RX ORDER — PANTOPRAZOLE SODIUM 40 MG/10ML
40 INJECTION, POWDER, LYOPHILIZED, FOR SOLUTION INTRAVENOUS
Status: DISCONTINUED | OUTPATIENT
Start: 2023-04-17 | End: 2023-04-18 | Stop reason: HOSPADM

## 2023-04-17 RX ORDER — ONDANSETRON 2 MG/ML
4 INJECTION INTRAMUSCULAR; INTRAVENOUS EVERY 4 HOURS PRN
Status: DISCONTINUED | OUTPATIENT
Start: 2023-04-17 | End: 2023-04-18 | Stop reason: HOSPADM

## 2023-04-17 RX ORDER — LEVOTHYROXINE SODIUM 0.03 MG/1
25 TABLET ORAL
Status: DISCONTINUED | OUTPATIENT
Start: 2023-04-17 | End: 2023-04-18 | Stop reason: HOSPADM

## 2023-04-17 RX ORDER — INSULIN LISPRO 100 [IU]/ML
1-5 INJECTION, SOLUTION INTRAVENOUS; SUBCUTANEOUS
Status: DISCONTINUED | OUTPATIENT
Start: 2023-04-17 | End: 2023-04-18 | Stop reason: HOSPADM

## 2023-04-17 RX ORDER — ENOXAPARIN SODIUM 100 MG/ML
40 INJECTION SUBCUTANEOUS DAILY
Status: DISCONTINUED | OUTPATIENT
Start: 2023-04-17 | End: 2023-04-18 | Stop reason: HOSPADM

## 2023-04-17 RX ORDER — LISINOPRIL 2.5 MG/1
2.5 TABLET ORAL DAILY
Status: DISCONTINUED | OUTPATIENT
Start: 2023-04-17 | End: 2023-04-18 | Stop reason: HOSPADM

## 2023-04-17 RX ORDER — PANTOPRAZOLE SODIUM 40 MG/1
40 TABLET, DELAYED RELEASE ORAL DAILY
Status: DISCONTINUED | OUTPATIENT
Start: 2023-04-17 | End: 2023-04-17

## 2023-04-17 RX ADMIN — LORAZEPAM 0.5 MG: 0.5 TABLET ORAL at 15:51

## 2023-04-17 RX ADMIN — ARIPIPRAZOLE 10 MG: 5 TABLET ORAL at 11:06

## 2023-04-17 RX ADMIN — CIPROFLOXACIN 400 MG: 2 INJECTION, SOLUTION INTRAVENOUS at 02:56

## 2023-04-17 RX ADMIN — FAMOTIDINE 20 MG: 20 TABLET, FILM COATED ORAL at 11:06

## 2023-04-17 RX ADMIN — DIVALPROEX SODIUM 500 MG: 500 TABLET, DELAYED RELEASE ORAL at 11:07

## 2023-04-17 RX ADMIN — FAMOTIDINE 20 MG: 20 TABLET, FILM COATED ORAL at 17:23

## 2023-04-17 RX ADMIN — LORAZEPAM 0.5 MG: 0.5 TABLET ORAL at 11:07

## 2023-04-17 RX ADMIN — ALUMINA, MAGNESIA, AND SIMETHICONE ORAL SUSPENSION REGULAR STRENGTH 20 ML: 1200; 1200; 120 SUSPENSION ORAL at 06:41

## 2023-04-17 RX ADMIN — Medication 1000 UNITS: at 11:06

## 2023-04-17 RX ADMIN — ALUMINA, MAGNESIA, AND SIMETHICONE ORAL SUSPENSION REGULAR STRENGTH 20 ML: 1200; 1200; 120 SUSPENSION ORAL at 17:23

## 2023-04-17 RX ADMIN — TRAZODONE HYDROCHLORIDE 100 MG: 100 TABLET ORAL at 21:33

## 2023-04-17 RX ADMIN — ALUMINA, MAGNESIA, AND SIMETHICONE ORAL SUSPENSION REGULAR STRENGTH 20 ML: 1200; 1200; 120 SUSPENSION ORAL at 21:32

## 2023-04-17 RX ADMIN — CHLORPROMAZINE HYDROCHLORIDE 100 MG: 100 TABLET, FILM COATED ORAL at 21:37

## 2023-04-17 RX ADMIN — PANTOPRAZOLE SODIUM 40 MG: 40 TABLET, DELAYED RELEASE ORAL at 11:06

## 2023-04-17 RX ADMIN — LORATADINE 10 MG: 10 TABLET ORAL at 11:06

## 2023-04-17 RX ADMIN — DIVALPROEX SODIUM 500 MG: 500 TABLET, FILM COATED, EXTENDED RELEASE ORAL at 11:06

## 2023-04-17 RX ADMIN — ENOXAPARIN SODIUM 40 MG: 40 INJECTION SUBCUTANEOUS at 11:06

## 2023-04-17 RX ADMIN — PANTOPRAZOLE SODIUM 40 MG: 40 INJECTION, POWDER, FOR SOLUTION INTRAVENOUS at 15:51

## 2023-04-17 RX ADMIN — IOHEXOL 100 ML: 350 INJECTION, SOLUTION INTRAVENOUS at 00:31

## 2023-04-17 RX ADMIN — SODIUM CHLORIDE, SODIUM LACTATE, POTASSIUM CHLORIDE, AND CALCIUM CHLORIDE 125 ML/HR: .6; .31; .03; .02 INJECTION, SOLUTION INTRAVENOUS at 06:32

## 2023-04-17 RX ADMIN — LEVOTHYROXINE SODIUM 25 MCG: 25 TABLET ORAL at 06:41

## 2023-04-17 RX ADMIN — ACETAMINOPHEN 650 MG: 325 TABLET ORAL at 15:50

## 2023-04-17 RX ADMIN — MAGNESIUM SULFATE HEPTAHYDRATE 2 G: 40 INJECTION, SOLUTION INTRAVENOUS at 04:10

## 2023-04-17 RX ADMIN — ATORVASTATIN CALCIUM 40 MG: 40 TABLET, FILM COATED ORAL at 21:33

## 2023-04-17 RX ADMIN — LORAZEPAM 0.5 MG: 0.5 TABLET ORAL at 21:33

## 2023-04-17 RX ADMIN — ONDANSETRON 4 MG: 2 INJECTION INTRAMUSCULAR; INTRAVENOUS at 15:51

## 2023-04-17 RX ADMIN — CHLORPROMAZINE HYDROCHLORIDE 100 MG: 100 TABLET, FILM COATED ORAL at 11:07

## 2023-04-17 RX ADMIN — ALUMINA, MAGNESIA, AND SIMETHICONE ORAL SUSPENSION REGULAR STRENGTH 20 ML: 1200; 1200; 120 SUSPENSION ORAL at 11:29

## 2023-04-17 RX ADMIN — SODIUM CHLORIDE 150 ML/HR: 0.9 INJECTION, SOLUTION INTRAVENOUS at 02:06

## 2023-04-17 RX ADMIN — PROPRANOLOL HYDROCHLORIDE 20 MG: 20 TABLET ORAL at 21:33

## 2023-04-17 RX ADMIN — DIVALPROEX SODIUM 250 MG: 250 TABLET, FILM COATED, EXTENDED RELEASE ORAL at 21:33

## 2023-04-17 RX ADMIN — DIVALPROEX SODIUM 500 MG: 500 TABLET, DELAYED RELEASE ORAL at 21:33

## 2023-04-17 RX ADMIN — PROPRANOLOL HYDROCHLORIDE 20 MG: 20 TABLET ORAL at 11:07

## 2023-04-17 RX ADMIN — LISINOPRIL 2.5 MG: 2.5 TABLET ORAL at 11:07

## 2023-04-17 RX ADMIN — ACETAMINOPHEN 650 MG: 325 TABLET ORAL at 06:54

## 2023-04-17 RX ADMIN — METRONIDAZOLE 500 MG: 500 INJECTION, SOLUTION INTRAVENOUS at 01:51

## 2023-04-17 RX ADMIN — MAGNESIUM SULFATE HEPTAHYDRATE 2 G: 40 INJECTION, SOLUTION INTRAVENOUS at 00:44

## 2023-04-17 NOTE — ASSESSMENT & PLAN NOTE
· Lactate 2 8 on admission  Likely in setting of ongoing vomiting    Takes metformin  · Trend until <2

## 2023-04-17 NOTE — CASE MANAGEMENT
Case Management Discharge Planning Note    Patient name Petros Lin S /S -89 MRN [id number]  : 1980 Date 2023       Current Admission Date: 2023  Current Admission Diagnosis:Gastroenteritis   Patient Active Problem List    Diagnosis Date Noted   • Gastroenteritis 2023   • Hypomagnesemia 2023   • Elevated lactic acid level 2023   • Right corneal abrasion 2022   • Annual physical exam 2022   • Psychiatric disorder 2022   • Epigastric pain 2022   • COVID-19 virus infection 2022   • Suicidal ideation 10/31/2021   • Dysuria 2021   • Acute pain of right shoulder 2021   • Decreased hearing of both ears 06/15/2021   • Medical clearance for psychiatric admission 2021   • Diabetic peripheral neuropathy ([address] ) 10/27/2020   • Nocturia 2019   • Vitamin D deficiency 2019   • Suicide attempt ([address] ) 2019   • Tension headache 2019   • Hyperlipidemia 2017   • CKD (chronic kidney disease) stage 2, GFR 60-89 ml/min 2017   • Type 2 diabetes mellitus with hyperglycemia ([address] ) 2017   • Nuclear sclerotic cataract of right eye 2017   • Mild intellectual disability 2017   • Obsessive-compulsive disorder, unspecified 2017   • Autism spectrum 2017   • Agitation 2017   • Schizoaffective disorder, bipolar type ([address] ) 2017   • Essential tremor 2017   • Hypothyroidism 2017   • Obesity 2017   • Seasonal allergies 2017      LOS (days): 0  Geometric Mean LOS (GMLOS) (days):   Days to GMLOS:     OBJECTIVE:            Current admission status: Inpatient   [facility] [place] [id number]  Phone: [phone] Fax: [phone]    Primary Care Provider: Jerry Pop MD    Primary Insurance: MEDICARE  Secondary Insurance: [place] [employer]    DISCHARGE DETAILS:     CM received call from [facility], pt's CM at the group home who stated they would need any new scripts in order to send them to the pharmacy  In the event there are no new medications, she requests the provider document 'resume all previous meds prior to admission  '  CM will request this  CM will then fax AVS to [facility] at [phone]  [facility] informed [facility] that pt will be stable for DC tomorrow  Group home will transport pt home

## 2023-04-17 NOTE — PLAN OF CARE
Problem: PAIN - ADULT  Goal: Verbalizes/displays adequate comfort level or baseline comfort level  Description: Interventions:  - Encourage patient to monitor pain and request assistance  - Assess pain using appropriate pain scale  - Administer analgesics based on type and severity of pain and evaluate response  - Implement non-pharmacological measures as appropriate and evaluate response  - Consider cultural and social influences on pain and pain management  - Notify physician/advanced practitioner if interventions unsuccessful or patient reports new pain  Outcome: Progressing     Problem: INFECTION - ADULT  Goal: Absence or prevention of progression during hospitalization  Description: INTERVENTIONS:  - Assess and monitor for signs and symptoms of infection  - Monitor lab/diagnostic results  - Monitor all insertion sites, i e  indwelling lines, tubes, and drains  - Monitor endotracheal if appropriate and nasal secretions for changes in amount and color  - [place] appropriate cooling/warming therapies per order  - Administer medications as ordered  - Instruct and encourage patient and family to use good hand hygiene technique  - Identify and instruct in appropriate isolation precautions for identified infection/condition  Outcome: Progressing  Goal: Absence of fever/infection during neutropenic period  Description: INTERVENTIONS:  - Monitor WBC    Outcome: Progressing     Problem: SAFETY ADULT  Goal: Patient will remain free of falls  Description: INTERVENTIONS:  - Educate patient/family on patient safety including physical limitations  - Instruct patient to call for assistance with activity   - Consult OT/PT to assist with strengthening/mobility   - Keep Call bell within reach  - Keep bed low and locked with side rails adjusted as appropriate  - Keep care items and personal belongings within reach  - Initiate and maintain comfort rounds  - Make Fall Risk Sign visible to staff  - Offer Toileting every 2 Hours, in advance of need  - Initiate/Maintain bed alarm  - Obtain necessary fall risk management equipment  - Apply yellow socks and bracelet for high fall risk patients  - Consider moving patient to room near nurses station  Outcome: Progressing  Goal: Maintain or return to baseline ADL function  Description: INTERVENTIONS:  -  Assess patient's ability to carry out ADLs; assess patient's baseline for ADL function and identify physical deficits which impact ability to perform ADLs (bathing, care of mouth/teeth, toileting, grooming, dressing, etc )  - Assess/evaluate cause of self-care deficits   - Assess range of motion  - Assess patient's mobility; develop plan if impaired  - Assess patient's need for assistive devices and provide as appropriate  - Encourage maximum independence but intervene and supervise when necessary  - Involve family in performance of ADLs  - Assess for home care needs following discharge   - Consider OT consult to assist with ADL evaluation and planning for discharge  - Provide patient education as appropriate  Outcome: Progressing  Goal: Maintains/Returns to pre admission functional level  Description: INTERVENTIONS:  - Perform BMAT or MOVE assessment daily    - Set and communicate daily mobility goal to care team and patient/family/caregiver  - Collaborate with rehabilitation services on mobility goals if consulted  - Perform Range of Motion 2 times a day  - Reposition patient every 2 hours    - Dangle patient 2 times a day  - Stand patient 3 times a day  - Ambulate patient 3 times a day  - Out of bed to chair 3 times a day   - Out of bed for meals 3 times a day  - Out of bed for toileting  - Record patient progress and toleration of activity level   Outcome: Progressing     Problem: DISCHARGE PLANNING  Goal: Discharge to home or other facility with appropriate resources  Description: INTERVENTIONS:  - Identify barriers to discharge w/patient and caregiver  - Arrange for needed discharge resources and transportation as appropriate  - Identify discharge learning needs (meds, wound care, etc )  - Arrange for interpretive services to assist at discharge as needed  - Refer to Case Management Department for coordinating discharge planning if the patient needs post-hospital services based on physician/advanced practitioner order or complex needs related to functional status, cognitive ability, or social support system  Outcome: Progressing     Problem: Knowledge Deficit  Goal: Patient/family/caregiver demonstrates understanding of disease process, treatment plan, medications, and discharge instructions  Description: Complete learning assessment and assess knowledge base    Interventions:  - Provide teaching at level of understanding  - Provide teaching via preferred learning methods  Outcome: Progressing
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
## 2023-04-17 NOTE — ASSESSMENT & PLAN NOTE
Lab Results   Component Value Date    HGBA1C 6 6 (H) 01/05/2023     · Update a1c  · Resume PO meds on discharge  · Accucheck, SSI

## 2023-04-17 NOTE — CASE MANAGEMENT
Case Management Discharge Planning Note    Patient name Wesly Ford  Location S /S -23 MRN [id number]  : 1980 Date 2023       Current Admission Date: 2023  Current Admission Diagnosis:Gastroenteritis   Patient Active Problem List    Diagnosis Date Noted   • Gastroenteritis 2023   • Hypomagnesemia 2023   • Elevated lactic acid level 2023   • Right corneal abrasion 2022   • Annual physical exam 2022   • Psychiatric disorder 2022   • Epigastric pain 2022   • COVID-19 virus infection 2022   • Suicidal ideation 10/31/2021   • Dysuria 2021   • Acute pain of right shoulder 2021   • Decreased hearing of both ears 06/15/2021   • Medical clearance for psychiatric admission 2021   • Diabetic peripheral neuropathy ([address] ) 10/27/2020   • Nocturia 2019   • Vitamin D deficiency 2019   • Suicide attempt ([address] ) 2019   • Tension headache 2019   • Hyperlipidemia 2017   • CKD (chronic kidney disease) stage 2, GFR 60-89 ml/min 2017   • Type 2 diabetes mellitus with hyperglycemia ([address] ) 2017   • Nuclear sclerotic cataract of right eye 2017   • Mild intellectual disability 2017   • Obsessive-compulsive disorder, unspecified 2017   • Autism spectrum 2017   • Agitation 2017   • Schizoaffective disorder, bipolar type ([address] ) 2017   • Essential tremor 2017   • Hypothyroidism 2017   • Obesity 2017   • Seasonal allergies 2017      LOS (days): 0  Geometric Mean LOS (GMLOS) (days):   Days to GMLOS:     OBJECTIVE:            Current admission status: Inpatient   Preferred Pharmacy:   [address] [place] [address] [place] [address]  Phone: [phone] Fax: [phone]    Primary Care Provider: Christianne Holley MD    Primary Insurance: MEDICARE  Secondary Insurance: [place] [employer]    DISCHARGE DETAILS:     CM attempted to contact pt's CM and the manager of the group home where pt resides  VM had to be left for CM and VM was not accepting messages on manager's phone  Cm will continue to follow to assist with needs and planning as pt is a potential DC for tomorrow

## 2023-04-17 NOTE — ASSESSMENT & PLAN NOTE
· Presents with n/v/d ongoing since Wednesday  Chills, but denies fever  Symptoms started after eating fettuccine at a restaurant  No one else at his group home at the restaurant  No one else is sick with similar symptoms     · CT AP: colitis or diarrheal disease  · Antibiotics: ciprofloxacin in ED  · Would favor monitoring off antibiotics  · Send stool studies  · IVF, anti emetics
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
## 2023-04-18 VITALS
OXYGEN SATURATION: 96 % | SYSTOLIC BLOOD PRESSURE: 134 MMHG | HEART RATE: 57 BPM | DIASTOLIC BLOOD PRESSURE: 92 MMHG | TEMPERATURE: 97.3 F | RESPIRATION RATE: 17 BRPM

## 2023-04-18 PROBLEM — F25.0 SCHIZOAFFECTIVE DISORDER, BIPOLAR TYPE (HCC): Status: ACTIVE | Noted: 2017-08-06

## 2023-04-18 LAB
ANION GAP SERPL CALCULATED.3IONS-SCNC: 2 MMOL/L (ref 4–13)
BASOPHILS # BLD AUTO: 0.04 THOUSANDS/ΜL (ref 0–0.1)
BASOPHILS NFR BLD AUTO: 1 % (ref 0–1)
BUN SERPL-MCNC: 9 MG/DL (ref 5–25)
C DIFF TOX GENS STL QL NAA+PROBE: NEGATIVE
CALCIUM SERPL-MCNC: 8 MG/DL (ref 8.4–10.2)
CAMPYLOBACTER DNA SPEC NAA+PROBE: NORMAL
CHLORIDE SERPL-SCNC: 108 MMOL/L (ref 96–108)
CO2 SERPL-SCNC: 30 MMOL/L (ref 21–32)
CREAT SERPL-MCNC: 1.3 MG/DL (ref 0.6–1.3)
EOSINOPHIL # BLD AUTO: 0.32 THOUSAND/ΜL (ref 0–0.61)
EOSINOPHIL NFR BLD AUTO: 4 % (ref 0–6)
ERYTHROCYTE [DISTWIDTH] IN BLOOD BY AUTOMATED COUNT: 11.8 % (ref 11.6–15.1)
GFR SERPL CREATININE-BSD FRML MDRD: 67 ML/MIN/1.73SQ M
GLUCOSE SERPL-MCNC: 114 MG/DL (ref 65–140)
GLUCOSE SERPL-MCNC: 118 MG/DL (ref 65–140)
GLUCOSE SERPL-MCNC: 146 MG/DL (ref 65–140)
HCT VFR BLD AUTO: 39 % (ref 36.5–49.3)
HGB BLD-MCNC: 12.6 G/DL (ref 12–17)
IMM GRANULOCYTES # BLD AUTO: 0.03 THOUSAND/UL (ref 0–0.2)
IMM GRANULOCYTES NFR BLD AUTO: 0 % (ref 0–2)
LYMPHOCYTES # BLD AUTO: 2.36 THOUSANDS/ΜL (ref 0.6–4.47)
LYMPHOCYTES NFR BLD AUTO: 32 % (ref 14–44)
MAGNESIUM SERPL-MCNC: 1.6 MG/DL (ref 1.9–2.7)
MCH RBC QN AUTO: 28.8 PG (ref 26.8–34.3)
MCHC RBC AUTO-ENTMCNC: 32.3 G/DL (ref 31.4–37.4)
MCV RBC AUTO: 89 FL (ref 82–98)
MONOCYTES # BLD AUTO: 0.54 THOUSAND/ΜL (ref 0.17–1.22)
MONOCYTES NFR BLD AUTO: 7 % (ref 4–12)
NEUTROPHILS # BLD AUTO: 4.18 THOUSANDS/ΜL (ref 1.85–7.62)
NEUTS SEG NFR BLD AUTO: 56 % (ref 43–75)
NRBC BLD AUTO-RTO: 0 /100 WBCS
PLATELET # BLD AUTO: 126 THOUSANDS/UL (ref 149–390)
PMV BLD AUTO: 9.6 FL (ref 8.9–12.7)
POTASSIUM SERPL-SCNC: 4.3 MMOL/L (ref 3.5–5.3)
RBC # BLD AUTO: 4.38 MILLION/UL (ref 3.88–5.62)
SALMONELLA DNA SPEC QL NAA+PROBE: NORMAL
SHIGA TOXIN STX GENE SPEC NAA+PROBE: NORMAL
SHIGELLA DNA SPEC QL NAA+PROBE: NORMAL
SODIUM SERPL-SCNC: 140 MMOL/L (ref 135–147)
WBC # BLD AUTO: 7.47 THOUSAND/UL (ref 4.31–10.16)

## 2023-04-18 RX ORDER — MAGNESIUM SULFATE HEPTAHYDRATE 40 MG/ML
2 INJECTION, SOLUTION INTRAVENOUS ONCE
Status: COMPLETED | OUTPATIENT
Start: 2023-04-18 | End: 2023-04-18

## 2023-04-18 RX ADMIN — MAGNESIUM SULFATE HEPTAHYDRATE 2 G: 40 INJECTION, SOLUTION INTRAVENOUS at 09:53

## 2023-04-18 RX ADMIN — PROPRANOLOL HYDROCHLORIDE 20 MG: 20 TABLET ORAL at 09:43

## 2023-04-18 RX ADMIN — LORATADINE 10 MG: 10 TABLET ORAL at 09:44

## 2023-04-18 RX ADMIN — DIVALPROEX SODIUM 500 MG: 500 TABLET, DELAYED RELEASE ORAL at 09:43

## 2023-04-18 RX ADMIN — CHLORPROMAZINE HYDROCHLORIDE 100 MG: 100 TABLET, FILM COATED ORAL at 10:00

## 2023-04-18 RX ADMIN — PANTOPRAZOLE SODIUM 40 MG: 40 INJECTION, POWDER, FOR SOLUTION INTRAVENOUS at 09:45

## 2023-04-18 RX ADMIN — ENOXAPARIN SODIUM 40 MG: 40 INJECTION SUBCUTANEOUS at 09:46

## 2023-04-18 RX ADMIN — LISINOPRIL 2.5 MG: 2.5 TABLET ORAL at 09:43

## 2023-04-18 RX ADMIN — ARIPIPRAZOLE 10 MG: 5 TABLET ORAL at 09:42

## 2023-04-18 RX ADMIN — Medication 1 TABLET: at 09:52

## 2023-04-18 RX ADMIN — LEVOTHYROXINE SODIUM 25 MCG: 25 TABLET ORAL at 06:28

## 2023-04-18 RX ADMIN — FAMOTIDINE 20 MG: 20 TABLET, FILM COATED ORAL at 09:44

## 2023-04-18 RX ADMIN — DIVALPROEX SODIUM 500 MG: 500 TABLET, FILM COATED, EXTENDED RELEASE ORAL at 09:47

## 2023-04-18 RX ADMIN — Medication 1000 UNITS: at 09:41

## 2023-04-18 RX ADMIN — LORAZEPAM 0.5 MG: 0.5 TABLET ORAL at 09:52

## 2023-04-18 NOTE — DISCHARGE SUMMARY
[facility]  Discharge- Galen Martel 1980, 40 y o  male MRN: [id number]  Unit/Bed#: S -01 Encounter: [id number]  Primary Care Provider: Melina Daily MD   Date and time admitted to hospital: 4/16/2023 11:12 PM    * Gastroenteritis  Assessment & Plan  · Presented with n/v/d since Wednesday  Symptoms started after eating fettuccine at a restaurant  · CT AP: colitis or diarrheal disease  · Stool enteric panel and C  difficile negative  · Afebrile without leukocytosis  · Monitored off antibiotics  · No recurrent diarrhea or vomiting    Able to tolerate regular diet today  · Stable for discharge today    Hypomagnesemia  Assessment & Plan  · Repleted    Type 2 diabetes mellitus with hyperglycemia (HCC)  Assessment & Plan  · Continue home medications    Schizoaffective disorder, bipolar type [facility])  Assessment & Plan  · Mood stable  · Continue home medications      Medical Problems     Resolved Problems  Date Reviewed: 4/18/2023   None       Discharging Physician / Practitioner: Kanchan Culp MD  PCP: Melina Daily MD  Admission Date:   Admission Orders (From admission, onward)     Ordered        04/17/23 1426  Inpatient Admission  Once            04/17/23 0133  Place in Observation  Once                      Discharge Date: 04/18/23    Consultations During Hospital Stay:  · none    Procedures Performed:   · none    Significant Findings / Test Results:   · none    Incidental Findings:   · none    Test Results Pending at Discharge (will require follow up):   · none     Outpatient Tests Requested:  · none    Complications:  none    Reason for Admission: nausea, vomiting, diarrhea    Hospital Course:   Galen Martel is a 40 y o  male patient with a PMH of schizoaffective disorder, HTN, DM, autism spectrum, mild intellectual disability who originally presented to the hospital on 4/16/2023 due to nausea, vomiting, diarrhea, abdominal cramping and poor p o  intake since Wednesday, after eating out at a restaurant  CT abdomen showed liquid stool throughout the colon consistent with colitis or diarrheal disease  Labs revealed hypomagnesemia at 1 and lactic acidosis of 2 8  He was afebrile with no leukocytosis and was monitored off antibiotics  Stool studies came back today negative  He was able to tolerate regular diet today and reports no further symptoms  He is stable for discharge to home today  Please see above list of diagnoses and related plan for additional information  Condition at Discharge: stable    Discharge Day Visit / Exam:   Subjective: Seen this morning in no acute distress, no further nausea, vomiting or diarrhea  Denies abdominal pain, fever or chills  Able to tolerate regular breakfast this morning  Agreeable for discharge today  Vitals: Blood Pressure: 134/92 (04/18/23 0942)  Pulse: 57 (04/18/23 0700)  Temperature: (!) 97 3 °F (36 3 °C) (04/18/23 0700)  Temp Source: Oral (04/17/23 1527)  Respirations: 17 (04/18/23 0700)  SpO2: 96 % (04/18/23 0700)    Exam:   Physical Exam  Vitals reviewed  Constitutional:       General: He is not in acute distress  Appearance: He is not toxic-appearing  HENT:      Head: Normocephalic and atraumatic  Mouth/Throat:      Pharynx: Oropharynx is clear  Eyes:      Extraocular Movements: Extraocular movements intact  Cardiovascular:      Rate and Rhythm: Normal rate and regular rhythm  Pulses: Normal pulses  Pulmonary:      Effort: Pulmonary effort is normal  No respiratory distress  Abdominal:      General: Bowel sounds are normal  There is no distension  Palpations: Abdomen is soft  Tenderness: There is no abdominal tenderness  Musculoskeletal:         General: No swelling or tenderness  Normal range of motion  Skin:     General: Skin is warm and dry  Neurological:      Mental Status: He is alert  Mental status is at baseline            Discussion with Family: Member from group home present at bedside  Discharge instructions/Information to patient and family:   See after visit summary for information provided to patient and family  Provisions for Follow-Up Care:  See after visit summary for information related to follow-up care and any pertinent home health orders  Disposition:   Other: Back to Group home    Planned Readmission:      Discharge Statement:  I spent 35 minutes discharging the patient  This time was spent on the day of discharge  I had direct contact with the patient on the day of discharge  Greater than 50% of the total time was spent examining patient, answering all patient questions, arranging and discussing plan of care with patient as well as directly providing post-discharge instructions  Additional time then spent on discharge activities  Discharge Medications:  See after visit summary for reconciled discharge medications provided to patient and/or family        **Please Note: This note may have been constructed using a voice recognition system**

## 2023-04-18 NOTE — DISCHARGE INSTR - AVS FIRST PAGE
Dear Rosina Bence,     It was our pleasure to care for you here at [facility] [place]  It is our hope that we were always able to exceed the expected standards for your care during your stay  You were hospitalized due to vomiting and diarrhea  You were cared for on the 4th floor by Dionne Fry MD under the service of Iliana Lay MD with the [facility] who covers for your primary care physician (PCP), Cecile Schumacher MD, while you were hospitalized  If you have any questions or concerns related to this hospitalization, you may contact us at [phone]  For follow up as well as any medication refills, we recommend that you follow up with your primary care physician  A registered nurse will reach out to you by phone within a few days after your discharge to answer any additional questions that you may have after going home  However, at this time we provide for you here, the most important instructions / recommendations at discharge:     Notable Medication Adjustments -   No new medications  Please resume all previous meds from prior to admission  Important follow up information -   Please follow up with your primary care physician  Other Instructions -   If you have any recurrent or worsening symptoms, please report to the ER  Please review this entire after visit summary as additional general instructions including medication list, appointments, activity, diet, any pertinent wound care, and other additional recommendations from your care team that may be provided for you        Sincerely,     Dionne Fry MD PT TAKEN TO BED 07 [place].

## 2023-04-18 NOTE — ASSESSMENT & PLAN NOTE
· Presented with n/v/d since Wednesday  Symptoms started after eating fettuccine at a restaurant  · CT AP: colitis or diarrheal disease  · Stool enteric panel and C  difficile negative  · Afebrile without leukocytosis  · Monitored off antibiotics  · No recurrent diarrhea or vomiting    Able to tolerate regular diet today  · Stable for discharge today

## 2023-04-18 NOTE — CASE MANAGEMENT
Case Management Discharge Planning Note    Patient name Elisabet Kulkarni  Location S /S -48 MRN [id number]  : 1980 Date 2023       Current Admission Date: 2023  Current Admission Diagnosis:Gastroenteritis   Patient Active Problem List    Diagnosis Date Noted   • Gastroenteritis 2023   • Hypomagnesemia 2023   • Elevated lactic acid level 2023   • Right corneal abrasion 2022   • Annual physical exam 2022   • Psychiatric disorder 2022   • Epigastric pain 2022   • COVID-19 virus infection 2022   • Suicidal ideation 10/31/2021   • Dysuria 2021   • Acute pain of right shoulder 2021   • Decreased hearing of both ears 06/15/2021   • Medical clearance for psychiatric admission 2021   • Diabetic peripheral neuropathy ([address] ) 10/27/2020   • Nocturia 2019   • Vitamin D deficiency 2019   • Suicide attempt ([address] ) 2019   • Tension headache 2019   • Hyperlipidemia 2017   • CKD (chronic kidney disease) stage 2, GFR 60-89 ml/min 2017   • Type 2 diabetes mellitus with hyperglycemia ([address] ) 2017   • Nuclear sclerotic cataract of right eye 2017   • Mild intellectual disability 2017   • Obsessive-compulsive disorder, unspecified 2017   • Autism spectrum 2017   • Agitation 2017   • Schizoaffective disorder, bipolar type ([address] ) 2017   • Essential tremor 2017   • Hypothyroidism 2017   • Obesity 2017   • Seasonal allergies 2017      LOS (days): 1  Geometric Mean LOS (GMLOS) (days): 2 60  Days to GMLOS:1 6     OBJECTIVE:  Risk of Unplanned Readmission Score: 17 52         Current admission status: Inpatient   Preferred Pharmacy:   [address] [place] [address] [place] [address]  Phone: [phone] Fax: [phone]    Primary Care Provider: Rebeca Pal MD    Primary Insurance: MEDICARE  Secondary "Insurance: [place] [employer]    DISCHARGE DETAILS:     [facility] spoke with Nurse at pt's group home who requested the notation to \"resume all previous medications prior to admission  \"  Pt does not have any new medications  This has been completed  DC order is written  [facility] informed nurse at group home of this  Staff will be transporting pt home this afternoon                                                                                                                       "

## 2023-04-22 LAB
BACTERIA BLD CULT: NORMAL
BACTERIA BLD CULT: NORMAL

## 2023-04-26 ENCOUNTER — HOSPITAL ENCOUNTER (EMERGENCY)
Facility: HOSPITAL | Age: 43
Discharge: HOME | End: 2023-04-27
Attending: EMERGENCY MEDICINE

## 2023-04-26 DIAGNOSIS — R45.1 AGITATION: ICD-10-CM

## 2023-04-26 DIAGNOSIS — R11.0 NAUSEA: Primary | ICD-10-CM

## 2023-04-26 RX ORDER — LORAZEPAM 0.5 MG/1
0.5 TABLET ORAL ONCE
Status: DISCONTINUED | OUTPATIENT
Start: 2023-04-26 | End: 2023-04-26

## 2023-04-26 RX ORDER — ONDANSETRON 4 MG/1
4 TABLET, ORALLY DISINTEGRATING ORAL ONCE
Status: COMPLETED | OUTPATIENT
Start: 2023-04-26 | End: 2023-04-26

## 2023-04-26 RX ADMIN — ONDANSETRON 4 MG: 4 TABLET, ORALLY DISINTEGRATING ORAL at 23:38

## 2023-04-27 ENCOUNTER — PATIENT OUTREACH (OUTPATIENT)
Dept: FAMILY MEDICINE CLINIC | Facility: CLINIC | Age: 43
End: 2023-04-27

## 2023-04-27 VITALS
SYSTOLIC BLOOD PRESSURE: 113 MMHG | DIASTOLIC BLOOD PRESSURE: 70 MMHG | OXYGEN SATURATION: 98 % | RESPIRATION RATE: 18 BRPM | HEART RATE: 85 BPM | TEMPERATURE: 98.2 F

## 2023-04-27 DIAGNOSIS — N18.2 CKD (CHRONIC KIDNEY DISEASE) STAGE 2, GFR 60-89 ML/MIN: Primary | ICD-10-CM

## 2023-04-27 RX ORDER — OLANZAPINE 10 MG/1
5 INJECTION, POWDER, LYOPHILIZED, FOR SOLUTION INTRAMUSCULAR ONCE
Status: COMPLETED | OUTPATIENT
Start: 2023-04-27 | End: 2023-04-27

## 2023-04-27 RX ORDER — WATER 1000 ML/1000ML
INJECTION, SOLUTION INTRAVENOUS
Status: COMPLETED
Start: 2023-04-27 | End: 2023-04-27

## 2023-04-27 RX ADMIN — WATER 10 ML: 1 INJECTION INTRAMUSCULAR; INTRAVENOUS; SUBCUTANEOUS at 00:58

## 2023-04-27 RX ADMIN — OLANZAPINE 5 MG: 10 INJECTION, POWDER, FOR SOLUTION INTRAMUSCULAR at 00:54
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
## 2023-04-27 NOTE — ED NOTES
"Pt does not want the ativan or EKG at this time  He states \" I don't need that  Theres nothing wrong with my heart  Just my stomach hurts me\"  Educated patient on reason for EKG   Provider made aware      Pavel Brower RN  04/26/23 5029    "

## 2023-04-27 NOTE — ED NOTES
Per provider Dr Silvio Sprague pt now OK to be discharged  Discharge instructions reviewed with patient and   Questions answered        Pavel Brower RN  04/27/23 0644

## 2023-04-27 NOTE — DISCHARGE INSTRUCTIONS
-Please follow-up with your primary care physician   -Please return to the emergency department if you develop continued or worsening symptoms, chest pains, fevers, chills, severe abdominal pain, inability to urinate or defecate

## 2023-04-27 NOTE — PROGRESS NOTES
Outpatient Care Manager Note    RN CM received ADT alert the patient was seen in the ED for agitation and nausea  Patient was administered zofran 4 mg and olanzapine 5 mg and discharged to home  RN CM spoke to [employer], Manager of patient's group home  [employer] states the patient was seen in the ED more for symptoms of agitation  Patient was agitated at the group home and experienced nausea as well  Patient is not experiencing nausea at this time  Patient will be seen by Dr Jeana Garcia on 5/5/23  Patient's  does not have any further questions, concerns, or other needs at this time   has my contact # and PCP office # if needed  Pt is agreeable for further outreach

## 2023-04-28 ENCOUNTER — OFFICE VISIT (OUTPATIENT)
Dept: URGENT CARE | Age: 43
End: 2023-04-28

## 2023-04-28 ENCOUNTER — APPOINTMENT (OUTPATIENT)
Dept: RADIOLOGY | Age: 43
End: 2023-04-28

## 2023-04-28 VITALS
DIASTOLIC BLOOD PRESSURE: 83 MMHG | SYSTOLIC BLOOD PRESSURE: 120 MMHG | OXYGEN SATURATION: 99 % | TEMPERATURE: 97.5 F | HEART RATE: 114 BPM | RESPIRATION RATE: 16 BRPM

## 2023-04-28 DIAGNOSIS — E11.65 TYPE 2 DIABETES MELLITUS WITH HYPERGLYCEMIA, WITHOUT LONG-TERM CURRENT USE OF INSULIN (HCC): ICD-10-CM

## 2023-04-28 DIAGNOSIS — M79.672 LEFT FOOT PAIN: ICD-10-CM

## 2023-04-28 DIAGNOSIS — M79.672 LEFT FOOT PAIN: Primary | ICD-10-CM

## 2023-04-28 RX ORDER — LANCETS 28 GAUGE
EACH MISCELLANEOUS
Qty: 100 EACH | Refills: 5 | Status: SHIPPED | OUTPATIENT
Start: 2023-04-28

## 2023-04-28 RX ORDER — CALCIUM CITRATE/VITAMIN D3 200MG-6.25
1 TABLET ORAL 2 TIMES WEEKLY
Qty: 50 STRIP | Refills: 1 | Status: SHIPPED | OUTPATIENT
Start: 2023-05-01

## 2023-04-28 RX ORDER — ALCOHOL ANTISEPTIC PADS
PADS, MEDICATED (EA) TOPICAL AS NEEDED
Qty: 200 EACH | Refills: 1 | Status: SHIPPED | OUTPATIENT
Start: 2023-04-28

## 2023-04-28 NOTE — PATIENT INSTRUCTIONS
Do not wear tight fitting shoes at this time  Alternate ibuprofen and Tylenol as needed for pain  Apply ice to affected area
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
## 2023-05-02 NOTE — ED ATTENDING ATTESTATION
4/26/2023  IJanet MD, saw and evaluated the patient  I have discussed the patient with the resident/non-physician practitioner and agree with the resident's/non-physician practitioner's findings, Plan of Care, and MDM as documented in the resident's/non-physician practitioner's note, except where noted  All available labs and Radiology studies were reviewed  I was present for key portions of any procedure(s) performed by the resident/non-physician practitioner and I was immediately available to provide assistance  At this point I agree with the current assessment done in the Emergency Department    I have conducted an independent evaluation of this patient a history and physical is as follows:see h and p above- agree with er resident tx plan/ dispo    ED Course  ED Course as of 05/02/23 1449   Thu Apr 27, 2023   0123 ER MD NOTE- PT- RE-EVALUATED- FEELS MORE RELAXED- FEELS [facility] TO GO BACK TO [facility]- WILL D/C         Critical Care Time  Procedures

## 2023-05-03 ENCOUNTER — OFFICE VISIT (OUTPATIENT)
Dept: URGENT CARE | Age: 43
End: 2023-05-03

## 2023-05-03 VITALS
TEMPERATURE: 97.6 F | RESPIRATION RATE: 18 BRPM | HEART RATE: 93 BPM | DIASTOLIC BLOOD PRESSURE: 72 MMHG | SYSTOLIC BLOOD PRESSURE: 106 MMHG | OXYGEN SATURATION: 97 %

## 2023-05-03 DIAGNOSIS — M54.2 ACUTE NECK PAIN: Primary | ICD-10-CM
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
## 2023-05-03 NOTE — PATIENT INSTRUCTIONS
Alternate ibuprofen and Tylenol as needed for pain  Apply ice to affected area  May trial over-the-counter muscle rub as needed

## 2023-05-04 ENCOUNTER — HOSPITAL ENCOUNTER (EMERGENCY)
Facility: HOSPITAL | Age: 43
Discharge: HOME | End: 2023-05-04

## 2023-05-04 VITALS
OXYGEN SATURATION: 98 % | DIASTOLIC BLOOD PRESSURE: 90 MMHG | TEMPERATURE: 97.8 F | RESPIRATION RATE: 18 BRPM | SYSTOLIC BLOOD PRESSURE: 134 MMHG | HEART RATE: 96 BPM

## 2023-05-04 LAB
ALBUMIN SERPL BCP-MCNC: 3.4 G/DL (ref 3.5–5)
ALP SERPL-CCNC: 58 U/L (ref 34–104)
ALT SERPL W P-5'-P-CCNC: 16 U/L (ref 7–52)
ANION GAP SERPL CALCULATED.3IONS-SCNC: 7 MMOL/L (ref 4–13)
AST SERPL W P-5'-P-CCNC: 14 U/L (ref 13–39)
BASOPHILS # BLD AUTO: 0.08 THOUSANDS/ÂΜL (ref 0–0.1)
BASOPHILS NFR BLD AUTO: 1 % (ref 0–1)
BILIRUB SERPL-MCNC: 0.37 MG/DL (ref 0.2–1)
BUN SERPL-MCNC: 10 MG/DL (ref 5–25)
CALCIUM ALBUM COR SERPL-MCNC: 8.8 MG/DL (ref 8.3–10.1)
CALCIUM SERPL-MCNC: 8.3 MG/DL (ref 8.4–10.2)
CHLORIDE SERPL-SCNC: 104 MMOL/L (ref 96–108)
CO2 SERPL-SCNC: 26 MMOL/L (ref 21–32)
CREAT SERPL-MCNC: 1.22 MG/DL (ref 0.6–1.3)
EOSINOPHIL # BLD AUTO: 0.63 THOUSAND/ÂΜL (ref 0–0.61)
EOSINOPHIL NFR BLD AUTO: 6 % (ref 0–6)
ERYTHROCYTE [DISTWIDTH] IN BLOOD BY AUTOMATED COUNT: 11.9 % (ref 11.6–15.1)
GFR SERPL CREATININE-BSD FRML MDRD: 73 ML/MIN/1.73SQ M
GLUCOSE SERPL-MCNC: 131 MG/DL (ref 65–140)
HCT VFR BLD AUTO: 44 % (ref 36.5–49.3)
HGB BLD-MCNC: 14.3 G/DL (ref 12–17)
HOLD SPECIMEN: NORMAL
IMM GRANULOCYTES # BLD AUTO: 0.08 THOUSAND/UL (ref 0–0.2)
IMM GRANULOCYTES NFR BLD AUTO: 1 % (ref 0–2)
LIPASE SERPL-CCNC: 45 U/L (ref 11–82)
LYMPHOCYTES # BLD AUTO: 2.48 THOUSANDS/ÂΜL (ref 0.6–4.47)
LYMPHOCYTES NFR BLD AUTO: 25 % (ref 14–44)
MCH RBC QN AUTO: 29.1 PG (ref 26.8–34.3)
MCHC RBC AUTO-ENTMCNC: 32.5 G/DL (ref 31.4–37.4)
MCV RBC AUTO: 89 FL (ref 82–98)
MONOCYTES # BLD AUTO: 0.85 THOUSAND/ÂΜL (ref 0.17–1.22)
MONOCYTES NFR BLD AUTO: 9 % (ref 4–12)
NEUTROPHILS # BLD AUTO: 5.91 THOUSANDS/ÂΜL (ref 1.85–7.62)
NEUTS SEG NFR BLD AUTO: 58 % (ref 43–75)
NRBC BLD AUTO-RTO: 0 /100 WBCS
PLATELET # BLD AUTO: 153 THOUSANDS/UL (ref 149–390)
PMV BLD AUTO: 9.6 FL (ref 8.9–12.7)
POTASSIUM SERPL-SCNC: 4.7 MMOL/L (ref 3.5–5.3)
PROT SERPL-MCNC: 5.8 G/DL (ref 6.4–8.4)
RBC # BLD AUTO: 4.92 MILLION/UL (ref 3.88–5.62)
SODIUM SERPL-SCNC: 137 MMOL/L (ref 135–147)
WBC # BLD AUTO: 10.03 THOUSAND/UL (ref 4.31–10.16)

## 2023-05-08 ENCOUNTER — PATIENT OUTREACH (OUTPATIENT)
Dept: FAMILY MEDICINE CLINIC | Facility: CLINIC | Age: 43
End: 2023-05-08

## 2023-05-08 DIAGNOSIS — E03.9 HYPOTHYROIDISM, UNSPECIFIED TYPE: ICD-10-CM

## 2023-05-08 DIAGNOSIS — E55.9 VITAMIN D DEFICIENCY: ICD-10-CM

## 2023-05-08 DIAGNOSIS — E78.49 OTHER HYPERLIPIDEMIA: ICD-10-CM

## 2023-05-08 RX ORDER — MELATONIN
1000 DAILY
Qty: 90 TABLET | Refills: 3 | Status: SHIPPED | OUTPATIENT
Start: 2023-05-08

## 2023-05-08 RX ORDER — ATORVASTATIN CALCIUM 40 MG/1
40 TABLET, FILM COATED ORAL
Qty: 90 TABLET | Refills: 3 | Status: SHIPPED | OUTPATIENT
Start: 2023-05-08

## 2023-05-08 RX ORDER — LEVOTHYROXINE SODIUM 0.03 MG/1
25 TABLET ORAL
Qty: 90 TABLET | Refills: 3 | Status: SHIPPED | OUTPATIENT
Start: 2023-05-08

## 2023-05-08 NOTE — PROGRESS NOTES
Outpatient RN Care Manager Note    Per chart review the patient was seen at Urgent Care and ED for acute neck pain  Patient discharge instructions are to alternate ibruprofen and tylenol as needed for pain  Apply ice to affected area  May trial muscle rub as needed  Follow up with PCP in 3 to 5 days and proceed to ED if symptoms worsen  Called patient and left message for patient's Joanna Silver 54 with no answer  Left message, this is Emery Reilly the Outpatient Nurse Care Manager at [address] [place] [address] office calling to follow up with you  Requested return phone call at [phone]

## 2023-05-10 ENCOUNTER — PATIENT OUTREACH (OUTPATIENT)
Dept: FAMILY MEDICINE CLINIC | Facility: CLINIC | Age: 43
End: 2023-05-10

## 2023-05-10 ENCOUNTER — OFFICE VISIT (OUTPATIENT)
Dept: FAMILY MEDICINE CLINIC | Facility: CLINIC | Age: 43
End: 2023-05-10

## 2023-05-10 VITALS
BODY MASS INDEX: 28.77 KG/M2 | HEART RATE: 84 BPM | WEIGHT: 194.8 LBS | OXYGEN SATURATION: 98 % | RESPIRATION RATE: 18 BRPM | DIASTOLIC BLOOD PRESSURE: 77 MMHG | TEMPERATURE: 97 F | SYSTOLIC BLOOD PRESSURE: 111 MMHG

## 2023-05-10 DIAGNOSIS — M54.2 NECK PAIN: ICD-10-CM

## 2023-05-10 DIAGNOSIS — E11.65 TYPE 2 DIABETES MELLITUS WITH HYPERGLYCEMIA, WITHOUT LONG-TERM CURRENT USE OF INSULIN (HCC): Primary | ICD-10-CM

## 2023-05-10 NOTE — PROGRESS NOTES
Outpatient RN Care Manager Note    RN CM met with the patient and patient's caregiver, Vidal Pandya, today after scheduled office visit with Dr Palmer Ngo He and his caregiver reports the patient has a new mattress causing left side neck and shoulder pain  Patient was instructed to take tylenol 650 mg every 8 hours for three days  Patient due for Hgba1c, script given for next visit  RN CM performed diabetic assessment to identify goals for patient  Patient does not have any further questions, concerns, or other needs at this time  Pt has my contact # and PCP office # if needed  Pt is agreeable for further outreach

## 2023-05-10 NOTE — ASSESSMENT & PLAN NOTE
Neck pain most likely appears to be musculoskeletal    No known trauma/injury  History of mild intellectual disability  Advised patient to continue Tylenol scheduled every 8 hours for the next 3 days and can be used as needed after that  Not a good candidate for muscle relaxants given psych history/confusion  Per chart review, unclear history of allergy to NSAID

## 2023-05-10 NOTE — ASSESSMENT & PLAN NOTE
Lab Results   Component Value Date    HGBA1C 6 6 (H) 01/05/2023     Controlled  Current regimen metformin 1 g twice daily, Januvia 100 mg daily  Due for A1c, blood work given to the patient  Will make changes based on the A1c level  Continue current regimen for now    Follow-up in 3 months with the PCP

## 2023-05-15 ENCOUNTER — LAB (OUTPATIENT)
Dept: LAB | Age: 43
End: 2023-05-15

## 2023-05-15 DIAGNOSIS — E78.5 HYPERLIPIDEMIA, UNSPECIFIED HYPERLIPIDEMIA TYPE: ICD-10-CM

## 2023-05-15 DIAGNOSIS — E11.65 TYPE 2 DIABETES MELLITUS WITH HYPERGLYCEMIA, WITHOUT LONG-TERM CURRENT USE OF INSULIN (HCC): ICD-10-CM

## 2023-05-15 DIAGNOSIS — F25.0 SCHIZOAFFECTIVE DISORDER, BIPOLAR TYPE (HCC): Chronic | ICD-10-CM

## 2023-05-15 LAB
EST. AVERAGE GLUCOSE BLD GHB EST-MCNC: 143 MG/DL
HBA1C MFR BLD: 6.6 %

## 2023-05-15 RX ORDER — CALCIUM CARBONATE 500(1250)
1 TABLET ORAL
Qty: 30 TABLET | Refills: 5 | Status: SHIPPED | OUTPATIENT
Start: 2023-05-15 | End: 2023-06-14

## 2023-05-15 RX ORDER — LORATADINE 10 MG/1
10 TABLET ORAL DAILY
Qty: 30 TABLET | Refills: 5 | Status: SHIPPED | OUTPATIENT
Start: 2023-05-15 | End: 2023-06-14

## 2023-05-25 DIAGNOSIS — K59.00 CONSTIPATION, UNSPECIFIED CONSTIPATION TYPE: ICD-10-CM

## 2023-05-25 DIAGNOSIS — R05.9 COUGH: ICD-10-CM

## 2023-05-25 DIAGNOSIS — M25.511 ACUTE PAIN OF RIGHT SHOULDER: ICD-10-CM
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
## 2023-05-29 RX ORDER — SENNOSIDES 8.6 MG
650 CAPSULE ORAL EVERY 8 HOURS PRN
Qty: 30 TABLET | Refills: 5 | Status: SHIPPED | OUTPATIENT
Start: 2023-05-29

## 2023-05-29 RX ORDER — MENTHOL 5.8 MG
5.8 LOZENGE MUCOUS MEMBRANE 4 TIMES DAILY PRN
Qty: 30 LOZENGE | Refills: 5 | Status: SHIPPED | OUTPATIENT
Start: 2023-05-29

## 2023-06-07 NOTE — PROGRESS NOTES
Name: Angelika Irvin      : 1980      MRN: [id number]  Encounter Provider: Honorio Rivera MD  Encounter Date: 5/10/2023   Encounter department: [address] [place] [address]  Type 2 diabetes mellitus with hyperglycemia, without long-term current use of insulin ([facility])  Assessment & Plan:    Lab Results   Component Value Date    HGBA1C 6 6 (H) 2023     Controlled  Current regimen metformin 1 g twice daily, Januvia 100 mg daily  Due for A1c, blood work given to the patient  Will make changes based on the A1c level  Continue current regimen for now  Follow-up in 3 months with the PCP    Orders:  -     HEMOGLOBIN A1C W/ EAG ESTIMATION; Future    2  Neck pain  Assessment & Plan:  Neck pain most likely appears to be musculoskeletal    No known trauma/injury  History of mild intellectual disability  Advised patient to continue Tylenol scheduled every 8 hours for the next 3 days and can be used as needed after that  Not a good candidate for muscle relaxants given psych history/confusion  Per chart review, unclear history of allergy to NSAID  Subjective      50-year-old group home patient with history of mild intellectual disability, bipolar disorder, type 2 diabetes presents today with a caregiver for follow-up  Patient reports left-sided neck pain, denies any known injuries or trauma  No weakness of the extremity  Has been trying Tylenol once at night as needed  Reports he does not take NSAIDs  Reports sometimes he skips breakfast      Review of Systems   Constitutional: Negative for chills and fever  HENT: Negative for ear pain and sore throat  Eyes: Negative for pain and visual disturbance  Respiratory: Negative for cough and shortness of breath  Cardiovascular: Negative for chest pain and palpitations  Gastrointestinal: Negative for abdominal pain and vomiting  Genitourinary: Negative for dysuria and hematuria  Musculoskeletal: Negative for arthralgias and back pain  Left sided neck pain   Skin: Negative for color change and rash  Neurological: Negative for syncope and headaches  All other systems reviewed and are negative        Current Outpatient Medications on File Prior to Visit   Medication Sig   • acetaminophen (TYLENOL) 650 mg CR tablet Take 1 tablet (650 mg total) by mouth every 8 (eight) hours as needed for mild pain   • Alcohol Swabs (Curity Alcohol Preps) 70 % PADS Use if needed (when checking blood glucose)   • aluminum-magnesium hydroxide-simethicone (MAALOX) 200-200-20 MG/5ML SUSP Take 20 mL by mouth 4 (four) times a day (before meals and at bedtime)   • ARIPiprazole (ABILIFY) 10 mg tablet Take 10 mg by mouth daily   • atorvastatin (LIPITOR) 40 mg tablet Take 1 tablet (40 mg total) by mouth daily at bedtime   • B Complex Vitamins (B Complex 50) TABS Take 1 tablet by mouth daily 1 cap by mouth daily @ 8am   • bismuth subsalicylate (PEPTO BISMOL) 524 mg/30 mL oral suspension Take 15 mL (262 mg total) by mouth every 6 (six) hours as needed for indigestion   • calcium carbonate (OYSTER SHELL,OSCAL) 500 mg Take 1 tablet by mouth daily with breakfast   • chlorproMAZINE (THORAZINE) 100 mg tablet Take 100 mg by mouth 2 (two) times a day   • cholecalciferol (VITAMIN D3) 1,000 units tablet Take 1 tablet (1,000 Units total) by mouth daily   • divalproex sodium (DEPAKOTE ER) 250 mg 24 hr tablet Take 250 mg by mouth daily at bedtime   • divalproex sodium (DEPAKOTE ER) 500 mg 24 hr tablet Take 500 mg by mouth every morning   • divalproex sodium (DEPAKOTE) 500 mg EC tablet Take 1 tablet (500 mg total) by mouth 2 (two) times a day   • famotidine (PEPCID) 20 mg tablet Take 1 tablet (20 mg total) by mouth 2 (two) times a day for 14 days   • glucose blood (True Metrix Blood Glucose Test) test strip Use 1 each 2 (two) times a week Use as instructed   • glucose monitoring kit (FREESTYLE) monitoring kit 1 each by Does not apply route 2 (two) times a week   • guaiFENesin (ROBITUSSIN) 100 MG/5ML oral liquid Take 10 mL (200 mg total) by mouth 3 (three) times a day as needed for cough   • Lancets (freestyle) lancets Use to test blood sugars 2x weekly on Mon & Thurs @ 8AM   • levothyroxine 25 mcg tablet Take 1 tablet (25 mcg total) by mouth daily in the early morning   • lisinopril (ZESTRIL) 2 5 mg tablet Take 1 tablet (2 5 mg total) by mouth daily   • loratadine (CLARITIN) 10 mg tablet Take 1 tablet (10 mg total) by mouth daily   • LORazepam (ATIVAN) 0 5 mg tablet Take 0 5 mg by mouth in the morning and 0 5 mg in the evening and 0 5 mg before bedtime  8AM, 2PM, 8PM    • Menthol ([place] Cough Drops) 5 8 MG LOZG Apply 1 lozenge (5 8 mg total) to the mouth or throat 4 (four) times a day as needed (cough)   • metFORMIN (GLUCOPHAGE) 1000 MG tablet Take 1 tablet (1,000 mg total) by mouth 2 (two) times a day with meals   • propranolol (INDERAL) 20 mg tablet Take 1 tablet (20 mg total) by mouth every 12 (twelve) hours   • Refresh Optive 0 5-0 9 % SOLN    • sitaGLIPtin (JANUVIA) 100 mg tablet Take 1 tablet (100 mg total) by mouth daily Daily after breakfast   • traZODone (DESYREL) 100 mg tablet        Objective     /77   Pulse 84   Temp (!) 97 °F (36 1 °C)   Resp 18   Wt 88 4 kg (194 lb 12 8 oz)   SpO2 98%   BMI 28 77 kg/m²     Physical Exam  Vitals reviewed  Constitutional:       General: He is not in acute distress  Appearance: Normal appearance  HENT:      Head: Normocephalic and atraumatic  Eyes:      Conjunctiva/sclera: Conjunctivae normal    Cardiovascular:      Rate and Rhythm: Normal rate and regular rhythm  Pulses: Normal pulses  Heart sounds: Normal heart sounds  Pulmonary:      Effort: Pulmonary effort is normal  No respiratory distress  Breath sounds: Normal breath sounds  No wheezing or rhonchi  Musculoskeletal:      Right lower leg: No edema  Left lower leg: No edema     Skin: General: Skin is warm and dry  Neurological:      Mental Status: He is alert  Mental status is at baseline        Gait: Gait normal    Psychiatric:         Mood and Affect: Mood normal          Behavior: Behavior normal        Marcia Watson MD Retention Suture Text: Retention sutures were placed to support the closure and prevent dehiscence.

## 2023-06-17 PROBLEM — K52.9 GASTROENTERITIS: Status: RESOLVED | Noted: 2023-04-17 | Resolved: 2023-06-17

## 2023-06-18 ENCOUNTER — OFFICE VISIT (OUTPATIENT)
Dept: URGENT CARE | Age: 43
End: 2023-06-18
Payer: COMMERCIAL

## 2023-06-18 VITALS
OXYGEN SATURATION: 100 % | DIASTOLIC BLOOD PRESSURE: 90 MMHG | SYSTOLIC BLOOD PRESSURE: 124 MMHG | RESPIRATION RATE: 18 BRPM | TEMPERATURE: 97 F | HEART RATE: 73 BPM

## 2023-06-18 DIAGNOSIS — S86.919A MUSCLE STRAIN OF LOWER LEG, INITIAL ENCOUNTER: Primary | ICD-10-CM

## 2023-06-18 PROCEDURE — G0463 HOSPITAL OUTPT CLINIC VISIT: HCPCS | Performed by: NURSE PRACTITIONER

## 2023-06-18 PROCEDURE — 99213 OFFICE O/P EST LOW 20 MIN: CPT | Performed by: NURSE PRACTITIONER

## 2023-06-18 NOTE — PATIENT INSTRUCTIONS
Muscle Strain   Tylenol OTC prn for pain  Stretching exercises  Aspercreme with lidocaine spray OTC    WHAT YOU NEED TO KNOW:   A muscle strain is a twist, pull, or tear of a muscle or tendon  A tendon is a strong elastic tissue that connects a muscle to a bone  Signs of a strained muscle include bruising and swelling over the area, pain with movement, and loss of strength  DISCHARGE INSTRUCTIONS:   Return to the emergency department if:   You suddenly cannot feel or move your injured muscle  Call your doctor if:   Your pain and swelling worsen or do not go away  You have questions or concerns about your condition or care  Medicines: You may need any of the following:  NSAIDs  help decrease swelling and pain or fever  This medicine is available with or without a doctor's order  NSAIDs can cause stomach bleeding or kidney problems in certain people  If you take blood thinner medicine, always ask your healthcare provider if NSAIDs are safe for you  Always read the medicine label and follow directions  Muscle relaxers  help decrease pain and muscle spasms  Take your medicine as directed  Contact your healthcare provider if you think your medicine is not helping or if you have side effects  Tell your provider if you are allergic to any medicine  Keep a list of the medicines, vitamins, and herbs you take  Include the amounts, and when and why you take them  Bring the list or the pill bottles to follow-up visits  Carry your medicine list with you in case of an emergency  Help a muscle strain heal:   3 to 7 days after the injury:  Use Rest, Ice, Compression, and Elevation (RICE) to help stop bruising and decrease pain and swelling  Rest your muscle to allow the injury to heal   When the pain decreases, begin normal, slow movements  For mild and moderate muscle strains, you should rest your muscles for about 2 days  If you have a severe muscle strain, you should rest for 10 to 14 days   You may need to use crutches to walk if your muscle strain is in your legs or lower body  Apply ice on the injured area  Use an ice pack, or put crushed ice in a plastic bag  Cover the bag with a towel before you apply it to your skin  Apply ice for 15 to 20 minutes each hour, or as directed  Use compression to decrease swelling  You can wrap an elastic bandage around the area to create compression  The bandage should be tight enough for you to feel support  Do not wrap it too tightly  Elevate the area above the level of your heart, if possible  Keep the injured muscle raised above your heart if possible  For example, if you have a strain of your lower leg muscle, lie down and prop your leg up on pillows  This helps decrease pain and swelling  3 to 21 days after your injury:  Start to slowly and regularly exercise your strained muscle  This will help it heal  If you feel pain, decrease how hard you are exercising  1 to 6 weeks after your injury:  Stretch the injured muscle  Stretch the muscle for about 30 seconds  Do this 4 times a day  You may stretch the muscle until you feel a slight pull  Stop stretching if you feel pain  2 weeks to 6 months after your injury:  The goal of this phase is to return to the activity you were doing before the injury without hurting the muscle again  3 weeks to 6 months after your injury:  Keep stretching and strengthening your muscles to prevent injury  Slowly increase the time and distance that you exercise  You may still have signs and symptoms of muscle strain 6 months after the injury, even if you do things to help it heal  In this case, you may need surgery on the muscle  Prevent muscle strains:   Always wear proper shoes when you play sports  Replace your old running shoes with new ones often if you are a runner  Use special shoe inserts or arch supports to correct leg or foot problems   Ask your healthcare provider for more information on shoe supports  Do warm up and cool down exercises  Do stretching exercises before you work out or do sports activities  These exercises will help loosen and decrease stress on your muscles  Cool down and stretch after your workout  Do not stop and rest after a workout without cooling down first          Keep your muscles strong with strength training exercises  Exercises such as weight lifting and stretching exercises help keep your muscles flexible and strong  A physical therapist or  may help you with these exercises  Slowly start your exercise or sports training program   Follow your healthcare provider's advice on when to start exercising  Slowly increase time, distance, and how often you train  Sudden increases in how often you train may cause you to injure your muscle again  Follow up with your doctor as directed: Your doctor may suggest that you have a follow-up visit before you go back to your usual activity  Write down your questions so you remember to ask them during your visits  © [employer] 2022 Information is for End User's use only and may not be sold, redistributed or otherwise used for commercial purposes  The above information is an  only  It is not intended as medical advice for individual conditions or treatments  Talk to your doctor, nurse or pharmacist before following any medical regimen to see if it is safe and effective for you
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
## 2023-06-23 ENCOUNTER — OFFICE VISIT (OUTPATIENT)
Dept: FAMILY MEDICINE CLINIC | Facility: CLINIC | Age: 43
End: 2023-06-23

## 2023-06-23 VITALS
DIASTOLIC BLOOD PRESSURE: 85 MMHG | HEART RATE: 83 BPM | WEIGHT: 195.6 LBS | SYSTOLIC BLOOD PRESSURE: 124 MMHG | BODY MASS INDEX: 28.89 KG/M2 | TEMPERATURE: 97.9 F | RESPIRATION RATE: 18 BRPM

## 2023-06-23 DIAGNOSIS — M79.604 PAIN OF RIGHT LOWER EXTREMITY: Primary | ICD-10-CM

## 2023-06-23 PROCEDURE — 99213 OFFICE O/P EST LOW 20 MIN: CPT | Performed by: FAMILY MEDICINE

## 2023-06-23 RX ORDER — LIDOCAINE 40 MG/G
CREAM TOPICAL AS NEEDED
Qty: 30 G | Refills: 0 | Status: SHIPPED | OUTPATIENT
Start: 2023-06-23

## 2023-06-23 RX ORDER — KETOROLAC TROMETHAMINE 30 MG/ML
30 INJECTION, SOLUTION INTRAMUSCULAR; INTRAVENOUS ONCE
Status: COMPLETED | OUTPATIENT
Start: 2023-06-23 | End: 2023-06-23

## 2023-06-23 RX ADMIN — KETOROLAC TROMETHAMINE 30 MG: 30 INJECTION, SOLUTION INTRAMUSCULAR; INTRAVENOUS at 16:42

## 2023-06-23 NOTE — ASSESSMENT & PLAN NOTE
Ongoing c/o of pain/stiffness around lateral knee/leg  Onset last Sunday  Denies trauma or fall  Pain could be 2/2 to strenuous exercise/workout he participates in as reported by support staff  Discussed rest/ice  Given toradol shot in office today and ordered lidocaine cream to be applied to area where he feels pain

## 2023-06-23 NOTE — PROGRESS NOTES
Name: Kamini Rider      : 1980      MRN: [id number]  Encounter Provider: Libia Horn MD  Encounter Date: 2023   Encounter department: [address] [place] [address]  Pain of right lower extremity  Assessment & Plan:  Ongoing c/o of pain/stiffness around lateral knee/leg  Onset last   Denies trauma or fall  Pain could be 2/2 to strenuous exercise/workout he participates in as reported by support staff  Discussed rest/ice  Given toradol shot in office today and ordered lidocaine cream to be applied to area where he feels pain  Orders:  -     ketorolac (TORADOL) injection 30 mg  -     lidocaine (LMX) 4 % cream; Apply topically as needed for mild pain         Subjective      Patient presents to the clinic for acute pain of right knee/ leg  Initially started on   Was seen in the urgent care for same  Reports that pain improved but then reoccurred  Denies fall or trauma  Today pain is worse  He has been taking tylenol with no improvement to pain  Nothing make it better  Per support staff present with patient  He continues to be physically active and works out  He still has continued to work out despite pain  He otherwise denies any other complains besides the pain  Review of Systems   Musculoskeletal: Positive for arthralgias   Negative for joint swelling         +leg pain       Current Outpatient Medications on File Prior to Visit   Medication Sig   • acetaminophen (TYLENOL) 650 mg CR tablet Take 1 tablet (650 mg total) by mouth every 8 (eight) hours as needed for mild pain   • Alcohol Swabs (Curity Alcohol Preps) 70 % PADS Use if needed (when checking blood glucose)   • aluminum-magnesium hydroxide-simethicone (MAALOX) 200-200-20 MG/5ML SUSP Take 20 mL by mouth 4 (four) times a day (before meals and at bedtime)   • ARIPiprazole (ABILIFY) 10 mg tablet Take 10 mg by mouth daily   • atorvastatin (LIPITOR) 40 mg tablet Take 1 tablet (40 mg total) by mouth daily at bedtime   • B Complex Vitamins (B Complex 50) TABS Take 1 tablet by mouth daily 1 cap by mouth daily @ 8am   • bismuth subsalicylate (PEPTO BISMOL) 524 mg/30 mL oral suspension Take 15 mL (262 mg total) by mouth every 6 (six) hours as needed for indigestion   • chlorproMAZINE (THORAZINE) 100 mg tablet Take 100 mg by mouth 2 (two) times a day   • cholecalciferol (VITAMIN D3) 1,000 units tablet Take 1 tablet (1,000 Units total) by mouth daily   • divalproex sodium (DEPAKOTE ER) 250 mg 24 hr tablet Take 250 mg by mouth daily at bedtime   • divalproex sodium (DEPAKOTE ER) 500 mg 24 hr tablet Take 500 mg by mouth every morning   • glucose blood (True Metrix Blood Glucose Test) test strip Use 1 each 2 (two) times a week Use as instructed   • glucose monitoring kit (FREESTYLE) monitoring kit 1 each by Does not apply route 2 (two) times a week   • guaiFENesin (ROBITUSSIN) 100 MG/5ML oral liquid Take 10 mL (200 mg total) by mouth 3 (three) times a day as needed for cough   • Lancets (freestyle) lancets Use to test blood sugars 2x weekly on Mon & Thurs @ 8AM   • levothyroxine 25 mcg tablet Take 1 tablet (25 mcg total) by mouth daily in the early morning   • lisinopril (ZESTRIL) 2 5 mg tablet Take 1 tablet (2 5 mg total) by mouth daily   • LORazepam (ATIVAN) 0 5 mg tablet Take 0 5 mg by mouth in the morning and 0 5 mg in the evening and 0 5 mg before bedtime   8AM, 2PM, 8PM    • Menthol (Halls Cough Drops) 5 8 MG LOZG Apply 1 lozenge (5 8 mg total) to the mouth or throat 4 (four) times a day as needed (cough)   • metFORMIN (GLUCOPHAGE) 1000 MG tablet Take 1 tablet (1,000 mg total) by mouth 2 (two) times a day with meals   • Refresh Optive 0 5-0 9 % SOLN    • sitaGLIPtin (JANUVIA) 100 mg tablet Take 1 tablet (100 mg total) by mouth daily Daily after breakfast   • Sunscreen SPF50 LOTN Apply topically if needed (apply prior to sun exposure)   • traZODone (DESYREL) 100 mg tablet    • calcium carbonate (OYSTER SHELL,OSCAL) 500 mg Take 1 tablet by mouth daily with breakfast   • divalproex sodium (DEPAKOTE) 500 mg EC tablet Take 1 tablet (500 mg total) by mouth 2 (two) times a day   • famotidine (PEPCID) 20 mg tablet Take 1 tablet (20 mg total) by mouth 2 (two) times a day for 14 days   • loratadine (CLARITIN) 10 mg tablet Take 1 tablet (10 mg total) by mouth daily   • propranolol (INDERAL) 20 mg tablet Take 1 tablet (20 mg total) by mouth every 12 (twelve) hours       Objective     /85   Pulse 83   Temp 97 9 °F (36 6 °C)   Resp 18   Wt 88 7 kg (195 lb 9 6 oz)   BMI 28 89 kg/m²     Physical Exam  Vitals reviewed  Constitutional:       General: He is not in acute distress  Appearance: Normal appearance  He is not ill-appearing, toxic-appearing or diaphoretic  HENT:      Head: Normocephalic and atraumatic  Eyes:      General:         Right eye: No discharge  Left eye: No discharge  Conjunctiva/sclera: Conjunctivae normal    Cardiovascular:      Rate and Rhythm: Normal rate and regular rhythm  Pulses: Normal pulses  Heart sounds: Normal heart sounds  Pulmonary:      Effort: Pulmonary effort is normal  No respiratory distress  Breath sounds: Normal breath sounds  No wheezing  Abdominal:      General: Abdomen is flat  Bowel sounds are normal  There is no distension  Palpations: Abdomen is soft  Tenderness: There is no abdominal tenderness  There is no guarding  Musculoskeletal:         General: Tenderness present  Normal range of motion  Right lower leg: No edema  Left lower leg: No edema  Comments: Tenderness to touch around lateral knee/ calf  Normal ROM  No swelling or effusion noted   Skin:     General: Skin is warm and dry  Neurological:      Mental Status: He is alert  Mental status is at baseline  Psychiatric:         Mood and Affect: Mood normal          Behavior: Behavior normal          Thought Content:  Thought content normal          Judgment: Judgment normal        Libia Horn MD

## 2023-06-27 ENCOUNTER — PATIENT OUTREACH (OUTPATIENT)
Dept: FAMILY MEDICINE CLINIC | Facility: CLINIC | Age: 43
End: 2023-06-27

## 2023-06-27 NOTE — PROGRESS NOTES
Outpatient RN Care Manager Note    Chart reviewed  Patient seen at Urgent Care on 6/18 and office visit on 6/23 for pain in right lower extremity  Patient was administered Toradol 30 mg intramuscular injection on 6/23 and prescribed lidocaine 4% cream, apply topically for mild pain  RN CM called patient and spoke to [address] [place] [address]  He states the patient is doing better and requests a RC to the patient at 1 pm   Will attempt to contact the patient at 1 pm     Late entry:  TC to [facility] 37 to speak with at 2:30 and no answer  Left message for return call  Provided contact information in message

## 2023-07-07 ENCOUNTER — OFFICE VISIT (OUTPATIENT)
Dept: URGENT CARE | Age: 43
End: 2023-07-07
Payer: MEDICARE

## 2023-07-07 VITALS
TEMPERATURE: 97.8 F | SYSTOLIC BLOOD PRESSURE: 112 MMHG | OXYGEN SATURATION: 100 % | HEART RATE: 90 BPM | DIASTOLIC BLOOD PRESSURE: 68 MMHG | RESPIRATION RATE: 14 BRPM

## 2023-07-07 DIAGNOSIS — J02.9 SORE THROAT: ICD-10-CM

## 2023-07-07 DIAGNOSIS — J02.0 STREP PHARYNGITIS: Primary | ICD-10-CM

## 2023-07-07 LAB — S PYO AG THROAT QL: POSITIVE

## 2023-07-07 PROCEDURE — 87880 STREP A ASSAY W/OPTIC: CPT

## 2023-07-07 PROCEDURE — G0463 HOSPITAL OUTPT CLINIC VISIT: HCPCS

## 2023-07-07 PROCEDURE — 99213 OFFICE O/P EST LOW 20 MIN: CPT

## 2023-07-07 RX ORDER — CEPHALEXIN 500 MG/1
500 CAPSULE ORAL EVERY 12 HOURS SCHEDULED
Qty: 20 CAPSULE | Refills: 0 | Status: SHIPPED | OUTPATIENT
Start: 2023-07-07 | End: 2023-07-17
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
## 2023-07-08 NOTE — PATIENT INSTRUCTIONS
I have prescribed an antibiotic for the infection. Please take the antibiotic as prescribed and finish the entire prescription. I recommend that the patient takes an over the counter probiotic or eats yogurt with live cultures in it [place]) to keep good bacteria in the gut and help prevent diarrhea. Wash hands frequently to prevent the spread of infection. Can use over the counter cough and cold medications to help with symptoms. Ibuprofen and/or tylenol as needed for pain or fever  Follow up with PCP in 3-5 days. Proceed to  ER if symptoms worsen.

## 2023-07-11 DIAGNOSIS — E11.65 TYPE 2 DIABETES MELLITUS WITH HYPERGLYCEMIA, WITHOUT LONG-TERM CURRENT USE OF INSULIN (HCC): ICD-10-CM

## 2023-07-12 RX ORDER — LISINOPRIL 2.5 MG/1
2.5 TABLET ORAL DAILY
Qty: 31 TABLET | Refills: 5 | Status: SHIPPED | OUTPATIENT
Start: 2023-07-12

## 2023-07-14 ENCOUNTER — TELEPHONE (OUTPATIENT)
Dept: FAMILY MEDICINE CLINIC | Facility: CLINIC | Age: 43
End: 2023-07-14

## 2023-07-14 ENCOUNTER — OFFICE VISIT (OUTPATIENT)
Dept: FAMILY MEDICINE CLINIC | Facility: CLINIC | Age: 43
End: 2023-07-14

## 2023-07-14 VITALS
TEMPERATURE: 98.3 F | BODY MASS INDEX: 27.7 KG/M2 | DIASTOLIC BLOOD PRESSURE: 73 MMHG | WEIGHT: 187 LBS | HEIGHT: 69 IN | HEART RATE: 111 BPM | OXYGEN SATURATION: 98 % | SYSTOLIC BLOOD PRESSURE: 110 MMHG

## 2023-07-14 DIAGNOSIS — M25.511 ACUTE PAIN OF RIGHT SHOULDER: ICD-10-CM

## 2023-07-14 DIAGNOSIS — R10.13 EPIGASTRIC PAIN: ICD-10-CM

## 2023-07-14 DIAGNOSIS — Z00.00 MEDICARE ANNUAL WELLNESS VISIT, SUBSEQUENT: Primary | ICD-10-CM

## 2023-07-14 DIAGNOSIS — E11.65 TYPE 2 DIABETES MELLITUS WITH HYPERGLYCEMIA, WITHOUT LONG-TERM CURRENT USE OF INSULIN (HCC): ICD-10-CM

## 2023-07-14 DIAGNOSIS — M79.604 PAIN OF RIGHT LOWER EXTREMITY: ICD-10-CM

## 2023-07-14 PROCEDURE — G0439 PPPS, SUBSEQ VISIT: HCPCS | Performed by: FAMILY MEDICINE

## 2023-07-14 RX ORDER — IBUPROFEN 600 MG/1
600 TABLET ORAL EVERY 6 HOURS PRN
Qty: 30 TABLET | Refills: 0 | Status: SHIPPED | OUTPATIENT
Start: 2023-07-14 | End: 2023-07-14

## 2023-07-14 RX ORDER — SENNOSIDES 8.6 MG
650 CAPSULE ORAL EVERY 8 HOURS PRN
Qty: 30 TABLET | Refills: 5 | Status: SHIPPED | OUTPATIENT
Start: 2023-07-14

## 2023-07-14 NOTE — ASSESSMENT & PLAN NOTE
Lab Results   Component Value Date    HGBA1C 6.6 (H) 05/15/2023     Last hemoglobin A1c 6.6 in May  We will schedule follow-up appointment in 3 months and check hemoglobin A1c at that time

## 2023-07-14 NOTE — TELEPHONE ENCOUNTER
Folder (To be completed by) -Dr. Leeroy Davis     Name of Form - Medical exam casenote    Color folder (Yellow    Form to be  Completed at visit or call when done    Patient was made aware of the 7-10 business day form policy.

## 2023-07-14 NOTE — ASSESSMENT & PLAN NOTE
Left lower extremity pain currently controlled with over-the-counter Tylenol  He also uses diclofenac gel which his caretaker states that he does not like to use.     Plan:  Because his kidney function is appropriate, will recommend adding ibuprofen as needed for pain if needed

## 2023-07-14 NOTE — PATIENT INSTRUCTIONS
Medicare Preventive Visit Patient Instructions  Thank you for completing your Welcome to Medicare Visit or Medicare Annual Wellness Visit today. Your next wellness visit will be due in one year (7/14/2024). The screening/preventive services that you may require over the next 5-10 years are detailed below. Some tests may not apply to you based off risk factors and/or age. Screening tests ordered at today's visit but not completed yet may show as past due. Also, please note that scanned in results may not display below. Preventive Screenings:  Service Recommendations Previous Testing/Comments   Colorectal Cancer Screening  · Colonoscopy    · Fecal Occult Blood Test (FOBT)/Fecal Immunochemical Test (FIT)  · Fecal DNA/Cologuard Test  · Flexible Sigmoidoscopy Age: 43-73 years old   Colonoscopy: every 10 years (May be performed more frequently if at higher risk)  OR  FOBT/FIT: every 1 year  OR  Cologuard: every 3 years  OR  Sigmoidoscopy: every 5 years  Screening may be recommended earlier than age 39 if at higher risk for colorectal cancer. Also, an individualized decision between you and your healthcare provider will decide whether screening between the ages of 77-80 would be appropriate.  Colonoscopy: Not on file  FOBT/FIT: Not on file  Cologuard: Not on file  Sigmoidoscopy: Not on file          Prostate Cancer Screening Individualized decision between patient and health care provider in men between ages of 53-66   Medicare will cover every 12 months beginning on the day after your 50th birthday PSA: 0.7 ng/mL     Screening Not Indicated     Hepatitis C Screening Once for adults born between 1945 and 1965  More frequently in patients at high risk for Hepatitis C Hep C Antibody: 08/30/2022    Screening Current   Diabetes Screening 1-2 times per year if you're at risk for diabetes or have pre-diabetes Fasting glucose: 126 mg/dL (1/5/2023)  A1C: 6.6 % (5/15/2023)  Screening Not Indicated  History Diabetes   Cholesterol Screening Once every 5 years if you don't have a lipid disorder. May order more often based on risk factors. Lipid panel: 01/05/2023  Screening Not Indicated  History Lipid Disorder      Other Preventive Screenings Covered by Medicare:  1. Abdominal Aortic Aneurysm (AAA) Screening: covered once if your at risk. You're considered to be at risk if you have a family history of AAA or a male between the age of 70-76 who smoking at least 100 cigarettes in your lifetime. 2. Lung Cancer Screening: covers low dose CT scan once per year if you meet all of the following conditions: (1) Age 48-67; (2) No signs or symptoms of lung cancer; (3) Current smoker or have quit smoking within the last 15 years; (4) You have a tobacco smoking history of at least 20 pack years (packs per day x number of years you smoked); (5) You get a written order from a healthcare provider. 3. Glaucoma Screening: covered annually if you're considered high risk: (1) You have diabetes OR (2) Family history of glaucoma OR (3)  aged 48 and older OR (3)  American aged 72 and older  3. Osteoporosis Screening: covered every 2 years if you meet one of the following conditions: (1) Have a vertebral abnormality; (2) On glucocorticoid therapy for more than 3 months; (3) Have primary hyperparathyroidism; (4) On osteoporosis medications and need to assess response to drug therapy. 5. HIV Screening: covered annually if you're between the age of 14-79. Also covered annually if you are younger than 13 and older than 72 with risk factors for HIV infection. For pregnant patients, it is covered up to 3 times per pregnancy.     Immunizations:  Immunization Recommendations   Influenza Vaccine Annual influenza vaccination during flu season is recommended for all persons aged >= 6 months who do not have contraindications   Pneumococcal Vaccine   * Pneumococcal conjugate vaccine = PCV13 (Prevnar 13), PCV15 (Vaxneuvance), PCV20 (Prevnar 20)  * Pneumococcal polysaccharide vaccine = PPSV23 (Pneumovax) Adults 2364 years old: 1-3 doses may be recommended based on certain risk factors  Adults 72 years old: 1-2 doses may be recommended based off what pneumonia vaccine you previously received   Hepatitis B Vaccine 3 dose series if at intermediate or high risk (ex: diabetes, end stage renal disease, liver disease)   Tetanus (Td) Vaccine - COST NOT COVERED BY MEDICARE PART B Following completion of primary series, a booster dose should be given every 10 years to maintain immunity against tetanus. Td may also be given as tetanus wound prophylaxis. Tdap Vaccine - COST NOT COVERED BY MEDICARE PART B Recommended at least once for all adults. For pregnant patients, recommended with each pregnancy. Shingles Vaccine (Shingrix) - COST NOT COVERED BY MEDICARE PART B  2 shot series recommended in those aged 48 and above     Health Maintenance Due:      Topic Date Due   • HIV Screening  Completed   • Hepatitis C Screening  Completed     Immunizations Due:      Topic Date Due   • COVID-19 Vaccine (2 - Pfizer series) 12/21/2021   • Pneumococcal Vaccine: Pediatrics (0 to 5 Years) and At-Risk Patients (6 to 59 Years) (2 - PPSV23 if available, else PCV20) 01/28/2022   • Influenza Vaccine (1) 09/01/2023     Advance Directives   What are advance directives? Advance directives are legal documents that state your wishes and plans for medical care. These plans are made ahead of time in case you lose your ability to make decisions for yourself. Advance directives can apply to any medical decision, such as the treatments you want, and if you want to donate organs. What are the types of advance directives? There are many types of advance directives, and each state has rules about how to use them. You may choose a combination of any of the following:  · Living will: This is a written record of the treatment you want.  You can also choose which treatments you do not want, which to limit, and which to stop at a certain time. This includes surgery, medicine, IV fluid, and tube feedings. · Durable power of  for [facility]): This is a written record that states who you want to make healthcare choices for you when you are unable to make them for yourself. This person, called a proxy, is usually a family member or a friend. You may choose more than 1 proxy. · Do not resuscitate (DNR) order:  A DNR order is used in case your heart stops beating or you stop breathing. It is a request not to have certain forms of treatment, such as CPR. A DNR order may be included in other types of advance directives. · Medical directive: This covers the care that you want if you are in a coma, near death, or unable to make decisions for yourself. You can list the treatments you want for each condition. Treatment may include pain medicine, surgery, blood transfusions, dialysis, IV or tube feedings, and a ventilator (breathing machine). · Values history: This document has questions about your views, beliefs, and how you feel and think about life. This information can help others choose the care that you would choose. Why are advance directives important? An advance directive helps you control your care. Although spoken wishes may be used, it is better to have your wishes written down. Spoken wishes can be misunderstood, or not followed. Treatments may be given even if you do not want them. An advance directive may make it easier for your family to make difficult choices about your care. Weight Management   Why it is important to manage your weight:  Being overweight increases your risk of health conditions such as heart disease, high blood pressure, type 2 diabetes, and certain types of cancer. It can also increase your risk for osteoarthritis, sleep apnea, and other respiratory problems. Aim for a slow, steady weight loss.  Even a small amount of weight loss can lower your risk of health problems. How to lose weight safely:  A safe and healthy way to lose weight is to eat fewer calories and get regular exercise. You can lose up about 1 pound a week by decreasing the number of calories you eat by 500 calories each day. Healthy meal plan for weight management:  A healthy meal plan includes a variety of foods, contains fewer calories, and helps you stay healthy. A healthy meal plan includes the following:  · Eat whole-grain foods more often. A healthy meal plan should contain fiber. Fiber is the part of grains, fruits, and vegetables that is not broken down by your body. Whole-grain foods are healthy and provide extra fiber in your diet. Some examples of whole-grain foods are whole-wheat breads and pastas, oatmeal, brown rice, and bulgur. · Eat a variety of vegetables every day. Include dark, leafy greens such as spinach, kale, stu greens, and mustard greens. Eat yellow and orange vegetables such as carrots, sweet potatoes, and winter squash. · Eat a variety of fruits every day. Choose fresh or canned fruit (canned in its own juice or light syrup) instead of juice. Fruit juice has very little or no fiber. · Eat low-fat dairy foods. Drink fat-free (skim) milk or 1% milk. Eat fat-free yogurt and low-fat cottage cheese. Try low-fat cheeses such as mozzarella and other reduced-fat cheeses. · Choose meat and other protein foods that are low in fat. Choose beans or other legumes such as split peas or lentils. Choose fish, skinless poultry (chicken or turkey), or lean cuts of red meat (beef or pork). Before you cook meat or poultry, cut off any visible fat. · Use less fat and oil. Try baking foods instead of frying them. Add less fat, such as margarine, sour cream, regular salad dressing and mayonnaise to foods. Eat fewer high-fat foods. Some examples of high-fat foods include french fries, doughnuts, ice cream, and cakes. · Eat fewer sweets.   Limit foods and drinks that are high in sugar. This includes candy, cookies, regular soda, and sweetened drinks. Exercise:  Exercise at least 30 minutes per day on most days of the week. Some examples of exercise include walking, biking, dancing, and swimming. You can also fit in more physical activity by taking the stairs instead of the elevator or parking farther away from stores. Ask your healthcare provider about the best exercise plan for you. © Copyright [employer] 2018 Information is for End User's use only and may not be sold, redistributed or otherwise used for commercial purposes.  All illustrations and images included in CareNotes® are the copyrighted property of [employer] or  [address]
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
## 2023-07-17 DIAGNOSIS — Z00.00 ANNUAL PHYSICAL EXAM: Primary | ICD-10-CM

## 2023-07-17 RX ORDER — VITAMIN E 268 MG
400 CAPSULE ORAL DAILY
Qty: 30 CAPSULE | Refills: 5 | Status: SHIPPED | OUTPATIENT
Start: 2023-07-17

## 2023-07-25 ENCOUNTER — PATIENT OUTREACH (OUTPATIENT)
Dept: FAMILY MEDICINE CLINIC | Facility: CLINIC | Age: 43
End: 2023-07-25

## 2023-07-25 NOTE — PROGRESS NOTES
Outpatient RN Care Manager Note    Per chart review the patient was seen by Dr. Yazmin Vasquez on 7/14 for his medicare annual wellness visit. Patient HGBA1C on 5/23 was 6.6. Patient reports symptom of cough usually happening postprandially and occasional diarrhea. Patient reports smoking when he is with his brother and encouraged by provider to not smoke. Patient prescribed ibuprofen 600 mg every 6 hours for mild pain and BMP and HGBA1C around 10/14/23. RN [facility] called and spoke with the patient's caregiver, Shubham Esteves. He states the patient will be receiving training through [facility] to assist him with activities of daily living. The patient is being managed by the group staff and his health care needs are being met. Both caregiver and RN CM are in agreement the patient does not need Chronic Care Management at this time. Will close referral to Chronic Care Management. The caregiver has my contact information for any prn needs in the future. Chronic Care Management Program Consent:    Patient informed of availability of Chronic Care Management services. The services will billed monthly by their Primary Care Provider only. Patient is informed there may be a monthly cost sharing associated with the Chronic Care Management services. Patient is aware that financial counseling is available to assist with any co-pay questions or concerns. Chronic Care Management services include:    • 24/7 access to care. • Comprehensive plan of care created by the provider. • Individualized care planning by the care manager(s). • Transitional care support. The patient is informed that they have the right to stop Chronic Care Management services at anytime. Patient consents to Chronic Care Management services? no    Patient informed that consent is needed only once unless the patient switches qualifying providers.

## 2023-07-28 RX ORDER — ASPIRIN 81 MG
TABLET, DELAYED RELEASE (ENTERIC COATED) ORAL
COMMUNITY
Start: 2023-07-13 | End: 2023-08-07 | Stop reason: SDUPTHER

## 2023-07-28 RX ORDER — CHLORPROMAZINE HYDROCHLORIDE 200 MG/1
TABLET, FILM COATED ORAL
COMMUNITY
Start: 2023-06-21

## 2023-07-28 RX ORDER — FLUORIDE TOOTHPASTE
TOOTHPASTE DENTAL
COMMUNITY
Start: 2023-06-21

## 2023-07-28 RX ORDER — ARIPIPRAZOLE 20 MG/1
TABLET ORAL
COMMUNITY
Start: 2023-07-13

## 2023-08-02 ENCOUNTER — OFFICE VISIT (OUTPATIENT)
Dept: GASTROENTEROLOGY | Facility: AMBULARY SURGERY CENTER | Age: 43
End: 2023-08-02
Payer: MEDICARE

## 2023-08-02 ENCOUNTER — TELEPHONE (OUTPATIENT)
Dept: FAMILY MEDICINE CLINIC | Facility: CLINIC | Age: 43
End: 2023-08-02

## 2023-08-02 VITALS
DIASTOLIC BLOOD PRESSURE: 80 MMHG | SYSTOLIC BLOOD PRESSURE: 114 MMHG | HEART RATE: 94 BPM | BODY MASS INDEX: 27.85 KG/M2 | OXYGEN SATURATION: 99 % | WEIGHT: 188 LBS | HEIGHT: 69 IN

## 2023-08-02 DIAGNOSIS — R10.13 EPIGASTRIC PAIN: Primary | ICD-10-CM

## 2023-08-02 PROCEDURE — 99213 OFFICE O/P EST LOW 20 MIN: CPT | Performed by: INTERNAL MEDICINE
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
## 2023-08-03 ENCOUNTER — TELEPHONE (OUTPATIENT)
Dept: FAMILY MEDICINE CLINIC | Facility: CLINIC | Age: 43
End: 2023-08-03

## 2023-08-03 ENCOUNTER — OFFICE VISIT (OUTPATIENT)
Dept: AUDIOLOGY | Age: 43
End: 2023-08-03
Payer: MEDICARE

## 2023-08-03 DIAGNOSIS — H90.3 SENSORY HEARING LOSS, BILATERAL: Primary | ICD-10-CM

## 2023-08-03 PROCEDURE — 92567 TYMPANOMETRY: CPT

## 2023-08-03 PROCEDURE — 92552 PURE TONE AUDIOMETRY AIR: CPT

## 2023-08-03 PROCEDURE — 92556 SPEECH AUDIOMETRY COMPLETE: CPT

## 2023-08-03 NOTE — PROGRESS NOTES
HEARING EVALUATION    Name:  Shu Gillette  :  1980  Age:  40 y.o. Date of Evaluation: 23     History: Annual Hearing Test  Reason for visit: Shu Gillette is being seen today at the request of Dr. Pepe Villeda for an evaluation of hearing. Patient reports no changes to his hearing since his last evaluation. He has a known normal hearing sloping to mild likely sensorineural hearing loss, bilaterally. EVALUATION:    Otoscopic Evaluation:   Right Ear: Clear and healthy ear canal and tympanic membrane   Left Ear: Clear and healthy ear canal and tympanic membrane    Tympanometry:   Right: Type A - normal middle ear pressure and compliance   Left: Type A - normal middle ear pressure and compliance    Audiogram Results:  Pure tone testing revealed a normal sloping to mild likely sensorineural hearing loss bilaterally. Word recognition scores were excellent bilaterally. Fair reliability noted today due to several false positives. *see attached audiogram      RECOMMENDATIONS:  Annual hearing eval, Return to [facility]. for F/U and Copy to Patient/Caregiver    PATIENT EDUCATION:   Discussed results and recommendations with Hayden Busby and his caretaker. Questions were addressed and the patient was encouraged to contact our department should concerns arise. Rito Sandifer, AuD.   Clinical Audiologist

## 2023-08-03 NOTE — TELEPHONE ENCOUNTER
Folder (To be completed by) -  Dr. Kaylynn Hernandez     Name of Form - Psychiatric PRN Approval Sheet PDS    Color folder (Yellow)    Form to be Faxed (Fax #), [phone]    Patient was made aware of the 7-10 business day form policy.

## 2023-08-07 DIAGNOSIS — K59.00 CONSTIPATION, UNSPECIFIED CONSTIPATION TYPE: Primary | ICD-10-CM

## 2023-08-07 RX ORDER — ASPIRIN 81 MG
2 TABLET, DELAYED RELEASE (ENTERIC COATED) ORAL DAILY
Qty: 62 TABLET | Refills: 5 | Status: SHIPPED | OUTPATIENT
Start: 2023-08-07

## 2023-08-08 ENCOUNTER — TELEPHONE (OUTPATIENT)
Dept: FAMILY MEDICINE CLINIC | Facility: CLINIC | Age: 43
End: 2023-08-08

## 2023-08-08 NOTE — TELEPHONE ENCOUNTER
Folder (To be completed by) -  Dr. Gifty Nicole     Name of Form - Confirmation of Physical    Color folder (Yellow)    Form to be Faxed (Fax #), [phone]    Patient was made aware of the 7-10 business day form policy.

## 2023-09-08 ENCOUNTER — TELEPHONE (OUTPATIENT)
Dept: FAMILY MEDICINE CLINIC | Facility: CLINIC | Age: 43
End: 2023-09-08

## 2023-09-08 DIAGNOSIS — J30.2 SEASONAL ALLERGIES: ICD-10-CM

## 2023-09-08 RX ORDER — GUAIFENESIN 200 MG/10ML
200 LIQUID ORAL 3 TIMES DAILY PRN
Qty: 120 ML | Refills: 2 | Status: SHIPPED | OUTPATIENT
Start: 2023-09-08

## 2023-09-14 ENCOUNTER — OFFICE VISIT (OUTPATIENT)
Dept: FAMILY MEDICINE CLINIC | Facility: CLINIC | Age: 43
End: 2023-09-14

## 2023-09-14 VITALS
SYSTOLIC BLOOD PRESSURE: 116 MMHG | BODY MASS INDEX: 27.99 KG/M2 | TEMPERATURE: 98.1 F | HEIGHT: 69 IN | DIASTOLIC BLOOD PRESSURE: 76 MMHG | WEIGHT: 189 LBS | OXYGEN SATURATION: 100 % | HEART RATE: 100 BPM

## 2023-09-14 DIAGNOSIS — J02.9 SORE THROAT: Primary | ICD-10-CM

## 2023-09-14 PROCEDURE — 99213 OFFICE O/P EST LOW 20 MIN: CPT | Performed by: FAMILY MEDICINE

## 2023-09-14 NOTE — PROGRESS NOTES
Name: Danielle Gaming      : 1980      MRN: [id number]  Encounter Provider: Oj Abarca DO  Encounter Date: 2023   Encounter department: [facility]    Assessment & Plan     1. Sore throat  Assessment & Plan:  Per patient, this occurred following outburst of anger last night that involved screaming. Improved with some fluids. Overall feeling well. No sick contacts. Per group home, pt required to follow up due to complaints this morning. Plan:  - continue supportive care  - monitor for changes in symptoms  - f/u prn            Subjective      HPI     41 yo F presents with caretaker from group home with concerns of congestion and sore throat. Pt reports outburst of anger last night with screaming and this morning he had some congestion and his throat felt dry. He overall feels well now; has been drinking fluids. Denies any fevers, chills, SOB, CP, palpitation, headache, abdominal pain. No sick contacts. Review of Systems   Constitutional: Negative for chills, fatigue and fever. HENT: Negative for sinus pain, sneezing and sore throat. Respiratory: Negative for apnea, choking, chest tightness and shortness of breath. Cardiovascular: Negative for chest pain and palpitations. Gastrointestinal: Negative for abdominal distention and abdominal pain.        Current Outpatient Medications on File Prior to Visit   Medication Sig   • acetaminophen (TYLENOL) 650 mg CR tablet Take 1 tablet (650 mg total) by mouth every 8 (eight) hours as needed for mild pain   • Alcohol Swabs (Curity Alcohol Preps) 70 % PADS Use if needed (when checking blood glucose)   • aluminum-magnesium hydroxide-simethicone (MAALOX) 200-200-20 MG/5ML SUSP Take 20 mL by mouth 4 (four) times a day (before meals and at bedtime)   • ARIPiprazole (ABILIFY) 10 mg tablet Take 10 mg by mouth daily   • ARIPiprazole (ABILIFY) 20 MG tablet    • atorvastatin (LIPITOR) 40 mg tablet Take 1 tablet (40 mg total) by mouth daily at bedtime   • B Complex Vitamins (B Complex 50) TABS Take 1 tablet by mouth daily 1 cap by mouth daily @ 8am   • bismuth subsalicylate (PEPTO BISMOL) 524 mg/30 mL oral suspension Take 15 mL (262 mg total) by mouth every 6 (six) hours as needed for indigestion   • chlorproMAZINE (THORAZINE) 100 mg tablet Take 100 mg by mouth 2 (two) times a day   • chlorproMAZINE (THORAZINE) 200 mg tablet    • cholecalciferol (VITAMIN D3) 1,000 units tablet Take 1 tablet (1,000 Units total) by mouth daily   • divalproex sodium (DEPAKOTE ER) 250 mg 24 hr tablet Take 250 mg by mouth daily at bedtime   • divalproex sodium (DEPAKOTE ER) 500 mg 24 hr tablet Take 500 mg by mouth every morning   • glucose blood (True Metrix Blood Glucose Test) test strip Use 1 each 2 (two) times a week Use as instructed   • glucose monitoring kit (FREESTYLE) monitoring kit 1 each by Does not apply route 2 (two) times a week   • guaiFENesin (ROBITUSSIN) 100 MG/5ML oral liquid Take 10 mL (200 mg total) by mouth 3 (three) times a day as needed for cough   • Lancets (freestyle) lancets Use to test blood sugars 2x weekly on Mon & Thurs @ 8AM   • levothyroxine 25 mcg tablet Take 1 tablet (25 mcg total) by mouth daily in the early morning   • lidocaine (LMX) 4 % cream Apply topically as needed for mild pain   • lisinopril (ZESTRIL) 2.5 mg tablet Take 1 tablet (2.5 mg total) by mouth daily   • LORazepam (ATIVAN) 0.5 mg tablet Take 0.5 mg by mouth in the morning and 0.5 mg in the evening and 0.5 mg before bedtime.  8AM, 2PM, 8PM.   • Menthol (Halls Cough Drops) 5.8 MG LOZG Apply 1 lozenge (5.8 mg total) to the mouth or throat 4 (four) times a day as needed (cough)   • metFORMIN (GLUCOPHAGE) 1000 MG tablet Take 1 tablet (1,000 mg total) by mouth 2 (two) times a day with meals   • Mouthwashes (Biotene Dry Mouth) LIQD    • Refresh Optive 0.5-0.9 % SOLN    • Senna Plus 8.6-50 MG per tablet Take 2 tablets by mouth daily   • sitaGLIPtin (JANUVIA) 100 mg tablet Take 1 tablet (100 mg total) by mouth daily Daily after breakfast   • Sunscreen SPF50 LOTN Apply topically if needed (apply prior to sun exposure)   • traZODone (DESYREL) 100 mg tablet    • vitamin E, tocopherol, 400 units capsule Take 1 capsule (400 Units total) by mouth daily   • calcium carbonate (OYSTER SHELL,OSCAL) 500 mg Take 1 tablet by mouth daily with breakfast   • divalproex sodium (DEPAKOTE) 500 mg EC tablet Take 1 tablet (500 mg total) by mouth 2 (two) times a day   • famotidine (PEPCID) 20 mg tablet Take 1 tablet (20 mg total) by mouth 2 (two) times a day for 14 days   • loratadine (CLARITIN) 10 mg tablet Take 1 tablet (10 mg total) by mouth daily   • propranolol (INDERAL) 20 mg tablet Take 1 tablet (20 mg total) by mouth every 12 (twelve) hours       Objective     /76 (BP Location: Left arm, Patient Position: Sitting, Cuff Size: Standard)   Pulse 100   Temp 98.1 °F (36.7 °C) (Temporal)   Ht 5' 9" (1.753 m)   Wt 85.7 kg (189 lb)   SpO2 100%   BMI 27.91 kg/m²     Physical Exam  Constitutional:       Appearance: Normal appearance. He is normal weight. HENT:      Head: Normocephalic and atraumatic. Nose: Nose normal. No congestion. Mouth/Throat:      Mouth: Mucous membranes are moist.      Pharynx: Oropharynx is clear. No oropharyngeal exudate or posterior oropharyngeal erythema. Eyes:      Conjunctiva/sclera: Conjunctivae normal.   Cardiovascular:      Pulses: Normal pulses. Heart sounds: Normal heart sounds. Pulmonary:      Effort: Pulmonary effort is normal.      Breath sounds: Normal breath sounds. Neurological:      Mental Status: He is alert.    Psychiatric:         Mood and Affect: Mood normal.         Behavior: Behavior normal.       Gay Dennis DO

## 2023-09-14 NOTE — ASSESSMENT & PLAN NOTE
Per patient, this occurred following outburst of anger last night that involved screaming. Improved with some fluids. Overall feeling well. No sick contacts. Per group home, pt required to follow up due to complaints this morning.     Plan:  - continue supportive care  - monitor for changes in symptoms  - f/u prn

## 2023-09-18 ENCOUNTER — TELEPHONE (OUTPATIENT)
Dept: FAMILY MEDICINE CLINIC | Facility: CLINIC | Age: 43
End: 2023-09-18

## 2023-09-18 NOTE — TELEPHONE ENCOUNTER
Folder (to be completed by): Dr. Monster Díaz    Name of Form: Medical Examination [place]    Color Folder: Yellow    Form to be faxed to:  [phone]

## 2023-09-19 DIAGNOSIS — J02.9 SORE THROAT: Primary | ICD-10-CM

## 2023-09-19 RX ORDER — HYDROCORTISONE 1 G/100G
1 CREAM TOPICAL EVERY 2 HOUR PRN
Qty: 118 ML | Refills: 0 | Status: SHIPPED | OUTPATIENT
Start: 2023-09-19

## 2023-09-19 NOTE — TELEPHONE ENCOUNTER
Forms completed by Dr. Selma Shearer on 9/19/23, faxed to [phone]. Placed in the blue folder in the front to be scanned to chart.

## 2023-09-21 ENCOUNTER — OFFICE VISIT (OUTPATIENT)
Dept: URGENT CARE | Age: 43
End: 2023-09-21
Payer: MEDICARE

## 2023-09-21 VITALS
RESPIRATION RATE: 20 BRPM | HEIGHT: 69 IN | WEIGHT: 180 LBS | HEART RATE: 119 BPM | TEMPERATURE: 98.3 F | DIASTOLIC BLOOD PRESSURE: 70 MMHG | BODY MASS INDEX: 26.66 KG/M2 | SYSTOLIC BLOOD PRESSURE: 110 MMHG | OXYGEN SATURATION: 98 %

## 2023-09-21 DIAGNOSIS — J06.9 ACUTE URI: Primary | ICD-10-CM

## 2023-09-21 LAB
SARS-COV-2 AG UPPER RESP QL IA: NEGATIVE
VALID CONTROL: NORMAL

## 2023-09-21 PROCEDURE — 99214 OFFICE O/P EST MOD 30 MIN: CPT

## 2023-09-21 PROCEDURE — G0463 HOSPITAL OUTPT CLINIC VISIT: HCPCS

## 2023-09-21 PROCEDURE — 87811 SARS-COV-2 COVID19 W/OPTIC: CPT
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
## 2023-09-22 NOTE — PATIENT INSTRUCTIONS
Please  alternate ibuprofen and Tylenol as needed for fever and pain. Continue with OTC cough and cold medication. Drink plenty of fluids.

## 2023-09-27 ENCOUNTER — TELEPHONE (OUTPATIENT)
Dept: FAMILY MEDICINE CLINIC | Facility: CLINIC | Age: 43
End: 2023-09-27

## 2023-09-27 NOTE — TELEPHONE ENCOUNTER
Signature required.   Folder (to be completed by): Dr. Watson Clear    Name of Form: Person Directed Supports request for additional information    Color Folder: Liz Corpus    Form to be faxed to:  [phone]

## 2023-10-02 ENCOUNTER — APPOINTMENT (OUTPATIENT)
Dept: LAB | Age: 43
End: 2023-10-02
Payer: MEDICARE

## 2023-10-02 DIAGNOSIS — E11.65 TYPE 2 DIABETES MELLITUS WITH HYPERGLYCEMIA, WITHOUT LONG-TERM CURRENT USE OF INSULIN (HCC): ICD-10-CM

## 2023-10-02 LAB
ANION GAP SERPL CALCULATED.3IONS-SCNC: 9 MMOL/L
BUN SERPL-MCNC: 14 MG/DL (ref 5–25)
CALCIUM SERPL-MCNC: 8.1 MG/DL (ref 8.4–10.2)
CHLORIDE SERPL-SCNC: 101 MMOL/L (ref 96–108)
CO2 SERPL-SCNC: 29 MMOL/L (ref 21–32)
CREAT SERPL-MCNC: 1.32 MG/DL (ref 0.6–1.3)
EST. AVERAGE GLUCOSE BLD GHB EST-MCNC: 151 MG/DL
GFR SERPL CREATININE-BSD FRML MDRD: 66 ML/MIN/1.73SQ M
GLUCOSE P FAST SERPL-MCNC: 139 MG/DL (ref 65–99)
HBA1C MFR BLD: 6.9 %
POTASSIUM SERPL-SCNC: 4.2 MMOL/L (ref 3.5–5.3)
SODIUM SERPL-SCNC: 139 MMOL/L (ref 135–147)

## 2023-10-02 PROCEDURE — 83036 HEMOGLOBIN GLYCOSYLATED A1C: CPT

## 2023-10-02 PROCEDURE — 36415 COLL VENOUS BLD VENIPUNCTURE: CPT

## 2023-10-02 PROCEDURE — 80048 BASIC METABOLIC PNL TOTAL CA: CPT

## 2023-10-04 DIAGNOSIS — N18.2 CKD (CHRONIC KIDNEY DISEASE) STAGE 2, GFR 60-89 ML/MIN: Primary | ICD-10-CM

## 2023-10-09 ENCOUNTER — TELEPHONE (OUTPATIENT)
Dept: FAMILY MEDICINE CLINIC | Facility: CLINIC | Age: 43
End: 2023-10-09

## 2023-10-09 ENCOUNTER — RA CDI HCC (OUTPATIENT)
Dept: OTHER | Facility: HOSPITAL | Age: 43
End: 2023-10-09

## 2023-10-09 NOTE — PROGRESS NOTES
[facility] [address] [facility] coding opportunities          Chart Reviewed number of suggestions sent to Provider: 3     Patients Insurance     Medicare Insurance: [employer]        E11.22  W38.97  [id number]

## 2023-10-13 NOTE — TELEPHONE ENCOUNTER
Folder (To be completed by) -  Dr. Victor Manuel Wood     Name of Form - Prostate Clarification 10/13    Color folder (Yellow)     Form to be Faxed (Fax #), [phone]    Patient was made aware of the 7-10 business day form policy.

## 2023-10-16 ENCOUNTER — OFFICE VISIT (OUTPATIENT)
Dept: FAMILY MEDICINE CLINIC | Facility: CLINIC | Age: 43
End: 2023-10-16

## 2023-10-16 VITALS
HEART RATE: 77 BPM | WEIGHT: 198 LBS | TEMPERATURE: 97.5 F | BODY MASS INDEX: 29.24 KG/M2 | SYSTOLIC BLOOD PRESSURE: 134 MMHG | DIASTOLIC BLOOD PRESSURE: 93 MMHG | RESPIRATION RATE: 18 BRPM | OXYGEN SATURATION: 100 %

## 2023-10-16 DIAGNOSIS — E11.65 TYPE 2 DIABETES MELLITUS WITH HYPERGLYCEMIA, WITHOUT LONG-TERM CURRENT USE OF INSULIN (HCC): Primary | ICD-10-CM

## 2023-10-16 DIAGNOSIS — K59.00 CONSTIPATION, UNSPECIFIED CONSTIPATION TYPE: Primary | ICD-10-CM

## 2023-10-16 DIAGNOSIS — E11.65 TYPE 2 DIABETES MELLITUS WITH HYPERGLYCEMIA, WITHOUT LONG-TERM CURRENT USE OF INSULIN (HCC): ICD-10-CM

## 2023-10-16 DIAGNOSIS — K59.00 CONSTIPATION, UNSPECIFIED CONSTIPATION TYPE: ICD-10-CM

## 2023-10-16 PROCEDURE — 99213 OFFICE O/P EST LOW 20 MIN: CPT | Performed by: FAMILY MEDICINE

## 2023-10-16 RX ORDER — ALCOHOL ANTISEPTIC PADS
PADS, MEDICATED (EA) TOPICAL AS NEEDED
Qty: 200 EACH | Refills: 1 | Status: SHIPPED | OUTPATIENT
Start: 2023-10-16

## 2023-10-16 RX ORDER — POLYETHYLENE GLYCOL 3350 17 G/17G
17 POWDER, FOR SOLUTION ORAL DAILY PRN
Qty: 30 EACH | Refills: 3 | Status: SHIPPED | OUTPATIENT
Start: 2023-10-16

## 2023-10-16 RX ORDER — BISACODYL 5 MG/1
10 TABLET, DELAYED RELEASE ORAL DAILY PRN
Qty: 30 TABLET | Refills: 0 | Status: SHIPPED | OUTPATIENT
Start: 2023-10-16

## 2023-10-16 NOTE — ASSESSMENT & PLAN NOTE
HbA1c still at the goal of under 7. Patient and  state patient has had significant sugar intake including multiple sugary drinks, cake, etc. Patient was counseled to reduce this.      Plan   - Continue current DM management plan including metformin, sitagliptin   - Encourage improved nutrition to patient and    - Encourage physical activity as comorbidity tolerate   -Follow-up in 3 months to monitor diet and exercise changes as well as recheck hemoglobin A1c  Lab Results   Component Value Date    HGBA1C 6.9 (H) 10/02/2023

## 2023-10-16 NOTE — PROGRESS NOTES
Name: Ariadna Schumacher      : 1980      MRN: [id number]  Encounter Provider: Madelyn Jack DO  Encounter Date: 10/16/2023   Encounter department: [address]     1. Type 2 diabetes mellitus with hyperglycemia, without long-term current use of insulin (HCC)  Assessment & Plan:  HbA1c still at the goal of under 7. Patient and  state patient has had significant sugar intake including multiple sugary drinks, cake, etc. Patient was counseled to reduce this. Plan   - Continue current DM management plan including metformin, sitagliptin   - Encourage improved nutrition to patient and    - Encourage physical activity as comorbidity tolerate   -Follow-up in 3 months to monitor diet and exercise changes as well as recheck hemoglobin A1c  Lab Results   Component Value Date    HGBA1C 6.9 (H) 10/02/2023         2. Constipation, unspecified constipation type  -     polyethylene glycol (MIRALAX) 17 g packet; Take 17 g by mouth daily as needed (Constipation)      BMI Counseling: Body mass index is 29.24 kg/m². The BMI is above normal. Nutrition recommendations include decreasing fast food intake, limiting drinks that contain sugar and moderation in carbohydrate intake. Exercise recommendations include exercising 3-5 times per week. No pharmacotherapy was ordered. Rationale for BMI follow-up plan is due to patient being overweight or obese. Patient and group home care worker both report significant sugary drink consumption. Patient was counseled on reduction. Depression Screening and Follow-up Plan: Patient was screened for depression during today's encounter. They screened negative with a PHQ-2 score of 0. Subjective      Mr. Ariadna Schumacher is a 43 y/o M group home resident due to intellectual disability, OCD, schizophrenia disorder who presents for 3 month diabetes follow up. Most recent HbA1c is below 7.0.  Patient reports that he had foot tingling/numbness when he was diagnosed with diabetes but this has since disappeared likely due to the metformin prescription. He does state however that his diet is not the best and frequently reports high sugar intake (cakes), alongside adding sugar to his coffee and soda consumption. He was counseled on reducing this today. He denies headaches, fatigue, fevers, chest pain, SOB, night sweats, swelling, exertional chest pain/sob, numbness, tingling. He further denies SI/HI. He states he is doing well from psychiatric perspective. No other concerns discussed at this time. Review of Systems   Constitutional:  Positive for unexpected weight change. Negative for chills, fatigue and fever. Weight gain of 14 pounds since September   Patient present with    Pleasant, cooperative, noticeable speech stutter   Positive relationship with tired . HENT:  Negative for hearing loss and trouble swallowing. Eyes:  Negative for photophobia and visual disturbance. Respiratory:  Negative for cough, chest tightness, shortness of breath and wheezing. Cardiovascular:  Negative for chest pain, palpitations and leg swelling. Gastrointestinal:  Positive for diarrhea. Negative for blood in stool, constipation, nausea and vomiting. Mild dietary diarrhea one episode in last 2 months    Endocrine: Positive for polydipsia and polyuria. Genitourinary:  Negative for difficulty urinating and hematuria. Musculoskeletal:  Negative for back pain, joint swelling, neck pain and neck stiffness. Skin:  Negative for rash. Neurological:  Negative for dizziness, tremors, seizures, syncope, facial asymmetry, numbness and headaches. Used to get dizzy frequently before hypertension medications    Psychiatric/Behavioral:  Negative for dysphoric mood, self-injury and suicidal ideas. The patient is not nervous/anxious.         Current Outpatient Medications on File Prior to Visit   Medication Sig    acetaminophen (TYLENOL) 650 mg CR tablet Take 1 tablet (650 mg total) by mouth every 8 (eight) hours as needed for mild pain    Alcohol Swabs (Curity Alcohol Preps) 70 % PADS Use if needed (when checking blood glucose)    aluminum-magnesium hydroxide-simethicone (MAALOX) 200-200-20 MG/5ML SUSP Take 20 mL by mouth 4 (four) times a day (before meals and at bedtime)    ARIPiprazole (ABILIFY) 10 mg tablet Take 10 mg by mouth daily    ARIPiprazole (ABILIFY) 20 MG tablet     atorvastatin (LIPITOR) 40 mg tablet Take 1 tablet (40 mg total) by mouth daily at bedtime    B Complex Vitamins (B Complex 50) TABS Take 1 tablet by mouth daily 1 cap by mouth daily @ 8am    chlorproMAZINE (THORAZINE) 100 mg tablet Take 100 mg by mouth 2 (two) times a day    cholecalciferol (VITAMIN D3) 1,000 units tablet Take 1 tablet (1,000 Units total) by mouth daily    divalproex sodium (DEPAKOTE ER) 250 mg 24 hr tablet Take 250 mg by mouth daily at bedtime    divalproex sodium (DEPAKOTE ER) 500 mg 24 hr tablet Take 500 mg by mouth every morning    glucose blood (True Metrix Blood Glucose Test) test strip Use 1 each 2 (two) times a week Use as instructed    glucose monitoring kit (FREESTYLE) monitoring kit 1 each by Does not apply route 2 (two) times a week    guaiFENesin (ROBITUSSIN) 100 MG/5ML oral liquid Take 10 mL (200 mg total) by mouth 3 (three) times a day as needed for cough    Lancets (freestyle) lancets Use to test blood sugars 2x weekly on Mon & Thurs @ 8AM    levothyroxine 25 mcg tablet Take 1 tablet (25 mcg total) by mouth daily in the early morning    lisinopril (ZESTRIL) 2.5 mg tablet Take 1 tablet (2.5 mg total) by mouth daily    Menthol (Halls Cough Drops) 5.8 MG LOZG Apply 1 lozenge (5.8 mg total) to the mouth or throat 4 (four) times a day as needed (cough)    metFORMIN (GLUCOPHAGE) 1000 MG tablet Take 1 tablet (1,000 mg total) by mouth 2 (two) times a day with meals Refresh Optive 0.5-0.9 % SOLN     Senna Plus 8.6-50 MG per tablet Take 2 tablets by mouth daily    sitaGLIPtin (JANUVIA) 100 mg tablet Take 1 tablet (100 mg total) by mouth daily Daily after breakfast    Sunscreen SPF50 LOTN Apply topically if needed (apply prior to sun exposure)    traZODone (DESYREL) 100 mg tablet     vitamin E, tocopherol, 400 units capsule Take 1 capsule (400 Units total) by mouth daily    bismuth subsalicylate (PEPTO BISMOL) 524 mg/30 mL oral suspension Take 15 mL (262 mg total) by mouth every 6 (six) hours as needed for indigestion (Patient not taking: Reported on 9/21/2023)    calcium carbonate (OYSTER SHELL,OSCAL) 500 mg Take 1 tablet by mouth daily with breakfast    chlorproMAZINE (THORAZINE) 200 mg tablet  (Patient not taking: Reported on 9/21/2023)    divalproex sodium (DEPAKOTE) 500 mg EC tablet Take 1 tablet (500 mg total) by mouth 2 (two) times a day    famotidine (PEPCID) 20 mg tablet Take 1 tablet (20 mg total) by mouth 2 (two) times a day for 14 days    loratadine (CLARITIN) 10 mg tablet Take 1 tablet (10 mg total) by mouth daily    LORazepam (ATIVAN) 0.5 mg tablet Take 0.5 mg by mouth in the morning and 0.5 mg in the evening and 0.5 mg before bedtime.  8AM, 2PM, 8PM. (Patient not taking: Reported on 9/21/2023)    Mouthwashes (Biotene Dry Mouth) LIQD  (Patient not taking: Reported on 9/21/2023)    propranolol (INDERAL) 20 mg tablet Take 1 tablet (20 mg total) by mouth every 12 (twelve) hours    [DISCONTINUED] lidocaine (LMX) 4 % cream Apply topically as needed for mild pain (Patient not taking: Reported on 9/21/2023)    [DISCONTINUED] phenol (Sore Throat Spray) 1.4 % mucosal liquid Apply 1 spray to the mouth or throat every 2 (two) hours as needed (for sore throat) (Patient not taking: Reported on 9/21/2023)       Objective     /93   Pulse 77   Temp 97.5 °F (36.4 °C)   Resp 18   Wt 89.8 kg (198 lb)   SpO2 100%   BMI 29.24 kg/m²     Physical Exam  Vitals and nursing note reviewed. Constitutional:       General: He is not in acute distress. Appearance: Normal appearance. He is not ill-appearing. Comments: Present with , friendly with  and examiner. Pleasant, cooperative. With mild stutter to speech. HENT:      Head: Normocephalic and atraumatic. Right Ear: External ear normal.      Left Ear: External ear normal.      Nose: Nose normal. No congestion. Mouth/Throat:      Mouth: Mucous membranes are moist.      Pharynx: Oropharynx is clear. Eyes:      General: No scleral icterus. Extraocular Movements: Extraocular movements intact. Conjunctiva/sclera: Conjunctivae normal.   Neck:      Vascular: No carotid bruit. Cardiovascular:      Rate and Rhythm: Normal rate and regular rhythm. Pulses: Normal pulses. Heart sounds: Normal heart sounds. No murmur heard. No gallop. Pulmonary:      Effort: Pulmonary effort is normal. No respiratory distress. Breath sounds: Normal breath sounds. No stridor. No wheezing. Abdominal:      General: Abdomen is flat. Bowel sounds are normal. There is no distension. Palpations: Abdomen is soft. Tenderness: There is no abdominal tenderness. There is no right CVA tenderness or rebound. Musculoskeletal:         General: Normal range of motion. Cervical back: Normal range of motion and neck supple. No rigidity. Right lower leg: No edema. Left lower leg: No edema. Lymphadenopathy:      Cervical: No cervical adenopathy. Skin:     General: Skin is warm and dry. Capillary Refill: Capillary refill takes less than 2 seconds. Neurological:      General: No focal deficit present. Mental Status: He is alert and oriented to person, place, and time. Mental status is at baseline. Cranial Nerves: No cranial nerve deficit. Sensory: No sensory deficit. Motor: No weakness.    Psychiatric:         Mood and Affect: Mood normal. Behavior: Behavior normal.         Thought Content: Thought content normal.      Comments: Appears mildly anxious, stuttering, refused to take coat off on first weight check. However cooperative, friendly in exam.   Denies SI/HI, denies hallucination. States previous psych history but has not had SI/HI thoughts in past 2 years.         Tammy Nassar, DO

## 2023-10-18 LAB
LEFT EYE DIABETIC RETINOPATHY: NORMAL
RIGHT EYE DIABETIC RETINOPATHY: NORMAL

## 2023-10-25 DIAGNOSIS — G44.209 TENSION HEADACHE: ICD-10-CM

## 2023-10-25 RX ORDER — ALPHA LIPOIC ACID 200 MG
1 CAPSULE ORAL DAILY
Qty: 90 TABLET | Refills: 3 | Status: SHIPPED | OUTPATIENT
Start: 2023-10-25

## 2023-10-26 ENCOUNTER — TELEPHONE (OUTPATIENT)
Dept: FAMILY MEDICINE CLINIC | Facility: CLINIC | Age: 43
End: 2023-10-26

## 2023-10-26 NOTE — TELEPHONE ENCOUNTER
Signature Required    Dr Johann Chavira Directed Supports  Diagnoses and allergies    Yellow folder    Fax [phone]

## 2023-11-03 ENCOUNTER — TELEPHONE (OUTPATIENT)
Dept: FAMILY MEDICINE CLINIC | Facility: CLINIC | Age: 43
End: 2023-11-03

## 2023-11-06 ENCOUNTER — TELEPHONE (OUTPATIENT)
Dept: FAMILY MEDICINE CLINIC | Facility: CLINIC | Age: 43
End: 2023-11-06

## 2023-11-06 NOTE — TELEPHONE ENCOUNTER
Signature Required    Dr Jennifer Luciano Directed [facility] folder    Fax [phone]

## 2023-11-07 ENCOUNTER — APPOINTMENT (OUTPATIENT)
Dept: LAB | Facility: MEDICAL CENTER | Age: 43
End: 2023-11-07
Payer: MEDICARE

## 2023-11-07 DIAGNOSIS — N18.2 CKD (CHRONIC KIDNEY DISEASE) STAGE 2, GFR 60-89 ML/MIN: ICD-10-CM

## 2023-11-07 LAB
ANION GAP SERPL CALCULATED.3IONS-SCNC: 7 MMOL/L
BUN SERPL-MCNC: 13 MG/DL (ref 5–25)
CALCIUM SERPL-MCNC: 8.2 MG/DL (ref 8.4–10.2)
CHLORIDE SERPL-SCNC: 102 MMOL/L (ref 96–108)
CO2 SERPL-SCNC: 30 MMOL/L (ref 21–32)
CREAT SERPL-MCNC: 1.42 MG/DL (ref 0.6–1.3)
GFR SERPL CREATININE-BSD FRML MDRD: 60 ML/MIN/1.73SQ M
GLUCOSE P FAST SERPL-MCNC: 155 MG/DL (ref 65–99)
POTASSIUM SERPL-SCNC: 4.6 MMOL/L (ref 3.5–5.3)
SODIUM SERPL-SCNC: 139 MMOL/L (ref 135–147)

## 2023-11-07 PROCEDURE — 36415 COLL VENOUS BLD VENIPUNCTURE: CPT

## 2023-11-07 PROCEDURE — 80048 BASIC METABOLIC PNL TOTAL CA: CPT

## 2023-11-10 ENCOUNTER — TELEPHONE (OUTPATIENT)
Dept: FAMILY MEDICINE CLINIC | Facility: CLINIC | Age: 43
End: 2023-11-10

## 2023-11-10 ENCOUNTER — OFFICE VISIT (OUTPATIENT)
Dept: FAMILY MEDICINE CLINIC | Facility: CLINIC | Age: 43
End: 2023-11-10

## 2023-11-10 VITALS
OXYGEN SATURATION: 97 % | WEIGHT: 197.4 LBS | BODY MASS INDEX: 29.24 KG/M2 | SYSTOLIC BLOOD PRESSURE: 118 MMHG | DIASTOLIC BLOOD PRESSURE: 82 MMHG | HEART RATE: 82 BPM | HEIGHT: 69 IN | RESPIRATION RATE: 18 BRPM | TEMPERATURE: 97.2 F

## 2023-11-10 DIAGNOSIS — N18.31 STAGE 3A CHRONIC KIDNEY DISEASE (HCC): Primary | ICD-10-CM

## 2023-11-10 DIAGNOSIS — Z80.42 FAMILY HISTORY OF PROSTATE CANCER: ICD-10-CM

## 2023-11-10 LAB
CREAT UR-MCNC: 38 MG/DL
MICROALBUMIN UR-MCNC: <7 MG/L
MICROALBUMIN/CREAT 24H UR: <18 MG/G CREATININE (ref 0–30)
SL AMB  POCT GLUCOSE, UA: NEGATIVE
SL AMB LEUKOCYTE ESTERASE,UA: NEGATIVE
SL AMB POCT BILIRUBIN,UA: NEGATIVE
SL AMB POCT BLOOD,UA: NEGATIVE
SL AMB POCT CLARITY,UA: CLEAR
SL AMB POCT COLOR,UA: YELLOW
SL AMB POCT KETONES,UA: NEGATIVE
SL AMB POCT NITRITE,UA: NEGATIVE
SL AMB POCT PH,UA: 5
SL AMB POCT SPECIFIC GRAVITY,UA: 1.01
SL AMB POCT URINE PROTEIN: 15
SL AMB POCT UROBILINOGEN: 0.2

## 2023-11-10 PROCEDURE — 82570 ASSAY OF URINE CREATININE: CPT

## 2023-11-10 PROCEDURE — 81002 URINALYSIS NONAUTO W/O SCOPE: CPT | Performed by: FAMILY MEDICINE

## 2023-11-10 PROCEDURE — 99213 OFFICE O/P EST LOW 20 MIN: CPT | Performed by: FAMILY MEDICINE

## 2023-11-10 PROCEDURE — 82043 UR ALBUMIN QUANTITATIVE: CPT

## 2023-11-10 RX ORDER — EMOLLIENT COMBINATION NO.110
LOTION (ML) TOPICAL
COMMUNITY
Start: 2023-10-31

## 2023-11-10 NOTE — PROGRESS NOTES
Name: Clarice Pardo      : 1980      MRN: [id number]  Encounter Provider: Mirela Ayala DO  Encounter Date: 11/10/2023   Encounter department: [address]     1. Stage 3a chronic kidney disease [facility])  Assessment & Plan:  Lab Results   Component Value Date    EGFR 60 2023    EGFR 66 10/02/2023    EGFR 73 2023    CREATININE 1.42 (H) 2023    CREATININE 1.32 (H) 10/02/2023    CREATININE 1.22 2023     Cr slowly rising on last two checks now elevated to 1.42  GFR now 60  It's difficult to say whether or not the progressing kidney dysfunction is due to T2DM or post renal syndrome. BUN/Cr ratio is 8.9 suggesting, if anything, post-renal   Patient is not having symptoms of frequency, urgency, incomplete voiding, trickling, or frequent nighttime awakenings for urination   JUNIOR in the office today was WNL    Plan:  Repeat BMP in 1 month   Urine dip in the office was unremarkable   Albumin/Cr ratio pending   If kidney function continues to be worsening we can consider a 24 hour urine collection as well as a urology referral as patient does have a strong family history of prostate cancer. Orders:  -     Basic metabolic panel; Future; Expected date: 12/10/2023  -     POCT urine dip  -     Albumin / creatinine urine ratio; Future  -     Albumin / creatinine urine ratio    2. Family history of prostate cancer  Assessment & Plan:  Strong family history of prostate cancer including patient's father, paternal grandfather, as well as brother who is 52years old. JUNIOR in the office today was within normal limits, prostate smooth and no nodules appreciated. Plan:  Continue ongoing work-up to rule out postrenal contribution to worsening kidney function.              Subjective      HPI  28-year-old male with past medical history of type 2 diabetes as well as behavioral presents today for digital rectal exam per request of his caregivers as well as his family. Patient has a strong family history of prostate cancer including his father and paternal grandfather who both passed away from prostate cancer. His brother also has a history of issues with his prostate, although patient is not sure what they are. But he does note that they are not cancer. Patient does not have any symptoms including nighttime awakenings for nocturia, dribbling, incomplete voiding, urgency, frequency. He states that he has not had any issues with urination. He has had digital rectal exams in the past and is open to having one today. His PSA in the past has been within normal limits. Review of Systems   Constitutional:  Negative for chills and fever. HENT:  Negative for sore throat. Respiratory:  Negative for cough and shortness of breath. Cardiovascular:  Negative for chest pain and palpitations. Gastrointestinal:  Negative for abdominal pain, blood in stool, constipation, diarrhea, nausea and vomiting. Genitourinary:  Negative for decreased urine volume, difficulty urinating, dysuria, frequency, hematuria and urgency. Musculoskeletal:  Negative for arthralgias and back pain. Skin:  Negative for color change and rash. Neurological:  Negative for syncope and headaches. Psychiatric/Behavioral:  Negative for agitation and behavioral problems. All other systems reviewed and are negative.       Current Outpatient Medications on File Prior to Visit   Medication Sig    acetaminophen (TYLENOL) 650 mg CR tablet Take 1 tablet (650 mg total) by mouth every 8 (eight) hours as needed for mild pain    Alcohol Swabs (Curity Alcohol Preps) 70 % PADS Use if needed (when checking blood glucose)    aluminum-magnesium hydroxide-simethicone (MAALOX) 200-200-20 MG/5ML SUSP Take 20 mL by mouth 4 (four) times a day (before meals and at bedtime)    ARIPiprazole (ABILIFY) 10 mg tablet Take 10 mg by mouth daily    ARIPiprazole (ABILIFY) 20 MG tablet     atorvastatin (LIPITOR) 40 mg tablet Take 1 tablet (40 mg total) by mouth daily at bedtime    B Complex Vitamins (B Complex-B12) TABS Take 1 tablet by mouth daily 1 cap by mouth daily @ 8am    bisacodyl (DULCOLAX) 5 mg EC tablet Take 2 tablets (10 mg total) by mouth daily as needed for constipation    bismuth subsalicylate (PEPTO BISMOL) 524 mg/30 mL oral suspension Take 15 mL (262 mg total) by mouth every 6 (six) hours as needed for indigestion    chlorproMAZINE (THORAZINE) 100 mg tablet Take 100 mg by mouth 2 (two) times a day    cholecalciferol (VITAMIN D3) 1,000 units tablet Take 1 tablet (1,000 Units total) by mouth daily    divalproex sodium (DEPAKOTE ER) 250 mg 24 hr tablet Take 250 mg by mouth daily at bedtime    divalproex sodium (DEPAKOTE ER) 500 mg 24 hr tablet Take 500 mg by mouth every morning    Emollient (Eucerin Original Healing) LOTN     glucose blood (True Metrix Blood Glucose Test) test strip Use 1 each 2 (two) times a week Use as instructed    glucose monitoring kit (FREESTYLE) monitoring kit 1 each by Does not apply route 2 (two) times a week    guaiFENesin (ROBITUSSIN) 100 MG/5ML oral liquid Take 10 mL (200 mg total) by mouth 3 (three) times a day as needed for cough    Lancets (freestyle) lancets Use to test blood sugars 2x weekly on Mon & Thurs @ 8AM    levothyroxine 25 mcg tablet Take 1 tablet (25 mcg total) by mouth daily in the early morning    lisinopril (ZESTRIL) 2.5 mg tablet Take 1 tablet (2.5 mg total) by mouth daily    LORazepam (ATIVAN) 0.5 mg tablet Take 0.5 mg by mouth 3 (three) times a day 8AM, 2PM, 8PM    Menthol (Halls Cough Drops) 5.8 MG LOZG Apply 1 lozenge (5.8 mg total) to the mouth or throat 4 (four) times a day as needed (cough)    metFORMIN (GLUCOPHAGE) 1000 MG tablet Take 1 tablet (1,000 mg total) by mouth 2 (two) times a day with meals    Mouthwashes (Biotene Dry Mouth) LIQD     polyethylene glycol (MIRALAX) 17 g packet Take 17 g by mouth daily as needed (Constipation)    Refresh Optive 0.5-0.9 % SOLN     Senna Plus 8.6-50 MG per tablet Take 2 tablets by mouth daily    sitaGLIPtin (JANUVIA) 100 mg tablet Take 1 tablet (100 mg total) by mouth daily Daily after breakfast    Sunscreen SPF50 LOTN Apply topically if needed (apply prior to sun exposure)    traZODone (DESYREL) 100 mg tablet     vitamin E, tocopherol, 400 units capsule Take 1 capsule (400 Units total) by mouth daily    calcium carbonate (OYSTER SHELL,OSCAL) 500 mg Take 1 tablet by mouth daily with breakfast    chlorproMAZINE (THORAZINE) 200 mg tablet  (Patient not taking: Reported on 9/21/2023)    divalproex sodium (DEPAKOTE) 500 mg EC tablet Take 1 tablet (500 mg total) by mouth 2 (two) times a day    famotidine (PEPCID) 20 mg tablet Take 1 tablet (20 mg total) by mouth 2 (two) times a day for 14 days    loratadine (CLARITIN) 10 mg tablet Take 1 tablet (10 mg total) by mouth daily    propranolol (INDERAL) 20 mg tablet Take 1 tablet (20 mg total) by mouth every 12 (twelve) hours       Objective     /82 (BP Location: Left arm, Patient Position: Sitting, Cuff Size: Large)   Pulse 82   Temp (!) 97.2 °F (36.2 °C) (Temporal)   Resp 18   Ht 5' 9" (1.753 m)   Wt 89.5 kg (197 lb 6.4 oz)   SpO2 97%   BMI 29.15 kg/m²     Physical Exam  Vitals reviewed. Constitutional:       General: He is not in acute distress. Appearance: Normal appearance. He is normal weight. He is not ill-appearing. HENT:      Head: Normocephalic and atraumatic. Mouth/Throat:      Mouth: Mucous membranes are moist.      Pharynx: Oropharynx is clear. Eyes:      Conjunctiva/sclera: Conjunctivae normal.   Cardiovascular:      Rate and Rhythm: Normal rate and regular rhythm. Heart sounds: No murmur heard. No friction rub. No gallop. Pulmonary:      Effort: Pulmonary effort is normal. No respiratory distress. Breath sounds: Normal breath sounds. No wheezing, rhonchi or rales. Abdominal:      General: Abdomen is flat.  Bowel sounds are normal.      Palpations: Abdomen is soft. Genitourinary:     Prostate: Normal.      Comments: Digital rectal exam in the office today: Prostate is smooth, firm. No nodules appreciated at this time. Musculoskeletal:      Cervical back: Normal range of motion. Skin:     General: Skin is warm and dry. Neurological:      General: No focal deficit present. Mental Status: He is alert.    Psychiatric:         Mood and Affect: Mood normal.         Behavior: Behavior normal.       Tamia Goel, DO

## 2023-11-10 NOTE — TELEPHONE ENCOUNTER
Form completed at visit 11/10/23. Original copy returned to patient's caregiver. Copy made to scan into patient's chart. Placed in yellow in the front to scan into patient's chart.

## 2023-11-10 NOTE — ASSESSMENT & PLAN NOTE
Lab Results   Component Value Date    EGFR 60 11/07/2023    EGFR 66 10/02/2023    EGFR 73 05/04/2023    CREATININE 1.42 (H) 11/07/2023    CREATININE 1.32 (H) 10/02/2023    CREATININE 1.22 05/04/2023     Cr slowly rising on last two checks now elevated to 1.42  GFR now 60  It's difficult to say whether or not the progressing kidney dysfunction is due to T2DM or post renal syndrome. BUN/Cr ratio is 8.9 suggesting, if anything, post-renal   Patient is not having symptoms of frequency, urgency, incomplete voiding, trickling, or frequent nighttime awakenings for urination   JUNIOR in the office today was WNL    Plan:  Repeat BMP in 1 month   Urine dip in the office was unremarkable   Albumin/Cr ratio pending   If kidney function continues to be worsening we can consider a 24 hour urine collection as well as a urology referral as patient does have a strong family history of prostate cancer.

## 2023-11-10 NOTE — TELEPHONE ENCOUNTER
Folder (To be completed by) -Dr. Malu Bales     Name of Form -      PDS Medical examination casenote    Color folder (Yellow    Form to be completed at visit     Patient was made aware of the 7-10 business day form policy.

## 2023-11-10 NOTE — TELEPHONE ENCOUNTER
Form completed. Original copy returned to the patient's caregiver. Copy made to scan into the chart. Placed in yellow folder in the front office to scan into chart.

## 2023-11-10 NOTE — ASSESSMENT & PLAN NOTE
Strong family history of prostate cancer including patient's father, paternal grandfather, as well as brother who is 52years old. JUNIOR in the office today was within normal limits, prostate smooth and no nodules appreciated. Plan:  Continue ongoing work-up to rule out postrenal contribution to worsening kidney function.

## 2023-11-17 DIAGNOSIS — E78.5 HYPERLIPIDEMIA, UNSPECIFIED HYPERLIPIDEMIA TYPE: ICD-10-CM

## 2023-11-17 RX ORDER — CALCIUM CARBONATE 500(1250)
1 TABLET ORAL
Qty: 30 TABLET | Refills: 3 | Status: SHIPPED | OUTPATIENT
Start: 2023-11-17 | End: 2024-03-16

## 2023-11-27 DIAGNOSIS — F25.0 SCHIZOAFFECTIVE DISORDER, BIPOLAR TYPE (HCC): Chronic | ICD-10-CM

## 2023-11-28 RX ORDER — LORATADINE 10 MG/1
10 TABLET ORAL DAILY
Qty: 30 TABLET | Refills: 5 | Status: SHIPPED | OUTPATIENT
Start: 2023-11-28 | End: 2024-05-26

## 2023-12-04 ENCOUNTER — APPOINTMENT (EMERGENCY)
Dept: CT IMAGING | Facility: HOSPITAL | Age: 43
End: 2023-12-04
Payer: MEDICARE

## 2023-12-04 ENCOUNTER — HOSPITAL ENCOUNTER (EMERGENCY)
Facility: HOSPITAL | Age: 43
Discharge: HOME | End: 2023-12-05
Attending: EMERGENCY MEDICINE
Payer: MEDICARE

## 2023-12-04 DIAGNOSIS — R10.84 GENERALIZED ABDOMINAL PAIN: Primary | ICD-10-CM

## 2023-12-04 DIAGNOSIS — R11.2 NAUSEA AND VOMITING: ICD-10-CM

## 2023-12-04 DIAGNOSIS — R19.7 DIARRHEA: ICD-10-CM

## 2023-12-04 DIAGNOSIS — E86.0 DEHYDRATION: ICD-10-CM

## 2023-12-04 LAB
ALBUMIN SERPL BCP-MCNC: 3.8 G/DL (ref 3.5–5)
ALP SERPL-CCNC: 62 U/L (ref 34–104)
ALT SERPL W P-5'-P-CCNC: 35 U/L (ref 7–52)
ANION GAP SERPL CALCULATED.3IONS-SCNC: 7 MMOL/L
AST SERPL W P-5'-P-CCNC: 21 U/L (ref 13–39)
BASOPHILS # BLD AUTO: 0.03 THOUSANDS/ÂΜL (ref 0–0.1)
BASOPHILS NFR BLD AUTO: 0 % (ref 0–1)
BILIRUB SERPL-MCNC: 0.45 MG/DL (ref 0.2–1)
BUN SERPL-MCNC: 15 MG/DL (ref 5–25)
CALCIUM SERPL-MCNC: 8.7 MG/DL (ref 8.4–10.2)
CHLORIDE SERPL-SCNC: 101 MMOL/L (ref 96–108)
CO2 SERPL-SCNC: 28 MMOL/L (ref 21–32)
CREAT SERPL-MCNC: 1.27 MG/DL (ref 0.6–1.3)
EOSINOPHIL # BLD AUTO: 0.15 THOUSAND/ÂΜL (ref 0–0.61)
EOSINOPHIL NFR BLD AUTO: 2 % (ref 0–6)
ERYTHROCYTE [DISTWIDTH] IN BLOOD BY AUTOMATED COUNT: 11.7 % (ref 11.6–15.1)
GFR SERPL CREATININE-BSD FRML MDRD: 69 ML/MIN/1.73SQ M
GLUCOSE SERPL-MCNC: 141 MG/DL (ref 65–140)
HCT VFR BLD AUTO: 43.5 % (ref 36.5–49.3)
HGB BLD-MCNC: 14.3 G/DL (ref 12–17)
HOLD SPECIMEN: NORMAL
IMM GRANULOCYTES # BLD AUTO: 0.04 THOUSAND/UL (ref 0–0.2)
IMM GRANULOCYTES NFR BLD AUTO: 0 % (ref 0–2)
LIPASE SERPL-CCNC: 103 U/L (ref 11–82)
LYMPHOCYTES # BLD AUTO: 2.53 THOUSANDS/ÂΜL (ref 0.6–4.47)
LYMPHOCYTES NFR BLD AUTO: 27 % (ref 14–44)
MCH RBC QN AUTO: 28.8 PG (ref 26.8–34.3)
MCHC RBC AUTO-ENTMCNC: 32.9 G/DL (ref 31.4–37.4)
MCV RBC AUTO: 88 FL (ref 82–98)
MONOCYTES # BLD AUTO: 0.72 THOUSAND/ÂΜL (ref 0.17–1.22)
MONOCYTES NFR BLD AUTO: 8 % (ref 4–12)
NEUTROPHILS # BLD AUTO: 5.84 THOUSANDS/ÂΜL (ref 1.85–7.62)
NEUTS SEG NFR BLD AUTO: 63 % (ref 43–75)
NRBC BLD AUTO-RTO: 0 /100 WBCS
PLATELET # BLD AUTO: 147 THOUSANDS/UL (ref 149–390)
PMV BLD AUTO: 9.9 FL (ref 8.9–12.7)
POTASSIUM SERPL-SCNC: 4.6 MMOL/L (ref 3.5–5.3)
PROT SERPL-MCNC: 6.4 G/DL (ref 6.4–8.4)
RBC # BLD AUTO: 4.97 MILLION/UL (ref 3.88–5.62)
SODIUM SERPL-SCNC: 136 MMOL/L (ref 135–147)
WBC # BLD AUTO: 9.31 THOUSAND/UL (ref 4.31–10.16)

## 2023-12-04 PROCEDURE — 93005 ELECTROCARDIOGRAM TRACING: CPT

## 2023-12-04 PROCEDURE — 96375 TX/PRO/DX INJ NEW DRUG ADDON: CPT

## 2023-12-04 PROCEDURE — 80053 COMPREHEN METABOLIC PANEL: CPT | Performed by: EMERGENCY MEDICINE

## 2023-12-04 PROCEDURE — 74177 CT ABD & PELVIS W/CONTRAST: CPT

## 2023-12-04 PROCEDURE — 99285 EMERGENCY DEPT VISIT HI MDM: CPT | Performed by: EMERGENCY MEDICINE

## 2023-12-04 PROCEDURE — 36415 COLL VENOUS BLD VENIPUNCTURE: CPT

## 2023-12-04 PROCEDURE — 99284 EMERGENCY DEPT VISIT MOD MDM: CPT

## 2023-12-04 PROCEDURE — 96374 THER/PROPH/DIAG INJ IV PUSH: CPT

## 2023-12-04 PROCEDURE — 85025 COMPLETE CBC W/AUTO DIFF WBC: CPT | Performed by: EMERGENCY MEDICINE

## 2023-12-04 PROCEDURE — 83690 ASSAY OF LIPASE: CPT | Performed by: EMERGENCY MEDICINE

## 2023-12-04 PROCEDURE — G1004 CDSM NDSC: HCPCS

## 2023-12-04 PROCEDURE — 0241U HB NFCT DS VIR RESP RNA 4 TRGT: CPT

## 2023-12-04 PROCEDURE — 96361 HYDRATE IV INFUSION ADD-ON: CPT

## 2023-12-04 RX ORDER — ONDANSETRON 2 MG/ML
4 INJECTION INTRAMUSCULAR; INTRAVENOUS ONCE
Status: COMPLETED | OUTPATIENT
Start: 2023-12-04 | End: 2023-12-04

## 2023-12-04 RX ORDER — SUCRALFATE 1 G/1
1 TABLET ORAL ONCE
Status: COMPLETED | OUTPATIENT
Start: 2023-12-04 | End: 2023-12-04

## 2023-12-04 RX ORDER — FAMOTIDINE 10 MG/ML
20 INJECTION, SOLUTION INTRAVENOUS ONCE
Status: COMPLETED | OUTPATIENT
Start: 2023-12-04 | End: 2023-12-04

## 2023-12-04 RX ADMIN — SUCRALFATE 1 G: 1 TABLET ORAL at 22:23

## 2023-12-04 RX ADMIN — IOHEXOL 100 ML: 350 INJECTION, SOLUTION INTRAVENOUS at 23:55

## 2023-12-04 RX ADMIN — SODIUM CHLORIDE 1000 ML: 0.9 INJECTION, SOLUTION INTRAVENOUS at 22:25

## 2023-12-04 RX ADMIN — ONDANSETRON 4 MG: 2 INJECTION INTRAMUSCULAR; INTRAVENOUS at 22:22

## 2023-12-04 RX ADMIN — FAMOTIDINE 20 MG: 10 INJECTION INTRAVENOUS at 22:22

## 2023-12-05 VITALS
RESPIRATION RATE: 20 BRPM | HEART RATE: 81 BPM | TEMPERATURE: 97.5 F | SYSTOLIC BLOOD PRESSURE: 113 MMHG | DIASTOLIC BLOOD PRESSURE: 72 MMHG | OXYGEN SATURATION: 96 %

## 2023-12-05 LAB
ATRIAL RATE: 79 BPM
BILIRUB UR QL STRIP: NEGATIVE
CLARITY UR: CLEAR
COLOR UR: COLORLESS
FLUAV RNA RESP QL NAA+PROBE: NEGATIVE
FLUBV RNA RESP QL NAA+PROBE: NEGATIVE
GLUCOSE UR STRIP-MCNC: NEGATIVE MG/DL
HGB UR QL STRIP.AUTO: NEGATIVE
KETONES UR STRIP-MCNC: NEGATIVE MG/DL
LEUKOCYTE ESTERASE UR QL STRIP: NEGATIVE
NITRITE UR QL STRIP: NEGATIVE
P AXIS: 55 DEGREES
PH UR STRIP.AUTO: 5 [PH]
PR INTERVAL: 162 MS
PROT UR STRIP-MCNC: NEGATIVE MG/DL
QRS AXIS: -3 DEGREES
QRSD INTERVAL: 98 MS
QT INTERVAL: 392 MS
QTC INTERVAL: 449 MS
RSV RNA RESP QL NAA+PROBE: NEGATIVE
SARS-COV-2 RNA RESP QL NAA+PROBE: NEGATIVE
SP GR UR STRIP.AUTO: 1.03 (ref 1–1.03)
T WAVE AXIS: 55 DEGREES
UROBILINOGEN UR STRIP-ACNC: <2 MG/DL
VENTRICULAR RATE: 79 BPM

## 2023-12-05 PROCEDURE — 81003 URINALYSIS AUTO W/O SCOPE: CPT
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
## 2023-12-05 NOTE — ED ATTENDING ATTESTATION
12/4/2023  I, Cecy Kaplan MD, saw and evaluated the patient. I have discussed the patient with the resident/non-physician practitioner and agree with the resident's/non-physician practitioner's findings, Plan of Care, and MDM as documented in the resident's/non-physician practitioner's note, except where noted. All available labs and Radiology studies were reviewed. I was present for key portions of any procedure(s) performed by the resident/non-physician practitioner and I was immediately available to provide assistance. At this point I agree with the current assessment done in the Emergency Department. I have conducted an independent evaluation of this patient a history and physical is as follows:  Patient is a 44year old male with abdominal pain with N/V/D today. No fever. No urinary sx. No GI bleeding. Has had prior appy. Was last seen at [facility] on 11/10/23 for stage 3A CKD. [facility] website checked on this patient and last RX filled was on 11/13/23 for ativan for 30 day supply. NCAT. No scleral icterus. Mucous membranes somewhat dry. Lungs clear. Tachycardia without murmur. Abdomen soft with diffuse tenderness. No guarding or rebound. (+) bowel sounds. No edema. No rash noted. No jaundice. DDx including but not limited to: gastroenteritis, food poisoning, metabolic abnormality, dehydration, KRAIG, mesenteric adenitis, IBD, IBS, ileus, bowel obstruction, toxic megacolon, colitis, enteritis, gastritis, PUD, GERD, hepatitis, pancreatitis, cholecystitis, biliary colic, choledocholithiasis, doubt splenic etiology; renal colic, pyelonephritis, UTI. Doubt cardiac etiology. I reviewed labs.  Will check EKG, CT.     ED Course         Critical Care Time  Procedures

## 2023-12-12 ENCOUNTER — APPOINTMENT (OUTPATIENT)
Dept: LAB | Facility: MEDICAL CENTER | Age: 43
End: 2023-12-12
Payer: MEDICARE

## 2023-12-12 DIAGNOSIS — N18.31 STAGE 3A CHRONIC KIDNEY DISEASE (HCC): ICD-10-CM

## 2023-12-12 LAB
ANION GAP SERPL CALCULATED.3IONS-SCNC: 7 MMOL/L
BUN SERPL-MCNC: 13 MG/DL (ref 5–25)
CALCIUM SERPL-MCNC: 8.8 MG/DL (ref 8.4–10.2)
CHLORIDE SERPL-SCNC: 101 MMOL/L (ref 96–108)
CO2 SERPL-SCNC: 31 MMOL/L (ref 21–32)
CREAT SERPL-MCNC: 1.34 MG/DL (ref 0.6–1.3)
GFR SERPL CREATININE-BSD FRML MDRD: 64 ML/MIN/1.73SQ M
GLUCOSE P FAST SERPL-MCNC: 130 MG/DL (ref 65–99)
POTASSIUM SERPL-SCNC: 4.6 MMOL/L (ref 3.5–5.3)
SODIUM SERPL-SCNC: 139 MMOL/L (ref 135–147)

## 2023-12-12 PROCEDURE — 36415 COLL VENOUS BLD VENIPUNCTURE: CPT

## 2023-12-12 PROCEDURE — 80048 BASIC METABOLIC PNL TOTAL CA: CPT

## 2023-12-13 ENCOUNTER — OFFICE VISIT (OUTPATIENT)
Dept: URGENT CARE | Age: 43
End: 2023-12-13
Payer: MEDICARE

## 2023-12-13 VITALS
HEART RATE: 96 BPM | OXYGEN SATURATION: 99 % | TEMPERATURE: 97 F | SYSTOLIC BLOOD PRESSURE: 100 MMHG | DIASTOLIC BLOOD PRESSURE: 77 MMHG | RESPIRATION RATE: 20 BRPM

## 2023-12-13 DIAGNOSIS — J04.0 ACUTE LARYNGITIS: Primary | ICD-10-CM

## 2023-12-13 PROCEDURE — 99213 OFFICE O/P EST LOW 20 MIN: CPT | Performed by: NURSE PRACTITIONER

## 2023-12-13 PROCEDURE — G0463 HOSPITAL OUTPT CLINIC VISIT: HCPCS | Performed by: NURSE PRACTITIONER
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
## 2023-12-13 NOTE — PATIENT INSTRUCTIONS
Increase water intake  May use lozenges to keep throat moist.   Follow up with your PCP as needed or if symptoms worsen. Laryngitis   AMBULATORY CARE:   Laryngitis  is inflammation of your larynx (voice box). The larynx holds your vocal cords. Your vocal cords usually open and close easily to form sounds. With laryngitis, your vocal cords swell and become irritated. This may change how your voice sounds, or you may lose your voice for a short while. Common signs and symptoms:   A weak voice or loss of voice    Hoarse, raspy voice    Sore, dry, raw throat    Clearing your throat often    Dry cough    Call your local emergency number (911 in the [address]) if:   You have sudden trouble breathing. You have severe drooling or trouble swallowing. Seek care immediately if:   You cough up blood. You have severe pain. Call your doctor if:   Your symptoms last longer than 2 weeks. You have questions or concerns about your condition or care. Treatment for laryngitis  is usually not needed. Laryngitis typically gets better on its own within 1 to 2 weeks. Medicines such as steroids or antibiotics may be used in some cases. Self-care:   Rest your voice. Try to talk as little as possible. Use a cool mist humidifier  to increase air moisture in your home. This may soothe your throat and decrease your cough. Keep your mouth and throat moist.  Suck on a throat lozenge or chew sugarless gum. Do not smoke, and avoid secondhand smoke. Nicotine and other chemicals in cigarettes and cigars can cause dryness and irritation in your throat and vocal cords. Ask your healthcare provider for information if you currently smoke and need help to quit. E-cigarettes or smokeless tobacco still contain nicotine. Talk to your healthcare provider before you use these products. Drink liquids as directed. You may need to drink extra liquids to help soothe your throat. Water or warm tea are good liquids to drink.     Avoid spicy and acidic foods. These may irritate your throat. Examples include citrus, salad dressings, and hot sauces. Carbonated drinks may also cause discomfort in your throat. Try not to clear your throat. This can cause more irritation and swelling of your vocal cords. Follow up with your doctor or ear, nose, and throat specialist as directed:  Write down your questions so you remember to ask them during your visits. © Copyright [employer] 2023 Information is for End User's use only and may not be sold, redistributed or otherwise used for commercial purposes. The above information is an  only. It is not intended as medical advice for individual conditions or treatments. Talk to your doctor, nurse or pharmacist before following any medical regimen to see if it is safe and effective for you.

## 2023-12-14 ENCOUNTER — HOSPITAL ENCOUNTER (EMERGENCY)
Facility: HOSPITAL | Age: 43
Discharge: HOME | End: 2023-12-14
Attending: EMERGENCY MEDICINE
Payer: MEDICARE

## 2023-12-14 ENCOUNTER — APPOINTMENT (EMERGENCY)
Dept: RADIOLOGY | Facility: HOSPITAL | Age: 43
End: 2023-12-14
Payer: MEDICARE

## 2023-12-14 VITALS
HEART RATE: 99 BPM | TEMPERATURE: 98.4 F | DIASTOLIC BLOOD PRESSURE: 93 MMHG | RESPIRATION RATE: 20 BRPM | SYSTOLIC BLOOD PRESSURE: 142 MMHG | OXYGEN SATURATION: 99 %

## 2023-12-14 DIAGNOSIS — J04.0 LARYNGITIS, ACUTE: ICD-10-CM

## 2023-12-14 DIAGNOSIS — J06.9 VIRAL URI WITH COUGH: Primary | ICD-10-CM

## 2023-12-14 PROCEDURE — 99284 EMERGENCY DEPT VISIT MOD MDM: CPT | Performed by: EMERGENCY MEDICINE

## 2023-12-14 PROCEDURE — 0241U HB NFCT DS VIR RESP RNA 4 TRGT: CPT | Performed by: EMERGENCY MEDICINE

## 2023-12-14 PROCEDURE — 71046 X-RAY EXAM CHEST 2 VIEWS: CPT

## 2023-12-14 PROCEDURE — 99284 EMERGENCY DEPT VISIT MOD MDM: CPT

## 2023-12-14 RX ORDER — DEXAMETHASONE 4 MG/1
8 TABLET ORAL ONCE
Status: COMPLETED | OUTPATIENT
Start: 2023-12-14 | End: 2023-12-14

## 2023-12-14 RX ORDER — GUAIFENESIN/DEXTROMETHORPHAN 100-10MG/5
10 SYRUP ORAL ONCE
Status: COMPLETED | OUTPATIENT
Start: 2023-12-14 | End: 2023-12-14

## 2023-12-14 RX ADMIN — DEXAMETHASONE 8 MG: 4 TABLET ORAL at 22:53

## 2023-12-14 RX ADMIN — GUAIFENESIN AND DEXTROMETHORPHAN 10 ML: 100; 10 SYRUP ORAL at 22:53
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
## 2023-12-18 ENCOUNTER — TELEPHONE (OUTPATIENT)
Dept: NEPHROLOGY | Facility: CLINIC | Age: 43
End: 2023-12-18

## 2023-12-18 ENCOUNTER — OFFICE VISIT (OUTPATIENT)
Dept: FAMILY MEDICINE CLINIC | Facility: CLINIC | Age: 43
End: 2023-12-18

## 2023-12-18 VITALS
SYSTOLIC BLOOD PRESSURE: 137 MMHG | HEIGHT: 69 IN | OXYGEN SATURATION: 100 % | TEMPERATURE: 97.7 F | HEART RATE: 86 BPM | DIASTOLIC BLOOD PRESSURE: 91 MMHG | BODY MASS INDEX: 29.33 KG/M2 | WEIGHT: 198 LBS

## 2023-12-18 DIAGNOSIS — N18.2 CKD (CHRONIC KIDNEY DISEASE) STAGE 2, GFR 60-89 ML/MIN: Primary | ICD-10-CM

## 2023-12-18 PROCEDURE — 99213 OFFICE O/P EST LOW 20 MIN: CPT | Performed by: FAMILY MEDICINE

## 2023-12-18 NOTE — TELEPHONE ENCOUNTER
New Patient Intake Form   Patient Details   Sandor Ratliff     1980     [id number]     Insurance Information   Name of Insurance Company Medicare  Medicaid   Does the patient need an insurance referral? No   If patient has VA insurance, please ask if they will be using their VA insurance.   Appointment Information   Who is calling to schedule?  If not patient, what is callers name? Theresa    Referring Provider Kyung Goel, DO     Reason for Appt (Diagnosis) CKD (chronic kidney disease) stage 2, GFR 60-89 ml/min (N18.2 [ICD-10-CM])       Does Patient have labs/urine done at [facility]?  If not, where do they go?  List the date of last lab / urine  *Please try to get labs 2 years back if not at  Yes 12/12   Has patient been hospitalized recently?  If yes, list name and location of hospital they were in 4/16 [facility]   Has patient been seen by a Nephrologist before?  If yes, list name, location and phone number No   Has the patient had renal imaging done?  If so, list the most recent date and type of imaging US kidney and bladder 2017   Does patient have a history of Kidney Stones? No   Appointment Details   Is there a referral on file? Yes   Appointment Date 12/21/23   Location [place] with Dr Kassy Hirsch

## 2023-12-18 NOTE — ASSESSMENT & PLAN NOTE
Lab Results   Component Value Date    EGFR 64 12/12/2023    EGFR 69 12/04/2023    EGFR 60 11/07/2023    CREATININE 1.34 (H) 12/12/2023    CREATININE 1.27 12/04/2023    CREATININE 1.42 (H) 11/07/2023     Stage II kidney disease with evidence of GFR of 64 and consistently elevated creatinine between 1.3 and 1.4.  Point-of-care urine dip in the office last visit as well as albumin creatinine ratio has been normal  Creatinine and GFR seem to be consistently abnormal  JUNIOR in the office within normal limits  PSA normal  Patient does have a history of type 2 diabetes which could be an expedition for worsening kidney function    Plan:  Referral for nephrology for further recommendations

## 2023-12-18 NOTE — PROGRESS NOTES
Name: Sandor Ratliff      : 1980      MRN: [id number]  Encounter Provider: Kyung Goel DO  Encounter Date: 2023   Encounter department: [facility]    Assessment & Plan     1. CKD (chronic kidney disease) stage 2, GFR 60-89 ml/min  Assessment & Plan:  Lab Results   Component Value Date    EGFR 64 2023    EGFR 69 2023    EGFR 60 2023    CREATININE 1.34 (H) 2023    CREATININE 1.27 2023    CREATININE 1.42 (H) 2023     Stage II kidney disease with evidence of GFR of 64 and consistently elevated creatinine between 1.3 and 1.4.  Point-of-care urine dip in the office last visit as well as albumin creatinine ratio has been normal  Creatinine and GFR seem to be consistently abnormal  JUNIOR in the office within normal limits  PSA normal  Patient does have a history of type 2 diabetes which could be an expedition for worsening kidney function    Plan:  Referral for nephrology for further recommendations    Orders:  -     Ambulatory Referral to Nephrology; Future           Subjective      HPI  43-year-old male presents today for stage II kidney disease follow-up.  He is continuing to not have any symptoms of urination including dysuria, hematuria, difficulty urinating.  He was having episodes of abdominal pain which have subsided.  CMP's and BMP is continue to show GFR below 65 as well as creatinines between the range of 1.3 and 1.4 which up until now have not been patient's baseline.  Shared decision making with patient and caretaker to send a referral to nephrology to further investigate the change in labs.    Review of Systems   Constitutional:  Negative for chills and fever.   HENT:  Negative for sore throat.    Respiratory:  Negative for cough and shortness of breath.    Cardiovascular:  Negative for chest pain and palpitations.   Gastrointestinal:  Negative for abdominal pain, blood in stool, constipation, diarrhea, nausea and  vomiting.   Genitourinary:  Negative for dysuria and hematuria.   Musculoskeletal:  Negative for arthralgias and back pain.   Skin:  Negative for color change and rash.   Neurological:  Negative for syncope and headaches.   Psychiatric/Behavioral:  Negative for agitation and behavioral problems.    All other systems reviewed and are negative.      Current Outpatient Medications on File Prior to Visit   Medication Sig    acetaminophen (TYLENOL) 650 mg CR tablet Take 1 tablet (650 mg total) by mouth every 8 (eight) hours as needed for mild pain    Alcohol Swabs (Curity Alcohol Preps) 70 % PADS Use if needed (when checking blood glucose)    aluminum-magnesium hydroxide-simethicone (MAALOX) 200-200-20 MG/5ML SUSP Take 20 mL by mouth 4 (four) times a day (before meals and at bedtime)    ARIPiprazole (ABILIFY) 10 mg tablet Take 10 mg by mouth daily    ARIPiprazole (ABILIFY) 20 MG tablet     atorvastatin (LIPITOR) 40 mg tablet Take 1 tablet (40 mg total) by mouth daily at bedtime    B Complex Vitamins (B Complex-B12) TABS Take 1 tablet by mouth daily 1 cap by mouth daily @ 8am    bisacodyl (DULCOLAX) 5 mg EC tablet Take 2 tablets (10 mg total) by mouth daily as needed for constipation    bismuth subsalicylate (PEPTO BISMOL) 524 mg/30 mL oral suspension Take 15 mL (262 mg total) by mouth every 6 (six) hours as needed for indigestion    calcium carbonate (OYSTER SHELL,OSCAL) 500 mg Take 1 tablet by mouth daily with breakfast    chlorproMAZINE (THORAZINE) 100 mg tablet Take 100 mg by mouth 2 (two) times a day    cholecalciferol (VITAMIN D3) 1,000 units tablet Take 1 tablet (1,000 Units total) by mouth daily    divalproex sodium (DEPAKOTE ER) 250 mg 24 hr tablet Take 250 mg by mouth daily at bedtime    divalproex sodium (DEPAKOTE) 500 mg EC tablet Take 1 tablet (500 mg total) by mouth 2 (two) times a day    Emollient (Eucerin Original Healing) LOTN     famotidine (PEPCID) 20 mg tablet Take 1 tablet (20 mg total) by mouth 2  (two) times a day for 14 days    glucose blood (True Metrix Blood Glucose Test) test strip Use 1 each 2 (two) times a week Use as instructed    glucose monitoring kit (FREESTYLE) monitoring kit 1 each by Does not apply route 2 (two) times a week    guaiFENesin (ROBITUSSIN) 100 MG/5ML oral liquid Take 10 mL (200 mg total) by mouth 3 (three) times a day as needed for cough    Lancets (freestyle) lancets Use to test blood sugars 2x weekly on Mon & Thurs @ 8AM    levothyroxine 25 mcg tablet Take 1 tablet (25 mcg total) by mouth daily in the early morning    lisinopril (ZESTRIL) 2.5 mg tablet Take 1 tablet (2.5 mg total) by mouth daily    loratadine (CLARITIN) 10 mg tablet Take 1 tablet (10 mg total) by mouth daily    LORazepam (ATIVAN) 0.5 mg tablet Take 0.5 mg by mouth 3 (three) times a day 8AM, 2PM, 8PM    Menthol (Halls Cough Drops) 5.8 MG LOZG Apply 1 lozenge (5.8 mg total) to the mouth or throat 4 (four) times a day as needed (cough)    metFORMIN (GLUCOPHAGE) 1000 MG tablet Take 1 tablet (1,000 mg total) by mouth 2 (two) times a day with meals    Mouthwashes (Biotene Dry Mouth) LIQD     polyethylene glycol (MIRALAX) 17 g packet Take 17 g by mouth daily as needed (Constipation)    propranolol (INDERAL) 20 mg tablet Take 1 tablet (20 mg total) by mouth every 12 (twelve) hours    Refresh Optive 0.5-0.9 % SOLN     Senna Plus 8.6-50 MG per tablet Take 2 tablets by mouth daily    sitaGLIPtin (JANUVIA) 100 mg tablet Take 1 tablet (100 mg total) by mouth daily Daily after breakfast    Sunscreen SPF50 LOTN Apply topically if needed (apply prior to sun exposure)    traZODone (DESYREL) 100 mg tablet     vitamin E, tocopherol, 400 units capsule Take 1 capsule (400 Units total) by mouth daily    chlorproMAZINE (THORAZINE) 200 mg tablet  (Patient not taking: Reported on 9/21/2023)    divalproex sodium (DEPAKOTE ER) 500 mg 24 hr tablet Take 500 mg by mouth every morning (Patient not taking: Reported on 12/18/2023)       Objective  "    /91 (BP Location: Left arm, Patient Position: Sitting, Cuff Size: Standard)   Pulse 86   Temp 97.7 °F (36.5 °C) (Temporal)   Ht 5' 9\" (1.753 m)   Wt 89.8 kg (198 lb)   SpO2 100%   BMI 29.24 kg/m²     Physical Exam  Vitals reviewed.   Constitutional:       General: He is not in acute distress.     Appearance: Normal appearance.   HENT:      Head: Normocephalic and atraumatic.      Nose: Nose normal. No congestion.      Mouth/Throat:      Mouth: Mucous membranes are moist.   Eyes:      General: No scleral icterus.        Right eye: No discharge.         Left eye: No discharge.      Conjunctiva/sclera: Conjunctivae normal.   Cardiovascular:      Rate and Rhythm: Normal rate and regular rhythm.      Pulses: Normal pulses.      Heart sounds: Normal heart sounds. No murmur heard.     No friction rub. No gallop.   Pulmonary:      Effort: Pulmonary effort is normal.      Breath sounds: Normal breath sounds. No wheezing, rhonchi or rales.   Abdominal:      General: Bowel sounds are normal.      Palpations: Abdomen is soft.      Tenderness: There is no abdominal tenderness.   Musculoskeletal:         General: No swelling.      Cervical back: Normal range of motion and neck supple.      Right lower leg: No edema.      Left lower leg: No edema.   Skin:     General: Skin is warm and dry.   Neurological:      General: No focal deficit present.      Mental Status: He is alert and oriented to person, place, and time.   Psychiatric:         Mood and Affect: Mood normal.         Behavior: Behavior normal.       Kyung Goel, DO    "

## 2023-12-20 ENCOUNTER — HOSPITAL ENCOUNTER (EMERGENCY)
Facility: HOSPITAL | Age: 43
Discharge: HOME | End: 2023-12-20
Attending: EMERGENCY MEDICINE
Payer: MEDICARE

## 2023-12-20 ENCOUNTER — OFFICE VISIT (OUTPATIENT)
Dept: URGENT CARE | Facility: MEDICAL CENTER | Age: 43
End: 2023-12-20
Payer: MEDICARE

## 2023-12-20 VITALS
SYSTOLIC BLOOD PRESSURE: 145 MMHG | TEMPERATURE: 98.5 F | HEART RATE: 122 BPM | RESPIRATION RATE: 18 BRPM | DIASTOLIC BLOOD PRESSURE: 86 MMHG | OXYGEN SATURATION: 99 %

## 2023-12-20 VITALS
DIASTOLIC BLOOD PRESSURE: 72 MMHG | HEART RATE: 80 BPM | OXYGEN SATURATION: 100 % | SYSTOLIC BLOOD PRESSURE: 102 MMHG | HEIGHT: 69 IN | TEMPERATURE: 98 F | RESPIRATION RATE: 18 BRPM | WEIGHT: 198 LBS | BODY MASS INDEX: 29.33 KG/M2

## 2023-12-20 DIAGNOSIS — Z51.89 ENCOUNTER FOR WOUND CARE: Primary | ICD-10-CM

## 2023-12-20 DIAGNOSIS — T14.8XXA ABRASION: ICD-10-CM

## 2023-12-20 DIAGNOSIS — L23.2 ALLERGIC CONTACT DERMATITIS DUE TO COSMETICS: Primary | ICD-10-CM

## 2023-12-20 DIAGNOSIS — Z91.89 AT HIGH RISK FOR INFECTION: ICD-10-CM

## 2023-12-20 PROCEDURE — G0463 HOSPITAL OUTPT CLINIC VISIT: HCPCS

## 2023-12-20 PROCEDURE — 99283 EMERGENCY DEPT VISIT LOW MDM: CPT

## 2023-12-20 PROCEDURE — 99284 EMERGENCY DEPT VISIT MOD MDM: CPT | Performed by: EMERGENCY MEDICINE

## 2023-12-20 PROCEDURE — 99213 OFFICE O/P EST LOW 20 MIN: CPT

## 2023-12-20 RX ORDER — CEPHALEXIN 500 MG/1
500 CAPSULE ORAL EVERY 6 HOURS SCHEDULED
Qty: 28 CAPSULE | Refills: 0 | Status: SHIPPED | OUTPATIENT
Start: 2023-12-20 | End: 2023-12-27

## 2023-12-20 RX ORDER — METHYLPREDNISOLONE 4 MG/1
TABLET ORAL
Qty: 1 EACH | Refills: 0 | Status: SHIPPED | OUTPATIENT
Start: 2023-12-20

## 2023-12-20 RX ADMIN — MUPIROCIN: 20 OINTMENT TOPICAL at 23:27

## 2023-12-20 NOTE — PATIENT INSTRUCTIONS
Take antibiotics as prescribed.   Take entire course of antibiotics.     Eat yogurt with live and active cultures and/or take a probiotic at least 3 hours before or after antibiotic dose.   Monitor stool for diarrhea and/or blood. If this occurs, contact primary care doctor ASAP.     Take steroids as prescribed.   Recommend to take them with food  Do not take Ibuprofen while on steroids.   May take Acetaminophen for pain or discomfort.    *Steroids may elevate your blood glucose levels, please monitor your blood sugar closely*    Recommend applying OTC hydrocortisone cream to affected areas (do not use on broken skin or near eyes)  Good hand hygiene  Avoid scratching area  Keep area clean and dry  Keep finger nail trimmed  Cool compresses for comfort  May use an OTC antihistamine during the day such as Claritin or Zyrtec or Allegra   Oral benadryl for itching as needed at night, it can make you drowsy    Watch for signs of increased infection including fever, sweats, chills, increased redness, swelling, drainage or streaking.     If rash is spreading, you develop facial swelling, shortness of breath, difficulty breathing, fever, any new or concerning symptoms please proceed to the ER.     Follow up with PCP in 3-5 days.  Proceed to ER if symptoms worsen.
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
## 2023-12-21 ENCOUNTER — HOSPITAL ENCOUNTER (EMERGENCY)
Facility: HOSPITAL | Age: 43
Discharge: HOME | End: 2023-12-21
Attending: EMERGENCY MEDICINE
Payer: MEDICARE

## 2023-12-21 ENCOUNTER — CONSULT (OUTPATIENT)
Dept: NEPHROLOGY | Facility: CLINIC | Age: 43
End: 2023-12-21
Payer: MEDICARE

## 2023-12-21 VITALS
BODY MASS INDEX: 29.33 KG/M2 | SYSTOLIC BLOOD PRESSURE: 118 MMHG | OXYGEN SATURATION: 95 % | TEMPERATURE: 97 F | DIASTOLIC BLOOD PRESSURE: 75 MMHG | RESPIRATION RATE: 18 BRPM | WEIGHT: 198 LBS | HEIGHT: 69 IN | HEART RATE: 109 BPM

## 2023-12-21 VITALS
SYSTOLIC BLOOD PRESSURE: 120 MMHG | HEIGHT: 69 IN | OXYGEN SATURATION: 98 % | HEART RATE: 80 BPM | DIASTOLIC BLOOD PRESSURE: 78 MMHG | WEIGHT: 199 LBS | BODY MASS INDEX: 29.47 KG/M2

## 2023-12-21 DIAGNOSIS — R00.0 TACHYCARDIA: ICD-10-CM

## 2023-12-21 DIAGNOSIS — H57.13 EYE PAIN, BILATERAL: Primary | ICD-10-CM

## 2023-12-21 DIAGNOSIS — N18.2 CKD (CHRONIC KIDNEY DISEASE) STAGE 2, GFR 60-89 ML/MIN: ICD-10-CM

## 2023-12-21 PROCEDURE — 99284 EMERGENCY DEPT VISIT MOD MDM: CPT | Performed by: EMERGENCY MEDICINE

## 2023-12-21 PROCEDURE — 99283 EMERGENCY DEPT VISIT LOW MDM: CPT

## 2023-12-21 PROCEDURE — 99204 OFFICE O/P NEW MOD 45 MIN: CPT | Performed by: INTERNAL MEDICINE

## 2023-12-21 PROCEDURE — 96372 THER/PROPH/DIAG INJ SC/IM: CPT

## 2023-12-21 RX ORDER — TETRACAINE HYDROCHLORIDE 5 MG/ML
1 SOLUTION OPHTHALMIC ONCE
Status: COMPLETED | OUTPATIENT
Start: 2023-12-21 | End: 2023-12-21

## 2023-12-21 RX ORDER — KETOROLAC TROMETHAMINE 30 MG/ML
15 INJECTION, SOLUTION INTRAMUSCULAR; INTRAVENOUS ONCE
Status: COMPLETED | OUTPATIENT
Start: 2023-12-21 | End: 2023-12-21

## 2023-12-21 RX ADMIN — FLUORESCEIN SODIUM 1 STRIP: 1 STRIP OPHTHALMIC at 02:48

## 2023-12-21 RX ADMIN — KETOROLAC TROMETHAMINE 15 MG: 30 INJECTION INTRAMUSCULAR; INTRAVENOUS at 04:23

## 2023-12-21 RX ADMIN — TETRACAINE HYDROCHLORIDE 1 DROP: 5 SOLUTION OPHTHALMIC at 02:48

## 2023-12-21 NOTE — ED NOTES
Patient refused to do visual acuity for Left eye. 20/20 in R eye.      Carlyn Dinh RN  12/21/23 0916
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
## 2023-12-21 NOTE — ED ATTENDING ATTESTATION
12/21/2023  I, Ryan Green MD, saw and evaluated the patient. I have discussed the patient with the resident/non-physician practitioner and agree with the resident's/non-physician practitioner's findings, Plan of Care, and MDM as documented in the resident's/non-physician practitioner's note, except where noted. All available labs and Radiology studies were reviewed.  I was present for key portions of any procedure(s) performed by the resident/non-physician practitioner and I was immediately available to provide assistance.       At this point I agree with the current assessment done in the Emergency Department.  I have conducted an independent evaluation of this patient a history and physical is as follows:  Patient is a 43 year old male who states he got dye in his eye tonight. Was last seen in this ED on 12/20/23 for encounter for wound care. No SI or HI. [email or web address] website checked on this patient and last Rx filled was on 12/14/23 for ativan for 30 day supply. Normocephalic. No conjunctival injection or FB. PERRL. Fluorescein and pH as per ED resident. No rash noted. DDx including but not limited to: chemical conjunctivitis, corneal abrasion; doubt corneal foreign body, iritis, uveitis, UV keratitis, periorbital cellulitis, orbital cellulitis, endophthalmitis, glaucoma, ruptured globe, vitreous hemorrhage, vitreous detachment, retinal detachment, episcleritis, scleritis, hyphema, subconjunctival hemorrhage. Will discharge.     ED Course         Critical Care Time  Procedures
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
## 2023-12-21 NOTE — ED NOTES
PT REFUSING TO CHANGE INTO PAPER SCRUBS. STARTED TO GET AGITATED SAYING HE LIED ABOUT EVERYTHING. HE THEN PROCEEDED TO RAMBLE ON. THIS TECH  ASKED PT ONCE AGAIN TO CHANGED HE REFUSED      Lu Ackerman  12/21/23 3046
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
## 2023-12-21 NOTE — PATIENT INSTRUCTIONS
You are here for your initial visit it was a pleasure to meet you.  You are here to evaluate your kidney function and thank you so much honestly for the history you gave me because it was helpful.  Being a diabetic you are at risk for kidney disease so the doctors may have been concerned because the blood test for your kidney function was a little elevated at 1.3 and normal is 1.  You do not have protein in your urine and that tells me it is not due to diabetic kidney disease which is a good thing.  You brought up that you are on lithium for years when you were younger and that can damage the kidneys so at this point there is no other evidence of serious kidney disease there is no protein in the urine so potentially it was from lithium.  You are now off that medication your blood pressure is under good control so we can just monitor this.    Labs prior to visit please call me if you have any problems before next visit.
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
## 2023-12-21 NOTE — DISCHARGE INSTRUCTIONS
Return sooner to the Emergency Department if increased pain, swelling, numbness, weakness, fever, redness, vomiting.    Please utilize the provided antibiotics that you have been prescribed for treatment of your symptomology.  Please keep area dry and covered as well as follow-up with primary care provider.

## 2024-01-08 ENCOUNTER — OFFICE VISIT (OUTPATIENT)
Dept: URGENT CARE | Facility: MEDICAL CENTER | Age: 44
End: 2024-01-08
Payer: MEDICARE

## 2024-01-08 VITALS
DIASTOLIC BLOOD PRESSURE: 74 MMHG | RESPIRATION RATE: 18 BRPM | WEIGHT: 199 LBS | BODY MASS INDEX: 29.47 KG/M2 | SYSTOLIC BLOOD PRESSURE: 120 MMHG | HEART RATE: 80 BPM | OXYGEN SATURATION: 99 % | TEMPERATURE: 98 F | HEIGHT: 69 IN

## 2024-01-08 DIAGNOSIS — S05.02XA ABRASION OF LEFT CORNEA, INITIAL ENCOUNTER: Primary | ICD-10-CM

## 2024-01-08 PROCEDURE — 99213 OFFICE O/P EST LOW 20 MIN: CPT | Performed by: PHYSICIAN ASSISTANT

## 2024-01-08 PROCEDURE — G0463 HOSPITAL OUTPT CLINIC VISIT: HCPCS | Performed by: PHYSICIAN ASSISTANT

## 2024-01-08 RX ORDER — OFLOXACIN 3 MG/ML
1 SOLUTION/ DROPS OPHTHALMIC 4 TIMES DAILY
Qty: 1.4 ML | Refills: 0 | Status: SHIPPED | OUTPATIENT
Start: 2024-01-08 | End: 2024-01-15

## 2024-01-08 NOTE — PATIENT INSTRUCTIONS
Corneal abrasion  Ocuflox as directed  Follow up with PCP in 3-5 days.  Proceed to  ER if symptoms worsen.
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
## 2024-01-09 DIAGNOSIS — E11.65 TYPE 2 DIABETES MELLITUS WITH HYPERGLYCEMIA, WITHOUT LONG-TERM CURRENT USE OF INSULIN (HCC): ICD-10-CM

## 2024-01-09 DIAGNOSIS — Z00.00 ANNUAL PHYSICAL EXAM: ICD-10-CM

## 2024-01-10 RX ORDER — VITAMIN E 268 MG
400 CAPSULE ORAL DAILY
Qty: 30 CAPSULE | Refills: 5 | Status: SHIPPED | OUTPATIENT
Start: 2024-01-10

## 2024-01-10 RX ORDER — LISINOPRIL 2.5 MG/1
2.5 TABLET ORAL DAILY
Qty: 31 TABLET | Refills: 5 | Status: SHIPPED | OUTPATIENT
Start: 2024-01-10

## 2024-01-16 ENCOUNTER — HOSPITAL ENCOUNTER (EMERGENCY)
Facility: HOSPITAL | Age: 44
Discharge: HOME | End: 2024-01-16
Attending: EMERGENCY MEDICINE
Payer: MEDICARE

## 2024-01-16 VITALS
WEIGHT: 200 LBS | RESPIRATION RATE: 20 BRPM | TEMPERATURE: 98.4 F | SYSTOLIC BLOOD PRESSURE: 140 MMHG | BODY MASS INDEX: 29.53 KG/M2 | OXYGEN SATURATION: 99 % | DIASTOLIC BLOOD PRESSURE: 81 MMHG | HEART RATE: 84 BPM

## 2024-01-16 DIAGNOSIS — R10.9 ABDOMINAL CRAMPING: Primary | ICD-10-CM

## 2024-01-16 DIAGNOSIS — S09.90XA CLOSED HEAD INJURY, INITIAL ENCOUNTER: ICD-10-CM

## 2024-01-16 PROCEDURE — 99283 EMERGENCY DEPT VISIT LOW MDM: CPT

## 2024-01-16 PROCEDURE — 96372 THER/PROPH/DIAG INJ SC/IM: CPT

## 2024-01-16 PROCEDURE — 99284 EMERGENCY DEPT VISIT MOD MDM: CPT | Performed by: EMERGENCY MEDICINE

## 2024-01-16 RX ORDER — OLANZAPINE 10 MG/2ML
5 INJECTION, POWDER, FOR SOLUTION INTRAMUSCULAR ONCE
Status: COMPLETED | OUTPATIENT
Start: 2024-01-16 | End: 2024-01-16

## 2024-01-16 RX ORDER — SUCRALFATE 1 G/1
1 TABLET ORAL 4 TIMES DAILY
Qty: 20 TABLET | Refills: 0 | Status: SHIPPED | OUTPATIENT
Start: 2024-01-16

## 2024-01-16 RX ADMIN — OLANZAPINE 5 MG: 10 INJECTION, POWDER, FOR SOLUTION INTRAMUSCULAR at 18:26

## 2024-01-16 NOTE — DISCHARGE INSTRUCTIONS
You will have to discuss your concerns with jacinto with the prescribing provider.    We reviewed your workup from your previous visit. It was reassuring and at this time we do not believe further bloodwork or imaging would be helpful at this time.     We have also sent a medication that helps with upset stomach for you to try for the next 5 days. It should not interfere with your other medications.    Please return with any further concerns as outlined in the packet in this paperwork.
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
## 2024-01-16 NOTE — ED ATTENDING ATTESTATION
1/16/2024  I, Prem Leyva MD, saw and evaluated the patient. I have discussed the patient with the resident/non-physician practitioner and agree with the resident's/non-physician practitioner's findings, Plan of Care, and MDM as documented in the resident's/non-physician practitioner's note, except where noted. All available labs and Radiology studies were reviewed.  I was present for key portions of any procedure(s) performed by the resident/non-physician practitioner and I was immediately available to provide assistance.       At this point I agree with the current assessment done in the Emergency Department.  I have conducted an independent evaluation of this patient a history and physical is as follows:    ED Course     43-year-old male group home patient here for multiple complaints.  Is been having some cramping abdominal discomfort for about a week now after eating at various diners and fast food restaurants.  Yesterday he was taken to [facility] where he had a fairly comprehensive workup of his abdominal pain including standard abdominal labs and CT abdomen pelvis with contrast.  This workup was unremarkable.  Roni says he was upset when he still had some symptoms today and became agitated.  He struck his head against the wall but reports he was not trying to hurt himself.  He thinks a lot of his symptoms may be related to treatment with Thorazine and he is requesting an injection of Zyprexa.    On exam well-appearing no acute distress, GCS 15 nonfocal, sclera nonicteric conjunctive normal, moist mucous membranes, regular rate and rhythm, clear to auscultation bilaterally, abdomen is soft nondistended nontender without rebound or guarding, extremities nontender without edema normal distal sensation and cap refill.    Impression and plan: Group home patient here with someone going mild abdominal cramping unchanged status post comprehensive eval yesterday at an outside hospital.  His abdominal exam is benign.   Will plan to discharge home with group home staff.    Critical Care Time  Procedures

## 2024-01-18 ENCOUNTER — OFFICE VISIT (OUTPATIENT)
Dept: FAMILY MEDICINE CLINIC | Facility: CLINIC | Age: 44
End: 2024-01-18

## 2024-01-18 VITALS
DIASTOLIC BLOOD PRESSURE: 81 MMHG | SYSTOLIC BLOOD PRESSURE: 123 MMHG | HEART RATE: 89 BPM | TEMPERATURE: 97.2 F | RESPIRATION RATE: 18 BRPM | WEIGHT: 203.2 LBS | BODY MASS INDEX: 30.01 KG/M2 | OXYGEN SATURATION: 98 %

## 2024-01-18 DIAGNOSIS — E11.65 TYPE 2 DIABETES MELLITUS WITH HYPERGLYCEMIA, WITHOUT LONG-TERM CURRENT USE OF INSULIN (HCC): Primary | ICD-10-CM

## 2024-01-18 DIAGNOSIS — K59.00 CONSTIPATION, UNSPECIFIED CONSTIPATION TYPE: ICD-10-CM

## 2024-01-18 DIAGNOSIS — R45.1 AGITATION: ICD-10-CM

## 2024-01-18 LAB — SL AMB POCT HEMOGLOBIN AIC: 7.4 (ref ?–6.5)

## 2024-01-18 PROCEDURE — 83036 HEMOGLOBIN GLYCOSYLATED A1C: CPT | Performed by: FAMILY MEDICINE

## 2024-01-18 PROCEDURE — 99214 OFFICE O/P EST MOD 30 MIN: CPT | Performed by: FAMILY MEDICINE

## 2024-01-18 RX ORDER — BISACODYL 5 MG/1
10 TABLET, DELAYED RELEASE ORAL DAILY PRN
Qty: 30 TABLET | Refills: 0 | Status: SHIPPED | OUTPATIENT
Start: 2024-01-18

## 2024-01-18 NOTE — ASSESSMENT & PLAN NOTE
HbA1c now above goal of 7. Patient and  state patient has had significant sugar intake including multiple sugary drinks, cake, etc. Patient was counseled to reduce this and states that he is actively trying to improve diet.     Plan   - Continue current DM management plan including metformin, sitagliptin, will add Jardiance 25 mg daily as patient has a sulfa allergy (avoid amaryl)  - Encourage improved nutrition to patient and    - Encourage physical activity as comorbidity tolerate   -Follow-up in 3 months to monitor diet and exercise changes as well as recheck hemoglobin A1c  Lab Results   Component Value Date    HGBA1C 7.4 (A) 01/18/2024

## 2024-01-18 NOTE — PROGRESS NOTES
Name: Sandor Ratliff      : 1980      MRN: [id number]  Encounter Provider: Kyung Goel DO  Encounter Date: 2024   Encounter department: [facility]    Assessment & Plan     1. Type 2 diabetes mellitus with hyperglycemia, without long-term current use of insulin (HCC)  Assessment & Plan:  HbA1c now above goal of 7. Patient and  state patient has had significant sugar intake including multiple sugary drinks, cake, etc. Patient was counseled to reduce this and states that he is actively trying to improve diet.     Plan   - Continue current DM management plan including metformin, sitagliptin, will add Jardiance 25 mg daily as patient has a sulfa allergy (avoid amaryl)  - Encourage improved nutrition to patient and    - Encourage physical activity as comorbidity tolerate   -Follow-up in 3 months to monitor diet and exercise changes as well as recheck hemoglobin A1c  Lab Results   Component Value Date    HGBA1C 7.4 (A) 2024       Orders:  -     POCT hemoglobin A1c  -     Empagliflozin (Jardiance) 25 MG TABS; Take 1 tablet (25 mg total) by mouth daily    2. Constipation, unspecified constipation type  -     bisacodyl (DULCOLAX) 5 mg EC tablet; Take 2 tablets (10 mg total) by mouth daily as needed for constipation    3. Agitation  Assessment & Plan:  Patient seen for ED follow-up as he was recently seen in the emergency department for agitation  He states that the agitation was sprung on by a upsetting conversation he had with his mother  He was given IM Zyprexa in the emergency department and returned to baseline    Plan:  Encouraged continued follow-up with a psychiatrist, as patient states that he does not believe his current psych medications are keeping his mood stable at times.             Subjective      HPI  43-year-old male presents today for emergency department follow-up for abdominal pain, nausea, vomiting and  agitation.  Patient tells me that he spoke to his mom on the phone which was very upsetting conversation to him.  This led to him having abdominal pain and subsequent nausea and vomiting.  He states because of the information that was spoken to about his mom, he became agitated and started to become violent with his self and not his care team.  He was seen in the emergency department, given IM Zyprexa and returned to baseline.    In terms of his diabetes: He is due for an A1c today.  We discussed that he has been trying to actively lose weight and reduce his food intake and make healthier choices which include salad and soup.  He has not had any hypoglycemic episodes.  We discussed his most recent visit with the nephrologist and he states that he felt the visit went well and they had recommended that we continue to monitor his kidney function.    Review of Systems   Constitutional:  Negative for chills and fever.   HENT:  Negative for sore throat.    Respiratory:  Negative for cough and shortness of breath.    Cardiovascular:  Negative for chest pain and palpitations.   Gastrointestinal:  Negative for abdominal pain, blood in stool, constipation, diarrhea, nausea and vomiting.   Genitourinary:  Negative for dysuria and hematuria.   Musculoskeletal:  Negative for arthralgias and back pain.   Skin:  Negative for color change and rash.   Neurological:  Negative for syncope and headaches.   Psychiatric/Behavioral:  Negative for agitation and behavioral problems.    All other systems reviewed and are negative.      Current Outpatient Medications on File Prior to Visit   Medication Sig    acetaminophen (TYLENOL) 650 mg CR tablet Take 1 tablet (650 mg total) by mouth every 8 (eight) hours as needed for mild pain    Alcohol Swabs (Curity Alcohol Preps) 70 % PADS Use if needed (when checking blood glucose)    aluminum-magnesium hydroxide-simethicone (MAALOX) 200-200-20 MG/5ML SUSP Take 20 mL by mouth 4 (four) times a day  (before meals and at bedtime)    ARIPiprazole (ABILIFY) 10 mg tablet Take 10 mg by mouth daily    ARIPiprazole (ABILIFY) 20 MG tablet     atorvastatin (LIPITOR) 40 mg tablet Take 1 tablet (40 mg total) by mouth daily at bedtime    B Complex Vitamins (B Complex-B12) TABS Take 1 tablet by mouth daily 1 cap by mouth daily @ 8am    bismuth subsalicylate (PEPTO BISMOL) 524 mg/30 mL oral suspension Take 15 mL (262 mg total) by mouth every 6 (six) hours as needed for indigestion    calcium carbonate (OYSTER SHELL,OSCAL) 500 mg Take 1 tablet by mouth daily with breakfast    chlorproMAZINE (THORAZINE) 100 mg tablet Take 100 mg by mouth 2 (two) times a day    cholecalciferol (VITAMIN D3) 1,000 units tablet Take 1 tablet (1,000 Units total) by mouth daily    divalproex sodium (DEPAKOTE ER) 250 mg 24 hr tablet Take 250 mg by mouth daily at bedtime    Emollient (Eucerin Original Healing) LOTN     glucose blood (True Metrix Blood Glucose Test) test strip Use 1 each 2 (two) times a week Use as instructed    glucose monitoring kit (FREESTYLE) monitoring kit 1 each by Does not apply route 2 (two) times a week    guaiFENesin (ROBITUSSIN) 100 MG/5ML oral liquid Take 10 mL (200 mg total) by mouth 3 (three) times a day as needed for cough    Lancets (freestyle) lancets Use to test blood sugars 2x weekly on Mon & Thurs @ 8AM    levothyroxine 25 mcg tablet Take 1 tablet (25 mcg total) by mouth daily in the early morning    lisinopril (ZESTRIL) 2.5 mg tablet Take 1 tablet (2.5 mg total) by mouth daily    loratadine (CLARITIN) 10 mg tablet Take 1 tablet (10 mg total) by mouth daily    LORazepam (ATIVAN) 0.5 mg tablet Take 0.5 mg by mouth 3 (three) times a day 8AM, 2PM, 8PM    Menthol (Halls Cough Drops) 5.8 MG LOZG Apply 1 lozenge (5.8 mg total) to the mouth or throat 4 (four) times a day as needed (cough)    metFORMIN (GLUCOPHAGE) 1000 MG tablet Take 1 tablet (1,000 mg total) by mouth 2 (two) times a day with meals    methylPREDNISolone 4  MG tablet therapy pack Use as directed on package    Mouthwashes (Biotene Dry Mouth) LIQD     Refresh Optive 0.5-0.9 % SOLN     Senna Plus 8.6-50 MG per tablet Take 2 tablets by mouth daily    sitaGLIPtin (JANUVIA) 100 mg tablet Take 1 tablet (100 mg total) by mouth daily Daily after breakfast    sucralfate (CARAFATE) 1 g tablet Take 1 tablet (1 g total) by mouth 4 (four) times a day    Sunscreen SPF50 LOTN Apply topically if needed (apply prior to sun exposure)    traZODone (DESYREL) 100 mg tablet     vitamin E, tocopherol, 400 units capsule Take 1 capsule (400 Units total) by mouth daily    [DISCONTINUED] bisacodyl (DULCOLAX) 5 mg EC tablet Take 2 tablets (10 mg total) by mouth daily as needed for constipation    chlorproMAZINE (THORAZINE) 200 mg tablet  (Patient not taking: Reported on 9/21/2023)    divalproex sodium (DEPAKOTE ER) 500 mg 24 hr tablet Take 500 mg by mouth every morning (Patient not taking: Reported on 12/18/2023)    divalproex sodium (DEPAKOTE) 500 mg EC tablet Take 1 tablet (500 mg total) by mouth 2 (two) times a day    famotidine (PEPCID) 20 mg tablet Take 1 tablet (20 mg total) by mouth 2 (two) times a day for 14 days    polyethylene glycol (MIRALAX) 17 g packet Take 17 g by mouth daily as needed (Constipation)    propranolol (INDERAL) 20 mg tablet Take 1 tablet (20 mg total) by mouth every 12 (twelve) hours       Objective     /81 (BP Location: Left arm, Patient Position: Sitting, Cuff Size: Large)   Pulse 89   Temp (!) 97.2 °F (36.2 °C)   Resp 18   Wt 92.2 kg (203 lb 3.2 oz)   SpO2 98%   BMI 30.01 kg/m²     Physical Exam  Vitals reviewed.   Constitutional:       General: He is not in acute distress.     Appearance: Normal appearance. He is normal weight. He is not ill-appearing.   HENT:      Head: Normocephalic and atraumatic.      Mouth/Throat:      Mouth: Mucous membranes are moist.      Pharynx: Oropharynx is clear.   Eyes:      Conjunctiva/sclera: Conjunctivae normal.    Cardiovascular:      Rate and Rhythm: Normal rate and regular rhythm.      Heart sounds: No murmur heard.     No friction rub. No gallop.   Pulmonary:      Effort: Pulmonary effort is normal. No respiratory distress.      Breath sounds: Normal breath sounds. No wheezing, rhonchi or rales.   Abdominal:      General: Abdomen is flat. Bowel sounds are normal.      Palpations: Abdomen is soft.   Musculoskeletal:      Cervical back: Normal range of motion.   Skin:     General: Skin is warm and dry.   Neurological:      General: No focal deficit present.      Mental Status: He is alert.   Psychiatric:         Mood and Affect: Mood normal.         Behavior: Behavior normal.       Kyung Goel,

## 2024-01-18 NOTE — ASSESSMENT & PLAN NOTE
Patient seen for ED follow-up as he was recently seen in the emergency department for agitation  He states that the agitation was sprung on by a upsetting conversation he had with his mother  He was given IM Zyprexa in the emergency department and returned to baseline    Plan:  Encouraged continued follow-up with a psychiatrist, as patient states that he does not believe his current psych medications are keeping his mood stable at times.

## 2024-02-01 DIAGNOSIS — E11.65 TYPE 2 DIABETES MELLITUS WITH HYPERGLYCEMIA, UNSPECIFIED WHETHER LONG TERM INSULIN USE (HCC): ICD-10-CM

## 2024-02-01 NOTE — TELEPHONE ENCOUNTER
Carlyn,  was just notified by [facility], there was no refills left and patient is completley out of med's. Requesting med's be called to pharmacy for today's dosage.

## 2024-02-15 ENCOUNTER — APPOINTMENT (OUTPATIENT)
Dept: LAB | Facility: MEDICAL CENTER | Age: 44
End: 2024-02-15
Payer: MEDICARE

## 2024-02-19 DIAGNOSIS — E11.65 TYPE 2 DIABETES MELLITUS WITH HYPERGLYCEMIA, WITHOUT LONG-TERM CURRENT USE OF INSULIN (HCC): ICD-10-CM

## 2024-02-19 DIAGNOSIS — K59.00 CONSTIPATION, UNSPECIFIED CONSTIPATION TYPE: ICD-10-CM

## 2024-02-19 RX ORDER — ASPIRIN 81 MG
2 TABLET, DELAYED RELEASE (ENTERIC COATED) ORAL DAILY
Qty: 62 TABLET | Refills: 5 | Status: SHIPPED | OUTPATIENT
Start: 2024-02-19

## 2024-02-19 NOTE — TELEPHONE ENCOUNTER
Patient's group home called and left a message requesting the following medication refills: Metformin 100 mg tablet and Senna Plus 8.6-50 MG per tablet. Please review and sign off if warranted

## 2024-02-22 ENCOUNTER — HOSPITAL ENCOUNTER (EMERGENCY)
Facility: HOSPITAL | Age: 44
Discharge: HOME | End: 2024-02-22
Attending: STUDENT IN AN ORGANIZED HEALTH CARE EDUCATION/TRAINING PROGRAM
Payer: MEDICARE

## 2024-02-22 ENCOUNTER — APPOINTMENT (EMERGENCY)
Dept: RADIOLOGY | Facility: HOSPITAL | Age: 44
End: 2024-02-22
Payer: MEDICARE

## 2024-02-22 VITALS
SYSTOLIC BLOOD PRESSURE: 137 MMHG | OXYGEN SATURATION: 100 % | TEMPERATURE: 97.3 F | DIASTOLIC BLOOD PRESSURE: 83 MMHG | RESPIRATION RATE: 18 BRPM | HEART RATE: 78 BPM

## 2024-02-22 DIAGNOSIS — S93.401A RIGHT ANKLE SPRAIN: Primary | ICD-10-CM

## 2024-02-22 DIAGNOSIS — M25.571 RIGHT ANKLE PAIN: ICD-10-CM

## 2024-02-22 DIAGNOSIS — M79.671 RIGHT FOOT PAIN: ICD-10-CM

## 2024-02-22 PROCEDURE — 99284 EMERGENCY DEPT VISIT MOD MDM: CPT | Performed by: STUDENT IN AN ORGANIZED HEALTH CARE EDUCATION/TRAINING PROGRAM

## 2024-02-22 PROCEDURE — 99283 EMERGENCY DEPT VISIT LOW MDM: CPT

## 2024-02-22 PROCEDURE — 73610 X-RAY EXAM OF ANKLE: CPT

## 2024-02-22 PROCEDURE — 73630 X-RAY EXAM OF FOOT: CPT

## 2024-02-22 RX ORDER — ACETAMINOPHEN 325 MG/1
650 TABLET ORAL ONCE
Status: COMPLETED | OUTPATIENT
Start: 2024-02-22 | End: 2024-02-22

## 2024-02-22 RX ADMIN — ACETAMINOPHEN 650 MG: 325 TABLET, FILM COATED ORAL at 17:40
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
## 2024-02-26 DIAGNOSIS — H04.129 DRY EYE: Primary | ICD-10-CM

## 2024-02-26 RX ORDER — CARBOXYMETHYLCELLULOSE SODIUM AND GLYCERIN 5; 9 MG/ML; MG/ML
1 SOLUTION/ DROPS OPHTHALMIC AS NEEDED
Qty: 15 ML | Refills: 5 | Status: SHIPPED | OUTPATIENT
Start: 2024-02-26

## 2024-02-26 NOTE — TELEPHONE ENCOUNTER
Aicha from [employer] support called asking for a refill of the following medication, please advise thank you

## 2024-02-28 ENCOUNTER — TELEPHONE (OUTPATIENT)
Dept: FAMILY MEDICINE CLINIC | Facility: CLINIC | Age: 44
End: 2024-02-28

## 2024-02-28 NOTE — TELEPHONE ENCOUNTER
Patient's eye drops in the chart did not have direction so I wrote what I thought they were supposed to be. If he is instructed by the ophthalmologist to take them TID then I would recommend following their instructions. Thank you       Called [facility] pharmacy and spoke with pharmacist Kika. Advised her of Dr. Goel's message. She confirmed and stated that she would update the script accordingly.

## 2024-02-28 NOTE — TELEPHONE ENCOUNTER
Pili from [facility] call our prescription line yesterday 2/27/24 regarding this patient. They received a prescription from Dr. Goel 2/26/24 for eye drops. The direction said place one drop in each eye as needed. Pharmacist want to check because patient currently has a script for the same eye drops from Dr. Manriquez that is a ophthalmologist and the prescription said straight instruction to use three times a day.     Pharmacist state if this was changing from the straight order to as needed.? Pharmacist requesting a call back to [phone]

## 2024-03-04 ENCOUNTER — HOSPITAL ENCOUNTER (EMERGENCY)
Facility: HOSPITAL | Age: 44
Discharge: HOME | End: 2024-03-04
Attending: EMERGENCY MEDICINE | Admitting: EMERGENCY MEDICINE
Payer: MEDICARE

## 2024-03-04 VITALS
BODY MASS INDEX: 30.02 KG/M2 | WEIGHT: 203.26 LBS | DIASTOLIC BLOOD PRESSURE: 56 MMHG | OXYGEN SATURATION: 99 % | SYSTOLIC BLOOD PRESSURE: 98 MMHG | TEMPERATURE: 97.8 F | RESPIRATION RATE: 16 BRPM | HEART RATE: 76 BPM

## 2024-03-04 DIAGNOSIS — R10.84 GENERALIZED ABDOMINAL PAIN: ICD-10-CM

## 2024-03-04 DIAGNOSIS — K52.9 GASTROENTERITIS: Primary | ICD-10-CM

## 2024-03-04 LAB
ALBUMIN SERPL BCP-MCNC: 3.7 G/DL (ref 3.5–5)
ALP SERPL-CCNC: 50 U/L (ref 34–104)
ALT SERPL W P-5'-P-CCNC: 14 U/L (ref 7–52)
ANION GAP SERPL CALCULATED.3IONS-SCNC: 7 MMOL/L
AST SERPL W P-5'-P-CCNC: 13 U/L (ref 13–39)
BASOPHILS # BLD AUTO: 0.04 THOUSANDS/ÂΜL (ref 0–0.1)
BASOPHILS NFR BLD AUTO: 0 % (ref 0–1)
BILIRUB SERPL-MCNC: 0.41 MG/DL (ref 0.2–1)
BUN SERPL-MCNC: 17 MG/DL (ref 5–25)
CALCIUM SERPL-MCNC: 9.2 MG/DL (ref 8.4–10.2)
CHLORIDE SERPL-SCNC: 101 MMOL/L (ref 96–108)
CO2 SERPL-SCNC: 30 MMOL/L (ref 21–32)
CREAT SERPL-MCNC: 1.54 MG/DL (ref 0.6–1.3)
EOSINOPHIL # BLD AUTO: 0.25 THOUSAND/ÂΜL (ref 0–0.61)
EOSINOPHIL NFR BLD AUTO: 2 % (ref 0–6)
ERYTHROCYTE [DISTWIDTH] IN BLOOD BY AUTOMATED COUNT: 11.5 % (ref 11.6–15.1)
FLUAV RNA RESP QL NAA+PROBE: NEGATIVE
FLUBV RNA RESP QL NAA+PROBE: NEGATIVE
GFR SERPL CREATININE-BSD FRML MDRD: 54 ML/MIN/1.73SQ M
GLUCOSE SERPL-MCNC: 122 MG/DL (ref 65–140)
HCT VFR BLD AUTO: 44.2 % (ref 36.5–49.3)
HGB BLD-MCNC: 14.2 G/DL (ref 12–17)
IMM GRANULOCYTES # BLD AUTO: 0.09 THOUSAND/UL (ref 0–0.2)
IMM GRANULOCYTES NFR BLD AUTO: 1 % (ref 0–2)
LIPASE SERPL-CCNC: 18 U/L (ref 11–82)
LYMPHOCYTES # BLD AUTO: 2.52 THOUSANDS/ÂΜL (ref 0.6–4.47)
LYMPHOCYTES NFR BLD AUTO: 23 % (ref 14–44)
MCH RBC QN AUTO: 28.3 PG (ref 26.8–34.3)
MCHC RBC AUTO-ENTMCNC: 32.1 G/DL (ref 31.4–37.4)
MCV RBC AUTO: 88 FL (ref 82–98)
MONOCYTES # BLD AUTO: 0.95 THOUSAND/ÂΜL (ref 0.17–1.22)
MONOCYTES NFR BLD AUTO: 9 % (ref 4–12)
NEUTROPHILS # BLD AUTO: 7 THOUSANDS/ÂΜL (ref 1.85–7.62)
NEUTS SEG NFR BLD AUTO: 65 % (ref 43–75)
NRBC BLD AUTO-RTO: 0 /100 WBCS
PLATELET # BLD AUTO: 138 THOUSANDS/UL (ref 149–390)
PMV BLD AUTO: 9.3 FL (ref 8.9–12.7)
POTASSIUM SERPL-SCNC: 5.1 MMOL/L (ref 3.5–5.3)
PROT SERPL-MCNC: 6.4 G/DL (ref 6.4–8.4)
RBC # BLD AUTO: 5.01 MILLION/UL (ref 3.88–5.62)
RSV RNA RESP QL NAA+PROBE: NEGATIVE
SARS-COV-2 RNA RESP QL NAA+PROBE: NEGATIVE
SODIUM SERPL-SCNC: 138 MMOL/L (ref 135–147)
WBC # BLD AUTO: 10.85 THOUSAND/UL (ref 4.31–10.16)

## 2024-03-04 PROCEDURE — 80053 COMPREHEN METABOLIC PANEL: CPT

## 2024-03-04 PROCEDURE — 99284 EMERGENCY DEPT VISIT MOD MDM: CPT

## 2024-03-04 PROCEDURE — 99284 EMERGENCY DEPT VISIT MOD MDM: CPT | Performed by: EMERGENCY MEDICINE

## 2024-03-04 PROCEDURE — 83690 ASSAY OF LIPASE: CPT

## 2024-03-04 PROCEDURE — 85025 COMPLETE CBC W/AUTO DIFF WBC: CPT

## 2024-03-04 PROCEDURE — 96361 HYDRATE IV INFUSION ADD-ON: CPT

## 2024-03-04 PROCEDURE — 36415 COLL VENOUS BLD VENIPUNCTURE: CPT

## 2024-03-04 PROCEDURE — 0241U HB NFCT DS VIR RESP RNA 4 TRGT: CPT

## 2024-03-04 PROCEDURE — 96374 THER/PROPH/DIAG INJ IV PUSH: CPT

## 2024-03-04 RX ORDER — ACETAMINOPHEN 325 MG/1
650 TABLET ORAL ONCE
Status: COMPLETED | OUTPATIENT
Start: 2024-03-04 | End: 2024-03-04

## 2024-03-04 RX ORDER — ONDANSETRON 4 MG/1
4 TABLET, FILM COATED ORAL EVERY 8 HOURS PRN
Qty: 15 TABLET | Refills: 0 | Status: SHIPPED | OUTPATIENT
Start: 2024-03-04 | End: 2024-03-11

## 2024-03-04 RX ORDER — ONDANSETRON 2 MG/ML
4 INJECTION INTRAMUSCULAR; INTRAVENOUS ONCE
Status: COMPLETED | OUTPATIENT
Start: 2024-03-04 | End: 2024-03-04

## 2024-03-04 RX ADMIN — SODIUM CHLORIDE 1000 ML: 0.9 INJECTION, SOLUTION INTRAVENOUS at 14:26

## 2024-03-04 RX ADMIN — ACETAMINOPHEN 650 MG: 325 TABLET, FILM COATED ORAL at 15:54

## 2024-03-04 RX ADMIN — ONDANSETRON 4 MG: 2 INJECTION INTRAMUSCULAR; INTRAVENOUS at 14:26
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
## 2024-03-04 NOTE — DISCHARGE INSTRUCTIONS
Take zofran at home for nausea/vomiting in order to rehydrate. Slowly return to your regular diet. Please return to the ED with new, worsening symptoms.

## 2024-03-04 NOTE — ED ATTENDING ATTESTATION
"3/4/2024  I, Romain Vera MD, saw and evaluated the patient. I have discussed the patient with the resident/non-physician practitioner and agree with the resident's/non-physician practitioner's findings, Plan of Care, and MDM as documented in the resident's/non-physician practitioner's note, except where noted. All available labs and Radiology studies were reviewed.  I was present for key portions of any procedure(s) performed by the resident/non-physician practitioner and I was immediately available to provide assistance.       At this point I agree with the current assessment done in the Emergency Department.  I have conducted an independent evaluation of this patient a history and physical is as follows: Patient presents for evaluation of 4 days of nausea, vomiting, diarrhea, diffuse abdominal pain.  Patient reports a long history of \"stomach issues\".  States that symptoms got worse after eating at [email or web address], concern that the soap that [employer] used to clean his dishes is not agreeing with him.  He denies any chest pain or shortness of breath.  Denies any hemoptysis.    Abdomen soft, nontender, nondistended.  Will check basic labs, hydrate patient, reassess.    Labs largely unremarkable, patient improved after ED management, abdomen soft, nontender, nondistended.  Patient stable at time of discharge home to the emergency department with p.o. Zofran PRN nausea.     Results Reviewed       Procedure Component Value Units Date/Time    FLU/RSV/COVID - if FLU/RSV clinically relevant [[id number]]  (Normal) Collected: 03/04/24 1424    Lab Status: Final result Specimen: Nares from Nose Updated: 03/04/24 1530     SARS-CoV-2 Negative     INFLUENZA A PCR Negative     INFLUENZA B PCR Negative     RSV PCR Negative    Narrative:      FOR PEDIATRIC PATIENTS - copy/paste COVID Guidelines URL to browser: [email or web address]    SARS-CoV-2 assay is a Nucleic Acid " Amplification assay intended for the  qualitative detection of nucleic acid from SARS-CoV-2 in nasopharyngeal  swabs. Results are for the presumptive identification of SARS-CoV-2 RNA.    Positive results are indicative of infection with SARS-CoV-2, the virus  causing COVID-19, but do not rule out bacterial infection or co-infection  with other viruses. Laboratories within the [place] and its  territories are required to report all positive results to the appropriate  public health authorities. Negative results do not preclude SARS-CoV-2  infection and should not be used as the sole basis for treatment or other  patient management decisions. Negative results must be combined with  clinical observations, patient history, and epidemiological information.  This test has not been FDA cleared or approved.    This test has been authorized by FDA under an Emergency Use Authorization  (EUA). This test is only authorized for the duration of time the  declaration that circumstances exist justifying the authorization of the  emergency use of an in vitro diagnostic tests for detection of SARS-CoV-2  virus and/or diagnosis of COVID-19 infection under section 564(b)(1) of  the Act, 21 U.S.C. 360bbb-3(b)(1), unless the authorization is terminated  or revoked sooner. The test has been validated but independent review by FDA  and CLIA is pending.    Test performed using [employer] GeneXpert: This RT-PCR assay targets N2,  a region unique to SARS-CoV-2. A conserved region in the E-gene was chosen  for pan-Sarbecovirus detection which includes SARS-CoV-2.    According to CMS-2020-01-R, this platform meets the definition of high-throughput technology.    Lipase [[id number]]  (Normal) Collected: 03/04/24 1424    Lab Status: Final result Specimen: Blood from Arm, Right Updated: 03/04/24 1458     Lipase 18 u/L     Comprehensive metabolic panel [[id number]]  (Abnormal) Collected: 03/04/24 1424    Lab Status: Final result Specimen: Blood  from Arm, Right Updated: 03/04/24 1458     Sodium 138 mmol/L      Potassium 5.1 mmol/L      Chloride 101 mmol/L      CO2 30 mmol/L      ANION GAP 7 mmol/L      BUN 17 mg/dL      Creatinine 1.54 mg/dL      Glucose 122 mg/dL      Calcium 9.2 mg/dL      AST 13 U/L      ALT 14 U/L      Alkaline Phosphatase 50 U/L      Total Protein 6.4 g/dL      Albumin 3.7 g/dL      Total Bilirubin 0.41 mg/dL      eGFR 54 ml/min/1.73sq m     Narrative:      National Kidney Disease Foundation guidelines for Chronic Kidney Disease (CKD):     Stage 1 with normal or high GFR (GFR > 90 mL/min/1.73 square meters)    Stage 2 Mild CKD (GFR = 60-89 mL/min/1.73 square meters)    Stage 3A Moderate CKD (GFR = 45-59 mL/min/1.73 square meters)    Stage 3B Moderate CKD (GFR = 30-44 mL/min/1.73 square meters)    Stage 4 Severe CKD (GFR = 15-29 mL/min/1.73 square meters)    Stage 5 End Stage CKD (GFR <15 mL/min/1.73 square meters)  Note: GFR calculation is accurate only with a steady state creatinine    CBC and differential [[id number]]  (Abnormal) Collected: 03/04/24 1424    Lab Status: Final result Specimen: Blood from Arm, Right Updated: 03/04/24 1438     WBC 10.85 Thousand/uL      RBC 5.01 Million/uL      Hemoglobin 14.2 g/dL      Hematocrit 44.2 %      MCV 88 fL      MCH 28.3 pg      MCHC 32.1 g/dL      RDW 11.5 %      MPV 9.3 fL      Platelets 138 Thousands/uL      nRBC 0 /100 WBCs      Neutrophils Relative 65 %      Immat GRANS % 1 %      Lymphocytes Relative 23 %      Monocytes Relative 9 %      Eosinophils Relative 2 %      Basophils Relative 0 %      Neutrophils Absolute 7.00 Thousands/µL      Immature Grans Absolute 0.09 Thousand/uL      Lymphocytes Absolute 2.52 Thousands/µL      Monocytes Absolute 0.95 Thousand/µL      Eosinophils Absolute 0.25 Thousand/µL      Basophils Absolute 0.04 Thousands/µL                   ED Course         Critical Care Time  Procedures

## 2024-03-08 DIAGNOSIS — Z12.5 PROSTATE CANCER SCREENING: Primary | ICD-10-CM

## 2024-03-11 DIAGNOSIS — Z80.42 FAMILY HISTORY OF PROSTATE CANCER: Primary | ICD-10-CM

## 2024-03-12 ENCOUNTER — HOSPITAL ENCOUNTER (EMERGENCY)
Facility: HOSPITAL | Age: 44
Discharge: HOME | End: 2024-03-12
Attending: EMERGENCY MEDICINE
Payer: MEDICARE

## 2024-03-12 VITALS
SYSTOLIC BLOOD PRESSURE: 137 MMHG | DIASTOLIC BLOOD PRESSURE: 82 MMHG | RESPIRATION RATE: 20 BRPM | HEART RATE: 85 BPM | OXYGEN SATURATION: 97 % | TEMPERATURE: 97.3 F

## 2024-03-12 DIAGNOSIS — Z76.0 ENCOUNTER FOR MEDICATION REFILL: Primary | ICD-10-CM

## 2024-03-12 PROCEDURE — 99284 EMERGENCY DEPT VISIT MOD MDM: CPT | Performed by: EMERGENCY MEDICINE

## 2024-03-12 PROCEDURE — 99281 EMR DPT VST MAYX REQ PHY/QHP: CPT

## 2024-03-12 RX ORDER — VALPROIC ACID 250 MG/5ML
SOLUTION ORAL
Qty: 473 ML | Refills: 0 | Status: SHIPPED | OUTPATIENT
Start: 2024-03-12
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
## 2024-03-14 ENCOUNTER — APPOINTMENT (OUTPATIENT)
Dept: LAB | Facility: MEDICAL CENTER | Age: 44
End: 2024-03-14
Payer: MEDICARE

## 2024-03-14 DIAGNOSIS — F31.81 BIPOLAR II DISORDER WITH RAPID CYCLING (HCC): ICD-10-CM

## 2024-03-14 DIAGNOSIS — F31.81 BIPOLAR II DISORDER (HCC): ICD-10-CM

## 2024-03-14 LAB
ALBUMIN SERPL BCP-MCNC: 3.5 G/DL (ref 3.5–5)
ALP SERPL-CCNC: 50 U/L (ref 34–104)
ALT SERPL W P-5'-P-CCNC: 18 U/L (ref 7–52)
ANION GAP SERPL CALCULATED.3IONS-SCNC: 8 MMOL/L (ref 4–13)
AST SERPL W P-5'-P-CCNC: 14 U/L (ref 13–39)
BASOPHILS # BLD AUTO: 0.03 THOUSANDS/ÂΜL (ref 0–0.1)
BASOPHILS NFR BLD AUTO: 1 % (ref 0–1)
BILIRUB SERPL-MCNC: 0.43 MG/DL (ref 0.2–1)
BUN SERPL-MCNC: 13 MG/DL (ref 5–25)
CALCIUM SERPL-MCNC: 8.4 MG/DL (ref 8.4–10.2)
CHLORIDE SERPL-SCNC: 101 MMOL/L (ref 96–108)
CHOLEST SERPL-MCNC: 86 MG/DL
CO2 SERPL-SCNC: 30 MMOL/L (ref 21–32)
CREAT SERPL-MCNC: 1.46 MG/DL (ref 0.6–1.3)
EOSINOPHIL # BLD AUTO: 0.25 THOUSAND/ÂΜL (ref 0–0.61)
EOSINOPHIL NFR BLD AUTO: 4 % (ref 0–6)
ERYTHROCYTE [DISTWIDTH] IN BLOOD BY AUTOMATED COUNT: 11.5 % (ref 11.6–15.1)
EST. AVERAGE GLUCOSE BLD GHB EST-MCNC: 160 MG/DL
GFR SERPL CREATININE-BSD FRML MDRD: 58 ML/MIN/1.73SQ M
GLUCOSE P FAST SERPL-MCNC: 143 MG/DL (ref 65–99)
HBA1C MFR BLD: 7.2 %
HCT VFR BLD AUTO: 42.3 % (ref 36.5–49.3)
HDLC SERPL-MCNC: 33 MG/DL
HGB BLD-MCNC: 13.4 G/DL (ref 12–17)
IMM GRANULOCYTES # BLD AUTO: 0.04 THOUSAND/UL (ref 0–0.2)
IMM GRANULOCYTES NFR BLD AUTO: 1 % (ref 0–2)
LDLC SERPL CALC-MCNC: 28 MG/DL (ref 0–100)
LYMPHOCYTES # BLD AUTO: 1.79 THOUSANDS/ÂΜL (ref 0.6–4.47)
LYMPHOCYTES NFR BLD AUTO: 28 % (ref 14–44)
MCH RBC QN AUTO: 27.3 PG (ref 26.8–34.3)
MCHC RBC AUTO-ENTMCNC: 31.7 G/DL (ref 31.4–37.4)
MCV RBC AUTO: 86 FL (ref 82–98)
MONOCYTES # BLD AUTO: 0.52 THOUSAND/ÂΜL (ref 0.17–1.22)
MONOCYTES NFR BLD AUTO: 8 % (ref 4–12)
NEUTROPHILS # BLD AUTO: 3.73 THOUSANDS/ÂΜL (ref 1.85–7.62)
NEUTS SEG NFR BLD AUTO: 58 % (ref 43–75)
NONHDLC SERPL-MCNC: 53 MG/DL
NRBC BLD AUTO-RTO: 0 /100 WBCS
PLATELET # BLD AUTO: 134 THOUSANDS/UL (ref 149–390)
PMV BLD AUTO: 10.2 FL (ref 8.9–12.7)
POTASSIUM SERPL-SCNC: 4.5 MMOL/L (ref 3.5–5.3)
PROT SERPL-MCNC: 5.7 G/DL (ref 6.4–8.4)
RBC # BLD AUTO: 4.9 MILLION/UL (ref 3.88–5.62)
SODIUM SERPL-SCNC: 139 MMOL/L (ref 135–147)
TRIGL SERPL-MCNC: 126 MG/DL
TSH SERPL DL<=0.05 MIU/L-ACNC: 0.84 UIU/ML (ref 0.45–4.5)
VALPROATE SERPL-MCNC: 42 UG/ML (ref 50–100)
WBC # BLD AUTO: 6.36 THOUSAND/UL (ref 4.31–10.16)

## 2024-03-14 PROCEDURE — 83036 HEMOGLOBIN GLYCOSYLATED A1C: CPT

## 2024-03-14 PROCEDURE — 80164 ASSAY DIPROPYLACETIC ACD TOT: CPT

## 2024-03-14 PROCEDURE — 85025 COMPLETE CBC W/AUTO DIFF WBC: CPT

## 2024-03-14 PROCEDURE — 80053 COMPREHEN METABOLIC PANEL: CPT

## 2024-03-14 PROCEDURE — 80359 METHYLENEDIOXYAMPHETAMINES: CPT

## 2024-03-14 PROCEDURE — 80307 DRUG TEST PRSMV CHEM ANLYZR: CPT

## 2024-03-14 PROCEDURE — 84443 ASSAY THYROID STIM HORMONE: CPT

## 2024-03-14 PROCEDURE — 80061 LIPID PANEL: CPT

## 2024-03-14 PROCEDURE — 36415 COLL VENOUS BLD VENIPUNCTURE: CPT

## 2024-03-14 PROCEDURE — 80324 DRUG SCREEN AMPHETAMINES 1/2: CPT

## 2024-03-17 LAB
6MAM UR QL SCN: NEGATIVE NG/ML
ACCEPTABLE CREAT UR QL: 67 MG/DL
AMPHET UR QL CFM: NOT DETECTED NG/MG CREAT
AMPHET UR QL SCN: NEGATIVE NG/ML
BARBITURATES UR QL SCN: NEGATIVE NG/ML
BENZODIAZ UR QL SCN: NEGATIVE NG/ML
BUPRENORPHINE UR QL CFM: NEGATIVE NG/ML
CANNABINOIDS UR QL SCN: NEGATIVE NG/ML
CARISOPRODOL UR QL: NEGATIVE NG/ML
COCAINE+BZE UR QL SCN: NEGATIVE NG/ML
ETHYL GLUCURONIDE UR QL SCN: NEGATIVE NG/ML
FENTANYL UR QL SCN: NEGATIVE NG/ML
GABAPENTIN SERPLBLD QL SCN: NEGATIVE UG/ML
MDMA UR QL CFM: NOT DETECTED NG/MG CREAT
MDMA UR QL CFM: NOT DETECTED NG/MG CREAT
METHADONE UR QL SCN: NEGATIVE NG/ML
METHAMPHET UR QL CFM: NOT DETECTED NG/MG CREAT
NITRITE UR QL STRIP: NEGATIVE UG/ML
OPIATES UR QL SCN: NEGATIVE NG/ML
OXYCODONE+OXYMORPHONE UR QL SCN: NEGATIVE NG/ML
PCP UR QL SCN: NEGATIVE NG/ML
PROPOXYPH UR QL SCN: NEGATIVE NG/ML
SPECIMEN PH ACCEPTABLE UR: 5.3 (ref 4.5–8.9)
TAPENTADOL UR QL SCN: NEGATIVE NG/ML
TRAMADOL UR QL SCN: NEGATIVE NG/ML

## 2024-03-19 ENCOUNTER — APPOINTMENT (OUTPATIENT)
Dept: LAB | Facility: MEDICAL CENTER | Age: 44
End: 2024-03-19
Payer: MEDICARE

## 2024-03-19 DIAGNOSIS — Z80.42 FAMILY HISTORY OF PROSTATE CANCER: ICD-10-CM

## 2024-03-19 LAB — PSA SERPL-MCNC: 0.7 NG/ML (ref 0–4)

## 2024-03-19 PROCEDURE — G0103 PSA SCREENING: HCPCS

## 2024-03-19 PROCEDURE — 36415 COLL VENOUS BLD VENIPUNCTURE: CPT

## 2024-04-04 ENCOUNTER — OFFICE VISIT (OUTPATIENT)
Dept: URGENT CARE | Age: 44
End: 2024-04-04
Payer: MEDICARE

## 2024-04-04 VITALS
HEART RATE: 76 BPM | OXYGEN SATURATION: 97 % | DIASTOLIC BLOOD PRESSURE: 75 MMHG | RESPIRATION RATE: 18 BRPM | TEMPERATURE: 97.6 F | SYSTOLIC BLOOD PRESSURE: 102 MMHG

## 2024-04-04 DIAGNOSIS — H57.89 REDNESS OF EYE, LEFT: Primary | ICD-10-CM

## 2024-04-04 PROCEDURE — G0463 HOSPITAL OUTPT CLINIC VISIT: HCPCS

## 2024-04-04 PROCEDURE — 99213 OFFICE O/P EST LOW 20 MIN: CPT

## 2024-04-04 RX ORDER — OLANZAPINE 5 MG/1
TABLET, ORALLY DISINTEGRATING ORAL
COMMUNITY
Start: 2024-03-13
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
## 2024-04-04 NOTE — PATIENT INSTRUCTIONS
Your eye examine is normal today.    Continue with your eye drops  If you continue with pain in the eye follow up with Opthalmology

## 2024-04-12 ENCOUNTER — RA CDI HCC (OUTPATIENT)
Dept: OTHER | Facility: HOSPITAL | Age: 44
End: 2024-04-12

## 2024-04-12 DIAGNOSIS — E78.5 HYPERLIPIDEMIA, UNSPECIFIED HYPERLIPIDEMIA TYPE: ICD-10-CM

## 2024-04-12 RX ORDER — CALCIUM CARBONATE 500(1250)
1 TABLET ORAL
Qty: 30 TABLET | Refills: 3 | Status: SHIPPED | OUTPATIENT
Start: 2024-04-12 | End: 2024-08-10

## 2024-04-12 NOTE — PROGRESS NOTES
HCC coding opportunities          Chart Reviewed number of suggestions sent to Provider: 2  E11.22  E11.42     Patients Insurance     Medicare Insurance: Medicare

## 2024-04-13 ENCOUNTER — OFFICE VISIT (OUTPATIENT)
Dept: URGENT CARE | Facility: MEDICAL CENTER | Age: 44
End: 2024-04-13
Payer: MEDICARE

## 2024-04-13 VITALS
TEMPERATURE: 98 F | HEART RATE: 80 BPM | BODY MASS INDEX: 29.24 KG/M2 | OXYGEN SATURATION: 97 % | RESPIRATION RATE: 18 BRPM | DIASTOLIC BLOOD PRESSURE: 64 MMHG | SYSTOLIC BLOOD PRESSURE: 127 MMHG | WEIGHT: 198 LBS

## 2024-04-13 DIAGNOSIS — Z63.8 FAMILY DISCORD: ICD-10-CM

## 2024-04-13 DIAGNOSIS — R10.84 GENERALIZED ABDOMINAL PAIN: ICD-10-CM

## 2024-04-13 DIAGNOSIS — Z86.59 HISTORY OF PSYCHIATRIC DISORDER: ICD-10-CM

## 2024-04-13 DIAGNOSIS — R11.10 VOMITING, UNSPECIFIED VOMITING TYPE, UNSPECIFIED WHETHER NAUSEA PRESENT: Primary | ICD-10-CM

## 2024-04-13 PROCEDURE — G0463 HOSPITAL OUTPT CLINIC VISIT: HCPCS

## 2024-04-13 PROCEDURE — 99213 OFFICE O/P EST LOW 20 MIN: CPT

## 2024-04-13 RX ORDER — ONDANSETRON 4 MG/1
TABLET, ORALLY DISINTEGRATING ORAL
COMMUNITY
Start: 2024-01-16

## 2024-04-13 SDOH — SOCIAL STABILITY - SOCIAL INSECURITY: OTHER SPECIFIED PROBLEMS RELATED TO PRIMARY SUPPORT GROUP: Z63.8
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
## 2024-04-13 NOTE — PATIENT INSTRUCTIONS
[place]/BRAT diet- bananas, rice, apples, toast/crackers  Broths and clear soup  Progress to a normal diet as tolerated  Encourage fluids (such as pedialyte) and hydration  Avoid dairy while symptoms persist  Tylenol for pain/fever    Zofran for nausea as needed, take as prescribed

## 2024-04-19 ENCOUNTER — OFFICE VISIT (OUTPATIENT)
Dept: FAMILY MEDICINE CLINIC | Facility: CLINIC | Age: 44
End: 2024-04-19

## 2024-04-19 VITALS
TEMPERATURE: 97.9 F | RESPIRATION RATE: 18 BRPM | SYSTOLIC BLOOD PRESSURE: 115 MMHG | HEIGHT: 69 IN | WEIGHT: 195 LBS | DIASTOLIC BLOOD PRESSURE: 78 MMHG | OXYGEN SATURATION: 99 % | HEART RATE: 88 BPM | BODY MASS INDEX: 28.88 KG/M2

## 2024-04-19 DIAGNOSIS — E11.65 TYPE 2 DIABETES MELLITUS WITH HYPERGLYCEMIA, WITHOUT LONG-TERM CURRENT USE OF INSULIN (HCC): Primary | ICD-10-CM

## 2024-04-19 PROCEDURE — 99213 OFFICE O/P EST LOW 20 MIN: CPT | Performed by: FAMILY MEDICINE

## 2024-04-19 PROCEDURE — G2211 COMPLEX E/M VISIT ADD ON: HCPCS | Performed by: FAMILY MEDICINE

## 2024-04-19 NOTE — PROGRESS NOTES
Name: Sandor Ratliff      : 1980      MRN: [id number]  Encounter Provider: Kyung Goel DO  Encounter Date: 2024   Encounter department: [facility]    Assessment & Plan     1. Type 2 diabetes mellitus with hyperglycemia, without long-term current use of insulin ([facility])  Assessment & Plan:  HbA1c now above goal of 7. Patient has been doing diligent work at decreasing sugar intake as well as exercising. He has also implemented intermittent fasting which has been very helpful for him as he has lost 3lbs since last visit.     Plan   - Continue current DM management plan including metformin, sitagliptin, Jardiance 25 mg daily as patient has a sulfa allergy (avoid amaryl)  - Encourage improved nutrition to patient and    - Encourage physical activity   -Follow-up in 3 months to monitor diet and exercise changes as well as recheck hemoglobin A1c and MAW visit   Lab Results   Component Value Date    HGBA1C 7.2 (H) 2024       Orders:  -     Hemoglobin A1C; Future           Subjective      HPI  43-year-old male with extensive psychiatric history as well as type 2 diabetes presents today for diabetes follow-up.  He states that he has been implementing improved diet changes such as decreasing sugar intake as well as carbohydrates.  He also states that he has implemented intermittent fasting as well as jogging into his weekly routine.  He denies any hypoglycemic events.  He denies any other complaints or concerns.    Review of Systems   Constitutional:  Negative for chills and fever.   HENT:  Negative for sore throat.    Respiratory:  Negative for cough and shortness of breath.    Cardiovascular:  Negative for chest pain and palpitations.   Gastrointestinal:  Negative for abdominal pain, blood in stool, constipation, diarrhea, nausea and vomiting.   Genitourinary:  Negative for dysuria and hematuria.   Musculoskeletal:  Negative for arthralgias and back  pain.   Skin:  Negative for color change and rash.   Neurological:  Negative for syncope and headaches.   Psychiatric/Behavioral:  Negative for agitation and behavioral problems.    All other systems reviewed and are negative.      Current Outpatient Medications on File Prior to Visit   Medication Sig    Alcohol Swabs (Curity Alcohol Preps) 70 % PADS Use if needed (when checking blood glucose)    aluminum-magnesium hydroxide-simethicone (MAALOX) 200-200-20 MG/5ML SUSP Take 20 mL by mouth 4 (four) times a day (before meals and at bedtime)    ARIPiprazole (ABILIFY) 10 mg tablet Take 10 mg by mouth daily    ARIPiprazole (ABILIFY) 20 MG tablet     atorvastatin (LIPITOR) 40 mg tablet Take 1 tablet (40 mg total) by mouth daily at bedtime    B Complex Vitamins (B Complex-B12) TABS Take 1 tablet by mouth daily 1 cap by mouth daily @ 8am    bisacodyl (DULCOLAX) 5 mg EC tablet Take 2 tablets (10 mg total) by mouth daily as needed for constipation    bismuth subsalicylate (PEPTO BISMOL) 524 mg/30 mL oral suspension Take 15 mL (262 mg total) by mouth every 6 (six) hours as needed for indigestion    calcium carbonate (OYSTER SHELL,OSCAL) 500 mg Take 1 tablet by mouth daily with breakfast    cholecalciferol (VITAMIN D3) 1,000 units tablet Take 1 tablet (1,000 Units total) by mouth daily    divalproex sodium (DEPAKOTE ER) 250 mg 24 hr tablet Take 250 mg by mouth daily at bedtime    Emollient (Eucerin Original Healing) LOTN     Empagliflozin (Jardiance) 25 MG TABS Take 1 tablet (25 mg total) by mouth daily    glucose blood (True Metrix Blood Glucose Test) test strip Use 1 each 2 (two) times a week Use as instructed    glucose monitoring kit (FREESTYLE) monitoring kit 1 each by Does not apply route 2 (two) times a week    guaiFENesin (ROBITUSSIN) 100 MG/5ML oral liquid Take 10 mL (200 mg total) by mouth 3 (three) times a day as needed for cough    Lancets (freestyle) lancets Use to test blood sugars 2x weekly on Mon & Thurs @ 8AM     levothyroxine 25 mcg tablet Take 1 tablet (25 mcg total) by mouth daily in the early morning    lisinopril (ZESTRIL) 2.5 mg tablet Take 1 tablet (2.5 mg total) by mouth daily    loratadine (CLARITIN) 10 mg tablet Take 1 tablet (10 mg total) by mouth daily    LORazepam (ATIVAN) 0.5 mg tablet Take 0.5 mg by mouth 3 (three) times a day 8AM, 2PM, 8PM    metFORMIN (GLUCOPHAGE) 1000 MG tablet Take 1 tablet (1,000 mg total) by mouth 2 (two) times a day with meals    OLANZapine (ZyPREXA ZYDIS) 5 mg dispersible tablet     ondansetron (ZOFRAN-ODT) 4 mg disintegrating tablet     Refresh Optive 0.5-0.9 % SOLN Administer 0.05 mL to both eyes if needed (To relieve burning, irritation, discomfort due to dryness of the eye)    sitaGLIPtin (JANUVIA) 100 mg tablet Take 1 tablet (100 mg total) by mouth daily Daily after breakfast    sucralfate (CARAFATE) 1 g tablet Take 1 tablet (1 g total) by mouth 4 (four) times a day    Sunscreen SPF50 LOTN Apply topically if needed (apply prior to sun exposure)    traZODone (DESYREL) 100 mg tablet     valproic acid (DEPAKENE) 250 MG/5ML soln Take 10 mL by mouth 2 times daily @ 9 AM and 5 PM and 5 mL by mouth @ 9 PM    vitamin E, tocopherol, 400 units capsule Take 1 capsule (400 Units total) by mouth daily    acetaminophen (TYLENOL) 650 mg CR tablet Take 1 tablet (650 mg total) by mouth every 8 (eight) hours as needed for mild pain (Patient not taking: Reported on 3/4/2024)    chlorproMAZINE (THORAZINE) 100 mg tablet Take 100 mg by mouth 2 (two) times a day (Patient not taking: Reported on 4/13/2024)    chlorproMAZINE (THORAZINE) 200 mg tablet  (Patient not taking: Reported on 9/21/2023)    divalproex sodium (DEPAKOTE ER) 500 mg 24 hr tablet Take 500 mg by mouth every morning (Patient not taking: Reported on 12/18/2023)    divalproex sodium (DEPAKOTE) 500 mg EC tablet Take 1 tablet (500 mg total) by mouth 2 (two) times a day    famotidine (PEPCID) 20 mg tablet Take 1 tablet (20 mg total) by mouth 2  "(two) times a day for 14 days    Menthol (Halls Cough Drops) 5.8 MG LOZG Apply 1 lozenge (5.8 mg total) to the mouth or throat 4 (four) times a day as needed (cough) (Patient not taking: Reported on 4/4/2024)    methylPREDNISolone 4 MG tablet therapy pack Use as directed on package (Patient not taking: Reported on 4/4/2024)    Mouthwashes (Biotene Dry Mouth) LIQD  (Patient not taking: Reported on 4/4/2024)    ondansetron (Zofran) 4 mg tablet Take 1 tablet (4 mg total) by mouth every 8 (eight) hours as needed for nausea or vomiting for up to 7 days    polyethylene glycol (MIRALAX) 17 g packet Take 17 g by mouth daily as needed (Constipation) (Patient not taking: Reported on 4/4/2024)    propranolol (INDERAL) 20 mg tablet Take 1 tablet (20 mg total) by mouth every 12 (twelve) hours    Senna Plus 8.6-50 MG per tablet Take 2 tablets by mouth daily (Patient not taking: Reported on 4/4/2024)       Objective     /78 (BP Location: Left arm, Patient Position: Sitting, Cuff Size: Standard)   Pulse 88   Temp 97.9 °F (36.6 °C) (Temporal)   Resp 18   Ht 5' 9\" (1.753 m)   Wt 88.5 kg (195 lb)   SpO2 99%   BMI 28.80 kg/m²     Physical Exam  Vitals reviewed.   Constitutional:       General: He is not in acute distress.     Appearance: Normal appearance. He is normal weight. He is not ill-appearing.   HENT:      Head: Normocephalic and atraumatic.      Mouth/Throat:      Mouth: Mucous membranes are moist.      Pharynx: Oropharynx is clear.   Eyes:      Conjunctiva/sclera: Conjunctivae normal.   Cardiovascular:      Rate and Rhythm: Normal rate and regular rhythm.      Heart sounds: No murmur heard.     No friction rub. No gallop.   Pulmonary:      Effort: Pulmonary effort is normal. No respiratory distress.      Breath sounds: Normal breath sounds. No wheezing, rhonchi or rales.   Abdominal:      General: Abdomen is flat. Bowel sounds are normal.      Palpations: Abdomen is soft.   Musculoskeletal:      Cervical back: " Normal range of motion.   Skin:     General: Skin is warm and dry.   Neurological:      General: No focal deficit present.      Mental Status: He is alert. Mental status is at baseline.   Psychiatric:         Mood and Affect: Mood normal.         Behavior: Behavior normal.       Kyung Goel, DO

## 2024-04-19 NOTE — ASSESSMENT & PLAN NOTE
HbA1c now above goal of 7. Patient has been doing diligent work at decreasing sugar intake as well as exercising. He has also implemented intermittent fasting which has been very helpful for him as he has lost 3lbs since last visit.     Plan   - Continue current DM management plan including metformin, sitagliptin, Jardiance 25 mg daily as patient has a sulfa allergy (avoid amaryl)  - Encourage improved nutrition to patient and    - Encourage physical activity   -Follow-up in 3 months to monitor diet and exercise changes as well as recheck hemoglobin A1c and MAW visit   Lab Results   Component Value Date    HGBA1C 7.2 (H) 03/14/2024

## 2024-04-30 ENCOUNTER — TELEPHONE (OUTPATIENT)
Dept: FAMILY MEDICINE CLINIC | Facility: CLINIC | Age: 44
End: 2024-04-30

## 2024-04-30 NOTE — TELEPHONE ENCOUNTER
Signature required: Dr. Goel    Form: [employer]    Fax #[phone]    Folder: YELLOW clinical

## 2024-05-01 ENCOUNTER — APPOINTMENT (OUTPATIENT)
Dept: LAB | Facility: CLINIC | Age: 44
End: 2024-05-01
Payer: MEDICARE

## 2024-05-01 DIAGNOSIS — N18.2 CKD (CHRONIC KIDNEY DISEASE) STAGE 2, GFR 60-89 ML/MIN: ICD-10-CM

## 2024-05-01 DIAGNOSIS — E11.65 TYPE 2 DIABETES MELLITUS WITH HYPERGLYCEMIA, WITHOUT LONG-TERM CURRENT USE OF INSULIN (HCC): ICD-10-CM

## 2024-05-01 LAB
ANION GAP SERPL CALCULATED.3IONS-SCNC: 7 MMOL/L (ref 4–13)
BUN SERPL-MCNC: 13 MG/DL (ref 5–25)
CALCIUM SERPL-MCNC: 8.9 MG/DL (ref 8.4–10.2)
CHLORIDE SERPL-SCNC: 101 MMOL/L (ref 96–108)
CO2 SERPL-SCNC: 31 MMOL/L (ref 21–32)
CREAT SERPL-MCNC: 1.36 MG/DL (ref 0.6–1.3)
GFR SERPL CREATININE-BSD FRML MDRD: 63 ML/MIN/1.73SQ M
GLUCOSE SERPL-MCNC: 124 MG/DL (ref 65–140)
POTASSIUM SERPL-SCNC: 4.7 MMOL/L (ref 3.5–5.3)
SODIUM SERPL-SCNC: 139 MMOL/L (ref 135–147)

## 2024-05-01 PROCEDURE — 80048 BASIC METABOLIC PNL TOTAL CA: CPT

## 2024-05-01 PROCEDURE — 36415 COLL VENOUS BLD VENIPUNCTURE: CPT

## 2024-05-03 NOTE — TELEPHONE ENCOUNTER
Person direct supports form completed. Faxed to [phone]. Copy placed in the yellow clerical folder to be scanned to the chart.

## 2024-05-05 ENCOUNTER — OFFICE VISIT (OUTPATIENT)
Dept: URGENT CARE | Facility: MEDICAL CENTER | Age: 44
End: 2024-05-05
Payer: MEDICARE

## 2024-05-05 VITALS
DIASTOLIC BLOOD PRESSURE: 60 MMHG | WEIGHT: 195 LBS | RESPIRATION RATE: 18 BRPM | BODY MASS INDEX: 28.88 KG/M2 | HEIGHT: 69 IN | HEART RATE: 80 BPM | TEMPERATURE: 97.6 F | OXYGEN SATURATION: 99 % | SYSTOLIC BLOOD PRESSURE: 123 MMHG

## 2024-05-05 DIAGNOSIS — K52.9 NONINFECTIOUS GASTROENTERITIS, UNSPECIFIED TYPE: Primary | ICD-10-CM

## 2024-05-05 PROCEDURE — 99213 OFFICE O/P EST LOW 20 MIN: CPT | Performed by: PHYSICIAN ASSISTANT

## 2024-05-05 PROCEDURE — G0463 HOSPITAL OUTPT CLINIC VISIT: HCPCS | Performed by: PHYSICIAN ASSISTANT

## 2024-05-05 RX ORDER — ONDANSETRON 4 MG/1
4 TABLET, FILM COATED ORAL EVERY 8 HOURS PRN
Qty: 20 TABLET | Refills: 0 | Status: SHIPPED | OUTPATIENT
Start: 2024-05-05

## 2024-05-05 RX ORDER — LOPERAMIDE HYDROCHLORIDE 2 MG/1
2 TABLET ORAL 4 TIMES DAILY PRN
Qty: 30 TABLET | Refills: 0 | Status: SHIPPED | OUTPATIENT
Start: 2024-05-05
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
## 2024-05-05 NOTE — PATIENT INSTRUCTIONS
Gastroenteritis  Imodium as needed for diarrhea  Zofran as needed for nausea  Follow up with PCP in 3-5 days.  Proceed to  ER if symptoms worsen.Gastroenteritis   WHAT YOU NEED TO KNOW:   Gastroenteritis, or stomach flu, is an infection of the stomach and intestines.        DISCHARGE INSTRUCTIONS:   Call 911 for any of the following:   You have trouble breathing or a very fast pulse.      Return to the emergency department if:   You see blood in your diarrhea.    You cannot stop vomiting.    You have not urinated for 12 hours.     You feel like you are going to faint.    Contact your healthcare provider if:   You have a fever.    You continue to vomit or have diarrhea, even after treatment.    You see worms in your diarrhea.    Your mouth or eyes are dry. You are not urinating as much or as often.    You have questions or concerns about your condition or care.    Medicines:   Medicines  may be given to stop vomiting or diarrhea, decrease abdominal cramps, or treat an infection.    Take your medicine as directed.  Contact your healthcare provider if you think your medicine is not helping or if you have side effects. Tell your provider if you are allergic to any medicine. Keep a list of the medicines, vitamins, and herbs you take. Include the amounts, and when and why you take them. Bring the list or the pill bottles to follow-up visits. Carry your medicine list with you in case of an emergency.    Manage your symptoms:   Drink liquids as directed.  Ask your healthcare provider how much liquid to drink each day, and which liquids are best for you. You may also need to drink an oral rehydration solution (ORS). An ORS has the right amounts of sugar, salt, and minerals in water to replace body fluids.    Eat bland foods.  When you feel hungry, begin eating soft, bland foods. Examples are bananas, clear soup, potatoes, and applesauce. Do not have dairy products, alcohol, sugary drinks, or drinks with caffeine until you  feel better.    Rest as much as possible.  Slowly start to do more each day when you begin to feel better.    Prevent the spread of gastroenteritis:  Gastroenteritis can spread easily. Keep yourself, your family, and your surroundings clean to help prevent the spread of gastroenteritis:  Wash your hands often.  Use soap and water. Wash your hands after you use the bathroom, change a child's diapers, or sneeze. Wash your hands before you prepare or eat food.         Clean surfaces and do laundry often.  Wash your clothes and towels separately from the rest of the laundry. Clean surfaces in your home with antibacterial  or bleach.    Clean food thoroughly and cook safely.  Wash raw vegetables before you cook. Cook meat, fish, and eggs fully. Do not use the same dishes for raw meat as you do for other foods. Refrigerate any leftover food immediately.    Be aware when you camp or travel.  Drink only clean water. Do not drink from rivers or lakes unless you purify or boil the water first. When you travel, drink bottled water and do not add ice. Do not eat fruit that has not been peeled. Do not eat raw fish or meat that is not fully cooked.    Follow up with your doctor as directed:  Write down your questions so you remember to ask them during your visits.  © Copyright [employer] 2023 Information is for End User's use only and may not be sold, redistributed or otherwise used for commercial purposes.  The above information is an  only. It is not intended as medical advice for individual conditions or treatments. Talk to your doctor, nurse or pharmacist before following any medical regimen to see if it is safe and effective for you.

## 2024-05-10 ENCOUNTER — OFFICE VISIT (OUTPATIENT)
Dept: URGENT CARE | Facility: MEDICAL CENTER | Age: 44
End: 2024-05-10
Payer: MEDICARE

## 2024-05-10 VITALS
RESPIRATION RATE: 18 BRPM | HEART RATE: 86 BPM | TEMPERATURE: 98.1 F | SYSTOLIC BLOOD PRESSURE: 138 MMHG | DIASTOLIC BLOOD PRESSURE: 84 MMHG | OXYGEN SATURATION: 98 %

## 2024-05-10 DIAGNOSIS — H57.12 EYE DISCOMFORT, LEFT: Primary | ICD-10-CM

## 2024-05-10 PROCEDURE — 99213 OFFICE O/P EST LOW 20 MIN: CPT | Performed by: FAMILY MEDICINE

## 2024-05-10 PROCEDURE — G0463 HOSPITAL OUTPT CLINIC VISIT: HCPCS | Performed by: FAMILY MEDICINE
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
## 2024-05-14 ENCOUNTER — APPOINTMENT (EMERGENCY)
Dept: CT IMAGING | Facility: HOSPITAL | Age: 44
End: 2024-05-14
Payer: MEDICARE

## 2024-05-14 ENCOUNTER — HOSPITAL ENCOUNTER (EMERGENCY)
Facility: HOSPITAL | Age: 44
Discharge: HOME | End: 2024-05-14
Attending: EMERGENCY MEDICINE
Payer: MEDICARE

## 2024-05-14 VITALS
RESPIRATION RATE: 18 BRPM | SYSTOLIC BLOOD PRESSURE: 125 MMHG | DIASTOLIC BLOOD PRESSURE: 79 MMHG | TEMPERATURE: 97.7 F | HEART RATE: 99 BPM | OXYGEN SATURATION: 99 %

## 2024-05-14 DIAGNOSIS — M54.2 NECK PAIN: Primary | ICD-10-CM

## 2024-05-14 DIAGNOSIS — S16.1XXA STRAIN OF NECK MUSCLE, INITIAL ENCOUNTER: ICD-10-CM

## 2024-05-14 LAB
ALBUMIN SERPL BCP-MCNC: 3.6 G/DL (ref 3.5–5)
ALP SERPL-CCNC: 56 U/L (ref 34–104)
ALT SERPL W P-5'-P-CCNC: 19 U/L (ref 7–52)
ANION GAP SERPL CALCULATED.3IONS-SCNC: 7 MMOL/L (ref 4–13)
AST SERPL W P-5'-P-CCNC: 13 U/L (ref 13–39)
BASOPHILS # BLD AUTO: 0.05 THOUSANDS/ÂΜL (ref 0–0.1)
BASOPHILS NFR BLD AUTO: 1 % (ref 0–1)
BILIRUB SERPL-MCNC: 0.39 MG/DL (ref 0.2–1)
BUN SERPL-MCNC: 15 MG/DL (ref 5–25)
CALCIUM SERPL-MCNC: 8.8 MG/DL (ref 8.4–10.2)
CHLORIDE SERPL-SCNC: 101 MMOL/L (ref 96–108)
CO2 SERPL-SCNC: 28 MMOL/L (ref 21–32)
CREAT SERPL-MCNC: 1.5 MG/DL (ref 0.6–1.3)
EOSINOPHIL # BLD AUTO: 0.33 THOUSAND/ÂΜL (ref 0–0.61)
EOSINOPHIL NFR BLD AUTO: 4 % (ref 0–6)
ERYTHROCYTE [DISTWIDTH] IN BLOOD BY AUTOMATED COUNT: 11.8 % (ref 11.6–15.1)
GFR SERPL CREATININE-BSD FRML MDRD: 56 ML/MIN/1.73SQ M
GLUCOSE SERPL-MCNC: 140 MG/DL (ref 65–140)
HCT VFR BLD AUTO: 44.2 % (ref 36.5–49.3)
HGB BLD-MCNC: 13.9 G/DL (ref 12–17)
IMM GRANULOCYTES # BLD AUTO: 0.04 THOUSAND/UL (ref 0–0.2)
IMM GRANULOCYTES NFR BLD AUTO: 1 % (ref 0–2)
LYMPHOCYTES # BLD AUTO: 2.31 THOUSANDS/ÂΜL (ref 0.6–4.47)
LYMPHOCYTES NFR BLD AUTO: 29 % (ref 14–44)
MCH RBC QN AUTO: 26.9 PG (ref 26.8–34.3)
MCHC RBC AUTO-ENTMCNC: 31.4 G/DL (ref 31.4–37.4)
MCV RBC AUTO: 86 FL (ref 82–98)
MONOCYTES # BLD AUTO: 0.65 THOUSAND/ÂΜL (ref 0.17–1.22)
MONOCYTES NFR BLD AUTO: 8 % (ref 4–12)
NEUTROPHILS # BLD AUTO: 4.66 THOUSANDS/ÂΜL (ref 1.85–7.62)
NEUTS SEG NFR BLD AUTO: 57 % (ref 43–75)
NRBC BLD AUTO-RTO: 0 /100 WBCS
PLATELET # BLD AUTO: 146 THOUSANDS/UL (ref 149–390)
PMV BLD AUTO: 9.7 FL (ref 8.9–12.7)
POTASSIUM SERPL-SCNC: 4.4 MMOL/L (ref 3.5–5.3)
PROT SERPL-MCNC: 6.4 G/DL (ref 6.4–8.4)
RBC # BLD AUTO: 5.17 MILLION/UL (ref 3.88–5.62)
SODIUM SERPL-SCNC: 136 MMOL/L (ref 135–147)
WBC # BLD AUTO: 8.04 THOUSAND/UL (ref 4.31–10.16)

## 2024-05-14 PROCEDURE — 99283 EMERGENCY DEPT VISIT LOW MDM: CPT

## 2024-05-14 PROCEDURE — 96374 THER/PROPH/DIAG INJ IV PUSH: CPT

## 2024-05-14 PROCEDURE — 36415 COLL VENOUS BLD VENIPUNCTURE: CPT | Performed by: EMERGENCY MEDICINE

## 2024-05-14 PROCEDURE — 70491 CT SOFT TISSUE NECK W/DYE: CPT

## 2024-05-14 PROCEDURE — 85025 COMPLETE CBC W/AUTO DIFF WBC: CPT | Performed by: EMERGENCY MEDICINE

## 2024-05-14 PROCEDURE — 80053 COMPREHEN METABOLIC PANEL: CPT | Performed by: EMERGENCY MEDICINE

## 2024-05-14 RX ORDER — KETOROLAC TROMETHAMINE 30 MG/ML
15 INJECTION, SOLUTION INTRAMUSCULAR; INTRAVENOUS ONCE
Status: COMPLETED | OUTPATIENT
Start: 2024-05-14 | End: 2024-05-14

## 2024-05-14 RX ORDER — LIDOCAINE 50 MG/G
1 PATCH TOPICAL DAILY
Qty: 7 PATCH | Refills: 0 | Status: SHIPPED | OUTPATIENT
Start: 2024-05-14 | End: 2024-05-21

## 2024-05-14 RX ORDER — LIDOCAINE 50 MG/G
1 PATCH TOPICAL ONCE
Status: DISCONTINUED | OUTPATIENT
Start: 2024-05-14 | End: 2024-05-14 | Stop reason: HOSPADM

## 2024-05-14 RX ORDER — METHOCARBAMOL 500 MG/1
500 TABLET, FILM COATED ORAL 2 TIMES DAILY
Qty: 20 TABLET | Refills: 0 | Status: SHIPPED | OUTPATIENT
Start: 2024-05-14 | End: 2024-05-20 | Stop reason: ALTCHOICE

## 2024-05-14 RX ADMIN — KETOROLAC TROMETHAMINE 15 MG: 30 INJECTION, SOLUTION INTRAMUSCULAR; INTRAVENOUS at 14:21

## 2024-05-14 RX ADMIN — IOHEXOL 80 ML: 350 INJECTION, SOLUTION INTRAVENOUS at 14:57

## 2024-05-14 RX ADMIN — LIDOCAINE 5% 1 PATCH: 700 PATCH TOPICAL at 15:28
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
## 2024-05-15 DIAGNOSIS — E03.9 HYPOTHYROIDISM, UNSPECIFIED TYPE: ICD-10-CM

## 2024-05-15 DIAGNOSIS — E55.9 VITAMIN D DEFICIENCY: ICD-10-CM

## 2024-05-15 DIAGNOSIS — E78.49 OTHER HYPERLIPIDEMIA: ICD-10-CM

## 2024-05-16 ENCOUNTER — OFFICE VISIT (OUTPATIENT)
Dept: NEPHROLOGY | Facility: CLINIC | Age: 44
End: 2024-05-16
Payer: MEDICARE

## 2024-05-16 VITALS
WEIGHT: 198 LBS | HEART RATE: 88 BPM | BODY MASS INDEX: 29.33 KG/M2 | HEIGHT: 69 IN | DIASTOLIC BLOOD PRESSURE: 70 MMHG | SYSTOLIC BLOOD PRESSURE: 118 MMHG

## 2024-05-16 DIAGNOSIS — N18.2 CKD (CHRONIC KIDNEY DISEASE) STAGE 2, GFR 60-89 ML/MIN: ICD-10-CM

## 2024-05-16 DIAGNOSIS — E11.69 TYPE 2 DIABETES MELLITUS WITH OTHER SPECIFIED COMPLICATION, WITHOUT LONG-TERM CURRENT USE OF INSULIN (HCC): ICD-10-CM

## 2024-05-16 DIAGNOSIS — N18.9 CHRONIC RENAL IMPAIRMENT, UNSPECIFIED CKD STAGE: Primary | ICD-10-CM

## 2024-05-16 PROCEDURE — 99214 OFFICE O/P EST MOD 30 MIN: CPT | Performed by: INTERNAL MEDICINE

## 2024-05-16 NOTE — PATIENT INSTRUCTIONS
You are here for follow-up it was a pleasure to see you and it sounds like you are doing well and enjoying your life.  Your creatinine which is the blood test for the kidney function ranges 1.3-1.5 and you are stable they are.  In the past there was no protein in the urine and I only say that because that tells me you do not have diabetic kidney disease.  Potentially the lithium early in your life it caused a little bit of aging to the kidney you are not on it any longer your blood pressure is excellent so at this point I feel you have a good prognosis.    Continue with good sugar control try to keep the A1c 7%  Labs and follow-up as scheduled and call me if there is any problems before next visit.

## 2024-05-16 NOTE — LETTER
May 16, 2024     Kyung Goel, DO  [address]  [place] [address]    Patient: Sandor Ratliff   YOB: 1980   Date of Visit: 5/16/2024       Dear Dr. Goel:    Thank you for referring Sandor Ratliff to me for evaluation. Below are my notes for this consultation.    If you have questions, please do not hesitate to call me. I look forward to following your patient along with you.         Sincerely,        Manuel Elena MD        CC: No Recipients    Manuel Elena MD  5/16/2024  2:03 PM  Sign when Signing Visit  NEPHROLOGY PROGRESS NOTE    Sandor Ratliff 43 y.o. male MRN: [id number]  Unit/Bed#:  Encounter: [id number]  Reason for Consult: Chronic renal insufficiency    The patient is awake alert in good spirits very talkative.  Went on and told me about he is being careful with his diet to control his sugar.  He has had no reported illnesses confirmed by his caregiver.    ASSESSMENT/PLAN:  1.  Renal    The patient has chronic renal insufficiency his creatinine stable at 1.5 without progression and seems to fluctuate between 1.3-1.5 likely depending on volume status.  The patient is diabetic but he had no significant proteinuria so he does not have diabetic kidney disease.  I suspect that he either has a little chronic interstitial disease related to prior lithium use which she is no longer on or nephrosclerosis.  Given his younger age normal blood pressure lithium likely contributed in the past.    Has been no progression I told him to continue with good glycemic control as his A1c is close to 7% to reduce risk of diabetic complications.    Blood pressure control is excellent  Continue current medications.    Labs and follow-up as scheduled told to call if there is any problems before next visit.    I have spent a total time of 30 minutes on 05/16/24 in caring for this patient including Diagnostic results, Prognosis, Impressions, Reviewing / ordering tests, medicine, procedures  , and Obtaining or reviewing  "history  .         SUBJECTIVE:  Review of Systems   Constitutional: Negative for chills, diaphoresis and fever.   HENT: Negative.     Eyes: Negative.    Cardiovascular:  Negative for chest pain, dyspnea on exertion and orthopnea.   Respiratory:  Negative for cough, shortness of breath, sputum production and wheezing.    Gastrointestinal:  Negative for abdominal pain, diarrhea, nausea and vomiting.   Genitourinary: Negative.    Neurological:  Negative for dizziness, focal weakness and headaches.   Psychiatric/Behavioral:  Negative for altered mental status.        OBJECTIVE:  Current Weight: Weight - Scale: 89.8 kg (198 lb)  Vitals:@TMAX(24)@Current:     Blood pressure 118/70, pulse 88, height 5' 9\" (1.753 m), weight 89.8 kg (198 lb). , Body mass index is 29.24 kg/m².    [unfilled]    Physical Exam: /70 (BP Location: Right arm, Patient Position: Sitting, Cuff Size: Standard)   Pulse 88   Ht 5' 9\" (1.753 m)   Wt 89.8 kg (198 lb)   BMI 29.24 kg/m²   Physical Exam  Constitutional:       General: He is not in acute distress.     Appearance: He is not toxic-appearing.   HENT:      Head: Normocephalic and atraumatic.      Nose: Nose normal.      Mouth/Throat:      Mouth: Mucous membranes are dry.   Eyes:      General: No scleral icterus.     Extraocular Movements: Extraocular movements intact.   Cardiovascular:      Rate and Rhythm: Normal rate and regular rhythm.      Heart sounds:      No friction rub. No gallop.   Pulmonary:      Effort: Pulmonary effort is normal. No respiratory distress.      Breath sounds: No wheezing or rales.   Abdominal:      General: Bowel sounds are normal. There is no distension.      Palpations: Abdomen is soft.      Tenderness: There is no abdominal tenderness. There is no rebound.   Musculoskeletal:      Cervical back: Normal range of motion and neck supple.   Neurological:      Mental Status: He is alert and oriented to person, place, and time. Mental status is at baseline. "   Psychiatric:         Mood and Affect: Mood normal.         Behavior: Behavior normal.         Thought Content: Thought content normal.         Medications:    Current Outpatient Medications:   •  Alcohol Swabs (Curity Alcohol Preps) 70 % PADS, Use if needed (when checking blood glucose), Disp: 200 each, Rfl: 1  •  aluminum-magnesium hydroxide-simethicone (MAALOX) 200-200-20 MG/5ML SUSP, Take 20 mL by mouth 4 (four) times a day (before meals and at bedtime), Disp: 710 mL, Rfl: 5  •  ARIPiprazole (ABILIFY) 10 mg tablet, Take 10 mg by mouth daily, Disp: , Rfl:   •  ARIPiprazole (ABILIFY) 20 MG tablet, , Disp: , Rfl:   •  atorvastatin (LIPITOR) 40 mg tablet, Take 1 tablet (40 mg total) by mouth daily at bedtime, Disp: 90 tablet, Rfl: 3  •  B Complex Vitamins (B Complex-B12) TABS, Take 1 tablet by mouth daily 1 cap by mouth daily @ 8am, Disp: 90 tablet, Rfl: 3  •  bisacodyl (DULCOLAX) 5 mg EC tablet, Take 2 tablets (10 mg total) by mouth daily as needed for constipation, Disp: 30 tablet, Rfl: 0  •  bismuth subsalicylate (PEPTO BISMOL) 524 mg/30 mL oral suspension, Take 15 mL (262 mg total) by mouth every 6 (six) hours as needed for indigestion, Disp: 360 mL, Rfl: 1  •  calcium carbonate (OYSTER SHELL,OSCAL) 500 mg, Take 1 tablet by mouth daily with breakfast, Disp: 30 tablet, Rfl: 3  •  cholecalciferol (VITAMIN D3) 1,000 units tablet, Take 1 tablet (1,000 Units total) by mouth daily, Disp: 90 tablet, Rfl: 3  •  divalproex sodium (DEPAKOTE ER) 250 mg 24 hr tablet, Take 250 mg by mouth daily at bedtime, Disp: , Rfl:   •  Emollient (Eucerin Original Healing) LOTN, , Disp: , Rfl:   •  Empagliflozin (Jardiance) 25 MG TABS, Take 1 tablet (25 mg total) by mouth daily, Disp: 90 tablet, Rfl: 1  •  famotidine (PEPCID) 20 mg tablet, Take 1 tablet (20 mg total) by mouth 2 (two) times a day for 14 days, Disp: 28 tablet, Rfl: 0  •  glucose blood (True Metrix Blood Glucose Test) test strip, Use 1 each 2 (two) times a week Use as  instructed, Disp: 50 strip, Rfl: 1  •  glucose monitoring kit (FREESTYLE) monitoring kit, 1 each by Does not apply route 2 (two) times a week, Disp: 1 each, Rfl: 1  •  guaiFENesin (ROBITUSSIN) 100 MG/5ML oral liquid, Take 10 mL (200 mg total) by mouth 3 (three) times a day as needed for cough, Disp: 120 mL, Rfl: 2  •  Lancets (freestyle) lancets, Use to test blood sugars 2x weekly on Mon & Thurs @ 8AM, Disp: 100 each, Rfl: 5  •  levothyroxine 25 mcg tablet, Take 1 tablet (25 mcg total) by mouth daily in the early morning, Disp: 90 tablet, Rfl: 3  •  lidocaine (Lidoderm) 5 %, Apply 1 patch topically over 12 hours daily for 7 days Remove & Discard patch within 12 hours or as directed by MD, Disp: 7 patch, Rfl: 0  •  lisinopril (ZESTRIL) 2.5 mg tablet, Take 1 tablet (2.5 mg total) by mouth daily, Disp: 31 tablet, Rfl: 5  •  loperamide (IMODIUM A-D) 2 MG tablet, Take 1 tablet (2 mg total) by mouth 4 (four) times a day as needed for diarrhea, Disp: 30 tablet, Rfl: 0  •  loratadine (CLARITIN) 10 mg tablet, Take 1 tablet (10 mg total) by mouth daily, Disp: 30 tablet, Rfl: 5  •  LORazepam (ATIVAN) 0.5 mg tablet, Take 0.5 mg by mouth 3 (three) times a day 8AM, 2PM, 8PM, Disp: , Rfl:   •  metFORMIN (GLUCOPHAGE) 1000 MG tablet, Take 1 tablet (1,000 mg total) by mouth 2 (two) times a day with meals, Disp: 180 tablet, Rfl: 3  •  methocarbamol (ROBAXIN) 500 mg tablet, Take 1 tablet (500 mg total) by mouth 2 (two) times a day, Disp: 20 tablet, Rfl: 0  •  OLANZapine (ZyPREXA ZYDIS) 5 mg dispersible tablet, , Disp: , Rfl:   •  ondansetron (Zofran) 4 mg tablet, Take 1 tablet (4 mg total) by mouth every 8 (eight) hours as needed for nausea or vomiting for up to 7 days, Disp: 15 tablet, Rfl: 0  •  ondansetron (ZOFRAN) 4 mg tablet, Take 1 tablet (4 mg total) by mouth every 8 (eight) hours as needed for nausea or vomiting, Disp: 20 tablet, Rfl: 0  •  ondansetron (ZOFRAN-ODT) 4 mg disintegrating tablet, , Disp: , Rfl:   •  propranolol  (INDERAL) 20 mg tablet, Take 1 tablet (20 mg total) by mouth every 12 (twelve) hours, Disp: 60 tablet, Rfl: 5  •  Refresh Optive 0.5-0.9 % SOLN, Administer 0.05 mL to both eyes if needed (To relieve burning, irritation, discomfort due to dryness of the eye), Disp: 15 mL, Rfl: 5  •  sitaGLIPtin (JANUVIA) 100 mg tablet, Take 1 tablet (100 mg total) by mouth daily Daily after breakfast, Disp: 90 tablet, Rfl: 3  •  sucralfate (CARAFATE) 1 g tablet, Take 1 tablet (1 g total) by mouth 4 (four) times a day, Disp: 20 tablet, Rfl: 0  •  Sunscreen SPF50 LOTN, Apply topically if needed (apply prior to sun exposure), Disp: 296 mL, Rfl: 5  •  traZODone (DESYREL) 100 mg tablet, , Disp: , Rfl:   •  valproic acid (DEPAKENE) 250 MG/5ML soln, Take 10 mL by mouth 2 times daily @ 9 AM and 5 PM and 5 mL by mouth @ 9 PM, Disp: 473 mL, Rfl: 0  •  vitamin E, tocopherol, 400 units capsule, Take 1 capsule (400 Units total) by mouth daily, Disp: 30 capsule, Rfl: 5  •  acetaminophen (TYLENOL) 650 mg CR tablet, Take 1 tablet (650 mg total) by mouth every 8 (eight) hours as needed for mild pain (Patient not taking: Reported on 3/4/2024), Disp: 30 tablet, Rfl: 5  •  chlorproMAZINE (THORAZINE) 100 mg tablet, Take 100 mg by mouth 2 (two) times a day (Patient not taking: Reported on 4/13/2024), Disp: , Rfl:   •  chlorproMAZINE (THORAZINE) 200 mg tablet, , Disp: , Rfl:   •  divalproex sodium (DEPAKOTE ER) 500 mg 24 hr tablet, Take 500 mg by mouth every morning (Patient not taking: Reported on 12/18/2023), Disp: , Rfl:   •  divalproex sodium (DEPAKOTE) 500 mg EC tablet, Take 1 tablet (500 mg total) by mouth 2 (two) times a day, Disp: 60 tablet, Rfl: 0  •  Menthol (Halls Cough Drops) 5.8 MG LOZG, Apply 1 lozenge (5.8 mg total) to the mouth or throat 4 (four) times a day as needed (cough) (Patient not taking: Reported on 4/4/2024), Disp: 30 lozenge, Rfl: 5  •  methylPREDNISolone 4 MG tablet therapy pack, Use as directed on package (Patient not taking:  "Reported on 4/4/2024), Disp: 1 each, Rfl: 0  •  Mouthwashes (Biotene Dry Mouth) LIQD, , Disp: , Rfl:   •  polyethylene glycol (MIRALAX) 17 g packet, Take 17 g by mouth daily as needed (Constipation) (Patient not taking: Reported on 4/4/2024), Disp: 30 each, Rfl: 3  •  Senna Plus 8.6-50 MG per tablet, Take 2 tablets by mouth daily (Patient not taking: Reported on 4/4/2024), Disp: 62 tablet, Rfl: 5    Laboratory Results:  Lab Results   Component Value Date    WBC 8.04 05/14/2024    HGB 13.9 05/14/2024    HCT 44.2 05/14/2024    MCV 86 05/14/2024     (L) 05/14/2024     Lab Results   Component Value Date    SODIUM 136 05/14/2024    K 4.4 05/14/2024     05/14/2024    CO2 28 05/14/2024    BUN 15 05/14/2024    CREATININE 1.50 (H) 05/14/2024    GLUC 140 05/14/2024    CALCIUM 8.8 05/14/2024     Lab Results   Component Value Date    CALCIUM 8.8 05/14/2024     No results found for: \"LABPROT\"      "

## 2024-05-16 NOTE — PROGRESS NOTES
NEPHROLOGY PROGRESS NOTE    Sandor Ratliff 43 y.o. male MRN: [id number]  Unit/Bed#:  Encounter: [id number]  Reason for Consult: Chronic renal insufficiency    The patient is awake alert in good spirits very talkative.  Went on and told me about he is being careful with his diet to control his sugar.  He has had no reported illnesses confirmed by his caregiver.    ASSESSMENT/PLAN:  1.  Renal    The patient has chronic renal insufficiency his creatinine stable at 1.5 without progression and seems to fluctuate between 1.3-1.5 likely depending on volume status.  The patient is diabetic but he had no significant proteinuria so he does not have diabetic kidney disease.  I suspect that he either has a little chronic interstitial disease related to prior lithium use which she is no longer on or nephrosclerosis.  Given his younger age normal blood pressure lithium likely contributed in the past.    Has been no progression I told him to continue with good glycemic control as his A1c is close to 7% to reduce risk of diabetic complications.    Blood pressure control is excellent  Continue current medications.    Labs and follow-up as scheduled told to call if there is any problems before next visit.    I have spent a total time of 30 minutes on 05/16/24 in caring for this patient including Diagnostic results, Prognosis, Impressions, Reviewing / ordering tests, medicine, procedures  , and Obtaining or reviewing history  .         SUBJECTIVE:  Review of Systems   Constitutional: Negative for chills, diaphoresis and fever.   HENT: Negative.     Eyes: Negative.    Cardiovascular:  Negative for chest pain, dyspnea on exertion and orthopnea.   Respiratory:  Negative for cough, shortness of breath, sputum production and wheezing.    Gastrointestinal:  Negative for abdominal pain, diarrhea, nausea and vomiting.   Genitourinary: Negative.    Neurological:  Negative for dizziness, focal weakness and headaches.   Psychiatric/Behavioral:   "Negative for altered mental status.        OBJECTIVE:  Current Weight: Weight - Scale: 89.8 kg (198 lb)  Vitals:@TMAX(24)@Current:     Blood pressure 118/70, pulse 88, height 5' 9\" (1.753 m), weight 89.8 kg (198 lb). , Body mass index is 29.24 kg/m².    [unfilled]    Physical Exam: /70 (BP Location: Right arm, Patient Position: Sitting, Cuff Size: Standard)   Pulse 88   Ht 5' 9\" (1.753 m)   Wt 89.8 kg (198 lb)   BMI 29.24 kg/m²   Physical Exam  Constitutional:       General: He is not in acute distress.     Appearance: He is not toxic-appearing.   HENT:      Head: Normocephalic and atraumatic.      Nose: Nose normal.      Mouth/Throat:      Mouth: Mucous membranes are dry.   Eyes:      General: No scleral icterus.     Extraocular Movements: Extraocular movements intact.   Cardiovascular:      Rate and Rhythm: Normal rate and regular rhythm.      Heart sounds:      No friction rub. No gallop.   Pulmonary:      Effort: Pulmonary effort is normal. No respiratory distress.      Breath sounds: No wheezing or rales.   Abdominal:      General: Bowel sounds are normal. There is no distension.      Palpations: Abdomen is soft.      Tenderness: There is no abdominal tenderness. There is no rebound.   Musculoskeletal:      Cervical back: Normal range of motion and neck supple.   Neurological:      Mental Status: He is alert and oriented to person, place, and time. Mental status is at baseline.   Psychiatric:         Mood and Affect: Mood normal.         Behavior: Behavior normal.         Thought Content: Thought content normal.         Medications:    Current Outpatient Medications:     Alcohol Swabs (Curity Alcohol Preps) 70 % PADS, Use if needed (when checking blood glucose), Disp: 200 each, Rfl: 1    aluminum-magnesium hydroxide-simethicone (MAALOX) 200-200-20 MG/5ML SUSP, Take 20 mL by mouth 4 (four) times a day (before meals and at bedtime), Disp: 710 mL, Rfl: 5    ARIPiprazole (ABILIFY) 10 mg tablet, Take 10 mg " by mouth daily, Disp: , Rfl:     ARIPiprazole (ABILIFY) 20 MG tablet, , Disp: , Rfl:     atorvastatin (LIPITOR) 40 mg tablet, Take 1 tablet (40 mg total) by mouth daily at bedtime, Disp: 90 tablet, Rfl: 3    B Complex Vitamins (B Complex-B12) TABS, Take 1 tablet by mouth daily 1 cap by mouth daily @ 8am, Disp: 90 tablet, Rfl: 3    bisacodyl (DULCOLAX) 5 mg EC tablet, Take 2 tablets (10 mg total) by mouth daily as needed for constipation, Disp: 30 tablet, Rfl: 0    bismuth subsalicylate (PEPTO BISMOL) 524 mg/30 mL oral suspension, Take 15 mL (262 mg total) by mouth every 6 (six) hours as needed for indigestion, Disp: 360 mL, Rfl: 1    calcium carbonate (OYSTER SHELL,OSCAL) 500 mg, Take 1 tablet by mouth daily with breakfast, Disp: 30 tablet, Rfl: 3    cholecalciferol (VITAMIN D3) 1,000 units tablet, Take 1 tablet (1,000 Units total) by mouth daily, Disp: 90 tablet, Rfl: 3    divalproex sodium (DEPAKOTE ER) 250 mg 24 hr tablet, Take 250 mg by mouth daily at bedtime, Disp: , Rfl:     Emollient (Eucerin Original Healing) LOTN, , Disp: , Rfl:     Empagliflozin (Jardiance) 25 MG TABS, Take 1 tablet (25 mg total) by mouth daily, Disp: 90 tablet, Rfl: 1    famotidine (PEPCID) 20 mg tablet, Take 1 tablet (20 mg total) by mouth 2 (two) times a day for 14 days, Disp: 28 tablet, Rfl: 0    glucose blood (True Metrix Blood Glucose Test) test strip, Use 1 each 2 (two) times a week Use as instructed, Disp: 50 strip, Rfl: 1    glucose monitoring kit (FREESTYLE) monitoring kit, 1 each by Does not apply route 2 (two) times a week, Disp: 1 each, Rfl: 1    guaiFENesin (ROBITUSSIN) 100 MG/5ML oral liquid, Take 10 mL (200 mg total) by mouth 3 (three) times a day as needed for cough, Disp: 120 mL, Rfl: 2    Lancets (freestyle) lancets, Use to test blood sugars 2x weekly on Mon & Thurs @ 8AM, Disp: 100 each, Rfl: 5    levothyroxine 25 mcg tablet, Take 1 tablet (25 mcg total) by mouth daily in the early morning, Disp: 90 tablet, Rfl: 3     lidocaine (Lidoderm) 5 %, Apply 1 patch topically over 12 hours daily for 7 days Remove & Discard patch within 12 hours or as directed by MD, Disp: 7 patch, Rfl: 0    lisinopril (ZESTRIL) 2.5 mg tablet, Take 1 tablet (2.5 mg total) by mouth daily, Disp: 31 tablet, Rfl: 5    loperamide (IMODIUM A-D) 2 MG tablet, Take 1 tablet (2 mg total) by mouth 4 (four) times a day as needed for diarrhea, Disp: 30 tablet, Rfl: 0    loratadine (CLARITIN) 10 mg tablet, Take 1 tablet (10 mg total) by mouth daily, Disp: 30 tablet, Rfl: 5    LORazepam (ATIVAN) 0.5 mg tablet, Take 0.5 mg by mouth 3 (three) times a day 8AM, 2PM, 8PM, Disp: , Rfl:     metFORMIN (GLUCOPHAGE) 1000 MG tablet, Take 1 tablet (1,000 mg total) by mouth 2 (two) times a day with meals, Disp: 180 tablet, Rfl: 3    methocarbamol (ROBAXIN) 500 mg tablet, Take 1 tablet (500 mg total) by mouth 2 (two) times a day, Disp: 20 tablet, Rfl: 0    OLANZapine (ZyPREXA ZYDIS) 5 mg dispersible tablet, , Disp: , Rfl:     ondansetron (Zofran) 4 mg tablet, Take 1 tablet (4 mg total) by mouth every 8 (eight) hours as needed for nausea or vomiting for up to 7 days, Disp: 15 tablet, Rfl: 0    ondansetron (ZOFRAN) 4 mg tablet, Take 1 tablet (4 mg total) by mouth every 8 (eight) hours as needed for nausea or vomiting, Disp: 20 tablet, Rfl: 0    ondansetron (ZOFRAN-ODT) 4 mg disintegrating tablet, , Disp: , Rfl:     propranolol (INDERAL) 20 mg tablet, Take 1 tablet (20 mg total) by mouth every 12 (twelve) hours, Disp: 60 tablet, Rfl: 5    Refresh Optive 0.5-0.9 % SOLN, Administer 0.05 mL to both eyes if needed (To relieve burning, irritation, discomfort due to dryness of the eye), Disp: 15 mL, Rfl: 5    sitaGLIPtin (JANUVIA) 100 mg tablet, Take 1 tablet (100 mg total) by mouth daily Daily after breakfast, Disp: 90 tablet, Rfl: 3    sucralfate (CARAFATE) 1 g tablet, Take 1 tablet (1 g total) by mouth 4 (four) times a day, Disp: 20 tablet, Rfl: 0    Sunscreen SPF50 LOTN, Apply topically if  needed (apply prior to sun exposure), Disp: 296 mL, Rfl: 5    traZODone (DESYREL) 100 mg tablet, , Disp: , Rfl:     valproic acid (DEPAKENE) 250 MG/5ML soln, Take 10 mL by mouth 2 times daily @ 9 AM and 5 PM and 5 mL by mouth @ 9 PM, Disp: 473 mL, Rfl: 0    vitamin E, tocopherol, 400 units capsule, Take 1 capsule (400 Units total) by mouth daily, Disp: 30 capsule, Rfl: 5    acetaminophen (TYLENOL) 650 mg CR tablet, Take 1 tablet (650 mg total) by mouth every 8 (eight) hours as needed for mild pain (Patient not taking: Reported on 3/4/2024), Disp: 30 tablet, Rfl: 5    chlorproMAZINE (THORAZINE) 100 mg tablet, Take 100 mg by mouth 2 (two) times a day (Patient not taking: Reported on 4/13/2024), Disp: , Rfl:     chlorproMAZINE (THORAZINE) 200 mg tablet, , Disp: , Rfl:     divalproex sodium (DEPAKOTE ER) 500 mg 24 hr tablet, Take 500 mg by mouth every morning (Patient not taking: Reported on 12/18/2023), Disp: , Rfl:     divalproex sodium (DEPAKOTE) 500 mg EC tablet, Take 1 tablet (500 mg total) by mouth 2 (two) times a day, Disp: 60 tablet, Rfl: 0    Menthol (Halls Cough Drops) 5.8 MG LOZG, Apply 1 lozenge (5.8 mg total) to the mouth or throat 4 (four) times a day as needed (cough) (Patient not taking: Reported on 4/4/2024), Disp: 30 lozenge, Rfl: 5    methylPREDNISolone 4 MG tablet therapy pack, Use as directed on package (Patient not taking: Reported on 4/4/2024), Disp: 1 each, Rfl: 0    Mouthwashes (Biotene Dry Mouth) LIQD, , Disp: , Rfl:     polyethylene glycol (MIRALAX) 17 g packet, Take 17 g by mouth daily as needed (Constipation) (Patient not taking: Reported on 4/4/2024), Disp: 30 each, Rfl: 3    Senna Plus 8.6-50 MG per tablet, Take 2 tablets by mouth daily (Patient not taking: Reported on 4/4/2024), Disp: 62 tablet, Rfl: 5    Laboratory Results:  Lab Results   Component Value Date    WBC 8.04 05/14/2024    HGB 13.9 05/14/2024    HCT 44.2 05/14/2024    MCV 86 05/14/2024     (L) 05/14/2024     Lab  "Results   Component Value Date    SODIUM 136 05/14/2024    K 4.4 05/14/2024     05/14/2024    CO2 28 05/14/2024    BUN 15 05/14/2024    CREATININE 1.50 (H) 05/14/2024    GLUC 140 05/14/2024    CALCIUM 8.8 05/14/2024     Lab Results   Component Value Date    CALCIUM 8.8 05/14/2024     No results found for: \"LABPROT\"      "

## 2024-05-17 RX ORDER — LEVOTHYROXINE SODIUM 0.03 MG/1
25 TABLET ORAL
Qty: 90 TABLET | Refills: 3 | Status: SHIPPED | OUTPATIENT
Start: 2024-05-17

## 2024-05-17 RX ORDER — ATORVASTATIN CALCIUM 40 MG/1
40 TABLET, FILM COATED ORAL
Qty: 90 TABLET | Refills: 3 | Status: SHIPPED | OUTPATIENT
Start: 2024-05-17

## 2024-05-17 RX ORDER — MELATONIN
1000 DAILY
Qty: 90 TABLET | Refills: 3 | Status: SHIPPED | OUTPATIENT
Start: 2024-05-17

## 2024-05-18 ENCOUNTER — HOSPITAL ENCOUNTER (EMERGENCY)
Facility: HOSPITAL | Age: 44
Discharge: HOME | End: 2024-05-18
Attending: EMERGENCY MEDICINE
Payer: MEDICARE

## 2024-05-18 VITALS
TEMPERATURE: 97.6 F | RESPIRATION RATE: 18 BRPM | DIASTOLIC BLOOD PRESSURE: 65 MMHG | HEART RATE: 87 BPM | SYSTOLIC BLOOD PRESSURE: 144 MMHG | OXYGEN SATURATION: 97 %

## 2024-05-18 DIAGNOSIS — M43.6 TORTICOLLIS: Primary | ICD-10-CM

## 2024-05-18 PROCEDURE — 99284 EMERGENCY DEPT VISIT MOD MDM: CPT | Performed by: EMERGENCY MEDICINE

## 2024-05-18 PROCEDURE — 96372 THER/PROPH/DIAG INJ SC/IM: CPT

## 2024-05-18 PROCEDURE — 99282 EMERGENCY DEPT VISIT SF MDM: CPT

## 2024-05-18 RX ORDER — LIDOCAINE 50 MG/G
1 PATCH TOPICAL ONCE
Status: DISCONTINUED | OUTPATIENT
Start: 2024-05-18 | End: 2024-05-18 | Stop reason: HOSPADM

## 2024-05-18 RX ORDER — METHOCARBAMOL 500 MG/1
500 TABLET, FILM COATED ORAL 2 TIMES DAILY
Qty: 20 TABLET | Refills: 0 | Status: SHIPPED | OUTPATIENT
Start: 2024-05-18 | End: 2024-05-20 | Stop reason: ALTCHOICE

## 2024-05-18 RX ORDER — ACETAMINOPHEN 325 MG/1
975 TABLET ORAL ONCE
Status: COMPLETED | OUTPATIENT
Start: 2024-05-18 | End: 2024-05-18

## 2024-05-18 RX ORDER — KETOROLAC TROMETHAMINE 30 MG/ML
15 INJECTION, SOLUTION INTRAMUSCULAR; INTRAVENOUS ONCE
Status: COMPLETED | OUTPATIENT
Start: 2024-05-18 | End: 2024-05-18

## 2024-05-18 RX ORDER — OXYCODONE HYDROCHLORIDE 5 MG/1
5 TABLET ORAL ONCE
Status: COMPLETED | OUTPATIENT
Start: 2024-05-18 | End: 2024-05-18

## 2024-05-18 RX ORDER — METHOCARBAMOL 500 MG/1
500 TABLET, FILM COATED ORAL ONCE
Status: COMPLETED | OUTPATIENT
Start: 2024-05-18 | End: 2024-05-18

## 2024-05-18 RX ADMIN — ACETAMINOPHEN 975 MG: 325 TABLET, FILM COATED ORAL at 18:19

## 2024-05-18 RX ADMIN — OXYCODONE HYDROCHLORIDE 5 MG: 5 TABLET ORAL at 18:19

## 2024-05-18 RX ADMIN — KETOROLAC TROMETHAMINE 15 MG: 30 INJECTION, SOLUTION INTRAMUSCULAR; INTRAVENOUS at 18:21

## 2024-05-18 RX ADMIN — LIDOCAINE 1 PATCH: 700 PATCH TOPICAL at 18:18

## 2024-05-18 RX ADMIN — METHOCARBAMOL 500 MG: 500 TABLET ORAL at 18:20
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
## 2024-05-18 NOTE — ED ATTENDING ATTESTATION
5/18/2024  I, Jose Maria Aleman MD, saw and evaluated the patient. I have discussed the patient with the resident/non-physician practitioner and agree with the resident's/non-physician practitioner's findings, Plan of Care, and MDM as documented in the resident's/non-physician practitioner's note, except where noted. All available labs and Radiology studies were reviewed.  I was present for key portions of any procedure(s) performed by the resident/non-physician practitioner and I was immediately available to provide assistance.       At this point I agree with the current assessment done in the Emergency Department.  I have conducted an independent evaluation of this patient a history and physical is as follows:    43-year-old male with history of autism from group home presented for evaluation of worsening right-sided neck pain after an argument with his mother over the phone.  Has been having pain for a about a week.  Was seen in the emergency department 4 days ago and had a soft tissue CT which showed some prominence of the tonsillar pillars and nonpathologic adenopathy.  On exam today patient locates pain over the right SCM.  There is some tenderness/spasm on palpation.  Range of motion remains normal but he holds his head cocked to the right.  Suspect recurrent muscle spasm/strain.  Plan symptomatic treatment and discharge.    ED Course         Critical Care Time  Procedures

## 2024-05-20 ENCOUNTER — TELEPHONE (OUTPATIENT)
Dept: FAMILY MEDICINE CLINIC | Facility: CLINIC | Age: 44
End: 2024-05-20

## 2024-05-20 ENCOUNTER — HOSPITAL ENCOUNTER (OUTPATIENT)
Dept: RADIOLOGY | Facility: HOSPITAL | Age: 44
Discharge: HOME | End: 2024-05-20
Payer: MEDICARE

## 2024-05-20 ENCOUNTER — OFFICE VISIT (OUTPATIENT)
Dept: FAMILY MEDICINE CLINIC | Facility: CLINIC | Age: 44
End: 2024-05-20

## 2024-05-20 VITALS
WEIGHT: 200.6 LBS | OXYGEN SATURATION: 98 % | RESPIRATION RATE: 18 BRPM | BODY MASS INDEX: 29.62 KG/M2 | HEART RATE: 80 BPM | DIASTOLIC BLOOD PRESSURE: 65 MMHG | SYSTOLIC BLOOD PRESSURE: 97 MMHG | TEMPERATURE: 96.1 F

## 2024-05-20 DIAGNOSIS — K52.9 NONINFECTIOUS GASTROENTERITIS, UNSPECIFIED TYPE: ICD-10-CM

## 2024-05-20 DIAGNOSIS — S16.1XXA STRAIN OF NECK MUSCLE, INITIAL ENCOUNTER: ICD-10-CM

## 2024-05-20 DIAGNOSIS — F25.0 SCHIZOAFFECTIVE DISORDER, BIPOLAR TYPE (HCC): Chronic | ICD-10-CM

## 2024-05-20 DIAGNOSIS — S16.1XXA STRAIN OF NECK MUSCLE, INITIAL ENCOUNTER: Primary | ICD-10-CM

## 2024-05-20 PROCEDURE — 72050 X-RAY EXAM NECK SPINE 4/5VWS: CPT

## 2024-05-20 PROCEDURE — G2211 COMPLEX E/M VISIT ADD ON: HCPCS | Performed by: FAMILY MEDICINE

## 2024-05-20 PROCEDURE — 99213 OFFICE O/P EST LOW 20 MIN: CPT | Performed by: FAMILY MEDICINE

## 2024-05-20 RX ORDER — LORATADINE 10 MG/1
10 TABLET ORAL DAILY
Qty: 30 TABLET | Refills: 5 | Status: SHIPPED | OUTPATIENT
Start: 2024-05-20 | End: 2024-05-20 | Stop reason: SDUPTHER

## 2024-05-20 RX ORDER — LORATADINE 10 MG/1
10 TABLET ORAL DAILY
Qty: 30 TABLET | Refills: 5 | Status: CANCELLED | OUTPATIENT
Start: 2024-05-20 | End: 2024-11-16

## 2024-05-20 RX ORDER — LORATADINE 10 MG/1
10 TABLET ORAL DAILY
Qty: 30 TABLET | Refills: 0 | Status: SHIPPED | OUTPATIENT
Start: 2024-05-20 | End: 2024-11-16

## 2024-05-20 RX ORDER — BACLOFEN 10 MG/1
10 TABLET ORAL 3 TIMES DAILY
Qty: 30 TABLET | Refills: 0 | Status: SHIPPED | OUTPATIENT
Start: 2024-05-20 | End: 2024-05-21 | Stop reason: SDUPTHER

## 2024-05-20 NOTE — Clinical Note
requests prior auth for Lidocaine patch that was ordered by ER  Please see if we can assist Thank you   Prelim x-ray read by me, looks good, will await formal radiology read

## 2024-05-20 NOTE — TELEPHONE ENCOUNTER
Signature required    Dr Goel    Medical Exam Case notes   Person Directed Supports    [employer]    Fax [phone]
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
## 2024-05-21 ENCOUNTER — TELEPHONE (OUTPATIENT)
Dept: FAMILY MEDICINE CLINIC | Facility: CLINIC | Age: 44
End: 2024-05-21

## 2024-05-21 DIAGNOSIS — E11.65 TYPE 2 DIABETES MELLITUS WITH HYPERGLYCEMIA, WITHOUT LONG-TERM CURRENT USE OF INSULIN (HCC): ICD-10-CM
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
## 2024-05-21 RX ORDER — LANCETS 28 GAUGE
EACH MISCELLANEOUS
Qty: 100 EACH | Refills: 5 | Status: SHIPPED | OUTPATIENT
Start: 2024-05-21

## 2024-05-21 RX ORDER — BACLOFEN 10 MG/1
10 TABLET ORAL 3 TIMES DAILY
Qty: 30 TABLET | Refills: 0 | Status: SHIPPED | OUTPATIENT
Start: 2024-05-21

## 2024-05-21 RX ORDER — FLUORIDE TOOTHPASTE
10 TOOTHPASTE DENTAL
Qty: 473 ML | Refills: 5 | Status: SHIPPED | OUTPATIENT
Start: 2024-05-21

## 2024-05-21 NOTE — TELEPHONE ENCOUNTER
Prior auth for Lidocaine patches [facility] at [phone]. Will follow up in 2-3 business days

## 2024-05-22 NOTE — TELEPHONE ENCOUNTER
Left message advising  that prior auth was denied. Advised to call the office with any other concerns.

## 2024-05-22 NOTE — TELEPHONE ENCOUNTER
Please let care team know that prior auth was denied  He should be responding to the new muscle relaxant by now and likely won't need the lidocaine patch that ER prescribed

## 2024-05-22 NOTE — TELEPHONE ENCOUNTER
Lidocaine patches have been denied, please see denial letter in media tab. Please scan into chart

## 2024-05-25 ENCOUNTER — HOSPITAL ENCOUNTER (EMERGENCY)
Facility: HOSPITAL | Age: 44
Discharge: HOME | End: 2024-05-25
Attending: EMERGENCY MEDICINE
Payer: MEDICARE

## 2024-05-25 VITALS
TEMPERATURE: 98.8 F | SYSTOLIC BLOOD PRESSURE: 136 MMHG | WEIGHT: 196.87 LBS | DIASTOLIC BLOOD PRESSURE: 85 MMHG | RESPIRATION RATE: 18 BRPM | OXYGEN SATURATION: 95 % | BODY MASS INDEX: 29.07 KG/M2 | HEART RATE: 92 BPM

## 2024-05-25 DIAGNOSIS — F25.0 SCHIZOAFFECTIVE DISORDER, BIPOLAR TYPE (HCC): ICD-10-CM

## 2024-05-25 DIAGNOSIS — R46.89 AGGRESSIVE BEHAVIOR OF ADULT: Primary | ICD-10-CM

## 2024-05-25 PROCEDURE — 96372 THER/PROPH/DIAG INJ SC/IM: CPT

## 2024-05-25 PROCEDURE — 99284 EMERGENCY DEPT VISIT MOD MDM: CPT | Performed by: EMERGENCY MEDICINE

## 2024-05-25 PROCEDURE — 99284 EMERGENCY DEPT VISIT MOD MDM: CPT

## 2024-05-25 RX ORDER — ACETAMINOPHEN 325 MG/1
975 TABLET ORAL ONCE
Status: COMPLETED | OUTPATIENT
Start: 2024-05-25 | End: 2024-05-25

## 2024-05-25 RX ORDER — OLANZAPINE 10 MG/2ML
5 INJECTION, POWDER, FOR SOLUTION INTRAMUSCULAR ONCE
Status: COMPLETED | OUTPATIENT
Start: 2024-05-25 | End: 2024-05-25

## 2024-05-25 RX ORDER — WATER 10 ML/10ML
INJECTION INTRAMUSCULAR; INTRAVENOUS; SUBCUTANEOUS
Status: COMPLETED
Start: 2024-05-25 | End: 2024-05-25

## 2024-05-25 RX ADMIN — WATER 2.1 ML: 1 INJECTION INTRAMUSCULAR; INTRAVENOUS; SUBCUTANEOUS at 20:27

## 2024-05-25 RX ADMIN — ACETAMINOPHEN 975 MG: 325 TABLET, FILM COATED ORAL at 21:05

## 2024-05-25 RX ADMIN — OLANZAPINE 5 MG: 10 INJECTION, POWDER, FOR SOLUTION INTRAMUSCULAR at 20:27

## 2024-05-25 NOTE — ED NOTES
Patient refusing to answer triage questions or allowing vitals to be taken at this time.     John Bender RN  05/25/24 1948
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
## 2024-05-28 ENCOUNTER — CLINICAL SUPPORT (OUTPATIENT)
Dept: FAMILY MEDICINE CLINIC | Facility: CLINIC | Age: 44
End: 2024-05-28

## 2024-05-28 DIAGNOSIS — Z11.1 SCREENING FOR TUBERCULOSIS: Primary | ICD-10-CM

## 2024-05-28 PROCEDURE — 86580 TB INTRADERMAL TEST: CPT

## 2024-05-30 ENCOUNTER — OFFICE VISIT (OUTPATIENT)
Dept: FAMILY MEDICINE CLINIC | Facility: CLINIC | Age: 44
End: 2024-05-30

## 2024-05-30 VITALS
HEART RATE: 92 BPM | SYSTOLIC BLOOD PRESSURE: 113 MMHG | WEIGHT: 200 LBS | BODY MASS INDEX: 29.53 KG/M2 | RESPIRATION RATE: 20 BRPM | TEMPERATURE: 97.3 F | DIASTOLIC BLOOD PRESSURE: 79 MMHG

## 2024-05-30 DIAGNOSIS — R11.2 ACUTE NAUSEA WITH NONBILIOUS VOMITING: Primary | ICD-10-CM

## 2024-05-30 LAB
INDURATION: 0 MM
TB SKIN TEST: NEGATIVE

## 2024-05-30 PROCEDURE — 99213 OFFICE O/P EST LOW 20 MIN: CPT | Performed by: FAMILY MEDICINE

## 2024-05-30 PROCEDURE — G2211 COMPLEX E/M VISIT ADD ON: HCPCS | Performed by: FAMILY MEDICINE

## 2024-05-30 RX ORDER — PROMETHAZINE HYDROCHLORIDE 50 MG/ML
25 INJECTION, SOLUTION INTRAMUSCULAR; INTRAVENOUS ONCE
Status: DISCONTINUED | OUTPATIENT
Start: 2024-05-30 | End: 2024-05-30

## 2024-05-30 RX ORDER — PROMETHAZINE HYDROCHLORIDE 50 MG/ML
50 INJECTION, SOLUTION INTRAMUSCULAR; INTRAVENOUS ONCE
Status: DISCONTINUED | OUTPATIENT
Start: 2024-05-30 | End: 2024-05-30

## 2024-05-30 RX ORDER — PROMETHAZINE HYDROCHLORIDE 25 MG/ML
25 INJECTION, SOLUTION INTRAMUSCULAR; INTRAVENOUS ONCE
Status: COMPLETED | OUTPATIENT
Start: 2024-05-30 | End: 2024-05-30

## 2024-05-30 RX ORDER — ONDANSETRON 4 MG/1
4 TABLET, FILM COATED ORAL EVERY 12 HOURS PRN
Qty: 6 TABLET | Refills: 0 | Status: SHIPPED | OUTPATIENT
Start: 2024-05-30 | End: 2024-06-02

## 2024-05-30 RX ADMIN — PROMETHAZINE HYDROCHLORIDE 25 MG: 25 INJECTION, SOLUTION INTRAMUSCULAR; INTRAVENOUS at 14:53

## 2024-05-30 NOTE — PROGRESS NOTES
Ambulatory Visit  Name: Sandor Ratliff      : 1980      MRN: [id number]  Encounter Provider: Gustabo Issa DO  Encounter Date: 2024   Encounter department: [facility]    Assessment & Plan   1. Acute nausea with nonbilious vomiting  Assessment & Plan:  - Started this AM, post breakfast - pt appeared to overeat per his care taker. Pt reported episodes of NBNB emesis, however he does endorsed of now epigastric tenderness, with radiation to the back   -Pt denied recent episode of illness, change in medication, nor substance usage.   - Suspect gastric dyspepsia 2/2 over eating; low suspicion for cardiac cause, can't r/o pancreatitis   Plan  - Recommend Lipase, CMP, troponin, CBC  - Soft-diet  - Phenegran given in clinic due to acute episode of emesis during encounter   - Zofran PRN for 3 days ()   Orders:  -     ondansetron (ZOFRAN) 4 mg tablet; Take 1 tablet (4 mg total) by mouth every 12 (twelve) hours as needed for nausea or vomiting for up to 3 days  -     Lipase; Future  -     CBC and differential; Future  -     Comprehensive metabolic panel; Future  -     promethazine (PHENERGAN) injection 25 mg  -     High Sensitivity Troponin I Random; Future       History of Present Illness     HPI  43 years old male present today due to symptoms nausea and vomiting.  Patient was taken by his caretaker who report patient was a big breakfast this morning and immediately after his meal he started throwing up.  Reports 2 episodes of NBNB emesis, no melena, associated symptoms including 1 bowel nonbloody diarrhea.  Patient endorsed feeling bloated as well as mild tenderness in his epigastric region radiating to back, patient denies any recent episode of illness.  Denies previous episodes similar condition  Review of Systems   Constitutional:  Negative for chills and fever.   HENT:  Negative for ear pain and sore throat.    Eyes:  Negative for pain and visual disturbance.    Respiratory:  Negative for cough and shortness of breath.    Cardiovascular:  Negative for chest pain and palpitations.   Gastrointestinal:  Positive for abdominal distention, abdominal pain, diarrhea and nausea. Negative for vomiting.   Genitourinary:  Negative for dysuria and hematuria.   Musculoskeletal:  Negative for arthralgias and back pain.   Skin:  Negative for color change and rash.   Neurological:  Negative for seizures and syncope.   All other systems reviewed and are negative.      Objective     /79   Pulse 92   Temp (!) 97.3 °F (36.3 °C)   Resp 20   Wt 90.7 kg (200 lb)   BMI 29.53 kg/m²     Physical Exam  Vitals and nursing note reviewed.   Constitutional:       General: He is not in acute distress.     Appearance: He is well-developed.   HENT:      Head: Normocephalic and atraumatic.   Eyes:      Conjunctiva/sclera: Conjunctivae normal.   Cardiovascular:      Rate and Rhythm: Normal rate and regular rhythm.      Heart sounds: No murmur heard.  Pulmonary:      Effort: Pulmonary effort is normal. No respiratory distress.      Breath sounds: Normal breath sounds.   Abdominal:      General: There is distension.      Palpations: There is no mass.      Tenderness: There is abdominal tenderness. There is no guarding or rebound.   Musculoskeletal:         General: No swelling.      Cervical back: Neck supple.   Skin:     General: Skin is warm and dry.      Capillary Refill: Capillary refill takes less than 2 seconds.   Neurological:      Mental Status: He is alert.   Psychiatric:         Mood and Affect: Mood normal.       Administrative Statements

## 2024-05-30 NOTE — ASSESSMENT & PLAN NOTE
- Started this AM, post breakfast - pt appeared to overeat per his care taker. Pt reported episodes of NBNB emesis, however he does endorsed of now epigastric tenderness, with radiation to the back   -Pt denied recent episode of illness, change in medication, nor substance usage.   - Suspect gastric dyspepsia 2/2 over eating; low suspicion for cardiac cause, can't r/o pancreatitis   Plan  - Recommend Lipase, CMP, troponin, CBC  - Soft-diet  - Phenegran given in clinic due to acute episode of emesis during encounter   - Zofran PRN for 3 days ()

## 2024-06-06 ENCOUNTER — HOSPITAL ENCOUNTER (EMERGENCY)
Facility: HOSPITAL | Age: 44
Discharge: HOME | End: 2024-06-06
Attending: EMERGENCY MEDICINE
Payer: MEDICARE

## 2024-06-06 ENCOUNTER — APPOINTMENT (EMERGENCY)
Dept: RADIOLOGY | Facility: HOSPITAL | Age: 44
End: 2024-06-06
Payer: MEDICARE

## 2024-06-06 VITALS
SYSTOLIC BLOOD PRESSURE: 109 MMHG | DIASTOLIC BLOOD PRESSURE: 70 MMHG | TEMPERATURE: 97.8 F | RESPIRATION RATE: 24 BRPM | OXYGEN SATURATION: 98 % | WEIGHT: 197.31 LBS | BODY MASS INDEX: 29.14 KG/M2 | HEART RATE: 82 BPM

## 2024-06-06 DIAGNOSIS — H10.9 CONJUNCTIVITIS OF RIGHT EYE, UNSPECIFIED CONJUNCTIVITIS TYPE: ICD-10-CM

## 2024-06-06 DIAGNOSIS — R51.9 HEADACHE: ICD-10-CM

## 2024-06-06 DIAGNOSIS — R10.84 GENERALIZED ABDOMINAL PAIN: Primary | ICD-10-CM

## 2024-06-06 DIAGNOSIS — R11.0 NAUSEA: ICD-10-CM

## 2024-06-06 LAB
ALBUMIN SERPL BCP-MCNC: 4 G/DL (ref 3.5–5)
ALP SERPL-CCNC: 58 U/L (ref 34–104)
ALT SERPL W P-5'-P-CCNC: 13 U/L (ref 7–52)
ANION GAP SERPL CALCULATED.3IONS-SCNC: 10 MMOL/L (ref 4–13)
AST SERPL W P-5'-P-CCNC: 17 U/L (ref 13–39)
BASOPHILS # BLD AUTO: 0.04 THOUSANDS/ÂΜL (ref 0–0.1)
BASOPHILS NFR BLD AUTO: 0 % (ref 0–1)
BILIRUB SERPL-MCNC: 0.55 MG/DL (ref 0.2–1)
BUN SERPL-MCNC: 11 MG/DL (ref 5–25)
CALCIUM SERPL-MCNC: 9 MG/DL (ref 8.4–10.2)
CARDIAC TROPONIN I PNL SERPL HS: <2 NG/L
CHLORIDE SERPL-SCNC: 102 MMOL/L (ref 96–108)
CO2 SERPL-SCNC: 25 MMOL/L (ref 21–32)
CREAT SERPL-MCNC: 1.51 MG/DL (ref 0.6–1.3)
EOSINOPHIL # BLD AUTO: 0.36 THOUSAND/ÂΜL (ref 0–0.61)
EOSINOPHIL NFR BLD AUTO: 3 % (ref 0–6)
ERYTHROCYTE [DISTWIDTH] IN BLOOD BY AUTOMATED COUNT: 12.3 % (ref 11.6–15.1)
GFR SERPL CREATININE-BSD FRML MDRD: 56 ML/MIN/1.73SQ M
GLUCOSE SERPL-MCNC: 86 MG/DL (ref 65–140)
HCT VFR BLD AUTO: 45.3 % (ref 36.5–49.3)
HGB BLD-MCNC: 14.5 G/DL (ref 12–17)
IMM GRANULOCYTES # BLD AUTO: 0.05 THOUSAND/UL (ref 0–0.2)
IMM GRANULOCYTES NFR BLD AUTO: 1 % (ref 0–2)
LIPASE SERPL-CCNC: 23 U/L (ref 11–82)
LYMPHOCYTES # BLD AUTO: 4.09 THOUSANDS/ÂΜL (ref 0.6–4.47)
LYMPHOCYTES NFR BLD AUTO: 39 % (ref 14–44)
MCH RBC QN AUTO: 26.6 PG (ref 26.8–34.3)
MCHC RBC AUTO-ENTMCNC: 32 G/DL (ref 31.4–37.4)
MCV RBC AUTO: 83 FL (ref 82–98)
MONOCYTES # BLD AUTO: 0.97 THOUSAND/ÂΜL (ref 0.17–1.22)
MONOCYTES NFR BLD AUTO: 9 % (ref 4–12)
NEUTROPHILS # BLD AUTO: 4.95 THOUSANDS/ÂΜL (ref 1.85–7.62)
NEUTS SEG NFR BLD AUTO: 48 % (ref 43–75)
NRBC BLD AUTO-RTO: 0 /100 WBCS
PLATELET # BLD AUTO: 185 THOUSANDS/UL (ref 149–390)
PMV BLD AUTO: 10 FL (ref 8.9–12.7)
POTASSIUM SERPL-SCNC: 4.1 MMOL/L (ref 3.5–5.3)
PROT SERPL-MCNC: 6.8 G/DL (ref 6.4–8.4)
RBC # BLD AUTO: 5.46 MILLION/UL (ref 3.88–5.62)
SODIUM SERPL-SCNC: 137 MMOL/L (ref 135–147)
WBC # BLD AUTO: 10.46 THOUSAND/UL (ref 4.31–10.16)

## 2024-06-06 PROCEDURE — 80053 COMPREHEN METABOLIC PANEL: CPT

## 2024-06-06 PROCEDURE — 96365 THER/PROPH/DIAG IV INF INIT: CPT

## 2024-06-06 PROCEDURE — 85025 COMPLETE CBC W/AUTO DIFF WBC: CPT

## 2024-06-06 PROCEDURE — 71045 X-RAY EXAM CHEST 1 VIEW: CPT

## 2024-06-06 PROCEDURE — 84484 ASSAY OF TROPONIN QUANT: CPT

## 2024-06-06 PROCEDURE — 83690 ASSAY OF LIPASE: CPT

## 2024-06-06 PROCEDURE — 99285 EMERGENCY DEPT VISIT HI MDM: CPT | Performed by: EMERGENCY MEDICINE

## 2024-06-06 PROCEDURE — 99284 EMERGENCY DEPT VISIT MOD MDM: CPT

## 2024-06-06 PROCEDURE — 36415 COLL VENOUS BLD VENIPUNCTURE: CPT

## 2024-06-06 PROCEDURE — 96375 TX/PRO/DX INJ NEW DRUG ADDON: CPT

## 2024-06-06 RX ORDER — OFLOXACIN 3 MG/ML
2 SOLUTION/ DROPS OPHTHALMIC ONCE
Status: COMPLETED | OUTPATIENT
Start: 2024-06-06 | End: 2024-06-06

## 2024-06-06 RX ORDER — SODIUM CHLORIDE, SODIUM GLUCONATE, SODIUM ACETATE, POTASSIUM CHLORIDE, MAGNESIUM CHLORIDE, SODIUM PHOSPHATE, DIBASIC, AND POTASSIUM PHOSPHATE .53; .5; .37; .037; .03; .012; .00082 G/100ML; G/100ML; G/100ML; G/100ML; G/100ML; G/100ML; G/100ML
500 INJECTION, SOLUTION INTRAVENOUS ONCE
Status: COMPLETED | OUTPATIENT
Start: 2024-06-06 | End: 2024-06-06

## 2024-06-06 RX ORDER — ONDANSETRON 2 MG/ML
4 INJECTION INTRAMUSCULAR; INTRAVENOUS ONCE
Status: COMPLETED | OUTPATIENT
Start: 2024-06-06 | End: 2024-06-06

## 2024-06-06 RX ORDER — ONDANSETRON 4 MG/1
4 TABLET, FILM COATED ORAL EVERY 6 HOURS
Qty: 12 TABLET | Refills: 0 | Status: SHIPPED | OUTPATIENT
Start: 2024-06-06

## 2024-06-06 RX ORDER — OFLOXACIN 3 MG/ML
2 SOLUTION/ DROPS OPHTHALMIC 4 TIMES DAILY
Qty: 5 ML | Refills: 0 | Status: SHIPPED | OUTPATIENT
Start: 2024-06-06 | End: 2024-06-13

## 2024-06-06 RX ORDER — SUCRALFATE 1 G/1
1 TABLET ORAL ONCE
Status: COMPLETED | OUTPATIENT
Start: 2024-06-06 | End: 2024-06-06

## 2024-06-06 RX ADMIN — OFLOXACIN 2 DROP: 3 SOLUTION OPHTHALMIC at 20:12

## 2024-06-06 RX ADMIN — SUCRALFATE 1 G: 1 TABLET ORAL at 19:41

## 2024-06-06 RX ADMIN — ONDANSETRON 4 MG: 2 INJECTION INTRAMUSCULAR; INTRAVENOUS at 18:59

## 2024-06-06 RX ADMIN — SODIUM CHLORIDE, SODIUM GLUCONATE, SODIUM ACETATE, POTASSIUM CHLORIDE, MAGNESIUM CHLORIDE, SODIUM PHOSPHATE, DIBASIC, AND POTASSIUM PHOSPHATE 500 ML: .53; .5; .37; .037; .03; .012; .00082 INJECTION, SOLUTION INTRAVENOUS at 19:13

## 2024-06-06 NOTE — ED ATTENDING ATTESTATION
6/6/2024  I, Edouard Rodríguez MD, saw and evaluated the patient. I have discussed the patient with the resident/non-physician practitioner and agree with the resident's/non-physician practitioner's findings, Plan of Care, and MDM as documented in the resident's/non-physician practitioner's note, except where noted. All available labs and Radiology studies were reviewed.  I was present for key portions of any procedure(s) performed by the resident/non-physician practitioner and I was immediately available to provide assistance.       At this point I agree with the current assessment done in the Emergency Department.  I have conducted an independent evaluation of this patient a history and physical is as follows:    History    Patient is a 43-year-old male, with a history significant for bipolar Polar disorder, schizoaffective, autism, diabetes per my review of medical record, who presents to the ED today for evaluation of an exacerbation of a multiple month long history of intermittent abdominal pain.  Patient states he ate a lot earlier today and, while in the car with group home staff, he had sudden onset nonradiating epigastric abdominal pain that is pressure/sharp in character.  Patient reports associated abnormal taste in mouth as well as nausea/vomiting.  Patient reports overall improvement in his symptoms at this time on my evaluation.  Patient states he also felt anxious.  There was no associated fever, chest pain, dyspnea, urinary symptoms, weakness, numbness.  Per patient's group home member who is present in room, patient is not confused.  Patient's group home member is also concerned about a 3-day history of right eye redness.  Patient denies eye pain, foreign body in eye, trauma to eye, contact lens use, vision change.  Patient does reside at a group home.  Handwashing recommended.  Patient also describes headache, similar in character to prior headaches that was not sudden onset.  This is now  resolved.  This was associated with his anxiety/abdominal discomfort.  Patient has had his appendix removed.    Per my review of the medical record, patient had recent evaluation at outside emergency department on 5/31/2024 for abdominal pain and CTA abdomen pelvis at that time was negative for acute pathology.    Patient is without other concerns at this time.     ROS  Patient denies: Fever; dysphagia; vision change; chest pain; dyspnea; polyuria; dysuria; rash; weakness; numbness; difficulty walking; confusion    Physical Exam    GENERAL APPEARANCE: NAD  NEURO: Cranial nerves 2-12 intact.  5/5 strength in all four extremities including finger extension against resistance.  Sensation to light touch subjectively intact/equal in all four extremities and the face.    Patient is speaking clearly in complete sentences.  Patient is answering appropriately and able to follow commands.   HEENT: PERRL, Moist mucous membranes, external ears normal, nose normal  Neck: No cervical adenopathy  CV: RRR. No murmurs, rubs, gallops  LUNGS: Clear to auscultation: No wheezes, stridor, rhonchi, rales  GI: Abdomen non-distended. Soft. Non-tender and without rebound or guarding   : Deferred at this time  MSK: No deformity.   Skin: Warm and dry  Capillary refill: <2 seconds    Patient is currently afebrile, tachycardic, otherwise stable    Assessment/Plan/MDM  Abdominal pain, vomiting, headache, eye redness  -Concern for gastritis, GERD, peptic ulcer disease, overeating, primary headache.  I also considered electrolyte abnormality, pancreatitis, KRAIG, anemia, ACS, conjunctivitis.  Given no eye pain, no reason to consider zoster ophthalmicus.  No reason to consider meningitis, subarachnoid, acute mesenteric ischemia, acute appendicitis, biliary pathology at this time based on history physical exam  -Will investigate with cardiac workup, lipase  -Will manage with ofloxacin, fluids, Zofran, Carafate, further based on workup    ED  Course  ED Course as of 06/07/24 0214   Thu Jun 06, 2024 1944 hs TnI 0hr: <2  WNL -given recent normal troponin and continued symptoms over months as well as no chest pain, no need for delta         Critical Care Time  Procedures
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
## 2024-06-06 NOTE — DISCHARGE INSTRUCTIONS
Today we assessed you for your generalized abdominal pain, headache, nausea.  Basic labs were drawn which was able to rule out cardiac etiology.  Your electrolytes were within normal limits and your other lab work were at your baseline.  We appreciated imaging done recently at [facility] and felt that no new imaging was necessary today.  We treated you with sucralfate for GI upset as well as Zofran for nausea.  You will be discharged with a prescription for Zofran at home.  We also treated you with eyedrops for your conjunctivitis.  These eyedrops can be taken with you.

## 2024-06-10 DIAGNOSIS — E11.65 TYPE 2 DIABETES MELLITUS WITH HYPERGLYCEMIA, WITHOUT LONG-TERM CURRENT USE OF INSULIN (HCC): ICD-10-CM

## 2024-06-10 RX ORDER — ALCOHOL ANTISEPTIC PADS
PADS, MEDICATED (EA) TOPICAL AS NEEDED
Qty: 200 EACH | Refills: 1 | Status: SHIPPED | OUTPATIENT
Start: 2024-06-10

## 2024-06-17 ENCOUNTER — APPOINTMENT (OUTPATIENT)
Dept: LAB | Facility: CLINIC | Age: 44
End: 2024-06-17
Payer: MEDICARE

## 2024-06-17 DIAGNOSIS — F43.10 POSTTRAUMATIC STRESS DISORDER: ICD-10-CM

## 2024-06-17 DIAGNOSIS — F31.81 BIPOLAR II DISORDER (HCC): ICD-10-CM

## 2024-06-17 DIAGNOSIS — Z79.899 ENCOUNTER FOR LONG-TERM (CURRENT) USE OF OTHER MEDICATIONS: ICD-10-CM

## 2024-06-17 LAB
25(OH)D3 SERPL-MCNC: 39.6 NG/ML (ref 30–100)
ALBUMIN SERPL BCP-MCNC: 3.7 G/DL (ref 3.5–5)
ALP SERPL-CCNC: 56 U/L (ref 34–104)
ALT SERPL W P-5'-P-CCNC: 15 U/L (ref 7–52)
ANION GAP SERPL CALCULATED.3IONS-SCNC: 6 MMOL/L (ref 4–13)
AST SERPL W P-5'-P-CCNC: 13 U/L (ref 13–39)
BACTERIA UR QL AUTO: NORMAL /HPF
BILIRUB SERPL-MCNC: 0.34 MG/DL (ref 0.2–1)
BILIRUB UR QL STRIP: NEGATIVE
BUN SERPL-MCNC: 16 MG/DL (ref 5–25)
CALCIUM SERPL-MCNC: 8.7 MG/DL (ref 8.4–10.2)
CHLORIDE SERPL-SCNC: 102 MMOL/L (ref 96–108)
CHOLEST SERPL-MCNC: 91 MG/DL
CLARITY UR: CLEAR
CO2 SERPL-SCNC: 30 MMOL/L (ref 21–32)
COLOR UR: ABNORMAL
CREAT SERPL-MCNC: 1.44 MG/DL (ref 0.6–1.3)
ERYTHROCYTE [DISTWIDTH] IN BLOOD BY AUTOMATED COUNT: 12.4 % (ref 11.6–15.1)
EST. AVERAGE GLUCOSE BLD GHB EST-MCNC: 174 MG/DL
GFR SERPL CREATININE-BSD FRML MDRD: 59 ML/MIN/1.73SQ M
GLUCOSE P FAST SERPL-MCNC: 191 MG/DL (ref 65–99)
GLUCOSE UR STRIP-MCNC: ABNORMAL MG/DL
HBA1C MFR BLD: 7.7 %
HCT VFR BLD AUTO: 46 % (ref 36.5–49.3)
HDLC SERPL-MCNC: 32 MG/DL
HGB BLD-MCNC: 14.1 G/DL (ref 12–17)
HGB UR QL STRIP.AUTO: NEGATIVE
KETONES UR STRIP-MCNC: NEGATIVE MG/DL
LDLC SERPL CALC-MCNC: 25 MG/DL (ref 0–100)
LEUKOCYTE ESTERASE UR QL STRIP: ABNORMAL
MCH RBC QN AUTO: 26.2 PG (ref 26.8–34.3)
MCHC RBC AUTO-ENTMCNC: 30.7 G/DL (ref 31.4–37.4)
MCV RBC AUTO: 86 FL (ref 82–98)
NITRITE UR QL STRIP: NEGATIVE
NON-SQ EPI CELLS URNS QL MICRO: NORMAL /HPF
NONHDLC SERPL-MCNC: 59 MG/DL
PH UR STRIP.AUTO: 5 [PH]
PLATELET # BLD AUTO: 149 THOUSANDS/UL (ref 149–390)
PMV BLD AUTO: 10 FL (ref 8.9–12.7)
POTASSIUM SERPL-SCNC: 4.9 MMOL/L (ref 3.5–5.3)
PROT SERPL-MCNC: 6 G/DL (ref 6.4–8.4)
PROT UR STRIP-MCNC: NEGATIVE MG/DL
RBC # BLD AUTO: 5.38 MILLION/UL (ref 3.88–5.62)
RBC #/AREA URNS AUTO: NORMAL /HPF
SODIUM SERPL-SCNC: 138 MMOL/L (ref 135–147)
SP GR UR STRIP.AUTO: 1.02 (ref 1–1.03)
TRIGL SERPL-MCNC: 168 MG/DL
TSH SERPL DL<=0.05 MIU/L-ACNC: 0.85 UIU/ML (ref 0.45–4.5)
UROBILINOGEN UR STRIP-ACNC: <2 MG/DL
WBC # BLD AUTO: 7.58 THOUSAND/UL (ref 4.31–10.16)
WBC #/AREA URNS AUTO: NORMAL /HPF

## 2024-06-17 PROCEDURE — 80061 LIPID PANEL: CPT

## 2024-06-17 PROCEDURE — 36415 COLL VENOUS BLD VENIPUNCTURE: CPT

## 2024-06-17 PROCEDURE — 85027 COMPLETE CBC AUTOMATED: CPT

## 2024-06-17 PROCEDURE — 80053 COMPREHEN METABOLIC PANEL: CPT

## 2024-06-17 PROCEDURE — 83036 HEMOGLOBIN GLYCOSYLATED A1C: CPT

## 2024-06-17 PROCEDURE — 81001 URINALYSIS AUTO W/SCOPE: CPT

## 2024-06-17 PROCEDURE — 84443 ASSAY THYROID STIM HORMONE: CPT

## 2024-06-17 PROCEDURE — 82306 VITAMIN D 25 HYDROXY: CPT

## 2024-07-08 DIAGNOSIS — K59.00 CONSTIPATION, UNSPECIFIED CONSTIPATION TYPE: ICD-10-CM

## 2024-07-11 ENCOUNTER — RA CDI HCC (OUTPATIENT)
Dept: OTHER | Facility: HOSPITAL | Age: 44
End: 2024-07-11

## 2024-07-17 ENCOUNTER — OFFICE VISIT (OUTPATIENT)
Dept: FAMILY MEDICINE CLINIC | Facility: CLINIC | Age: 44
End: 2024-07-17

## 2024-07-17 ENCOUNTER — TELEPHONE (OUTPATIENT)
Dept: FAMILY MEDICINE CLINIC | Facility: CLINIC | Age: 44
End: 2024-07-17

## 2024-07-17 VITALS
HEART RATE: 99 BPM | TEMPERATURE: 97.3 F | RESPIRATION RATE: 18 BRPM | WEIGHT: 197.2 LBS | DIASTOLIC BLOOD PRESSURE: 81 MMHG | OXYGEN SATURATION: 98 % | BODY MASS INDEX: 29.12 KG/M2 | SYSTOLIC BLOOD PRESSURE: 118 MMHG

## 2024-07-17 DIAGNOSIS — M79.642 HAND PAIN, LEFT: Primary | ICD-10-CM

## 2024-07-17 PROCEDURE — 99213 OFFICE O/P EST LOW 20 MIN: CPT | Performed by: FAMILY MEDICINE

## 2024-07-17 PROCEDURE — G2211 COMPLEX E/M VISIT ADD ON: HCPCS | Performed by: FAMILY MEDICINE

## 2024-07-17 RX ORDER — ACETAMINOPHEN 500 MG
1000 TABLET ORAL EVERY 8 HOURS
Qty: 30 TABLET | Refills: 1 | Status: SHIPPED | OUTPATIENT
Start: 2024-07-17

## 2024-07-17 RX ORDER — IBUPROFEN 600 MG/1
600 TABLET ORAL EVERY 8 HOURS PRN
Qty: 30 TABLET | Refills: 0 | Status: SHIPPED | OUTPATIENT
Start: 2024-07-17

## 2024-07-17 NOTE — ASSESSMENT & PLAN NOTE
Pt. Seen for left hand pain since 1 week.  Pain - abrupt onset.          Cramping pain          Only in left 5 fingers.           Pain aggravates- on making a fist, and when using his left hand.            Not aggravated on cold temp          No radiation of pain. No pain in palm, arm,neck or Rt hand.         No H/O injury          Not associated with numbness, tingling, weakness, skin colour changes over fingers         Pt. Took tylenol yesterday, but didn't help.            On Examination - on doing crude and fine touch - pt vaguely says he feels touch sensation on all fingers even though only 1 finger was tested.    A form was filled up, provided by the patient as he lives in group home.    PLAN  - Acetaminophen 1000mg every 8 hours  - Ibuprofen 600mg every 8 hours.  - F/U in 2 weeks

## 2024-07-17 NOTE — PROGRESS NOTES
Ambulatory Visit  Name: Sandor Ratliff      : 1980      MRN: [id number]  Encounter Provider: Berkley Austin  Encounter Date: 2024   Encounter department: [facility]    Assessment & Plan   1. Hand pain, left  Assessment & Plan:  Pt. Seen for left hand pain for 1 week.  Pain - abrupt onset.          Cramping pain          Only in left 5 fingers.           Pain aggravates- on making a fist, and when using his left hand. Not aggravated on cold temp          No radiation of pain. No pain in palm, arm,neck or Rt hand.         No H/O injury          Not associated with numbness, tingling, weakness, skin colour changes over fingers         Pt. Took tylenol yesterday, but didn't help.            On Examination - on doing crude and fine touch - pt vaguely says he feels touch sensation on all fingers even though only 1 finger was tested.    PLAN  - Acetaminophen 1000mg every 8 hours  - Ibuprofen 600mg every 8 hours.  - F/U in 2 weeks     Orders:  -     acetaminophen (TYLENOL) 500 mg tablet; Take 2 tablets (1,000 mg total) by mouth every 8 (eight) hours  -     ibuprofen (MOTRIN) 600 mg tablet; Take 1 tablet (600 mg total) by mouth every 8 (eight) hours as needed for moderate pain       History of Present Illness     HPI  Mr. Patten, 43yr old male was seen in our office for hand pain which is for 1 week.  C/O - Pain in left hand ( fingers only) for 1 week , cramping pain.   No H/O injury.  Pt. stays in group home. He is accompanied by a friend.  Friend states that pt. sleeps on left hand under his head, so complaing pain. Pt took tylenol yesterday but didn't help.        Review of Systems   Constitutional:  Negative for fatigue and fever.   HENT:  Negative for ear discharge, postnasal drip and sore throat.    Eyes:  Negative for discharge and visual disturbance.   Respiratory:  Negative for cough and shortness of breath.    Cardiovascular:  Negative for chest  pain.   Gastrointestinal:  Negative for constipation and diarrhea.   Endocrine: Negative for cold intolerance.   Genitourinary:  Negative for dysuria.   Musculoskeletal:         Pain in 5 fingers of left hand only.    Skin:  Negative for color change and rash.   Neurological:  Negative for weakness, numbness and headaches.   Psychiatric/Behavioral:  Negative for confusion.        Objective     /81   Pulse 99   Temp (!) 97.3 °F (36.3 °C)   Resp 18   Wt 89.4 kg (197 lb 3.2 oz)   SpO2 98%   BMI 29.12 kg/m²     Physical Exam  Constitutional:       Appearance: Normal appearance.   HENT:      Head: Normocephalic and atraumatic.      Right Ear: External ear normal.      Left Ear: External ear normal.      Nose: Nose normal.      Mouth/Throat:      Pharynx: Oropharynx is clear.   Eyes:      Extraocular Movements: Extraocular movements intact.   Cardiovascular:      Rate and Rhythm: Normal rate and regular rhythm.      Pulses: Normal pulses.      Heart sounds: Normal heart sounds.   Pulmonary:      Effort: Pulmonary effort is normal.      Breath sounds: Normal breath sounds.   Abdominal:      General: Bowel sounds are normal.      Palpations: Abdomen is soft.   Musculoskeletal:      Cervical back: Normal range of motion.      Comments: Tender on palpation in left 5 fingers only. On doing crude and fine touch - pt vaguely says he feels touch sensation on all fingers even though only 1 finger was tested.     Skin:     General: Skin is warm.   Neurological:      General: No focal deficit present.      Mental Status: He is alert and oriented to person, place, and time.   Psychiatric:         Mood and Affect: Mood normal.         Behavior: Behavior normal.       Administrative Statements

## 2024-07-17 NOTE — TELEPHONE ENCOUNTER
To be completed by: Dr. Austin    Form: Person Directed Supports    Folder: behind glass window in front office    Upon completion of visit today, please give to patient.

## 2024-07-18 ENCOUNTER — OFFICE VISIT (OUTPATIENT)
Dept: FAMILY MEDICINE CLINIC | Facility: CLINIC | Age: 44
End: 2024-07-18

## 2024-07-18 ENCOUNTER — TELEPHONE (OUTPATIENT)
Dept: FAMILY MEDICINE CLINIC | Facility: CLINIC | Age: 44
End: 2024-07-18

## 2024-07-18 VITALS
WEIGHT: 200 LBS | TEMPERATURE: 98 F | HEIGHT: 69 IN | BODY MASS INDEX: 29.62 KG/M2 | OXYGEN SATURATION: 99 % | HEART RATE: 98 BPM | RESPIRATION RATE: 18 BRPM | SYSTOLIC BLOOD PRESSURE: 103 MMHG | DIASTOLIC BLOOD PRESSURE: 66 MMHG

## 2024-07-18 DIAGNOSIS — Z00.00 MEDICARE ANNUAL WELLNESS VISIT, SUBSEQUENT: Primary | ICD-10-CM

## 2024-07-18 DIAGNOSIS — R10.84 GENERALIZED ABDOMINAL PAIN: ICD-10-CM

## 2024-07-18 PROCEDURE — G0439 PPPS, SUBSEQ VISIT: HCPCS | Performed by: FAMILY MEDICINE

## 2024-07-18 NOTE — PATIENT INSTRUCTIONS
Medicare Preventive Visit Patient Instructions  Thank you for completing your Welcome to Medicare Visit or Medicare Annual Wellness Visit today. Your next wellness visit will be due in one year (7/19/2025).  The screening/preventive services that you may require over the next 5-10 years are detailed below. Some tests may not apply to you based off risk factors and/or age. Screening tests ordered at today's visit but not completed yet may show as past due. Also, please note that scanned in results may not display below.  Preventive Screenings:  Service Recommendations Previous Testing/Comments   Colorectal Cancer Screening  Colonoscopy    Fecal Occult Blood Test (FOBT)/Fecal Immunochemical Test (FIT)  Fecal DNA/Cologuard Test  Flexible Sigmoidoscopy Age: 45-75 years old   Colonoscopy: every 10 years (May be performed more frequently if at higher risk)  OR  FOBT/FIT: every 1 year  OR  Cologuard: every 3 years  OR  Sigmoidoscopy: every 5 years  Screening may be recommended earlier than age 45 if at higher risk for colorectal cancer. Also, an individualized decision between you and your healthcare provider will decide whether screening between the ages of 76-85 would be appropriate. Colonoscopy: Not on file  FOBT/FIT: Not on file  Cologuard: Not on file  Sigmoidoscopy: Not on file          Prostate Cancer Screening Individualized decision between patient and health care provider in men between ages of 55-69   Medicare will cover every 12 months beginning on the day after your 50th birthday PSA: 0.70 ng/mL     Screening Not Indicated     Hepatitis C Screening Once for adults born between 1945 and 1965  More frequently in patients at high risk for Hepatitis C Hep C Antibody: 08/30/2022    Screening Current   Diabetes Screening 1-2 times per year if you're at risk for diabetes or have pre-diabetes Fasting glucose: 191 mg/dL (6/17/2024)  A1C: 7.7 % (6/17/2024)  Screening Not Indicated  History Diabetes   Cholesterol  Screening Once every 5 years if you don't have a lipid disorder. May order more often based on risk factors. Lipid panel: 06/17/2024  Screening Not Indicated  History Lipid Disorder      Other Preventive Screenings Covered by Medicare:  Abdominal Aortic Aneurysm (AAA) Screening: covered once if your at risk. You're considered to be at risk if you have a family history of AAA or a male between the age of 65-75 who smoking at least 100 cigarettes in your lifetime.  Lung Cancer Screening: covers low dose CT scan once per year if you meet all of the following conditions: (1) Age 55-77; (2) No signs or symptoms of lung cancer; (3) Current smoker or have quit smoking within the last 15 years; (4) You have a tobacco smoking history of at least 20 pack years (packs per day x number of years you smoked); (5) You get a written order from a healthcare provider.  Glaucoma Screening: covered annually if you're considered high risk: (1) You have diabetes OR (2) Family history of glaucoma OR (3)  aged 50 and older OR (4)  American aged 65 and older  Osteoporosis Screening: covered every 2 years if you meet one of the following conditions: (1) Have a vertebral abnormality; (2) On glucocorticoid therapy for more than 3 months; (3) Have primary hyperparathyroidism; (4) On osteoporosis medications and need to assess response to drug therapy.  HIV Screening: covered annually if you're between the age of 15-65. Also covered annually if you are younger than 15 and older than 65 with risk factors for HIV infection. For pregnant patients, it is covered up to 3 times per pregnancy.    Immunizations:  Immunization Recommendations   Influenza Vaccine Annual influenza vaccination during flu season is recommended for all persons aged >= 6 months who do not have contraindications   Pneumococcal Vaccine   * Pneumococcal conjugate vaccine = PCV13 (Prevnar 13), PCV15 (Vaxneuvance), PCV20 (Prevnar 20)  * Pneumococcal  polysaccharide vaccine = PPSV23 (Pneumovax) Adults 19-65 yo with certain risk factors or if 65+ yo  If never received any pneumonia vaccine: recommend Prevnar 20 (PCV20)  Give PCV20 if previously received 1 dose of PCV13 or PPSV23   Hepatitis B Vaccine 3 dose series if at intermediate or high risk (ex: diabetes, end stage renal disease, liver disease)   Respiratory syncytial virus (RSV) Vaccine - COVERED BY MEDICARE PART D  * RSVPreF3 (Arexvy) CDC recommends that adults 60 years of age and older may receive a single dose of RSV vaccine using shared clinical decision-making (SCDM)   Tetanus (Td) Vaccine - COST NOT COVERED BY MEDICARE PART B Following completion of primary series, a booster dose should be given every 10 years to maintain immunity against tetanus. Td may also be given as tetanus wound prophylaxis.   Tdap Vaccine - COST NOT COVERED BY MEDICARE PART B Recommended at least once for all adults. For pregnant patients, recommended with each pregnancy.   Shingles Vaccine (Shingrix) - COST NOT COVERED BY MEDICARE PART B  2 shot series recommended in those 19 years and older who have or will have weakened immune systems or those 50 years and older     Health Maintenance Due:      Topic Date Due   • HIV Screening  Completed   • Hepatitis C Screening  Completed     Immunizations Due:      Topic Date Due   • Pneumococcal Vaccine: Pediatrics (0 to 5 Years) and At-Risk Patients (6 to 64 Years) (2 of 2 - PPSV23 or PCV20) 01/28/2022   • COVID-19 Vaccine (5 - 2023-24 season) 12/18/2023   • Influenza Vaccine (1) 09/01/2024     Advance Directives   What are advance directives?  Advance directives are legal documents that state your wishes and plans for medical care. These plans are made ahead of time in case you lose your ability to make decisions for yourself. Advance directives can apply to any medical decision, such as the treatments you want, and if you want to donate organs.   What are the types of advance  directives?  There are many types of advance directives, and each state has rules about how to use them. You may choose a combination of any of the following:  Living will:  This is a written record of the treatment you want. You can also choose which treatments you do not want, which to limit, and which to stop at a certain time. This includes surgery, medicine, IV fluid, and tube feedings.   Durable power of  for healthcare (DPAHC):  This is a written record that states who you want to make healthcare choices for you when you are unable to make them for yourself. This person, called a proxy, is usually a family member or a friend. You may choose more than 1 proxy.  Do not resuscitate (DNR) order:  A DNR order is used in case your heart stops beating or you stop breathing. It is a request not to have certain forms of treatment, such as CPR. A DNR order may be included in other types of advance directives.  Medical directive:  This covers the care that you want if you are in a coma, near death, or unable to make decisions for yourself. You can list the treatments you want for each condition. Treatment may include pain medicine, surgery, blood transfusions, dialysis, IV or tube feedings, and a ventilator (breathing machine).  Values history:  This document has questions about your views, beliefs, and how you feel and think about life. This information can help others choose the care that you would choose.  Why are advance directives important?  An advance directive helps you control your care. Although spoken wishes may be used, it is better to have your wishes written down. Spoken wishes can be misunderstood, or not followed. Treatments may be given even if you do not want them. An advance directive may make it easier for your family to make difficult choices about your care.   Weight Management   Why it is important to manage your weight:  Being overweight increases your risk of health conditions such as  heart disease, high blood pressure, type 2 diabetes, and certain types of cancer. It can also increase your risk for osteoarthritis, sleep apnea, and other respiratory problems. Aim for a slow, steady weight loss. Even a small amount of weight loss can lower your risk of health problems.  How to lose weight safely:  A safe and healthy way to lose weight is to eat fewer calories and get regular exercise. You can lose up about 1 pound a week by decreasing the number of calories you eat by 500 calories each day.   Healthy meal plan for weight management:  A healthy meal plan includes a variety of foods, contains fewer calories, and helps you stay healthy. A healthy meal plan includes the following:  Eat whole-grain foods more often.  A healthy meal plan should contain fiber. Fiber is the part of grains, fruits, and vegetables that is not broken down by your body. Whole-grain foods are healthy and provide extra fiber in your diet. Some examples of whole-grain foods are whole-wheat breads and pastas, oatmeal, brown rice, and bulgur.  Eat a variety of vegetables every day.  Include dark, leafy greens such as spinach, kale, stu greens, and mustard greens. Eat yellow and orange vegetables such as carrots, sweet potatoes, and winter squash.   Eat a variety of fruits every day.  Choose fresh or canned fruit (canned in its own juice or light syrup) instead of juice. Fruit juice has very little or no fiber.  Eat low-fat dairy foods.  Drink fat-free (skim) milk or 1% milk. Eat fat-free yogurt and low-fat cottage cheese. Try low-fat cheeses such as mozzarella and other reduced-fat cheeses.  Choose meat and other protein foods that are low in fat.  Choose beans or other legumes such as split peas or lentils. Choose fish, skinless poultry (chicken or turkey), or lean cuts of red meat (beef or pork). Before you cook meat or poultry, cut off any visible fat.   Use less fat and oil.  Try baking foods instead of frying them. Add  less fat, such as margarine, sour cream, regular salad dressing and mayonnaise to foods. Eat fewer high-fat foods. Some examples of high-fat foods include french fries, doughnuts, ice cream, and cakes.  Eat fewer sweets.  Limit foods and drinks that are high in sugar. This includes candy, cookies, regular soda, and sweetened drinks.  Exercise:  Exercise at least 30 minutes per day on most days of the week. Some examples of exercise include walking, biking, dancing, and swimming. You can also fit in more physical activity by taking the stairs instead of the elevator or parking farther away from stores. Ask your healthcare provider about the best exercise plan for you.      © Copyright [employer] 2018 Information is for End User's use only and may not be sold, redistributed or otherwise used for commercial purposes. All illustrations and images included in CareNotes® are the copyrighted property of A.D.A[employer]M., Inc. or [employer]

## 2024-07-18 NOTE — PROGRESS NOTES
Ambulatory Visit  Name: Sandor Ratliff      : 1980      MRN: [id number]  Encounter Provider: Kyung Goel DO  Encounter Date: 2024   Encounter department: [facility]    Assessment & Plan   1. Medicare annual wellness visit, subsequent  2. Generalized abdominal pain  -     Ambulatory Referral to Family Practice    BMI Counseling: Body mass index is 29.53 kg/m². The BMI is above normal. Nutrition recommendations include decreasing portion sizes, encouraging healthy choices of fruits and vegetables, decreasing fast food intake, consuming healthier snacks and limiting drinks that contain sugar. Exercise recommendations include moderate physical activity 150 minutes/week. Rationale for BMI follow-up plan is due to patient being overweight or obese.       Preventive health issues were discussed with patient, and age appropriate screening tests were ordered as noted in patient's After Visit Summary. Personalized health advice and appropriate referrals for health education or preventive services given if needed, as noted in patient's After Visit Summary.    History of Present Illness     43-year-old male presents today accompanied by his caregivers and group home workers for an annual physical.  He has no questions or concerns at this time.  No complaints.       Patient Care Team:  Kyung Goel DO as PCP - General (Family Medicine)  DO Kendall Angel MD    Review of Systems   Constitutional:  Negative for chills and fever.   HENT:  Negative for sore throat.    Respiratory:  Negative for cough and shortness of breath.    Cardiovascular:  Negative for chest pain and palpitations.   Gastrointestinal:  Negative for abdominal pain, blood in stool, constipation, diarrhea, nausea and vomiting.   Genitourinary:  Negative for dysuria and hematuria.   Musculoskeletal:  Negative for arthralgias and back pain.   Skin:  Negative for color change and rash.    Neurological:  Negative for syncope and headaches.   Psychiatric/Behavioral:  Negative for agitation and behavioral problems.    All other systems reviewed and are negative.    Medical History Reviewed by provider this encounter:       Annual Wellness Visit Questionnaire   Sandor is here for his Subsequent Wellness visit.     Health Risk Assessment:   Patient rates overall health as good. Patient feels that their physical health rating is slightly better. Patient is satisfied with their life. Eyesight was rated as same. Hearing was rated as same. Patient feels that their emotional and mental health rating is same. Patients states they are sometimes angry. Patient states they are sometimes unusually tired/fatigued. Pain experienced in the last 7 days has been none. Patient states that he has experienced no weight loss or gain in last 6 months.     Fall Risk Screening:   In the past year, patient has experienced: no history of falling in past year      Home Safety:  Patient does not have trouble with stairs inside or outside of their home. Patient has working smoke alarms and has working carbon monoxide detector. Home safety hazards include: none.     Nutrition:   Current diet is Diabetic.     Medications:   Patient is not currently taking any over-the-counter supplements. Patient is not able to manage medications.     Activities of Daily Living (ADLs)/Instrumental Activities of Daily Living (IADLs):   Walk and transfer into and out of bed and chair?: Yes  Dress and groom yourself?: Yes    Bathe or shower yourself?: Yes    Feed yourself? Yes  Do your laundry/housekeeping?: No  Manage your money, pay your bills and track your expenses?: No  Make your own meals?: No    Do your own shopping?: No    Previous Hospitalizations:   Any hospitalizations or ED visits within the last 12 months?: No      Advance Care Planning:   Living will: Yes    Durable POA for healthcare: Yes    Advanced directive: Yes    Advanced directive  counseling given: Yes      Cognitive Screening:   Provider or family/friend/caregiver concerned regarding cognition?: No    PREVENTIVE SCREENINGS      Cardiovascular Screening:    General: Screening Not Indicated and History Lipid Disorder      Diabetes Screening:     General: Screening Not Indicated and History Diabetes      Prostate Cancer Screening:    General: Screening Not Indicated      Abdominal Aortic Aneurysm (AAA) Screening:    Risk factors include: tobacco use        Lung Cancer Screening:     General: Screening Not Indicated      Hepatitis C Screening:    General: Screening Current    Screening, Brief Intervention, and Referral to Treatment (SBIRT)    Screening      Single Item Drug Screening:  How often have you used an illegal drug (including marijuana) or a prescription medication for non-medical reasons in the past year? never    Single Item Drug Screen Score: 0  Interpretation: Negative screen for possible drug use disorder    Social Determinants of Health     Financial Resource Strain: Low Risk  (4/19/2024)    Overall Financial Resource Strain (CARDIA)     Difficulty of Paying Living Expenses: Not hard at all   Food Insecurity: No Food Insecurity (4/19/2024)    Hunger Vital Sign     Worried About Running Out of Food in the Last Year: Never true     Ran Out of Food in the Last Year: Never true   Transportation Needs: No Transportation Needs (4/19/2024)    PRAPARE - Transportation     Lack of Transportation (Medical): No     Lack of Transportation (Non-Medical): No   Housing Stability: Low Risk  (4/19/2024)    Housing Stability Vital Sign     Unable to Pay for Housing in the Last Year: No     Number of Times Moved in the Last Year: 1     Homeless in the Last Year: No   Utilities: Not At Risk (4/19/2024)    [facility] Utilities     Threatened with loss of utilities: No     No results found.    Objective     /66 (BP Location: Left arm, Patient Position: Sitting, Cuff Size: Standard)   Pulse 98   Temp  "98 °F (36.7 °C) (Temporal)   Resp 18   Ht 5' 9\" (1.753 m)   Wt 90.7 kg (200 lb)   SpO2 99%   BMI 29.53 kg/m²     Physical Exam  Vitals and nursing note reviewed.   Constitutional:       General: He is not in acute distress.     Appearance: Normal appearance. He is well-developed. He is not ill-appearing.   HENT:      Head: Normocephalic and atraumatic.      Nose: Nose normal.      Mouth/Throat:      Mouth: Mucous membranes are moist.   Eyes:      Conjunctiva/sclera: Conjunctivae normal.      Pupils: Pupils are equal, round, and reactive to light.   Cardiovascular:      Rate and Rhythm: Normal rate and regular rhythm.      Heart sounds: No murmur heard.     No friction rub. No gallop.   Pulmonary:      Effort: Pulmonary effort is normal. No respiratory distress.      Breath sounds: Normal breath sounds. No wheezing, rhonchi or rales.   Abdominal:      General: Bowel sounds are normal. There is no distension.      Palpations: Abdomen is soft.      Tenderness: There is no abdominal tenderness.   Musculoskeletal:         General: No swelling.      Cervical back: Neck supple.      Right lower leg: No edema.      Left lower leg: No edema.   Skin:     General: Skin is warm and dry.      Capillary Refill: Capillary refill takes less than 2 seconds.   Neurological:      General: No focal deficit present.      Mental Status: He is alert and oriented to person, place, and time.   Psychiatric:         Mood and Affect: Mood normal.         Behavior: Behavior normal.           "

## 2024-07-22 ENCOUNTER — HOSPITAL ENCOUNTER (EMERGENCY)
Facility: HOSPITAL | Age: 44
Discharge: HOME | End: 2024-07-22
Attending: EMERGENCY MEDICINE
Payer: MEDICARE

## 2024-07-22 VITALS
DIASTOLIC BLOOD PRESSURE: 86 MMHG | SYSTOLIC BLOOD PRESSURE: 135 MMHG | TEMPERATURE: 98.6 F | HEART RATE: 97 BPM | RESPIRATION RATE: 20 BRPM | OXYGEN SATURATION: 98 %

## 2024-07-22 DIAGNOSIS — K52.9 GASTROENTERITIS: Primary | ICD-10-CM

## 2024-07-22 LAB
ALBUMIN SERPL BCG-MCNC: 3.8 G/DL (ref 3.5–5)
ALP SERPL-CCNC: 64 U/L (ref 34–104)
ALT SERPL W P-5'-P-CCNC: 15 U/L (ref 7–52)
ANION GAP SERPL CALCULATED.3IONS-SCNC: 10 MMOL/L (ref 4–13)
AST SERPL W P-5'-P-CCNC: 17 U/L (ref 13–39)
BASOPHILS # BLD AUTO: 0.06 THOUSANDS/ÂΜL (ref 0–0.1)
BASOPHILS NFR BLD AUTO: 1 % (ref 0–1)
BILIRUB SERPL-MCNC: 0.34 MG/DL (ref 0.2–1)
BUN SERPL-MCNC: 12 MG/DL (ref 5–25)
CALCIUM SERPL-MCNC: 9 MG/DL (ref 8.4–10.2)
CHLORIDE SERPL-SCNC: 100 MMOL/L (ref 96–108)
CO2 SERPL-SCNC: 27 MMOL/L (ref 21–32)
CREAT SERPL-MCNC: 1.54 MG/DL (ref 0.6–1.3)
EOSINOPHIL # BLD AUTO: 0.43 THOUSAND/ÂΜL (ref 0–0.61)
EOSINOPHIL NFR BLD AUTO: 4 % (ref 0–6)
ERYTHROCYTE [DISTWIDTH] IN BLOOD BY AUTOMATED COUNT: 12.4 % (ref 11.6–15.1)
FLUAV RNA RESP QL NAA+PROBE: NEGATIVE
FLUBV RNA RESP QL NAA+PROBE: NEGATIVE
GFR SERPL CREATININE-BSD FRML MDRD: 54 ML/MIN/1.73SQ M
GLUCOSE SERPL-MCNC: 108 MG/DL (ref 65–140)
HCT VFR BLD AUTO: 44.2 % (ref 36.5–49.3)
HGB BLD-MCNC: 14.3 G/DL (ref 12–17)
HOLD SPECIMEN: NORMAL
IMM GRANULOCYTES # BLD AUTO: 0.06 THOUSAND/UL (ref 0–0.2)
IMM GRANULOCYTES NFR BLD AUTO: 1 % (ref 0–2)
LIPASE SERPL-CCNC: 50 U/L (ref 11–82)
LYMPHOCYTES # BLD AUTO: 3.2 THOUSANDS/ÂΜL (ref 0.6–4.47)
LYMPHOCYTES NFR BLD AUTO: 29 % (ref 14–44)
MCH RBC QN AUTO: 26.9 PG (ref 26.8–34.3)
MCHC RBC AUTO-ENTMCNC: 32.4 G/DL (ref 31.4–37.4)
MCV RBC AUTO: 83 FL (ref 82–98)
MONOCYTES # BLD AUTO: 0.93 THOUSAND/ÂΜL (ref 0.17–1.22)
MONOCYTES NFR BLD AUTO: 8 % (ref 4–12)
NEUTROPHILS # BLD AUTO: 6.54 THOUSANDS/ÂΜL (ref 1.85–7.62)
NEUTS SEG NFR BLD AUTO: 57 % (ref 43–75)
NRBC BLD AUTO-RTO: 0 /100 WBCS
PLATELET # BLD AUTO: 156 THOUSANDS/UL (ref 149–390)
PMV BLD AUTO: 9.5 FL (ref 8.9–12.7)
POTASSIUM SERPL-SCNC: 4.5 MMOL/L (ref 3.5–5.3)
PROT SERPL-MCNC: 6.3 G/DL (ref 6.4–8.4)
RBC # BLD AUTO: 5.32 MILLION/UL (ref 3.88–5.62)
RSV RNA RESP QL NAA+PROBE: NEGATIVE
SARS-COV-2 RNA RESP QL NAA+PROBE: NEGATIVE
SODIUM SERPL-SCNC: 137 MMOL/L (ref 135–147)
WBC # BLD AUTO: 11.22 THOUSAND/UL (ref 4.31–10.16)

## 2024-07-22 PROCEDURE — 36415 COLL VENOUS BLD VENIPUNCTURE: CPT

## 2024-07-22 PROCEDURE — 96374 THER/PROPH/DIAG INJ IV PUSH: CPT

## 2024-07-22 PROCEDURE — 0241U HB NFCT DS VIR RESP RNA 4 TRGT: CPT | Performed by: EMERGENCY MEDICINE

## 2024-07-22 PROCEDURE — 85025 COMPLETE CBC W/AUTO DIFF WBC: CPT | Performed by: EMERGENCY MEDICINE

## 2024-07-22 PROCEDURE — 80053 COMPREHEN METABOLIC PANEL: CPT | Performed by: EMERGENCY MEDICINE

## 2024-07-22 PROCEDURE — 83690 ASSAY OF LIPASE: CPT | Performed by: EMERGENCY MEDICINE

## 2024-07-22 PROCEDURE — 99283 EMERGENCY DEPT VISIT LOW MDM: CPT

## 2024-07-22 PROCEDURE — 99284 EMERGENCY DEPT VISIT MOD MDM: CPT | Performed by: EMERGENCY MEDICINE

## 2024-07-22 PROCEDURE — 96361 HYDRATE IV INFUSION ADD-ON: CPT

## 2024-07-22 RX ORDER — ONDANSETRON 4 MG/1
4 TABLET, ORALLY DISINTEGRATING ORAL EVERY 6 HOURS PRN
Qty: 28 TABLET | Refills: 0 | Status: SHIPPED | OUTPATIENT
Start: 2024-07-22 | End: 2024-07-29

## 2024-07-22 RX ORDER — ONDANSETRON 2 MG/ML
4 INJECTION INTRAMUSCULAR; INTRAVENOUS ONCE
Status: COMPLETED | OUTPATIENT
Start: 2024-07-22 | End: 2024-07-22

## 2024-07-22 RX ADMIN — SODIUM CHLORIDE 1000 ML: 0.9 INJECTION, SOLUTION INTRAVENOUS at 22:20

## 2024-07-22 RX ADMIN — ONDANSETRON 4 MG: 2 INJECTION INTRAMUSCULAR; INTRAVENOUS at 22:20

## 2024-07-23 DIAGNOSIS — E11.65 TYPE 2 DIABETES MELLITUS WITH HYPERGLYCEMIA, WITHOUT LONG-TERM CURRENT USE OF INSULIN (HCC): ICD-10-CM

## 2024-07-23 DIAGNOSIS — Z00.00 ANNUAL PHYSICAL EXAM: ICD-10-CM

## 2024-07-23 DIAGNOSIS — F25.0 SCHIZOAFFECTIVE DISORDER, BIPOLAR TYPE (HCC): Chronic | ICD-10-CM

## 2024-07-23 RX ORDER — LISINOPRIL 2.5 MG/1
2.5 TABLET ORAL DAILY
Qty: 31 TABLET | Refills: 5 | Status: SHIPPED | OUTPATIENT
Start: 2024-07-23

## 2024-07-23 RX ORDER — VITAMIN E 268 MG
400 CAPSULE ORAL DAILY
Qty: 30 CAPSULE | Refills: 5 | Status: SHIPPED | OUTPATIENT
Start: 2024-07-23

## 2024-07-23 RX ORDER — LORATADINE 10 MG/1
10 TABLET ORAL DAILY
Qty: 30 TABLET | Refills: 0 | Status: SHIPPED | OUTPATIENT
Start: 2024-07-23 | End: 2025-01-19
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
## 2024-07-24 ENCOUNTER — OFFICE VISIT (OUTPATIENT)
Dept: GASTROENTEROLOGY | Facility: AMBULARY SURGERY CENTER | Age: 44
End: 2024-07-24
Payer: MEDICARE

## 2024-07-24 VITALS
SYSTOLIC BLOOD PRESSURE: 104 MMHG | HEART RATE: 81 BPM | OXYGEN SATURATION: 98 % | DIASTOLIC BLOOD PRESSURE: 68 MMHG | HEIGHT: 69 IN | BODY MASS INDEX: 28.76 KG/M2 | WEIGHT: 194.2 LBS

## 2024-07-24 DIAGNOSIS — R10.84 GENERALIZED ABDOMINAL PAIN: ICD-10-CM

## 2024-07-24 PROCEDURE — 99213 OFFICE O/P EST LOW 20 MIN: CPT | Performed by: INTERNAL MEDICINE

## 2024-07-24 RX ORDER — FAMOTIDINE 40 MG/1
40 TABLET, FILM COATED ORAL
Qty: 30 TABLET | Refills: 6 | Status: SHIPPED | OUTPATIENT
Start: 2024-07-24

## 2024-07-25 ENCOUNTER — NURSE TRIAGE (OUTPATIENT)
Age: 44
End: 2024-07-25

## 2024-07-25 ENCOUNTER — OFFICE VISIT (OUTPATIENT)
Dept: URGENT CARE | Age: 44
End: 2024-07-25
Payer: MEDICARE

## 2024-07-25 VITALS
WEIGHT: 194 LBS | OXYGEN SATURATION: 98 % | HEART RATE: 102 BPM | DIASTOLIC BLOOD PRESSURE: 68 MMHG | RESPIRATION RATE: 22 BRPM | BODY MASS INDEX: 28.65 KG/M2 | TEMPERATURE: 97.2 F | SYSTOLIC BLOOD PRESSURE: 103 MMHG

## 2024-07-25 DIAGNOSIS — R05.1 ACUTE COUGH: Primary | ICD-10-CM

## 2024-07-25 DIAGNOSIS — J02.9 SORE THROAT: ICD-10-CM

## 2024-07-25 PROCEDURE — 99213 OFFICE O/P EST LOW 20 MIN: CPT

## 2024-07-25 PROCEDURE — G0463 HOSPITAL OUTPT CLINIC VISIT: HCPCS

## 2024-07-25 RX ORDER — SITAGLIPTIN 100 MG/1
TABLET, FILM COATED ORAL
COMMUNITY
Start: 2024-06-28
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
## 2024-07-25 NOTE — TELEPHONE ENCOUNTER
"[facility] pharmacy calling to clarify the Pepcid script that was sent to pharmacy, they received 2 scripts verified that the script we have on file is for Pepcid 40 mg I tab by mouth daily at bedtime, pharmacy verbalized understanding, no further review  Reason for Disposition   Information only question and nurse able to answer    Answer Assessment - Initial Assessment Questions  1. REASON FOR CALL or QUESTION: \"What is your reason for calling today?\" or \"How can I best help you?\" or \"What question do you have that I can help answer?\"          [facility] pharmacy calling to verify Pepcid order sent to pharmacy    Protocols used: Information Only Call - No Triage-ADULT-[place]    "
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
## 2024-07-29 NOTE — ED ATTENDING ATTESTATION
7/22/2024  I, Romain Johnson MD, saw and evaluated the patient. I have discussed the patient with the resident/non-physician practitioner and agree with the resident's/non-physician practitioner's findings, Plan of Care, and MDM as documented in the resident's/non-physician practitioner's note, except where noted. All available labs and Radiology studies were reviewed.  I was present for key portions of any procedure(s) performed by the resident/non-physician practitioner and I was immediately available to provide assistance.       At this point I agree with the current assessment done in the Emergency Department.  I have conducted an independent evaluation of this patient a history and physical is as follows:see h and p above-     ED Course         Critical Care Time  Procedures

## 2024-07-31 ENCOUNTER — TELEPHONE (OUTPATIENT)
Dept: FAMILY MEDICINE CLINIC | Facility: CLINIC | Age: 44
End: 2024-07-31

## 2024-07-31 ENCOUNTER — OFFICE VISIT (OUTPATIENT)
Dept: FAMILY MEDICINE CLINIC | Facility: CLINIC | Age: 44
End: 2024-07-31

## 2024-07-31 VITALS
HEIGHT: 69 IN | WEIGHT: 195.6 LBS | HEART RATE: 79 BPM | TEMPERATURE: 98 F | DIASTOLIC BLOOD PRESSURE: 74 MMHG | RESPIRATION RATE: 20 BRPM | BODY MASS INDEX: 28.97 KG/M2 | OXYGEN SATURATION: 99 % | SYSTOLIC BLOOD PRESSURE: 105 MMHG

## 2024-07-31 DIAGNOSIS — M79.642 HAND PAIN, LEFT: ICD-10-CM

## 2024-07-31 DIAGNOSIS — E11.9 TYPE 2 DIABETES MELLITUS WITHOUT COMPLICATION, WITHOUT LONG-TERM CURRENT USE OF INSULIN (HCC): Primary | ICD-10-CM

## 2024-07-31 DIAGNOSIS — Z02.83 ENCOUNTER FOR DRUG SCREENING: Primary | ICD-10-CM

## 2024-07-31 PROCEDURE — 99214 OFFICE O/P EST MOD 30 MIN: CPT | Performed by: FAMILY MEDICINE

## 2024-07-31 PROCEDURE — G2211 COMPLEX E/M VISIT ADD ON: HCPCS | Performed by: FAMILY MEDICINE

## 2024-07-31 RX ORDER — GLIMEPIRIDE 2 MG/1
2 TABLET ORAL
Qty: 30 TABLET | Refills: 3 | Status: SHIPPED | OUTPATIENT
Start: 2024-07-31 | End: 2025-01-27

## 2024-07-31 NOTE — PROGRESS NOTES
Ambulatory Visit  Name: Sandor Ratliff      : 1980      MRN: [id number]  Encounter Provider: Garland Allen DO  Encounter Date: 2024   Encounter department: [facility]    Assessment & Plan   1. Type 2 diabetes mellitus without complication, without long-term current use of insulin ([facility])  Assessment & Plan:  HbA1c is 7.7 which has increased from 7.2 in March. Pt is currently taking metformin, empagliflozin, and Januvia. Counseled pt on decreasing sugar intake by limiting candy and sodas. Concerned due to large jump in HbA1c and pt will not be able to adequately follow these directions due to intellectual disability.   Lab Results   Component Value Date    HGBA1C 7.7 (H) 2024     Plan:  -Add glimepiride 2mg to diabetes regimen  -F/u in 3 months to re-check Hba1c  -Counseled pt to decrease sugar intake and switch out candy for vegetables   Orders:  -     glimepiride (AMARYL) 2 mg tablet; Take 1 tablet (2 mg total) by mouth daily with breakfast  2. Hand pain, left  Assessment & Plan:  Pain in the left hand has completely resolved. ROM is normal    Plan:  - F/u as needed if hand pain arises  - Conservative treatment PRN       History of Present Illness     Pt is a 43 yr old male who is accompanied by caretaker from group home and is here for f/u on left hand pain. Hand pain has completely resolved. Patient has no complaints at this time. Reviewed blood work with patient. Pt says diet consists of salmon and pizza for dinner. He frequently eats candy and drinks about 7 sodas a week. Pt says he rides the bike and lifts weights for exercise.         Review of Systems   Constitutional:  Negative for chills and fever.   HENT:  Negative for ear pain and sore throat.    Eyes:  Negative for pain and visual disturbance.   Respiratory:  Negative for cough and shortness of breath.    Cardiovascular:  Negative for chest pain and palpitations.   Gastrointestinal:  Negative  "for abdominal pain and vomiting.   Endocrine: Negative for polydipsia and polyuria.   Genitourinary:  Negative for dysuria and hematuria.   Musculoskeletal:  Negative for arthralgias and back pain.   Skin:  Negative for color change and rash.   Neurological:  Negative for seizures and syncope.   All other systems reviewed and are negative.      Objective     /74   Pulse 79   Temp 98 °F (36.7 °C) (Temporal)   Resp 20   Ht 5' 9\" (1.753 m)   Wt 88.7 kg (195 lb 9.6 oz)   SpO2 99%   BMI 28.89 kg/m²     Physical Exam  Vitals and nursing note reviewed.   Constitutional:       General: He is not in acute distress.     Appearance: He is well-developed.   HENT:      Head: Normocephalic and atraumatic.      Right Ear: Tympanic membrane normal.      Left Ear: Tympanic membrane normal.      Mouth/Throat:      Mouth: Mucous membranes are moist.      Pharynx: Oropharynx is clear.   Eyes:      Conjunctiva/sclera: Conjunctivae normal.   Cardiovascular:      Rate and Rhythm: Normal rate and regular rhythm.      Heart sounds: No murmur heard.  Pulmonary:      Effort: Pulmonary effort is normal. No respiratory distress.      Breath sounds: Normal breath sounds.   Abdominal:      General: Abdomen is flat. Bowel sounds are normal.      Palpations: Abdomen is soft.      Tenderness: There is no abdominal tenderness.   Musculoskeletal:         General: No swelling, tenderness or deformity.      Cervical back: Neck supple.   Skin:     General: Skin is warm and dry.      Capillary Refill: Capillary refill takes less than 2 seconds.   Neurological:      Mental Status: He is alert.   Psychiatric:         Mood and Affect: Mood normal.       Administrative Statements     "

## 2024-07-31 NOTE — ASSESSMENT & PLAN NOTE
HbA1c is 7.7 which has increased from 7.2 in March. Pt is currently taking metformin, empagliflozin, and Januvia. Counseled pt on decreasing sugar intake by limiting candy and sodas. Concerned due to large jump in HbA1c and pt will not be able to adequately follow these directions due to intellectual disability.   Lab Results   Component Value Date    HGBA1C 7.7 (H) 06/17/2024     Plan:  -Add glimepiride 2mg to diabetes regimen  -F/u in 3 months to re-check Hba1c  -Counseled pt to decrease sugar intake and switch out candy for vegetables

## 2024-07-31 NOTE — ASSESSMENT & PLAN NOTE
Pain in the left hand has completely resolved. ROM is normal    Plan:  - F/u as needed if hand pain arises  - Conservative treatment PRN   Zyclara Pregnancy And Lactation Text: This medication is Pregnancy Category C. It is unknown if this medication is excreted in breast milk.

## 2024-08-01 ENCOUNTER — APPOINTMENT (OUTPATIENT)
Dept: LAB | Facility: CLINIC | Age: 44
End: 2024-08-01
Payer: MEDICARE

## 2024-08-01 ENCOUNTER — TELEPHONE (OUTPATIENT)
Dept: FAMILY MEDICINE CLINIC | Facility: CLINIC | Age: 44
End: 2024-08-01

## 2024-08-01 DIAGNOSIS — R11.2 ACUTE NAUSEA WITH NONBILIOUS VOMITING: ICD-10-CM

## 2024-08-01 DIAGNOSIS — Z02.83 ENCOUNTER FOR DRUG SCREENING: ICD-10-CM

## 2024-08-01 LAB
EST. AVERAGE GLUCOSE BLD GHB EST-MCNC: 160 MG/DL
HBA1C MFR BLD: 7.2 %

## 2024-08-01 NOTE — TELEPHONE ENCOUNTER
To be completed by: Dr. Goel    Form: [employer]    Fax: [phone]    Folder: [place]

## 2024-08-06 ENCOUNTER — TELEPHONE (OUTPATIENT)
Dept: FAMILY MEDICINE CLINIC | Facility: CLINIC | Age: 44
End: 2024-08-06

## 2024-08-06 NOTE — TELEPHONE ENCOUNTER
Signature requested     Dr Goel    [employer]   Requesting Auth for PT to eat certain foods    [place] folder    Fax [phone]

## 2024-08-08 ENCOUNTER — APPOINTMENT (OUTPATIENT)
Dept: LAB | Facility: CLINIC | Age: 44
End: 2024-08-08
Payer: MEDICARE

## 2024-08-08 DIAGNOSIS — Z02.83 ENCOUNTER FOR DRUG SCREENING: Primary | ICD-10-CM

## 2024-08-08 PROCEDURE — 80307 DRUG TEST PRSMV CHEM ANLYZR: CPT

## 2024-08-12 LAB
6MAM UR QL SCN: NEGATIVE NG/ML
ACCEPTABLE CREAT UR QL: 42 MG/DL
AMPHET UR QL SCN: NEGATIVE NG/ML
BARBITURATES UR QL SCN: NEGATIVE NG/ML
BENZODIAZ UR QL SCN: NEGATIVE NG/ML
BUPRENORPHINE UR QL CFM: NEGATIVE NG/ML
CANNABINOIDS UR QL SCN: NEGATIVE NG/ML
CARISOPRODOL UR QL: NEGATIVE NG/ML
COCAINE+BZE UR QL SCN: NEGATIVE NG/ML
ETHYL GLUCURONIDE UR QL SCN: NEGATIVE NG/ML
FENTANYL UR QL SCN: NEGATIVE NG/ML
GABAPENTIN SERPLBLD QL SCN: NEGATIVE UG/ML
METHADONE UR QL SCN: NEGATIVE NG/ML
NITRITE UR QL STRIP: NEGATIVE UG/ML
OPIATES UR QL SCN: NEGATIVE NG/ML
OXYCODONE+OXYMORPHONE UR QL SCN: NEGATIVE NG/ML
PCP UR QL SCN: NEGATIVE NG/ML
PROPOXYPH UR QL SCN: NEGATIVE NG/ML
SPECIMEN PH ACCEPTABLE UR: 6.4 (ref 4.5–8.9)
TAPENTADOL UR QL SCN: NEGATIVE NG/ML
TRAMADOL UR QL SCN: NEGATIVE NG/ML

## 2024-08-14 DIAGNOSIS — K59.00 CONSTIPATION, UNSPECIFIED CONSTIPATION TYPE: ICD-10-CM

## 2024-08-15 RX ORDER — ASPIRIN 81 MG
2 TABLET, DELAYED RELEASE (ENTERIC COATED) ORAL DAILY
Qty: 62 TABLET | Refills: 5 | Status: SHIPPED | OUTPATIENT
Start: 2024-08-15

## 2024-08-21 ENCOUNTER — OFFICE VISIT (OUTPATIENT)
Dept: AUDIOLOGY | Age: 44
End: 2024-08-21
Payer: MEDICARE

## 2024-08-21 DIAGNOSIS — H90.3 SENSORY HEARING LOSS, BILATERAL: Primary | ICD-10-CM

## 2024-08-21 PROCEDURE — 92557 COMPREHENSIVE HEARING TEST: CPT | Performed by: AUDIOLOGIST

## 2024-08-21 PROCEDURE — 92567 TYMPANOMETRY: CPT | Performed by: AUDIOLOGIST

## 2024-08-21 NOTE — PROGRESS NOTES
Diagnostic Hearing Evaluation    Name:  Sandor Ratliff  :  1980  Age:  43 y.o.   MRN:  [id number]  Date of Evaluation: 24     HISTORY:     Reason for visit: Annual Hearing Test    Sandor Ratliff is being seen today at the request of Dr. Goel for an annual evaluation of hearing. The patient reports no concern over change in hearing. The patient  denies otalgia, dizziness, and tinnitus.     EVALUATION:    Otoscopic Evaluation:   Right Ear:  mild irritation in canal - patient reports peroxide use   Left Ear:  mild irritation in canal - patient reports peroxide use    Tympanometry:   Right Ear: Type A; normal middle ear pressure and static compliance    Left Ear: Type A; normal middle ear pressure and static compliance     Speech Audiometry:  Speech Reception (SRT)    Right Ear: 5 dB HL    Left Ear: 5 dB HL    Word Recognition Scores (WRS):  Right Ear: excellent (100 % correct)     Left Ear: excellent (100 % correct)    Stimuli: [id number]    Pure Tone Audiometry:  Conventional pure tone audiometry from 250 - 8000 Hz  was obtained with good reliability and revealed normal hearing through 4k Hz with mild high frequency hearing loss thereafter in each ear.    *see attached audiogram    IMPRESSIONS:   Stable mild high frequency hearing loss, bilaterally.    RECOMMENDATIONS:  Annual hearing eval, Return to [facility]. for F/U, and Copy to Patient/Caregiver    PATIENT EDUCATION:   The results of today's results and recommendations were reviewed with the patient and his caregiver. His hearing thresholds were explained at length. Treatment options, including amplification and communication strategies, were discussed as appropriate. The patient voiced understanding of his test results. Questions were addressed and the patient was encouraged to contact our department should concerns arise.      Catrachito Quintanilla  Clinical Audiologist  [facility]  [address]  [place]  [phone]

## 2024-08-26 DIAGNOSIS — E55.9 VITAMIN D DEFICIENCY: Primary | ICD-10-CM

## 2024-08-26 DIAGNOSIS — F25.0 SCHIZOAFFECTIVE DISORDER, BIPOLAR TYPE (HCC): Chronic | ICD-10-CM

## 2024-08-26 RX ORDER — LORATADINE 10 MG/1
10 TABLET ORAL DAILY
Qty: 90 TABLET | Refills: 3 | Status: SHIPPED | OUTPATIENT
Start: 2024-08-26

## 2024-08-26 RX ORDER — B-COMPLEX WITH VITAMIN C
1 TABLET ORAL
Qty: 90 TABLET | Refills: 3 | Status: SHIPPED | OUTPATIENT
Start: 2024-08-26

## 2024-09-01 ENCOUNTER — APPOINTMENT (EMERGENCY)
Dept: RADIOLOGY | Facility: HOSPITAL | Age: 44
End: 2024-09-01
Payer: MEDICARE

## 2024-09-01 ENCOUNTER — HOSPITAL ENCOUNTER (EMERGENCY)
Facility: HOSPITAL | Age: 44
Discharge: HOME | End: 2024-09-01
Attending: EMERGENCY MEDICINE
Payer: MEDICARE

## 2024-09-01 VITALS
RESPIRATION RATE: 20 BRPM | TEMPERATURE: 97.5 F | OXYGEN SATURATION: 98 % | DIASTOLIC BLOOD PRESSURE: 84 MMHG | SYSTOLIC BLOOD PRESSURE: 127 MMHG | HEART RATE: 90 BPM

## 2024-09-01 DIAGNOSIS — R42 LIGHTHEADEDNESS: ICD-10-CM

## 2024-09-01 DIAGNOSIS — S89.90XA KNEE INJURY: ICD-10-CM
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
## 2024-09-01 DIAGNOSIS — E11.65 TYPE 2 DIABETES MELLITUS WITH HYPERGLYCEMIA, WITHOUT LONG-TERM CURRENT USE OF INSULIN (HCC): ICD-10-CM

## 2024-09-01 LAB
ANION GAP SERPL CALCULATED.3IONS-SCNC: 5 MMOL/L (ref 4–13)
ATRIAL RATE: 80 BPM
BUN SERPL-MCNC: 21 MG/DL (ref 5–25)
CALCIUM SERPL-MCNC: 8.3 MG/DL (ref 8.4–10.2)
CHLORIDE SERPL-SCNC: 102 MMOL/L (ref 96–108)
CO2 SERPL-SCNC: 30 MMOL/L (ref 21–32)
CREAT SERPL-MCNC: 1.5 MG/DL (ref 0.6–1.3)
GFR SERPL CREATININE-BSD FRML MDRD: 56 ML/MIN/1.73SQ M
GLUCOSE SERPL-MCNC: 159 MG/DL (ref 65–140)
GLUCOSE SERPL-MCNC: 184 MG/DL (ref 65–140)
P AXIS: 51 DEGREES
POTASSIUM SERPL-SCNC: 4.4 MMOL/L (ref 3.5–5.3)
PR INTERVAL: 176 MS
QRS AXIS: -19 DEGREES
QRSD INTERVAL: 94 MS
QT INTERVAL: 390 MS
QTC INTERVAL: 449 MS
SODIUM SERPL-SCNC: 137 MMOL/L (ref 135–147)
T WAVE AXIS: 37 DEGREES
VENTRICULAR RATE: 80 BPM

## 2024-09-01 PROCEDURE — 93010 ELECTROCARDIOGRAM REPORT: CPT | Performed by: INTERNAL MEDICINE

## 2024-09-01 PROCEDURE — 96360 HYDRATION IV INFUSION INIT: CPT

## 2024-09-01 PROCEDURE — 82948 REAGENT STRIP/BLOOD GLUCOSE: CPT

## 2024-09-01 PROCEDURE — 36415 COLL VENOUS BLD VENIPUNCTURE: CPT | Performed by: EMERGENCY MEDICINE

## 2024-09-01 PROCEDURE — 73564 X-RAY EXAM KNEE 4 OR MORE: CPT

## 2024-09-01 PROCEDURE — 99284 EMERGENCY DEPT VISIT MOD MDM: CPT

## 2024-09-01 PROCEDURE — 80048 BASIC METABOLIC PNL TOTAL CA: CPT | Performed by: EMERGENCY MEDICINE

## 2024-09-01 PROCEDURE — 99284 EMERGENCY DEPT VISIT MOD MDM: CPT | Performed by: EMERGENCY MEDICINE

## 2024-09-01 PROCEDURE — 93005 ELECTROCARDIOGRAM TRACING: CPT

## 2024-09-01 RX ORDER — CALCIUM CITRATE/VITAMIN D3 200MG-6.25
1 TABLET ORAL 2 TIMES WEEKLY
Qty: 50 STRIP | Refills: 0 | Status: SHIPPED | OUTPATIENT
Start: 2024-09-02

## 2024-09-01 RX ADMIN — SODIUM CHLORIDE 1000 ML: 0.9 INJECTION, SOLUTION INTRAVENOUS at 07:51
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
## 2024-09-02 ENCOUNTER — HOSPITAL ENCOUNTER (EMERGENCY)
Facility: HOSPITAL | Age: 44
Discharge: HOME | End: 2024-09-03
Attending: EMERGENCY MEDICINE
Payer: MEDICARE

## 2024-09-02 VITALS
HEART RATE: 91 BPM | SYSTOLIC BLOOD PRESSURE: 133 MMHG | DIASTOLIC BLOOD PRESSURE: 86 MMHG | OXYGEN SATURATION: 98 % | TEMPERATURE: 97.9 F | RESPIRATION RATE: 20 BRPM

## 2024-09-02 DIAGNOSIS — F41.9 ANXIETY: Primary | ICD-10-CM

## 2024-09-02 PROCEDURE — 99283 EMERGENCY DEPT VISIT LOW MDM: CPT

## 2024-09-03 ENCOUNTER — APPOINTMENT (EMERGENCY)
Dept: RADIOLOGY | Facility: HOSPITAL | Age: 44
End: 2024-09-03
Payer: MEDICARE

## 2024-09-03 VITALS
DIASTOLIC BLOOD PRESSURE: 73 MMHG | HEART RATE: 91 BPM | SYSTOLIC BLOOD PRESSURE: 107 MMHG | RESPIRATION RATE: 20 BRPM | TEMPERATURE: 97.8 F | OXYGEN SATURATION: 97 %

## 2024-09-03 DIAGNOSIS — R45.1 AGITATION: Primary | ICD-10-CM

## 2024-09-03 DIAGNOSIS — F51.05 INSOMNIA DUE TO OTHER MENTAL DISORDER: ICD-10-CM

## 2024-09-03 DIAGNOSIS — F99 INSOMNIA DUE TO OTHER MENTAL DISORDER: ICD-10-CM

## 2024-09-03 DIAGNOSIS — F41.9 ANXIETY: ICD-10-CM

## 2024-09-03 LAB
ALBUMIN SERPL BCG-MCNC: 3.7 G/DL (ref 3.5–5)
ALP SERPL-CCNC: 63 U/L (ref 34–104)
ALT SERPL W P-5'-P-CCNC: 21 U/L (ref 7–52)
AMMONIA PLAS-SCNC: 27 UMOL/L (ref 18–72)
ANION GAP SERPL CALCULATED.3IONS-SCNC: 6 MMOL/L (ref 4–13)
AST SERPL W P-5'-P-CCNC: 15 U/L (ref 13–39)
BASOPHILS # BLD AUTO: 0.05 THOUSANDS/ÂΜL (ref 0–0.1)
BASOPHILS NFR BLD AUTO: 1 % (ref 0–1)
BILIRUB SERPL-MCNC: 0.31 MG/DL (ref 0.2–1)
BUN SERPL-MCNC: 18 MG/DL (ref 5–25)
CALCIUM SERPL-MCNC: 8.7 MG/DL (ref 8.4–10.2)
CHLORIDE SERPL-SCNC: 102 MMOL/L (ref 96–108)
CO2 SERPL-SCNC: 29 MMOL/L (ref 21–32)
CREAT SERPL-MCNC: 1.63 MG/DL (ref 0.6–1.3)
EOSINOPHIL # BLD AUTO: 0.5 THOUSAND/ÂΜL (ref 0–0.61)
EOSINOPHIL NFR BLD AUTO: 6 % (ref 0–6)
ERYTHROCYTE [DISTWIDTH] IN BLOOD BY AUTOMATED COUNT: 12.5 % (ref 11.6–15.1)
GFR SERPL CREATININE-BSD FRML MDRD: 51 ML/MIN/1.73SQ M
GLUCOSE SERPL-MCNC: 166 MG/DL (ref 65–140)
HCT VFR BLD AUTO: 44.2 % (ref 36.5–49.3)
HGB BLD-MCNC: 14 G/DL (ref 12–17)
IMM GRANULOCYTES # BLD AUTO: 0.06 THOUSAND/UL (ref 0–0.2)
IMM GRANULOCYTES NFR BLD AUTO: 1 % (ref 0–2)
LYMPHOCYTES # BLD AUTO: 2.03 THOUSANDS/ÂΜL (ref 0.6–4.47)
LYMPHOCYTES NFR BLD AUTO: 23 % (ref 14–44)
MCH RBC QN AUTO: 26.7 PG (ref 26.8–34.3)
MCHC RBC AUTO-ENTMCNC: 31.7 G/DL (ref 31.4–37.4)
MCV RBC AUTO: 84 FL (ref 82–98)
MONOCYTES # BLD AUTO: 1.03 THOUSAND/ÂΜL (ref 0.17–1.22)
MONOCYTES NFR BLD AUTO: 12 % (ref 4–12)
NEUTROPHILS # BLD AUTO: 5.13 THOUSANDS/ÂΜL (ref 1.85–7.62)
NEUTS SEG NFR BLD AUTO: 57 % (ref 43–75)
NRBC BLD AUTO-RTO: 0 /100 WBCS
PLATELET # BLD AUTO: 145 THOUSANDS/UL (ref 149–390)
PMV BLD AUTO: 9.7 FL (ref 8.9–12.7)
POTASSIUM SERPL-SCNC: 4.4 MMOL/L (ref 3.5–5.3)
PROT SERPL-MCNC: 6.5 G/DL (ref 6.4–8.4)
RBC # BLD AUTO: 5.24 MILLION/UL (ref 3.88–5.62)
SODIUM SERPL-SCNC: 137 MMOL/L (ref 135–147)
VALPROATE SERPL-MCNC: 37 UG/ML (ref 50–100)
WBC # BLD AUTO: 8.8 THOUSAND/UL (ref 4.31–10.16)

## 2024-09-03 PROCEDURE — 99285 EMERGENCY DEPT VISIT HI MDM: CPT | Performed by: EMERGENCY MEDICINE

## 2024-09-03 PROCEDURE — 80053 COMPREHEN METABOLIC PANEL: CPT

## 2024-09-03 PROCEDURE — 99284 EMERGENCY DEPT VISIT MOD MDM: CPT | Performed by: EMERGENCY MEDICINE

## 2024-09-03 PROCEDURE — 99285 EMERGENCY DEPT VISIT HI MDM: CPT

## 2024-09-03 PROCEDURE — 96372 THER/PROPH/DIAG INJ SC/IM: CPT

## 2024-09-03 PROCEDURE — 82140 ASSAY OF AMMONIA: CPT

## 2024-09-03 PROCEDURE — 85025 COMPLETE CBC W/AUTO DIFF WBC: CPT

## 2024-09-03 PROCEDURE — 80164 ASSAY DIPROPYLACETIC ACD TOT: CPT

## 2024-09-03 PROCEDURE — 36415 COLL VENOUS BLD VENIPUNCTURE: CPT

## 2024-09-03 PROCEDURE — 73110 X-RAY EXAM OF WRIST: CPT

## 2024-09-03 RX ORDER — OLANZAPINE 5 MG/1
10 TABLET, ORALLY DISINTEGRATING ORAL ONCE
Status: DISCONTINUED | OUTPATIENT
Start: 2024-09-03 | End: 2024-09-03

## 2024-09-03 RX ORDER — OLANZAPINE 10 MG/2ML
10 INJECTION, POWDER, FOR SOLUTION INTRAMUSCULAR ONCE
Status: COMPLETED | OUTPATIENT
Start: 2024-09-03 | End: 2024-09-03

## 2024-09-03 RX ORDER — OLANZAPINE 5 MG/1
5 TABLET, ORALLY DISINTEGRATING ORAL ONCE
Status: COMPLETED | OUTPATIENT
Start: 2024-09-03 | End: 2024-09-03

## 2024-09-03 RX ORDER — WATER 10 ML/10ML
INJECTION INTRAMUSCULAR; INTRAVENOUS; SUBCUTANEOUS
Status: COMPLETED
Start: 2024-09-03 | End: 2024-09-03

## 2024-09-03 RX ADMIN — OLANZAPINE 5 MG: 5 TABLET, ORALLY DISINTEGRATING ORAL at 00:13

## 2024-09-03 RX ADMIN — WATER 10 ML: 1 INJECTION INTRAMUSCULAR; INTRAVENOUS; SUBCUTANEOUS at 16:01

## 2024-09-03 RX ADMIN — OLANZAPINE 10 MG: 10 INJECTION, POWDER, FOR SOLUTION INTRAMUSCULAR at 16:01
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
## 2024-09-03 NOTE — DISCHARGE INSTRUCTIONS
Take the as needed dose of Zyprexa tomorrow morning.    Please call your psychiatrist tomorrow for follow-up on today's visit.    Please ice and elevate your left wrist for the next 3 days.
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
## 2024-09-03 NOTE — ED NOTES
Per group home staff, patient already took PRN zyprexa although did not inform the provider. RN informed provider, provider confirmed 5mg dose is still ok to take at this time.      Carlyn Dinh RN  09/03/24 0012
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
## 2024-09-06 ENCOUNTER — HOSPITAL ENCOUNTER (EMERGENCY)
Facility: HOSPITAL | Age: 44
Discharge: HOME | End: 2024-09-06
Attending: EMERGENCY MEDICINE
Payer: MEDICARE

## 2024-09-06 ENCOUNTER — APPOINTMENT (EMERGENCY)
Dept: CT IMAGING | Facility: HOSPITAL | Age: 44
End: 2024-09-06
Payer: MEDICARE

## 2024-09-06 ENCOUNTER — APPOINTMENT (EMERGENCY)
Dept: RADIOLOGY | Facility: HOSPITAL | Age: 44
End: 2024-09-06
Payer: MEDICARE

## 2024-09-06 VITALS
OXYGEN SATURATION: 96 % | TEMPERATURE: 97.5 F | HEART RATE: 87 BPM | RESPIRATION RATE: 18 BRPM | BODY MASS INDEX: 29.49 KG/M2 | HEIGHT: 69 IN | SYSTOLIC BLOOD PRESSURE: 118 MMHG | WEIGHT: 199.08 LBS | DIASTOLIC BLOOD PRESSURE: 72 MMHG

## 2024-09-06 DIAGNOSIS — S09.90XA MINOR HEAD INJURY WITHOUT LOSS OF CONSCIOUSNESS, INITIAL ENCOUNTER: Primary | ICD-10-CM
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
## 2024-09-06 DIAGNOSIS — M79.604 RIGHT LEG PAIN: ICD-10-CM

## 2024-09-06 PROCEDURE — 73502 X-RAY EXAM HIP UNI 2-3 VIEWS: CPT

## 2024-09-06 PROCEDURE — 70450 CT HEAD/BRAIN W/O DYE: CPT

## 2024-09-06 PROCEDURE — 99284 EMERGENCY DEPT VISIT MOD MDM: CPT | Performed by: EMERGENCY MEDICINE

## 2024-09-06 PROCEDURE — 99284 EMERGENCY DEPT VISIT MOD MDM: CPT

## 2024-09-06 RX ORDER — ACETAMINOPHEN 325 MG/1
650 TABLET ORAL ONCE
Status: COMPLETED | OUTPATIENT
Start: 2024-09-06 | End: 2024-09-06

## 2024-09-06 RX ADMIN — ACETAMINOPHEN 650 MG: 325 TABLET ORAL at 11:51
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
## 2024-09-16 ENCOUNTER — RA CDI HCC (OUTPATIENT)
Dept: OTHER | Facility: HOSPITAL | Age: 44
End: 2024-09-16

## 2024-09-16 PROBLEM — E11.22 TYPE 2 DIABETES MELLITUS WITH DIABETIC CHRONIC KIDNEY DISEASE (HCC): Status: ACTIVE | Noted: 2024-07-31

## 2024-09-16 PROBLEM — U07.1 COVID-19 VIRUS INFECTION: Status: RESOLVED | Noted: 2022-01-07 | Resolved: 2024-09-16

## 2024-09-16 PROBLEM — E11.22 TYPE 2 DIABETES MELLITUS WITH DIABETIC CHRONIC KIDNEY DISEASE (HCC): Status: ACTIVE | Noted: 2024-09-16

## 2024-09-16 NOTE — PROGRESS NOTES
HCC coding opportunities          Chart Reviewed number of suggestions sent to Provider: 2     Patients Insurance     Medicare Insurance: Medicare        E11.22  E11.42

## 2024-09-17 ENCOUNTER — TELEPHONE (OUTPATIENT)
Dept: NEPHROLOGY | Facility: CLINIC | Age: 44
End: 2024-09-17

## 2024-09-23 ENCOUNTER — OFFICE VISIT (OUTPATIENT)
Dept: FAMILY MEDICINE CLINIC | Facility: CLINIC | Age: 44
End: 2024-09-23

## 2024-09-23 ENCOUNTER — TELEPHONE (OUTPATIENT)
Dept: FAMILY MEDICINE CLINIC | Facility: CLINIC | Age: 44
End: 2024-09-23

## 2024-09-23 VITALS
OXYGEN SATURATION: 97 % | BODY MASS INDEX: 30.36 KG/M2 | RESPIRATION RATE: 20 BRPM | TEMPERATURE: 98.7 F | HEART RATE: 91 BPM | WEIGHT: 205 LBS | DIASTOLIC BLOOD PRESSURE: 76 MMHG | HEIGHT: 69 IN | SYSTOLIC BLOOD PRESSURE: 111 MMHG

## 2024-09-23 DIAGNOSIS — Z09 ENCOUNTER FOR FOLLOW-UP EXAMINATION: ICD-10-CM
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
## 2024-09-23 PROCEDURE — 99213 OFFICE O/P EST LOW 20 MIN: CPT | Performed by: FAMILY MEDICINE

## 2024-09-23 PROCEDURE — G2211 COMPLEX E/M VISIT ADD ON: HCPCS | Performed by: FAMILY MEDICINE
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
## 2024-09-23 NOTE — TELEPHONE ENCOUNTER
Signature required.    Folder (to be completed by): Dr. Goel    Name of Form: [facility] [place]      Color Folder: Pt brought to appt    Form to be faxed to: Pt will collect at appt
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
## 2024-10-12 ENCOUNTER — HOSPITAL ENCOUNTER (EMERGENCY)
Facility: HOSPITAL | Age: 44
Discharge: HOME | End: 2024-10-12
Attending: EMERGENCY MEDICINE
Payer: MEDICARE

## 2024-10-12 VITALS
TEMPERATURE: 97.9 F | OXYGEN SATURATION: 98 % | DIASTOLIC BLOOD PRESSURE: 92 MMHG | RESPIRATION RATE: 18 BRPM | SYSTOLIC BLOOD PRESSURE: 162 MMHG | HEART RATE: 84 BPM

## 2024-10-12 DIAGNOSIS — F41.9 ANXIETY: Primary | ICD-10-CM

## 2024-10-12 PROCEDURE — 99283 EMERGENCY DEPT VISIT LOW MDM: CPT | Performed by: EMERGENCY MEDICINE

## 2024-10-12 PROCEDURE — 99283 EMERGENCY DEPT VISIT LOW MDM: CPT

## 2024-10-12 NOTE — ED ATTENDING ATTESTATION
10/12/2024  IHope MD, saw and evaluated the patient. I have discussed the patient with the resident/non-physician practitioner and agree with the resident's/non-physician practitioner's findings, Plan of Care, and MDM as documented in the resident's/non-physician practitioner's note, except where noted. All available labs and Radiology studies were reviewed.  I was present for key portions of any procedure(s) performed by the resident/non-physician practitioner and I was immediately available to provide assistance.       At this point I agree with the current assessment done in the Emergency Department.  I have conducted an independent evaluation of this patient a history and physical is as follows:    44-year-old male with a history of schizoaffective disorder and anxiety presenting for evaluation of agitation and anxiety.  Patient states he was frustrated after a phone call with his mother and could not calm down at his group home.  After initial evaluation by the resident physician, patient states he feels better.  There were no reports of violent outburst.  Patient denies SI, HI, and hallucinations.  Denies other acute complaints.    Please see resident documentation for histories and review of systems.    Exam: Vital signs and nursing notes reviewed  General: Awake, alert, no acute distress  HEENT: Normocephalic, atraumatic, mucous membranes moist  Neck: Supple  Heart: Regular rate and rhythm  Lungs: Clear to auscultation bilaterally  Abdomen: Soft, nontender, nondistended  Extremities: No swelling or deformity  Skin: Warm, dry, intact, no rash  Neuro: No gross motor deficits  Psych: Pressured speech with normal thought content, mildly anxious but cooperative    ED course/Medical Decision Makin-year-old male presenting for evaluation of anxiety and agitation.  On my evaluation, patient is calm and cooperative.  Denies acute complaints.  States he feels much better.  Denies acute  physical complaints.  Do not feel he requires laboratory studies or imaging today.  Patient is at his baseline per caregiver from the group home.  Patient is appropriate for discharge home with outpatient follow-up.  Return precautions discussed.  Patient is in agreement and understanding of these instructions.    Diagnosis: Anxiety  Disposition: Discharge
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
## 2024-10-16 DIAGNOSIS — G44.209 TENSION HEADACHE: ICD-10-CM

## 2024-10-18 ENCOUNTER — OFFICE VISIT (OUTPATIENT)
Dept: FAMILY MEDICINE CLINIC | Facility: CLINIC | Age: 44
End: 2024-10-18

## 2024-10-18 VITALS
HEIGHT: 69 IN | SYSTOLIC BLOOD PRESSURE: 108 MMHG | OXYGEN SATURATION: 98 % | DIASTOLIC BLOOD PRESSURE: 78 MMHG | TEMPERATURE: 97.9 F | BODY MASS INDEX: 31.4 KG/M2 | WEIGHT: 212 LBS | HEART RATE: 92 BPM | RESPIRATION RATE: 18 BRPM

## 2024-10-18 DIAGNOSIS — F84.0 AUTISM SPECTRUM: ICD-10-CM

## 2024-10-18 DIAGNOSIS — Z23 FLU VACCINE NEED: Primary | ICD-10-CM

## 2024-10-18 RX ORDER — CHLORPROMAZINE HYDROCHLORIDE 100 MG/1
100 TABLET, FILM COATED ORAL 2 TIMES DAILY
COMMUNITY

## 2024-10-18 NOTE — PROGRESS NOTES
Ambulatory Visit  Name: Sandor Ratliff      : 1980      MRN: [id number]  Encounter Provider: Shahbaz Williamson MD  Encounter Date: 10/18/2024   Encounter department: [facility]    Assessment & Plan  Flu vaccine need  Patient request flu vaccine.  Flu vaccine provided.  Risks and benefits were discussed with patient and the aide both were in agreement.  Orders:    influenza vaccine, recombinant, PF, 0.5 mL IM (Flublok)    Autism spectrum  Patient was recently seen in the emergency department for anxiety as a listed reason at the time.  Now states that he wanted to be seen but did not have any symptoms at the time.  Workup at that time was negative.  He currently denies any symptoms of anxiety stress.  Will continue to monitor.            History of Present Illness     HPI Here to follow up from ED visit for anxiety. No current issues.  Patient denies any current anxiety symptoms he stated that he previously went into the emergency department because he felt like he was wanted to be seen 8 that was with him endorse that they brought him to the emergency department due to anxiety.  Flu shot provided      Review of Systems   Constitutional:  Negative for chills and fever.   HENT:  Negative for ear pain and sore throat.    Eyes:  Negative for pain and visual disturbance.   Respiratory:  Negative for cough and shortness of breath.    Cardiovascular:  Negative for chest pain and palpitations.   Gastrointestinal:  Negative for abdominal pain and vomiting.   Genitourinary:  Negative for dysuria and hematuria.   Musculoskeletal:  Negative for arthralgias and back pain.   Skin:  Negative for color change and rash.   Neurological:  Negative for seizures and syncope.   All other systems reviewed and are negative.          Objective     /78 (BP Location: Right arm, Patient Position: Sitting, Cuff Size: Standard)   Pulse 92   Temp 97.9 °F (36.6 °C) (Temporal)   Resp 18   Ht  "5' 9\" (1.753 m)   Wt 96.2 kg (212 lb)   SpO2 98%   BMI 31.31 kg/m²     Physical Exam  Constitutional:       Appearance: Normal appearance.      Comments: Rapid speech chelsea with occasional stuttering. Baseline for patient.    HENT:      Head: Normocephalic and atraumatic.      Nose: Nose normal.      Mouth/Throat:      Mouth: Mucous membranes are moist.   Cardiovascular:      Heart sounds: No murmur heard.     No friction rub. No gallop.   Pulmonary:      Effort: Pulmonary effort is normal.      Breath sounds: No wheezing, rhonchi or rales.   Musculoskeletal:      Right lower leg: No edema.      Left lower leg: No edema.   Skin:     General: Skin is warm and dry.      Findings: No bruising or lesion.   Neurological:      Mental Status: He is alert and oriented to person, place, and time.   Psychiatric:         Mood and Affect: Mood normal.         " altered GI function increased physiological demand for healing

## 2024-10-19 NOTE — ASSESSMENT & PLAN NOTE
Patient was recently seen in the emergency department for anxiety as a listed reason at the time.  Now states that he wanted to be seen but did not have any symptoms at the time.  Workup at that time was negative.  He currently denies any symptoms of anxiety stress.  Will continue to monitor.

## 2024-10-31 DIAGNOSIS — K59.00 CONSTIPATION, UNSPECIFIED CONSTIPATION TYPE: ICD-10-CM

## 2024-10-31 RX ORDER — POLYETHYLENE GLYCOL 3350 17 G/17G
17 POWDER, FOR SOLUTION ORAL DAILY PRN
Qty: 30 EACH | Refills: 3 | Status: SHIPPED | OUTPATIENT
Start: 2024-10-31

## 2024-11-01 ENCOUNTER — TELEPHONE (OUTPATIENT)
Dept: FAMILY MEDICINE CLINIC | Facility: CLINIC | Age: 44
End: 2024-11-01

## 2024-11-01 NOTE — TELEPHONE ENCOUNTER
[facility] Called for an Insurance Auth for the patient she stated that he has an apt for today,      TAX ID:[id number]  NPI:[id number]  [facility],  [phone]    [facility]

## 2024-11-04 ENCOUNTER — APPOINTMENT (OUTPATIENT)
Dept: LAB | Facility: CLINIC | Age: 44
End: 2024-11-04
Payer: MEDICARE

## 2024-11-04 DIAGNOSIS — N18.9 CHRONIC RENAL IMPAIRMENT, UNSPECIFIED CKD STAGE: ICD-10-CM

## 2024-11-04 LAB
ANION GAP SERPL CALCULATED.3IONS-SCNC: 7 MMOL/L (ref 4–13)
BUN SERPL-MCNC: 24 MG/DL (ref 5–25)
CALCIUM SERPL-MCNC: 9.1 MG/DL (ref 8.4–10.2)
CHLORIDE SERPL-SCNC: 100 MMOL/L (ref 96–108)
CO2 SERPL-SCNC: 28 MMOL/L (ref 21–32)
CREAT SERPL-MCNC: 1.67 MG/DL (ref 0.6–1.3)
GFR SERPL CREATININE-BSD FRML MDRD: 49 ML/MIN/1.73SQ M
GLUCOSE SERPL-MCNC: 154 MG/DL (ref 65–140)
POTASSIUM SERPL-SCNC: 4.8 MMOL/L (ref 3.5–5.3)
SODIUM SERPL-SCNC: 135 MMOL/L (ref 135–147)

## 2024-11-04 PROCEDURE — 80048 BASIC METABOLIC PNL TOTAL CA: CPT

## 2024-11-04 PROCEDURE — 36415 COLL VENOUS BLD VENIPUNCTURE: CPT

## 2024-11-08 ENCOUNTER — OFFICE VISIT (OUTPATIENT)
Dept: NEPHROLOGY | Facility: CLINIC | Age: 44
End: 2024-11-08
Payer: MEDICARE

## 2024-11-08 VITALS
HEIGHT: 69 IN | HEART RATE: 78 BPM | DIASTOLIC BLOOD PRESSURE: 72 MMHG | BODY MASS INDEX: 31.31 KG/M2 | SYSTOLIC BLOOD PRESSURE: 120 MMHG

## 2024-11-08 DIAGNOSIS — N18.9 CHRONIC RENAL IMPAIRMENT, UNSPECIFIED CKD STAGE: Primary | ICD-10-CM

## 2024-11-08 PROBLEM — N18.2 CKD (CHRONIC KIDNEY DISEASE) STAGE 2, GFR 60-89 ML/MIN: Status: RESOLVED | Noted: 2017-12-06 | Resolved: 2024-11-08

## 2024-11-08 PROCEDURE — 99214 OFFICE O/P EST MOD 30 MIN: CPT | Performed by: INTERNAL MEDICINE

## 2024-11-08 NOTE — PROGRESS NOTES
NEPHROLOGY PROGRESS NOTE    Sandor Ratliff 44 y.o. male MRN: [id number]  Unit/Bed#:  Encounter: [id number]  Reason for Consult: Chronic kidney disease    Patient is here for routine follow-up he looks well he is doing well states that his health has been unchanged.  He did have a reduction in some of his psychiatric medications as he was a little tired this was just done yesterday.  Otherwise no acute changes or complaints for me.    ASSESSMENT/PLAN:    1.  Renal    Patient is chronic kidney disease latest creatinine is 1.6 which is in baseline range without progression over a couple years.  In the past no significant proteinuria.  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  So at this point with blood pressure control routine health management he should have a good prognosis but of course needs to be monitored because he is very young.    Suspect the patient has some mild nephrosclerosis and at this point just continue current medications and we will monitor every 6 months with blood pressure and routine health management.    I have spent a total time of 30 minutes in caring for this patient on the day of the visit/encounter including Diagnostic results, Prognosis, Reviewing / ordering tests, medicine, procedures  , and Obtaining or reviewing history  .     I told him to contact me if there is any problems or concerns before next visit.    SUBJECTIVE:  Review of Systems   Constitutional: Negative for chills, diaphoresis and fever.   HENT: Negative.     Eyes: Negative.    Cardiovascular:  Negative for chest pain, dyspnea on exertion, leg swelling and orthopnea.   Respiratory:  Negative for cough, shortness of breath and sputum production.    Gastrointestinal:  Negative for abdominal pain, diarrhea, nausea and vomiting.   Genitourinary:  Negative for dysuria and incomplete emptying.  "  Neurological:  Negative for dizziness and headaches.   Psychiatric/Behavioral:  Negative for altered mental status.        OBJECTIVE:  Current Weight:    Vitals:@TMAX(24)@Current:     Blood pressure 120/72, pulse 78, height 5' 9\" (1.753 m). , Body mass index is 31.31 kg/m².    [unfilled]    Physical Exam: /72 (BP Location: Left arm, Patient Position: Sitting, Cuff Size: Standard)   Pulse 78   Ht 5' 9\" (1.753 m)   BMI 31.31 kg/m²   Physical Exam  Constitutional:       General: He is not in acute distress.     Appearance: He is not toxic-appearing.   HENT:      Head: Normocephalic and atraumatic.      Nose: Nose normal.      Mouth/Throat:      Mouth: Mucous membranes are dry.   Eyes:      General: No scleral icterus.     Extraocular Movements: Extraocular movements intact.   Cardiovascular:      Rate and Rhythm: Normal rate and regular rhythm.      Heart sounds:      No gallop.   Pulmonary:      Effort: Pulmonary effort is normal. No respiratory distress.      Breath sounds: No wheezing or rales.   Abdominal:      General: Bowel sounds are normal. There is no distension.      Palpations: Abdomen is soft.      Tenderness: There is no abdominal tenderness.   Neurological:      Mental Status: He is alert and oriented to person, place, and time.   Psychiatric:         Mood and Affect: Mood normal.         Medications:    Current Outpatient Medications:     ARIPiprazole (ABILIFY) 10 mg tablet, Take 10 mg by mouth daily, Disp: , Rfl:     ARIPiprazole (ABILIFY) 20 MG tablet, , Disp: , Rfl:     atorvastatin (LIPITOR) 40 mg tablet, Take 1 tablet (40 mg total) by mouth daily at bedtime, Disp: 90 tablet, Rfl: 3    B Complex Vitamins (B Complex-B12) TABS, Take 1 tablet by mouth daily 1 cap by mouth daily @ 8am, Disp: 90 tablet, Rfl: 3    bismuth subsalicylate (PEPTO BISMOL) 524 mg/30 mL oral suspension, Take 15 mL (262 mg total) by mouth every 6 (six) hours as needed for indigestion, Disp: 360 mL, Rfl: 1    calcium " carbonate-vitamin D 500 mg-5 mcg per tablet, Take 1 tablet by mouth daily with breakfast, Disp: 90 tablet, Rfl: 3    chlorproMAZINE (THORAZINE) 100 mg tablet, Take 100 mg by mouth 2 (two) times a day, Disp: , Rfl:     cholecalciferol (VITAMIN D3) 1,000 units tablet, Take 1 tablet (1,000 Units total) by mouth daily, Disp: 90 tablet, Rfl: 3    Empagliflozin (Jardiance) 25 MG TABS, Take 1 tablet (25 mg total) by mouth daily, Disp: 90 tablet, Rfl: 1    famotidine (PEPCID) 40 MG tablet, Take 1 tablet (40 mg total) by mouth daily at bedtime, Disp: 30 tablet, Rfl: 6    glimepiride (AMARYL) 2 mg tablet, Take 1 tablet (2 mg total) by mouth daily with breakfast, Disp: 30 tablet, Rfl: 3    Januvia 100 MG tablet, , Disp: , Rfl:     levothyroxine 25 mcg tablet, Take 1 tablet (25 mcg total) by mouth daily in the early morning, Disp: 90 tablet, Rfl: 3    lisinopril (ZESTRIL) 2.5 mg tablet, Take 1 tablet (2.5 mg total) by mouth daily, Disp: 31 tablet, Rfl: 5    loperamide (IMODIUM A-D) 2 MG tablet, Take 1 tablet (2 mg total) by mouth 4 (four) times a day as needed for diarrhea, Disp: 30 tablet, Rfl: 0    loratadine (CLARITIN) 10 mg tablet, Take 1 tablet (10 mg total) by mouth daily, Disp: 90 tablet, Rfl: 3    LORazepam (ATIVAN) 0.5 mg tablet, Take 0.5 mg by mouth 3 (three) times a day 8AM, 2PM, 8PM, Disp: , Rfl:     metFORMIN (GLUCOPHAGE) 1000 MG tablet, Take 1 tablet (1,000 mg total) by mouth 2 (two) times a day with meals, Disp: 180 tablet, Rfl: 3    OLANZapine (ZyPREXA ZYDIS) 5 mg dispersible tablet, , Disp: , Rfl:     ondansetron (ZOFRAN) 4 mg tablet, Take 1 tablet (4 mg total) by mouth every 6 (six) hours, Disp: 12 tablet, Rfl: 0    propranolol (INDERAL) 20 mg tablet, Take 1 tablet (20 mg total) by mouth every 12 (twelve) hours, Disp: 60 tablet, Rfl: 5    traZODone (DESYREL) 100 mg tablet, , Disp: , Rfl:     valproic acid (DEPAKENE) 250 MG/5ML soln, Take 10 mL by mouth 2 times daily @ 9 AM and 5 PM and 5 mL by mouth @ 9 PM,  Disp: 473 mL, Rfl: 0    vitamin E, tocopherol, 400 units capsule, Take 1 capsule (400 Units total) by mouth daily, Disp: 30 capsule, Rfl: 5    acetaminophen (TYLENOL) 500 mg tablet, Take 2 tablets (1,000 mg total) by mouth every 8 (eight) hours, Disp: 30 tablet, Rfl: 1    Alcohol Swabs (Curity Alcohol Preps) 70 % PADS, Use if needed (when checking blood glucose), Disp: 200 each, Rfl: 1    aluminum-magnesium hydroxide-simethicone (MAALOX) 200-200-20 MG/5ML SUSP, Take 20 mL by mouth 4 (four) times a day (before meals and at bedtime), Disp: 710 mL, Rfl: 5    baclofen 10 mg tablet, Take 1 tablet (10 mg total) by mouth 3 (three) times a day, Disp: 30 tablet, Rfl: 0    bisacodyl (DULCOLAX) 5 mg EC tablet, Take 2 tablets (10 mg total) by mouth daily as needed for constipation, Disp: 30 tablet, Rfl: 0    divalproex sodium (DEPAKOTE) 500 mg EC tablet, Take 1 tablet (500 mg total) by mouth 2 (two) times a day, Disp: 60 tablet, Rfl: 0    Emollient (Eucerin Original Healing) LOTN, , Disp: , Rfl:     famotidine (PEPCID) 20 mg tablet, Take 1 tablet (20 mg total) by mouth 2 (two) times a day for 14 days, Disp: 28 tablet, Rfl: 0    glucose blood (True Metrix Blood Glucose Test) test strip, Use 1 each 2 (two) times a week Use as instructed, Disp: 50 strip, Rfl: 0    glucose monitoring kit (FREESTYLE) monitoring kit, 1 each by Does not apply route 2 (two) times a week, Disp: 1 each, Rfl: 1    guaiFENesin (ROBITUSSIN) 100 MG/5ML oral liquid, Take 10 mL (200 mg total) by mouth 3 (three) times a day as needed for cough, Disp: 120 mL, Rfl: 2    ibuprofen (MOTRIN) 600 mg tablet, Take 1 tablet (600 mg total) by mouth every 8 (eight) hours as needed for moderate pain, Disp: 30 tablet, Rfl: 0    Lancets (freestyle) lancets, Use to test blood sugars 2x weekly on Mon & Thurs @ 8AM, Disp: 100 each, Rfl: 5    lidocaine (Lidoderm) 5 %, Apply 1 patch topically over 12 hours daily for 7 days Remove & Discard patch within 12 hours or as directed by  "MD, Disp: 7 patch, Rfl: 0    Menthol (Halls Cough Drops) 5.8 MG LOZG, Apply 1 lozenge (5.8 mg total) to the mouth or throat 4 (four) times a day as needed (cough), Disp: 30 lozenge, Rfl: 5    Mouthwashes (Biotene Dry Mouth) LIQD, Take 10 mL by mouth 3 (three) times a day, Disp: 473 mL, Rfl: 5    ondansetron (ZOFRAN) 4 mg tablet, Take 1 tablet (4 mg total) by mouth every 12 (twelve) hours as needed for nausea or vomiting for up to 3 days, Disp: 6 tablet, Rfl: 0    ondansetron (ZOFRAN-ODT) 4 mg disintegrating tablet, Take 1 tablet (4 mg total) by mouth every 6 (six) hours as needed for nausea or vomiting for up to 7 days, Disp: 28 tablet, Rfl: 0    polyethylene glycol (MIRALAX) 17 g packet, Take 17 g by mouth daily as needed (Constipation), Disp: 30 each, Rfl: 3    Refresh Optive 0.5-0.9 % SOLN, Administer 0.05 mL to both eyes if needed (To relieve burning, irritation, discomfort due to dryness of the eye), Disp: 15 mL, Rfl: 5    Senna Plus 8.6-50 MG per tablet, Take 2 tablets by mouth daily, Disp: 62 tablet, Rfl: 5    Sunscreen SPF50 LOTN, Apply topically if needed (apply prior to sun exposure), Disp: 296 mL, Rfl: 5    Laboratory Results:  Lab Results   Component Value Date    WBC 8.80 09/03/2024    HGB 14.0 09/03/2024    HCT 44.2 09/03/2024    MCV 84 09/03/2024     (L) 09/03/2024     Lab Results   Component Value Date    SODIUM 135 11/04/2024    K 4.8 11/04/2024     11/04/2024    CO2 28 11/04/2024    BUN 24 11/04/2024    CREATININE 1.67 (H) 11/04/2024    GLUC 154 (H) 11/04/2024    CALCIUM 9.1 11/04/2024     Lab Results   Component Value Date    CALCIUM 9.1 11/04/2024     No results found for: \"LABPROT\"      "

## 2024-11-08 NOTE — PATIENT INSTRUCTIONS
You are here for follow-up sounds like your health is been good you had some recent adjustments in some of the medications because you are tired at home.  But other than that none of those would affect the kidneys.  Your blood pressure is excellent creatinine which is the blood test is stable at 1.5.  In the past you had no protein in the urine so that tells me you do not have diabetic kidney disease and most serious kidney diseases cause protein in the urine so is good that there is none there.    Continue now with blood pressure good sugar control to avoid those problems and we will follow-up in 6 months.

## 2024-11-08 NOTE — LETTER
November 8, 2024     Kyung Goel, DO  [address]  [place] [address]    Patient: Sandor Ratliff   YOB: 1980   Date of Visit: 11/8/2024       Dear Dr. Goel:    Thank you for referring Sandor Ratliff to me for evaluation. Below are my notes for this consultation.    If you have questions, please do not hesitate to call me. I look forward to following your patient along with you.         Sincerely,        Manuel Elena MD        CC: No Recipients    Manuel Elena MD  11/8/2024 12:32 PM  Sign when Signing Visit  NEPHROLOGY PROGRESS NOTE    Sandor Ratliff 44 y.o. male MRN: [id number]  Unit/Bed#:  Encounter: [id number]  Reason for Consult: Chronic kidney disease    Patient is here for routine follow-up he looks well he is doing well states that his health has been unchanged.  He did have a reduction in some of his psychiatric medications as he was a little tired this was just done yesterday.  Otherwise no acute changes or complaints for me.    ASSESSMENT/PLAN:    1.  Renal    Patient is chronic kidney disease latest creatinine is 1.6 which is in baseline range without progression over a couple years.  In the past no significant proteinuria.  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  So at this point with blood pressure control routine health management he should have a good prognosis but of course needs to be monitored because he is very young.    Suspect the patient has some mild nephrosclerosis and at this point just continue current medications and we will monitor every 6 months with blood pressure and routine health management.    I have spent a total time of 30 minutes in caring for this patient on the day of the visit/encounter including Diagnostic results, Prognosis, Reviewing / ordering tests, medicine, procedures  , and Obtaining or reviewing history  .     I told him to  "contact me if there is any problems or concerns before next visit.    SUBJECTIVE:  Review of Systems   Constitutional: Negative for chills, diaphoresis and fever.   HENT: Negative.     Eyes: Negative.    Cardiovascular:  Negative for chest pain, dyspnea on exertion, leg swelling and orthopnea.   Respiratory:  Negative for cough, shortness of breath and sputum production.    Gastrointestinal:  Negative for abdominal pain, diarrhea, nausea and vomiting.   Genitourinary:  Negative for dysuria and incomplete emptying.   Neurological:  Negative for dizziness and headaches.   Psychiatric/Behavioral:  Negative for altered mental status.        OBJECTIVE:  Current Weight:    Vitals:@TMAX(24)@Current:     Blood pressure 120/72, pulse 78, height 5' 9\" (1.753 m). , Body mass index is 31.31 kg/m².    [unfilled]    Physical Exam: /72 (BP Location: Left arm, Patient Position: Sitting, Cuff Size: Standard)   Pulse 78   Ht 5' 9\" (1.753 m)   BMI 31.31 kg/m²   Physical Exam  Constitutional:       General: He is not in acute distress.     Appearance: He is not toxic-appearing.   HENT:      Head: Normocephalic and atraumatic.      Nose: Nose normal.      Mouth/Throat:      Mouth: Mucous membranes are dry.   Eyes:      General: No scleral icterus.     Extraocular Movements: Extraocular movements intact.   Cardiovascular:      Rate and Rhythm: Normal rate and regular rhythm.      Heart sounds:      No gallop.   Pulmonary:      Effort: Pulmonary effort is normal. No respiratory distress.      Breath sounds: No wheezing or rales.   Abdominal:      General: Bowel sounds are normal. There is no distension.      Palpations: Abdomen is soft.      Tenderness: There is no abdominal tenderness.   Neurological:      Mental Status: He is alert and oriented to person, place, and time.   Psychiatric:         Mood and Affect: Mood normal.         Medications:    Current Outpatient Medications:   •  ARIPiprazole (ABILIFY) 10 mg tablet, Take 10 " mg by mouth daily, Disp: , Rfl:   •  ARIPiprazole (ABILIFY) 20 MG tablet, , Disp: , Rfl:   •  atorvastatin (LIPITOR) 40 mg tablet, Take 1 tablet (40 mg total) by mouth daily at bedtime, Disp: 90 tablet, Rfl: 3  •  B Complex Vitamins (B Complex-B12) TABS, Take 1 tablet by mouth daily 1 cap by mouth daily @ 8am, Disp: 90 tablet, Rfl: 3  •  bismuth subsalicylate (PEPTO BISMOL) 524 mg/30 mL oral suspension, Take 15 mL (262 mg total) by mouth every 6 (six) hours as needed for indigestion, Disp: 360 mL, Rfl: 1  •  calcium carbonate-vitamin D 500 mg-5 mcg per tablet, Take 1 tablet by mouth daily with breakfast, Disp: 90 tablet, Rfl: 3  •  chlorproMAZINE (THORAZINE) 100 mg tablet, Take 100 mg by mouth 2 (two) times a day, Disp: , Rfl:   •  cholecalciferol (VITAMIN D3) 1,000 units tablet, Take 1 tablet (1,000 Units total) by mouth daily, Disp: 90 tablet, Rfl: 3  •  Empagliflozin (Jardiance) 25 MG TABS, Take 1 tablet (25 mg total) by mouth daily, Disp: 90 tablet, Rfl: 1  •  famotidine (PEPCID) 40 MG tablet, Take 1 tablet (40 mg total) by mouth daily at bedtime, Disp: 30 tablet, Rfl: 6  •  glimepiride (AMARYL) 2 mg tablet, Take 1 tablet (2 mg total) by mouth daily with breakfast, Disp: 30 tablet, Rfl: 3  •  Januvia 100 MG tablet, , Disp: , Rfl:   •  levothyroxine 25 mcg tablet, Take 1 tablet (25 mcg total) by mouth daily in the early morning, Disp: 90 tablet, Rfl: 3  •  lisinopril (ZESTRIL) 2.5 mg tablet, Take 1 tablet (2.5 mg total) by mouth daily, Disp: 31 tablet, Rfl: 5  •  loperamide (IMODIUM A-D) 2 MG tablet, Take 1 tablet (2 mg total) by mouth 4 (four) times a day as needed for diarrhea, Disp: 30 tablet, Rfl: 0  •  loratadine (CLARITIN) 10 mg tablet, Take 1 tablet (10 mg total) by mouth daily, Disp: 90 tablet, Rfl: 3  •  LORazepam (ATIVAN) 0.5 mg tablet, Take 0.5 mg by mouth 3 (three) times a day 8AM, 2PM, 8PM, Disp: , Rfl:   •  metFORMIN (GLUCOPHAGE) 1000 MG tablet, Take 1 tablet (1,000 mg total) by mouth 2 (two) times  a day with meals, Disp: 180 tablet, Rfl: 3  •  OLANZapine (ZyPREXA ZYDIS) 5 mg dispersible tablet, , Disp: , Rfl:   •  ondansetron (ZOFRAN) 4 mg tablet, Take 1 tablet (4 mg total) by mouth every 6 (six) hours, Disp: 12 tablet, Rfl: 0  •  propranolol (INDERAL) 20 mg tablet, Take 1 tablet (20 mg total) by mouth every 12 (twelve) hours, Disp: 60 tablet, Rfl: 5  •  traZODone (DESYREL) 100 mg tablet, , Disp: , Rfl:   •  valproic acid (DEPAKENE) 250 MG/5ML soln, Take 10 mL by mouth 2 times daily @ 9 AM and 5 PM and 5 mL by mouth @ 9 PM, Disp: 473 mL, Rfl: 0  •  vitamin E, tocopherol, 400 units capsule, Take 1 capsule (400 Units total) by mouth daily, Disp: 30 capsule, Rfl: 5  •  acetaminophen (TYLENOL) 500 mg tablet, Take 2 tablets (1,000 mg total) by mouth every 8 (eight) hours, Disp: 30 tablet, Rfl: 1  •  Alcohol Swabs (Curity Alcohol Preps) 70 % PADS, Use if needed (when checking blood glucose), Disp: 200 each, Rfl: 1  •  aluminum-magnesium hydroxide-simethicone (MAALOX) 200-200-20 MG/5ML SUSP, Take 20 mL by mouth 4 (four) times a day (before meals and at bedtime), Disp: 710 mL, Rfl: 5  •  baclofen 10 mg tablet, Take 1 tablet (10 mg total) by mouth 3 (three) times a day, Disp: 30 tablet, Rfl: 0  •  bisacodyl (DULCOLAX) 5 mg EC tablet, Take 2 tablets (10 mg total) by mouth daily as needed for constipation, Disp: 30 tablet, Rfl: 0  •  divalproex sodium (DEPAKOTE) 500 mg EC tablet, Take 1 tablet (500 mg total) by mouth 2 (two) times a day, Disp: 60 tablet, Rfl: 0  •  Emollient (Eucerin Original Healing) LOTN, , Disp: , Rfl:   •  famotidine (PEPCID) 20 mg tablet, Take 1 tablet (20 mg total) by mouth 2 (two) times a day for 14 days, Disp: 28 tablet, Rfl: 0  •  glucose blood (True Metrix Blood Glucose Test) test strip, Use 1 each 2 (two) times a week Use as instructed, Disp: 50 strip, Rfl: 0  •  glucose monitoring kit (FREESTYLE) monitoring kit, 1 each by Does not apply route 2 (two) times a week, Disp: 1 each, Rfl: 1  •   guaiFENesin (ROBITUSSIN) 100 MG/5ML oral liquid, Take 10 mL (200 mg total) by mouth 3 (three) times a day as needed for cough, Disp: 120 mL, Rfl: 2  •  ibuprofen (MOTRIN) 600 mg tablet, Take 1 tablet (600 mg total) by mouth every 8 (eight) hours as needed for moderate pain, Disp: 30 tablet, Rfl: 0  •  Lancets (freestyle) lancets, Use to test blood sugars 2x weekly on Mon & Thurs @ 8AM, Disp: 100 each, Rfl: 5  •  lidocaine (Lidoderm) 5 %, Apply 1 patch topically over 12 hours daily for 7 days Remove & Discard patch within 12 hours or as directed by MD, Disp: 7 patch, Rfl: 0  •  Menthol (Halls Cough Drops) 5.8 MG LOZG, Apply 1 lozenge (5.8 mg total) to the mouth or throat 4 (four) times a day as needed (cough), Disp: 30 lozenge, Rfl: 5  •  Mouthwashes (Biotene Dry Mouth) LIQD, Take 10 mL by mouth 3 (three) times a day, Disp: 473 mL, Rfl: 5  •  ondansetron (ZOFRAN) 4 mg tablet, Take 1 tablet (4 mg total) by mouth every 12 (twelve) hours as needed for nausea or vomiting for up to 3 days, Disp: 6 tablet, Rfl: 0  •  ondansetron (ZOFRAN-ODT) 4 mg disintegrating tablet, Take 1 tablet (4 mg total) by mouth every 6 (six) hours as needed for nausea or vomiting for up to 7 days, Disp: 28 tablet, Rfl: 0  •  polyethylene glycol (MIRALAX) 17 g packet, Take 17 g by mouth daily as needed (Constipation), Disp: 30 each, Rfl: 3  •  Refresh Optive 0.5-0.9 % SOLN, Administer 0.05 mL to both eyes if needed (To relieve burning, irritation, discomfort due to dryness of the eye), Disp: 15 mL, Rfl: 5  •  Senna Plus 8.6-50 MG per tablet, Take 2 tablets by mouth daily, Disp: 62 tablet, Rfl: 5  •  Sunscreen SPF50 LOTN, Apply topically if needed (apply prior to sun exposure), Disp: 296 mL, Rfl: 5    Laboratory Results:  Lab Results   Component Value Date    WBC 8.80 09/03/2024    HGB 14.0 09/03/2024    HCT 44.2 09/03/2024    MCV 84 09/03/2024     (L) 09/03/2024     Lab Results   Component Value Date    SODIUM 135 11/04/2024    K 4.8  "11/04/2024     11/04/2024    CO2 28 11/04/2024    BUN 24 11/04/2024    CREATININE 1.67 (H) 11/04/2024    GLUC 154 (H) 11/04/2024    CALCIUM 9.1 11/04/2024     Lab Results   Component Value Date    CALCIUM 9.1 11/04/2024     No results found for: \"LABPROT\"      "

## 2024-11-19 DIAGNOSIS — E11.9 TYPE 2 DIABETES MELLITUS WITHOUT COMPLICATION, WITHOUT LONG-TERM CURRENT USE OF INSULIN (HCC): ICD-10-CM

## 2024-11-19 RX ORDER — GLIMEPIRIDE 2 MG/1
2 TABLET ORAL
Qty: 30 TABLET | Refills: 3 | Status: SHIPPED | OUTPATIENT
Start: 2024-11-19 | End: 2025-05-18

## 2024-11-21 ENCOUNTER — OFFICE VISIT (OUTPATIENT)
Dept: URGENT CARE | Facility: MEDICAL CENTER | Age: 44
End: 2024-11-21
Payer: MEDICARE

## 2024-11-21 VITALS
WEIGHT: 214 LBS | HEART RATE: 92 BPM | TEMPERATURE: 97.7 F | SYSTOLIC BLOOD PRESSURE: 145 MMHG | RESPIRATION RATE: 18 BRPM | BODY MASS INDEX: 31.6 KG/M2 | OXYGEN SATURATION: 99 % | DIASTOLIC BLOOD PRESSURE: 83 MMHG

## 2024-11-21 DIAGNOSIS — H92.02 ACUTE OTALGIA, LEFT: Primary | ICD-10-CM

## 2024-11-21 PROCEDURE — 99213 OFFICE O/P EST LOW 20 MIN: CPT | Performed by: PHYSICIAN ASSISTANT

## 2024-11-21 PROCEDURE — G0463 HOSPITAL OUTPT CLINIC VISIT: HCPCS | Performed by: PHYSICIAN ASSISTANT

## 2024-11-21 NOTE — PATIENT INSTRUCTIONS
Recommend Motrin or Tylenol for symptom relief.  Supportive care.  Follow up with PCP, as necessary.
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
## 2024-11-25 ENCOUNTER — HOSPITAL ENCOUNTER (EMERGENCY)
Facility: HOSPITAL | Age: 44
Discharge: HOME | End: 2024-11-25
Attending: EMERGENCY MEDICINE
Payer: MEDICARE

## 2024-11-25 VITALS
DIASTOLIC BLOOD PRESSURE: 72 MMHG | TEMPERATURE: 97.6 F | RESPIRATION RATE: 20 BRPM | SYSTOLIC BLOOD PRESSURE: 124 MMHG | OXYGEN SATURATION: 97 % | HEART RATE: 105 BPM

## 2024-11-25 DIAGNOSIS — F41.0 ANXIETY ATTACK: Primary | ICD-10-CM

## 2024-11-25 DIAGNOSIS — R94.31 ABNORMAL EKG: ICD-10-CM

## 2024-11-25 PROCEDURE — 99283 EMERGENCY DEPT VISIT LOW MDM: CPT

## 2024-11-25 PROCEDURE — 93005 ELECTROCARDIOGRAM TRACING: CPT

## 2024-11-25 PROCEDURE — 99284 EMERGENCY DEPT VISIT MOD MDM: CPT | Performed by: EMERGENCY MEDICINE

## 2024-11-25 RX ORDER — OLANZAPINE 5 MG/1
5 TABLET ORAL ONCE
Status: COMPLETED | OUTPATIENT
Start: 2024-11-25 | End: 2024-11-25

## 2024-11-25 RX ADMIN — OLANZAPINE 5 MG: 5 TABLET, FILM COATED ORAL at 17:53

## 2024-11-25 NOTE — DISCHARGE INSTRUCTIONS
Please have Sandor follow up with cardiology as there are slight changes in his EKG compared to prior.  Please come back to the ER with chest pain associated with exertion, shortness of breath or any new concerns.  We are always here and always happy to re-evaluate!
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
## 2024-11-26 LAB
ATRIAL RATE: 107 BPM
P AXIS: 51 DEGREES
PR INTERVAL: 186 MS
QRS AXIS: -77 DEGREES
QRSD INTERVAL: 98 MS
QT INTERVAL: 358 MS
QTC INTERVAL: 477 MS
T WAVE AXIS: 28 DEGREES
VENTRICULAR RATE: 107 BPM

## 2024-11-26 PROCEDURE — 93010 ELECTROCARDIOGRAM REPORT: CPT | Performed by: INTERNAL MEDICINE

## 2024-11-27 ENCOUNTER — OFFICE VISIT (OUTPATIENT)
Dept: PODIATRY | Facility: CLINIC | Age: 44
End: 2024-11-27
Payer: MEDICARE

## 2024-11-27 VITALS
HEART RATE: 103 BPM | SYSTOLIC BLOOD PRESSURE: 118 MMHG | HEIGHT: 69 IN | BODY MASS INDEX: 31.55 KG/M2 | WEIGHT: 213 LBS | DIASTOLIC BLOOD PRESSURE: 81 MMHG

## 2024-11-27 DIAGNOSIS — E11.42 DIABETIC PERIPHERAL NEUROPATHY (HCC): ICD-10-CM

## 2024-11-27 DIAGNOSIS — L85.3 XEROSIS OF SKIN: ICD-10-CM

## 2024-11-27 DIAGNOSIS — E11.65 TYPE 2 DIABETES MELLITUS WITH HYPERGLYCEMIA, WITHOUT LONG-TERM CURRENT USE OF INSULIN (HCC): Primary | ICD-10-CM

## 2024-11-27 DIAGNOSIS — B35.1 ONYCHOMYCOSIS: ICD-10-CM

## 2024-11-27 DIAGNOSIS — M79.675 PAIN IN TOES OF BOTH FEET: ICD-10-CM

## 2024-11-27 DIAGNOSIS — M79.674 PAIN IN TOES OF BOTH FEET: ICD-10-CM

## 2024-11-27 PROCEDURE — 11720 DEBRIDE NAIL 1-5: CPT | Performed by: PODIATRIST

## 2024-11-27 PROCEDURE — RECHECK: Performed by: PODIATRIST

## 2024-11-27 RX ORDER — OLANZAPINE 10 MG/1
TABLET, ORALLY DISINTEGRATING ORAL
COMMUNITY
Start: 2024-11-24

## 2024-11-27 RX ORDER — CALCIUM CARBONATE 500(1250)
TABLET ORAL
COMMUNITY
Start: 2024-11-14

## 2024-11-27 RX ORDER — VITAMIN B COMPLEX
CAPSULE ORAL
COMMUNITY
Start: 2024-11-14

## 2024-11-28 NOTE — PROGRESS NOTES
Name: Sandor Ratliff      : 1980      MRN: [id number]  Encounter Provider: Joey Montana DPM  Encounter Date: 2024   Encounter department: [facility] [place]  :  Assessment & Plan  Type 2 diabetes mellitus with hyperglycemia, without long-term current use of insulin (HCC)    Lab Results   Component Value Date    HGBA1C 7.2 (H) 2024            Diabetic peripheral neuropathy (HCC)    Lab Results   Component Value Date    HGBA1C 7.2 (H) 2024            Onychomycosis       Debride mycotic nails and thin the nail plates x 4 with the use of a nail nipper manually and an electric Dremel bur was used to reduce the thickness of the nail beds and smoothed the distal aspect of the nails.   Xerosis of skin       Pt was instructed to use lotion once a day on both feet such as cerave, Cetaphil or similar thick style of lotion.   Pain in toes of both feet         Discussed proper shoe gear, daily inspections of feet, and general foot health with patient. Patient has Q9  findings and is recommended for at risk foot care every 9-10 weeks.    Patients most recent complete clinical foot exam was on: 2024      Return in about 10 weeks (around 2025).     History of Present Illness     HPI  Sandor Ratliff is a 44 y.o. male who presents with chief complaint of painful thick nails on both feet.  He is a diabetic who was seen with his staff present in the room.Patient presents for at-risk foot care.  Patient has no acute concerns today.  Patient has significant lower extremity risk due to neuropathy, parasthesia, edema, and trophic skin changes to the lower extremity.   History obtained from: patient and patient's Legal Guardian    Review of Systems  Medical History Reviewed by provider this encounter:     .  Current Outpatient Medications on File Prior to Visit   Medication Sig Dispense Refill    acetaminophen (TYLENOL) 500 mg tablet Take 2 tablets (1,000 mg total) by mouth every 8  (eight) hours 30 tablet 1    Alcohol Swabs (Curity Alcohol Preps) 70 % PADS Use if needed (when checking blood glucose) 200 each 1    aluminum-magnesium hydroxide-simethicone (MAALOX) 200-200-20 MG/5ML SUSP Take 20 mL by mouth 4 (four) times a day (before meals and at bedtime) 710 mL 5    atorvastatin (LIPITOR) 40 mg tablet Take 1 tablet (40 mg total) by mouth daily at bedtime 90 tablet 3    B Complex CAPS       B Complex Vitamins (B Complex-B12) TABS Take 1 tablet by mouth daily 1 cap by mouth daily @ 8am 90 tablet 3    bisacodyl (DULCOLAX) 5 mg EC tablet Take 2 tablets (10 mg total) by mouth daily as needed for constipation 30 tablet 0    bismuth subsalicylate (PEPTO BISMOL) 524 mg/30 mL oral suspension Take 15 mL (262 mg total) by mouth every 6 (six) hours as needed for indigestion 360 mL 1    calcium carbonate (OYSTER SHELL,OSCAL) 500 mg       chlorproMAZINE (THORAZINE) 100 mg tablet Take 100 mg by mouth 2 (two) times a day      cholecalciferol (VITAMIN D3) 1,000 units tablet Take 1 tablet (1,000 Units total) by mouth daily 90 tablet 3    Emollient (Eucerin Original Healing) LOTN       Empagliflozin (Jardiance) 25 MG TABS Take 1 tablet (25 mg total) by mouth daily 90 tablet 1    famotidine (PEPCID) 40 MG tablet Take 1 tablet (40 mg total) by mouth daily at bedtime 30 tablet 6    glimepiride (AMARYL) 2 mg tablet Take 1 tablet (2 mg total) by mouth daily with breakfast 30 tablet 3    glucose blood (True Metrix Blood Glucose Test) test strip Use 1 each 2 (two) times a week Use as instructed 50 strip 0    guaiFENesin (ROBITUSSIN) 100 MG/5ML oral liquid Take 10 mL (200 mg total) by mouth 3 (three) times a day as needed for cough 120 mL 2    ibuprofen (MOTRIN) 600 mg tablet Take 1 tablet (600 mg total) by mouth every 8 (eight) hours as needed for moderate pain 30 tablet 0    Januvia 100 MG tablet       Lancets (freestyle) lancets Use to test blood sugars 2x weekly on Mon & Thurs @ 8AM 100 each 5    levothyroxine 25  mcg tablet Take 1 tablet (25 mcg total) by mouth daily in the early morning 90 tablet 3    lisinopril (ZESTRIL) 2.5 mg tablet Take 1 tablet (2.5 mg total) by mouth daily 31 tablet 5    loperamide (IMODIUM A-D) 2 MG tablet Take 1 tablet (2 mg total) by mouth 4 (four) times a day as needed for diarrhea 30 tablet 0    loratadine (CLARITIN) 10 mg tablet Take 1 tablet (10 mg total) by mouth daily 90 tablet 3    LORazepam (ATIVAN) 0.5 mg tablet Take 0.5 mg by mouth 3 (three) times a day 8AM, 2PM, 8PM      Menthol (Halls Cough Drops) 5.8 MG LOZG Apply 1 lozenge (5.8 mg total) to the mouth or throat 4 (four) times a day as needed (cough) 30 lozenge 5    metFORMIN (GLUCOPHAGE) 1000 MG tablet Take 1 tablet (1,000 mg total) by mouth 2 (two) times a day with meals 180 tablet 3    Mouthwashes (Biotene Dry Mouth) LIQD Take 10 mL by mouth 3 (three) times a day 473 mL 5    OLANZapine (ZyPREXA ZYDIS) 10 mg disintegrating tablet       OLANZapine (ZyPREXA ZYDIS) 5 mg dispersible tablet       ondansetron (ZOFRAN) 4 mg tablet Take 1 tablet (4 mg total) by mouth every 6 (six) hours 12 tablet 0    polyethylene glycol (MIRALAX) 17 g packet Take 17 g by mouth daily as needed (Constipation) 30 each 3    propranolol (INDERAL) 20 mg tablet Take 1 tablet (20 mg total) by mouth every 12 (twelve) hours 60 tablet 5    Refresh Optive 0.5-0.9 % SOLN Administer 0.05 mL to both eyes if needed (To relieve burning, irritation, discomfort due to dryness of the eye) 15 mL 5    Senna Plus 8.6-50 MG per tablet Take 2 tablets by mouth daily 62 tablet 5    Sunscreen SPF50 LOTN Apply topically if needed (apply prior to sun exposure) 296 mL 5    traZODone (DESYREL) 100 mg tablet       valproic acid (DEPAKENE) 250 MG/5ML soln Take 10 mL by mouth 2 times daily @ 9 AM and 5 PM and 5 mL by mouth @ 9  mL 0    vitamin E, tocopherol, 400 units capsule Take 1 capsule (400 Units total) by mouth daily 30 capsule 5    ARIPiprazole (ABILIFY) 10 mg tablet Take 10 mg  "by mouth daily (Patient not taking: Reported on 11/27/2024)      ARIPiprazole (ABILIFY) 20 MG tablet  (Patient not taking: Reported on 11/27/2024)      calcium carbonate-vitamin D 500 mg-5 mcg per tablet Take 1 tablet by mouth daily with breakfast (Patient not taking: Reported on 11/27/2024) 90 tablet 3    divalproex sodium (DEPAKOTE) 500 mg EC tablet Take 1 tablet (500 mg total) by mouth 2 (two) times a day (Patient not taking: Reported on 11/27/2024) 60 tablet 0    famotidine (PEPCID) 20 mg tablet Take 1 tablet (20 mg total) by mouth 2 (two) times a day for 14 days 28 tablet 0    glucose monitoring kit (FREESTYLE) monitoring kit 1 each by Does not apply route 2 (two) times a week (Patient not taking: Reported on 11/27/2024) 1 each 1    lidocaine (Lidoderm) 5 % Apply 1 patch topically over 12 hours daily for 7 days Remove & Discard patch within 12 hours or as directed by MD (Patient not taking: Reported on 11/21/2024) 7 patch 0    ondansetron (ZOFRAN) 4 mg tablet Take 1 tablet (4 mg total) by mouth every 12 (twelve) hours as needed for nausea or vomiting for up to 3 days 6 tablet 0    ondansetron (ZOFRAN-ODT) 4 mg disintegrating tablet Take 1 tablet (4 mg total) by mouth every 6 (six) hours as needed for nausea or vomiting for up to 7 days 28 tablet 0     No current facility-administered medications on file prior to visit.      Social History     Tobacco Use    Smoking status: Former    Smokeless tobacco: Never    Tobacco comments:     per [employer]-former smoker   Vaping Use    Vaping status: Never Used   Substance and Sexual Activity    Alcohol use: No     Comment: per [employer]-former consumption of alcohol    Drug use: No    Sexual activity: Never     Partners: Female     Birth control/protection: None        Objective   /81 (Patient Position: Sitting, Cuff Size: Adult)   Pulse 103   Ht 5' 9\" (1.753 m)   Wt 96.6 kg (213 lb)   BMI 31.45 kg/m²      Physical Exam  Vascular status is 1/4 DP PT negative " digital hair normal distal cooling immediate capillary refill bilaterally.  Capillary refill is approximately 2 seconds.    Derm nails are brittle elongated hypertrophic white-yellow discoloration with subungual debris x 4.  There is an increased thickness and the nails are approximately 2 to 3 mm.  There is yellow flaky tissue present on the plantar aspect of both feet with negative signs of any blood or fissures noted.  There is some questionable areas beneath the first IPJ's bilaterally secondary to the skin fold.  There is loss of turgor present bilaterally.    Ortho mild hammertoe deformities are present fourth and fifth digits bilaterally with the fifth digit being in a slight adductovarus position.  Pes planus is seen bilaterally.    Neuro light touch is in tact and equal bilaterally 0.5 monofilament test is diminished in the area of the plantar aspect of the hallux, plantar aspect of the third and fourth toes and submet 3 is diminished bilaterally.  Patient was able to feel the plantar aspect of the arch.    Administrative Statements   I have spent a total time of 15 minutes in caring for this patient on the day of the visit/encounter including Risks and benefits of tx options, Instructions for management, Patient and family education, Importance of tx compliance, Risk factor reductions, Counseling / Coordination of care, and Documenting in the medical record.

## 2024-11-29 ENCOUNTER — TELEPHONE (OUTPATIENT)
Dept: FAMILY MEDICINE CLINIC | Facility: CLINIC | Age: 44
End: 2024-11-29

## 2024-12-02 ENCOUNTER — OFFICE VISIT (OUTPATIENT)
Dept: FAMILY MEDICINE CLINIC | Facility: CLINIC | Age: 44
End: 2024-12-02

## 2024-12-02 ENCOUNTER — TELEPHONE (OUTPATIENT)
Dept: FAMILY MEDICINE CLINIC | Facility: CLINIC | Age: 44
End: 2024-12-02

## 2024-12-02 VITALS
SYSTOLIC BLOOD PRESSURE: 118 MMHG | WEIGHT: 211 LBS | RESPIRATION RATE: 18 BRPM | DIASTOLIC BLOOD PRESSURE: 86 MMHG | HEIGHT: 69 IN | TEMPERATURE: 98 F | HEART RATE: 77 BPM | OXYGEN SATURATION: 99 % | BODY MASS INDEX: 31.25 KG/M2

## 2024-12-02 DIAGNOSIS — R45.1 AGITATION: Primary | ICD-10-CM

## 2024-12-02 PROCEDURE — G2211 COMPLEX E/M VISIT ADD ON: HCPCS | Performed by: FAMILY MEDICINE

## 2024-12-02 PROCEDURE — 99213 OFFICE O/P EST LOW 20 MIN: CPT | Performed by: FAMILY MEDICINE

## 2024-12-02 NOTE — ASSESSMENT & PLAN NOTE
Agitation now resolved.  Continue medications as outlined by psychiatrist, recommend continued close follow-up with psychiatrist as indicated    Follow-up in the next 1 to 2 weeks for diabetes follow-up

## 2024-12-02 NOTE — TELEPHONE ENCOUNTER
Folder (To be completed by) -Dr. Goel     Name of Form - [facility] Medical exam        Form to be Faxed     Patient was made aware of the 7-10 business day form policy.

## 2024-12-02 NOTE — PROGRESS NOTES
"Name: Sandor Ratliff      : 1980      MRN: [id number]  Encounter Provider: Kyung Goel DO  Encounter Date: 2024   Encounter department: [facility] [place]  :  Assessment & Plan  Agitation  Agitation now resolved.  Continue medications as outlined by psychiatrist, recommend continued close follow-up with psychiatrist as indicated    Follow-up in the next 1 to 2 weeks for diabetes follow-up                History of Present Illness     HPI    Sandor is a 44 y.o. male with h/o schizoaffective disorder who presents today for evaluation following urgent care visit for L ear pain on  and ED visit for an anxiety attack on . He says that his ear pain has resolved. He believes that the anxiety attack was triggered by a change in his medications. He says that he was recently taken off of ativan and abilify, which has made him feel irritable. His mood has improved since his ED visit and he is following up with his psychiatrist on . He notes that he vomited and had a minor fever on , but that his symptoms only lasted a day.     Review of Systems   Constitutional: Negative.    HENT: Negative.     Eyes: Negative.    Respiratory: Negative.     Cardiovascular: Negative.    Gastrointestinal: Negative.    Endocrine: Negative.    Genitourinary: Negative.    Musculoskeletal: Negative.    Allergic/Immunologic: Negative.    Neurological: Negative.    Hematological: Negative.    Psychiatric/Behavioral:  The patient is nervous/anxious.           Objective   /86 (BP Location: Left arm, Patient Position: Sitting, Cuff Size: Large)   Pulse 77   Temp 98 °F (36.7 °C) (Temporal)   Resp 18   Ht 5' 9\" (1.753 m)   Wt 95.7 kg (211 lb)   SpO2 99%   BMI 31.16 kg/m²      Physical Exam  Vitals reviewed.   Constitutional:       Appearance: Normal appearance.   HENT:      Head: Normocephalic.      Right Ear: Tympanic membrane, ear canal and external ear normal.      " Left Ear: Tympanic membrane, ear canal and external ear normal.      Nose: Nose normal.      Mouth/Throat:      Mouth: Mucous membranes are moist.   Eyes:      Extraocular Movements: Extraocular movements intact.      Conjunctiva/sclera: Conjunctivae normal.      Pupils: Pupils are equal, round, and reactive to light.   Cardiovascular:      Rate and Rhythm: Normal rate and regular rhythm.      Pulses: Normal pulses.      Heart sounds: Normal heart sounds.   Pulmonary:      Effort: Pulmonary effort is normal.      Breath sounds: Normal breath sounds.   Abdominal:      General: Abdomen is flat. Bowel sounds are normal.      Palpations: Abdomen is soft.   Musculoskeletal:         General: Normal range of motion.      Cervical back: Normal range of motion.   Skin:     General: Skin is warm and dry.   Neurological:      General: No focal deficit present.   Psychiatric:      Comments: Pressured speech

## 2024-12-03 ENCOUNTER — TELEPHONE (OUTPATIENT)
Dept: FAMILY MEDICINE CLINIC | Facility: CLINIC | Age: 44
End: 2024-12-03

## 2024-12-14 ENCOUNTER — OFFICE VISIT (OUTPATIENT)
Dept: URGENT CARE | Age: 44
End: 2024-12-14
Payer: MEDICARE

## 2024-12-14 VITALS
DIASTOLIC BLOOD PRESSURE: 60 MMHG | SYSTOLIC BLOOD PRESSURE: 138 MMHG | HEART RATE: 90 BPM | OXYGEN SATURATION: 99 % | TEMPERATURE: 97.6 F

## 2024-12-14 DIAGNOSIS — K52.9 NONINFECTIOUS GASTROENTERITIS, UNSPECIFIED TYPE: Primary | ICD-10-CM

## 2024-12-14 DIAGNOSIS — R42 DIZZINESS: ICD-10-CM

## 2024-12-14 DIAGNOSIS — R11.0 NAUSEA: ICD-10-CM

## 2024-12-14 LAB — GLUCOSE SERPL-MCNC: 162 MG/DL (ref 65–140)

## 2024-12-14 PROCEDURE — 82948 REAGENT STRIP/BLOOD GLUCOSE: CPT

## 2024-12-14 RX ORDER — ONDANSETRON 4 MG/1
4 TABLET, ORALLY DISINTEGRATING ORAL EVERY 8 HOURS PRN
Qty: 21 TABLET | Refills: 0 | Status: SHIPPED | OUTPATIENT
Start: 2024-12-14 | End: 2024-12-21
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
## 2024-12-15 DIAGNOSIS — E11.65 TYPE 2 DIABETES MELLITUS WITH HYPERGLYCEMIA, WITHOUT LONG-TERM CURRENT USE OF INSULIN (HCC): ICD-10-CM

## 2024-12-19 ENCOUNTER — OFFICE VISIT (OUTPATIENT)
Dept: FAMILY MEDICINE CLINIC | Facility: CLINIC | Age: 44
End: 2024-12-19

## 2024-12-19 VITALS
HEART RATE: 102 BPM | WEIGHT: 212 LBS | BODY MASS INDEX: 31.4 KG/M2 | OXYGEN SATURATION: 99 % | RESPIRATION RATE: 20 BRPM | DIASTOLIC BLOOD PRESSURE: 80 MMHG | SYSTOLIC BLOOD PRESSURE: 113 MMHG | HEIGHT: 69 IN | TEMPERATURE: 98.7 F

## 2024-12-19 DIAGNOSIS — Z23 ENCOUNTER FOR IMMUNIZATION: ICD-10-CM

## 2024-12-19 DIAGNOSIS — E11.65 TYPE 2 DIABETES MELLITUS WITH HYPERGLYCEMIA, WITHOUT LONG-TERM CURRENT USE OF INSULIN (HCC): Primary | ICD-10-CM

## 2024-12-19 LAB
CREAT UR-MCNC: 38.4 MG/DL
MICROALBUMIN UR-MCNC: <7 MG/L
SL AMB POCT HEMOGLOBIN AIC: 7 (ref ?–6.5)

## 2024-12-19 PROCEDURE — 90677 PCV20 VACCINE IM: CPT | Performed by: FAMILY MEDICINE

## 2024-12-19 PROCEDURE — 82043 UR ALBUMIN QUANTITATIVE: CPT

## 2024-12-19 PROCEDURE — G0009 ADMIN PNEUMOCOCCAL VACCINE: HCPCS | Performed by: FAMILY MEDICINE

## 2024-12-19 PROCEDURE — 82570 ASSAY OF URINE CREATININE: CPT

## 2024-12-19 PROCEDURE — 83036 HEMOGLOBIN GLYCOSYLATED A1C: CPT | Performed by: FAMILY MEDICINE

## 2024-12-19 PROCEDURE — 99213 OFFICE O/P EST LOW 20 MIN: CPT | Performed by: FAMILY MEDICINE

## 2024-12-19 NOTE — ASSESSMENT & PLAN NOTE
Lab Results   Component Value Date    HGBA1C 7.2 (H) 08/01/2024       SmartLink not supported outside of the Encounter Diagnoses SmartSection.

## 2024-12-19 NOTE — PROGRESS NOTES
Name: Sandor Ratliff      : 1980      MRN: [id number]  Encounter Provider: Kyung Goel DO  Encounter Date: 2024   Encounter department: [facility] [place]  :  Assessment & Plan  Type 2 diabetes mellitus with hyperglycemia, without long-term current use of insulin (HCC)    Lab Results   Component Value Date    HGBA1C 7.0 (A) 2024     Previous A1c 7.2%.  Currently on metformin at 1000 mg twice daily, Jardiance 25 mg daily, glimepiride 2 mg daily, Januvia 100 mg daily.    Plan:  Continue current regimen  If needed for future diabetic control, will consider d/c of glimepiride and januvia and initiation of GLP-1.   Urine collected for send out of micro albumin:cr ratio     Orders:    POCT hemoglobin A1c    POCT urine microalbumin/creatinine    Encounter for immunization  PCV 20 given today   Orders:    Pneumococcal Conjugate Vaccine 20-valent (Pcv20)          Depression Screening and Follow-up Plan: Patient was screened for depression during today's encounter. They screened negative with a PHQ-2 score of 0.      History of Present Illness     HPI  45 yo M here for T2DM follow up. His A1C is stable around 7.0%. He states that he has been cutting down on soda and trying to eat more salads. He does a lot of walking for exercise and rides an exercise bike. He is taking his medications as prescribed. He states his neuropathy in his hands and feet is improving.   Review of Systems   Constitutional:  Negative for chills and fever.   HENT:  Negative for sore throat.    Respiratory:  Negative for cough and shortness of breath.    Cardiovascular:  Negative for chest pain and palpitations.   Gastrointestinal:  Negative for abdominal pain, blood in stool, constipation, diarrhea, nausea and vomiting.   Genitourinary:  Positive for frequency. Negative for dysuria and hematuria.   Musculoskeletal:  Negative for arthralgias and back pain.   Skin:  Negative for color change and rash.  "  Neurological:  Negative for syncope and headaches.   Psychiatric/Behavioral:  Negative for agitation and behavioral problems.    All other systems reviewed and are negative.      Objective   /80   Pulse 102   Temp 98.7 °F (37.1 °C) (Temporal)   Resp 20   Ht 5' 9\" (1.753 m)   Wt 96.2 kg (212 lb)   SpO2 99%   BMI 31.31 kg/m²      Physical Exam  Vitals and nursing note reviewed.   Constitutional:       General: He is not in acute distress.     Appearance: He is well-developed.   HENT:      Head: Normocephalic and atraumatic.      Nose: No congestion or rhinorrhea.   Eyes:      Conjunctiva/sclera: Conjunctivae normal.   Cardiovascular:      Rate and Rhythm: Normal rate.      Pulses: no weak pulses.           Dorsalis pedis pulses are 2+ on the right side and 2+ on the left side.        Posterior tibial pulses are 2+ on the right side and 2+ on the left side.   Pulmonary:      Effort: Pulmonary effort is normal.   Abdominal:      Palpations: Abdomen is soft.      Tenderness: There is no abdominal tenderness.   Musculoskeletal:         General: No swelling.      Cervical back: Neck supple.      Right lower leg: No edema.      Left lower leg: No edema.   Feet:      Right foot:      Skin integrity: No ulcer, skin breakdown, erythema, warmth, callus or dry skin.      Left foot:      Skin integrity: No ulcer, skin breakdown, erythema, warmth, callus or dry skin.   Skin:     General: Skin is warm and dry.      Capillary Refill: Capillary refill takes less than 2 seconds.   Neurological:      Mental Status: He is alert.   Psychiatric:         Mood and Affect: Mood normal.         Patient's shoes and socks removed.    Right Foot/Ankle   Right Foot Inspection  Skin Exam: skin normal. Skin not intact, no dry skin, no warmth, no callus, no erythema, no maceration, no abnormal color, no pre-ulcer, no ulcer and no callus.     Toe Exam: No swelling, erythema and  no right toe deformity    Sensory   Vibration: " intact  Proprioception: intact  Monofilament testing: intact    Vascular  Capillary refills: < 3 seconds  The right DP pulse is 2+. The right PT pulse is 2+.     Left Foot/Ankle  Left Foot Inspection  Skin Exam: skin normal. Skin not intact, no dry skin, no warmth, no erythema, no maceration, normal color, no pre-ulcer, no ulcer and no callus.     Toe Exam: No swelling and no erythema.     Sensory   Vibration: intact  Proprioception: intact  Monofilament testing: intact    Vascular  Capillary refills: < 3 seconds  The left DP pulse is 2+. The left PT pulse is 2+.     Assign Risk Category  No deformity present  No loss of protective sensation  No weak pulses  Risk: 0

## 2024-12-19 NOTE — ASSESSMENT & PLAN NOTE
Lab Results   Component Value Date    HGBA1C 7.0 (A) 12/19/2024     Previous A1c 7.2%.  Currently on metformin at 1000 mg twice daily, Jardiance 25 mg daily, glimepiride 2 mg daily, Januvia 100 mg daily.    Plan:  Continue current regimen  If needed for future diabetic control, will consider d/c of glimepiride and januvia and initiation of GLP-1.   Urine collected for send out of micro albumin:cr ratio     Orders:    POCT hemoglobin A1c    POCT urine microalbumin/creatinine

## 2024-12-20 DIAGNOSIS — H04.129 DRY EYE: ICD-10-CM

## 2024-12-20 NOTE — TELEPHONE ENCOUNTER
Med refill    [facility] 0.5-0.9 % SOLN     [facility]     [phone]

## 2024-12-23 RX ORDER — CARBOXYMETHYLCELLULOSE SODIUM AND GLYCERIN 5; 9 MG/ML; MG/ML
1 SOLUTION/ DROPS OPHTHALMIC AS NEEDED
Qty: 15 ML | Refills: 5 | Status: SHIPPED | OUTPATIENT
Start: 2024-12-23

## 2025-01-03 ENCOUNTER — HOSPITAL ENCOUNTER (EMERGENCY)
Facility: HOSPITAL | Age: 45
Discharge: HOME | End: 2025-01-03
Attending: EMERGENCY MEDICINE
Payer: MEDICARE

## 2025-01-03 VITALS
OXYGEN SATURATION: 100 % | SYSTOLIC BLOOD PRESSURE: 131 MMHG | DIASTOLIC BLOOD PRESSURE: 64 MMHG | RESPIRATION RATE: 18 BRPM | HEART RATE: 85 BPM

## 2025-01-03 DIAGNOSIS — R11.0 NAUSEA: ICD-10-CM

## 2025-01-03 DIAGNOSIS — R42 LIGHTHEADEDNESS: Primary | ICD-10-CM

## 2025-01-03 LAB
ALBUMIN SERPL BCG-MCNC: 3.7 G/DL (ref 3.5–5)
ALP SERPL-CCNC: 74 U/L (ref 34–104)
ALT SERPL W P-5'-P-CCNC: 12 U/L (ref 7–52)
ANION GAP SERPL CALCULATED.3IONS-SCNC: 7 MMOL/L (ref 4–13)
AST SERPL W P-5'-P-CCNC: 15 U/L (ref 13–39)
ATRIAL RATE: 85 BPM
ATRIAL RATE: 86 BPM
BASOPHILS # BLD AUTO: 0.04 THOUSANDS/ΜL (ref 0–0.1)
BASOPHILS NFR BLD AUTO: 0 % (ref 0–1)
BILIRUB SERPL-MCNC: 0.38 MG/DL (ref 0.2–1)
BUN SERPL-MCNC: 13 MG/DL (ref 5–25)
CALCIUM SERPL-MCNC: 8.8 MG/DL (ref 8.4–10.2)
CARDIAC TROPONIN I PNL SERPL HS: <2 NG/L (ref ?–50)
CARDIAC TROPONIN I PNL SERPL HS: <2 NG/L (ref ?–50)
CHLORIDE SERPL-SCNC: 103 MMOL/L (ref 96–108)
CO2 SERPL-SCNC: 28 MMOL/L (ref 21–32)
CREAT SERPL-MCNC: 1.64 MG/DL (ref 0.6–1.3)
EOSINOPHIL # BLD AUTO: 0.36 THOUSAND/ΜL (ref 0–0.61)
EOSINOPHIL NFR BLD AUTO: 4 % (ref 0–6)
ERYTHROCYTE [DISTWIDTH] IN BLOOD BY AUTOMATED COUNT: 13.2 % (ref 11.6–15.1)
GFR SERPL CREATININE-BSD FRML MDRD: 50 ML/MIN/1.73SQ M
GLUCOSE SERPL-MCNC: 100 MG/DL (ref 65–140)
GLUCOSE SERPL-MCNC: 168 MG/DL (ref 65–140)
GLUCOSE SERPL-MCNC: 67 MG/DL (ref 65–140)
HCT VFR BLD AUTO: 46 % (ref 36.5–49.3)
HGB BLD-MCNC: 14.7 G/DL (ref 12–17)
IMM GRANULOCYTES # BLD AUTO: 0.06 THOUSAND/UL (ref 0–0.2)
IMM GRANULOCYTES NFR BLD AUTO: 1 % (ref 0–2)
LYMPHOCYTES # BLD AUTO: 2.47 THOUSANDS/ΜL (ref 0.6–4.47)
LYMPHOCYTES NFR BLD AUTO: 24 % (ref 14–44)
MCH RBC QN AUTO: 26.4 PG (ref 26.8–34.3)
MCHC RBC AUTO-ENTMCNC: 32 G/DL (ref 31.4–37.4)
MCV RBC AUTO: 83 FL (ref 82–98)
MONOCYTES # BLD AUTO: 0.71 THOUSAND/ΜL (ref 0.17–1.22)
MONOCYTES NFR BLD AUTO: 7 % (ref 4–12)
NEUTROPHILS # BLD AUTO: 6.65 THOUSANDS/ΜL (ref 1.85–7.62)
NEUTS SEG NFR BLD AUTO: 64 % (ref 43–75)
NRBC BLD AUTO-RTO: 0 /100 WBCS
P AXIS: 51 DEGREES
P AXIS: 54 DEGREES
PLATELET # BLD AUTO: 170 THOUSANDS/UL (ref 149–390)
PMV BLD AUTO: 9.4 FL (ref 8.9–12.7)
POTASSIUM SERPL-SCNC: 4.1 MMOL/L (ref 3.5–5.3)
PR INTERVAL: 172 MS
PR INTERVAL: 176 MS
PROT SERPL-MCNC: 6.8 G/DL (ref 6.4–8.4)
QRS AXIS: -44 DEGREES
QRS AXIS: -48 DEGREES
QRSD INTERVAL: 96 MS
QRSD INTERVAL: 98 MS
QT INTERVAL: 386 MS
QT INTERVAL: 390 MS
QTC INTERVAL: 461 MS
QTC INTERVAL: 464 MS
RBC # BLD AUTO: 5.57 MILLION/UL (ref 3.88–5.62)
SODIUM SERPL-SCNC: 138 MMOL/L (ref 135–147)
T WAVE AXIS: 24 DEGREES
T WAVE AXIS: 30 DEGREES
VENTRICULAR RATE: 85 BPM
VENTRICULAR RATE: 86 BPM
WBC # BLD AUTO: 10.29 THOUSAND/UL (ref 4.31–10.16)

## 2025-01-03 PROCEDURE — 82948 REAGENT STRIP/BLOOD GLUCOSE: CPT

## 2025-01-03 PROCEDURE — 96361 HYDRATE IV INFUSION ADD-ON: CPT

## 2025-01-03 PROCEDURE — 80053 COMPREHEN METABOLIC PANEL: CPT | Performed by: EMERGENCY MEDICINE

## 2025-01-03 PROCEDURE — 36415 COLL VENOUS BLD VENIPUNCTURE: CPT

## 2025-01-03 PROCEDURE — 96374 THER/PROPH/DIAG INJ IV PUSH: CPT

## 2025-01-03 PROCEDURE — 84484 ASSAY OF TROPONIN QUANT: CPT | Performed by: EMERGENCY MEDICINE

## 2025-01-03 PROCEDURE — 99285 EMERGENCY DEPT VISIT HI MDM: CPT

## 2025-01-03 PROCEDURE — 85025 COMPLETE CBC W/AUTO DIFF WBC: CPT | Performed by: EMERGENCY MEDICINE

## 2025-01-03 PROCEDURE — 99284 EMERGENCY DEPT VISIT MOD MDM: CPT | Performed by: EMERGENCY MEDICINE

## 2025-01-03 PROCEDURE — 93005 ELECTROCARDIOGRAM TRACING: CPT

## 2025-01-03 RX ORDER — ONDANSETRON 2 MG/ML
4 INJECTION INTRAMUSCULAR; INTRAVENOUS ONCE
Status: COMPLETED | OUTPATIENT
Start: 2025-01-03 | End: 2025-01-03

## 2025-01-03 RX ORDER — ACETAMINOPHEN 325 MG/1
650 TABLET ORAL ONCE
Status: COMPLETED | OUTPATIENT
Start: 2025-01-03 | End: 2025-01-03

## 2025-01-03 RX ADMIN — SODIUM CHLORIDE 500 ML: 0.9 INJECTION, SOLUTION INTRAVENOUS at 17:11

## 2025-01-03 RX ADMIN — ONDANSETRON 4 MG: 2 INJECTION INTRAMUSCULAR; INTRAVENOUS at 17:12

## 2025-01-03 RX ADMIN — ACETAMINOPHEN 650 MG: 325 TABLET, FILM COATED ORAL at 16:13
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
## 2025-01-03 NOTE — ED ATTENDING ATTESTATION
1/3/2025  ISilvestre MD, saw and evaluated the patient. I have discussed the patient with the resident/non-physician practitioner and agree with the resident's/non-physician practitioner's findings, Plan of Care, and MDM as documented in the resident's/non-physician practitioner's note, except where noted. All available labs and Radiology studies were reviewed.  I was present for key portions of any procedure(s) performed by the resident/non-physician practitioner and I was immediately available to provide assistance.       At this point I agree with the current assessment done in the Emergency Department.  I have conducted an independent evaluation of this patient a history and physical is as follows:  Patient from Group home, hx of DM, felt lightheadedness, hit head on wall, no  LOC, Glu 67, ate after coming to ER, c/o mild HA, no neck pain, no CP, dyspnea, abd or back pain, no fever, cough.  On exam, patient is conscious, alert, vital signs are stable, no acute distress, pupils equal round reactive to light, nonfocal neuroexam, no signs of external head injury, no C-spine tenderness, lungs clear, heart sounds regular, abdomen soft nontender.  Impression: Nonspecific lightheadedness, possibly secondary to low blood glucose, patient had food here, will check labs, recheck glucose.    ED Course     Labs reviewed, no significant abnormality noted.  Patient ate, glucose remained stable, stable for discharge with caregiver.    Critical Care Time  Procedures

## 2025-01-09 ENCOUNTER — OFFICE VISIT (OUTPATIENT)
Dept: FAMILY MEDICINE CLINIC | Facility: CLINIC | Age: 45
End: 2025-01-09

## 2025-01-09 VITALS
WEIGHT: 210 LBS | SYSTOLIC BLOOD PRESSURE: 121 MMHG | BODY MASS INDEX: 31.1 KG/M2 | HEIGHT: 69 IN | DIASTOLIC BLOOD PRESSURE: 78 MMHG | OXYGEN SATURATION: 98 % | TEMPERATURE: 97.6 F | HEART RATE: 92 BPM

## 2025-01-09 DIAGNOSIS — R11.2 NAUSEA AND VOMITING, UNSPECIFIED VOMITING TYPE: Primary | ICD-10-CM

## 2025-01-09 DIAGNOSIS — E11.9 TYPE 2 DIABETES MELLITUS WITHOUT COMPLICATION, WITHOUT LONG-TERM CURRENT USE OF INSULIN (HCC): ICD-10-CM

## 2025-01-09 PROCEDURE — 99213 OFFICE O/P EST LOW 20 MIN: CPT | Performed by: FAMILY MEDICINE

## 2025-01-09 NOTE — ASSESSMENT & PLAN NOTE
Lab Results   Component Value Date    HGBA1C 7.0 (A) 12/19/2024     Currently on metformin at 1000 mg twice daily, Jardiance 25 mg daily, glimepiride 2 mg daily, Januvia 100 mg daily.     Plan:  Continue current regimen  If needed for future diabetic control, will consider d/c of glimepiride and januvia and initiation of GLP-1.   Urine collected for send out of micro albumin:cr ratio

## 2025-01-09 NOTE — PROGRESS NOTES
Name: Sandor Ratliff      : 1980      MRN: [id number]  Encounter Provider: Kyung Goel DO  Encounter Date: 2025   Encounter department: [facility] [place]  :  Assessment & Plan  Nausea and vomiting, unspecified vomiting type  Patient seen in the ED for nausea and vomiting. Found to have glucose in the 60s. Repeat glucose was 100. Patient states that he had a bad hamburger and vomited approximately 12 times within 6 hours and began to feel better.    Plan  Supportive care as discussed   Follow up as scheduled        Type 2 diabetes mellitus without complication, without long-term current use of insulin (formerly [facility])    Lab Results   Component Value Date    HGBA1C 7.0 (A) 2024     Currently on metformin at 1000 mg twice daily, Jardiance 25 mg daily, glimepiride 2 mg daily, Januvia 100 mg daily.     Plan:  Continue current regimen  If needed for future diabetic control, will consider d/c of glimepiride and januvia and initiation of GLP-1.   Urine collected for send out of micro albumin:cr ratio              BMI Counseling: Body mass index is 31.01 kg/m². The BMI is above normal. Nutrition recommendations include decreasing portion sizes, decreasing fast food intake, consuming healthier snacks, limiting drinks that contain sugar, moderation in carbohydrate intake and increasing intake of lean protein. Exercise recommendations include moderate physical activity 150 minutes/week. No pharmacotherapy was ordered. Patient referred to PCP. Rationale for BMI follow-up plan is due to patient being overweight or obese.       History of Present Illness     HPI  44-year-old male here today for an emergency visit follow-up.  He was seen in the emergency department on 3 January for nausea and vomiting the night before.  Initially he was found to have a glucose of 67 and on repeat was 100.  He suspects that it was likely due to a bad hamburger that he ate.  No one else got sick with  "similar symptoms although no one ate something similar to him.  His mom and caretaker also tell me that he has a habit of overeating which causes abdominal pain and sometimes nausea and vomiting.  We discussed the habits of eating slower and smaller portions and going back if he continues to be hungry rather than eating everything all at once.  He is feeling back to normal.  No fevers, chills, nausea, vomiting.  No issues with bowel movements.    Review of Systems   Constitutional:  Negative for chills and fever.   HENT:  Negative for sore throat.    Respiratory:  Negative for cough and shortness of breath.    Cardiovascular:  Negative for chest pain and palpitations.   Gastrointestinal:  Negative for abdominal pain, blood in stool, constipation, diarrhea, nausea and vomiting.   Genitourinary:  Negative for dysuria and hematuria.   Musculoskeletal:  Negative for arthralgias and back pain.   Skin:  Negative for color change and rash.   Neurological:  Negative for syncope and headaches.   Psychiatric/Behavioral:  Negative for agitation and behavioral problems.    All other systems reviewed and are negative.      Objective   /78 (BP Location: Right arm, Patient Position: Sitting, Cuff Size: Standard)   Pulse 92   Temp 97.6 °F (36.4 °C) (Temporal)   Ht 5' 9\" (1.753 m)   Wt 95.3 kg (210 lb)   SpO2 98%   BMI 31.01 kg/m²      Physical Exam  Vitals reviewed.   Constitutional:       Appearance: Normal appearance.   HENT:      Head: Normocephalic.      Nose: Nose normal.      Mouth/Throat:      Mouth: Mucous membranes are moist.   Eyes:      Extraocular Movements: Extraocular movements intact.      Conjunctiva/sclera: Conjunctivae normal.      Pupils: Pupils are equal, round, and reactive to light.   Cardiovascular:      Rate and Rhythm: Normal rate and regular rhythm.      Pulses: Normal pulses.      Heart sounds: Normal heart sounds. No murmur heard.     No friction rub. No gallop.   Pulmonary:      Effort: " Pulmonary effort is normal.      Breath sounds: Normal breath sounds. No wheezing, rhonchi or rales.   Abdominal:      General: Abdomen is flat. Bowel sounds are normal.      Palpations: Abdomen is soft.   Musculoskeletal:         General: Normal range of motion.      Cervical back: Normal range of motion.      Right lower leg: No edema.      Left lower leg: No edema.   Skin:     General: Skin is warm and dry.   Neurological:      General: No focal deficit present.   Psychiatric:      Comments: Pressured speech

## 2025-01-13 ENCOUNTER — TELEPHONE (OUTPATIENT)
Age: 45
End: 2025-01-13

## 2025-01-13 DIAGNOSIS — R10.84 GENERALIZED ABDOMINAL PAIN: ICD-10-CM

## 2025-01-13 RX ORDER — FAMOTIDINE 40 MG/1
40 TABLET, FILM COATED ORAL
Qty: 30 TABLET | Refills: 6 | Status: SHIPPED | OUTPATIENT
Start: 2025-01-13

## 2025-01-20 DIAGNOSIS — K59.00 CONSTIPATION, UNSPECIFIED CONSTIPATION TYPE: ICD-10-CM

## 2025-01-20 DIAGNOSIS — K52.9 NONINFECTIOUS GASTROENTERITIS, UNSPECIFIED TYPE: ICD-10-CM

## 2025-01-20 DIAGNOSIS — F25.0 SCHIZOAFFECTIVE DISORDER, BIPOLAR TYPE (HCC): Primary | ICD-10-CM

## 2025-01-20 DIAGNOSIS — E11.65 TYPE 2 DIABETES MELLITUS WITH HYPERGLYCEMIA, WITHOUT LONG-TERM CURRENT USE OF INSULIN (HCC): ICD-10-CM

## 2025-01-20 DIAGNOSIS — J30.2 SEASONAL ALLERGIES: ICD-10-CM

## 2025-01-20 RX ORDER — BISACODYL 5 MG/1
10 TABLET, DELAYED RELEASE ORAL DAILY PRN
Qty: 30 TABLET | Refills: 0 | Status: SHIPPED | OUTPATIENT
Start: 2025-01-20

## 2025-01-20 RX ORDER — ARIPIPRAZOLE 10 MG/1
10 TABLET ORAL DAILY
Qty: 30 TABLET | Refills: 0 | Status: SHIPPED | OUTPATIENT
Start: 2025-01-20

## 2025-01-20 RX ORDER — CALCIUM CITRATE/VITAMIN D3 200MG-6.25
1 TABLET ORAL 2 TIMES WEEKLY
Qty: 50 STRIP | Refills: 0 | Status: SHIPPED | OUTPATIENT
Start: 2025-01-20

## 2025-01-20 RX ORDER — LOPERAMIDE HYDROCHLORIDE 2 MG/1
2 TABLET ORAL 4 TIMES DAILY PRN
Qty: 30 TABLET | Refills: 0 | Status: SHIPPED | OUTPATIENT
Start: 2025-01-20

## 2025-01-20 RX ORDER — GUAIFENESIN 200 MG/10ML
200 LIQUID ORAL 3 TIMES DAILY PRN
Qty: 120 ML | Refills: 2 | Status: SHIPPED | OUTPATIENT
Start: 2025-01-20

## 2025-01-20 RX ORDER — SITAGLIPTIN 100 MG/1
100 TABLET, FILM COATED ORAL DAILY
Qty: 90 TABLET | Refills: 0 | Status: SHIPPED | OUTPATIENT
Start: 2025-01-20

## 2025-01-22 LAB
ATRIAL RATE: 85 BPM
ATRIAL RATE: 86 BPM
P AXIS: 51 DEGREES
P AXIS: 54 DEGREES
PR INTERVAL: 172 MS
PR INTERVAL: 176 MS
QRS AXIS: -44 DEGREES
QRS AXIS: -48 DEGREES
QRSD INTERVAL: 96 MS
QRSD INTERVAL: 98 MS
QT INTERVAL: 386 MS
QT INTERVAL: 390 MS
QTC INTERVAL: 461 MS
QTC INTERVAL: 464 MS
T WAVE AXIS: 24 DEGREES
T WAVE AXIS: 30 DEGREES
VENTRICULAR RATE: 85 BPM
VENTRICULAR RATE: 86 BPM

## 2025-01-24 ENCOUNTER — CONSULT (OUTPATIENT)
Dept: CARDIOLOGY CLINIC | Facility: CLINIC | Age: 45
End: 2025-01-24
Payer: MEDICARE

## 2025-01-24 VITALS
BODY MASS INDEX: 31.55 KG/M2 | OXYGEN SATURATION: 99 % | HEIGHT: 69 IN | SYSTOLIC BLOOD PRESSURE: 132 MMHG | DIASTOLIC BLOOD PRESSURE: 82 MMHG | WEIGHT: 213 LBS | TEMPERATURE: 97.2 F | HEART RATE: 79 BPM

## 2025-01-24 DIAGNOSIS — E11.42 DIABETIC PERIPHERAL NEUROPATHY (HCC): ICD-10-CM

## 2025-01-24 DIAGNOSIS — E11.65 TYPE 2 DIABETES MELLITUS WITH HYPERGLYCEMIA, WITHOUT LONG-TERM CURRENT USE OF INSULIN (HCC): ICD-10-CM

## 2025-01-24 DIAGNOSIS — R94.31 ABNORMAL EKG: ICD-10-CM

## 2025-01-24 PROCEDURE — 99203 OFFICE O/P NEW LOW 30 MIN: CPT | Performed by: INTERNAL MEDICINE

## 2025-01-24 RX ORDER — TRAZODONE HYDROCHLORIDE 50 MG/1
TABLET, FILM COATED ORAL
COMMUNITY
Start: 2025-01-14
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
## 2025-01-27 DIAGNOSIS — E11.65 TYPE 2 DIABETES MELLITUS WITH HYPERGLYCEMIA, WITHOUT LONG-TERM CURRENT USE OF INSULIN (HCC): ICD-10-CM

## 2025-01-27 DIAGNOSIS — Z00.00 ANNUAL PHYSICAL EXAM: ICD-10-CM

## 2025-01-27 RX ORDER — VITAMIN E 268 MG
400 CAPSULE ORAL DAILY
Qty: 30 CAPSULE | Refills: 5 | Status: SHIPPED | OUTPATIENT
Start: 2025-01-27

## 2025-01-27 RX ORDER — LISINOPRIL 2.5 MG/1
2.5 TABLET ORAL DAILY
Qty: 31 TABLET | Refills: 5 | Status: SHIPPED | OUTPATIENT
Start: 2025-01-27

## 2025-01-27 NOTE — TELEPHONE ENCOUNTER
Micaela from [facility] pharmacy call requesting a new script for the following medications. Thank you.    Requested Prescriptions     Pending Prescriptions Disp Refills    lisinopril (ZESTRIL) 2.5 mg tablet 31 tablet 5     Sig: Take 1 tablet (2.5 mg total) by mouth daily    vitamin E, tocopherol, 400 units capsule 30 capsule 5     Sig: Take 1 capsule (400 Units total) by mouth daily

## 2025-01-28 ENCOUNTER — OFFICE VISIT (OUTPATIENT)
Dept: FAMILY MEDICINE CLINIC | Facility: CLINIC | Age: 45
End: 2025-01-28

## 2025-01-28 ENCOUNTER — TELEPHONE (OUTPATIENT)
Dept: FAMILY MEDICINE CLINIC | Facility: CLINIC | Age: 45
End: 2025-01-28

## 2025-01-28 VITALS
RESPIRATION RATE: 18 BRPM | WEIGHT: 209.6 LBS | BODY MASS INDEX: 30.95 KG/M2 | DIASTOLIC BLOOD PRESSURE: 85 MMHG | HEART RATE: 91 BPM | OXYGEN SATURATION: 99 % | SYSTOLIC BLOOD PRESSURE: 127 MMHG | TEMPERATURE: 98.3 F

## 2025-01-28 DIAGNOSIS — L85.3 DRY SKIN DERMATITIS: Primary | ICD-10-CM

## 2025-01-28 PROCEDURE — 99213 OFFICE O/P EST LOW 20 MIN: CPT

## 2025-01-28 PROCEDURE — G2211 COMPLEX E/M VISIT ADD ON: HCPCS

## 2025-01-28 RX ORDER — PETROLATUM 0.61 G/G
CREAM TOPICAL AS NEEDED
Qty: 99 G | Refills: 3 | Status: SHIPPED | OUTPATIENT
Start: 2025-01-28

## 2025-01-28 NOTE — PROGRESS NOTES
Name: Sandor Ratliff      : 1980      MRN: [id number]  Encounter Provider: Shahbaz Williamson MD  Encounter Date: 2025   Encounter department: [facility] [place]  :  Assessment & Plan  Dry skin dermatitis  Dry flaking skin over the b/l knuckles which is painful and not responded to soaking in epsom salt.     Plan  - Eucerin bid at minimum plus after washing hands  RTC if symptoms do not resolve  Orders:    Skin Protectants, Misc. (eucerin) cream; Apply topically as needed for dry skin (Every morning, at night before bed, and after washing hands)           History of Present Illness   HPI  History of dry flaking skin on the b/l hands. Has only tried soaking his hands in Epsom salt. 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  Review of Systems   Constitutional:  Negative for chills and fever.   HENT:  Negative for ear pain and sore throat.    Eyes:  Negative for pain and visual disturbance.   Respiratory:  Negative for cough and shortness of breath.    Cardiovascular:  Negative for chest pain and palpitations.   Gastrointestinal:  Negative for abdominal pain and vomiting.   Genitourinary:  Negative for dysuria and hematuria.   Musculoskeletal:  Negative for arthralgias and back pain.   Skin:  Negative for color change and rash.   Neurological:  Negative for seizures and syncope.   All other systems reviewed and are negative.      Objective   /85 (BP Location: Left arm, Patient Position: Sitting, Cuff Size: Standard)   Pulse 91   Temp 98.3 °F (36.8 °C) (Temporal)   Resp 18   Wt 95.1 kg (209 lb 9.6 oz)   SpO2 99%   BMI 30.95 kg/m²      Physical Exam  Vitals and nursing note reviewed.   Constitutional:       General: He is not in acute distress.     Appearance: He is well-developed.   HENT:      Head: Normocephalic and atraumatic.   Eyes:      Conjunctiva/sclera: Conjunctivae normal.   Cardiovascular:      Rate and Rhythm: Normal rate and regular rhythm.       Heart sounds: No murmur heard.  Pulmonary:      Effort: Pulmonary effort is normal. No respiratory distress.      Breath sounds: Normal breath sounds.   Abdominal:      Palpations: Abdomen is soft.      Tenderness: There is no abdominal tenderness.   Musculoskeletal:         General: No swelling.      Cervical back: Neck supple.   Skin:     General: Skin is warm and dry.      Capillary Refill: Capillary refill takes less than 2 seconds.      Comments: Dry, flaky scaling skin over the bilateral knuckles   Neurological:      Mental Status: He is alert.   Psychiatric:         Mood and Affect: Mood normal.

## 2025-02-11 DIAGNOSIS — K59.00 CONSTIPATION, UNSPECIFIED CONSTIPATION TYPE: ICD-10-CM

## 2025-02-11 DIAGNOSIS — E11.65 TYPE 2 DIABETES MELLITUS WITH HYPERGLYCEMIA, WITHOUT LONG-TERM CURRENT USE OF INSULIN (HCC): ICD-10-CM

## 2025-02-11 RX ORDER — ASPIRIN 81 MG
2 TABLET, DELAYED RELEASE (ENTERIC COATED) ORAL DAILY
Qty: 62 TABLET | Refills: 5 | Status: SHIPPED | OUTPATIENT
Start: 2025-02-11

## 2025-02-12 ENCOUNTER — OFFICE VISIT (OUTPATIENT)
Dept: URGENT CARE | Facility: MEDICAL CENTER | Age: 45
End: 2025-02-12
Payer: MEDICARE

## 2025-02-12 VITALS
HEART RATE: 97 BPM | SYSTOLIC BLOOD PRESSURE: 125 MMHG | TEMPERATURE: 96.6 F | DIASTOLIC BLOOD PRESSURE: 80 MMHG | WEIGHT: 213 LBS | BODY MASS INDEX: 31.55 KG/M2 | HEIGHT: 69 IN | OXYGEN SATURATION: 99 % | RESPIRATION RATE: 18 BRPM

## 2025-02-12 DIAGNOSIS — R11.2 NAUSEA AND VOMITING, UNSPECIFIED VOMITING TYPE: Primary | ICD-10-CM

## 2025-02-12 DIAGNOSIS — M54.50 ACUTE RIGHT-SIDED LOW BACK PAIN WITHOUT SCIATICA: ICD-10-CM

## 2025-02-12 LAB
GLUCOSE SERPL-MCNC: 210 MG/DL (ref 65–140)
SL AMB  POCT GLUCOSE, UA: 2000
SL AMB LEUKOCYTE ESTERASE,UA: ABNORMAL
SL AMB POCT BILIRUBIN,UA: ABNORMAL
SL AMB POCT BLOOD,UA: ABNORMAL
SL AMB POCT CLARITY,UA: CLEAR
SL AMB POCT COLOR,UA: YELLOW
SL AMB POCT KETONES,UA: ABNORMAL
SL AMB POCT NITRITE,UA: ABNORMAL
SL AMB POCT PH,UA: 8
SL AMB POCT SPECIFIC GRAVITY,UA: 1
SL AMB POCT URINE PROTEIN: ABNORMAL
SL AMB POCT UROBILINOGEN: 0.2

## 2025-02-12 PROCEDURE — G0463 HOSPITAL OUTPT CLINIC VISIT: HCPCS

## 2025-02-12 PROCEDURE — 81002 URINALYSIS NONAUTO W/O SCOPE: CPT

## 2025-02-12 PROCEDURE — 82948 REAGENT STRIP/BLOOD GLUCOSE: CPT

## 2025-02-12 PROCEDURE — 99213 OFFICE O/P EST LOW 20 MIN: CPT

## 2025-02-12 RX ORDER — ONDANSETRON 4 MG/1
4 TABLET, ORALLY DISINTEGRATING ORAL EVERY 6 HOURS PRN
Qty: 30 TABLET | Refills: 0 | Status: SHIPPED | OUTPATIENT
Start: 2025-02-12

## 2025-02-12 NOTE — PATIENT INSTRUCTIONS
[place]/BRAT diet- bananas, rice, apples, toast/crackers  Broths and clear soup  Progress to a normal diet as tolerated  Encourage fluids (such as pedialyte) and hydration  Avoid dairy while symptoms persist    Tylenol for pain/fever  Ice vs. Heat  OTC Biofreeze or Lidoderm patches    Zofran for nausea as needed, take as prescribed     Follow up with PCP in 3-5 days.  Proceed to  ER if symptoms worsen.    If tests are performed, our office will contact you with results only if changes need to made to the care plan discussed with you at the visit. You can review your full results on [facility].
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
## 2025-02-16 DIAGNOSIS — M79.642 HAND PAIN, LEFT: ICD-10-CM

## 2025-02-16 DIAGNOSIS — E55.9 VITAMIN D DEFICIENCY: Primary | ICD-10-CM

## 2025-02-16 RX ORDER — LIDOCAINE 50 MG/G
1 PATCH TOPICAL DAILY
Qty: 30 PATCH | Refills: 0 | Status: SHIPPED | OUTPATIENT
Start: 2025-02-16

## 2025-02-16 RX ORDER — CALCIUM CARBONATE 500(1250)
1 TABLET ORAL
Qty: 90 TABLET | Refills: 3 | Status: SHIPPED | OUTPATIENT
Start: 2025-02-16

## 2025-02-26 ENCOUNTER — OFFICE VISIT (OUTPATIENT)
Dept: PODIATRY | Facility: CLINIC | Age: 45
End: 2025-02-26
Payer: MEDICARE

## 2025-02-26 DIAGNOSIS — E11.42 DIABETIC PERIPHERAL NEUROPATHY (HCC): ICD-10-CM

## 2025-02-26 DIAGNOSIS — B35.1 ONYCHOMYCOSIS: ICD-10-CM

## 2025-02-26 DIAGNOSIS — L84 CALLUS: ICD-10-CM

## 2025-02-26 DIAGNOSIS — E11.65 TYPE 2 DIABETES MELLITUS WITH HYPERGLYCEMIA, WITHOUT LONG-TERM CURRENT USE OF INSULIN (HCC): Primary | ICD-10-CM

## 2025-02-26 PROCEDURE — 11056 PARNG/CUTG B9 HYPRKR LES 2-4: CPT | Performed by: PODIATRIST

## 2025-02-26 PROCEDURE — RECHECK: Performed by: PODIATRIST

## 2025-02-26 PROCEDURE — 11720 DEBRIDE NAIL 1-5: CPT | Performed by: PODIATRIST

## 2025-02-26 NOTE — PROGRESS NOTES
Name: Sandor Ratliff      : 1980      MRN: [id number]  Encounter Provider: Joey Montana DPM  Encounter Date: 2025   Encounter department: [facility] [place]  :  Assessment & Plan  Type 2 diabetes mellitus with hyperglycemia, without long-term current use of insulin (HCC)    Lab Results   Component Value Date    HGBA1C 7.0 (A) 2024            Diabetic peripheral neuropathy (HCC)    Lab Results   Component Value Date    HGBA1C 7.0 (A) 2024            Onychomycosis       Debride mycotic nails and thin the nail plates x 4 with the use of a nail nipper manually and an electric Dremel bur was used to reduce the thickness of the nail beds and smoothed the distal aspect of the nails.   Callus       Debride Tyloma to dermal layer x 1 with the use of a 312 blade and sharp dissection.   It was recommended to the patient that he stop using any antifungal sprays or topicals since there is no pathology noted at this time.  It was explained to him and his aide that prolonged use of topical antifungals with no apparent infection results in the bacteria getting used to that product and medication and will be unable to use it in the future.    Discussed proper shoe gear, daily inspections of feet, and general foot health with patient. Patient has Q9  findings and is recommended for at risk foot care every 9-10 weeks.    Patients most recent complete clinical foot exam was on: 2024      Return in about 10 weeks (around 2025).     History of Present Illness   HPI  Sandor Ratliff is a 44 y.o. male who presents with chief complaint of painful thick nails on both feet and painful calluses on the bottom of both big toes.  He is a diabetic whose sugars up and down.  He was seen today with his support staff present in the room.Patient presents for at-risk foot care.  Patient has no acute concerns today.  Patient has significant lower extremity risk due to neuropathy, parasthesia, edema,  and trophic skin changes to the lower extremity.   History obtained from: patient    Review of Systems  Medical History Reviewed by provider this encounter:     .  Current Outpatient Medications on File Prior to Visit   Medication Sig Dispense Refill    acetaminophen (TYLENOL) 500 mg tablet Take 2 tablets (1,000 mg total) by mouth every 8 (eight) hours 30 tablet 1    Alcohol Swabs (Curity Alcohol Preps) 70 % PADS Use if needed (when checking blood glucose) 200 each 1    ARIPiprazole (ABILIFY) 10 mg tablet Take 1 tablet (10 mg total) by mouth daily 30 tablet 0    atorvastatin (LIPITOR) 40 mg tablet Take 1 tablet (40 mg total) by mouth daily at bedtime 90 tablet 3    B Complex Vitamins (B Complex-B12) TABS Take 1 tablet by mouth daily 1 cap by mouth daily @ 8am 90 tablet 3    bisacodyl (DULCOLAX) 5 mg EC tablet Take 2 tablets (10 mg total) by mouth daily as needed for constipation 30 tablet 0    bismuth subsalicylate (PEPTO BISMOL) 524 mg/30 mL oral suspension Take 15 mL (262 mg total) by mouth every 6 (six) hours as needed for indigestion 360 mL 1    calcium carbonate (OYSTER SHELL,OSCAL) 500 mg Take 1 tablet by mouth daily with breakfast 90 tablet 3    chlorproMAZINE (THORAZINE) 100 mg tablet Take 100 mg by mouth 2 (two) times a day      cholecalciferol (VITAMIN D3) 1,000 units tablet Take 1 tablet (1,000 Units total) by mouth daily 90 tablet 3    Emollient (Eucerin Original Healing) LOTN       Empagliflozin (Jardiance) 25 MG TABS Take 1 tablet (25 mg total) by mouth daily 90 tablet 3    famotidine (PEPCID) 40 MG tablet Take 1 tablet (40 mg total) by mouth daily at bedtime 30 tablet 6    glimepiride (AMARYL) 2 mg tablet Take 1 tablet (2 mg total) by mouth daily with breakfast 30 tablet 3    glucose blood (True Metrix Blood Glucose Test) test strip Use 1 each 2 (two) times a week Use as instructed 50 strip 0    glucose monitoring kit (FREESTYLE) monitoring kit 1 each by Does not apply route 2 (two) times a week 1  each 1    guaiFENesin (ROBITUSSIN) 100 MG/5ML oral liquid Take 10 mL (200 mg total) by mouth 3 (three) times a day as needed for cough 120 mL 2    ibuprofen (MOTRIN) 600 mg tablet Take 1 tablet (600 mg total) by mouth every 8 (eight) hours as needed for moderate pain 30 tablet 0    Januvia 100 MG tablet Take 1 tablet (100 mg total) by mouth daily 90 tablet 0    Lancets (freestyle) lancets Use to test blood sugars 2x weekly on Mon & Thurs @ 8AM 100 each 5    levothyroxine 25 mcg tablet Take 1 tablet (25 mcg total) by mouth daily in the early morning 90 tablet 3    lidocaine (Lidoderm) 5 % Apply 1 patch topically over 12 hours daily Remove & Discard patch within 12 hours or as directed by MD 30 patch 0    lisinopril (ZESTRIL) 2.5 mg tablet Take 1 tablet (2.5 mg total) by mouth daily 31 tablet 5    loperamide (IMODIUM A-D) 2 MG tablet Take 1 tablet (2 mg total) by mouth 4 (four) times a day as needed for diarrhea 30 tablet 0    loratadine (CLARITIN) 10 mg tablet Take 1 tablet (10 mg total) by mouth daily 90 tablet 3    LORazepam (ATIVAN) 0.5 mg tablet Take 0.5 mg by mouth 3 (three) times a day 8AM, 2PM, 8PM      Menthol (Halls Cough Drops) 5.8 MG LOZG Apply 1 lozenge (5.8 mg total) to the mouth or throat 4 (four) times a day as needed (cough) 30 lozenge 5    metFORMIN (GLUCOPHAGE) 1000 MG tablet Take 1 tablet (1,000 mg total) by mouth 2 (two) times a day with meals 180 tablet 3    Mouthwashes (Biotene Dry Mouth) LIQD Take 10 mL by mouth 3 (three) times a day 473 mL 5    OLANZapine (ZyPREXA ZYDIS) 5 mg dispersible tablet       ondansetron (ZOFRAN-ODT) 4 mg disintegrating tablet Take 1 tablet (4 mg total) by mouth every 6 (six) hours as needed for nausea or vomiting 30 tablet 0    polyethylene glycol (MIRALAX) 17 g packet Take 17 g by mouth daily as needed (Constipation) 30 each 3    Refresh Optive 0.5-0.9 % SOLN Administer 0.05 mL to both eyes if needed (To relieve burning, irritation, discomfort due to dryness of the  eye) 15 mL 5    Senna Plus 8.6-50 MG per tablet Take 2 tablets by mouth daily 62 tablet 5    Skin Protectants, Misc. (eucerin) cream Apply topically as needed for dry skin (Every morning, at night before bed, and after washing hands) 99 g 3    Sunscreen SPF50 LOTN Apply topically if needed (apply prior to sun exposure) 296 mL 5    traZODone (DESYREL) 100 mg tablet       traZODone (DESYREL) 50 mg tablet       valproic acid (DEPAKENE) 250 MG/5ML soln Take 10 mL by mouth 2 times daily @ 9 AM and 5 PM and 5 mL by mouth @ 9  mL 0    vitamin E, tocopherol, 400 units capsule Take 1 capsule (400 Units total) by mouth daily 30 capsule 5    propranolol (INDERAL) 20 mg tablet Take 1 tablet (20 mg total) by mouth every 12 (twelve) hours 60 tablet 5     No current facility-administered medications on file prior to visit.      Social History     Tobacco Use    Smoking status: Former    Smokeless tobacco: Never    Tobacco comments:     per [employer]-former smoker   Vaping Use    Vaping status: Never Used   Substance and Sexual Activity    Alcohol use: No     Comment: per [employer]-former consumption of alcohol    Drug use: No    Sexual activity: Never     Partners: Female     Birth control/protection: None        Objective   There were no vitals taken for this visit.     Physical Exam  Vascular status is 1/4 DP PT negative digital hair normal distal cooling, immediate capillary refill bilaterally.  Capillary refill is approximately 2 seconds.    Derm nails are brittle elongated hypertrophic white-yellow discoloration with subungual debris x 4.  There is an increased thickness and the nails are approximately 2 mm.  Hypertrophic tissue is present on the plantar aspect of the first IPJ's bilaterally with no signs of dried blood or trophic changes present.  There is yellow flaky tissue present on the heels of both feet with no cracks or fissures noted.    Ortho and neuro are unchanged since his visit on  11/27/2024.  Administrative Statements   I have spent a total time of 15 minutes in caring for this patient on the day of the visit/encounter including Risks and benefits of tx options, Instructions for management, Patient and family education, Importance of tx compliance, Risk factor reductions, Counseling / Coordination of care, Documenting in the medical record, and Obtaining or reviewing history  .

## 2025-02-28 ENCOUNTER — OFFICE VISIT (OUTPATIENT)
Dept: URGENT CARE | Facility: MEDICAL CENTER | Age: 45
End: 2025-02-28
Payer: MEDICARE

## 2025-02-28 VITALS
OXYGEN SATURATION: 100 % | HEIGHT: 69 IN | TEMPERATURE: 97.2 F | WEIGHT: 208 LBS | HEART RATE: 88 BPM | SYSTOLIC BLOOD PRESSURE: 137 MMHG | DIASTOLIC BLOOD PRESSURE: 78 MMHG | BODY MASS INDEX: 30.81 KG/M2 | RESPIRATION RATE: 18 BRPM

## 2025-02-28 DIAGNOSIS — R05.1 ACUTE COUGH: Primary | ICD-10-CM

## 2025-02-28 PROCEDURE — 99213 OFFICE O/P EST LOW 20 MIN: CPT | Performed by: FAMILY MEDICINE

## 2025-02-28 PROCEDURE — G0463 HOSPITAL OUTPT CLINIC VISIT: HCPCS | Performed by: FAMILY MEDICINE

## 2025-02-28 RX ORDER — BENZONATATE 100 MG/1
100 CAPSULE ORAL 3 TIMES DAILY PRN
Qty: 20 CAPSULE | Refills: 0 | Status: SHIPPED | OUTPATIENT
Start: 2025-02-28

## 2025-02-28 RX ORDER — PREDNISONE 20 MG/1
40 TABLET ORAL DAILY
Qty: 10 TABLET | Refills: 0 | Status: SHIPPED | OUTPATIENT
Start: 2025-02-28 | End: 2025-03-05
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
## 2025-03-01 ENCOUNTER — HOSPITAL ENCOUNTER (EMERGENCY)
Facility: HOSPITAL | Age: 45
Discharge: HOME | End: 2025-03-01
Attending: EMERGENCY MEDICINE | Admitting: EMERGENCY MEDICINE
Payer: MEDICARE

## 2025-03-01 ENCOUNTER — APPOINTMENT (EMERGENCY)
Dept: RADIOLOGY | Facility: HOSPITAL | Age: 45
End: 2025-03-01
Payer: MEDICARE

## 2025-03-01 VITALS
OXYGEN SATURATION: 98 % | SYSTOLIC BLOOD PRESSURE: 136 MMHG | RESPIRATION RATE: 18 BRPM | DIASTOLIC BLOOD PRESSURE: 97 MMHG | TEMPERATURE: 98.2 F | HEART RATE: 105 BPM

## 2025-03-01 DIAGNOSIS — R05.9 COUGH: Primary | ICD-10-CM

## 2025-03-01 PROCEDURE — 93005 ELECTROCARDIOGRAM TRACING: CPT

## 2025-03-01 PROCEDURE — 99284 EMERGENCY DEPT VISIT MOD MDM: CPT

## 2025-03-01 PROCEDURE — 99284 EMERGENCY DEPT VISIT MOD MDM: CPT | Performed by: EMERGENCY MEDICINE

## 2025-03-01 PROCEDURE — 71045 X-RAY EXAM CHEST 1 VIEW: CPT

## 2025-03-02 LAB
ATRIAL RATE: 104 BPM
P AXIS: 59 DEGREES
PR INTERVAL: 168 MS
QRS AXIS: -14 DEGREES
QRSD INTERVAL: 94 MS
QT INTERVAL: 360 MS
QTC INTERVAL: 473 MS
T WAVE AXIS: 61 DEGREES
VENTRICULAR RATE: 104 BPM

## 2025-03-02 PROCEDURE — 93010 ELECTROCARDIOGRAM REPORT: CPT | Performed by: INTERNAL MEDICINE

## 2025-03-02 NOTE — DISCHARGE INSTRUCTIONS
Call [employer] at  [phone] [facility] ([phone]) to obtain a primary care physician.  They will be able to schedule you with a physician who sees patients with your insurance and physicians who see patients without insurance.
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
## 2025-03-02 NOTE — ED ATTENDING ATTESTATION
"3/1/2025  I, Monika Vera MD, saw and evaluated the patient. I have discussed the patient with the resident/non-physician practitioner and agree with the resident's/non-physician practitioner's findings, Plan of Care, and MDM as documented in the resident's/non-physician practitioner's note, except where noted. All available labs and Radiology studies were reviewed.  I was present for key portions of any procedure(s) performed by the resident/non-physician practitioner and I was immediately available to provide assistance.       At this point I agree with the current assessment done in the Emergency Department.  I have conducted an independent evaluation of this patient a history and physical is as follows:    44-year-old male with history of bipolar type schizoaffective disorder and mild intellectual delay, resides at a group home.  Patient presents for evaluation of 1 week of cough.    The patient has had a dry non-productive cough for the last week. He reportedly got a new tattoo today and subsequently got into an argument over the phone with his entire family over the tattoo.  While on the phone arguing with his mom, he had a coughing fit and began to feel lightheaded.  He then dropped to the ground landing on his right knee, was lightheaded but did not fully lose consciousness.  He states that he \"tapped\" the left side of his head against the sink but denies hitting his head hard.  Denies headache, nausea, vomiting, or vision changes.  No shortness of breath or chest pain.    The patient is concerned that his cough was caused by accidentally inhaling some spray deodorant.  He was seen at an urgent care a week and a half ago and prescribed prednisone and Tessalon Perles without improvement in his symptoms.    Physical Exam  Vitals and nursing note reviewed.   Constitutional:       General: He is not in acute distress.     Appearance: He is well-developed. He is not ill-appearing, toxic-appearing or " diaphoretic.   HENT:      Head: Normocephalic and atraumatic.      Right Ear: Tympanic membrane and external ear normal.      Left Ear: Tympanic membrane and external ear normal.      Nose: Nose normal.      Mouth/Throat:      Mouth: Mucous membranes are moist.      Pharynx: Oropharynx is clear. No oropharyngeal exudate or posterior oropharyngeal erythema.   Eyes:      Extraocular Movements: Extraocular movements intact.      Conjunctiva/sclera: Conjunctivae normal.      Pupils: Pupils are equal, round, and reactive to light.   Neck:      Comments: No midline tenderness to palpation over the cervical spine  Cardiovascular:      Rate and Rhythm: Regular rhythm. Tachycardia present.      Pulses: Normal pulses.      Heart sounds: Normal heart sounds. No murmur heard.     No friction rub. No gallop.   Pulmonary:      Effort: Pulmonary effort is normal. No respiratory distress.      Breath sounds: Normal breath sounds. No wheezing or rales.   Abdominal:      General: Bowel sounds are normal. There is no distension.      Palpations: Abdomen is soft.      Tenderness: There is no abdominal tenderness. There is no guarding.   Musculoskeletal:         General: No deformity. Normal range of motion.      Cervical back: Normal range of motion and neck supple.      Right lower leg: No edema.      Left lower leg: No edema.      Comments: Full range of motion of the bilateral knees, no ecchymosis or swelling.  Extremities atraumatic.  No midline tenderness to palpation over the T or L-spine.   Skin:     General: Skin is warm and dry.   Neurological:      General: No focal deficit present.      Mental Status: He is alert and oriented to person, place, and time.      Motor: No abnormal muscle tone.      Comments: Face symmetric, tongue midline.  Clear speech.  Moves all 4 extremities with equal strength.   Psychiatric:         Mood and Affect: Mood normal.               ED Course  ED Course as of 03/01/25 2343   Sat Mar 01, 2025  "  2247 Patient is well-appearing.  No signs of trauma on exam.  He states that he \"tapped\" his head against the sink when he fell to his knee.  No signs of trauma to the head.  Denies headache, vision changes, nausea, or vomiting.  No indication for CT scan of the head at this time.  Given over 1 week of cough will obtain chest x-ray to rule out pneumonia.  No fever or additional symptoms to suggest flu or COVID.  Given report of lightheadedness earlier will check EKG.   2318 I personally interpreted the patient's EKG which reveals sinus tachycardia to 104 bpm, normal axis, prolonged QTc to 473 ms, otherwise normal intervals, similar to most recent prior EKG.  Patient is on multiple QT prolonging agents.  Doubt cardiogenic cause of his lightheadedness earlier in the day.  Patient is very well-appearing.  Admits that all of his symptoms worsen when he became acutely upset while arguing with his mother.  He had no chest pain, difficulty breathing, or significant EKG change to suggest ACS.  Tachycardia in triage improved back in the emergency department.  Chest x-ray without evidence of pneumonia.  Suspect bronchitis, likely viral in etiology.  Recommend Robitussin as needed for cough and follow-up with his doctor in 1 to 2 weeks if cough persists.  No further emergent workup dictated at this time.  Return precautions discussed.         Critical Care Time  Procedures      "

## 2025-03-04 DIAGNOSIS — R05.9 COUGH: ICD-10-CM

## 2025-03-07 ENCOUNTER — OFFICE VISIT (OUTPATIENT)
Dept: URGENT CARE | Facility: MEDICAL CENTER | Age: 45
End: 2025-03-07
Payer: MEDICARE

## 2025-03-07 VITALS
SYSTOLIC BLOOD PRESSURE: 130 MMHG | TEMPERATURE: 97.1 F | DIASTOLIC BLOOD PRESSURE: 84 MMHG | BODY MASS INDEX: 31.01 KG/M2 | RESPIRATION RATE: 18 BRPM | WEIGHT: 210 LBS | HEART RATE: 82 BPM | OXYGEN SATURATION: 100 %

## 2025-03-07 DIAGNOSIS — R05.2 SUBACUTE COUGH: Primary | ICD-10-CM

## 2025-03-07 PROCEDURE — G0463 HOSPITAL OUTPT CLINIC VISIT: HCPCS | Performed by: FAMILY MEDICINE

## 2025-03-07 PROCEDURE — 99213 OFFICE O/P EST LOW 20 MIN: CPT | Performed by: FAMILY MEDICINE

## 2025-03-07 RX ORDER — BENZONATATE 100 MG/1
100 CAPSULE ORAL 3 TIMES DAILY PRN
Qty: 20 CAPSULE | Refills: 0 | Status: SHIPPED | OUTPATIENT
Start: 2025-03-08

## 2025-03-07 RX ORDER — VITAMIN B COMPLEX
CAPSULE ORAL
COMMUNITY
Start: 2025-02-13

## 2025-03-07 RX ORDER — FLUTICASONE PROPIONATE 50 MCG
1 SPRAY, SUSPENSION (ML) NASAL DAILY
Qty: 9.9 ML | Refills: 0 | Status: SHIPPED | OUTPATIENT
Start: 2025-03-07
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
## 2025-03-11 ENCOUNTER — HOSPITAL ENCOUNTER (OUTPATIENT)
Dept: NON INVASIVE DIAGNOSTICS | Facility: CLINIC | Age: 45
Discharge: HOME | End: 2025-03-11
Payer: MEDICARE

## 2025-03-11 ENCOUNTER — RESULTS FOLLOW-UP (OUTPATIENT)
Dept: NON INVASIVE DIAGNOSTICS | Facility: HOSPITAL | Age: 45
End: 2025-03-11

## 2025-03-11 VITALS
HEIGHT: 69 IN | SYSTOLIC BLOOD PRESSURE: 130 MMHG | WEIGHT: 210 LBS | HEART RATE: 82 BPM | BODY MASS INDEX: 31.1 KG/M2 | DIASTOLIC BLOOD PRESSURE: 84 MMHG

## 2025-03-11 DIAGNOSIS — R94.31 ABNORMAL EKG: ICD-10-CM

## 2025-03-11 LAB
AORTIC ROOT: 3.3 CM
ASCENDING AORTA: 3.1 CM
BSA FOR ECHO PROCEDURE: 2.11 M2
E WAVE DECELERATION TIME: 196 MS
E/A RATIO: 1.27
FRACTIONAL SHORTENING: 29 (ref 28–44)
INTERVENTRICULAR SEPTUM IN DIASTOLE (PARASTERNAL SHORT AXIS VIEW): 1 CM
INTERVENTRICULAR SEPTUM: 1 CM (ref 0.6–1.1)
LAAS-AP2: 9.1 CM2
LAAS-AP4: 11.3 CM2
LEFT ATRIUM SIZE: 3.8 CM
LEFT ATRIUM VOLUME (MOD BIPLANE): 22 ML
LEFT ATRIUM VOLUME INDEX (MOD BIPLANE): 10.4 ML/M2
LEFT INTERNAL DIMENSION IN SYSTOLE: 3 CM (ref 2.1–4)
LEFT VENTRICULAR INTERNAL DIMENSION IN DIASTOLE: 4.2 CM (ref 3.5–6)
LEFT VENTRICULAR POSTERIOR WALL IN END DIASTOLE: 1.2 CM
LEFT VENTRICULAR STROKE VOLUME: 45 ML
LV EF US.2D.A4C+ESTIMATED: 56 %
LVSV (TEICH): 45 ML
MV E'TISSUE VEL-LAT: 11 CM/S
MV E'TISSUE VEL-SEP: 7 CM/S
MV PEAK A VEL: 0.48 M/S
MV PEAK E VEL: 61 CM/S
MV STENOSIS PRESSURE HALF TIME: 57 MS
MV VALVE AREA P 1/2 METHOD: 3.86
RA PRESSURE ESTIMATED: 5 MMHG
RIGHT ATRIUM AREA SYSTOLE A4C: 16.2 CM2
RIGHT VENTRICLE ID DIMENSION: 4.7 CM
RV PSP: 30 MMHG
SL CV LEFT ATRIUM LENGTH A2C: 3.8 CM
SL CV LV EF: 55
SL CV PED ECHO LEFT VENTRICLE DIASTOLIC VOLUME (MOD BIPLANE) 2D: 79 ML
SL CV PED ECHO LEFT VENTRICLE SYSTOLIC VOLUME (MOD BIPLANE) 2D: 34 ML
TR MAX PG: 25 MMHG
TR PEAK VELOCITY: 2.5 M/S
TRICUSPID ANNULAR PLANE SYSTOLIC EXCURSION: 1.3 CM
TRICUSPID VALVE PEAK REGURGITATION VELOCITY: 2.49 M/S

## 2025-03-11 PROCEDURE — 93306 TTE W/DOPPLER COMPLETE: CPT

## 2025-03-11 PROCEDURE — 93306 TTE W/DOPPLER COMPLETE: CPT | Performed by: INTERNAL MEDICINE

## 2025-03-13 ENCOUNTER — RA CDI HCC (OUTPATIENT)
Dept: OTHER | Facility: HOSPITAL | Age: 45
End: 2025-03-13

## 2025-03-13 NOTE — PROGRESS NOTES
HCC coding opportunities          Chart Reviewed number of suggestions sent to Provider: 2     Patients Insurance     Medicare Insurance: Medicare        E11.22  N18.3

## 2025-03-19 ENCOUNTER — HOSPITAL ENCOUNTER (EMERGENCY)
Facility: HOSPITAL | Age: 45
Discharge: HOME | End: 2025-03-19
Attending: EMERGENCY MEDICINE | Admitting: EMERGENCY MEDICINE
Payer: MEDICARE

## 2025-03-19 ENCOUNTER — APPOINTMENT (EMERGENCY)
Dept: RADIOLOGY | Facility: HOSPITAL | Age: 45
End: 2025-03-19
Payer: MEDICARE

## 2025-03-19 VITALS
OXYGEN SATURATION: 99 % | TEMPERATURE: 97.7 F | DIASTOLIC BLOOD PRESSURE: 90 MMHG | RESPIRATION RATE: 16 BRPM | SYSTOLIC BLOOD PRESSURE: 171 MMHG | HEART RATE: 91 BPM

## 2025-03-19 DIAGNOSIS — R05.8 ACE-INHIBITOR COUGH: Primary | ICD-10-CM

## 2025-03-19 DIAGNOSIS — T46.4X5A ACE-INHIBITOR COUGH: Primary | ICD-10-CM

## 2025-03-19 PROCEDURE — 99284 EMERGENCY DEPT VISIT MOD MDM: CPT | Performed by: EMERGENCY MEDICINE

## 2025-03-19 PROCEDURE — 71045 X-RAY EXAM CHEST 1 VIEW: CPT

## 2025-03-19 PROCEDURE — 99283 EMERGENCY DEPT VISIT LOW MDM: CPT
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
## 2025-03-19 NOTE — DISCHARGE INSTRUCTIONS
Stop taking your Lisinopril as this may be the cause of your cough.     Revisit with your primary care doctor tomorrow for an alternative medication to control your blood pressure.

## 2025-03-19 NOTE — ED ATTENDING ATTESTATION
3/19/2025  IErnesto DO, saw and evaluated the patient. I have discussed the patient with the resident/non-physician practitioner and agree with the resident's/non-physician practitioner's findings, Plan of Care, and MDM as documented in the resident's/non-physician practitioner's note, except where noted. All available labs and Radiology studies were reviewed.  I was present for key portions of any procedure(s) performed by the resident/non-physician practitioner and I was immediately available to provide assistance.       At this point I agree with the current assessment done in the Emergency Department.  I have conducted an independent evaluation of this patient a history and physical is as follows: 44-year-old male, past medical history per chart, presenting to the emergency department with nonproductive cough times weeks to months.  Patient denies fever, chills, chest pain, shortness of breath, dyspnea on exertion.  Has had previous x-rays which were without acute pathology.    Vital sign stable.  Patient resting comfortably.  Non-ill-appearing.  Lungs clear to auscultation.  No increased work of breathing.  Heart regular rhythm.  No murmur gallop or rub.  Lower extremity without edema, calf tenderness, dissymmetry.  No coughing while in the room.    MDM: 44-year-old male presenting to the emergency department with potential ACE induced cough as his signs and symptoms do follow-up on the timeline of being status post initiation of ACE.  Patient is to stop taking ACE and has anything appointment tomorrow with his primary care doctor to reevaluate further blood pressure control.  Should cessation of ACE not discontinue cough, patient is to continue outpatient chronic cough investigation via PCP. Reviewed all findings both relevant and incidental with the patient at bedside. Pt verbalized understanding of findings, neccesary follow up, return to ED precautions. Pt agreed to review today's findings with their  primary care provider. Pt non-toxic appearing upon discharge.       ED Course  ED Course as of 03/19/25 1524   Wed Mar 19, 2025   0915 Reviewed XR on machine itself. No acute processes.          Critical Care Time  Procedures

## 2025-03-20 DIAGNOSIS — E11.9 TYPE 2 DIABETES MELLITUS WITHOUT COMPLICATION, WITHOUT LONG-TERM CURRENT USE OF INSULIN (HCC): ICD-10-CM

## 2025-03-20 RX ORDER — GLIMEPIRIDE 2 MG/1
2 TABLET ORAL
Qty: 90 TABLET | Refills: 3 | Status: SHIPPED | OUTPATIENT
Start: 2025-03-20 | End: 2025-09-16

## 2025-03-26 DIAGNOSIS — M79.642 HAND PAIN, LEFT: ICD-10-CM

## 2025-03-26 RX ORDER — ACETAMINOPHEN 500 MG
1000 TABLET ORAL EVERY 8 HOURS
Qty: 30 TABLET | Refills: 5 | Status: SHIPPED | OUTPATIENT
Start: 2025-03-26

## 2025-04-02 ENCOUNTER — APPOINTMENT (OUTPATIENT)
Dept: LAB | Facility: MEDICAL CENTER | Age: 45
End: 2025-04-02
Payer: MEDICARE

## 2025-04-02 DIAGNOSIS — Z79.899 ENCOUNTER FOR LONG-TERM (CURRENT) USE OF MEDICATIONS: ICD-10-CM

## 2025-04-02 DIAGNOSIS — F31.81 BIPOLAR II DISORDER (HCC): ICD-10-CM

## 2025-04-02 DIAGNOSIS — N18.9 CHRONIC RENAL IMPAIRMENT, UNSPECIFIED CKD STAGE: ICD-10-CM

## 2025-04-02 DIAGNOSIS — F84.0 AUTISTIC SPECTRUM DISORDER: ICD-10-CM

## 2025-04-02 LAB
ALBUMIN SERPL BCG-MCNC: 4 G/DL (ref 3.5–5)
ALP SERPL-CCNC: 84 U/L (ref 34–104)
ALT SERPL W P-5'-P-CCNC: 19 U/L (ref 7–52)
ANION GAP SERPL CALCULATED.3IONS-SCNC: 5 MMOL/L (ref 4–13)
AST SERPL W P-5'-P-CCNC: 17 U/L (ref 13–39)
BASOPHILS # BLD AUTO: 0.05 THOUSANDS/ÂΜL (ref 0–0.1)
BASOPHILS NFR BLD AUTO: 1 % (ref 0–1)
BILIRUB SERPL-MCNC: 0.29 MG/DL (ref 0.2–1)
BUN SERPL-MCNC: 18 MG/DL (ref 5–25)
CALCIUM SERPL-MCNC: 9.3 MG/DL (ref 8.4–10.2)
CARDIAC TROPONIN I PNL SERPL HS: 3 NG/L (ref 8–18)
CHLORIDE SERPL-SCNC: 100 MMOL/L (ref 96–108)
CO2 SERPL-SCNC: 35 MMOL/L (ref 21–32)
CREAT SERPL-MCNC: 1.49 MG/DL (ref 0.6–1.3)
EOSINOPHIL # BLD AUTO: 0.65 THOUSAND/ÂΜL (ref 0–0.61)
EOSINOPHIL NFR BLD AUTO: 8 % (ref 0–6)
ERYTHROCYTE [DISTWIDTH] IN BLOOD BY AUTOMATED COUNT: 13 % (ref 11.6–15.1)
GFR SERPL CREATININE-BSD FRML MDRD: 56 ML/MIN/1.73SQ M
GLUCOSE P FAST SERPL-MCNC: 159 MG/DL (ref 65–99)
HCT VFR BLD AUTO: 46.4 % (ref 36.5–49.3)
HGB BLD-MCNC: 14.3 G/DL (ref 12–17)
IMM GRANULOCYTES # BLD AUTO: 0.04 THOUSAND/UL (ref 0–0.2)
IMM GRANULOCYTES NFR BLD AUTO: 1 % (ref 0–2)
LIPASE SERPL-CCNC: 44 U/L (ref 11–82)
LYMPHOCYTES # BLD AUTO: 1.1 THOUSANDS/ÂΜL (ref 0.6–4.47)
LYMPHOCYTES NFR BLD AUTO: 13 % (ref 14–44)
MCH RBC QN AUTO: 26.2 PG (ref 26.8–34.3)
MCHC RBC AUTO-ENTMCNC: 30.8 G/DL (ref 31.4–37.4)
MCV RBC AUTO: 85 FL (ref 82–98)
MONOCYTES # BLD AUTO: 0.95 THOUSAND/ÂΜL (ref 0.17–1.22)
MONOCYTES NFR BLD AUTO: 11 % (ref 4–12)
NEUTROPHILS # BLD AUTO: 5.61 THOUSANDS/ÂΜL (ref 1.85–7.62)
NEUTS SEG NFR BLD AUTO: 66 % (ref 43–75)
NRBC BLD AUTO-RTO: 0 /100 WBCS
PLATELET # BLD AUTO: 159 THOUSANDS/UL (ref 149–390)
PMV BLD AUTO: 10 FL (ref 8.9–12.7)
POTASSIUM SERPL-SCNC: 4.6 MMOL/L (ref 3.5–5.3)
PROT SERPL-MCNC: 6.6 G/DL (ref 6.4–8.4)
RBC # BLD AUTO: 5.45 MILLION/UL (ref 3.88–5.62)
SODIUM SERPL-SCNC: 140 MMOL/L (ref 135–147)
VALPROATE SERPL-MCNC: 37 UG/ML (ref 50–125)
WBC # BLD AUTO: 8.4 THOUSAND/UL (ref 4.31–10.16)

## 2025-04-02 PROCEDURE — 80164 ASSAY DIPROPYLACETIC ACD TOT: CPT

## 2025-04-02 PROCEDURE — 80307 DRUG TEST PRSMV CHEM ANLYZR: CPT

## 2025-04-05 LAB
6MAM UR QL SCN: NEGATIVE NG/ML
ACCEPTABLE CREAT UR QL: 43 MG/DL
AMPHET UR QL SCN: NEGATIVE NG/ML
BARBITURATES UR QL SCN: NEGATIVE NG/ML
BENZODIAZ UR QL SCN: NEGATIVE NG/ML
BUPRENORPHINE UR QL CFM: NEGATIVE NG/ML
CANNABINOIDS UR QL SCN: NEGATIVE NG/ML
CARISOPRODOL UR QL: NEGATIVE NG/ML
COCAINE+BZE UR QL SCN: NEGATIVE NG/ML
ETHYL GLUCURONIDE UR QL SCN: NEGATIVE NG/ML
FENTANYL UR QL SCN: NEGATIVE NG/ML
GABAPENTIN SERPLBLD QL SCN: NEGATIVE UG/ML
METHADONE UR QL SCN: NEGATIVE NG/ML
NITRITE UR QL STRIP: NEGATIVE UG/ML
OPIATES UR QL SCN: NEGATIVE NG/ML
OXYCODONE+OXYMORPHONE UR QL SCN: NEGATIVE NG/ML
PCP UR QL SCN: NEGATIVE NG/ML
PROPOXYPH UR QL SCN: NEGATIVE NG/ML
SPECIMEN PH ACCEPTABLE UR: 8.4 (ref 4.5–8.9)
TAPENTADOL UR QL SCN: NEGATIVE NG/ML
TRAMADOL UR QL SCN: NEGATIVE NG/ML

## 2025-04-06 ENCOUNTER — OFFICE VISIT (OUTPATIENT)
Dept: URGENT CARE | Facility: MEDICAL CENTER | Age: 45
End: 2025-04-06
Payer: MEDICARE

## 2025-04-06 VITALS
SYSTOLIC BLOOD PRESSURE: 120 MMHG | DIASTOLIC BLOOD PRESSURE: 76 MMHG | TEMPERATURE: 97.9 F | OXYGEN SATURATION: 98 % | HEART RATE: 92 BPM | RESPIRATION RATE: 18 BRPM

## 2025-04-06 DIAGNOSIS — R05.1 ACUTE COUGH: Primary | ICD-10-CM

## 2025-04-06 PROCEDURE — G0463 HOSPITAL OUTPT CLINIC VISIT: HCPCS | Performed by: PHYSICIAN ASSISTANT

## 2025-04-06 PROCEDURE — 99213 OFFICE O/P EST LOW 20 MIN: CPT | Performed by: PHYSICIAN ASSISTANT

## 2025-04-06 RX ORDER — BENZONATATE 100 MG/1
100 CAPSULE ORAL 3 TIMES DAILY PRN
Qty: 20 CAPSULE | Refills: 0 | Status: SHIPPED | OUTPATIENT
Start: 2025-04-06
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
## 2025-04-06 NOTE — PATIENT INSTRUCTIONS
Cough  Tessalon pearls as needed for cough  Humidifiers in room, steam from shower  Follow up with PCP in 3-5 days.  Proceed to  ER if symptoms worsen.

## 2025-04-09 ENCOUNTER — OFFICE VISIT (OUTPATIENT)
Dept: NEPHROLOGY | Facility: CLINIC | Age: 45
End: 2025-04-09

## 2025-04-09 VITALS
SYSTOLIC BLOOD PRESSURE: 112 MMHG | HEART RATE: 80 BPM | BODY MASS INDEX: 31.01 KG/M2 | DIASTOLIC BLOOD PRESSURE: 70 MMHG | HEIGHT: 69 IN

## 2025-04-09 DIAGNOSIS — N18.9 CHRONIC RENAL IMPAIRMENT, UNSPECIFIED CKD STAGE: Primary | ICD-10-CM

## 2025-04-09 NOTE — LETTER
2025     Kyung Goel DO  [address]  [place] [address]    Patient: Sandor Ratliff   YOB: 1980   Date of Visit: 2025       Dear Dr. Kyung Goel DO:    Thank you for referring Sandor Ratliff to me for evaluation. Below are my notes for this consultation.    If you have questions, please do not hesitate to call me. I look forward to following your patient along with you.         Sincerely,        Manuel Elena MD        CC: No Recipients    Manuel Elena MD  2025  7:47 AM  Sign when Signing Visit  Name: Sandor Ratliff      : 1980      MRN: [id number]  Encounter Provider: Manuel Elena MD  Encounter Date: 2025   Encounter department: [facility] [place]  :  Assessment & Plan  Chronic renal impairment, unspecified CKD stage  Lab Results   Component Value Date    EGFR 56 2025    EGFR 50 2025    EGFR 49 2024    CREATININE 1.49 (H) 2025    CREATININE 1.64 (H) 2025    CREATININE 1.67 (H) 2024     The patient has chronic renal insufficiency with no proteinuria his last estimation was negative for albumin.  That tells me he does not have an aggressive intrinsic process.  He has been on lithium for many years so potentially may have had a little chronic interstitial injury but his creatinine stable its actually 1.4 so there is been no progression over a long time.  He has a good prognosis his blood pressure is under good control he knows to avoid anti-inflammatories and overall is doing well.  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    Will continue to monitor with blood pressure control  At home he has a good prognosis continue with routine health care    He will do labs prior to this visit but do not call if there is any problems before next visit.    He will be seeing his family physician but it might be  "helpful if he received a short course of an antibiotic patient potentially has a little bacterial bronchitis if it has been going on for months but I will leave that to the discretion of his primary doctor who he is seeing this week.    Orders:  •  Basic metabolic panel; Future    I have spent a total time of 30 minutes in caring for this patient on the day of the visit/encounter including Diagnostic results, Prognosis, Patient and family education, Impressions, Reviewing/placing orders in the medical record (including tests, medications, and/or procedures), and Obtaining or reviewing history  .     History of Present Illness  HPI  Sandor Ratliff is a 44 y.o. male who presents for routine follow-up with his escort.  The patient is very talkative today but he is on point although at times there is a little looseness of associations.  The patient's been having some upper respiratory symptoms with a cough that is been going on for couple months.  He says he is even visited an urgent care.  No fever.  History obtained from: patient and his escort.    Review of Systems   Constitutional:  Negative for chills, diaphoresis and fever.   HENT:  Positive for congestion.    Eyes: Negative.    Respiratory:  Positive for cough. Negative for chest tightness, shortness of breath and wheezing.    Cardiovascular:  Negative for chest pain and leg swelling.   Gastrointestinal:  Negative for abdominal pain, diarrhea, nausea and vomiting.   Genitourinary: Negative.    Neurological:  Negative for dizziness and headaches.   Psychiatric/Behavioral:  Negative for agitation and confusion.           Objective  /70 (BP Location: Left arm, Patient Position: Sitting, Cuff Size: Standard)   Pulse 80   Ht 5' 9\" (1.753 m)   BMI 31.01 kg/m²      Physical Exam  Constitutional:       General: He is not in acute distress.     Appearance: He is not toxic-appearing.   HENT:      Head: Normocephalic and atraumatic.      Nose:      Comments: Wearing " mask.     Mouth/Throat:      Comments: Wearing mask.  Eyes:      Extraocular Movements: Extraocular movements intact.   Cardiovascular:      Rate and Rhythm: Normal rate and regular rhythm.      Heart sounds:      No gallop.      Comments: No edema.  Pulmonary:      Effort: Pulmonary effort is normal. No respiratory distress.      Breath sounds: No wheezing or rales.   Abdominal:      General: Bowel sounds are normal. There is no distension.      Palpations: Abdomen is soft.      Tenderness: There is no abdominal tenderness.   Neurological:      Mental Status: He is alert and oriented to person, place, and time.

## 2025-04-09 NOTE — PATIENT INSTRUCTIONS
You are here for follow-up sounds like your health is been doing well except you seem to have an upper respiratory infection for a few months you are seeing your family doctor tomorrow.  Hopefully will give you little antibiotic to help get this kicked away as it has been going on for a long time.    With respect to your kidney function your creatinine which is the blood test for the kidney function was 1.4 which is stable over the last couple years with no progression.  You do not have protein in your urine that is a good thing because protein in the urine means that you have a more aggressive kidney disease including diabetes so this means you do not have diabetic kidney disease which is good.    May be in the past years of lithium caused a little bit of damage to the kidneys but now its better than it has been you are off the medication and you have a good prognosis.    She has excellent off of the lisinopril so no need to resume that.    Labs and follow-up as scheduled call if there is any problems before next visit.

## 2025-04-09 NOTE — ASSESSMENT & PLAN NOTE
Lab Results   Component Value Date    EGFR 56 04/02/2025    EGFR 50 01/03/2025    EGFR 49 11/04/2024    CREATININE 1.49 (H) 04/02/2025    CREATININE 1.64 (H) 01/03/2025    CREATININE 1.67 (H) 11/04/2024     The patient has chronic renal insufficiency with no proteinuria his last estimation was negative for albumin.  That tells me he does not have an aggressive intrinsic process.  He has been on lithium for many years so potentially may have had a little chronic interstitial injury but his creatinine stable its actually 1.4 so there is been no progression over a long time.  He has a good prognosis his blood pressure is under good control he knows to avoid anti-inflammatories and overall is doing well.  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    Will continue to monitor with blood pressure control  At home he has a good prognosis continue with routine health care    He will do labs prior to this visit but do not call if there is any problems before next visit.    He will be seeing his family physician but it might be helpful if he received a short course of an antibiotic patient potentially has a little bacterial bronchitis if it has been going on for months but I will leave that to the discretion of his primary doctor who he is seeing this week.    Orders:    Basic metabolic panel; Future

## 2025-04-09 NOTE — PROGRESS NOTES
Name: Sandor Ratliff      : 1980      MRN: [id number]  Encounter Provider: Manuel Elena MD  Encounter Date: 2025   Encounter department: [facility] [place]  :  Assessment & Plan  Chronic renal impairment, unspecified CKD stage  Lab Results   Component Value Date    EGFR 56 2025    EGFR 50 2025    EGFR 49 2024    CREATININE 1.49 (H) 2025    CREATININE 1.64 (H) 2025    CREATININE 1.67 (H) 2024     The patient has chronic renal insufficiency with no proteinuria his last estimation was negative for albumin.  That tells me he does not have an aggressive intrinsic process.  He has been on lithium for many years so potentially may have had a little chronic interstitial injury but his creatinine stable its actually 1.4 so there is been no progression over a long time.  He has a good prognosis his blood pressure is under good control he knows to avoid anti-inflammatories and overall is doing well.  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    Will continue to monitor with blood pressure control  At home he has a good prognosis continue with routine health care    He will do labs prior to this visit but do not call if there is any problems before next visit.    He will be seeing his family physician but it might be helpful if he received a short course of an antibiotic patient potentially has a little bacterial bronchitis if it has been going on for months but I will leave that to the discretion of his primary doctor who he is seeing this week.    Orders:    Basic metabolic panel; Future    I have spent a total time of 30 minutes in caring for this patient on the day of the visit/encounter including Diagnostic results, Prognosis, Patient and family education, Impressions, Reviewing/placing orders in the medical record (including tests, medications,  "and/or procedures), and Obtaining or reviewing history  .     History of Present Illness   HPI  Sandor Ratliff is a 44 y.o. male who presents for routine follow-up with his escort.  The patient is very talkative today but he is on point although at times there is a little looseness of associations.  The patient's been having some upper respiratory symptoms with a cough that is been going on for couple months.  He says he is even visited an urgent care.  No fever.  History obtained from: patient and his escort.    Review of Systems   Constitutional:  Negative for chills, diaphoresis and fever.   HENT:  Positive for congestion.    Eyes: Negative.    Respiratory:  Positive for cough. Negative for chest tightness, shortness of breath and wheezing.    Cardiovascular:  Negative for chest pain and leg swelling.   Gastrointestinal:  Negative for abdominal pain, diarrhea, nausea and vomiting.   Genitourinary: Negative.    Neurological:  Negative for dizziness and headaches.   Psychiatric/Behavioral:  Negative for agitation and confusion.           Objective   /70 (BP Location: Left arm, Patient Position: Sitting, Cuff Size: Standard)   Pulse 80   Ht 5' 9\" (1.753 m)   BMI 31.01 kg/m²      Physical Exam  Constitutional:       General: He is not in acute distress.     Appearance: He is not toxic-appearing.   HENT:      Head: Normocephalic and atraumatic.      Nose:      Comments: Wearing mask.     Mouth/Throat:      Comments: Wearing mask.  Eyes:      Extraocular Movements: Extraocular movements intact.   Cardiovascular:      Rate and Rhythm: Normal rate and regular rhythm.      Heart sounds:      No gallop.      Comments: No edema.  Pulmonary:      Effort: Pulmonary effort is normal. No respiratory distress.      Breath sounds: No wheezing or rales.   Abdominal:      General: Bowel sounds are normal. There is no distension.      Palpations: Abdomen is soft.      Tenderness: There is no abdominal tenderness. "   Neurological:      Mental Status: He is alert and oriented to person, place, and time.

## 2025-04-10 ENCOUNTER — OFFICE VISIT (OUTPATIENT)
Dept: FAMILY MEDICINE CLINIC | Facility: CLINIC | Age: 45
End: 2025-04-10

## 2025-04-10 ENCOUNTER — TELEPHONE (OUTPATIENT)
Dept: FAMILY MEDICINE CLINIC | Facility: CLINIC | Age: 45
End: 2025-04-10

## 2025-04-10 VITALS
DIASTOLIC BLOOD PRESSURE: 80 MMHG | TEMPERATURE: 98.7 F | RESPIRATION RATE: 18 BRPM | OXYGEN SATURATION: 98 % | HEART RATE: 88 BPM | SYSTOLIC BLOOD PRESSURE: 121 MMHG | HEIGHT: 69 IN | WEIGHT: 205.8 LBS | BODY MASS INDEX: 30.48 KG/M2

## 2025-04-10 DIAGNOSIS — E11.9 TYPE 2 DIABETES MELLITUS WITHOUT COMPLICATION, WITHOUT LONG-TERM CURRENT USE OF INSULIN (HCC): Primary | ICD-10-CM

## 2025-04-10 DIAGNOSIS — R05.3 CHRONIC COUGH: ICD-10-CM

## 2025-04-10 PROCEDURE — 99213 OFFICE O/P EST LOW 20 MIN: CPT | Performed by: FAMILY MEDICINE

## 2025-04-10 PROCEDURE — G2211 COMPLEX E/M VISIT ADD ON: HCPCS | Performed by: FAMILY MEDICINE

## 2025-04-10 NOTE — ASSESSMENT & PLAN NOTE
Productive cough since approximately March.  He has been seen in the ED twice for this both x-rays negative.  He has failed Tessalon Perles, Robitussin and supportive measures.  Decreased lung sounds bilateral bases which may represent presence of atelectasis    Plan:   His lisinopril was stopped due to suspicion for cough being secondary to this medication.  Blood pressure is controlled in the office today, will hold on lisinopril until etiology of cough is worked up  Repeat chest x-ray.  If there are signs of pneumonia, will treat with antibiotics  If negative for bacterial suspicion, will trial gabapentin for short course    Orders:    XR chest pa and lateral; Future

## 2025-04-10 NOTE — ASSESSMENT & PLAN NOTE
Lab Results   Component Value Date    HGBA1C 7.7 (H) 04/02/2025     Currently on metformin at 1000 mg twice daily, Jardiance 25 mg daily, glimepiride 2 mg daily, Januvia 100 mg daily.     Plan:  At this time, due to worsening A1C will begin GLP-1 in place of glimepiride and Januvia. Patient agreed with plan.     Prior Authorization Clinical Questions for Weight Management Pharmacotherapy    1. Does the patient have a contrainidcation to medication prescribed for weight management?: No  2. Does the patient have a diagnosis of obesity, confirmed by a BMI greater than or equal to 30 kg/m^2?: Yes  3. Does the patient have a BMI of greater than or equal to 27 kg/m^2 with at least one weight-related comorbidity/risk factor/complication (e.g. diabetes, dyslipidemia, coronary artery disease)?: Yes  4. Weight-related co-morbidities/risk factors: type 2 diabetes  5. WEGOVY CVA Indication: Does patient have established documented cardiovascular disease (history of a prior heart attack (myocardial infarction), stroke, or symptomatic peripheral arterial disease (PAD)?: No  6. ZEPBOUND MIGUE Indication: Does patient have documented MIGUE diagnosed via sleep study (insurance will require copy of sleep study results for approval)?: No  7. Has the patient been on a weight loss regimen of low-calorie diet, increased physical activity, and lifestyle modifications for a minimum of 6 months?: Yes  8. Has the patient completed a comprehensive weight loss program (ie, Weight Watchers, Noom, Bariatrics, other [place] on phone)? If so, what?: No  9. Does the patient have a history of type 2 diabetes?: Yes  10. Has the member tried and failed other weight loss medication within the past 12 months?: No  11. Will the member use requested medication in combination with another GLP agonist or weight loss drug?: No  12. Is the medication a controlled substance?: No     Baseline weight (in pounds): 205 lbs  Current weight (in pounds): 205 lbs  Weight  loss percentage: 0%          Orders:    semaglutide, 0.25 or 0.5 mg/dose, (Ozempic, 0.25 or 0.5 MG/DOSE,) 2 mg/3 mL injection pen; Inject 0.375 mL (0.25 mg total) under the skin every 7 days

## 2025-04-10 NOTE — PROGRESS NOTES
Name: Sandor Ratliff      : 1980      MRN: [id number]  Encounter Provider: Kyung Goel DO  Encounter Date: 4/10/2025   Encounter department: [facility] [place]  :  Assessment & Plan  Type 2 diabetes mellitus without complication, without long-term current use of insulin (HCC)    Lab Results   Component Value Date    HGBA1C 7.7 (H) 2025     Currently on metformin at 1000 mg twice daily, Jardiance 25 mg daily, glimepiride 2 mg daily, Januvia 100 mg daily.     Plan:  At this time, due to worsening A1C will begin GLP-1 in place of glimepiride and Januvia. Patient agreed with plan.     Prior Authorization Clinical Questions for Weight Management Pharmacotherapy    1. Does the patient have a contrainidcation to medication prescribed for weight management?: No  2. Does the patient have a diagnosis of obesity, confirmed by a BMI greater than or equal to 30 kg/m^2?: Yes  3. Does the patient have a BMI of greater than or equal to 27 kg/m^2 with at least one weight-related comorbidity/risk factor/complication (e.g. diabetes, dyslipidemia, coronary artery disease)?: Yes  4. Weight-related co-morbidities/risk factors: type 2 diabetes  5. WEGOVY CVA Indication: Does patient have established documented cardiovascular disease (history of a prior heart attack (myocardial infarction), stroke, or symptomatic peripheral arterial disease (PAD)?: No  6. ZEPBOUND MIGUE Indication: Does patient have documented MIGUE diagnosed via sleep study (insurance will require copy of sleep study results for approval)?: No  7. Has the patient been on a weight loss regimen of low-calorie diet, increased physical activity, and lifestyle modifications for a minimum of 6 months?: Yes  8. Has the patient completed a comprehensive weight loss program (ie, Weight Watchers, Noom, Bariatrics, other [place] on phone)? If so, what?: No  9. Does the patient have a history of type 2 diabetes?: Yes  10. Has the member tried  and failed other weight loss medication within the past 12 months?: No  11. Will the member use requested medication in combination with another GLP agonist or weight loss drug?: No  12. Is the medication a controlled substance?: No     Baseline weight (in pounds): 205 lbs  Current weight (in pounds): 205 lbs  Weight loss percentage: 0%          Orders:    semaglutide, 0.25 or 0.5 mg/dose, (Ozempic, 0.25 or 0.5 MG/DOSE,) 2 mg/3 mL injection pen; Inject 0.375 mL (0.25 mg total) under the skin every 7 days      Chronic cough  Productive cough since approximately March.  He has been seen in the ED twice for this both x-rays negative.  He has failed Tessalon Perles, Robitussin and supportive measures.  Decreased lung sounds bilateral bases which may represent presence of atelectasis    Plan:   His lisinopril was stopped due to suspicion for cough being secondary to this medication.  Blood pressure is controlled in the office today, will hold on lisinopril until etiology of cough is worked up  Repeat chest x-ray.  If there are signs of pneumonia, will treat with antibiotics  If negative for bacterial suspicion, will trial gabapentin for short course    Orders:    XR chest pa and lateral; Future           History of Present Illness   HPI  Sandor is a 44-year-old male who presents today for follow-up on diabetes.  His A1c has unfortunately risen from 7 to 7.7%.  We discussed initiation of Ozempic which he is agreeable to.  We discussed that we will discontinue glimepiride and Januvia if insurance is able to approve Ozempic.    He also complains of a chronic cough.  The cough began approximately 1 month ago and he was seen in the emergency department twice for this.  He had a chest x-ray both times and both times chest x-ray did not show any lung pathology.  He does not have a history of smoking and he denies being ill recently.  He states that he did have a cold at some point but it has now resolved and the cough has  "lingered.  It is a wet cough with sometimes productive sputum that is worsening when he lies down.  He recently had an echo that showed an EF of 55%.  He denies any fevers or chills or abdominal pain, nausea, vomiting.  He has attempted Tessalon Perles and Robitussin without relief.  Review of Systems   Constitutional:  Negative for chills and fever.   HENT:  Negative for sore throat.    Respiratory:  Positive for cough. Negative for shortness of breath.    Cardiovascular:  Negative for chest pain and palpitations.   Gastrointestinal:  Negative for abdominal pain, blood in stool, constipation, diarrhea, nausea and vomiting.   Genitourinary:  Negative for dysuria and hematuria.   Musculoskeletal:  Negative for arthralgias and back pain.   Skin:  Negative for color change and rash.   Neurological:  Negative for syncope and headaches.   Psychiatric/Behavioral:  Negative for agitation and behavioral problems.    All other systems reviewed and are negative.      Objective   /80 (BP Location: Left arm, Patient Position: Sitting, Cuff Size: Large)   Pulse 88   Temp 98.7 °F (37.1 °C) (Temporal)   Resp 18   Ht 5' 9\" (1.753 m)   Wt 93.4 kg (205 lb 12.8 oz)   SpO2 98%   BMI 30.39 kg/m²      Physical Exam  Vitals and nursing note reviewed.   Constitutional:       General: He is not in acute distress.     Appearance: He is well-developed.   HENT:      Head: Normocephalic and atraumatic.   Eyes:      Conjunctiva/sclera: Conjunctivae normal.   Cardiovascular:      Rate and Rhythm: Normal rate and regular rhythm.      Heart sounds: No murmur heard.  Pulmonary:      Effort: Pulmonary effort is normal. No respiratory distress.      Breath sounds: No wheezing, rhonchi or rales.      Comments: Decreased lung sounds at bilateral bases  Abdominal:      Palpations: Abdomen is soft.      Tenderness: There is no abdominal tenderness.   Musculoskeletal:         General: No swelling.      Cervical back: Neck supple.      Right " lower leg: No edema.      Left lower leg: No edema.   Skin:     General: Skin is warm and dry.      Capillary Refill: Capillary refill takes less than 2 seconds.   Neurological:      Mental Status: He is alert.   Psychiatric:         Mood and Affect: Mood normal.

## 2025-04-10 NOTE — TELEPHONE ENCOUNTER
Patient came to appointment today, had meed medical examination case note. Form completed at time of visit. Original given back to patient, copy placed in the red clerical folder to be scanned to the chart.

## 2025-04-11 ENCOUNTER — TELEPHONE (OUTPATIENT)
Dept: FAMILY MEDICINE CLINIC | Facility: CLINIC | Age: 45
End: 2025-04-11

## 2025-04-11 ENCOUNTER — HOSPITAL ENCOUNTER (OUTPATIENT)
Dept: RADIOLOGY | Facility: HOSPITAL | Age: 45
Discharge: HOME | End: 2025-04-11
Payer: MEDICARE

## 2025-04-11 DIAGNOSIS — R05.3 CHRONIC COUGH: ICD-10-CM

## 2025-04-11 PROCEDURE — 71046 X-RAY EXAM CHEST 2 VIEWS: CPT

## 2025-04-11 NOTE — TELEPHONE ENCOUNTER
Spoke with patients mother after speaking to attending , informed of Xray results and that patinets PCP will follow up on Monday with formal impressions also for discussion of Gabapentin trailDyan Verbally understood,

## 2025-04-11 NOTE — TELEPHONE ENCOUNTER
Call on 4/11/25 Length (14Mins 1Sec)    Patients mother called asking to speak directly with Dr. Goel in regards to the patients chest Xray, She was looking to see if the results of the Xray can be expedited as she would like an antibiotic sent to the pharmacy before the weekend, she is currently in [facility] and was upset with the treatment and communication from [facility] as it was difficult to get in touch with them     Informed her the patient had just gotten the Xray done today at 950 Am, also the provider is not currently in the office, but based on the encounter the patient had 2 previous chest X-rays and based on the readings from this current one a plan would follow, Dyan was asking if she could have an answer today for a medication to be sent out or a plan, informed the Imaging office would still need to complete the report before the Provider is able to provide an impression and course of action,       Dyan Evy,  [phone]    Carlyn Chicas (Care Giver)  [phone]    [facility]

## 2025-04-14 ENCOUNTER — TELEPHONE (OUTPATIENT)
Dept: FAMILY MEDICINE CLINIC | Facility: CLINIC | Age: 45
End: 2025-04-14

## 2025-04-14 DIAGNOSIS — R05.3 CHRONIC COUGH: Primary | ICD-10-CM

## 2025-04-14 RX ORDER — GABAPENTIN 100 MG/1
100 CAPSULE ORAL 3 TIMES DAILY
Qty: 90 CAPSULE | Refills: 0 | Status: SHIPPED | OUTPATIENT
Start: 2025-04-14 | End: 2025-04-21

## 2025-04-14 NOTE — TELEPHONE ENCOUNTER
Contacted patient's mother at request of patient's mother.  Informed Dyan that the chest x-ray is negative and we will plan to initiate gabapentin 100 mg 3 times daily for chronic cough when patient is seen on Thursday.  All questions and concerns answered at this time.

## 2025-04-14 NOTE — TELEPHONE ENCOUNTER
Left voicemail for Carlyn regarding Sandor's gabapentin which is to be taken 3 times daily at 100 mg.  This will be for chronic cough.  We will follow-up regularly to monitor cough and ensure that it is improving on this medication.  He has a follow-up appointment with Dr. Issa on 4/17 that I encouraged him to keep.  Will send gabapentin 100 mg 3 times daily to the pharmacy

## 2025-04-15 ENCOUNTER — TELEPHONE (OUTPATIENT)
Dept: FAMILY MEDICINE CLINIC | Facility: CLINIC | Age: 45
End: 2025-04-15

## 2025-04-15 NOTE — TELEPHONE ENCOUNTER
Called and leave detail voice message stating appointment schedule for 4/17 with Dr. Issa has been cancelled.

## 2025-04-21 ENCOUNTER — OFFICE VISIT (OUTPATIENT)
Dept: FAMILY MEDICINE CLINIC | Facility: CLINIC | Age: 45
End: 2025-04-21

## 2025-04-21 VITALS
TEMPERATURE: 97.9 F | DIASTOLIC BLOOD PRESSURE: 86 MMHG | OXYGEN SATURATION: 99 % | HEIGHT: 69 IN | WEIGHT: 206 LBS | SYSTOLIC BLOOD PRESSURE: 127 MMHG | HEART RATE: 89 BPM | BODY MASS INDEX: 30.51 KG/M2

## 2025-04-21 DIAGNOSIS — R05.2 SUBACUTE COUGH: Primary | ICD-10-CM

## 2025-04-21 DIAGNOSIS — R05.3 CHRONIC COUGH: ICD-10-CM

## 2025-04-21 PROCEDURE — 99213 OFFICE O/P EST LOW 20 MIN: CPT | Performed by: FAMILY MEDICINE

## 2025-04-21 PROCEDURE — G2211 COMPLEX E/M VISIT ADD ON: HCPCS | Performed by: FAMILY MEDICINE

## 2025-04-21 RX ORDER — GABAPENTIN 100 MG/1
100 CAPSULE ORAL 3 TIMES DAILY
Qty: 90 CAPSULE | Refills: 1 | Status: SHIPPED | OUTPATIENT
Start: 2025-04-21 | End: 2025-04-21

## 2025-04-21 RX ORDER — GABAPENTIN 100 MG/1
100 CAPSULE ORAL 3 TIMES DAILY
Qty: 3 CAPSULE | Refills: 0 | Status: SHIPPED | OUTPATIENT
Start: 2025-04-21 | End: 2025-04-22

## 2025-04-22 PROBLEM — R05.2 SUBACUTE COUGH: Status: ACTIVE | Noted: 2021-12-29

## 2025-04-22 NOTE — PROGRESS NOTES
Name: Sandor Ratliff      : 1980      MRN: [id number]  Encounter Provider: Romain Maloney MD  Encounter Date: 2025   Encounter department: [facility] [place]  :  Assessment & Plan  Chronic cough  patient presenting for follow up and re-evaluation of subacute cough. cough first noted 25, persistent since that time despite multiple treatment attempts. recent cxr negative for acute findings. At last visit, patient was started on gabapentin as an anti-tussive.     gabapentin has worked well and the cough is nearly resolved. No other respiratory issues today or new symptoms.   - patient requested discontinuation of gabapentin now that the cough is improved  - rec'd finishing today's doses and d/c'ing tomorrow; he was agreeable  - reviewed changes with caregiver. standard visit form completed and returned, copy to be scanned into chart    Orders:    gabapentin (Neurontin) 100 mg capsule; Take 1 capsule (100 mg total) by mouth 3 (three) times a day for 1 day    Subacute cough  patient presenting for follow up and re-evaluation of subacute cough. cough first noted 25, persistent since that time despite multiple treatment attempts. recent cxr negative for acute findings. At last visit, patient was started on gabapentin as an anti-tussive.     gabapentin has worked well and the cough is nearly resolved. No other respiratory issues today or new symptoms.   - patient requested discontinuation of gabapentin now that the cough is improved  - rec'd finishing today's doses and d/c'ing tomorrow; he was agreeable  - reviewed changes with caregiver. standard visit form completed and returned, copy to be scanned into chart                History of Present Illness   Cough  The current episode started more than 1 month ago. The problem has been rapidly improving. The cough is Non-productive. Pertinent negatives include no chest pain, chills, fever, rash or shortness of  "breath. The symptoms are aggravated by lying down. He has tried oral steroids, prescription cough suppressant, rest and OTC cough suppressant for the symptoms. The treatment provided significant relief.     Review of Systems   Constitutional:  Negative for chills, fever and unexpected weight change.   HENT: Negative.     Eyes:  Negative for photophobia and visual disturbance.   Respiratory:  Positive for cough. Negative for shortness of breath.    Cardiovascular:  Negative for chest pain and palpitations.   Gastrointestinal:  Negative for abdominal pain and vomiting.   Genitourinary:  Negative for hematuria.   Musculoskeletal:  Negative for back pain.   Skin:  Negative for rash.   Neurological:  Negative for dizziness.   Psychiatric/Behavioral: Negative.     All other systems reviewed and are negative.      Objective   /86 (BP Location: Right arm, Patient Position: Sitting, Cuff Size: Large)   Pulse 89   Temp 97.9 °F (36.6 °C) (Temporal)   Ht 5' 9\" (1.753 m)   Wt 93.4 kg (206 lb)   SpO2 99%   BMI 30.42 kg/m²      Physical Exam  Vitals reviewed.   Constitutional:       General: He is not in acute distress.     Appearance: Normal appearance. He is well-developed. He is not ill-appearing.   HENT:      Head: Normocephalic and atraumatic.      Right Ear: External ear normal.      Left Ear: External ear normal.      Nose: Nose normal.      Mouth/Throat:      Pharynx: Oropharynx is clear.   Eyes:      Extraocular Movements: Extraocular movements intact.      Conjunctiva/sclera: Conjunctivae normal.   Cardiovascular:      Rate and Rhythm: Normal rate and regular rhythm.      Heart sounds: No murmur heard.  Pulmonary:      Effort: Pulmonary effort is normal. No respiratory distress.      Breath sounds: Normal breath sounds. No wheezing, rhonchi or rales.   Abdominal:      General: There is no distension.   Musculoskeletal:         General: No signs of injury.      Cervical back: Normal range of motion and neck " supple.   Skin:     Capillary Refill: Capillary refill takes less than 2 seconds.      Findings: No bruising or rash.   Neurological:      Mental Status: He is alert and oriented to person, place, and time.      Gait: Gait normal.   Psychiatric:         Mood and Affect: Mood normal.         Behavior: Behavior normal.       Romain Maloney MD

## 2025-04-22 NOTE — ASSESSMENT & PLAN NOTE
patient presenting for follow up and re-evaluation of subacute cough. cough first noted 2/28/25, persistent since that time despite multiple treatment attempts. recent cxr negative for acute findings. At last visit, patient was started on gabapentin as an anti-tussive.     gabapentin has worked well and the cough is nearly resolved. No other respiratory issues today or new symptoms.   - patient requested discontinuation of gabapentin now that the cough is improved  - rec'd finishing today's doses and d/c'ing tomorrow; he was agreeable  - reviewed changes with caregiver. standard visit form completed and returned, copy to be scanned into chart    Orders:    gabapentin (Neurontin) 100 mg capsule; Take 1 capsule (100 mg total) by mouth 3 (three) times a day for 1 day

## 2025-04-22 NOTE — ASSESSMENT & PLAN NOTE
patient presenting for follow up and re-evaluation of subacute cough. cough first noted 2/28/25, persistent since that time despite multiple treatment attempts. recent cxr negative for acute findings. At last visit, patient was started on gabapentin as an anti-tussive.     gabapentin has worked well and the cough is nearly resolved. No other respiratory issues today or new symptoms.   - patient requested discontinuation of gabapentin now that the cough is improved  - rec'd finishing today's doses and d/c'ing tomorrow; he was agreeable  - reviewed changes with caregiver. standard visit form completed and returned, copy to be scanned into chart

## 2025-04-24 ENCOUNTER — TELEPHONE (OUTPATIENT)
Dept: FAMILY MEDICINE CLINIC | Facility: CLINIC | Age: 45
End: 2025-04-24

## 2025-04-24 NOTE — TELEPHONE ENCOUNTER
Patients mother called wanting a call back from Dr. Goel. She is not happy with Sandor being on Ozempic medication. She knows there are many side effects of the medication and really wants to discuss her concerns with the doctor. Patients mother Dyan can be reached at [phone]. Thank you

## 2025-04-27 ENCOUNTER — APPOINTMENT (EMERGENCY)
Dept: CT IMAGING | Facility: HOSPITAL | Age: 45
End: 2025-04-27
Payer: MEDICARE

## 2025-04-27 ENCOUNTER — HOSPITAL ENCOUNTER (EMERGENCY)
Facility: HOSPITAL | Age: 45
Discharge: HOME | End: 2025-04-27
Payer: MEDICARE

## 2025-04-27 VITALS
DIASTOLIC BLOOD PRESSURE: 90 MMHG | TEMPERATURE: 97.7 F | RESPIRATION RATE: 16 BRPM | OXYGEN SATURATION: 97 % | HEART RATE: 82 BPM | SYSTOLIC BLOOD PRESSURE: 122 MMHG

## 2025-04-27 DIAGNOSIS — E86.0 DEHYDRATION: Primary | ICD-10-CM

## 2025-04-27 DIAGNOSIS — K52.9 GASTROENTERITIS: ICD-10-CM

## 2025-04-27 LAB
ALBUMIN SERPL BCG-MCNC: 3.7 G/DL (ref 3.5–5)
ALP SERPL-CCNC: 73 U/L (ref 34–104)
ALT SERPL W P-5'-P-CCNC: 10 U/L (ref 7–52)
ANION GAP SERPL CALCULATED.3IONS-SCNC: 6 MMOL/L (ref 4–13)
AST SERPL W P-5'-P-CCNC: 12 U/L (ref 13–39)
ATRIAL RATE: 88 BPM
B-OH-BUTYR SERPL-MCNC: 0.07 MMOL/L (ref 0.02–0.27)
BASE EX.OXY STD BLDV CALC-SCNC: 64.7 % (ref 60–80)
BASE EXCESS BLDV CALC-SCNC: -0.5 MMOL/L
BASOPHILS # BLD AUTO: 0.04 THOUSANDS/ÂΜL (ref 0–0.1)
BASOPHILS NFR BLD AUTO: 1 % (ref 0–1)
BILIRUB SERPL-MCNC: 0.42 MG/DL (ref 0.2–1)
BUN SERPL-MCNC: 17 MG/DL (ref 5–25)
CALCIUM SERPL-MCNC: 8.9 MG/DL (ref 8.4–10.2)
CHLORIDE SERPL-SCNC: 101 MMOL/L (ref 96–108)
CO2 SERPL-SCNC: 30 MMOL/L (ref 21–32)
CREAT SERPL-MCNC: 1.5 MG/DL (ref 0.6–1.3)
EOSINOPHIL # BLD AUTO: 0.34 THOUSAND/ÂΜL (ref 0–0.61)
EOSINOPHIL NFR BLD AUTO: 5 % (ref 0–6)
ERYTHROCYTE [DISTWIDTH] IN BLOOD BY AUTOMATED COUNT: 13.2 % (ref 11.6–15.1)
GFR SERPL CREATININE-BSD FRML MDRD: 56 ML/MIN/1.73SQ M
GLUCOSE SERPL-MCNC: 162 MG/DL (ref 65–140)
GLUCOSE SERPL-MCNC: 191 MG/DL (ref 65–140)
HCO3 BLDV-SCNC: 25.4 MMOL/L (ref 24–30)
HCT VFR BLD AUTO: 44.3 % (ref 36.5–49.3)
HGB BLD-MCNC: 13.7 G/DL (ref 12–17)
IMM GRANULOCYTES # BLD AUTO: 0.04 THOUSAND/UL (ref 0–0.2)
IMM GRANULOCYTES NFR BLD AUTO: 1 % (ref 0–2)
LACTATE SERPL-SCNC: 2.6 MMOL/L (ref 0.5–2)
LIPASE SERPL-CCNC: 29 U/L (ref 11–82)
LYMPHOCYTES # BLD AUTO: 2.18 THOUSANDS/ÂΜL (ref 0.6–4.47)
LYMPHOCYTES NFR BLD AUTO: 29 % (ref 14–44)
MCH RBC QN AUTO: 25.7 PG (ref 26.8–34.3)
MCHC RBC AUTO-ENTMCNC: 30.9 G/DL (ref 31.4–37.4)
MCV RBC AUTO: 83 FL (ref 82–98)
MONOCYTES # BLD AUTO: 0.57 THOUSAND/ÂΜL (ref 0.17–1.22)
MONOCYTES NFR BLD AUTO: 8 % (ref 4–12)
NEUTROPHILS # BLD AUTO: 4.46 THOUSANDS/ÂΜL (ref 1.85–7.62)
NEUTS SEG NFR BLD AUTO: 56 % (ref 43–75)
NRBC BLD AUTO-RTO: 0 /100 WBCS
O2 CT BLDV-SCNC: 13 ML/DL
P AXIS: 49 DEGREES
PCO2 BLDV: 46.5 MM HG (ref 42–50)
PH BLDV: 7.36 [PH] (ref 7.3–7.4)
PLATELET # BLD AUTO: 160 THOUSANDS/UL (ref 149–390)
PMV BLD AUTO: 10 FL (ref 8.9–12.7)
PO2 BLDV: 35.6 MM HG (ref 35–45)
POTASSIUM SERPL-SCNC: 4.4 MMOL/L (ref 3.5–5.3)
PR INTERVAL: 172 MS
PROT SERPL-MCNC: 6.5 G/DL (ref 6.4–8.4)
QRS AXIS: -24 DEGREES
QRSD INTERVAL: 100 MS
QT INTERVAL: 382 MS
QTC INTERVAL: 462 MS
RBC # BLD AUTO: 5.34 MILLION/UL (ref 3.88–5.62)
SODIUM SERPL-SCNC: 137 MMOL/L (ref 135–147)
T WAVE AXIS: 25 DEGREES
VENTRICULAR RATE: 88 BPM
WBC # BLD AUTO: 7.63 THOUSAND/UL (ref 4.31–10.16)

## 2025-04-27 PROCEDURE — 36415 COLL VENOUS BLD VENIPUNCTURE: CPT

## 2025-04-27 PROCEDURE — 83605 ASSAY OF LACTIC ACID: CPT

## 2025-04-27 PROCEDURE — 99284 EMERGENCY DEPT VISIT MOD MDM: CPT

## 2025-04-27 PROCEDURE — 99285 EMERGENCY DEPT VISIT HI MDM: CPT

## 2025-04-27 PROCEDURE — 85025 COMPLETE CBC W/AUTO DIFF WBC: CPT

## 2025-04-27 PROCEDURE — 82948 REAGENT STRIP/BLOOD GLUCOSE: CPT

## 2025-04-27 PROCEDURE — 93010 ELECTROCARDIOGRAM REPORT: CPT | Performed by: INTERNAL MEDICINE

## 2025-04-27 PROCEDURE — 82805 BLOOD GASES W/O2 SATURATION: CPT

## 2025-04-27 PROCEDURE — 74177 CT ABD & PELVIS W/CONTRAST: CPT

## 2025-04-27 PROCEDURE — 80053 COMPREHEN METABOLIC PANEL: CPT

## 2025-04-27 PROCEDURE — 93005 ELECTROCARDIOGRAM TRACING: CPT

## 2025-04-27 PROCEDURE — 96361 HYDRATE IV INFUSION ADD-ON: CPT

## 2025-04-27 PROCEDURE — 96375 TX/PRO/DX INJ NEW DRUG ADDON: CPT

## 2025-04-27 PROCEDURE — 82010 KETONE BODYS QUAN: CPT

## 2025-04-27 PROCEDURE — 83690 ASSAY OF LIPASE: CPT

## 2025-04-27 PROCEDURE — 96374 THER/PROPH/DIAG INJ IV PUSH: CPT

## 2025-04-27 RX ORDER — ONDANSETRON 2 MG/ML
4 INJECTION INTRAMUSCULAR; INTRAVENOUS ONCE
Status: COMPLETED | OUTPATIENT
Start: 2025-04-27 | End: 2025-04-27

## 2025-04-27 RX ORDER — FAMOTIDINE 20 MG/1
20 TABLET, FILM COATED ORAL 2 TIMES DAILY
Qty: 28 TABLET | Refills: 0 | Status: SHIPPED | OUTPATIENT
Start: 2025-04-27

## 2025-04-27 RX ORDER — FAMOTIDINE 10 MG/ML
20 INJECTION, SOLUTION INTRAVENOUS ONCE
Status: COMPLETED | OUTPATIENT
Start: 2025-04-27 | End: 2025-04-27

## 2025-04-27 RX ORDER — ARIPIPRAZOLE 10 MG/1
10 TABLET ORAL ONCE
Status: COMPLETED | OUTPATIENT
Start: 2025-04-27 | End: 2025-04-27

## 2025-04-27 RX ORDER — ACETAMINOPHEN 325 MG/1
975 TABLET ORAL ONCE
Status: COMPLETED | OUTPATIENT
Start: 2025-04-27 | End: 2025-04-27

## 2025-04-27 RX ADMIN — FAMOTIDINE 20 MG: 10 INJECTION INTRAVENOUS at 11:43

## 2025-04-27 RX ADMIN — ARIPIPRAZOLE 10 MG: 10 TABLET ORAL at 12:19

## 2025-04-27 RX ADMIN — ONDANSETRON 4 MG: 2 INJECTION, SOLUTION INTRAMUSCULAR; INTRAVENOUS at 11:09

## 2025-04-27 RX ADMIN — ACETAMINOPHEN 975 MG: 325 TABLET, FILM COATED ORAL at 11:07

## 2025-04-27 RX ADMIN — SODIUM CHLORIDE 1000 ML: 0.9 INJECTION, SOLUTION INTRAVENOUS at 11:01

## 2025-04-27 RX ADMIN — IOHEXOL 85 ML: 350 INJECTION, SOLUTION INTRAVENOUS at 12:06

## 2025-04-27 NOTE — ED PROVIDER NOTES
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 Normal      !! traZODone (DESYREL) 100 mg tablet Starting Wed 11/16/2022, Historical Med      !! traZODone (DESYREL) 50 mg tablet Historical Med      valproic acid (DEPAKENE) 250 MG/5ML soln Take 10 mL by mouth 2 times daily @ 9 AM and 5 PM and 5 mL by mouth @ 9 PM, Normal      vitamin E, tocopherol, 400 units capsule Take 1 capsule (400 Units total) by mouth daily, Starting Mon 1/27/2025, Normal       !! - Potential duplicate medications found. Please discuss with provider.        No discharge procedures on file.  ED SEPSIS DOCUMENTATION   Time reflects when diagnosis was documented in both MDM as applicable and the Disposition within this note       Time User Action Codes Description Comment    4/27/2025  1:39 PM Mendelson, Brooke Add [E86.0] Dehydration     4/27/2025  1:42 PM Mendelson, Brooke Add [K52.9] Gastroenteritis Brooke Mendelson, DO  04/27/25 6619

## 2025-04-27 NOTE — DISCHARGE INSTRUCTIONS
You were seen in the ED for nausea and vomiting  Your work up in the ED showed findings significant for dehydration, please try to drink water throughout the day to adequately hydrate yourself. Your imaging and labs did not show any signs of acute infection.   Please follow up with your PCP within the next few days to discuss diabetic medications.   Recommend to take Pepcid twice daily for the next 14 days. Medication was sent to your pharmacy at discharge from the ED.

## 2025-04-29 ENCOUNTER — TELEPHONE (OUTPATIENT)
Dept: FAMILY MEDICINE CLINIC | Facility: CLINIC | Age: 45
End: 2025-04-29

## 2025-04-29 NOTE — TELEPHONE ENCOUNTER
Please review.       Yes, my name is Dyan Ratliff. My number is [phone]. That's my cell. My home is [phone]. I put a phone call in on April the 23rd or the 24th to speak to Doctor Shavon LIU She is my son's PCP Sandor Cortezgosia at [facility]. I asked to speak to her or one of her associates might help me but no one ever returns my call and I never heard anything. It is imperative that I speak to her in regards to a medication called Ozempic that Sandor was taken and I asked them to take him off of it because he was definitely sick and had to go to the hospital. Could you please tell her not to prescribe that medicine for him? I do not want him on it. I want him back on either the glutathione or metformin or whatever medication he was taking before and I would like to speak to her in regards to this. Thank you so very much. What are you doing?

## 2025-04-29 NOTE — TELEPHONE ENCOUNTER
Called patient's Mother Dyan. Dyan is concerned about side effects of dizziness, constipation, and nausea with vomiting that Sandor was experiencing with Ozempic. She states that he is very sensitive to medications and he not only has physical symptoms with the Ozempic but was having behavioral problems at his group home as well. She requests that he be switched medications due to these side effects. Reassured Dyan that there are other medications we can try to control his diabetes and I will discuss these options with him at his upcoming visit. All questions and concerns addressed at this time.

## 2025-04-29 NOTE — TELEPHONE ENCOUNTER
Patient's mother Dyan called. She thinks she may have missed call from you. Plese call her @ [phone]. Are You able to call?

## 2025-05-01 ENCOUNTER — TELEPHONE (OUTPATIENT)
Dept: FAMILY MEDICINE CLINIC | Facility: CLINIC | Age: 45
End: 2025-05-01

## 2025-05-01 ENCOUNTER — OFFICE VISIT (OUTPATIENT)
Dept: FAMILY MEDICINE CLINIC | Facility: CLINIC | Age: 45
End: 2025-05-01

## 2025-05-01 VITALS
SYSTOLIC BLOOD PRESSURE: 116 MMHG | WEIGHT: 204 LBS | DIASTOLIC BLOOD PRESSURE: 78 MMHG | RESPIRATION RATE: 18 BRPM | HEIGHT: 69 IN | HEART RATE: 102 BPM | OXYGEN SATURATION: 98 % | BODY MASS INDEX: 30.21 KG/M2 | TEMPERATURE: 98.4 F

## 2025-05-01 DIAGNOSIS — E11.9 TYPE 2 DIABETES MELLITUS WITHOUT COMPLICATION, WITHOUT LONG-TERM CURRENT USE OF INSULIN (HCC): Primary | ICD-10-CM

## 2025-05-01 PROCEDURE — G2211 COMPLEX E/M VISIT ADD ON: HCPCS | Performed by: FAMILY MEDICINE

## 2025-05-01 PROCEDURE — 99213 OFFICE O/P EST LOW 20 MIN: CPT | Performed by: FAMILY MEDICINE

## 2025-05-01 RX ORDER — DULAGLUTIDE 0.75 MG/.5ML
0.75 INJECTION, SOLUTION SUBCUTANEOUS WEEKLY
Qty: 2 ML | Refills: 0 | Status: SHIPPED | OUTPATIENT
Start: 2025-05-01

## 2025-05-01 NOTE — TELEPHONE ENCOUNTER
Folder (To be completed by) -Dr. Goel     Name of Form - Medical exam case note      Form to be completed at visit    Patient was made aware of the 7-10 business day form policy.

## 2025-05-01 NOTE — ED ATTENDING ATTESTATION
4/27/2025  I, Carlyn Jacques DO, saw and evaluated the patient. I have discussed the patient with the resident/non-physician practitioner and agree with the resident's/non-physician practitioner's findings, Plan of Care, and MDM as documented in the resident's/non-physician practitioner's note, except where noted. All available labs and Radiology studies were reviewed.  I was present for key portions of any procedure(s) performed by the resident/non-physician practitioner and I was immediately available to provide assistance.       At this point I agree with the current assessment done in the Emergency Department.  I have conducted an independent evaluation of this patient a history and physical is as follows:    Patient is a 44-year-old male with history of diabetes, schizoaffective disorder, CKD, recently started on Ozempic for diabetes who presents with nausea and non-bloody, non-bilious vomiting that started approximately 1 week after starting Ozempic in early April.  Also endorses fatigue, lightheadedness, and mild frontal headaches over the past several days.  Drank orange juice this morning which made him to vomit but has been able to tolerate water today.  Feels that he does not have an appetite due to the Ozempic.  Denies fever, chills, chest pain. or dyspnea.  Vital signs within normal limits, does have tacky oral mucosa, lung sounds clear to auscultation bilaterally, normal cardiac exam, does have epigastric tenderness to palpation without rebound or guarding.  No CVA tenderness to percussion, no peripheral edema, no evidence of DVT on exam.  Differential is broad and includes but is not limited to gastroparesis, gastritis, PUD, GERD, esophagitis, side effect of Ozempic, euglycemic DKA, pancreatitis, less likely cholecystitis, appendicitis, intra-abdominal infection, end organ dysfunction, electrolyte abnormality, dehydration.  Doubt ACS, CVA.  Plan for labs, imaging, IV fluids, and  antiemetics.  See resident documentation for discussion of results and disposition.  Would be appropriate for discharge if workup relatively unremarkable and patient is able to tolerate p.o. prior to discharge.    ED Course  ED Course as of 05/01/25 0621   Sun Apr 27, 2025   1130 Blood gas, venous  Doubt euglycemic DKA.   1155 Comprehensive metabolic panel(!)  Renal function at baseline.     1317 CT abdomen pelvis w contrast  IMPRESSION:     No acute findings in the abdomen or pelvis.              Critical Care Time  Procedures

## 2025-05-01 NOTE — TELEPHONE ENCOUNTER
Patients form completed at time of visit. Original given back to the patient, copy placed in the red clerical folder to be scanned to the chart.

## 2025-05-01 NOTE — PROGRESS NOTES
"Name: Sandor Ratliff      : 1980      MRN: [id number]  Encounter Provider: Kyung Goel DO  Encounter Date: 2025   Encounter department: [facility] [place]  :  Assessment & Plan  Type 2 diabetes mellitus without complication, without long-term current use of insulin ([facility])    Lab Results   Component Value Date    HGBA1C 7.7 (H) 2025     Currently on metformin at 1000 mg twice daily, Jardiance 25 mg daily, self-discontinued ozempic   Morning sugars range between 97 and 140s (picture of weekly sugars in media in chart)     Plan:  Patient was unable to tolerate Ozempic due to nausea and vomiting.  Currently, patient's morning sugars are not consistently elevated enough to begin long-term insulin.  Discussed risks and benefits of trying other GLP-1 medication such as Trulicity or Mounjaro with patient and caretaker.  Discussed that there is no way of knowing how his body will react to another injectable as their side effect profiles are similar. At this time they would like to trial Trulicity weekly and return in 2 weeks for symptom recheck.        Orders:    Dulaglutide (Trulicity) 0.75 MG/0.5ML SOAJ; Inject 0.75 mg under the skin once a week           History of Present Illness   Sandor is a 43 y/o male coming in for a follow up on a recent ED visit and a discontinuation of his ozempic. Patient reports that he went to the emergency department on 25 after vomiting once, feeling nausea, dizziness, and weak. The episode occurred after he received his second dose of ozempic. The patient stated, \"we went to get mcdonalds after I took my second dose and then on the car ride home after I ate the [employer] I became nauseous and vomited once I got home.\" Last ozempic dose was 25 and he has since discontinued and missed his scheduled dose that was due on 25. Patient explains that he thinks it was the bad needle from the ozempic causing the symptoms and that when " "he ate the [employer] he felt as though the food was poisoned. Patient also states that he has been working hard on eating mor vegetables and trying to get exercise to control his DM. Patient was counseled that the side effects from the ozempic often include symptoms such as nausea and vomiting and that these are fairly common upon starting the medication especially in the first few weeks. In addition a mutual conversation was held with the patient regarding ozempic and the other options for treating his diabetes. A1c was last recorded at 7.7 on 4/2/2025. Patient has no complaints other than the nausea and vomiting that sent him to the ED.      Review of Systems   Gastrointestinal:  Positive for nausea and vomiting. Negative for diarrhea.   Skin: Negative.    Neurological:  Positive for dizziness.       Objective   /78 (BP Location: Left arm, Patient Position: Sitting, Cuff Size: Large)   Pulse 102   Temp 98.4 °F (36.9 °C) (Temporal)   Resp 18   Ht 5' 9\" (1.753 m)   Wt 92.5 kg (204 lb)   SpO2 98%   BMI 30.13 kg/m²      Physical Exam  Constitutional:       Appearance: Normal appearance. He is normal weight.   Cardiovascular:      Rate and Rhythm: Normal rate and regular rhythm.      Pulses: Normal pulses.      Heart sounds: Normal heart sounds. No murmur heard.     No friction rub. No gallop.   Pulmonary:      Effort: Pulmonary effort is normal. No respiratory distress.      Breath sounds: No stridor. No wheezing, rhonchi or rales.   Chest:      Chest wall: No tenderness.   Neurological:      Mental Status: He is alert.   Psychiatric:         Mood and Affect: Mood normal.         Behavior: Behavior normal.         "

## 2025-05-01 NOTE — ASSESSMENT & PLAN NOTE
Lab Results   Component Value Date    HGBA1C 7.7 (H) 04/02/2025     Currently on metformin at 1000 mg twice daily, Jardiance 25 mg daily, self-discontinued ozempic   Morning sugars range between 97 and 140s (picture of weekly sugars in media in chart)     Plan:  Patient was unable to tolerate Ozempic due to nausea and vomiting.  Currently, patient's morning sugars are not consistently elevated enough to begin long-term insulin.  Discussed risks and benefits of trying other GLP-1 medication such as Trulicity or Mounjaro with patient and caretaker.  Discussed that there is no way of knowing how his body will react to another injectable as their side effect profiles are similar. At this time they would like to trial Trulicity weekly and return in 2 weeks for symptom recheck.        Orders:    Dulaglutide (Trulicity) 0.75 MG/0.5ML SOAJ; Inject 0.75 mg under the skin once a week

## 2025-05-01 NOTE — TELEPHONE ENCOUNTER
Left message for mother Dyan explaining that Sandor was started on Trulicity in place of Ozempic.  Unfortunately his fasting sugars are not consistently elevated enough for long-term insulin as she requested previously.  Informed her that I will be seeing him in 2 weeks for follow-up and symptom check on Trulicity.  Advised her to call back if she had any questions or concerns.

## 2025-05-13 DIAGNOSIS — E11.65 TYPE 2 DIABETES MELLITUS WITH HYPERGLYCEMIA, WITHOUT LONG-TERM CURRENT USE OF INSULIN (HCC): ICD-10-CM

## 2025-05-13 RX ORDER — CALCIUM CITRATE/VITAMIN D3 200MG-6.25
1 TABLET ORAL 2 TIMES WEEKLY
Qty: 50 STRIP | Refills: 5 | Status: SHIPPED | OUTPATIENT
Start: 2025-05-15

## 2025-05-19 ENCOUNTER — TELEPHONE (OUTPATIENT)
Dept: FAMILY MEDICINE CLINIC | Facility: CLINIC | Age: 45
End: 2025-05-19

## 2025-05-19 ENCOUNTER — OFFICE VISIT (OUTPATIENT)
Dept: FAMILY MEDICINE CLINIC | Facility: CLINIC | Age: 45
End: 2025-05-19

## 2025-05-19 VITALS
OXYGEN SATURATION: 99 % | HEART RATE: 92 BPM | HEIGHT: 69 IN | WEIGHT: 198.8 LBS | DIASTOLIC BLOOD PRESSURE: 80 MMHG | BODY MASS INDEX: 29.44 KG/M2 | TEMPERATURE: 97.9 F | SYSTOLIC BLOOD PRESSURE: 123 MMHG | RESPIRATION RATE: 18 BRPM

## 2025-05-19 DIAGNOSIS — E11.9 TYPE 2 DIABETES MELLITUS WITHOUT COMPLICATION, WITHOUT LONG-TERM CURRENT USE OF INSULIN (HCC): Primary | ICD-10-CM

## 2025-05-19 DIAGNOSIS — Z76.0 ENCOUNTER FOR MEDICATION REFILL: ICD-10-CM

## 2025-05-19 RX ORDER — VALPROIC ACID 250 MG/5ML
SOLUTION ORAL
Qty: 473 ML | Refills: 0 | Status: SHIPPED | OUTPATIENT
Start: 2025-05-19

## 2025-05-19 NOTE — PROGRESS NOTES
Name: Sandor Ratliff      : 1980      MRN: [id number]  Encounter Provider: Kyung Goel DO  Encounter Date: 2025   Encounter department: [facility] [place]  :  Assessment & Plan  Type 2 diabetes mellitus without complication, without long-term current use of insulin ([facility])    Lab Results   Component Value Date    HGBA1C 7.7 (H) 2025     Currently on metformin at 1000 mg twice daily, Jardiance 25 mg daily, Trulicity      Assessment:   Patient was unable to tolerate Ozempic due to nausea and vomiting.  Currently, patient's morning sugars are not consistently elevated enough to begin long-term insulin.  Discussed risks and benefits of trying other GLP-1 medication such as Trulicity or Mounjaro with patient and caretaker.  Discussed that there is no way of knowing how his body will react to another injectable as their side effect profiles are similar. Trulicity was started last visit and we are monitoring him for adverse side effects.     Plan:  Continue with above regimen and follow-up in 2 months for A1c recheck.               Encounter for medication refill    Orders:    valproic acid (DEPAKENE) 250 MG/5ML soln; Take 10 mL by mouth 2 times daily @ 9 AM and 5 PM and 5 mL by mouth @ 9 PM           History of Present Illness   HPI  44-year-old male presents today for symptom check after initiating Trulicity.  Patient denies any nausea, vomiting, constipation, diarrhea.  He states that he has been doing well on this weekly medication and wishes to continue it at this time.  Review of Systems   Constitutional:  Negative for chills and fever.   HENT:  Negative for ear pain and sore throat.    Eyes:  Negative for pain and visual disturbance.   Respiratory:  Negative for cough and shortness of breath.    Cardiovascular:  Negative for chest pain and palpitations.   Gastrointestinal:  Negative for abdominal pain and vomiting.   Genitourinary:  Negative for dysuria and  "hematuria.   Musculoskeletal:  Negative for arthralgias and back pain.   Skin:  Negative for color change and rash.   Neurological:  Negative for seizures and syncope.   All other systems reviewed and are negative.      Objective   /80 (BP Location: Left arm, Patient Position: Sitting, Cuff Size: Large)   Pulse 92   Temp 97.9 °F (36.6 °C) (Temporal)   Resp 18   Ht 5' 9\" (1.753 m)   Wt 90.2 kg (198 lb 12.8 oz)   SpO2 99%   BMI 29.36 kg/m²      Physical Exam  Vitals and nursing note reviewed.   Constitutional:       General: He is not in acute distress.     Appearance: He is well-developed.   HENT:      Head: Normocephalic and atraumatic.     Eyes:      Conjunctiva/sclera: Conjunctivae normal.       Cardiovascular:      Rate and Rhythm: Normal rate and regular rhythm.      Heart sounds: No murmur heard.  Pulmonary:      Effort: Pulmonary effort is normal. No respiratory distress.      Breath sounds: Normal breath sounds.   Abdominal:      Palpations: Abdomen is soft.      Tenderness: There is no abdominal tenderness.     Musculoskeletal:         General: No swelling.      Cervical back: Neck supple.     Skin:     General: Skin is warm and dry.      Capillary Refill: Capillary refill takes less than 2 seconds.     Neurological:      Mental Status: He is alert.     Psychiatric:         Mood and Affect: Mood normal.         "

## 2025-05-19 NOTE — TELEPHONE ENCOUNTER
Patient came to office today for visit, presented with Medical examination casenote. Form completed at time of visit, original given to patient, copy put in the red clerical folder to be scanned to chart.

## 2025-05-19 NOTE — ASSESSMENT & PLAN NOTE
Lab Results   Component Value Date    HGBA1C 7.7 (H) 04/02/2025     Currently on metformin at 1000 mg twice daily, Jardiance 25 mg daily, Trulicity      Assessment:   Patient was unable to tolerate Ozempic due to nausea and vomiting.  Currently, patient's morning sugars are not consistently elevated enough to begin long-term insulin.  Discussed risks and benefits of trying other GLP-1 medication such as Trulicity or Mounjaro with patient and caretaker.  Discussed that there is no way of knowing how his body will react to another injectable as their side effect profiles are similar. Trulicity was started last visit and we are monitoring him for adverse side effects.     Plan:  Continue with above regimen and follow-up in 2 months for A1c recheck.

## 2025-05-20 DIAGNOSIS — E78.49 OTHER HYPERLIPIDEMIA: ICD-10-CM

## 2025-05-20 DIAGNOSIS — R10.84 GENERALIZED ABDOMINAL PAIN: ICD-10-CM

## 2025-05-20 DIAGNOSIS — F25.0 SCHIZOAFFECTIVE DISORDER, BIPOLAR TYPE (HCC): Primary | ICD-10-CM

## 2025-05-20 DIAGNOSIS — E03.9 HYPOTHYROIDISM, UNSPECIFIED TYPE: ICD-10-CM

## 2025-05-20 DIAGNOSIS — G25.0 ESSENTIAL TREMOR: ICD-10-CM

## 2025-05-20 RX ORDER — FAMOTIDINE 40 MG/1
40 TABLET, FILM COATED ORAL
Qty: 30 TABLET | Refills: 0 | Status: SHIPPED | OUTPATIENT
Start: 2025-05-20

## 2025-05-20 RX ORDER — FAMOTIDINE 40 MG/1
40 TABLET, FILM COATED ORAL
Qty: 30 TABLET | Refills: 0 | OUTPATIENT
Start: 2025-05-20

## 2025-05-20 NOTE — TELEPHONE ENCOUNTER
Reason for call:   [x] Refill   [] Prior Auth  [x] Other: New pharmacy, Last ordered by another provider. Please review.       Office:   [x] PCP/Provider -   [] Specialty/Provider -       famotidine (PEPCID) 40 MG tablet 40 mg, Oral, Daily at bedtime       Quantity: 90    Pharmacy: [facility]    Local Pharmacy   Does the patient have enough for 3 days?   [] Yes   [x] No - Send as HP to POD    Mail Away Pharmacy   Does the patient have enough for 10 days?   [] Yes   [] No - Send as HP to POD

## 2025-05-21 ENCOUNTER — PROCEDURE VISIT (OUTPATIENT)
Dept: PODIATRY | Facility: CLINIC | Age: 45
End: 2025-05-21
Payer: MEDICARE

## 2025-05-21 VITALS — HEIGHT: 69 IN | BODY MASS INDEX: 29.62 KG/M2 | WEIGHT: 200 LBS

## 2025-05-21 DIAGNOSIS — E11.65 TYPE 2 DIABETES MELLITUS WITH HYPERGLYCEMIA, WITHOUT LONG-TERM CURRENT USE OF INSULIN (HCC): ICD-10-CM

## 2025-05-21 DIAGNOSIS — M79.675 PAIN IN TOES OF BOTH FEET: ICD-10-CM

## 2025-05-21 DIAGNOSIS — M79.674 PAIN IN TOES OF BOTH FEET: ICD-10-CM

## 2025-05-21 DIAGNOSIS — E11.42 DIABETIC PERIPHERAL NEUROPATHY (HCC): ICD-10-CM

## 2025-05-21 DIAGNOSIS — B35.1 ONYCHOMYCOSIS: Primary | ICD-10-CM

## 2025-05-21 PROCEDURE — 11720 DEBRIDE NAIL 1-5: CPT | Performed by: PODIATRIST

## 2025-05-21 RX ORDER — LORAZEPAM 1 MG/1
TABLET ORAL
COMMUNITY
Start: 2025-04-21

## 2025-05-21 RX ORDER — PROPRANOLOL HYDROCHLORIDE 10 MG/1
TABLET ORAL
COMMUNITY
Start: 2025-04-23

## 2025-05-21 RX ORDER — CHLORPROMAZINE HYDROCHLORIDE 50 MG/1
TABLET, FILM COATED ORAL
COMMUNITY
Start: 2025-04-21

## 2025-05-21 RX ORDER — ARIPIPRAZOLE 5 MG/1
TABLET ORAL
COMMUNITY
Start: 2025-05-07

## 2025-05-21 NOTE — PROGRESS NOTES
Name: Sandor Ratliff      : 1980      MRN: [id number]  Encounter Provider: Joey Montana DPM  Encounter Date: 2025   Encounter department: [facility] [place]  :  Assessment & Plan  Onychomycosis       Debride mycotic nails and thin the nail plates x 4 with the use of a nail nipper manually and an electric Dremel bur was used to reduce the thickness of the nail beds and smoothed the distal aspect of the nails.   Type 2 diabetes mellitus with hyperglycemia, without long-term current use of insulin (HCC)    Lab Results   Component Value Date    HGBA1C 7.7 (H) 2025            Diabetic peripheral neuropathy (HCC)    Lab Results   Component Value Date    HGBA1C 7.7 (H) 2025            Pain in toes of both feet         Pt was instructed to use lotion once a day on both feet such as cerave, Cetaphil or similar thick style of lotion.     Discussed proper shoe gear, daily inspections of feet, and general foot health with patient. Patient has Q9  findings and is recommended for at risk foot care every 9-10 weeks.    Patients most recent complete clinical foot exam was on: 2025    Return in about 10 weeks (around 2025).     History of Present Illness   HPI  Sandor Ratliff is a 44 y.o. male who presents with chief complaint of painful thick nails on both feet and for follow-up of the dry flaky skin present on the bottom of both feet.  His support staff was present in the room for today's visit.  Patient relates he has been using the lotion as directed.  His last A1c was 7.7 performed on 2025.  History obtained from: patient's Legal Guardian and patient's caregiver    Review of Systems  Medical History Reviewed by provider this encounter:     .  Medications Ordered Prior to Encounter[1]   Social History     Tobacco Use    Smoking status: Former     Types: Cigarettes    Smokeless tobacco: Never    Tobacco comments:     per [employer]-former smoker   Vaping Use    Vaping  "status: Never Used   Substance and Sexual Activity    Alcohol use: No     Comment: per [employer]-former consumption of alcohol    Drug use: No    Sexual activity: Never     Partners: Female     Birth control/protection: None        Objective   Ht 5' 9\" (1.753 m) Comment: verbal  Wt 90.7 kg (200 lb)   BMI 29.53 kg/m²      Physical Exam  Vascular status is 1/4 DP PT negative digital hair normal distal cooling, immediate capillary refill bilaterally.  Capillary refill is approximately 2 seconds.     Derm nails are brittle elongated hypertrophic white-yellow discoloration with subungual debris x 4.  There is an increased thickness and the nails are approximately 2 mm.  The hypertrophic tissue that was present on the plantar aspect of the first IPJ's bilaterally has significantly improved.  There is yellow flaky tissue present on the heels of both feet with no cracks or fissures noted.  The flaky skin that was present on the plantar aspect of both heels has also improved.       [1]   Current Outpatient Medications on File Prior to Visit   Medication Sig Dispense Refill    acetaminophen (TYLENOL) 500 mg tablet Take 2 tablets (1,000 mg total) by mouth every 8 (eight) hours 30 tablet 5    Alcohol Swabs (Curity Alcohol Preps) 70 % PADS Use if needed (when checking blood glucose) 200 each 1    ARIPiprazole (ABILIFY) 10 mg tablet Take 1 tablet (10 mg total) by mouth daily 30 tablet 0    ARIPiprazole (ABILIFY) 5 mg tablet       atorvastatin (LIPITOR) 40 mg tablet Take 1 tablet (40 mg total) by mouth daily at bedtime 90 tablet 3    B Complex CAPS       B Complex Vitamins (B Complex-B12) TABS Take 1 tablet by mouth daily 1 cap by mouth daily @ 8am 90 tablet 3    benzonatate (TESSALON PERLES) 100 mg capsule Take 1 capsule (100 mg total) by mouth 3 (three) times a day as needed for cough 20 capsule 0    bisacodyl (DULCOLAX) 5 mg EC tablet Take 2 tablets (10 mg total) by mouth daily as needed for constipation 30 tablet 0    " bismuth subsalicylate (PEPTO BISMOL) 524 mg/30 mL oral suspension Take 15 mL (262 mg total) by mouth every 6 (six) hours as needed for indigestion 360 mL 1    calcium carbonate (OYSTER SHELL,OSCAL) 500 mg Take 1 tablet by mouth daily with breakfast 90 tablet 3    chlorproMAZINE (THORAZINE) 100 mg tablet Take 100 mg by mouth in the morning and 100 mg in the evening.      chlorproMAZINE (THORAZINE) 50 mg tablet       cholecalciferol (VITAMIN D3) 1,000 units tablet Take 1 tablet (1,000 Units total) by mouth daily 90 tablet 3    Dulaglutide (Trulicity) 0.75 MG/0.5ML SOAJ Inject 0.75 mg under the skin once a week 2 mL 0    Emollient (Eucerin Original Healing) LOTN       Empagliflozin (Jardiance) 25 MG TABS Take 1 tablet (25 mg total) by mouth daily 90 tablet 3    famotidine (PEPCID) 20 mg tablet Take 1 tablet (20 mg total) by mouth 2 (two) times a day 28 tablet 0    famotidine (PEPCID) 40 MG tablet Take 1 tablet (40 mg total) by mouth daily at bedtime 30 tablet 0    fluticasone (FLONASE) 50 mcg/act nasal spray 1 spray into each nostril daily 9.9 mL 0    glucose blood (True Metrix Blood Glucose Test) test strip Use 1 each 2 (two) times a week Use as instructed 50 strip 5    glucose monitoring kit (FREESTYLE) monitoring kit 1 each by Does not apply route 2 (two) times a week 1 each 1    guaiFENesin (ROBITUSSIN) 100 MG/5ML oral liquid Take 10 mL (200 mg total) by mouth 3 (three) times a day as needed for cough 120 mL 2    ibuprofen (MOTRIN) 600 mg tablet Take 1 tablet (600 mg total) by mouth every 8 (eight) hours as needed for moderate pain 30 tablet 0    Lancets (freestyle) lancets Use to test blood sugars 2x weekly on Mon & Thurs @ 8AM 100 each 5    levothyroxine 25 mcg tablet Take 1 tablet (25 mcg total) by mouth daily in the early morning 90 tablet 3    lidocaine (Lidoderm) 5 % Apply 1 patch topically over 12 hours daily Remove & Discard patch within 12 hours or as directed by MD 30 patch 0    loratadine (CLARITIN) 10  mg tablet Take 1 tablet (10 mg total) by mouth daily 90 tablet 3    LORazepam (ATIVAN) 0.5 mg tablet Take 0.5 mg by mouth in the morning and 0.5 mg in the evening. 8AM, 2PM, 8PM.      LORazepam (ATIVAN) 1 mg tablet       Menthol (Halls Cough Drops) 5.8 MG LOZG Apply 1 lozenge (5.8 mg total) to the mouth or throat 4 (four) times a day as needed (cough) 30 lozenge 5    metFORMIN (GLUCOPHAGE) 1000 MG tablet Take 1 tablet (1,000 mg total) by mouth 2 (two) times a day with meals 180 tablet 3    Mouthwashes (Biotene Dry Mouth) LIQD Take 10 mL by mouth 3 (three) times a day 473 mL 5    OLANZapine (ZyPREXA ZYDIS) 5 mg dispersible tablet       ondansetron (ZOFRAN-ODT) 4 mg disintegrating tablet Take 1 tablet (4 mg total) by mouth every 6 (six) hours as needed for nausea or vomiting 30 tablet 0    polyethylene glycol (MIRALAX) 17 g packet Take 17 g by mouth daily as needed (Constipation) 30 each 3    propranolol (INDERAL) 10 mg tablet       Refresh Optive 0.5-0.9 % SOLN Administer 0.05 mL to both eyes if needed (To relieve burning, irritation, discomfort due to dryness of the eye) 15 mL 5    Senna Plus 8.6-50 MG per tablet Take 2 tablets by mouth daily 62 tablet 5    Skin Protectants, Misc. (eucerin) cream Apply topically as needed for dry skin (Every morning, at night before bed, and after washing hands) 99 g 3    Sunscreen SPF50 LOTN Apply topically if needed (apply prior to sun exposure) 296 mL 5    traZODone (DESYREL) 100 mg tablet       traZODone (DESYREL) 50 mg tablet       valproic acid (DEPAKENE) 250 MG/5ML soln Take 10 mL by mouth 2 times daily @ 9 AM and 5 PM and 5 mL by mouth @ 9  mL 0    vitamin E, tocopherol, 400 units capsule Take 1 capsule (400 Units total) by mouth daily 30 capsule 5    gabapentin (Neurontin) 100 mg capsule Take 1 capsule (100 mg total) by mouth 3 (three) times a day for 1 day 3 capsule 0     No current facility-administered medications on file prior to visit.

## 2025-05-22 PROBLEM — R05.2 SUBACUTE COUGH: Status: RESOLVED | Noted: 2021-12-29 | Resolved: 2025-05-22

## 2025-05-22 RX ORDER — CHLORPROMAZINE HYDROCHLORIDE 100 MG/1
100 TABLET, FILM COATED ORAL 2 TIMES DAILY
Qty: 60 TABLET | Refills: 5 | Status: SHIPPED | OUTPATIENT
Start: 2025-05-22

## 2025-05-22 RX ORDER — ATORVASTATIN CALCIUM 40 MG/1
40 TABLET, FILM COATED ORAL
Qty: 90 TABLET | Refills: 3 | Status: SHIPPED | OUTPATIENT
Start: 2025-05-22

## 2025-05-22 RX ORDER — LEVOTHYROXINE SODIUM 25 UG/1
25 TABLET ORAL
Qty: 90 TABLET | Refills: 3 | Status: SHIPPED | OUTPATIENT
Start: 2025-05-22

## 2025-05-22 RX ORDER — PROPRANOLOL HCL 20 MG
20 TABLET ORAL EVERY 12 HOURS SCHEDULED
Qty: 60 TABLET | Refills: 5 | Status: SHIPPED | OUTPATIENT
Start: 2025-05-22 | End: 2025-06-21
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
## 2025-05-28 DIAGNOSIS — E55.9 VITAMIN D DEFICIENCY: ICD-10-CM

## 2025-05-28 DIAGNOSIS — E11.65 TYPE 2 DIABETES MELLITUS WITH HYPERGLYCEMIA, WITHOUT LONG-TERM CURRENT USE OF INSULIN (HCC): ICD-10-CM

## 2025-05-28 RX ORDER — LISINOPRIL 2.5 MG/1
2.5 TABLET ORAL DAILY
Qty: 30 TABLET | Refills: 5 | Status: SHIPPED | OUTPATIENT
Start: 2025-05-28

## 2025-05-28 RX ORDER — CHOLECALCIFEROL (VITAMIN D3) 25 MCG
1000 TABLET ORAL DAILY
Qty: 90 TABLET | Refills: 3 | Status: SHIPPED | OUTPATIENT
Start: 2025-05-28

## 2025-05-29 DIAGNOSIS — R05.2 SUBACUTE COUGH: ICD-10-CM

## 2025-05-29 RX ORDER — FLUTICASONE PROPIONATE 50 MCG
1 SPRAY, SUSPENSION (ML) NASAL DAILY
Qty: 9.9 ML | Refills: 0 | Status: SHIPPED | OUTPATIENT
Start: 2025-05-29

## 2025-05-29 NOTE — TELEPHONE ENCOUNTER
Received call from [facility] requesting refill on the following medication. Thank you.     Requested Prescriptions     Pending Prescriptions Disp Refills    fluticasone (FLONASE) 50 mcg/act nasal spray 9.9 mL 0     Si spray into each nostril daily

## 2025-06-02 DIAGNOSIS — Z76.0 ENCOUNTER FOR MEDICATION REFILL: ICD-10-CM

## 2025-06-02 RX ORDER — VALPROIC ACID 250 MG/5ML
SOLUTION ORAL
Qty: 473 ML | Refills: 0 | Status: SHIPPED | OUTPATIENT
Start: 2025-06-02

## 2025-06-02 NOTE — TELEPHONE ENCOUNTER
Received fax from patient pharmacy requesting refill on the following medication:  Requested Prescriptions     Pending Prescriptions Disp Refills    valproic acid (DEPAKENE) 250 MG/5ML soln 473 mL 0     Sig: Take 10 mL by mouth 2 times daily @ 9 AM and 5 PM and 5 mL by mouth @ 9 PM

## 2025-06-04 DIAGNOSIS — E11.9 TYPE 2 DIABETES MELLITUS WITHOUT COMPLICATION, WITHOUT LONG-TERM CURRENT USE OF INSULIN (HCC): ICD-10-CM

## 2025-06-05 RX ORDER — DULAGLUTIDE 0.75 MG/.5ML
0.75 INJECTION, SOLUTION SUBCUTANEOUS WEEKLY
Qty: 2 ML | Refills: 0 | Status: SHIPPED | OUTPATIENT
Start: 2025-06-05

## 2025-06-20 DIAGNOSIS — H04.129 DRY EYE: ICD-10-CM

## 2025-06-20 RX ORDER — CARBOXYMETHYLCELLULOSE SODIUM AND GLYCERIN 5; 9 MG/ML; MG/ML
1 SOLUTION/ DROPS OPHTHALMIC AS NEEDED
Qty: 15 ML | Refills: 5 | Status: SHIPPED | OUTPATIENT
Start: 2025-06-20 | End: 2025-06-23

## 2025-06-23 ENCOUNTER — TELEPHONE (OUTPATIENT)
Age: 45
End: 2025-06-23

## 2025-06-23 ENCOUNTER — TELEPHONE (OUTPATIENT)
Dept: FAMILY MEDICINE CLINIC | Facility: CLINIC | Age: 45
End: 2025-06-23

## 2025-06-23 DIAGNOSIS — H04.129 DRY EYE: ICD-10-CM

## 2025-06-23 RX ORDER — CARBOXYMETHYLCELLULOSE SODIUM AND GLYCERIN 5; 9 MG/ML; MG/ML
1 SOLUTION/ DROPS OPHTHALMIC 2 TIMES DAILY PRN
Qty: 15 ML | Refills: 5 | Status: SHIPPED | OUTPATIENT
Start: 2025-06-23

## 2025-06-23 NOTE — TELEPHONE ENCOUNTER
As patient is part of a group home setting they need very specific details as to when the patient is to receive the drops and however many times a day, they can not use PRN, please update to reflect drops per day (once daily OU, twice daily OU) and send back to the pharmacy once updated thank you

## 2025-06-23 NOTE — TELEPHONE ENCOUNTER
[facility] Opinion of patient's capability to manage benefit complete by Dr. Goel, called patient  Carlyn to inform that form is ready for pick-up.     Form placed in pick-up bin

## 2025-06-25 DIAGNOSIS — E11.9 TYPE 2 DIABETES MELLITUS WITHOUT COMPLICATION, WITHOUT LONG-TERM CURRENT USE OF INSULIN (HCC): ICD-10-CM

## 2025-06-26 RX ORDER — DULAGLUTIDE 0.75 MG/.5ML
INJECTION, SOLUTION SUBCUTANEOUS
Qty: 2 ML | Refills: 10 | Status: SHIPPED | OUTPATIENT
Start: 2025-06-26

## 2025-06-27 DIAGNOSIS — R10.84 GENERALIZED ABDOMINAL PAIN: ICD-10-CM

## 2025-06-27 RX ORDER — FAMOTIDINE 40 MG/1
40 TABLET, FILM COATED ORAL
Qty: 30 TABLET | Refills: 0 | Status: SHIPPED | OUTPATIENT
Start: 2025-06-27

## 2025-07-01 ENCOUNTER — TELEPHONE (OUTPATIENT)
Dept: FAMILY MEDICINE CLINIC | Facility: CLINIC | Age: 45
End: 2025-07-01

## 2025-07-01 NOTE — TELEPHONE ENCOUNTER
Received form from [facility] clinician to be reviewed by Dr. Pompa. Patient of Dr. Goel..     Please fax form to [phone]

## 2025-07-02 ENCOUNTER — TELEPHONE (OUTPATIENT)
Dept: FAMILY MEDICINE CLINIC | Facility: CLINIC | Age: 45
End: 2025-07-02

## 2025-07-08 ENCOUNTER — TELEPHONE (OUTPATIENT)
Dept: FAMILY MEDICINE CLINIC | Facility: CLINIC | Age: 45
End: 2025-07-08

## 2025-07-08 ENCOUNTER — OFFICE VISIT (OUTPATIENT)
Dept: FAMILY MEDICINE CLINIC | Facility: CLINIC | Age: 45
End: 2025-07-08

## 2025-07-08 VITALS
HEIGHT: 69 IN | SYSTOLIC BLOOD PRESSURE: 115 MMHG | RESPIRATION RATE: 18 BRPM | HEART RATE: 87 BPM | WEIGHT: 189.8 LBS | BODY MASS INDEX: 28.11 KG/M2 | TEMPERATURE: 98.2 F | DIASTOLIC BLOOD PRESSURE: 80 MMHG | OXYGEN SATURATION: 99 %
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
## 2025-07-08 DIAGNOSIS — E11.9 TYPE 2 DIABETES MELLITUS WITHOUT COMPLICATION, WITHOUT LONG-TERM CURRENT USE OF INSULIN (HCC): Primary | ICD-10-CM

## 2025-07-08 LAB — SL AMB POCT HEMOGLOBIN AIC: 6.7 (ref ?–6.5)

## 2025-07-08 PROCEDURE — 83036 HEMOGLOBIN GLYCOSYLATED A1C: CPT | Performed by: FAMILY MEDICINE

## 2025-07-08 PROCEDURE — G2211 COMPLEX E/M VISIT ADD ON: HCPCS | Performed by: FAMILY MEDICINE

## 2025-07-08 PROCEDURE — 99214 OFFICE O/P EST MOD 30 MIN: CPT | Performed by: FAMILY MEDICINE
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
## 2025-07-08 NOTE — ASSESSMENT & PLAN NOTE
Lab Results   Component Value Date    HGBA1C 6.7 (A) 07/08/2025     A1C improved with current medications Trulicity and Jardiance. BMI still elevated. Will F/U for A1C. Brings BS log to visit, average 120-125.

## 2025-07-08 NOTE — ASSESSMENT & PLAN NOTE
Lab Results   Component Value Date    HGBA1C 6.7 (A) 07/08/2025     - A1C today improved at 6.7 from 7.7 in April 2025  - Continue to decrease soda intake. Encouraged more fruits and vegetables.  - BS log avg 120s    Plan  - Continue Trulicity and Jardiance  Orders:    POCT hemoglobin A1c

## 2025-07-16 ENCOUNTER — OFFICE VISIT (OUTPATIENT)
Dept: URGENT CARE | Facility: MEDICAL CENTER | Age: 45
End: 2025-07-16
Payer: MEDICARE

## 2025-07-16 ENCOUNTER — APPOINTMENT (OUTPATIENT)
Dept: URGENT CARE | Facility: MEDICAL CENTER | Age: 45
End: 2025-07-16
Payer: MEDICARE

## 2025-07-16 VITALS
BODY MASS INDEX: 29.24 KG/M2 | DIASTOLIC BLOOD PRESSURE: 89 MMHG | WEIGHT: 198 LBS | RESPIRATION RATE: 18 BRPM | HEART RATE: 102 BPM | TEMPERATURE: 97.3 F | SYSTOLIC BLOOD PRESSURE: 140 MMHG | OXYGEN SATURATION: 99 %

## 2025-07-16 DIAGNOSIS — L02.31 ABSCESS OF RIGHT BUTTOCK: Primary | ICD-10-CM

## 2025-07-16 PROCEDURE — 99213 OFFICE O/P EST LOW 20 MIN: CPT

## 2025-07-16 PROCEDURE — G0463 HOSPITAL OUTPT CLINIC VISIT: HCPCS

## 2025-07-16 RX ORDER — DOXYCYCLINE 100 MG/1
100 CAPSULE ORAL EVERY 12 HOURS SCHEDULED
Qty: 12 CAPSULE | Refills: 0 | Status: SHIPPED | OUTPATIENT
Start: 2025-07-17 | End: 2025-07-23

## 2025-07-16 RX ORDER — DOXYCYCLINE 100 MG/1
100 CAPSULE ORAL EVERY 12 HOURS SCHEDULED
Qty: 2 CAPSULE | Refills: 0 | Status: SHIPPED | COMMUNITY
Start: 2025-07-16 | End: 2025-07-17

## 2025-07-16 RX ORDER — DOXYCYCLINE 100 MG/1
100 CAPSULE ORAL ONCE
Status: DISCONTINUED | OUTPATIENT
Start: 2025-07-16 | End: 2025-07-16

## 2025-07-16 NOTE — PATIENT INSTRUCTIONS
Take antibiotics as prescribed.   Take entire course of antibiotics.      Eat yogurt with live and active cultures and/or take a probiotic at least 3 hours before or after antibiotic dose.   Monitor stool for diarrhea and/or blood. If this occurs, contact primary care doctor ASAP.     Doxycycline may cause your skin to be more sensitive to the sunlight than it is normally. Exposure to sunlight, even for short periods of time, may cause skin rash, itching, redness or other discoloration of the skin, or a severe sunburn.     Stay out of direct sunlight, especially between hours of 10am-3pm, if possible  Wear protective clothing, including a hat  Wear sunglasses  Apply sunblock   Apply sunblock lipstick  Do not use a sun lamp or tanning bed or vogel.    Hold any calcium, iron or multivitamin which can inhibit the absorption of Doxycycline as discussed.      Good hand hygiene  Avoid scratching area  Keep area clean and dry    Skin marked with skin marker.     Monitor site for signs of infection including but not limited to increased redness, swelling, discharge, drainage, streaking, or if you develop any fever, body ache, chills, new joint pain or swelling, headache or dizziness, please proceed to the ER.       Follow up with PCP in 3-5 days.   Proceed to ER if symptoms worsen.
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
## 2025-07-18 ENCOUNTER — HOSPITAL ENCOUNTER (EMERGENCY)
Facility: HOSPITAL | Age: 45
Discharge: HOME | End: 2025-07-18
Attending: EMERGENCY MEDICINE
Payer: MEDICARE

## 2025-07-18 VITALS
HEART RATE: 90 BPM | SYSTOLIC BLOOD PRESSURE: 156 MMHG | DIASTOLIC BLOOD PRESSURE: 70 MMHG | OXYGEN SATURATION: 98 % | TEMPERATURE: 97.5 F | RESPIRATION RATE: 18 BRPM

## 2025-07-18 DIAGNOSIS — L02.91 ABSCESS: Primary | ICD-10-CM

## 2025-07-18 PROCEDURE — 99284 EMERGENCY DEPT VISIT MOD MDM: CPT | Performed by: PHYSICIAN ASSISTANT

## 2025-07-18 PROCEDURE — 10061 I&D ABSCESS COMP/MULTIPLE: CPT | Performed by: PHYSICIAN ASSISTANT

## 2025-07-18 PROCEDURE — 99282 EMERGENCY DEPT VISIT SF MDM: CPT

## 2025-07-18 RX ORDER — LIDOCAINE HYDROCHLORIDE AND EPINEPHRINE 10; 10 MG/ML; UG/ML
20 INJECTION, SOLUTION INFILTRATION; PERINEURAL ONCE
Status: COMPLETED | OUTPATIENT
Start: 2025-07-18 | End: 2025-07-18

## 2025-07-18 RX ADMIN — LIDOCAINE HYDROCHLORIDE,EPINEPHRINE BITARTRATE 20 ML: 10; .01 INJECTION, SOLUTION INFILTRATION; PERINEURAL at 09:34

## 2025-07-18 NOTE — DISCHARGE INSTRUCTIONS
Remove packing in 2 days    You may shower on Sunday.  Apply a dry guaze when you are done    Return to the ER if you have any fever, chills, weakness or increased redness.    You can finish the Doxycyline
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
## 2025-07-21 ENCOUNTER — TELEPHONE (OUTPATIENT)
Dept: FAMILY MEDICINE CLINIC | Facility: CLINIC | Age: 45
End: 2025-07-21

## 2025-07-21 NOTE — TELEPHONE ENCOUNTER
hi my name is trung cox i'm the nurse that [place] one of your patients lima TREVINO YOB: 1980 we have information for you about a recent med error unfortunately he did not receive his 5:00 PM dose of metformin he was given his 8:00 AM dose but not at 5:00 PM dose but we are now on the right track and he is receiving both doses but we did want to inform you of this it start the med error started on july 1st and it's been up until a couple of days ago if you could please call me back on my cell phone number [phone] again this is trung the nurse from [employer] regarding gil trevino thank you      Called and spoke with Trung she reported he had not received his 5PM dose of Metformin since July 1st. He had only received his 8am dose. Patient lives in a group home, they opened an investigation as to why the patient never received his medications. They had the medications it was just not given. Per their protocol they had to call PCP to make aware please review thank you

## 2025-07-23 ENCOUNTER — OFFICE VISIT (OUTPATIENT)
Dept: FAMILY MEDICINE CLINIC | Facility: CLINIC | Age: 45
End: 2025-07-23

## 2025-07-23 ENCOUNTER — TELEPHONE (OUTPATIENT)
Dept: FAMILY MEDICINE CLINIC | Facility: CLINIC | Age: 45
End: 2025-07-23

## 2025-07-23 VITALS
TEMPERATURE: 97.7 F | RESPIRATION RATE: 18 BRPM | DIASTOLIC BLOOD PRESSURE: 71 MMHG | SYSTOLIC BLOOD PRESSURE: 105 MMHG | BODY MASS INDEX: 27.99 KG/M2 | WEIGHT: 189 LBS | HEIGHT: 69 IN | HEART RATE: 80 BPM | OXYGEN SATURATION: 100 %

## 2025-07-23 DIAGNOSIS — Z00.00 MEDICARE ANNUAL WELLNESS VISIT, SUBSEQUENT: Primary | ICD-10-CM

## 2025-07-23 DIAGNOSIS — Z12.5 SCREENING FOR PROSTATE CANCER: ICD-10-CM

## 2025-07-23 DIAGNOSIS — L02.31 ABSCESS OF RIGHT BUTTOCK: ICD-10-CM

## 2025-07-23 NOTE — PATIENT INSTRUCTIONS
Medicare Preventive Visit Patient Instructions  Thank you for completing your Welcome to Medicare Visit or Medicare Annual Wellness Visit today. Your next wellness visit will be due in one year (7/24/2026).  The screening/preventive services that you may require over the next 5-10 years are detailed below. Some tests may not apply to you based off risk factors and/or age. Screening tests ordered at today's visit but not completed yet may show as past due. Also, please note that scanned in results may not display below.  Preventive Screenings:  Service Recommendations Previous Testing/Comments   Colorectal Cancer Screening  Colonoscopy    Fecal Occult Blood Test (FOBT)/Fecal Immunochemical Test (FIT)  Fecal DNA/Cologuard Test  Flexible Sigmoidoscopy Age: 45-75 years old   Colonoscopy: every 10 years (May be performed more frequently if at higher risk)  OR  FOBT/FIT: every 1 year  OR  Cologuard: every 3 years  OR  Sigmoidoscopy: every 5 years  Screening may be recommended earlier than age 45 if at higher risk for colorectal cancer. Also, an individualized decision between you and your healthcare provider will decide whether screening between the ages of 76-85 would be appropriate. Colonoscopy: Not on file  FOBT/FIT: Not on file  Cologuard: Not on file  Sigmoidoscopy: Not on file          Prostate Cancer Screening Individualized decision between patient and health care provider in men between ages of 55-69   Medicare will cover every 12 months beginning on the day after your 50th birthday PSA: 0.70 ng/mL     Screening Not Indicated  Screening Not Indicated     Hepatitis C Screening Once for adults born between 1945 and 1965  More frequently in patients at high risk for Hepatitis C Hep C Antibody: 08/30/2022    Screening Current  Screening Current   Diabetes Screening 1-2 times per year if you're at risk for diabetes or have pre-diabetes Fasting glucose: 159 mg/dL (4/2/2025)  A1C: 6.7 (7/8/2025)  Screening Not  Indicated  History Diabetes  Screening Not Indicated  History Diabetes   Cholesterol Screening Once every 5 years if you don't have a lipid disorder. May order more often based on risk factors. Lipid panel: 04/02/2025  Screening Not Indicated  History Lipid Disorder  Screening Not Indicated  History Lipid Disorder      Other Preventive Screenings Covered by Medicare:  Abdominal Aortic Aneurysm (AAA) Screening: covered once if your at risk. You're considered to be at risk if you have a family history of AAA or a male between the age of 65-75 who smoking at least 100 cigarettes in your lifetime.  Lung Cancer Screening: covers low dose CT scan once per year if you meet all of the following conditions: (1) Age 55-77; (2) No signs or symptoms of lung cancer; (3) Current smoker or have quit smoking within the last 15 years; (4) You have a tobacco smoking history of at least 20 pack years (packs per day x number of years you smoked); (5) You get a written order from a healthcare provider.  Glaucoma Screening: covered annually if you're considered high risk: (1) You have diabetes OR (2) Family history of glaucoma OR (3)  aged 50 and older OR (4)  American aged 65 and older  Osteoporosis Screening: covered every 2 years if you meet one of the following conditions: (1) Have a vertebral abnormality; (2) On glucocorticoid therapy for more than 3 months; (3) Have primary hyperparathyroidism; (4) On osteoporosis medications and need to assess response to drug therapy.  HIV Screening: covered annually if you're between the age of 15-65. Also covered annually if you are younger than 15 and older than 65 with risk factors for HIV infection. For pregnant patients, it is covered up to 3 times per pregnancy.    Immunizations:  Immunization Recommendations   Influenza Vaccine Annual influenza vaccination during flu season is recommended for all persons aged >= 6 months who do not have contraindications    Pneumococcal Vaccine   * Pneumococcal conjugate vaccine = PCV13 (Prevnar 13), PCV15 (Vaxneuvance), PCV20 (Prevnar 20)  * Pneumococcal polysaccharide vaccine = PPSV23 (Pneumovax) Adults 19-65 yo with certain risk factors or if 65+ yo  If never received any pneumonia vaccine: recommend Prevnar 20 (PCV20)  Give PCV20 if previously received 1 dose of PCV13 or PPSV23   Hepatitis B Vaccine 3 dose series if at intermediate or high risk (ex: diabetes, end stage renal disease, liver disease)   Respiratory syncytial virus (RSV) Vaccine - COVERED BY MEDICARE PART D  * RSVPreF3 (Arexvy) CDC recommends that adults 60 years of age and older may receive a single dose of RSV vaccine using shared clinical decision-making (SCDM)   Tetanus (Td) Vaccine - COST NOT COVERED BY MEDICARE PART B Following completion of primary series, a booster dose should be given every 10 years to maintain immunity against tetanus. Td may also be given as tetanus wound prophylaxis.   Tdap Vaccine - COST NOT COVERED BY MEDICARE PART B Recommended at least once for all adults. For pregnant patients, recommended with each pregnancy.   Shingles Vaccine (Shingrix) - COST NOT COVERED BY MEDICARE PART B  2 shot series recommended in those 19 years and older who have or will have weakened immune systems or those 50 years and older     Health Maintenance Due:      Topic Date Due   • HIV Screening  Completed   • Hepatitis C Screening  Completed     Immunizations Due:      Topic Date Due   • HPV Vaccine (1 - 3-dose SCDM series) Never done   • COVID-19 Vaccine (5 - 2024-25 season) 09/01/2024   • Influenza Vaccine (1) 09/01/2025     Advance Directives   What are advance directives?  Advance directives are legal documents that state your wishes and plans for medical care. These plans are made ahead of time in case you lose your ability to make decisions for yourself. Advance directives can apply to any medical decision, such as the treatments you want, and if you  want to donate organs.   What are the types of advance directives?  There are many types of advance directives, and each state has rules about how to use them. You may choose a combination of any of the following:  Living will:  This is a written record of the treatment you want. You can also choose which treatments you do not want, which to limit, and which to stop at a certain time. This includes surgery, medicine, IV fluid, and tube feedings.   Durable power of  for healthcare (DPAHC):  This is a written record that states who you want to make healthcare choices for you when you are unable to make them for yourself. This person, called a proxy, is usually a family member or a friend. You may choose more than 1 proxy.  Do not resuscitate (DNR) order:  A DNR order is used in case your heart stops beating or you stop breathing. It is a request not to have certain forms of treatment, such as CPR. A DNR order may be included in other types of advance directives.  Medical directive:  This covers the care that you want if you are in a coma, near death, or unable to make decisions for yourself. You can list the treatments you want for each condition. Treatment may include pain medicine, surgery, blood transfusions, dialysis, IV or tube feedings, and a ventilator (breathing machine).  Values history:  This document has questions about your views, beliefs, and how you feel and think about life. This information can help others choose the care that you would choose.  Why are advance directives important?  An advance directive helps you control your care. Although spoken wishes may be used, it is better to have your wishes written down. Spoken wishes can be misunderstood, or not followed. Treatments may be given even if you do not want them. An advance directive may make it easier for your family to make difficult choices about your care.   Weight Management   Why it is important to manage your weight:  Being  overweight increases your risk of health conditions such as heart disease, high blood pressure, type 2 diabetes, and certain types of cancer. It can also increase your risk for osteoarthritis, sleep apnea, and other respiratory problems. Aim for a slow, steady weight loss. Even a small amount of weight loss can lower your risk of health problems.  How to lose weight safely:  A safe and healthy way to lose weight is to eat fewer calories and get regular exercise. You can lose up about 1 pound a week by decreasing the number of calories you eat by 500 calories each day.   Healthy meal plan for weight management:  A healthy meal plan includes a variety of foods, contains fewer calories, and helps you stay healthy. A healthy meal plan includes the following:  Eat whole-grain foods more often.  A healthy meal plan should contain fiber. Fiber is the part of grains, fruits, and vegetables that is not broken down by your body. Whole-grain foods are healthy and provide extra fiber in your diet. Some examples of whole-grain foods are whole-wheat breads and pastas, oatmeal, brown rice, and bulgur.  Eat a variety of vegetables every day.  Include dark, leafy greens such as spinach, kale, stu greens, and mustard greens. Eat yellow and orange vegetables such as carrots, sweet potatoes, and winter squash.   Eat a variety of fruits every day.  Choose fresh or canned fruit (canned in its own juice or light syrup) instead of juice. Fruit juice has very little or no fiber.  Eat low-fat dairy foods.  Drink fat-free (skim) milk or 1% milk. Eat fat-free yogurt and low-fat cottage cheese. Try low-fat cheeses such as mozzarella and other reduced-fat cheeses.  Choose meat and other protein foods that are low in fat.  Choose beans or other legumes such as split peas or lentils. Choose fish, skinless poultry (chicken or turkey), or lean cuts of red meat (beef or pork). Before you cook meat or poultry, cut off any visible fat.   Use less  fat and oil.  Try baking foods instead of frying them. Add less fat, such as margarine, sour cream, regular salad dressing and mayonnaise to foods. Eat fewer high-fat foods. Some examples of high-fat foods include french fries, doughnuts, ice cream, and cakes.  Eat fewer sweets.  Limit foods and drinks that are high in sugar. This includes candy, cookies, regular soda, and sweetened drinks.  Exercise:  Exercise at least 30 minutes per day on most days of the week. Some examples of exercise include walking, biking, dancing, and swimming. You can also fit in more physical activity by taking the stairs instead of the elevator or parking farther away from stores. Ask your healthcare provider about the best exercise plan for you.    © Copyright [employer] 2018 Information is for End User's use only and may not be sold, redistributed or otherwise used for commercial purposes. All illustrations and images included in CareNotes® are the copyrighted property of [employer] or [employer]    Medicare Preventive Visit Patient Instructions  Thank you for completing your Welcome to Medicare Visit or Medicare Annual Wellness Visit today. Your next wellness visit will be due in one year (7/24/2026).  The screening/preventive services that you may require over the next 5-10 years are detailed below. Some tests may not apply to you based off risk factors and/or age. Screening tests ordered at today's visit but not completed yet may show as past due. Also, please note that scanned in results may not display below.  Preventive Screenings:  Service Recommendations Previous Testing/Comments   Colorectal Cancer Screening  Colonoscopy    Fecal Occult Blood Test (FOBT)/Fecal Immunochemical Test (FIT)  Fecal DNA/Cologuard Test  Flexible Sigmoidoscopy Age: 45-75 years old   Colonoscopy: every 10 years (May be performed more frequently if at higher risk)  OR  FOBT/FIT: every 1 year  OR  Cologuard: every 3 years   OR  Sigmoidoscopy: every 5 years  Screening may be recommended earlier than age 45 if at higher risk for colorectal cancer. Also, an individualized decision between you and your healthcare provider will decide whether screening between the ages of 76-85 would be appropriate. Colonoscopy: Not on file  FOBT/FIT: Not on file  Cologuard: Not on file  Sigmoidoscopy: Not on file          Prostate Cancer Screening Individualized decision between patient and health care provider in men between ages of 55-69   Medicare will cover every 12 months beginning on the day after your 50th birthday PSA: 0.70 ng/mL     Screening Not Indicated  Screening Not Indicated     Hepatitis C Screening Once for adults born between 1945 and 1965  More frequently in patients at high risk for Hepatitis C Hep C Antibody: 08/30/2022    Screening Current  Screening Current   Diabetes Screening 1-2 times per year if you're at risk for diabetes or have pre-diabetes Fasting glucose: 159 mg/dL (4/2/2025)  A1C: 6.7 (7/8/2025)  Screening Not Indicated  History Diabetes  Screening Not Indicated  History Diabetes   Cholesterol Screening Once every 5 years if you don't have a lipid disorder. May order more often based on risk factors. Lipid panel: 04/02/2025  Screening Not Indicated  History Lipid Disorder  Screening Not Indicated  History Lipid Disorder      Other Preventive Screenings Covered by Medicare:  Abdominal Aortic Aneurysm (AAA) Screening: covered once if your at risk. You're considered to be at risk if you have a family history of AAA or a male between the age of 65-75 who smoking at least 100 cigarettes in your lifetime.  Lung Cancer Screening: covers low dose CT scan once per year if you meet all of the following conditions: (1) Age 55-77; (2) No signs or symptoms of lung cancer; (3) Current smoker or have quit smoking within the last 15 years; (4) You have a tobacco smoking history of at least 20 pack years (packs per day x number of years you  smoked); (5) You get a written order from a healthcare provider.  Glaucoma Screening: covered annually if you're considered high risk: (1) You have diabetes OR (2) Family history of glaucoma OR (3)  aged 50 and older OR (4)  American aged 65 and older  Osteoporosis Screening: covered every 2 years if you meet one of the following conditions: (1) Have a vertebral abnormality; (2) On glucocorticoid therapy for more than 3 months; (3) Have primary hyperparathyroidism; (4) On osteoporosis medications and need to assess response to drug therapy.  HIV Screening: covered annually if you're between the age of 15-65. Also covered annually if you are younger than 15 and older than 65 with risk factors for HIV infection. For pregnant patients, it is covered up to 3 times per pregnancy.    Immunizations:  Immunization Recommendations   Influenza Vaccine Annual influenza vaccination during flu season is recommended for all persons aged >= 6 months who do not have contraindications   Pneumococcal Vaccine   * Pneumococcal conjugate vaccine = PCV13 (Prevnar 13), PCV15 (Vaxneuvance), PCV20 (Prevnar 20)  * Pneumococcal polysaccharide vaccine = PPSV23 (Pneumovax) Adults 19-63 yo with certain risk factors or if 65+ yo  If never received any pneumonia vaccine: recommend Prevnar 20 (PCV20)  Give PCV20 if previously received 1 dose of PCV13 or PPSV23   Hepatitis B Vaccine 3 dose series if at intermediate or high risk (ex: diabetes, end stage renal disease, liver disease)   Respiratory syncytial virus (RSV) Vaccine - COVERED BY MEDICARE PART D  * RSVPreF3 (Arexvy) CDC recommends that adults 60 years of age and older may receive a single dose of RSV vaccine using shared clinical decision-making (SCDM)   Tetanus (Td) Vaccine - COST NOT COVERED BY MEDICARE PART B Following completion of primary series, a booster dose should be given every 10 years to maintain immunity against tetanus. Td may also be given as tetanus  wound prophylaxis.   Tdap Vaccine - COST NOT COVERED BY MEDICARE PART B Recommended at least once for all adults. For pregnant patients, recommended with each pregnancy.   Shingles Vaccine (Shingrix) - COST NOT COVERED BY MEDICARE PART B  2 shot series recommended in those 19 years and older who have or will have weakened immune systems or those 50 years and older     Health Maintenance Due:      Topic Date Due   • HIV Screening  Completed   • Hepatitis C Screening  Completed     Immunizations Due:      Topic Date Due   • HPV Vaccine (1 - 3-dose SCDM series) Never done   • COVID-19 Vaccine (5 - 2024-25 season) 09/01/2024   • Influenza Vaccine (1) 09/01/2025     Advance Directives   What are advance directives?  Advance directives are legal documents that state your wishes and plans for medical care. These plans are made ahead of time in case you lose your ability to make decisions for yourself. Advance directives can apply to any medical decision, such as the treatments you want, and if you want to donate organs.   What are the types of advance directives?  There are many types of advance directives, and each state has rules about how to use them. You may choose a combination of any of the following:  Living will:  This is a written record of the treatment you want. You can also choose which treatments you do not want, which to limit, and which to stop at a certain time. This includes surgery, medicine, IV fluid, and tube feedings.   Durable power of  for healthcare (DPAHC):  This is a written record that states who you want to make healthcare choices for you when you are unable to make them for yourself. This person, called a proxy, is usually a family member or a friend. You may choose more than 1 proxy.  Do not resuscitate (DNR) order:  A DNR order is used in case your heart stops beating or you stop breathing. It is a request not to have certain forms of treatment, such as CPR. A DNR order may be  included in other types of advance directives.  Medical directive:  This covers the care that you want if you are in a coma, near death, or unable to make decisions for yourself. You can list the treatments you want for each condition. Treatment may include pain medicine, surgery, blood transfusions, dialysis, IV or tube feedings, and a ventilator (breathing machine).  Values history:  This document has questions about your views, beliefs, and how you feel and think about life. This information can help others choose the care that you would choose.  Why are advance directives important?  An advance directive helps you control your care. Although spoken wishes may be used, it is better to have your wishes written down. Spoken wishes can be misunderstood, or not followed. Treatments may be given even if you do not want them. An advance directive may make it easier for your family to make difficult choices about your care.   Weight Management   Why it is important to manage your weight:  Being overweight increases your risk of health conditions such as heart disease, high blood pressure, type 2 diabetes, and certain types of cancer. It can also increase your risk for osteoarthritis, sleep apnea, and other respiratory problems. Aim for a slow, steady weight loss. Even a small amount of weight loss can lower your risk of health problems.  How to lose weight safely:  A safe and healthy way to lose weight is to eat fewer calories and get regular exercise. You can lose up about 1 pound a week by decreasing the number of calories you eat by 500 calories each day.   Healthy meal plan for weight management:  A healthy meal plan includes a variety of foods, contains fewer calories, and helps you stay healthy. A healthy meal plan includes the following:  Eat whole-grain foods more often.  A healthy meal plan should contain fiber. Fiber is the part of grains, fruits, and vegetables that is not broken down by your body.  Whole-grain foods are healthy and provide extra fiber in your diet. Some examples of whole-grain foods are whole-wheat breads and pastas, oatmeal, brown rice, and bulgur.  Eat a variety of vegetables every day.  Include dark, leafy greens such as spinach, kale, stu greens, and mustard greens. Eat yellow and orange vegetables such as carrots, sweet potatoes, and winter squash.   Eat a variety of fruits every day.  Choose fresh or canned fruit (canned in its own juice or light syrup) instead of juice. Fruit juice has very little or no fiber.  Eat low-fat dairy foods.  Drink fat-free (skim) milk or 1% milk. Eat fat-free yogurt and low-fat cottage cheese. Try low-fat cheeses such as mozzarella and other reduced-fat cheeses.  Choose meat and other protein foods that are low in fat.  Choose beans or other legumes such as split peas or lentils. Choose fish, skinless poultry (chicken or turkey), or lean cuts of red meat (beef or pork). Before you cook meat or poultry, cut off any visible fat.   Use less fat and oil.  Try baking foods instead of frying them. Add less fat, such as margarine, sour cream, regular salad dressing and mayonnaise to foods. Eat fewer high-fat foods. Some examples of high-fat foods include french fries, doughnuts, ice cream, and cakes.  Eat fewer sweets.  Limit foods and drinks that are high in sugar. This includes candy, cookies, regular soda, and sweetened drinks.  Exercise:  Exercise at least 30 minutes per day on most days of the week. Some examples of exercise include walking, biking, dancing, and swimming. You can also fit in more physical activity by taking the stairs instead of the elevator or parking farther away from stores. Ask your healthcare provider about the best exercise plan for you.    © Copyright [employer] 2018 Information is for End User's use only and may not be sold, redistributed or otherwise used for commercial purposes. All illustrations and images included in  CareNotes® are the copyrighted property of [employer]. or [employer]

## 2025-07-23 NOTE — PROGRESS NOTES
Name: Sandor Ratliff      : 1980      MRN: [id number]  Encounter Provider: Mira Oro DO  Encounter Date: 2025   Encounter department: [facility] [place]  :  Assessment & Plan       Preventive health issues were discussed with patient, and age appropriate screening tests were ordered as noted in patient's After Visit Summary. Personalized health advice and appropriate referrals for health education or preventive services given if needed, as noted in patient's After Visit Summary.    History of Present Illness     HPI   Patient Care Team:  Mira Oro DO as PCP - General (Family Medicine)  DO Kendall Angel MD Adam Dratch, MD (Nephrology)    Review of Systems  Medical History Reviewed by provider this encounter:       Annual Wellness Visit Questionnaire       Preventive Screenings      Cardiovascular Screening:    General: Screening Not Indicated and History Lipid Disorder      Diabetes Screening:     General: Screening Not Indicated and History Diabetes      Prostate Cancer Screening:    General: Screening Not Indicated      Abdominal Aortic Aneurysm (AAA) Screening:    Risk factors include: tobacco use        Lung Cancer Screening:     General: Screening Not Indicated      Hepatitis C Screening:    General: Screening Current    Social Drivers of Health     Financial Resource Strain: Low Risk  (4/10/2025)    Overall Financial Resource Strain (CARDIA)    • Difficulty of Paying Living Expenses: Not hard at all   Food Insecurity: No Food Insecurity (4/10/2025)    Nursing - Inadequate Food Risk Classification    • Worried About Running Out of Food in the Last Year: Never true    • Ran Out of Food in the Last Year: Never true   Transportation Needs: No Transportation Needs (4/10/2025)    PRAPARE - Transportation    • Lack of Transportation (Medical): No    • Lack of Transportation (Non-Medical): No   Housing Stability: Low Risk  (4/10/2025)     Housing Stability Vital Sign    • Unable to Pay for Housing in the Last Year: No    • Number of Times Moved in the Last Year: 1    • Homeless in the Last Year: No   Utilities: Not At Risk (4/10/2025)    [facility] Utilities    • Threatened with loss of utilities: No     No results found.    Objective   There were no vitals taken for this visit.    Physical Exam

## 2025-07-23 NOTE — PROGRESS NOTES
Name: Sandor Ratliff      : 1980      MRN: [id number]  Encounter Provider: Mira Oro DO  Encounter Date: 2025   Encounter department: [facility] [place]  :  Assessment & Plan  Medicare annual wellness visit, subsequent  Pt is medically healthy and no changes were made during today's visit. Please refer to smartset below.       Screening for prostate cancer  - Father has prostate cancer  - Last ordered 3/24, PSA was normal    Plan  - Ordered PSA screening exam  Orders:    PSA, total and free; Future    Abscess of right buttock  - I&D performed on  in ER  - Wound packing and dressing cover was removed  - No concern for infection or inflammation at this time    Plan  - Continue to perform standard wound care         Depression Screening and Follow-up Plan: Patient was screened for depression during today's encounter. They screened negative with a PHQ-2 score of 0.        Preventive health issues were discussed with patient, and age appropriate screening tests were ordered as noted in patient's After Visit Summary. Personalized health advice and appropriate referrals for health education or preventive services given if needed, as noted in patient's After Visit Summary.    History of Present Illness     Pt is 44M here today for MAW. Pt also was seen in ER on  for I&D of abscess on right buttock. Wound packing was taken out at appropriate time and wound is not covered with dressing at this time. Wound is healing appropriately, no concerns for infection or inflammation.        Patient Care Team:  Mira Oro DO as PCP - General (Family Medicine)  DO Kendall Angel MD Adam Dratch, MD (Nephrology)    Review of Systems   Constitutional:  Negative for fatigue and fever.   Respiratory:  Negative for apnea.    Cardiovascular:  Negative for chest pain and palpitations.   Gastrointestinal:  Negative for abdominal distention.     Medical History  Reviewed by provider this encounter:       Annual Wellness Visit Questionnaire   Sandor is here for his Subsequent Wellness visit. Last Medicare Wellness visit information reviewed, patient interviewed, no change since last AWV.     Health Risk Assessment:   Patient rates overall health as good. Patient feels that their physical health rating is slightly better. Patient is very satisfied with their life. Eyesight was rated as same. Hearing was rated as same. Patient feels that their emotional and mental health rating is much better. Patients states they are often angry. Patient states they are sometimes unusually tired/fatigued. Pain experienced in the last 7 days has been some. Patient's pain rating has been 2/10. Patient states that he has experienced weight loss or gain in last 6 months.     Depression Screening:   PHQ-2 Score: 0      Fall Risk Screening:   In the past year, patient has experienced: no history of falling in past year      Home Safety:  Patient does not have trouble with stairs inside or outside of their home. Patient has working smoke alarms and has working carbon monoxide detector. Home safety hazards include: none.     Nutrition:   Current diet is Diabetic, Low Cholesterol, Low Saturated Fat and Limited junk food.     Medications:   Patient is not currently taking any over-the-counter supplements. Patient is able to manage medications.     Activities of Daily Living (ADLs)/Instrumental Activities of Daily Living (IADLs):   Walk and transfer into and out of bed and chair?: Yes  Dress and groom yourself?: Yes    Bathe or shower yourself?: Yes    Feed yourself? Yes  Do your laundry/housekeeping?: Yes  Manage your money, pay your bills and track your expenses?: No  Make your own meals?: Yes    Do your own shopping?: Yes    Previous Hospitalizations:   Any hospitalizations or ED visits within the last 12 months?: No      Advance Care Planning:   Living will: No    Durable POA for healthcare: No     Advanced directive: No    Advanced directive counseling given: Yes    Five wishes given: No      Comments: Opts for CPR and intubation    Cognitive Screening:   Provider or family/friend/caregiver concerned regarding cognition?: No    Preventive Screenings      Cardiovascular Screening:    General: Screening Not Indicated and History Lipid Disorder      Diabetes Screening:     General: Screening Not Indicated and History Diabetes      Colorectal Cancer Screening:     General: Screening Not Indicated      Prostate Cancer Screening:    General: Screening Not Indicated      Osteoporosis Screening:    General: Screening Not Indicated      Abdominal Aortic Aneurysm (AAA) Screening:    Risk factors include: tobacco use        General: Screening Not Indicated      Lung Cancer Screening:     General: Screening Not Indicated      Hepatitis C Screening:    General: Screening Current    Cardiovascular Risk Assessment:  Patient does not have underlying ASCVD and their cardiovascular risk was assessed today. Their cardiovascular risk factors include: hyperlipidemia, overweight/obesity, pre-diabetes or diabetes and CKD/proteinuria.     The ASCVD Risk score (Olamied ASENCIO, et al., 2019) failed to calculate for the following reasons:    The valid total cholesterol range is 130 to 320 mg/dL1.2% ASCVD    Screening, Brief Intervention, and Referral to Treatment (SBIRT)     Screening  Typical number of drinks in a day: 0  Typical number of drinks in a week: 0  Interpretation: Low risk drinking behavior.    AUDIT-C Screenin) How often did you have a drink containing alcohol in the past year? never  2) How many drinks did you have on a typical day when you were drinking in the past year? 0  3) How often did you have 6 or more drinks on one occasion in the past year? never    AUDIT-C Score: 0  Interpretation: Score 0-3 (male): Negative screen for alcohol misuse    Single Item Drug Screening:  How often have you used an illegal drug  (including marijuana) or a prescription medication for non-medical reasons in the past year? never    Single Item Drug Screen Score: 0  Interpretation: Negative screen for possible drug use disorder    Social Drivers of Health     Financial Resource Strain: Low Risk  (4/10/2025)    Overall Financial Resource Strain (CARDIA)     Difficulty of Paying Living Expenses: Not hard at all   Food Insecurity: No Food Insecurity (4/10/2025)    Nursing - Inadequate Food Risk Classification     Worried About Running Out of Food in the Last Year: Never true     Ran Out of Food in the Last Year: Never true   Transportation Needs: No Transportation Needs (4/10/2025)    PRAPARE - Transportation     Lack of Transportation (Medical): No     Lack of Transportation (Non-Medical): No   Housing Stability: Low Risk  (4/10/2025)    Housing Stability Vital Sign     Unable to Pay for Housing in the Last Year: No     Number of Times Moved in the Last Year: 1     Homeless in the Last Year: No   Utilities: Not At Risk (4/10/2025)    [facility] Utilities     Threatened with loss of utilities: No     No results found.    Objective   There were no vitals taken for this visit.    Physical Exam  Constitutional:       Appearance: Normal appearance.     Cardiovascular:      Rate and Rhythm: Normal rate and regular rhythm.      Heart sounds: Normal heart sounds. No murmur heard.  Pulmonary:      Effort: Pulmonary effort is normal. No respiratory distress.      Breath sounds: Normal breath sounds. No stridor. No wheezing.   Abdominal:      General: Abdomen is flat. There is no distension.      Palpations: Abdomen is soft. There is no mass.      Tenderness: There is no abdominal tenderness.     Skin:     General: Skin is warm and dry.      Capillary Refill: Capillary refill takes less than 2 seconds.           Comments: Healing wound on R buttock from recent I&D     Neurological:      Mental Status: He is alert.

## 2025-07-23 NOTE — ASSESSMENT & PLAN NOTE
- I&D performed on 7/18 in ER  - Wound packing and dressing cover was removed  - No concern for infection or inflammation at this time    Plan  - Continue to perform standard wound care

## 2025-07-23 NOTE — TELEPHONE ENCOUNTER
Folder (To be completed by) -Dr Oro     Name of Form - [facility]ote          Patient was made aware of the 7-10 business day form policy.

## 2025-08-05 ENCOUNTER — OFFICE VISIT (OUTPATIENT)
Dept: URGENT CARE | Age: 45
End: 2025-08-05
Payer: MEDICARE

## 2025-08-05 VITALS
BODY MASS INDEX: 27.47 KG/M2 | OXYGEN SATURATION: 99 % | TEMPERATURE: 97.8 F | RESPIRATION RATE: 18 BRPM | HEART RATE: 99 BPM | DIASTOLIC BLOOD PRESSURE: 72 MMHG | WEIGHT: 186 LBS | SYSTOLIC BLOOD PRESSURE: 110 MMHG

## 2025-08-05 DIAGNOSIS — L60.0 INGROWN TOENAIL OF LEFT FOOT: Primary | ICD-10-CM

## 2025-08-05 PROBLEM — H25.12 NUCLEAR SCLEROTIC CATARACT OF LEFT EYE: Status: ACTIVE | Noted: 2017-11-09

## 2025-08-05 PROCEDURE — G0463 HOSPITAL OUTPT CLINIC VISIT: HCPCS | Performed by: PHYSICIAN ASSISTANT

## 2025-08-05 PROCEDURE — 99213 OFFICE O/P EST LOW 20 MIN: CPT | Performed by: PHYSICIAN ASSISTANT

## 2025-08-07 ENCOUNTER — OFFICE VISIT (OUTPATIENT)
Dept: URGENT CARE | Facility: MEDICAL CENTER | Age: 45
End: 2025-08-07
Payer: MEDICARE

## 2025-08-07 VITALS
HEART RATE: 96 BPM | RESPIRATION RATE: 18 BRPM | OXYGEN SATURATION: 99 % | WEIGHT: 187 LBS | TEMPERATURE: 97.3 F | BODY MASS INDEX: 27.62 KG/M2 | DIASTOLIC BLOOD PRESSURE: 77 MMHG | SYSTOLIC BLOOD PRESSURE: 126 MMHG

## 2025-08-07 DIAGNOSIS — R73.9 HYPERGLYCEMIA: Primary | ICD-10-CM
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
## 2025-08-07 LAB
GLUCOSE SERPL-MCNC: 179 MG/DL (ref 65–140)
SL AMB POCT GLUCOSE BLD: 179

## 2025-08-07 PROCEDURE — 99214 OFFICE O/P EST MOD 30 MIN: CPT | Performed by: PHYSICIAN ASSISTANT

## 2025-08-07 PROCEDURE — 82948 REAGENT STRIP/BLOOD GLUCOSE: CPT | Performed by: PHYSICIAN ASSISTANT

## 2025-08-07 PROCEDURE — G0463 HOSPITAL OUTPT CLINIC VISIT: HCPCS | Performed by: PHYSICIAN ASSISTANT

## 2025-08-09 ENCOUNTER — HOSPITAL ENCOUNTER (EMERGENCY)
Facility: HOSPITAL | Age: 45
Discharge: HOME | End: 2025-08-09
Attending: EMERGENCY MEDICINE | Admitting: EMERGENCY MEDICINE
Payer: MEDICARE

## 2025-08-12 ENCOUNTER — TELEPHONE (OUTPATIENT)
Dept: FAMILY MEDICINE CLINIC | Facility: CLINIC | Age: 45
End: 2025-08-12

## 2025-08-12 ENCOUNTER — OFFICE VISIT (OUTPATIENT)
Dept: FAMILY MEDICINE CLINIC | Facility: CLINIC | Age: 45
End: 2025-08-12
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
## 2025-08-18 DIAGNOSIS — R11.2 NAUSEA AND VOMITING, UNSPECIFIED VOMITING TYPE: ICD-10-CM

## 2025-08-18 RX ORDER — ONDANSETRON 4 MG/1
4 TABLET, ORALLY DISINTEGRATING ORAL EVERY 6 HOURS PRN
Qty: 30 TABLET | Refills: 0 | Status: CANCELLED | OUTPATIENT
Start: 2025-08-18

## 2025-08-19 DIAGNOSIS — Z00.00 ANNUAL PHYSICAL EXAM: ICD-10-CM

## 2025-08-19 RX ORDER — MULTIVIT WITH MINERALS/LUTEIN
400 TABLET ORAL DAILY
Qty: 30 CAPSULE | Refills: 5 | Status: CANCELLED | OUTPATIENT
Start: 2025-08-19

## 2025-08-19 RX ORDER — MULTIVIT WITH MINERALS/LUTEIN
400 TABLET ORAL DAILY
Qty: 90 CAPSULE | Refills: 1 | Status: SHIPPED | OUTPATIENT
Start: 2025-08-19

## 2025-08-20 ENCOUNTER — PROCEDURE VISIT (OUTPATIENT)
Dept: PODIATRY | Facility: CLINIC | Age: 45
End: 2025-08-20
Payer: MEDICARE

## 2025-08-20 DIAGNOSIS — M79.675 PAIN IN TOES OF BOTH FEET: ICD-10-CM

## 2025-08-20 DIAGNOSIS — E11.42 DIABETIC PERIPHERAL NEUROPATHY (HCC): ICD-10-CM

## 2025-08-20 DIAGNOSIS — E11.65 TYPE 2 DIABETES MELLITUS WITH HYPERGLYCEMIA, WITHOUT LONG-TERM CURRENT USE OF INSULIN (HCC): ICD-10-CM

## 2025-08-20 DIAGNOSIS — M79.674 PAIN IN TOES OF BOTH FEET: ICD-10-CM

## 2025-08-20 DIAGNOSIS — B35.1 ONYCHOMYCOSIS: Primary | ICD-10-CM

## 2025-08-20 PROCEDURE — 11720 DEBRIDE NAIL 1-5: CPT | Performed by: PODIATRIST

## 2025-08-21 DIAGNOSIS — E11.65 TYPE 2 DIABETES MELLITUS WITH HYPERGLYCEMIA, WITHOUT LONG-TERM CURRENT USE OF INSULIN (HCC): ICD-10-CM

## 2025-08-21 DIAGNOSIS — M79.642 HAND PAIN, LEFT: ICD-10-CM

## 2025-08-21 DIAGNOSIS — R05.2 SUBACUTE COUGH: ICD-10-CM

## 2025-08-21 DIAGNOSIS — H04.129 DRY EYE: ICD-10-CM

## 2025-08-21 RX ORDER — ACETAMINOPHEN 500 MG
1000 TABLET ORAL EVERY 8 HOURS
Qty: 30 TABLET | Refills: 0 | Status: SHIPPED | OUTPATIENT
Start: 2025-08-21

## 2025-08-21 RX ORDER — ALCOHOL ANTISEPTIC PADS
PADS, MEDICATED (EA) TOPICAL AS NEEDED
Qty: 200 EACH | Refills: 0 | Status: SHIPPED | OUTPATIENT
Start: 2025-08-21

## 2025-08-21 RX ORDER — CARBOXYMETHYLCELLULOSE SODIUM AND GLYCERIN 5; 9 MG/ML; MG/ML
1 SOLUTION/ DROPS OPHTHALMIC 2 TIMES DAILY PRN
Qty: 15 ML | Refills: 0 | Status: SHIPPED | OUTPATIENT
Start: 2025-08-21

## 2025-08-21 RX ORDER — FLUTICASONE PROPIONATE 50 MCG
1 SPRAY, SUSPENSION (ML) NASAL DAILY
Qty: 9.9 ML | Refills: 0 | Status: SHIPPED | OUTPATIENT
Start: 2025-08-21

## 2025-08-22 ENCOUNTER — HOSPITAL ENCOUNTER (EMERGENCY)
Facility: HOSPITAL | Age: 45
Discharge: HOME | End: 2025-08-22
Attending: STUDENT IN AN ORGANIZED HEALTH CARE EDUCATION/TRAINING PROGRAM
Payer: MEDICARE

## 2025-08-22 VITALS
BODY MASS INDEX: 28.1 KG/M2 | HEART RATE: 100 BPM | DIASTOLIC BLOOD PRESSURE: 85 MMHG | WEIGHT: 190.26 LBS | SYSTOLIC BLOOD PRESSURE: 125 MMHG | RESPIRATION RATE: 18 BRPM | TEMPERATURE: 98 F | OXYGEN SATURATION: 100 %

## 2025-08-22 DIAGNOSIS — S05.01XA ABRASION OF RIGHT CORNEA, INITIAL ENCOUNTER: Primary | ICD-10-CM

## 2025-08-22 PROCEDURE — 99282 EMERGENCY DEPT VISIT SF MDM: CPT

## 2025-08-22 RX ORDER — TETRACAINE HYDROCHLORIDE 5 MG/ML
2 SOLUTION OPHTHALMIC ONCE
Status: COMPLETED | OUTPATIENT
Start: 2025-08-22 | End: 2025-08-22

## 2025-08-22 RX ORDER — CIPROFLOXACIN HYDROCHLORIDE 3.5 MG/ML
1 SOLUTION/ DROPS TOPICAL ONCE
Status: COMPLETED | OUTPATIENT
Start: 2025-08-22 | End: 2025-08-22

## 2025-08-22 RX ADMIN — TETRACAINE HYDROCHLORIDE 2 DROP: 5 SOLUTION OPHTHALMIC at 21:26

## 2025-08-22 RX ADMIN — CIPROFLOXACIN HYDROCHLORIDE 1 DROP: 3 SOLUTION/ DROPS OPHTHALMIC at 22:37

## 2025-08-22 RX ADMIN — FLUORESCEIN SODIUM 1 STRIP: 1 STRIP OPHTHALMIC at 21:26
